# Patient Record
Sex: MALE | Race: ASIAN | NOT HISPANIC OR LATINO | Employment: FULL TIME | URBAN - METROPOLITAN AREA
[De-identification: names, ages, dates, MRNs, and addresses within clinical notes are randomized per-mention and may not be internally consistent; named-entity substitution may affect disease eponyms.]

---

## 2017-07-13 ENCOUNTER — GENERIC CONVERSION - ENCOUNTER (OUTPATIENT)
Dept: OTHER | Facility: OTHER | Age: 52
End: 2017-07-13

## 2017-08-04 ENCOUNTER — ALLSCRIPTS OFFICE VISIT (OUTPATIENT)
Dept: OTHER | Facility: OTHER | Age: 52
End: 2017-08-04

## 2017-08-04 DIAGNOSIS — E04.2 NONTOXIC MULTINODULAR GOITER: ICD-10-CM

## 2017-08-08 ENCOUNTER — APPOINTMENT (OUTPATIENT)
Dept: URGENT CARE | Facility: MEDICAL CENTER | Age: 52
End: 2017-08-08
Payer: OTHER MISCELLANEOUS

## 2017-08-08 PROCEDURE — G0382 LEV 3 HOSP TYPE B ED VISIT: HCPCS

## 2017-08-08 PROCEDURE — 99283 EMERGENCY DEPT VISIT LOW MDM: CPT

## 2017-08-23 ENCOUNTER — ALLSCRIPTS OFFICE VISIT (OUTPATIENT)
Dept: OTHER | Facility: OTHER | Age: 52
End: 2017-08-23

## 2017-08-24 ENCOUNTER — HOSPITAL ENCOUNTER (OUTPATIENT)
Dept: RADIOLOGY | Facility: HOSPITAL | Age: 52
Discharge: HOME | End: 2017-08-24
Attending: RADIOLOGY

## 2017-08-24 DIAGNOSIS — Z76.89 REFERRAL OF PATIENT WITHOUT EXAMINATION OR TREATMENT: ICD-10-CM

## 2017-09-06 ENCOUNTER — ALLSCRIPTS OFFICE VISIT (OUTPATIENT)
Dept: OTHER | Facility: OTHER | Age: 52
End: 2017-09-06

## 2018-01-10 NOTE — MISCELLANEOUS
Message  Return to work or school:   Gianna Caldwell is under my professional care  He was seen in my office on 9-6-2017   He is able to return to work on  9-7-2017    He can perform work without limitations           Signatures   Electronically signed by : DAYLIN Quintana ; Sep  6 2017  9:04AM EST                       (Author)

## 2018-01-13 VITALS
HEIGHT: 65 IN | SYSTOLIC BLOOD PRESSURE: 154 MMHG | WEIGHT: 161 LBS | OXYGEN SATURATION: 99 % | HEART RATE: 84 BPM | RESPIRATION RATE: 16 BRPM | TEMPERATURE: 97.1 F | DIASTOLIC BLOOD PRESSURE: 84 MMHG | BODY MASS INDEX: 26.82 KG/M2

## 2018-01-13 VITALS
BODY MASS INDEX: 26.82 KG/M2 | WEIGHT: 161 LBS | SYSTOLIC BLOOD PRESSURE: 148 MMHG | HEART RATE: 96 BPM | DIASTOLIC BLOOD PRESSURE: 80 MMHG | HEIGHT: 65 IN

## 2018-01-13 VITALS — WEIGHT: 161 LBS | HEART RATE: 85 BPM | DIASTOLIC BLOOD PRESSURE: 83 MMHG | SYSTOLIC BLOOD PRESSURE: 138 MMHG

## 2019-06-10 ENCOUNTER — TELEPHONE (OUTPATIENT)
Dept: INTERNAL MEDICINE CLINIC | Facility: CLINIC | Age: 54
End: 2019-06-10

## 2021-04-06 ENCOUNTER — HOSPITAL ENCOUNTER (INPATIENT)
Facility: HOSPITAL | Age: 56
LOS: 1 days | DRG: 282 | End: 2021-04-07
Attending: EMERGENCY MEDICINE | Admitting: STUDENT IN AN ORGANIZED HEALTH CARE EDUCATION/TRAINING PROGRAM
Payer: COMMERCIAL

## 2021-04-06 ENCOUNTER — APPOINTMENT (EMERGENCY)
Dept: RADIOLOGY | Facility: HOSPITAL | Age: 56
DRG: 282 | End: 2021-04-06
Payer: COMMERCIAL

## 2021-04-06 ENCOUNTER — APPOINTMENT (INPATIENT)
Dept: INTERVENTIONAL RADIOLOGY/VASCULAR | Facility: HOSPITAL | Age: 56
DRG: 282 | End: 2021-04-06
Attending: INTERNAL MEDICINE
Payer: COMMERCIAL

## 2021-04-06 ENCOUNTER — APPOINTMENT (INPATIENT)
Dept: NON INVASIVE DIAGNOSTICS | Facility: HOSPITAL | Age: 56
DRG: 282 | End: 2021-04-06
Payer: COMMERCIAL

## 2021-04-06 ENCOUNTER — OFFICE VISIT (OUTPATIENT)
Dept: URGENT CARE | Facility: CLINIC | Age: 56
End: 2021-04-06
Payer: COMMERCIAL

## 2021-04-06 VITALS
BODY MASS INDEX: 25.83 KG/M2 | HEIGHT: 65 IN | SYSTOLIC BLOOD PRESSURE: 162 MMHG | HEART RATE: 78 BPM | DIASTOLIC BLOOD PRESSURE: 88 MMHG | TEMPERATURE: 98.7 F | OXYGEN SATURATION: 98 % | WEIGHT: 155 LBS | RESPIRATION RATE: 18 BRPM

## 2021-04-06 DIAGNOSIS — R07.9 CHEST PAIN, UNSPECIFIED TYPE: Primary | ICD-10-CM

## 2021-04-06 DIAGNOSIS — I21.4 NSTEMI (NON-ST ELEVATED MYOCARDIAL INFARCTION) (HCC): ICD-10-CM

## 2021-04-06 DIAGNOSIS — I24.9 ACUTE CORONARY SYNDROME (HCC): Primary | ICD-10-CM

## 2021-04-06 DIAGNOSIS — E83.52 HYPERCALCEMIA: ICD-10-CM

## 2021-04-06 DIAGNOSIS — R07.9 CHEST PAIN, UNSPECIFIED TYPE: ICD-10-CM

## 2021-04-06 PROBLEM — D72.829 LEUKOCYTOSIS: Status: ACTIVE | Noted: 2021-04-06

## 2021-04-06 PROBLEM — Z72.0 TOBACCO ABUSE: Status: ACTIVE | Noted: 2021-04-06

## 2021-04-06 PROBLEM — I16.0 HYPERTENSIVE URGENCY: Status: ACTIVE | Noted: 2021-04-06

## 2021-04-06 PROBLEM — R73.9 HYPERGLYCEMIA: Status: ACTIVE | Noted: 2021-04-06

## 2021-04-06 LAB
ALBUMIN SERPL BCP-MCNC: 4.1 G/DL (ref 3.5–5)
ALP SERPL-CCNC: 109 U/L (ref 46–116)
ALT SERPL W P-5'-P-CCNC: 42 U/L (ref 12–78)
ANION GAP SERPL CALCULATED.3IONS-SCNC: 9 MMOL/L (ref 4–13)
APTT PPP: 32 SECONDS (ref 23–37)
APTT PPP: 47 SECONDS (ref 23–37)
AST SERPL W P-5'-P-CCNC: 73 U/L (ref 5–45)
ATRIAL RATE: 90 BPM
BASOPHILS # BLD AUTO: 0.03 THOUSANDS/ΜL (ref 0–0.1)
BASOPHILS NFR BLD AUTO: 0 % (ref 0–1)
BILIRUB DIRECT SERPL-MCNC: 0.24 MG/DL (ref 0–0.2)
BILIRUB SERPL-MCNC: 0.93 MG/DL (ref 0.2–1)
BUN SERPL-MCNC: 17 MG/DL (ref 5–25)
CALCIUM SERPL-MCNC: 12.4 MG/DL (ref 8.3–10.1)
CHLORIDE SERPL-SCNC: 100 MMOL/L (ref 100–108)
CO2 SERPL-SCNC: 31 MMOL/L (ref 21–32)
CREAT SERPL-MCNC: 1.04 MG/DL (ref 0.6–1.3)
EOSINOPHIL # BLD AUTO: 0.04 THOUSAND/ΜL (ref 0–0.61)
EOSINOPHIL NFR BLD AUTO: 0 % (ref 0–6)
ERYTHROCYTE [DISTWIDTH] IN BLOOD BY AUTOMATED COUNT: 13.4 % (ref 11.6–15.1)
GFR SERPL CREATININE-BSD FRML MDRD: 80 ML/MIN/1.73SQ M
GLUCOSE SERPL-MCNC: 135 MG/DL (ref 65–140)
GLUCOSE SERPL-MCNC: 167 MG/DL (ref 65–140)
GLUCOSE SERPL-MCNC: 171 MG/DL (ref 65–140)
HCT VFR BLD AUTO: 43.3 % (ref 36.5–49.3)
HGB BLD-MCNC: 14.2 G/DL (ref 12–17)
IMM GRANULOCYTES # BLD AUTO: 0.02 THOUSAND/UL (ref 0–0.2)
IMM GRANULOCYTES NFR BLD AUTO: 0 % (ref 0–2)
INR PPP: 1.13 (ref 0.84–1.19)
KCT BLD-ACNC: 144 SEC (ref 89–137)
LIPASE SERPL-CCNC: 146 U/L (ref 73–393)
LYMPHOCYTES # BLD AUTO: 1.69 THOUSANDS/ΜL (ref 0.6–4.47)
LYMPHOCYTES NFR BLD AUTO: 17 % (ref 14–44)
MCH RBC QN AUTO: 26.8 PG (ref 26.8–34.3)
MCHC RBC AUTO-ENTMCNC: 32.8 G/DL (ref 31.4–37.4)
MCV RBC AUTO: 82 FL (ref 82–98)
MONOCYTES # BLD AUTO: 0.69 THOUSAND/ΜL (ref 0.17–1.22)
MONOCYTES NFR BLD AUTO: 7 % (ref 4–12)
NEUTROPHILS # BLD AUTO: 7.7 THOUSANDS/ΜL (ref 1.85–7.62)
NEUTS SEG NFR BLD AUTO: 76 % (ref 43–75)
NRBC BLD AUTO-RTO: 0 /100 WBCS
P AXIS: 72 DEGREES
PLATELET # BLD AUTO: 178 THOUSANDS/UL (ref 149–390)
PMV BLD AUTO: 10.6 FL (ref 8.9–12.7)
POTASSIUM SERPL-SCNC: 4 MMOL/L (ref 3.5–5.3)
PR INTERVAL: 166 MS
PROT SERPL-MCNC: 8.6 G/DL (ref 6.4–8.2)
PROTHROMBIN TIME: 14 SECONDS (ref 11.6–14.5)
QRS AXIS: 85 DEGREES
QRSD INTERVAL: 96 MS
QT INTERVAL: 314 MS
QTC INTERVAL: 384 MS
RBC # BLD AUTO: 5.3 MILLION/UL (ref 3.88–5.62)
SODIUM SERPL-SCNC: 140 MMOL/L (ref 136–145)
SPECIMEN SOURCE: ABNORMAL
T WAVE AXIS: 94 DEGREES
TROPONIN I SERPL-MCNC: 11.87 NG/ML
TROPONIN I SERPL-MCNC: 14.91 NG/ML
TROPONIN I SERPL-MCNC: 16.28 NG/ML
TROPONIN I SERPL-MCNC: 20.19 NG/ML
VENTRICULAR RATE: 90 BPM
WBC # BLD AUTO: 10.17 THOUSAND/UL (ref 4.31–10.16)

## 2021-04-06 PROCEDURE — 99285 EMERGENCY DEPT VISIT HI MDM: CPT

## 2021-04-06 PROCEDURE — 85610 PROTHROMBIN TIME: CPT | Performed by: PHYSICIAN ASSISTANT

## 2021-04-06 PROCEDURE — 85025 COMPLETE CBC W/AUTO DIFF WBC: CPT | Performed by: PHYSICIAN ASSISTANT

## 2021-04-06 PROCEDURE — 99223 1ST HOSP IP/OBS HIGH 75: CPT | Performed by: PHYSICIAN ASSISTANT

## 2021-04-06 PROCEDURE — 82948 REAGENT STRIP/BLOOD GLUCOSE: CPT

## 2021-04-06 PROCEDURE — B211YZZ FLUOROSCOPY OF MULTIPLE CORONARY ARTERIES USING OTHER CONTRAST: ICD-10-PCS | Performed by: INTERNAL MEDICINE

## 2021-04-06 PROCEDURE — 93306 TTE W/DOPPLER COMPLETE: CPT

## 2021-04-06 PROCEDURE — 85730 THROMBOPLASTIN TIME PARTIAL: CPT | Performed by: PHYSICIAN ASSISTANT

## 2021-04-06 PROCEDURE — 85347 COAGULATION TIME ACTIVATED: CPT

## 2021-04-06 PROCEDURE — C9113 INJ PANTOPRAZOLE SODIUM, VIA: HCPCS | Performed by: PHYSICIAN ASSISTANT

## 2021-04-06 PROCEDURE — 99291 CRITICAL CARE FIRST HOUR: CPT | Performed by: PHYSICIAN ASSISTANT

## 2021-04-06 PROCEDURE — 96374 THER/PROPH/DIAG INJ IV PUSH: CPT

## 2021-04-06 PROCEDURE — 93458 L HRT ARTERY/VENTRICLE ANGIO: CPT

## 2021-04-06 PROCEDURE — 36415 COLL VENOUS BLD VENIPUNCTURE: CPT | Performed by: PHYSICIAN ASSISTANT

## 2021-04-06 PROCEDURE — 71045 X-RAY EXAM CHEST 1 VIEW: CPT

## 2021-04-06 PROCEDURE — 4A033BC MEASUREMENT OF ARTERIAL PRESSURE, CORONARY, PERCUTANEOUS APPROACH: ICD-10-PCS | Performed by: INTERNAL MEDICINE

## 2021-04-06 PROCEDURE — 84484 ASSAY OF TROPONIN QUANT: CPT | Performed by: PHYSICIAN ASSISTANT

## 2021-04-06 PROCEDURE — C1769 GUIDE WIRE: HCPCS

## 2021-04-06 PROCEDURE — B215YZZ FLUOROSCOPY OF LEFT HEART USING OTHER CONTRAST: ICD-10-PCS | Performed by: INTERNAL MEDICINE

## 2021-04-06 PROCEDURE — 99152 MOD SED SAME PHYS/QHP 5/>YRS: CPT | Performed by: INTERNAL MEDICINE

## 2021-04-06 PROCEDURE — 99221 1ST HOSP IP/OBS SF/LOW 40: CPT | Performed by: INTERNAL MEDICINE

## 2021-04-06 PROCEDURE — 99153 MOD SED SAME PHYS/QHP EA: CPT

## 2021-04-06 PROCEDURE — 84484 ASSAY OF TROPONIN QUANT: CPT | Performed by: INTERNAL MEDICINE

## 2021-04-06 PROCEDURE — NC001 PR NO CHARGE: Performed by: PHYSICIAN ASSISTANT

## 2021-04-06 PROCEDURE — 93306 TTE W/DOPPLER COMPLETE: CPT | Performed by: INTERNAL MEDICINE

## 2021-04-06 PROCEDURE — C1894 INTRO/SHEATH, NON-LASER: HCPCS

## 2021-04-06 PROCEDURE — 80048 BASIC METABOLIC PNL TOTAL CA: CPT | Performed by: PHYSICIAN ASSISTANT

## 2021-04-06 PROCEDURE — 4A023N7 MEASUREMENT OF CARDIAC SAMPLING AND PRESSURE, LEFT HEART, PERCUTANEOUS APPROACH: ICD-10-PCS | Performed by: INTERNAL MEDICINE

## 2021-04-06 PROCEDURE — 83036 HEMOGLOBIN GLYCOSYLATED A1C: CPT | Performed by: PHYSICIAN ASSISTANT

## 2021-04-06 PROCEDURE — 93458 L HRT ARTERY/VENTRICLE ANGIO: CPT | Performed by: INTERNAL MEDICINE

## 2021-04-06 PROCEDURE — 99152 MOD SED SAME PHYS/QHP 5/>YRS: CPT

## 2021-04-06 PROCEDURE — 99213 OFFICE O/P EST LOW 20 MIN: CPT | Performed by: PHYSICIAN ASSISTANT

## 2021-04-06 PROCEDURE — 85730 THROMBOPLASTIN TIME PARTIAL: CPT | Performed by: STUDENT IN AN ORGANIZED HEALTH CARE EDUCATION/TRAINING PROGRAM

## 2021-04-06 PROCEDURE — 83690 ASSAY OF LIPASE: CPT | Performed by: PHYSICIAN ASSISTANT

## 2021-04-06 PROCEDURE — 93010 ELECTROCARDIOGRAM REPORT: CPT | Performed by: INTERNAL MEDICINE

## 2021-04-06 PROCEDURE — 93005 ELECTROCARDIOGRAM TRACING: CPT

## 2021-04-06 PROCEDURE — 80076 HEPATIC FUNCTION PANEL: CPT | Performed by: PHYSICIAN ASSISTANT

## 2021-04-06 RX ORDER — SODIUM CHLORIDE 9 MG/ML
50 INJECTION, SOLUTION INTRAVENOUS CONTINUOUS
Status: DISCONTINUED | OUTPATIENT
Start: 2021-04-06 | End: 2021-04-06

## 2021-04-06 RX ORDER — AMLODIPINE BESYLATE 5 MG/1
5 TABLET ORAL DAILY
Status: CANCELLED | OUTPATIENT
Start: 2021-04-07

## 2021-04-06 RX ORDER — ATORVASTATIN CALCIUM 40 MG/1
40 TABLET, FILM COATED ORAL
Status: CANCELLED | OUTPATIENT
Start: 2021-04-07

## 2021-04-06 RX ORDER — OMEPRAZOLE 10 MG/1
10 CAPSULE, DELAYED RELEASE ORAL DAILY
COMMUNITY
End: 2021-10-27

## 2021-04-06 RX ORDER — ONDANSETRON 2 MG/ML
4 INJECTION INTRAMUSCULAR; INTRAVENOUS EVERY 6 HOURS PRN
Status: DISCONTINUED | OUTPATIENT
Start: 2021-04-06 | End: 2021-04-07 | Stop reason: HOSPADM

## 2021-04-06 RX ORDER — PANTOPRAZOLE SODIUM 40 MG/1
40 TABLET, DELAYED RELEASE ORAL
Status: DISCONTINUED | OUTPATIENT
Start: 2021-04-07 | End: 2021-04-07 | Stop reason: HOSPADM

## 2021-04-06 RX ORDER — MAGNESIUM HYDROXIDE/ALUMINUM HYDROXICE/SIMETHICONE 120; 1200; 1200 MG/30ML; MG/30ML; MG/30ML
10 SUSPENSION ORAL ONCE
Status: COMPLETED | OUTPATIENT
Start: 2021-04-06 | End: 2021-04-06

## 2021-04-06 RX ORDER — FENTANYL CITRATE 50 UG/ML
INJECTION, SOLUTION INTRAMUSCULAR; INTRAVENOUS CODE/TRAUMA/SEDATION MEDICATION
Status: COMPLETED | OUTPATIENT
Start: 2021-04-06 | End: 2021-04-06

## 2021-04-06 RX ORDER — ONDANSETRON 2 MG/ML
4 INJECTION INTRAMUSCULAR; INTRAVENOUS EVERY 6 HOURS PRN
Status: CANCELLED | OUTPATIENT
Start: 2021-04-06

## 2021-04-06 RX ORDER — PANTOPRAZOLE SODIUM 40 MG/1
40 TABLET, DELAYED RELEASE ORAL
Status: CANCELLED | OUTPATIENT
Start: 2021-04-07

## 2021-04-06 RX ORDER — ASPIRIN 81 MG/1
81 TABLET ORAL DAILY
Status: DISCONTINUED | OUTPATIENT
Start: 2021-04-07 | End: 2021-04-07 | Stop reason: HOSPADM

## 2021-04-06 RX ORDER — MAGNESIUM HYDROXIDE/ALUMINUM HYDROXICE/SIMETHICONE 120; 1200; 1200 MG/30ML; MG/30ML; MG/30ML
10 SUSPENSION ORAL EVERY 4 HOURS PRN
Status: DISCONTINUED | OUTPATIENT
Start: 2021-04-06 | End: 2021-04-07 | Stop reason: HOSPADM

## 2021-04-06 RX ORDER — HEPARIN SODIUM 10000 [USP'U]/100ML
3-20 INJECTION, SOLUTION INTRAVENOUS
Status: CANCELLED | OUTPATIENT
Start: 2021-04-06

## 2021-04-06 RX ORDER — ASPIRIN 81 MG/1
81 TABLET, CHEWABLE ORAL DAILY
Status: DISCONTINUED | OUTPATIENT
Start: 2021-04-07 | End: 2021-04-07 | Stop reason: HOSPADM

## 2021-04-06 RX ORDER — SODIUM CHLORIDE, SODIUM LACTATE, POTASSIUM CHLORIDE, CALCIUM CHLORIDE 600; 310; 30; 20 MG/100ML; MG/100ML; MG/100ML; MG/100ML
75 INJECTION, SOLUTION INTRAVENOUS CONTINUOUS
Status: DISCONTINUED | OUTPATIENT
Start: 2021-04-06 | End: 2021-04-07 | Stop reason: HOSPADM

## 2021-04-06 RX ORDER — LIDOCAINE WITH 8.4% SOD BICARB 0.9%(10ML)
SYRINGE (ML) INJECTION CODE/TRAUMA/SEDATION MEDICATION
Status: COMPLETED | OUTPATIENT
Start: 2021-04-06 | End: 2021-04-06

## 2021-04-06 RX ORDER — ASPIRIN 81 MG/1
324 TABLET, CHEWABLE ORAL ONCE
Status: COMPLETED | OUTPATIENT
Start: 2021-04-06 | End: 2021-04-06

## 2021-04-06 RX ORDER — LIDOCAINE HYDROCHLORIDE 20 MG/ML
10 SOLUTION OROPHARYNGEAL ONCE
Status: COMPLETED | OUTPATIENT
Start: 2021-04-06 | End: 2021-04-06

## 2021-04-06 RX ORDER — VERAPAMIL HCL 2.5 MG/ML
AMPUL (ML) INTRAVENOUS CODE/TRAUMA/SEDATION MEDICATION
Status: COMPLETED | OUTPATIENT
Start: 2021-04-06 | End: 2021-04-06

## 2021-04-06 RX ORDER — MAGNESIUM HYDROXIDE/ALUMINUM HYDROXICE/SIMETHICONE 120; 1200; 1200 MG/30ML; MG/30ML; MG/30ML
10 SUSPENSION ORAL EVERY 4 HOURS PRN
Status: CANCELLED | OUTPATIENT
Start: 2021-04-06

## 2021-04-06 RX ORDER — HEPARIN SODIUM 1000 [USP'U]/ML
2000 INJECTION, SOLUTION INTRAVENOUS; SUBCUTANEOUS
Status: CANCELLED | OUTPATIENT
Start: 2021-04-06

## 2021-04-06 RX ORDER — ASPIRIN 81 MG/1
81 TABLET, CHEWABLE ORAL DAILY
Status: CANCELLED | OUTPATIENT
Start: 2021-04-07

## 2021-04-06 RX ORDER — ASPIRIN 81 MG/1
81 TABLET ORAL DAILY
Status: CANCELLED | OUTPATIENT
Start: 2021-04-07

## 2021-04-06 RX ORDER — MIDAZOLAM HYDROCHLORIDE 2 MG/2ML
INJECTION, SOLUTION INTRAMUSCULAR; INTRAVENOUS CODE/TRAUMA/SEDATION MEDICATION
Status: COMPLETED | OUTPATIENT
Start: 2021-04-06 | End: 2021-04-06

## 2021-04-06 RX ORDER — NITROGLYCERIN 20 MG/100ML
INJECTION INTRAVENOUS CODE/TRAUMA/SEDATION MEDICATION
Status: COMPLETED | OUTPATIENT
Start: 2021-04-06 | End: 2021-04-06

## 2021-04-06 RX ORDER — HEPARIN SODIUM 1000 [USP'U]/ML
4000 INJECTION, SOLUTION INTRAVENOUS; SUBCUTANEOUS
Status: CANCELLED | OUTPATIENT
Start: 2021-04-06

## 2021-04-06 RX ORDER — AMLODIPINE BESYLATE 5 MG/1
5 TABLET ORAL DAILY
Status: DISCONTINUED | OUTPATIENT
Start: 2021-04-06 | End: 2021-04-07 | Stop reason: HOSPADM

## 2021-04-06 RX ORDER — ACETAMINOPHEN 325 MG/1
650 TABLET ORAL EVERY 6 HOURS PRN
Status: CANCELLED | OUTPATIENT
Start: 2021-04-06

## 2021-04-06 RX ORDER — HEPARIN SODIUM 10000 [USP'U]/100ML
3-20 INJECTION, SOLUTION INTRAVENOUS
Status: DISCONTINUED | OUTPATIENT
Start: 2021-04-06 | End: 2021-04-07 | Stop reason: HOSPADM

## 2021-04-06 RX ORDER — PANTOPRAZOLE SODIUM 40 MG/1
40 INJECTION, POWDER, FOR SOLUTION INTRAVENOUS ONCE
Status: COMPLETED | OUTPATIENT
Start: 2021-04-06 | End: 2021-04-06

## 2021-04-06 RX ORDER — HEPARIN SODIUM 1000 [USP'U]/ML
4000 INJECTION, SOLUTION INTRAVENOUS; SUBCUTANEOUS
Status: DISCONTINUED | OUTPATIENT
Start: 2021-04-06 | End: 2021-04-07 | Stop reason: HOSPADM

## 2021-04-06 RX ORDER — ATORVASTATIN CALCIUM 40 MG/1
40 TABLET, FILM COATED ORAL
Status: DISCONTINUED | OUTPATIENT
Start: 2021-04-06 | End: 2021-04-07 | Stop reason: HOSPADM

## 2021-04-06 RX ORDER — HEPARIN SODIUM 1000 [USP'U]/ML
2000 INJECTION, SOLUTION INTRAVENOUS; SUBCUTANEOUS
Status: DISCONTINUED | OUTPATIENT
Start: 2021-04-06 | End: 2021-04-07 | Stop reason: HOSPADM

## 2021-04-06 RX ORDER — HEPARIN SODIUM 1000 [USP'U]/ML
4000 INJECTION, SOLUTION INTRAVENOUS; SUBCUTANEOUS ONCE
Status: COMPLETED | OUTPATIENT
Start: 2021-04-06 | End: 2021-04-06

## 2021-04-06 RX ORDER — SODIUM CHLORIDE, SODIUM LACTATE, POTASSIUM CHLORIDE, CALCIUM CHLORIDE 600; 310; 30; 20 MG/100ML; MG/100ML; MG/100ML; MG/100ML
75 INJECTION, SOLUTION INTRAVENOUS CONTINUOUS
Status: CANCELLED | OUTPATIENT
Start: 2021-04-06

## 2021-04-06 RX ORDER — HEPARIN SODIUM 1000 [USP'U]/ML
INJECTION, SOLUTION INTRAVENOUS; SUBCUTANEOUS CODE/TRAUMA/SEDATION MEDICATION
Status: COMPLETED | OUTPATIENT
Start: 2021-04-06 | End: 2021-04-06

## 2021-04-06 RX ORDER — ACETAMINOPHEN 325 MG/1
650 TABLET ORAL EVERY 6 HOURS PRN
Status: DISCONTINUED | OUTPATIENT
Start: 2021-04-06 | End: 2021-04-07 | Stop reason: HOSPADM

## 2021-04-06 RX ADMIN — NITROGLYCERIN 200 MCG: 20 INJECTION INTRAVENOUS at 17:07

## 2021-04-06 RX ADMIN — LIDOCAINE HYDROCHLORIDE 10 ML: 20 SOLUTION OROPHARYNGEAL at 08:39

## 2021-04-06 RX ADMIN — Medication 2 ML: at 17:05

## 2021-04-06 RX ADMIN — MAGNESIUM HYDROXIDE/ALUMINUM HYDROXICE/SIMETHICONE 10 ML: 120; 1200; 1200 SUSPENSION ORAL at 08:38

## 2021-04-06 RX ADMIN — ASPIRIN 324 MG: 81 TABLET, CHEWABLE ORAL at 13:37

## 2021-04-06 RX ADMIN — METOPROLOL TARTRATE 25 MG: 25 TABLET, FILM COATED ORAL at 15:49

## 2021-04-06 RX ADMIN — AMLODIPINE BESYLATE 5 MG: 5 TABLET ORAL at 15:49

## 2021-04-06 RX ADMIN — HEPARIN SODIUM 12 UNITS/KG/HR: 10000 INJECTION, SOLUTION INTRAVENOUS at 12:25

## 2021-04-06 RX ADMIN — FENTANYL CITRATE 50 MCG: 50 INJECTION, SOLUTION INTRAMUSCULAR; INTRAVENOUS at 17:05

## 2021-04-06 RX ADMIN — IODIXANOL 45 ML: 320 INJECTION, SOLUTION INTRAVASCULAR at 17:23

## 2021-04-06 RX ADMIN — ATORVASTATIN CALCIUM 40 MG: 40 TABLET, FILM COATED ORAL at 18:36

## 2021-04-06 RX ADMIN — SODIUM CHLORIDE, SODIUM LACTATE, POTASSIUM CHLORIDE, AND CALCIUM CHLORIDE 75 ML/HR: .6; .31; .03; .02 INJECTION, SOLUTION INTRAVENOUS at 18:36

## 2021-04-06 RX ADMIN — MIDAZOLAM HYDROCHLORIDE 1 MG: 1 INJECTION, SOLUTION INTRAMUSCULAR; INTRAVENOUS at 17:04

## 2021-04-06 RX ADMIN — PANTOPRAZOLE SODIUM 40 MG: 40 INJECTION, POWDER, FOR SOLUTION INTRAVENOUS at 11:18

## 2021-04-06 RX ADMIN — HEPARIN SODIUM 4000 UNITS: 1000 INJECTION INTRAVENOUS; SUBCUTANEOUS at 12:25

## 2021-04-06 RX ADMIN — VERAPAMIL HYDROCHLORIDE 2.5 MG: 2.5 INJECTION, SOLUTION INTRAVENOUS at 17:07

## 2021-04-06 RX ADMIN — HEPARIN SODIUM 5000 UNITS: 1000 INJECTION INTRAVENOUS; SUBCUTANEOUS at 17:10

## 2021-04-06 RX ADMIN — Medication 25 MG: at 08:39

## 2021-04-06 NOTE — ASSESSMENT & PLAN NOTE
· POA, slightly elevated at 10 17  · Likely reactive  · No additional evidence of underlying infection  · Patient is afebrile, hemodynamically stable otherwise  · Monitor CBC closely

## 2021-04-06 NOTE — CONSULTS
Consult to cardiology  Consult performed by: Regi Mejia MD  Consult ordered by: Davidson Bell PA-C       Reason for consultation:   Elevated troponin     HPI:   54-year-old male with chronic active smoker, family history of premature coronary artery disease was referred from urgent care for further evaluation     patient is experiencing substernal/epigastric burning sensation which is intermittent in nature going on since   He tried milk which initially helped but symptoms progress and was having some shortness of breath yesterday  He again tried milk and Tums which did not help and so he went to urgent care  Patient was given GI cocktail which partially helped the symptoms and he was sent to emergency department for further evaluation  patient denies any fever, chills, leg edema, orthopnea, leg edema, black stool or blood in stool, dizziness or loss of consciousness   on arrival to ED patient was noted to have significant elevated blood pressure and as per patient he was never diagnosed to have high blood pressure/hyperlipidemia or diabetes     he denies personal history of myocardial infarction, TIA / CVA, heart failure, cardiac arrhythmia or peripheral vascular disease     social history:  Chronic smoker 1 pack per day, denies illicit drug use or alcohol intake   Family history: Father  of MI at age of 54    Multiple family members have coronary artery disease     patient was told to have cardiac murmur many years back in [place] and underwent stress test which was okay as per patient     at present time he has mild epigastric burning     physical examination:  General:  moderate built, awake, alert and oriented x3, not in distress  Neck: supple, no JVD  Eyes: PERRL, conjunctiva normal  Lungs:  Bilateral air entry positive, no wheeze/rhonchi or crackle  Heart:  S1-S2 normal, no murmur  Abdomen:  Soft ,nondistended ,nontender, bowel sounds positive  Extremities:  No leg edema, no deformity, ROM normal  Neuro:  Moving all extremities, speech clear  Skin: warm, no rash    Labs including vitals reviewed   Troponin 11  Increase calcium likely as patient was taking milk of magnesia and Tums  EKG showed sinus rhythm, nonspecific ST changes     Cardiac telemetry monitoring showed sinus rhythm without any evidence of arrhythmia     Assessment:   1  Non-STEMI  2  Hypertension  3  Chronic active smoker  4  Family history of premature coronary artery disease     recommendation:   Continues telemetry monitoring   Give aspirin 325 mg   Continue therapeutic heparin with PTT monitoring   Repeat troponin   2D echocardiogram   risk, benefit and alternatives of cardiac catheterization including possible coronary intervention explained to the patient  Patient understands and agrees   NPO for cardiac catheterization     addendum note   Cardiac catheterization showed multivessel coronary artery disease with 100% ostial LAD and proximal % occlusion, OM1 90% stenosis  Patient will be transferred to [facility] for CABG evaluation      above discussed with patient  Slim notified

## 2021-04-06 NOTE — DISCHARGE SUMMARY
[address] [facility]  Discharge- Kelly Aas 1965, 64 y o  male MRN: [id number]  Unit/Bed#: -01 Encounter: [id number]  Primary Care Provider: Jose Poon MD   Date and time admitted to hospital: 4/6/2021 10:54 AM               DOS: 4/6/2021  * Chest pain  Assessment & Plan  · Patient presenting with midsternal, burning chest pain that started Sunday night for eating a large meal  · Reports that was only relieved with drinking milk and self-induced vomiting  · Positive family history of CAD, tobacco abuse  · Trop 11 87/14 91  · NSTEMI, type 1  · EKG showed sinus rhythm   · Cardiology consulted,   · Status post aspirin 325 mg today  · Place on aspirin 81 mg daily and Lipitor 40 mg daily  · Started on Lopressor 25 mg b i d  And amlodipine 5 mg daily per Cardiology  · Currently on therapeutic heparin drip  · Echo with EF 55%, G2 DD  · Status post cardiac catheterization, evidence of triple-vessel disease  Recommending that patient be transferred to [facility] for CABG evaluation  · Obtain lipid panel  · Trial protonix and PRN mylanta for any concurrent GERD symptoms  · Saturating well on RA      Leukocytosis  Assessment & Plan  · POA, slightly elevated at 10 17  · Likely reactive  · No additional evidence of underlying infection  · Patient is afebrile, hemodynamically stable otherwise  · Monitor CBC closely    Hypercalcemia  Assessment & Plan  · POA, calcium 12 4  · No prior labs to compare  · Could be multifactorial in setting of patient taking in increased doses of Tums as an outpatient as well self-induced vomiting, dehydration  · Continue on very gentle IV fluid hydration  · Obtain repeat CMP in the a m  Tobacco abuse  Assessment & Plan  · Currently every day smoker  Smokes 1 pack a day     · Counseled on smoking cessation   · Offered nicotine patch while inpatient, patient declined      Hyperglycemia  Assessment & Plan  · BMP with glucose noted to be 171  · Patient denies any history of diabetes, however does not appear to have followed up with a PCP in quite some time  · Obtain hemoglobin A1c  · Q i d  Fingersticks  · Monitor closely    Hypertensive urgency  Assessment & Plan  · BP was 188/108 on admission  · Patient denies any prior history of hypertension  · Significant improvement noted, most recent /84  · Started on Lopressor 25 mg b i d  And amlodipine 5 mg daily per Cardiology  · Monitor BP closely       Discharging Physician / Practitioner: Donnie Almazan PA-C  PCP: Aguilar Plaza MD  Admission Date:   Admission Orders (From admission, onward)     Ordered        04/06/21 1222  Inpatient Admission  Once                   Discharge Date: 04/06/21    Resolved Problems  Date Reviewed: 4/6/2021    None          Consultations During Hospital Stay:  · Cardiology    Procedures Performed:   · Chest x-ray 4/6-no acute cardiopulmonary disease  · Echo 4/6-EF 55%, hypokinesis of the mid anterior and apical anterior wall  G2 DD  · Cardiac catheterization 4/6 - triple-vessel disease, full report pending    Significant Findings / Test Results:   · Hyperglycemia  · Hypercalcemia  · Troponin of 11 87/14 91  · Mild leukocytosis, likely reactive    Incidental Findings:   · None     Test Results Pending at Discharge (will require follow up): · None     Outpatient Tests Requested:  · N/A, pt to be transferred to [facility]    Complications:  None    Reason for Admission:  Chest pain, NSTEMI    Hospital Course:     Patti Chua is a 64 y o  male patient with significant past medical history of tobacco abuse who originally presented to the hospital on 4/6/2021 due to midsternal chest pain and burning sensation  Patient's symptoms have been ongoing for 2 days prior to admission  He thought that the symptoms were likely related to GERD and was trialing over-the-counter Tums and PPI without relief  Pain became so severe that he came into the ER  Troponin was initially elevated at 11   He had significant family history of heart disease and history of tobacco smoking  He had declined any additional medical history  Patient was evaluated by Cardiology and underwent echocardiogram as well as cardiac catheterization  Patient was noted to have triple-vessel disease on cardiac catheterization and recommended transfer to [facility] for CABG evaluation  Patient was in agreement with transfer  Patient was discharged in stable condition  For additional information please refer to medical records  The patient, initially admitted to the hospital as inpatient, was discharged earlier than expected given the following: Patient to be discharged to [facility] for CABG evaluation  Please see above list of diagnoses and related plan for additional information  Condition at Discharge: stable     Discharge Day Visit / Exam:     * Please refer to separate progress note for these details *    Discussion with Family:  Cardiology discussed with patient regarding transfer, he is in agreement    Discharge instructions/Information to patient and family:   See after visit summary for information provided to patient and family  Provisions for Follow-Up Care:  See after visit summary for information related to follow-up care and any pertinent home health orders  Disposition:     [address] Transfer to [facility] to [facility]:   · Not Applicable to this Patient - Not Applicable to this Patient    Planned Readmission:  None     Discharge Statement:  I spent 35 minutes discharging the patient  This time was spent on the day of discharge  I had direct contact with the patient on the day of discharge  Greater than 50% of the total time was spent examining patient, answering all patient questions, arranging and discussing plan of care with patient as well as directly providing post-discharge instructions    Additional time then spent on discharge activities  Discharge Medications:  See after visit summary for reconciled discharge medications provided to patient and family        ** Please Note: This note has been constructed using a voice recognition system **

## 2021-04-06 NOTE — ASSESSMENT & PLAN NOTE
· POA, calcium 12 4  · No prior labs to compare  · Could be multifactorial in setting of patient taking in increased doses of Tums as an outpatient as well self-induced vomiting, dehydration  · Placed on very gentle IV fluid hydration  · Obtain repeat CMP in the a m

## 2021-04-06 NOTE — PROGRESS NOTES
[address] [facility]        NAME: Nadege Rudolph is a 64 y o  male  : 1965    MRN: [id number]  DATE: 2021  TIME: 8:57 AM    Assessment and Plan   Chest pain, unspecified type [R07 9]  1  Chest pain, unspecified type  Lidocaine Viscous HCl (XYLOCAINE) 2 % mucosal solution 10 mL    diphenhydrAMINE (BENADRYL) oral liquid 25 mg    aluminum-magnesium hydroxide-simethicone (MYLANTA) oral suspension 10 mL    ECG 12 lead         Patient Instructions     Patient Instructions   Abnormal EKG  Reports improvement with GI cocktail, 10/10 pain down to 7/10 pain  Symptoms most likely due to acid reflux  Unable to r/o cardiac etiology  Recommend evaluation at [facility]'s ED  **Portions of the record may have been created with voice recognition software  Occasional wrong word or "sound a like" substitutions may have occurred due to the inherent limitations of voice recognition software  Read the chart carefully and recognize, using context, where substitutions have occurred  **     Chief Complaint     Chief Complaint   Patient presents with    Heartburn     Pt c/o acid reflex flair up that started yesterday morning  History of Present Illness     77-year-old male presents clinic with complaints of heartburn x1 day  Reports 3:00 a m  yesterday went to lay down for bed and fell the burning in his chest  He reports some chest discomfort and occasional shortness of breath when his symptoms flair  She states this occurred after eating pizza and having soda which she does not typically consume  He denies any history of acid reflux  He reports that making himself vomit and drinking milk helps with his symptoms  He noticed his vomit was pink in color, but denies blood or black coffee-ground vomitus  He took an omeprazole last night with no improvement in his symptoms  He reports history of cardiac murmur, otherwise no significant cardiac history     He denies fever, nausea, diarrhea, abdominal pain, body aches or chills  He smokes 1 pack per day  Review of Systems     Review of Systems   Constitutional: Negative for fever  Respiratory: Positive for shortness of breath  Negative for chest tightness  Cardiovascular: Positive for chest pain  Negative for palpitations and leg swelling  Gastrointestinal: Negative for abdominal pain, diarrhea, nausea and vomiting  Heartburn   Musculoskeletal: Negative for myalgias  Neurological: Negative for dizziness and headaches  Current Medications     Current Outpatient Medications:     omeprazole (PriLOSEC) 10 mg delayed release capsule, Take 10 mg by mouth daily, Disp: , Rfl:   No current facility-administered medications for this visit  Current Allergies     Allergies as of 04/06/2021    (No Known Allergies)            The following portions of the patient's history were reviewed and updated as appropriate: allergies, current medications, past family history, past medical history, past social history, past surgical history and problem list      Past Medical History:   Diagnosis Date    Acid reflux        Past Surgical History:   Procedure Laterality Date    VASECTOMY         History reviewed  No pertinent family history  Medications have been verified  Objective     /88 (BP Location: Left arm, Patient Position: Sitting, Cuff Size: Standard)   Pulse 78   Temp 98 7 °F (37 1 °C)   Resp 18   Ht 5' 5" (1 651 m)   Wt 70 3 kg (155 lb)   SpO2 98%   BMI 25 79 kg/m²        Physical Exam     Physical Exam  Vitals signs and nursing note reviewed  Constitutional:       General: He is not in acute distress  Appearance: Normal appearance  He is normal weight  He is not ill-appearing or diaphoretic  HENT:      Head: Normocephalic and atraumatic  Cardiovascular:      Rate and Rhythm: Normal rate and regular rhythm  Pulses: Normal pulses  Heart sounds: Murmur present     Pulmonary:      Effort: Pulmonary effort is normal  No respiratory distress  Breath sounds: Normal breath sounds  No wheezing, rhonchi or rales  Abdominal:      General: Abdomen is flat  Bowel sounds are normal  There is no distension  Palpations: Abdomen is soft  Tenderness: There is no abdominal tenderness  There is no guarding or rebound  Skin:     Findings: No rash  Neurological:      Mental Status: He is alert and oriented to person, place, and time

## 2021-04-06 NOTE — H&P
[address] [facility]  H&P- Nadege Rudolph 1965, 64 y o  male MRN: [id number]  Unit/Bed#: ED 10 Encounter: [id number]  Primary Care Provider: Jose Church MD   Date and time admitted to hospital: 4/6/2021 10:54 AM      DOS: 4/6/2021  * Chest pain  Assessment & Plan  · Patient presenting with midsternal, burning chest pain that started Sunday night for eating a large meal  · Reports that was only relieved with drinking milk and self-induced vomiting  · Positive family history of CAD, tobacco abuse  · Trop 11 87/14 9, continue to trend to peak    · NSTEMI, type to be determined  · EKG showed sinus rhythm   · Cardiology consulted,  · Status post aspirin 325 mg today  · Place on aspirin 81 mg daily and Lipitor 40 mg daily  · Started on Lopressor 25 mg b i d  And amlodipine 5 mg daily per Cardiology  · Started on therapeutic heparin drip  · Obtain echocardiogram  · Patient currently NPO, plan for cardiac catheterization possibly later today  · Obtain lipid panel  · Trial protonix and PRN mylanta for any GERD symptoms  · Saturating well on RA      Leukocytosis  Assessment & Plan  · POA, slightly elevated at 10 17  · Likely reactive  · No additional evidence of underlying infection  · Patient is afebrile, hemodynamically stable otherwise  · Monitor CBC closely    Hypercalcemia  Assessment & Plan  · POA, calcium 12 4  · No prior labs to compare  · Could be multifactorial in setting of patient taking in increased doses of Tums as an outpatient as well self-induced vomiting, dehydration  · Placed on very gentle IV fluid hydration  · Obtain repeat CMP in the a m  Tobacco abuse  Assessment & Plan  · Currently every day smoker  Smokes 1 pack a day     · Counseled on smoking cessation   · Offered nicotine patch while inpatient, patient declined      Hyperglycemia  Assessment & Plan  · BMP with glucose noted to be 171  · Patient denies any history of diabetes, however does not appear to have followed up with a PCP in quite some time  · Obtain hemoglobin A1c  · Q i d  Fingersticks  · Monitor closely    Hypertensive urgency  Assessment & Plan  · BP was 188/108 on admission  · Patient denies any prior history of hypertension  · Started on Lopressor 25 mg b i d  And amlodipine 5 mg daily per Cardiology  · Monitor BP closely     VTE Prophylaxis: Heparin Drip  / sequential compression device   Code Status:  Level 1-full code, discussed with patient at bedside  POLST: There is no POLST form on file for this patient (pre-hospital)  Discussion with family:  Discussed with patient at bedside regarding plan of care, when asked update friends or family members patient politely declined    Anticipated Length of Stay:  Patient will be admitted on an Inpatient basis with an anticipated length of stay of  > 2 midnights  Justification for Hospital Stay:  Patient presenting with chest pain, NSTEMI requiring cardiac catheterization, echo and continuous cardiac monitoring    Total Time for Visit, including Counseling / Coordination of Care: 30 minutes  Greater than 50% of this total time spent on direct patient counseling and coordination of care  Chief Complaint:  "I felt like my chest was burning "    History of Present Illness:    Sal Garcia is a 64 y o  male with significant past medical history of tobacco abuse who presents with midsternal chest pain/burning x2 days  Patient reports that symptoms started Sunday night after a large meal   He states that the pain feels like a burning sensation and thought it was secondary to acid reflux  Therefore he has been taking increased amount of Tums, drinking milk and try to dose of omeprazole without much relief  Reports that the pain is worse when he lays down  Reports that he even resorted to self-induced vomiting to help his symptoms  However he continued to have pain and therefore came to the ER  Reports that his pain is very minimal currently    He denies any associated shortness of breath, diaphoresis  States that he has significant history of cardiac disease on paternal side  Review of Systems:    Review of Systems   Constitutional: Negative for appetite change, chills, diaphoresis and fever  HENT: Negative for congestion, sinus pressure, sneezing, sore throat and tinnitus  Eyes: Negative  Negative for visual disturbance  Respiratory: Negative for cough, chest tightness, shortness of breath and wheezing  Cardiovascular: Positive for chest pain  Negative for palpitations and leg swelling  Gastrointestinal: Positive for vomiting  Negative for abdominal pain, constipation, diarrhea and nausea  Genitourinary: Negative for difficulty urinating, dysuria, hematuria and urgency  Musculoskeletal: Negative for back pain, joint swelling and myalgias  Skin: Negative for color change, pallor and rash  Neurological: Negative for dizziness, light-headedness and headaches  Past Medical and Surgical History:     Past Medical History:   Diagnosis Date    Acid reflux        Past Surgical History:   Procedure Laterality Date    VASECTOMY         Meds/Allergies:    Prior to Admission medications    Medication Sig Start Date End Date Taking? Authorizing Provider   omeprazole (PriLOSEC) 10 mg delayed release capsule Take 10 mg by mouth daily    Historical Provider, MD     I have reviewed home medications with patient personally      Allergies: No Known Allergies    Social History:     Marital Status:    Occupation:   Patient Pre-hospital Living Situation:  Patient lives at home alone  Patient Pre-hospital Level of Mobility:  Full mobility  Patient Pre-hospital Diet Restrictions:  None  Substance Use History:   Social History     Substance and Sexual Activity   Alcohol Use Never    Frequency: Never     Social History     Tobacco Use   Smoking Status Current Every Day Smoker    Packs/day: 1 00    Types: Cigarettes   Smokeless Tobacco Never Used     Social History     Substance and Sexual Activity   Drug Use Never       Family History:    non-contributory    Physical Exam:     Vitals:   Blood Pressure: 169/88 (04/06/21 1216)  Pulse: 67 (04/06/21 1216)  Temperature: 98 6 °F (37 °C) (04/06/21 0920)  Temp Source: Oral (04/06/21 0920)  Respirations: 18 (04/06/21 1216)  Height: 5' 5" (165 1 cm) (04/06/21 0920)  Weight - Scale: 70 4 kg (155 lb 2 6 oz) (04/06/21 0920)  SpO2: 100 % (04/06/21 1216)    Physical Exam  Vitals signs reviewed  Constitutional:       General: He is not in acute distress  Appearance: He is not toxic-appearing  Comments: Patient is in no acute distress lying in his hospital bed resting comfortably   HENT:      Head: Normocephalic and atraumatic  Eyes:      Conjunctiva/sclera: Conjunctivae normal       Pupils: Pupils are equal, round, and reactive to light  Cardiovascular:      Rate and Rhythm: Normal rate and regular rhythm  Pulses: Normal pulses  Pulmonary:      Effort: Pulmonary effort is normal  No respiratory distress  Breath sounds: Normal breath sounds  No wheezing  Abdominal:      General: Bowel sounds are normal  There is no distension  Palpations: Abdomen is soft  Tenderness: There is no abdominal tenderness  There is no guarding  Musculoskeletal:      Right lower leg: No edema  Left lower leg: No edema  Skin:     General: Skin is warm and dry  Findings: No erythema  Neurological:      Mental Status: He is alert  Psychiatric:         Mood and Affect: Mood normal          Additional Data:     Lab Results: I have personally reviewed pertinent reports        Results from last 7 days   Lab Units 04/06/21  1115   WBC Thousand/uL 10 17*   HEMOGLOBIN g/dL 14 2   HEMATOCRIT % 43 3   PLATELETS Thousands/uL 178   NEUTROS PCT % 76*   LYMPHS PCT % 17   MONOS PCT % 7   EOS PCT % 0     Results from last 7 days   Lab Units 04/06/21  1115   SODIUM mmol/L 140   POTASSIUM mmol/L 4 0   CHLORIDE mmol/L 100 CO2 mmol/L 31   BUN mg/dL 17   CREATININE mg/dL 1 04   ANION GAP mmol/L 9   CALCIUM mg/dL 12 4*   ALBUMIN g/dL 4 1   TOTAL BILIRUBIN mg/dL 0 93   ALK PHOS U/L 109   ALT U/L 42   AST U/L 73*   GLUCOSE RANDOM mg/dL 171*     Results from last 7 days   Lab Units 04/06/21  1221   INR  1 13                   Imaging: I have personally reviewed pertinent reports  XR chest 1 view portable   Final Result by Deedee Kinney MD (04/06 4792)      No acute cardiopulmonary disease  Workstation performed: [id number]             EKG, Pathology, and Other Studies Reviewed on Admission:   · EKG: NSR  · CXR results reviewed     [facility] Epic Records Reviewed: Yes     ** Please Note: This note has been constructed using a voice recognition system   **

## 2021-04-06 NOTE — ASSESSMENT & PLAN NOTE
· Patient presenting with midsternal, burning chest pain that started Sunday night for eating a large meal  · Reports that was only relieved with drinking milk and self-induced vomiting  · Positive family history of CAD, tobacco abuse  · Trop 11 87/14 91  · NSTEMI, type 1  · EKG showed sinus rhythm   · Cardiology consulted,   · Status post aspirin 325 mg today  · Place on aspirin 81 mg daily and Lipitor 40 mg daily  · Started on Lopressor 25 mg b i d  And amlodipine 5 mg daily per Cardiology  · Currently on therapeutic heparin drip  · Echo with EF 55%, G2 DD  · Status post cardiac catheterization, evidence of triple-vessel disease    Recommending that patient be transferred to [facility] for CABG evaluation  · Obtain lipid panel  · Trial protonix and PRN mylanta for any concurrent GERD symptoms  · Saturating well on RA

## 2021-04-06 NOTE — ASSESSMENT & PLAN NOTE
· Currently every day smoker  Smokes 1 pack a day     · Counseled on smoking cessation   · Offered nicotine patch while inpatient, patient declined

## 2021-04-06 NOTE — ASSESSMENT & PLAN NOTE
· BMP with glucose noted to be 171  · Patient denies any history of diabetes, however does not appear to have followed up with a PCP in quite some time  · Obtain hemoglobin A1c  · Q i d  Fingersticks  · Monitor closely

## 2021-04-06 NOTE — PROGRESS NOTES
Patient transported to 314 ON THE monitor, report given to Car TRUJILLO, care assumed  Assessed R radial puncture site with RN, site is clean, dry and intact with no signs or symptoms of bleeding or hematoma  Cap refill is brisk  Patient denies any chest pain at this time  VSS  TR band with 12ml air in pocket

## 2021-04-06 NOTE — ASSESSMENT & PLAN NOTE
· BP was 188/108 on admission  · Patient denies any prior history of hypertension  · Significant improvement noted, most recent /84  · Started on Lopressor 25 mg b i d   And amlodipine 5 mg daily per Cardiology  · Monitor BP closely

## 2021-04-06 NOTE — ASSESSMENT & PLAN NOTE
· POA, calcium 12 4  · No prior labs to compare  · Could be multifactorial in setting of patient taking in increased doses of Tums as an outpatient as well self-induced vomiting, dehydration  · Continue on very gentle IV fluid hydration  · Obtain repeat CMP in the a m

## 2021-04-06 NOTE — ED PROVIDER NOTES
History  Chief Complaint   Patient presents with    Chest Pain     Pt reports CP that began yest am, now worsening  Was seen at urgent care, and instructed to come here       64 y o  male with past medical history significant for GERD presents to ED with chief complaint of chest pain  Onset of symptoms reported as 1 day ago  Location of symptoms reported as substernal chest  Quality is reported as burning sensation  Severity is reported as moderate currently none  Associated symptoms: denies sob, denies nausea, denies dizziness or syncope, denies fevers, denies diaphoresis  Modifying factors: given GI cocktail at urgent care with good relief of symptoms  Context: Pt reports CP that began yest am, now worsening  Was seen at urgent care, and instructed to come here  Patient is a smoker  He reports father with history of premature CAD and mother with history of acid reflus  Reviewed past medical history and visits via [facility]:  No prior visits to this ed  History provided by:  Patient   used: No    Chest Pain  Associated symptoms: vomiting (self induced)    Associated symptoms: no abdominal pain, no back pain, no cough, no diaphoresis, no dizziness, no dysphagia, no fatigue, no fever, no headache, no nausea, no numbness, no palpitations, no shortness of breath and no weakness        Prior to Admission Medications   Prescriptions Last Dose Informant Patient Reported?  Taking?   omeprazole (PriLOSEC) 10 mg delayed release capsule   Yes No   Sig: Take 10 mg by mouth daily      Facility-Administered Medications Last Administration Doses Remaining   Lidocaine Viscous HCl (XYLOCAINE) 2 % mucosal solution 10 mL 4/6/2021  8:39 AM 0   aluminum-magnesium hydroxide-simethicone (MYLANTA) oral suspension 10 mL 4/6/2021  8:38 AM 0   diphenhydrAMINE (BENADRYL) oral liquid 25 mg 4/6/2021  8:39 AM 0          Past Medical History:   Diagnosis Date    Acid reflux        Past Surgical History: Procedure Laterality Date    VASECTOMY         History reviewed  No pertinent family history  I have reviewed and agree with the history as documented  E-Cigarette/Vaping    E-Cigarette Use Never User      E-Cigarette/Vaping Substances     Social History     Tobacco Use    Smoking status: Current Every Day Smoker     Packs/day: 1 00     Types: Cigarettes    Smokeless tobacco: Never Used   Substance Use Topics    Alcohol use: Never     Frequency: Never    Drug use: Never       Review of Systems   Constitutional: Negative for activity change, appetite change, chills, diaphoresis, fatigue, fever and unexpected weight change  HENT: Negative for congestion, dental problem, drooling, ear discharge, ear pain, facial swelling, hearing loss, mouth sores, nosebleeds, postnasal drip, rhinorrhea, sinus pressure, sinus pain, sneezing, sore throat, tinnitus, trouble swallowing and voice change  Eyes: Negative for photophobia, pain, redness, itching and visual disturbance  Respiratory: Negative for cough, shortness of breath, wheezing and stridor  Cardiovascular: Positive for chest pain  Negative for palpitations and leg swelling  Gastrointestinal: Positive for vomiting (self induced)  Negative for abdominal distention, abdominal pain, anal bleeding, blood in stool, constipation, diarrhea, nausea and rectal pain  Endocrine: Negative for cold intolerance, heat intolerance, polydipsia, polyphagia and polyuria  Genitourinary: Negative for decreased urine volume, difficulty urinating, flank pain, hematuria, testicular pain and urgency  Musculoskeletal: Negative for arthralgias, back pain, gait problem, joint swelling, myalgias, neck pain and neck stiffness  Skin: Negative for color change, pallor, rash and wound  Allergic/Immunologic: Negative for environmental allergies, food allergies and immunocompromised state     Neurological: Negative for dizziness, tremors, seizures, syncope, facial asymmetry, speech difficulty, weakness, light-headedness, numbness and headaches  Hematological: Negative for adenopathy  Does not bruise/bleed easily  Psychiatric/Behavioral: Negative for agitation, confusion, hallucinations and suicidal ideas  The patient is not nervous/anxious and is not hyperactive  All other systems reviewed and are negative  Physical Exam  Physical Exam  Vitals signs and nursing note reviewed  Constitutional:       General: He is not in acute distress  Appearance: Normal appearance  He is well-developed  He is not diaphoretic  Comments: BP (!) 171/90 (BP Location: Left arm)   Pulse 78   Temp 98 6 °F (37 °C) (Oral)   Resp 17   Ht 5' 5" (1 651 m)   Wt 70 4 kg (155 lb 2 6 oz)   SpO2 99%   BMI 25 82 kg/m²    HENT:      Head: Normocephalic and atraumatic  Right Ear: External ear normal       Left Ear: External ear normal       Nose: Nose normal       Mouth/Throat:      Pharynx: No oropharyngeal exudate  Eyes:      General: No scleral icterus  Right eye: No discharge  Left eye: No discharge  Conjunctiva/sclera: Conjunctivae normal       Pupils: Pupils are equal, round, and reactive to light  Neck:      Musculoskeletal: Normal range of motion and neck supple  No neck rigidity or muscular tenderness  Thyroid: No thyromegaly  Vascular: No JVD  Trachea: No tracheal deviation  Cardiovascular:      Rate and Rhythm: Normal rate and regular rhythm  Pulses: Normal pulses  Pulmonary:      Effort: Pulmonary effort is normal  No respiratory distress  Breath sounds: Normal breath sounds  No stridor  No wheezing or rales  Chest:      Chest wall: No tenderness  Abdominal:      General: Bowel sounds are normal  There is no distension  Palpations: Abdomen is soft  There is no mass  Tenderness: There is no abdominal tenderness  There is no guarding or rebound  Musculoskeletal: Normal range of motion           General: No tenderness, deformity or signs of injury  Lymphadenopathy:      Cervical: No cervical adenopathy  Skin:     General: Skin is warm  Capillary Refill: Capillary refill takes less than 2 seconds  Coloration: Skin is not jaundiced or pale  Findings: No bruising, erythema, lesion or rash  Neurological:      General: No focal deficit present  Mental Status: He is alert and oriented to person, place, and time  Mental status is at baseline  Cranial Nerves: No cranial nerve deficit  Sensory: No sensory deficit  Motor: No weakness or abnormal muscle tone  Coordination: Coordination normal       Deep Tendon Reflexes: Reflexes normal    Psychiatric:         Mood and Affect: Mood normal          Behavior: Behavior normal          Thought Content:  Thought content normal          Judgment: Judgment normal          Vital Signs  ED Triage Vitals [04/06/21 0920]   Temperature Pulse Respirations Blood Pressure SpO2   98 6 °F (37 °C) 91 19 (!) 188/108 98 %      Temp Source Heart Rate Source Patient Position - Orthostatic VS BP Location FiO2 (%)   Oral Monitor Sitting Left arm --      Pain Score       7           Vitals:    04/06/21 0920 04/06/21 1121 04/06/21 1216   BP: (!) 188/108 (!) 171/90 169/88   Pulse: 91 78 67   Patient Position - Orthostatic VS: Sitting Lying Lying         Visual Acuity      ED Medications  Medications   heparin (porcine) injection 4,000 Units (has no administration in time range)   heparin (porcine) 25,000 units in 0 45% NaCl 250 mL infusion (premix) (has no administration in time range)   heparin (porcine) injection 4,000 Units (has no administration in time range)   heparin (porcine) injection 2,000 Units (has no administration in time range)   pantoprazole (PROTONIX) injection 40 mg (40 mg Intravenous Given 4/6/21 1118)       Diagnostic Studies  Results Reviewed     Procedure Component Value Units Date/Time    APTT six (6) hours after Heparin bolus/drip initiation or dosing change [[id number]] Collected: 04/06/21 1221    Lab Status: No result Specimen: Blood from Arm, Left     Protime-INR [[id number]] Collected: 04/06/21 1221    Lab Status: No result Specimen: Blood from Arm, Left     Basic metabolic panel [[id number]]  (Abnormal) Collected: 04/06/21 1115    Lab Status: Final result Specimen: Blood from Arm, Right Updated: 04/06/21 1200     Sodium 140 mmol/L      Potassium 4 0 mmol/L      Chloride 100 mmol/L      CO2 31 mmol/L      ANION GAP 9 mmol/L      BUN 17 mg/dL      Creatinine 1 04 mg/dL      Glucose 171 mg/dL      Calcium 12 4 mg/dL      eGFR 80 ml/min/1 73sq m     Narrative:      [place] guidelines for Chronic Kidney Disease (CKD):     Stage 1 with normal or high GFR (GFR > 90 mL/min/1 73 square meters)    Stage 2 Mild CKD (GFR = 60-89 mL/min/1 73 square meters)    Stage 3A Moderate CKD (GFR = 45-59 mL/min/1 73 square meters)    Stage 3B Moderate CKD (GFR = 30-44 mL/min/1 73 square meters)    Stage 4 Severe CKD (GFR = 15-29 mL/min/1 73 square meters)    Stage 5 End Stage CKD (GFR <15 mL/min/1 73 square meters)  Note: GFR calculation is accurate only with a steady state creatinine    Lipase [[id number]]  (Normal) Collected: 04/06/21 1115    Lab Status: Final result Specimen: Blood from Arm, Right Updated: 04/06/21 1151     Lipase 146 u/L     Hepatic function panel [[id number]]  (Abnormal) Collected: 04/06/21 1115    Lab Status: Final result Specimen: Blood from Arm, Right Updated: 04/06/21 1151     Total Bilirubin 0 93 mg/dL      Bilirubin, Direct 0 24 mg/dL      Alkaline Phosphatase 109 U/L      AST 73 U/L      ALT 42 U/L      Total Protein 8 6 g/dL      Albumin 4 1 g/dL     Troponin I [[id number]]  (Abnormal) Collected: 04/06/21 1115    Lab Status: Final result Specimen: Blood from Arm, Right Updated: 04/06/21 1137     Troponin I 11 87 ng/mL     CBC and differential [[id number]]  (Abnormal) Collected: 04/06/21 1115    Lab Status: Final result Specimen: Blood from Arm, Right Updated: 04/06/21 1121     WBC 10 17 Thousand/uL      RBC 5 30 Million/uL      Hemoglobin 14 2 g/dL      Hematocrit 43 3 %      MCV 82 fL      MCH 26 8 pg      MCHC 32 8 g/dL      RDW 13 4 %      MPV 10 6 fL      Platelets 809 Thousands/uL      nRBC 0 /100 WBCs      Neutrophils Relative 76 %      Immat GRANS % 0 %      Lymphocytes Relative 17 %      Monocytes Relative 7 %      Eosinophils Relative 0 %      Basophils Relative 0 %      Neutrophils Absolute 7 70 Thousands/µL      Immature Grans Absolute 0 02 Thousand/uL      Lymphocytes Absolute 1 69 Thousands/µL      Monocytes Absolute 0 69 Thousand/µL      Eosinophils Absolute 0 04 Thousand/µL      Basophils Absolute 0 03 Thousands/µL                  XR chest 1 view portable    (Results Pending)              Procedures  ECG 12 Lead Documentation Only    Date/Time: 4/6/2021 9:22 AM  Performed by: Luis E Cleary PA-C  Authorized by: Luis E Cleary PA-C     Indications / Diagnosis:  C/p  ECG reviewed by me, the ED Provider: yes    Patient location:  ED  Previous ECG:     Previous ECG:  Unavailable    Comparison to cardiac monitor: Yes    Interpretation:     Interpretation: normal    Rate:     ECG rate:  90    ECG rate assessment: normal    Rhythm:     Rhythm: sinus rhythm    Ectopy:     Ectopy: none    QRS:     QRS axis:  Normal    QRS intervals:  Normal  Conduction:     Conduction: abnormal      Abnormal conduction: incomplete RBBB    ST segments:     ST segments:  Normal  T waves:     T waves: normal               ED Course  ED Course as of Apr 06 1223   Tue Apr 06, 2021   1202 Re-evaluation  Patient currently chest pain free  Will continue to monitor  Spoke with cardiology will see in consult                    HEART Risk Score      Most Recent Value   Heart Score Risk Calculator   History  1 Filed at: 04/06/2021 1222   ECG  1 Filed at: 04/06/2021 1222   Age  1 Filed at: 04/06/2021 1222   Risk Factors  2 Filed at: 04/06/2021 1222   Troponin  2 Filed at: 04/06/2021 1222   HEART Score  7 Filed at: 04/06/2021 1222                      SBIRT 20yo+      Most Recent Value   SBIRT (25 yo +)   In order to provide better care to our patients, we are screening all of our patients for alcohol and drug use  Would it be okay to ask you these screening questions? Yes Filed at: 04/06/2021 1108   Initial Alcohol Screen: US AUDIT-C    1  How often do you have a drink containing alcohol? 1 Filed at: 04/06/2021 1108   2  How many drinks containing alcohol do you have on a typical day you are drinking? 1 Filed at: 04/06/2021 1108   3a  Male UNDER 65: How often do you have five or more drinks on one occasion? 0 Filed at: 04/06/2021 1108   3b  FEMALE Any Age, or MALE 65+: How often do you have 4 or more drinks on one occassion? 0 Filed at: 04/06/2021 1108   Audit-C Score  2 Filed at: 04/06/2021 1108   EVGENY: How many times in the past year have you    Used an illegal drug or used a prescription medication for non-medical reasons? Never Filed at: 04/06/2021 1108                    MDM  Number of Diagnoses or Management Options  Acute coronary syndrome [facility]): new and requires workup  Hypercalcemia: new and requires workup  NSTEMI (non-ST elevated myocardial infarction) [facility]): new and requires workup  Diagnosis management comments: Ddx includes but is not limited to:  1  ACS/USA  2  Pneumothorax  3  Pneumonia  4  Pleural effusion  5  Pericarditis  6  Pericardial effusion  7  Chest wall pain/costochondritis  8  Esophageal spasm  9  Pulmonary embolism  10  Bronchitis/bronchospasm  11  Aortic dissection  Plan cardiac workup including ekg, labs, cxr  Determination of disposition to made based upon risk factors, workup results and patient's ED coarse  Lab results reviewed  CBC demonstrates mildly elevated white blood cell count 10 1, hemoglobin 14 2 hematocrit 43 3 are normal   No anemia  Lipase normal 146  No pancreatitis    Troponin is elevated at 11 8  EKG demonstrates no acute ischemic changes  Consistent with NSTEMI  Basic metabolic panel demonstrates a BUN of 11 creatinine 1 0 which are normal   No renal failure  Glucose elevated 161, calcium elevated at 12  Hepatic function panel remarkable for AST of 73, ALT of 42        chest xray images independently visualized and interpreted by me  No acute pneumothorax or infiltrate  Medical decision-making: based on my review of the history, physical exam, laboratory testing and diagnostic x-rays; I believe the patient has potential coronary artery disease at this time  Patient will require serial enzymes and diagnostic cardiac testing  Discussed with the patient, discussed with the hospitalist service  Amount and/or Complexity of Data Reviewed  Clinical lab tests: ordered and reviewed  Tests in the radiology section of CPT®: ordered and reviewed  Tests in the medicine section of CPT®: ordered and reviewed  Discussion of test results with the performing providers: yes  Review and summarize past medical records: yes  Discuss the patient with other providers: yes (Dr Georges Menendez, cardiology)  Independent visualization of images, tracings, or specimens: yes    Risk of Complications, Morbidity, and/or Mortality  General comments: ED course:  59-year-old male with history of gastroesophageal reflux disease presents to the emergency department with substernal chest burning since yesterday  He reports the day prior he has been eating soda and pizza which he does not normally consume  He states following that he developed substernal burning which he thought was reflux  He tried drinking milk at home twice which did relieve his symptoms  He reports the symptoms returned later that day and he went to the pharmacy and was recommended omeprazole  His symptoms were improved yesterday afternoon until he bent over to tie his shoe and noticed his symptoms worsened    He states the burning was so bad that he forced himself to vomit which did completely relieve his symptoms  He states this morning his symptoms resume prompting to go to a local urgent care  He was seen there given a GI cocktail which he reports has relieved his symptoms  He was referred to the ED for further evaluation  In the emergency department he was chest pain-free  His blood pressure was noted be 964 systolic on arrival   On a recheck it came down to 171/90  His initial EKG shows no acute ST elevations  So normal sinus rhythm with nonspecific T-wave abnormalities noted  There is an incomplete right bundle-branch block  His initial troponin came back at 11 in the setting of normal renal function  Patient remained chest pain-free in the ED  Discussed his test results at bedside and informed him regarding pending admission  Case discussed with cardiology, Dr Timmy Richard  Case presented to [facility] for admission  Heparin started for NSTEMI  The critical care time is separate of other billable procedures, treating other patients, and any teaching time  The critical care time was for: obtaining history from patient or surrogate, development of treatment plan with patient or surrogate, discussion with any applicable consultants, examination of the patient,ordering and performing treatments and interventions, ordering and reviewing any applicable laboratory or radiographic studies, reassessment of the patient for response to treatment, reviewing any pertinent and available old records, documentation time      The critical care time was necessary to prevent deterioration of the following organ systems: cardiopulmonary system/Acute coronary syndrome/NSTEMI/hypercalcemia    Time spent (in minutes):45              Disposition  Final diagnoses:   Acute coronary syndrome [facility])   Hypercalcemia   NSTEMI (non-ST elevated myocardial infarction) [facility])     Time reflects when diagnosis was documented in both MDM as applicable and the Disposition within this note     Time User Action Codes Description Comment    4/6/2021 12:07 PM Melissa Sergio Add [I24 9] Acute coronary syndrome ([address] )     4/6/2021 12:07 PM Melissa Sergio Add [E83 52] Hypercalcemia     4/6/2021 12:07 PM Melissaadelfo Hill Add [I21 4] NSTEMI (non-ST elevated myocardial infarction) [facility])       ED Disposition     ED Disposition Condition Date/Time Comment    Admit Stable Tue Apr 6, 2021 12:22 PM Case was discussed with Dr Robel Regan and the patient's admission status was agreed to be Admission Status: inpatient status to the service of Dr Robel Regan   Follow-up Information    None         Patient's Medications   Discharge Prescriptions    No medications on file     No discharge procedures on file      PDMP Review     None          ED Provider  Electronically Signed by           Colleen Aquino PA-C  04/06/21 [address] [facility] JHONNY Armendariz PA-C  04/06/21 1225

## 2021-04-06 NOTE — DISCHARGE INSTRUCTIONS
After Radial Heart Catheterization   WHAT YOU NEED TO KNOW:   What will happen after a radial heart catheterization? · You will be attached to a heart monitor until you are fully awake  A heart monitor is an EKG that stays on continuously to record your heart's electrical activity  Healthcare providers will monitor your vital signs and pulses in your arm  They will frequently check your pressure bandage for bleeding or swelling  · You may have a band wrapped tightly around your wrist  The band puts pressure on your wound and helps prevent bleeding  A healthcare provider can put air into the band or remove air from the band  A healthcare provider will gradually remove air from the band and decrease pressure on your wrist  The band may be removed in 2 hours or when your wound stops bleeding  · You will need to keep your wrist straight for 2 to 4 hours  Do not  push or pull with your arm  Arm movements can cause serious bleeding  After you are monitored for several hours, you may go home or may need to stay in the hospital overnight  What do I need to know before I go home? · Care for your wound as directed  Remove the pressure bandage in 24 hours or as directed  Mild bruising is normal and expected  A small bandage can be placed on your wound after you remove the pressure bandage  Do not put powders, lotions, or creams on your wound  They may cause your wound to get infected  Monitor your wound every day for signs of infection, such as redness, swelling, or pus  · Shower the day after your procedure or as directed  Remove your pressure bandage before you shower  Do not take baths or go in hot tubs or pools  Carefully wash the wound with soap and water  Pat the area dry  A small bandage can be placed on your wound after you shower  · Apply firm, steady pressure to your wound if it bleeds  Apply pressure with a clean gauze or towel for 5 to 10 minutes   Call 911 if bleeding becomes heavy or does not stop  · Drink liquids as directed  Liquids will help flush the contrast liquid from your body  Ask how much liquid to drink each day and which liquids are best for you  · Do not lift anything heavier than 5 pounds until directed by your healthcare provider  Heavy lifting can put stress on your wound and cause bleeding  Do not push or pull with the arm that was used for the procedure  Do not do vigorous activity for at least 48 hours  Vigorous activity may cause bleeding from your wound  Rest and do quiet activities  Take short walks around the house to prevent a blood clot  Ask your healthcare provider when you can return to your normal activities  · Do not drive or return to work until your healthcare provider says it is okay  Your healthcare provider may tell you to wait 48 hours before you drive to decrease your risk for bleeding  You may not be able to return to work for at least 2 days after your procedure if your job involves heavy lifting  What medicines may I need? You may need any of the following:  · Blood thinners  help prevent blood clots  Clots can cause strokes, heart attacks, and death  The following are general safety guidelines to follow while you are taking a blood thinner:    ? Watch for bleeding and bruising while you take blood thinners  Watch for bleeding from your gums or nose  Watch for blood in your urine and bowel movements  Use a soft washcloth on your skin, and a soft toothbrush to brush your teeth  This can keep your skin and gums from bleeding  If you shave, use an electric shaver  Do not play contact sports  ? Tell your dentist and other healthcare providers that you take a blood thinner  Wear a bracelet or necklace that says you take this medicine  ? Do not start or stop any other medicines unless your healthcare provider tells you to  Many medicines cannot be used with blood thinners      ? Take your blood thinner exactly as prescribed by your healthcare provider  Do not skip does or take less than prescribed  Tell your provider right away if you forget to take your blood thinner, or if you take too much  ? Warfarin  is a blood thinner that you may need to take  The following are things you should be aware of if you take warfarin:     § Foods and medicines can affect the amount of warfarin in your blood  Do not make major changes to your diet while you take warfarin  Warfarin works best when you eat about the same amount of vitamin K every day  Vitamin K is found in green leafy vegetables and certain other foods  Ask for more information about what to eat when you are taking warfarin  § You will need to see your healthcare provider for follow-up visits when you are on warfarin  You will need regular blood tests  These tests are used to decide how much medicine you need  · Acetaminophen  helps decrease pain and fever  This medicine is available without a doctor's order  Ask how much medicine is safe to take, and how often to take it  Acetaminophen can cause liver damage if not taken correctly  · Take your medicine as directed  Contact your healthcare provider if you think your medicine is not helping or if you have side effects  Tell him or her if you are allergic to any medicine  Keep a list of the medicines, vitamins, and herbs you take  Include the amounts, and when and why you take them  Bring the list or the pill bottles to follow-up visits  Carry your medicine list with you in case of an emergency  Call your local emergency number (911 in the [address] [facility]) if:   · You have chest pain  · You have any of the following signs of a heart attack:      ? Squeezing, pressure, or pain in your chest    ? You may  also have any of the following:     ? Discomfort or pain in your back, neck, jaw, stomach, or arm    ? Shortness of breath    ? Nausea or vomiting    ?  Lightheadedness or a sudden cold sweat    · You have any of the following signs of a stroke:      ? Numbness or drooping on one side of your face     ? Weakness in an arm or leg    ? Confusion or difficulty speaking    ? Dizziness, a severe headache, or vision loss    · You cough up blood  · You have trouble breathing  · You cannot stop the bleeding from your wound even after you hold firm pressure for 10 minutes  When should I call my doctor? · You have a fever or chills  · Blood soaks through your bandage  · Your stitches come apart  · Your hand or arm feels numb, cool, or looks pale  · Your wound gets swollen quickly  · Your wound is red, swollen, or draining pus  · Your wound looks more bruised or you have new bruising on the side of your wrist      · You have nausea or are vomiting  · Your skin is itchy, swollen, or you have a rash  · You have questions or concerns about your condition or care  Healthy living tips: The following are general healthy guidelines  If your chest pain is caused by a heart problem, your healthcare provider will give you specific guidelines to follow  · Manage other health conditions  Diabetes and high cholesterol increases your risk for another heart attack and stroke  Talk to your healthcare provider about your management plan  He or she will make a plan that helps you manage your conditions  · Do not smoke  Nicotine and other chemicals in cigarettes and cigars can cause lung and heart damage  Ask your healthcare provider for information if you currently smoke and need help to quit  E-cigarettes or smokeless tobacco still contain nicotine  Talk to your healthcare provider before you use these products  · Eat a variety of healthy, low-fat, low-salt foods  Healthy foods include fruits, vegetables, whole-grain breads, low-fat dairy products, beans, lean meats, and fish  Ask for more information about a heart healthy diet  · Drink plenty of water every day  Your body is made of mostly water   Water helps your body to control your temperature and blood pressure  Ask your healthcare provider how much water you should drink every day  · Ask about activity  Your healthcare provider will tell you which activities to limit or avoid  Ask when you can drive, return to work, and have sex  Ask about the best exercise plan for you  · Maintain a healthy weight  Ask your healthcare provider how much you should weigh  Ask him or her to help you create a weight loss plan if you are overweight  · Get the flu and pneumonia vaccines  All adults should get the influenza (flu) vaccine  Get it every year as soon as it becomes available  The pneumococcal vaccine is given to adults aged 72 years or older  The vaccine is given every 5 years to prevent pneumococcal disease, such as pneumonia  If you have a stent:   · Carry your stent card with you at all times  · Let all healthcare providers know that you have a stent  CARE AGREEMENT:   You have the right to help plan your care  Learn about your health condition and how it may be treated  Discuss treatment options with your healthcare providers to decide what care you want to receive  You always have the right to refuse treatment  The above information is an  only  It is not intended as medical advice for individual conditions or treatments  Talk to your doctor, nurse or pharmacist before following any medical regimen to see if it is safe and effective for you  © Copyright [address] Information is for End User's use only and may not be sold, redistributed or otherwise used for commercial purposes   All illustrations and images included in CareNotes® are the copyrighted property of [employer]  or [address] [place], [place] [address]

## 2021-04-06 NOTE — PATIENT INSTRUCTIONS
Abnormal EKG  Reports improvement with GI cocktail, 10/10 pain down to 7/10 pain  Symptoms most likely due to acid reflux  Unable to r/o cardiac etiology  Recommend evaluation at [facility]'s ED

## 2021-04-06 NOTE — ASSESSMENT & PLAN NOTE
· Patient presenting with midsternal, burning chest pain that started Sunday night for eating a large meal  · Reports that was only relieved with drinking milk and self-induced vomiting  · Positive family history of CAD, tobacco abuse  · Trop 11 87/14 9, continue to trend to peak    · NSTEMI, type to be determined  · EKG showed sinus rhythm   · Cardiology consulted,  · Status post aspirin 325 mg today  · Place on aspirin 81 mg daily and Lipitor 40 mg daily  · Started on Lopressor 25 mg b i d   And amlodipine 5 mg daily per Cardiology  · Started on therapeutic heparin drip  · Obtain echocardiogram  · Patient currently NPO, plan for cardiac catheterization possibly later today  · Obtain lipid panel  · Trial protonix and PRN mylanta for any GERD symptoms  · Saturating well on RA

## 2021-04-06 NOTE — ASSESSMENT & PLAN NOTE
· BP was 188/108 on admission  · Patient denies any prior history of hypertension  · Started on Lopressor 25 mg b i d   And amlodipine 5 mg daily per Cardiology  · Monitor BP closely

## 2021-04-07 ENCOUNTER — HOSPITAL ENCOUNTER (INPATIENT)
Facility: HOSPITAL | Age: 56
LOS: 8 days | Discharge: HOME HEALTH | DRG: 235 | End: 2021-04-15
Attending: INTERNAL MEDICINE | Admitting: INTERNAL MEDICINE
Payer: COMMERCIAL

## 2021-04-07 VITALS
SYSTOLIC BLOOD PRESSURE: 164 MMHG | HEIGHT: 65 IN | WEIGHT: 155.2 LBS | RESPIRATION RATE: 18 BRPM | TEMPERATURE: 98.8 F | HEART RATE: 66 BPM | DIASTOLIC BLOOD PRESSURE: 92 MMHG | OXYGEN SATURATION: 97 % | BODY MASS INDEX: 25.86 KG/M2

## 2021-04-07 DIAGNOSIS — R73.9 HYPERGLYCEMIA: ICD-10-CM

## 2021-04-07 DIAGNOSIS — Z95.1 S/P CABG X 3: ICD-10-CM

## 2021-04-07 DIAGNOSIS — R73.03 PREDIABETES: ICD-10-CM

## 2021-04-07 DIAGNOSIS — I21.4 NSTEMI (NON-ST ELEVATED MYOCARDIAL INFARCTION) (HCC): Primary | ICD-10-CM

## 2021-04-07 DIAGNOSIS — I20.9 ANGINA PECTORIS (HCC): ICD-10-CM

## 2021-04-07 DIAGNOSIS — D69.6 THROMBOCYTOPENIA (HCC): ICD-10-CM

## 2021-04-07 DIAGNOSIS — R07.9 CHEST PAIN, UNSPECIFIED TYPE: ICD-10-CM

## 2021-04-07 DIAGNOSIS — Z72.0 TOBACCO ABUSE: ICD-10-CM

## 2021-04-07 DIAGNOSIS — R73.09 ELEVATED HEMOGLOBIN A1C: ICD-10-CM

## 2021-04-07 LAB
ALBUMIN SERPL BCP-MCNC: 3.6 G/DL (ref 3.5–5)
ALP SERPL-CCNC: 96 U/L (ref 46–116)
ALT SERPL W P-5'-P-CCNC: 36 U/L (ref 12–78)
ANION GAP SERPL CALCULATED.3IONS-SCNC: 8 MMOL/L (ref 4–13)
APTT PPP: 64 SECONDS (ref 23–37)
APTT PPP: 67 SECONDS (ref 23–37)
AST SERPL W P-5'-P-CCNC: 61 U/L (ref 5–45)
BILIRUB SERPL-MCNC: 1.3 MG/DL (ref 0.2–1)
BUN SERPL-MCNC: 19 MG/DL (ref 5–25)
CALCIUM SERPL-MCNC: 9.3 MG/DL (ref 8.3–10.1)
CHLORIDE SERPL-SCNC: 103 MMOL/L (ref 100–108)
CHOLEST SERPL-MCNC: 132 MG/DL (ref 50–200)
CO2 SERPL-SCNC: 28 MMOL/L (ref 21–32)
CREAT SERPL-MCNC: 1.11 MG/DL (ref 0.6–1.3)
ERYTHROCYTE [DISTWIDTH] IN BLOOD BY AUTOMATED COUNT: 13.3 % (ref 11.6–15.1)
EST. AVERAGE GLUCOSE BLD GHB EST-MCNC: 140 MG/DL
GFR SERPL CREATININE-BSD FRML MDRD: 74 ML/MIN/1.73SQ M
GLUCOSE SERPL-MCNC: 133 MG/DL (ref 65–140)
GLUCOSE SERPL-MCNC: 142 MG/DL (ref 65–140)
GLUCOSE SERPL-MCNC: 150 MG/DL (ref 65–140)
GLUCOSE SERPL-MCNC: 189 MG/DL (ref 65–140)
HBA1C MFR BLD: 6.5 %
HCT VFR BLD AUTO: 38.9 % (ref 36.5–49.3)
HDLC SERPL-MCNC: 35 MG/DL
HGB BLD-MCNC: 12.7 G/DL (ref 12–17)
LDLC SERPL CALC-MCNC: 79 MG/DL (ref 0–100)
MCH RBC QN AUTO: 26.7 PG (ref 26.8–34.3)
MCHC RBC AUTO-ENTMCNC: 32.6 G/DL (ref 31.4–37.4)
MCV RBC AUTO: 82 FL (ref 82–98)
PLATELET # BLD AUTO: 153 THOUSANDS/UL (ref 149–390)
PMV BLD AUTO: 10.7 FL (ref 8.9–12.7)
POTASSIUM SERPL-SCNC: 3.8 MMOL/L (ref 3.5–5.3)
PROT SERPL-MCNC: 7.2 G/DL (ref 6.4–8.2)
RBC # BLD AUTO: 4.75 MILLION/UL (ref 3.88–5.62)
SODIUM SERPL-SCNC: 139 MMOL/L (ref 136–145)
TRIGL SERPL-MCNC: 91 MG/DL
TROPONIN I SERPL-MCNC: 5.6 NG/ML
WBC # BLD AUTO: 8.39 THOUSAND/UL (ref 4.31–10.16)

## 2021-04-07 PROCEDURE — 99239 HOSP IP/OBS DSCHRG MGMT >30: CPT | Performed by: NURSE PRACTITIONER

## 2021-04-07 PROCEDURE — 85730 THROMBOPLASTIN TIME PARTIAL: CPT | Performed by: STUDENT IN AN ORGANIZED HEALTH CARE EDUCATION/TRAINING PROGRAM

## 2021-04-07 PROCEDURE — 80053 COMPREHEN METABOLIC PANEL: CPT | Performed by: PHYSICIAN ASSISTANT

## 2021-04-07 PROCEDURE — 82948 REAGENT STRIP/BLOOD GLUCOSE: CPT

## 2021-04-07 PROCEDURE — 3044F HG A1C LEVEL LT 7.0%: CPT | Performed by: NURSE PRACTITIONER

## 2021-04-07 PROCEDURE — NC001 PR NO CHARGE: Performed by: INTERNAL MEDICINE

## 2021-04-07 PROCEDURE — 84484 ASSAY OF TROPONIN QUANT: CPT | Performed by: STUDENT IN AN ORGANIZED HEALTH CARE EDUCATION/TRAINING PROGRAM

## 2021-04-07 PROCEDURE — 85027 COMPLETE CBC AUTOMATED: CPT | Performed by: PHYSICIAN ASSISTANT

## 2021-04-07 PROCEDURE — 80061 LIPID PANEL: CPT | Performed by: PHYSICIAN ASSISTANT

## 2021-04-07 PROCEDURE — 99233 SBSQ HOSP IP/OBS HIGH 50: CPT | Performed by: INTERNAL MEDICINE

## 2021-04-07 RX ORDER — AMLODIPINE BESYLATE 5 MG/1
5 TABLET ORAL DAILY
Status: DISCONTINUED | OUTPATIENT
Start: 2021-04-08 | End: 2021-04-09 | Stop reason: HOSPADM

## 2021-04-07 RX ORDER — ASPIRIN 81 MG/1
81 TABLET, CHEWABLE ORAL DAILY
Status: DISCONTINUED | OUTPATIENT
Start: 2021-04-08 | End: 2021-04-09 | Stop reason: HOSPADM

## 2021-04-07 RX ORDER — MAGNESIUM HYDROXIDE/ALUMINUM HYDROXICE/SIMETHICONE 120; 1200; 1200 MG/30ML; MG/30ML; MG/30ML
10 SUSPENSION ORAL EVERY 4 HOURS PRN
Status: DISCONTINUED | OUTPATIENT
Start: 2021-04-07 | End: 2021-04-09 | Stop reason: HOSPADM

## 2021-04-07 RX ORDER — HEPARIN SODIUM 1000 [USP'U]/ML
4000 INJECTION, SOLUTION INTRAVENOUS; SUBCUTANEOUS
Status: DISCONTINUED | OUTPATIENT
Start: 2021-04-07 | End: 2021-04-09 | Stop reason: HOSPADM

## 2021-04-07 RX ORDER — PANTOPRAZOLE SODIUM 40 MG/1
40 TABLET, DELAYED RELEASE ORAL
Status: DISCONTINUED | OUTPATIENT
Start: 2021-04-08 | End: 2021-04-09 | Stop reason: HOSPADM

## 2021-04-07 RX ORDER — ATORVASTATIN CALCIUM 40 MG/1
40 TABLET, FILM COATED ORAL
Status: DISCONTINUED | OUTPATIENT
Start: 2021-04-08 | End: 2021-04-09 | Stop reason: HOSPADM

## 2021-04-07 RX ORDER — ACETAMINOPHEN 325 MG/1
650 TABLET ORAL EVERY 6 HOURS PRN
Status: DISCONTINUED | OUTPATIENT
Start: 2021-04-07 | End: 2021-04-09 | Stop reason: HOSPADM

## 2021-04-07 RX ORDER — HEPARIN SODIUM 10000 [USP'U]/100ML
3-20 INJECTION, SOLUTION INTRAVENOUS
Status: DISCONTINUED | OUTPATIENT
Start: 2021-04-07 | End: 2021-04-09 | Stop reason: HOSPADM

## 2021-04-07 RX ORDER — ASPIRIN 81 MG/1
81 TABLET ORAL DAILY
Status: DISCONTINUED | OUTPATIENT
Start: 2021-04-08 | End: 2021-04-07 | Stop reason: SDUPTHER

## 2021-04-07 RX ORDER — ONDANSETRON 2 MG/ML
4 INJECTION INTRAMUSCULAR; INTRAVENOUS EVERY 6 HOURS PRN
Status: DISCONTINUED | OUTPATIENT
Start: 2021-04-07 | End: 2021-04-09 | Stop reason: HOSPADM

## 2021-04-07 RX ORDER — SODIUM CHLORIDE, SODIUM LACTATE, POTASSIUM CHLORIDE, CALCIUM CHLORIDE 600; 310; 30; 20 MG/100ML; MG/100ML; MG/100ML; MG/100ML
75 INJECTION, SOLUTION INTRAVENOUS CONTINUOUS
Status: DISCONTINUED | OUTPATIENT
Start: 2021-04-07 | End: 2021-04-07

## 2021-04-07 RX ORDER — HEPARIN SODIUM 1000 [USP'U]/ML
2000 INJECTION, SOLUTION INTRAVENOUS; SUBCUTANEOUS
Status: DISCONTINUED | OUTPATIENT
Start: 2021-04-07 | End: 2021-04-09 | Stop reason: HOSPADM

## 2021-04-07 RX ADMIN — HEPARIN SODIUM 12 UNITS/KG/HR: 10000 INJECTION, SOLUTION INTRAVENOUS at 21:04

## 2021-04-07 RX ADMIN — ASPIRIN 81 MG: 81 TABLET, CHEWABLE ORAL at 09:31

## 2021-04-07 RX ADMIN — ASPIRIN 81 MG: 81 TABLET, DELAYED RELEASE ORAL at 09:31

## 2021-04-07 RX ADMIN — PANTOPRAZOLE SODIUM 40 MG: 40 TABLET, DELAYED RELEASE ORAL at 05:52

## 2021-04-07 RX ADMIN — METOPROLOL TARTRATE 25 MG: 25 TABLET, FILM COATED ORAL at 21:15

## 2021-04-07 RX ADMIN — ATORVASTATIN CALCIUM 40 MG: 40 TABLET, FILM COATED ORAL at 15:59

## 2021-04-07 RX ADMIN — SODIUM CHLORIDE, SODIUM LACTATE, POTASSIUM CHLORIDE, AND CALCIUM CHLORIDE 75 ML/HR: .6; .31; .03; .02 INJECTION, SOLUTION INTRAVENOUS at 16:05

## 2021-04-07 NOTE — ASSESSMENT & PLAN NOTE
· POA, calcium 12 4  · No prior labs to compare  · Could be multifactorial in setting of patient taking in increased doses of Tums as an outpatient as well self-induced vomiting, dehydration  · Continue on very gentle IV fluid hydration  · Calcium noted to be 9 3 today  · Monitor

## 2021-04-07 NOTE — CASE MANAGEMENT
Per rounding with [facility], patient is being transferred to B  Transport time pending  Cm completed the medical necessity and placed in patient's binder  Cm placed another copy in medical records  Cm department will continue to follow patient through discharge

## 2021-04-07 NOTE — ASSESSMENT & PLAN NOTE
· Patient presenting with midsternal, burning chest pain that started Sunday night for eating a large meal  · Reports that was only relieved with drinking milk and self-induced vomiting  · Positive family history of CAD, tobacco abuse  · Trop 11 87/14 91  · NSTEMI, type 1  · EKG showed sinus rhythm   · Cardiology consulted,   · Status post aspirin 325 mg today  · Place on aspirin 81 mg daily and Lipitor 40 mg daily  · Started on Lopressor 25 mg b i d  And amlodipine 5 mg daily per Cardiology  · Currently on therapeutic heparin drip  · Echo with EF 55%, G2 DD  · Status post cardiac catheterization, evidence of triple-vessel disease    Recommending that patient be transferred to [facility] [place] for CABG evaluation  · Updated lipid panel noted  · Total chol 132; Triglycerides 91; LDL 79  · Trial protonix and PRN mylanta for any concurrent GERD symptoms  · Saturating well on RA

## 2021-04-07 NOTE — DISCHARGE SUMMARY
[facility]  Discharge- Herminiaerin Stamp 1965, 64 y o  male MRN: [id number]  Unit/Bed#: -Natalie Encounter: [id number]  Primary Care Provider: Ranjit Salter MD   Date and time admitted to hospital: 4/6/2021 10:54 AM    * Chest pain  Assessment & Plan  · Patient presenting with midsternal, burning chest pain that started Sunday night for eating a large meal  · Reports that was only relieved with drinking milk and self-induced vomiting  · Positive family history of CAD, tobacco abuse  · Trop 11 87/14 91  · NSTEMI, type 1  · EKG showed sinus rhythm   · Cardiology consulted,   · Status post aspirin 325 mg today  · Place on aspirin 81 mg daily and Lipitor 40 mg daily  · Started on Lopressor 25 mg b i d  And amlodipine 5 mg daily per Cardiology  · Currently on therapeutic heparin drip  · Echo with EF 55%, G2 DD  · Status post cardiac catheterization, evidence of triple-vessel disease  Recommending that patient be transferred to [facility] for CABG evaluation  · Updated lipid panel noted  · Total chol 132; Triglycerides 91; LDL 79  · Trial protonix and PRN mylanta for any concurrent GERD symptoms  · Saturating well on RA      Leukocytosis  Assessment & Plan  · POA, slightly elevated at 10 17  · Likely reactive  · No additional evidence of underlying infection  · Patient is afebrile, hemodynamically stable otherwise  · Monitor CBC closely    Hypercalcemia  Assessment & Plan  · POA, calcium 12 4  · No prior labs to compare  · Could be multifactorial in setting of patient taking in increased doses of Tums as an outpatient as well self-induced vomiting, dehydration  · Continue on very gentle IV fluid hydration  · Calcium noted to be 9 3 today  · Monitor  Tobacco abuse  Assessment & Plan  · Currently every day smoker  Smokes 1 pack a day     · Counseled on smoking cessation   · Offered nicotine patch while inpatient, patient declined      Hyperglycemia  Assessment & Plan  · BMP with glucose noted to be 171  · Patient denies any history of diabetes, however does not appear to have followed up with a PCP in quite some time  · Obtain hemoglobin A1c  · Q i d  Fingersticks  · Monitor closely    Hypertensive urgency  Assessment & Plan  · BP was 188/108 on admission  · Patient denies any prior history of hypertension  · Significant improvement noted, most recent /84  · Started on Lopressor 25 mg b i d  And amlodipine 5 mg daily per Cardiology  · Monitor BP closely     Discharging Physician / Practitioner: SEE Bah  PCP: Daniel Ruiz MD  Admission Date:   Admission Orders (From admission, onward)     Ordered        04/06/21 1222  Inpatient Admission  Once                   Discharge Date: 04/07/21    Resolved Problems  Date Reviewed: 4/7/2021    None          Consultations During Hospital Stay:  · Cardiology    Procedures Performed:   · Cardiac Catheterization    Significant Findings / Test Results:   XR chest 1 view portable   Final Result by Brock Gillis MD (04/06 0664)      No acute cardiopulmonary disease  Workstation performed: [id number]         ·       Incidental Findings:   · None    Test Results Pending at Discharge (will require follow up): · None     Outpatient Tests Requested:  · NA - Transfer to \[facility]\""    Complications:  None    Reason for Admission:  Chest pain, NSTEMI    Hospital Course:     Susana Yanez is a 64 y o  male patient who originally presented to the hospital on 4/6/2021 due to chest pain x2 days prior to admission  See full discharge note as documented by Radha Greer PA-C on 4/6  Patient held overnight at [facility] as we were waiting for a bed at [facility] for [facility] evaluation  Please see above list of diagnoses and related plan for additional information       Condition at Discharge: stable     Discharge Day Visit / Exam:     Subjective:  Patient resting in bed; denies complaints of active chest pain, shortness of breath, fever, or chills at this time  Vitals: Blood Pressure: 120/71 (04/07/21 1042)  Pulse: 72 (04/07/21 1042)  Temperature: 98 3 °F (36 8 °C) (04/07/21 1042)  Temp Source: Oral (04/06/21 0920)  Respirations: 17 (04/07/21 1042)  Height: 5' 5" (165 1 cm) (04/06/21 0920)  Weight - Scale: 70 4 kg (155 lb 3 3 oz) (04/06/21 1751)  SpO2: 96 % (04/07/21 1042)     Exam:   Physical Exam  Vitals signs and nursing note reviewed  Constitutional:       Appearance: He is not ill-appearing  Cardiovascular:      Rate and Rhythm: Normal rate  Pulses: Normal pulses  Heart sounds: Normal heart sounds  Pulmonary:      Effort: Pulmonary effort is normal       Breath sounds: Normal breath sounds  Abdominal:      General: Bowel sounds are normal       Palpations: Abdomen is soft  Musculoskeletal: Normal range of motion  Skin:     General: Skin is warm and dry  Capillary Refill: Capillary refill takes less than 2 seconds  Neurological:      Mental Status: He is alert  Mental status is at baseline  Psychiatric:         Mood and Affect: Mood normal        Discussion with Family: NA    Discharge instructions/Information to patient and family:   See after visit summary for information provided to patient and family  Provisions for Follow-Up Care:  See after visit summary for information related to follow-up care and any pertinent home health orders  Disposition:     [address] Transfer to \[facility]\""      Planned Readmission: None     Discharge Statement:  I spent 35 minutes discharging the patient  This time was spent on the day of discharge  I had direct contact with the patient on the day of discharge  Greater than 50% of the total time was spent examining patient, answering all patient questions, arranging and discussing plan of care with patient as well as directly providing post-discharge instructions  Additional time then spent on discharge activities      Discharge Medications:  See after visit summary for reconciled discharge medications provided to patient and family        ** Please Note: This note has been constructed using a voice recognition system **

## 2021-04-07 NOTE — UTILIZATION REVIEW
Initial Clinical Review    Admission: Date/Time/Statement:   Admission Orders (From admission, onward)     Ordered        04/06/21 1222  Inpatient Admission  Once                   Orders Placed This Encounter   Procedures    Inpatient Admission     Standing Status:   Standing     Number of Occurrences:   1     Order Specific Question:   Level of Care     Answer:   Med Surg [16]     Order Specific Question:   Estimated length of stay     Answer:   More than 2 Midnights     Order Specific Question:   Certification     Answer:   I certify that inpatient services are medically necessary for this patient for a duration of greater than two midnights  See H&P and MD Progress Notes for additional information about the patient's course of treatment  ED Arrival Information     Expected Arrival Acuity Means of Arrival Escorted By Service Admission Type    4/6/2021 08:59 4/6/2021 09:16 Urgent Wheelchair Family Member General Medicine Urgent    Arrival Complaint    chest pain        Chief Complaint   Patient presents with    Chest Pain     Pt reports CP that began yest am, now worsening  Was seen at urgent care, and instructed to come here  Assessment/Plan:  63 yo male to ED from home w/ midsternal CP /burning x2 days   Sx started after eating a lrg meal on Sunday   Pain worse when lying down   Admitted IP status for NSTEMI type to be determined  Plan for cardiology consult , asa, lopressor , heparin gtt , echo , NPO for cardiac  Cath , f/u lipid panel , trial protonix and mylanta   Hypercalcemia london 12 4, gentle IVF and recheck in am   HTN urgency started on lopressor and amlodipine and monitor   4/6 Cardiology Consult   NSTEMI , tele , asa, heparin , repeat trop , echo , NPO for cardiac cath   addendum note   Cardiac catheterization showed multivessel coronary artery disease with 100% ostial LAD and proximal % occlusion, OM1 90% stenosis       4/6 IM Note   Status post cardiac catheterization, evidence of triple-vessel disease  Recommending that patient be transferred to [facility] for CABG evaluation     ED Triage Vitals [04/06/21 0920]   Temperature Pulse Respirations Blood Pressure SpO2   98 6 °F (37 °C) 91 19 (!) 188/108 98 %      Temp Source Heart Rate Source Patient Position - Orthostatic VS BP Location FiO2 (%)   Oral Monitor Sitting Left arm --      Pain Score       7          Wt Readings from Last 1 Encounters:   04/06/21 70 4 kg (155 lb 3 3 oz)     Additional Vital Signs:  04/07/21 07:16:32  98 5 °F (36 9 °C)  75  17  112/73  86  96 %  --  --   04/07/21 0258  --  --  --  112/76  --  --  --  Lying   04/07/21 02:55:10  --  --  --  89/63Abnormal   72  --  --  --   04/06/21 23:31:59  98 9 °F (37 2 °C)  79  18  119/71  87  96 %  --  --   04/06/21 22:25:30  98 2 °F (36 8 °C)  74  17  118/65  83  96 %  --  --   04/06/21 21:15:04  --  81  --  102/70  81  94 %  --  --   04/06/21 2100  --  --  --  --  --  95 %  None (Room air)  --   04/06/21 19:41:42  --  69  --  150/97  115  96 %  --  --   04/06/21 19:06:10  --  62  --  139/91  107  96 %  --  --   04/06/21 1900  --  63  --  143/92  109  98 %  --  --   04/06/21 18:52:42  --  59  --  143/92  109  99 %  --  --   04/06/21 18:38:38  --  63  --  145/93  110  96 %  --  --   04/06/21 18:22:47  --  63  --  143/84  104  99 %  --  --   04/06/21 18:05:44  --  65  --  138/90  106  96 %  --  --   04/06/21 17:51:05  98 6 °F (37 °C)  64  17  136/81  99  97 %  --  --   04/06/21 1530  --  77  22  168/90  123  97 %  None (Room air)  Lying   04/06/21 1216  --  67  18  169/88  --  100 %  None (Room air)  Lying   04/06/21 1121  --  78  17  171/90Abnormal   --  99 %  None (Room air)  Lying   04/06/21 1107  --  --  --  --  --  --  None (Room air         Pertinent Labs/Diagnostic Test Results:  4/6 PCXR  No acute cardiopulmonary disease  4/6 EKG NSR Possible Left atrial enlargement  Incomplete right bundle branch block  Nonspecific T wave abnormality  Abnormal ECG  4/6 Echo LEFT VENTRICLE:  Systolic function was normal  Ejection fraction was estimated to be 55 %  There was hypokinesis of mid anterior and apical anterior wall  Wall thickness was at the upper limits of normal   Features were consistent with a pseudonormal left ventricular filling pattern, with concomitant abnormal relaxation and increased filling pressure (grade 2 diastolic dysfunction)    MITRAL VALVE:  There was mild annular calcification  There was mild regurgitation   AORTIC VALVE:  The valve was not visualized well enough to rule out a bicuspid morphology    TRICUSPID VALVE:  There was trace regurgitation    4/6 Cardiac Cath    IMPRESSIONS:  Ostial % occlusion and distal LAD fills from right-to-left and left-to-left collateral  Mid OM1 90% stenosis  Distal circumflex 85 % stenosis  Proximal % occluded and distally it fills from right to right collaterals  Preserved LVEF on echo   RECOMMENDATIONS:  Aggressive medical management  CT surgery evaluation for CABG  Results from last 7 days   Lab Units 04/07/21  0552 04/06/21  1115   WBC Thousand/uL 8 39 10 17*   HEMOGLOBIN g/dL 12 7 14 2   HEMATOCRIT % 38 9 43 3   PLATELETS Thousands/uL 153 178   NEUTROS ABS Thousands/µL  --  7 70*     Results from last 7 days   Lab Units 04/07/21  0552 04/06/21  1115   SODIUM mmol/L 139 140   POTASSIUM mmol/L 3 8 4 0   CHLORIDE mmol/L 103 100   CO2 mmol/L 28 31   ANION GAP mmol/L 8 9   BUN mg/dL 19 17   CREATININE mg/dL 1 11 1 04   EGFR ml/min/1 73sq m 74 80   CALCIUM mg/dL 9 3 12 4*     Results from last 7 days   Lab Units 04/07/21  0552 04/06/21  1115   AST U/L 61* 73*   ALT U/L 36 42   ALK PHOS U/L 96 109   TOTAL PROTEIN g/dL 7 2 8 6*   ALBUMIN g/dL 3 6 4 1   TOTAL BILIRUBIN mg/dL 1 30* 0 93   BILIRUBIN DIRECT mg/dL  --  0 24*     Results from last 7 days   Lab Units 04/07/21  0714 04/06/21  2117 04/06/21  1801   POC GLUCOSE mg/dl 133 167* 135     Results from last 7 days   Lab Units 04/07/21  0552 04/06/21  1115   GLUCOSE RANDOM mg/dL 142* 171*     Results from last 7 days   Lab Units 04/06/21  1900   HEMOGLOBIN A1C % 6 5*   EAG mg/dl 140     Results from last 7 days   Lab Units 04/06/21  2146 04/06/21  1900 04/06/21  1342 04/06/21  1115   TROPONIN I ng/mL 16 28* 20 19* 14 91* 11 87*     Results from last 7 days   Lab Units 04/07/21  0552 04/06/21  2146 04/06/21  1221   PROTIME seconds  --   --  14 0   INR   --   --  1 13   PTT seconds 64* 47* 32     Results from last 7 days   Lab Units 04/06/21  1115   LIPASE u/L 146     ED Treatment:   Medication Administration from 04/06/2021 0859 to 04/06/2021 1737       Date/Time Order Dose Route Action     04/06/2021 1118 pantoprazole (PROTONIX) injection 40 mg 40 mg Intravenous Given     04/06/2021 1225 heparin (porcine) injection 4,000 Units 4,000 Units Intravenous Given     04/06/2021 1225 heparin (porcine) 25,000 units in 0 45% NaCl 250 mL infusion (premix) 12 Units/kg/hr Intravenous New Bag     04/06/2021 1337 aspirin chewable tablet 324 mg 324 mg Oral Given     04/06/2021 1549 metoprolol tartrate (LOPRESSOR) tablet 25 mg 25 mg Oral Given     04/06/2021 1549 amLODIPine (NORVASC) tablet 5 mg 5 mg Oral Given     04/06/2021 1704 midazolam (VERSED) injection 1 mg Intravenous Given     04/06/2021 1705 fentanyl citrate (PF) 100 MCG/2ML 50 mcg Intravenous Given     04/06/2021 1705 lidocaine 1% buffered 2 mL Infiltration Given     04/06/2021 1707 nitroGLYcerin (TRIDIL) 50 mg in 250 mL infusion (premix) 200 mcg Intra-arterial Given     04/06/2021 1707 verapamil (ISOPTIN) injection 2 5 mg Intra-arterial Given     04/06/2021 1710 heparin (porcine) injection 5,000 Units Intravenous Given     04/06/2021 1723 iodixanol (VISIPAQUE) 320 MG/ML injection 45 mL Intravenous Given        Past Medical History:   Diagnosis Date    Acid reflux      Present on Admission:  **None**      Admitting Diagnosis: Hypercalcemia [E83 52]  Chest pain [R07 9]  Acute coronary syndrome (HCC) [I24 9]  NSTEMI (non-ST elevated myocardial infarction) ([facility] [address] ) [I21 4]  Chest pain, unspecified type [R07 9]  Age/Sex: 64 y o  male  Admission Orders:  Scheduled Medications:  amLODIPine, 5 mg, Oral, Daily  aspirin, 81 mg, Oral, Daily  aspirin, 81 mg, Oral, Daily  atorvastatin, 40 mg, Oral, Daily With Dinner  metoprolol tartrate, 25 mg, Oral, Q12H BOBBY  pantoprazole, 40 mg, Oral, Early Morning      Continuous IV Infusions:  heparin (porcine), 3-20 Units/kg/hr (Order-Specific), Intravenous, Titrated  lactated ringers, 75 mL/hr, Intravenous, Continuous      PRN Meds:  acetaminophen, 650 mg, Oral, Q6H PRN  aluminum-magnesium hydroxide-simethicone, 10 mL, Oral, Q4H PRN  heparin (porcine), 2,000 Units, Intravenous, Q1H PRN  heparin (porcine), 4,000 Units, Intravenous, Q1H PRN  ondansetron, 4 mg, Intravenous, Q6H PRN    Tele   NPO     IP CONSULT TO CARDIOLOGY    Network Utilization Review Department  ATTENTION: Please call with any questions or concerns to [phone] and carefully listen to the prompts so that you are directed to the right person  All voicemails are confidential   Manisha Doing all requests for admission clinical reviews, approved or denied determinations and any other requests to dedicated fax number below belonging to the campus where the patient is receiving treatment   List of dedicated fax numbers for the Facilities:  [address] DENIALS (Administrative/Medical Necessity) [phone]   [address] (Maternity/NICU/Pediatrics) [phone]   [address] [place] [address] 40 [id number] [facility] Darren Gerson 2427 (  Christina Gutiérrez "Carey" 103) [address] [facility] [address] [place] [address] [address] [phone]   [place] [address] [phone]

## 2021-04-07 NOTE — H&P
INTERNAL MEDICINE RESIDENCY ADMISSION H&P     Name: Sal Garcia   Age & Sex: 64 y o  male   MRN: [id number]  Unit/Bed#: CW2 212-01   Encounter: [id number]  Primary Care Provider: Princess Dee MD    Code Status: Level 1 - Full Code  Admission Status: INPATIENT   Admit To: SOD Team C   Disposition: Patient requires Med/Surg with Telemetry    ASSESSMENT/PLAN     Principal Problem:    Chest pain  Active Problems:    Hypertensive urgency    Tobacco abuse    Hyperglycemia    Hypercalcemia    * Chest pain  Assessment & Plan  Patient presenting with 3 days of chest pain which she described as a flame in the middle of his chest   Pain did not radiate anywhere it improved with milk in [place] until Tuesday when patient bent over to tie his shoes and was suddenly struck with an episode of sweats, palpitations and shortness of breath  He presented to [facility] where components were positive and cardiac catheterization demonstrated triple-vessel disease  Patient was transferred to [facility] for CABG evaluation  Patient currently denies any chest pain, shortness of breath, palpitations or lightheadedness  He has a regular rate and rhythm with a systolic ejection murmur in the 2nd left intercostal space on physical exam   He does appear to have JVD at 45° extending up to the angle of the mandible    Troponins on admission 11, peaked at 20 19, no longer needed trend  Continue heparin drip, aspirin, statin, beta-blocker   Metoprolol 25 mg b i d    EKG demonstrated Incomplete right bundle branch block, Nonspecific T wave abnormality  Catheterization Ostial % occlusion and distal LAD fills from right-to-left and left-to-left collateral, Mid OM1 90% stenosis, Distal circumflex 85 % stenosis, Proximal % occluded and distally it fills from right to right collaterals, Preserved LVEF on echo  Echo EF of 55% with grade 2 diastolic dysfunction, trace TR, trace pericardial effusion was identified along the right atrial free wall  Cardiology and CT surgery on board, appreciate recommendations   Tentatively CABG on Friday  Cardiovascular diet in place  Lipid panel pending, will follow-up  Recommend nicotine abstinence and healthy lifestyle    Hypertensive urgency  Assessment & Plan  Patient presented to monitor campus with elevated blood pressure 188/108, without signs of end-organ damage  Patient BP hovering around 714 systolic without any complaints of CP curently    Continue metoprolol 25 b i d  Continue amlodipine 5 mg daily  Will encourage tobacco cessation  Will continue to monitor daily labs  Cardiac diet in place      Tobacco abuse  Assessment & Plan  Patient has extensive tobacco use, 1 pack per day for over 30 years  Will provide nicotine cessation Education  Will hold on provide nicotine patch in setting of STEMI  Will encourage abstinence  Will benefit from outpatient CT lung screening annually    Hypercalcemia  Assessment & Plan  No signs of psychosis, abdominal pain or changes in reflexes  Likely multifactorial, self-induced vomiting, milk alkali syndrome and dehydration    12 4 on admission  Currently down trending and improved with IV fluids  Continue to monitor    Hyperglycemia  Assessment & Plan  BG >160 on presentation  No history of DM, s/s such as polyuria or polydipsia, polyphagia    Will hold off on diabetic regimen, however may consider adding SSI coverage  Goal -180 during hospital stay, generally at goal currently  A1c 6 5  Will recommend lifestyle modification and outpatient follow up      VTE Pharmacologic Prophylaxis: Heparin  VTE Mechanical Prophylaxis: sequential compression device    CHIEF COMPLAINT   No chief complaint on file       HISTORY OF PRESENT ILLNESS     The patient is a 64year old male with a past medical history of tobacco abuse who originally presented to [facility] on Tuesday 4/6 with complaints of chest pain which began on Sunday 4/4 at [facility]  He thought it was acid reflux which he described as "a flame inside his chest", rating it as 10/10  The pain did not radiate anywhere  He therefore drink milk, took handfuls of Tums, Prilosec, and went to urgent care which he was prescribed a GI cocktail   A GI cocktail did not help and later that night when patient was bending over to tie his shoes he suddenly had an episode of sweats, shortness of breath and palpitations  He denies any changes in vision, lightheadedness or syncope during that time  He then decided to go to the hospital   This has never happened to him before  On presentation to [address] [place] [address], he was found to have hypertensive urgency with blood pressures upwards of 188/108, a troponin level of 11,  and was sent for cardiac catheterization  Catheterization demonstrated severe triple vessel disease, and it was recommended that the patient be transferred to [facility] for CABG evaluation  He was started on aspirin, statin, beta blocker and a heparin gtt  Echocardiogram was also obtained which showed an EF of 55% with grade 2 diastolic dysfunction  He was started on Metoprolol and Amlodipine as per cardiology and her blood pressure has since improved  He was also found to have hypercalcemia (calcium of 12 4) of unknown origin, although is most likely multifactorial and related to taking increased doses of tums, self induced vomiting and dehydration  He also was found to be hyperglycemic although he denies any history of diabetes  HbA1c was found to be 6 5  Patient has over 30 year history of 1 pack per day, no alcohol no drug use  Patient states he no longer has chest pain, SOB, palpitations, or sweats; he is very pleasant and states he is a , with concerns of timeline to return back to work  All questions answered to satisfaction    REVIEW OF SYSTEMS     Review of Systems   Constitutional: Negative for diaphoresis, fatigue and fever     Eyes: Negative for visual disturbance  Respiratory: Negative for shortness of breath  Cardiovascular: Negative for chest pain and palpitations  Gastrointestinal: Positive for nausea and vomiting  Negative for diarrhea  Neurological: Negative for dizziness, syncope and light-headedness  OBJECTIVE     Vitals:    21   BP: (!) 173/96 (!) 172/96    Pulse: 66  69   SpO2: 98%  99%      Temperature:   Temp (24hrs), Av 5 °F (36 9 °C), Min:98 2 °F (36 8 °C), Max:98 9 °F (37 2 °C)       Intake & Output:  I/O     None        No intake or output data in the 24 hours ending 21  No intake/output data recorded  Weights: There is no height or weight on file to calculate BMI  Weight (last 2 days)     None        Physical Exam  Constitutional:       General: He is not in acute distress  Appearance: Normal appearance  He is not ill-appearing  HENT:      Mouth/Throat:      Mouth: Mucous membranes are dry  Cardiovascular:      Rate and Rhythm: Normal rate and regular rhythm  Heart sounds: Murmur (EVA 2nd L intercostal space) present  No gallop  Pulmonary:      Breath sounds: No wheezing, rhonchi or rales  Abdominal:      General: Bowel sounds are normal  There is distension  Palpations: Abdomen is soft  Tenderness: There is no abdominal tenderness  There is no guarding or rebound  Musculoskeletal:      Right lower leg: No edema  Left lower leg: No edema  Skin:     General: Skin is warm and dry  Findings: Lesion (scattered hyperpiemtation scars globally) present  Comments: hyperpigmentaed amorphous areas on R/L LE, suggestive of PVD   Neurological:      Mental Status: He is alert and oriented to person, place, and time  Psychiatric:         Mood and Affect: Mood normal          Behavior: Behavior normal          Thought Content:  Thought content normal          Judgment: Judgment normal        PAST MEDICAL HISTORY     Past Medical History: Diagnosis Date    Acid reflux      PAST SURGICAL HISTORY     Past Surgical History:   Procedure Laterality Date    VASECTOMY       SOCIAL & FAMILY HISTORY     Social History     Substance and Sexual Activity   Alcohol Use Never    Frequency: Never     Substance and Sexual Activity   Alcohol Use Never    Frequency: Never        Substance and Sexual Activity   Drug Use Never     Social History     Tobacco Use   Smoking Status Current Every Day Smoker    Packs/day: 1 00    Types: Cigarettes   Smokeless Tobacco Never Used     Family History   Problem Relation Age of Onset    Heart disease Father      LABORATORY DATA     Labs: I have personally reviewed pertinent reports  Results from last 7 days   Lab Units 04/07/21  0552 04/06/21  1115   WBC Thousand/uL 8 39 10 17*   HEMOGLOBIN g/dL 12 7 14 2   HEMATOCRIT % 38 9 43 3   PLATELETS Thousands/uL 153 178   NEUTROS PCT %  --  76*   MONOS PCT %  --  7      Results from last 7 days   Lab Units 04/07/21  0552 04/06/21  1115   POTASSIUM mmol/L 3 8 4 0   CHLORIDE mmol/L 103 100   CO2 mmol/L 28 31   BUN mg/dL 19 17   CREATININE mg/dL 1 11 1 04   CALCIUM mg/dL 9 3 12 4*   ALK PHOS U/L 96 109   ALT U/L 36 42   AST U/L 61* 73*     Serum creatinine: 1 11 mg/dL 04/07/21 0552  Estimated creatinine clearance: 64 6 mL/min            Results from last 7 days   Lab Units 04/07/21  1315 04/07/21  0552 04/06/21  2146 04/06/21  1221   INR   --   --   --  1 13   PTT seconds 67* 64* 47* 32         Results from last 7 days   Lab Units 04/06/21  2146 04/06/21  1900 04/06/21  1342   TROPONIN I ng/mL 16 28* 20 19* 14 91*     Micro:  No results found for: BLOODCX, Diannah Rouge, WOUNDCULT, SPUTUMCULTUR  IMAGING & DIAGNOSTIC TESTS     Imaging: I have personally reviewed pertinent reports  Xr Chest 1 View Portable    Result Date: 4/6/2021  Impression: No acute cardiopulmonary disease   Workstation performed: [id number]     EKG, Pathology, and Other Studies: I have personally reviewed pertinent reports  ALLERGIES   No Known Allergies  MEDICATIONS PRIOR TO ARRIVAL     Prior to Admission medications    Medication Sig Start Date End Date Taking? Authorizing Provider   omeprazole (PriLOSEC) 10 mg delayed release capsule Take 10 mg by mouth daily    Historical Provider, MD     MEDICATIONS ADMINISTERED IN LAST 24 HOURS     Medication Administration - last 24 hours from 04/06/2021 2127 to 04/07/2021 2127       Date/Time Order Dose Route Action Action by     04/07/2021 2120 lactated ringers infusion 75 mL/hr Intravenous Not Given Sylvia Thibodeaux RN     04/07/2021 2104 heparin (porcine) 25,000 units in 0 45% NaCl 250 mL infusion (premix) 12 Units/kg/hr Intravenous [address] Sylvia Thibodeaux RN     04/07/2021 2115 metoprolol tartrate (LOPRESSOR) tablet 25 mg 25 mg Oral Given Sylvia Thibodeaux RN        CURRENT MEDICATIONS     Current Facility-Administered Medications   Medication Dose Route Frequency Provider Last Rate    acetaminophen  650 mg Oral Q6H PRN Kannan Isbell MD      aluminum-magnesium hydroxide-simethicone  10 mL Oral Q4H PRN Kannan Isbell MD     [facility] ON 4/8/2021] amLODIPine  5 mg Oral Daily Kannan Isbell MD      [START ON 4/8/2021] aspirin  81 mg Oral Daily Kannan Isbell MD      [START ON 4/8/2021] atorvastatin  40 mg Oral Daily With Yanelis Bhagat MD      heparin (porcine)  3-20 Units/kg/hr (Order-Specific) Intravenous Titrated Kannan Isbell MD 12 Units/kg/hr (04/07/21 2104)    heparin (porcine)  2,000 Units Intravenous Q1H PRN Kannan Isbell MD      heparin (porcine)  4,000 Units Intravenous Q1H PRN Kannan Isbell MD      metoprolol tartrate  25 mg Oral Q12H [address] Kannan Isbell MD      ondansetron  4 mg Intravenous Q6H PRN Kannan Isbell MD      [START ON 4/8/2021] pantoprazole  40 mg Oral Early Morning Kannan Isbell MD       heparin (porcine), 3-20 Units/kg/hr (Order-Specific), Last Rate: 12 Units/kg/hr (04/07/21 2104)        Admission Time  I spent 30 minutes admitting the patient  This involved direct patient contact where I performed a full history and physical, reviewing previous records, and reviewing laboratory and other diagnostic studies     ==  DO CLINT Morales Resident, PGY-1  [facility] Res[facility]

## 2021-04-07 NOTE — PLAN OF CARE
Problem: CARDIOVASCULAR - ADULT  Goal: Maintains optimal cardiac output and hemodynamic stability  Description: INTERVENTIONS:  - Monitor I/O, vital signs and rhythm  - Monitor for S/S and trends of decreased cardiac output  - Administer and titrate ordered vasoactive medications to optimize hemodynamic stability  - Assess quality of pulses, skin color and temperature  - Assess for signs of decreased coronary artery perfusion  - Instruct patient to report change in severity of symptoms  Outcome: Progressing  Goal: Absence of cardiac dysrhythmias or at baseline rhythm  Description: INTERVENTIONS:  - Continuous cardiac monitoring, vital signs, obtain 12 lead EKG if ordered  - Administer antiarrhythmic and heart rate control medications as ordered  - Monitor electrolytes and administer replacement therapy as ordered  Outcome: Progressing     Problem: PAIN - ADULT  Goal: Verbalizes/displays adequate comfort level or baseline comfort level  Description: Interventions:  - Encourage patient to monitor pain and request assistance  - Assess pain using appropriate pain scale  - Administer analgesics based on type and severity of pain and evaluate response  - Implement non-pharmacological measures as appropriate and evaluate response  - Consider cultural and social influences on pain and pain management  - Notify physician/advanced practitioner if interventions unsuccessful or patient reports new pain  Outcome: Progressing     Problem: INFECTION - ADULT  Goal: Absence or prevention of progression during hospitalization  Description: INTERVENTIONS:  - Assess and monitor for signs and symptoms of infection  - Monitor lab/diagnostic results  - Monitor all insertion sites, i e  indwelling lines, tubes, and drains  - Monitor endotracheal if appropriate and nasal secretions for changes in amount and color  - [place] appropriate cooling/warming therapies per order  - Administer medications as ordered  - Instruct and encourage patient and family to use good hand hygiene technique  - Identify and instruct in appropriate isolation precautions for identified infection/condition  Outcome: Progressing     Problem: SAFETY ADULT  Goal: Patient will remain free of falls  Description: INTERVENTIONS:  - Assess patient frequently for physical needs  -  Identify cognitive and physical deficits and behaviors that affect risk of falls    -  [place] fall precautions as indicated by assessment   - Educate patient/family on patient safety including physical limitations  - Instruct patient to call for assistance with activity based on assessment  - Modify environment to reduce risk of injury  - Consider OT/PT consult to assist with strengthening/mobility  Outcome: Progressing  Goal: Maintain or return to baseline ADL function  Description: INTERVENTIONS:  -  Assess patient's ability to carry out ADLs; assess patient's baseline for ADL function and identify physical deficits which impact ability to perform ADLs (bathing, care of mouth/teeth, toileting, grooming, dressing, etc )  - Assess/evaluate cause of self-care deficits   - Assess range of motion  - Assess patient's mobility; develop plan if impaired  - Assess patient's need for assistive devices and provide as appropriate  - Encourage maximum independence but intervene and supervise when necessary  - Involve family in performance of ADLs  - Assess for home care needs following discharge   - Consider OT consult to assist with ADL evaluation and planning for discharge  - Provide patient education as appropriate  Outcome: Progressing  Goal: Maintain or return mobility status to optimal level  Description: INTERVENTIONS:  - Assess patient's baseline mobility status (ambulation, transfers, stairs, etc )    - Identify cognitive and physical deficits and behaviors that affect mobility  - Identify mobility aids required to assist with transfers and/or ambulation (gait belt, sit-to-stand, lift, walker, cane, etc )  - [place] fall precautions as indicated by assessment  - Record patient progress and toleration of activity level on Mobility SBAR; progress patient to next Phase/Stage  - Instruct patient to call for assistance with activity based on assessment  - Consider rehabilitation consult to assist with strengthening/weightbearing, etc   Outcome: Progressing     Problem: DISCHARGE PLANNING  Goal: Discharge to home or other facility with appropriate resources  Description: INTERVENTIONS:  - Identify barriers to discharge w/patient and caregiver  - Arrange for needed discharge resources and transportation as appropriate  - Identify discharge learning needs (meds, wound care, etc )  - Arrange for interpretive services to assist at discharge as needed  - Refer to Case Management Department for coordinating discharge planning if the patient needs post-hospital services based on physician/advanced practitioner order or complex needs related to functional status, cognitive ability, or social support system  Outcome: Progressing     Problem: Knowledge Deficit  Goal: Patient/family/caregiver demonstrates understanding of disease process, treatment plan, medications, and discharge instructions  Description: Complete learning assessment and assess knowledge base    Interventions:  - Provide teaching at level of understanding  - Provide teaching via preferred learning methods  Outcome: Progressing

## 2021-04-07 NOTE — PROGRESS NOTES
Cardiology progress note:     subjective:   Denies chest pain or shortness of breath     Objective:   Not in distress     Labs including vitals reviewed  Peak troponin 20 trending down   Cardiac telemetry monitoring showed sinus rhythm without any evidence of arrhythmia    General:  moderate built, awake, alert and oriented x3  Neck: supple, no JVD  Eyes: PERRL, conjunctiva normal  Lungs:  Bilateral air entry positive, no wheeze/rhonchi or crackle  Heart:  S1-S2 normal, no murmur  Abdomen:  Soft ,nondistended ,nontender, bowel sounds positive  Extremities:  No leg edema, no deformity, ROM normal, right radial cardiac catheterization site no bleeding or hematoma  Right distal radial artery pulse same as before angiogram  Neuro:  Moving all extremities, speech clear  Skin: warm, no rash     Assessment:   1  Non-STEMI  Cardiac catheterization showed triple-vessel coronary artery disease  Currently patient denies chest pain  Peak troponin of 20  Echo showed preserved LVEF    2    Newly diagnosed hypertension this admission  3  HbA1c 6 5 this admission  4  Chronic active smoker  5  Family history of premature coronary artery disease     recommendation:   Continues telemetry monitoring   Continue therapeutic heparin with PTT monitoring   Continue aspirin, Lipitor, metoprolol tartrate and Norvasc    consider switching Norvasc to ACE-inhibitor after coronary revascularization   patient is awaiting to transfer at [facility] for CABG evaluation   above all discussed with patient    Patient understands and agrees

## 2021-04-07 NOTE — TRANSPORTATION MEDICAL NECESSITY
Section I - General Information    Name of Patient: Dalia Shields                 : 1965    Medicare #: [id number]  Transport Date: 21 (PCS is valid for round trips on this date and for all repetitive trips in the 60-day range as noted below )  [facility] [address]  Is the pt's stay covered under Medicare Part A (PPS/DRG)   []     Closest appropriate facility? If no, why is transport to more distant facility required? Yes  If hospice pt, is this transport related to pt's terminal illness? NA       Section II - Medical Necessity Questionnaire  Ambulance transportation is medically necessary only if other means of transport are contraindicated or would be potentially harmful to the patient  To meet this requirement, the patient must either be "bed confined" or suffer from a condition such that transport by means other than ambulance is contraindicated by the patient's condition  The following questions must be answered by the medical professional signing below for this form to be valid:    1)  Describe the MEDICAL CONDITION (physical and/or mental) of this patient AT [address] [place] [address] that requires the patient to be transported in an ambulance and why transport by other means is contraindicated by the patient's condition: Patient requires cardiac monitoring for the transfer  Patient is limited by pain and fatigue  2) Is the patient "bed confined" as defined below? No  To be "be confined" the patient must satisfy all three of the following conditions: (1) unable to get up from bed without Assistance; AND (2) unable to ambulate; AND (3) unable to sit in a chair or wheelchair  3) Can this patient safely be transported by car or wheelchair van (i e , seated during transport without a medical attendant or monitoring)?    No    4) In addition to completing questions 1-3 above, please check any of the following conditions that apply*:   *Note: supporting documentation for any boxes checked must be maintained in the patient's medical records  If hosp-hosp transfer, describe services needed at 2nd facility not available at 1st facility? Moderate/severe pain on movement   Medical attendant required   Requires oxygen-unable to self administer  Unable to tolerate seated position for time needed to transport   Cardiac monitoring required en route       Section III - Signature of Physician or Healthcare Professional  I certify that the above information is true and correct based on my evaluation of this patient, and represent that the patient requires transport by ambulance and that other forms of transport are contraindicated  I understand that this information will be used by the Centers for Medicare and Medicaid Services (CMS) to support the determination of medical necessity for ambulance services, and I represent that I have personal knowledge of the patient's condition at time of transport  []  If this box is checked, I also certify that the patient is physically or mentally incapable of signing the ambulance service's claim and that the institution with which I am affiliated has furnished care, services, or assistance to the patient  My signature below is made on behalf of the patient pursuant to 42 CFR §424 36(b)(4)  In accordance with 42 CFR §424 37, the specific reason(s) that the patient is physically or mentally incapable of signing the claim form is as follows: N/A        Signature of Physician* or Healthcare Professional_______________________________    Signature Date 04/07/21 (For scheduled repetitive transports, this form is not valid for transports performed more than 60 days after this date)    Printed Name & Credentials of Physician or Healthcare Professional (MD, DO, RN, etc ) VITA Pruitt    *Form must be signed by patient's attending physician for scheduled, repetitive transports   For non-repetitive, unscheduled ambulance transports, if unable to obtain the signature of the attending physician, any of the following may sign (choose appropriate option below)  [] Physician Assistant []  Clinical Nurse Specialist []  Registered Nurse  []  Nurse Practitioner  [x] Discharge Planner

## 2021-04-08 ENCOUNTER — APPOINTMENT (INPATIENT)
Dept: NON INVASIVE DIAGNOSTICS | Facility: HOSPITAL | Age: 56
DRG: 235 | End: 2021-04-08
Payer: COMMERCIAL

## 2021-04-08 ENCOUNTER — PREP FOR PROCEDURE (OUTPATIENT)
Dept: CARDIAC SURGERY | Facility: CLINIC | Age: 56
End: 2021-04-08

## 2021-04-08 ENCOUNTER — APPOINTMENT (INPATIENT)
Dept: RADIOLOGY | Facility: HOSPITAL | Age: 56
DRG: 235 | End: 2021-04-08
Payer: COMMERCIAL

## 2021-04-08 DIAGNOSIS — I25.10 CORONARY ARTERY DISEASE, ANGINA PRESENCE UNSPECIFIED, UNSPECIFIED VESSEL OR LESION TYPE, UNSPECIFIED WHETHER NATIVE OR TRANSPLANTED HEART: ICD-10-CM

## 2021-04-08 DIAGNOSIS — I21.4 NSTEMI (NON-ST ELEVATED MYOCARDIAL INFARCTION) (HCC): Primary | ICD-10-CM

## 2021-04-08 PROBLEM — E83.52 HYPERCALCEMIA: Status: RESOLVED | Noted: 2021-04-06 | Resolved: 2021-04-08

## 2021-04-08 LAB
ABO GROUP BLD: NORMAL
ABO GROUP BLD: NORMAL
ALBUMIN SERPL BCP-MCNC: 3.2 G/DL (ref 3.5–5)
ALP SERPL-CCNC: 91 U/L (ref 46–116)
ALT SERPL W P-5'-P-CCNC: 37 U/L (ref 12–78)
ANION GAP SERPL CALCULATED.3IONS-SCNC: 4 MMOL/L (ref 4–13)
APTT PPP: 57 SECONDS (ref 23–37)
APTT PPP: 68 SECONDS (ref 23–37)
APTT PPP: 74 SECONDS (ref 23–37)
AST SERPL W P-5'-P-CCNC: 31 U/L (ref 5–45)
BACTERIA UR QL AUTO: NORMAL /HPF
BILIRUB SERPL-MCNC: 0.74 MG/DL (ref 0.2–1)
BILIRUB UR QL STRIP: NEGATIVE
BLD GP AB SCN SERPL QL: NEGATIVE
BUN SERPL-MCNC: 17 MG/DL (ref 5–25)
CALCIUM ALBUM COR SERPL-MCNC: 9.2 MG/DL (ref 8.3–10.1)
CALCIUM SERPL-MCNC: 8.6 MG/DL (ref 8.3–10.1)
CHLORIDE SERPL-SCNC: 107 MMOL/L (ref 100–108)
CHOLEST SERPL-MCNC: 97 MG/DL (ref 50–200)
CLARITY UR: CLEAR
CO2 SERPL-SCNC: 28 MMOL/L (ref 21–32)
COLOR UR: YELLOW
CREAT SERPL-MCNC: 1.04 MG/DL (ref 0.6–1.3)
ERYTHROCYTE [DISTWIDTH] IN BLOOD BY AUTOMATED COUNT: 13.2 % (ref 11.6–15.1)
FLUAV RNA RESP QL NAA+PROBE: NEGATIVE
FLUBV RNA RESP QL NAA+PROBE: NEGATIVE
GFR SERPL CREATININE-BSD FRML MDRD: 80 ML/MIN/1.73SQ M
GLUCOSE SERPL-MCNC: 114 MG/DL (ref 65–140)
GLUCOSE SERPL-MCNC: 130 MG/DL (ref 65–140)
GLUCOSE SERPL-MCNC: 188 MG/DL (ref 65–140)
GLUCOSE SERPL-MCNC: 194 MG/DL (ref 65–140)
GLUCOSE UR STRIP-MCNC: NEGATIVE MG/DL
HCT VFR BLD AUTO: 36.1 % (ref 36.5–49.3)
HDLC SERPL-MCNC: 34 MG/DL
HGB BLD-MCNC: 12 G/DL (ref 12–17)
HGB UR QL STRIP.AUTO: NEGATIVE
HYALINE CASTS #/AREA URNS LPF: NORMAL /LPF
KETONES UR STRIP-MCNC: NEGATIVE MG/DL
LDLC SERPL CALC-MCNC: 44 MG/DL (ref 0–100)
LEUKOCYTE ESTERASE UR QL STRIP: NEGATIVE
MCH RBC QN AUTO: 27.5 PG (ref 26.8–34.3)
MCHC RBC AUTO-ENTMCNC: 33.2 G/DL (ref 31.4–37.4)
MCV RBC AUTO: 83 FL (ref 82–98)
NITRITE UR QL STRIP: NEGATIVE
NON-SQ EPI CELLS URNS QL MICRO: NORMAL /HPF
PH UR STRIP.AUTO: 8 [PH]
PLATELET # BLD AUTO: 139 THOUSANDS/UL (ref 149–390)
PMV BLD AUTO: 10.9 FL (ref 8.9–12.7)
POTASSIUM SERPL-SCNC: 3.4 MMOL/L (ref 3.5–5.3)
PROT SERPL-MCNC: 6.9 G/DL (ref 6.4–8.2)
PROT UR STRIP-MCNC: NEGATIVE MG/DL
RBC # BLD AUTO: 4.36 MILLION/UL (ref 3.88–5.62)
RBC #/AREA URNS AUTO: NORMAL /HPF
RH BLD: NEGATIVE
RH BLD: NEGATIVE
RSV RNA RESP QL NAA+PROBE: NEGATIVE
SARS-COV-2 RNA RESP QL NAA+PROBE: NEGATIVE
SODIUM SERPL-SCNC: 139 MMOL/L (ref 136–145)
SP GR UR STRIP.AUTO: 1.01 (ref 1–1.03)
SPECIMEN EXPIRATION DATE: NORMAL
TRIGL SERPL-MCNC: 97 MG/DL
UROBILINOGEN UR QL STRIP.AUTO: 1 E.U./DL
WBC # BLD AUTO: 7.19 THOUSAND/UL (ref 4.31–10.16)
WBC #/AREA URNS AUTO: NORMAL /HPF

## 2021-04-08 PROCEDURE — 81001 URINALYSIS AUTO W/SCOPE: CPT | Performed by: PHYSICIAN ASSISTANT

## 2021-04-08 PROCEDURE — 93971 EXTREMITY STUDY: CPT | Performed by: SURGERY

## 2021-04-08 PROCEDURE — 93880 EXTRACRANIAL BILAT STUDY: CPT

## 2021-04-08 PROCEDURE — 94760 N-INVAS EAR/PLS OXIMETRY 1: CPT

## 2021-04-08 PROCEDURE — 86920 COMPATIBILITY TEST SPIN: CPT

## 2021-04-08 PROCEDURE — 86850 RBC ANTIBODY SCREEN: CPT | Performed by: PHYSICIAN ASSISTANT

## 2021-04-08 PROCEDURE — 86901 BLOOD TYPING SEROLOGIC RH(D): CPT | Performed by: PHYSICIAN ASSISTANT

## 2021-04-08 PROCEDURE — 99232 SBSQ HOSP IP/OBS MODERATE 35: CPT | Performed by: INTERNAL MEDICINE

## 2021-04-08 PROCEDURE — 93880 EXTRACRANIAL BILAT STUDY: CPT | Performed by: SURGERY

## 2021-04-08 PROCEDURE — 99223 1ST HOSP IP/OBS HIGH 75: CPT | Performed by: THORACIC SURGERY (CARDIOTHORACIC VASCULAR SURGERY)

## 2021-04-08 PROCEDURE — 85730 THROMBOPLASTIN TIME PARTIAL: CPT | Performed by: STUDENT IN AN ORGANIZED HEALTH CARE EDUCATION/TRAINING PROGRAM

## 2021-04-08 PROCEDURE — G1004 CDSM NDSC: HCPCS

## 2021-04-08 PROCEDURE — 80053 COMPREHEN METABOLIC PANEL: CPT | Performed by: STUDENT IN AN ORGANIZED HEALTH CARE EDUCATION/TRAINING PROGRAM

## 2021-04-08 PROCEDURE — 93971 EXTREMITY STUDY: CPT

## 2021-04-08 PROCEDURE — 71250 CT THORAX DX C-: CPT

## 2021-04-08 PROCEDURE — 85027 COMPLETE CBC AUTOMATED: CPT | Performed by: STUDENT IN AN ORGANIZED HEALTH CARE EDUCATION/TRAINING PROGRAM

## 2021-04-08 PROCEDURE — 0241U HB NFCT DS VIR RESP RNA 4 TRGT: CPT | Performed by: PHYSICIAN ASSISTANT

## 2021-04-08 PROCEDURE — 82948 REAGENT STRIP/BLOOD GLUCOSE: CPT

## 2021-04-08 PROCEDURE — 80061 LIPID PANEL: CPT | Performed by: STUDENT IN AN ORGANIZED HEALTH CARE EDUCATION/TRAINING PROGRAM

## 2021-04-08 PROCEDURE — 85730 THROMBOPLASTIN TIME PARTIAL: CPT | Performed by: INTERNAL MEDICINE

## 2021-04-08 PROCEDURE — 86900 BLOOD TYPING SEROLOGIC ABO: CPT | Performed by: PHYSICIAN ASSISTANT

## 2021-04-08 PROCEDURE — 87081 CULTURE SCREEN ONLY: CPT | Performed by: PHYSICIAN ASSISTANT

## 2021-04-08 RX ORDER — CHLORHEXIDINE GLUCONATE 0.12 MG/ML
15 RINSE ORAL ONCE
Status: COMPLETED | OUTPATIENT
Start: 2021-04-09 | End: 2021-04-09

## 2021-04-08 RX ORDER — CHLORHEXIDINE GLUCONATE 0.12 MG/ML
15 RINSE ORAL EVERY 12 HOURS SCHEDULED
Status: DISCONTINUED | OUTPATIENT
Start: 2021-04-08 | End: 2021-04-09 | Stop reason: HOSPADM

## 2021-04-08 RX ORDER — POTASSIUM CHLORIDE 20 MEQ/1
40 TABLET, EXTENDED RELEASE ORAL ONCE
Status: COMPLETED | OUTPATIENT
Start: 2021-04-08 | End: 2021-04-08

## 2021-04-08 RX ORDER — CEFAZOLIN SODIUM 2 G/50ML
2000 SOLUTION INTRAVENOUS ONCE
Status: CANCELLED | OUTPATIENT
Start: 2021-04-08 | End: 2021-04-08

## 2021-04-08 RX ORDER — HEPARIN SODIUM 1000 [USP'U]/ML
10000 INJECTION, SOLUTION INTRAVENOUS; SUBCUTANEOUS ONCE
Status: CANCELLED | OUTPATIENT
Start: 2021-04-09

## 2021-04-08 RX ORDER — LABETALOL 20 MG/4 ML (5 MG/ML) INTRAVENOUS SYRINGE
10 EVERY 6 HOURS PRN
Status: DISCONTINUED | OUTPATIENT
Start: 2021-04-08 | End: 2021-04-09

## 2021-04-08 RX ORDER — HEPARIN SODIUM 1000 [USP'U]/ML
400 INJECTION, SOLUTION INTRAVENOUS; SUBCUTANEOUS ONCE
Status: CANCELLED | OUTPATIENT
Start: 2021-04-09

## 2021-04-08 RX ADMIN — ASPIRIN 81 MG: 81 TABLET, CHEWABLE ORAL at 09:13

## 2021-04-08 RX ADMIN — METOPROLOL TARTRATE 25 MG: 25 TABLET, FILM COATED ORAL at 09:13

## 2021-04-08 RX ADMIN — MUPIROCIN 1 APPLICATION: 20 OINTMENT TOPICAL at 13:05

## 2021-04-08 RX ADMIN — ATORVASTATIN CALCIUM 40 MG: 40 TABLET, FILM COATED ORAL at 16:43

## 2021-04-08 RX ADMIN — MUPIROCIN 1 APPLICATION: 20 OINTMENT TOPICAL at 20:15

## 2021-04-08 RX ADMIN — HEPARIN SODIUM 14 UNITS/KG/HR: 10000 INJECTION, SOLUTION INTRAVENOUS at 20:54

## 2021-04-08 RX ADMIN — HEPARIN SODIUM 2000 UNITS: 1000 INJECTION INTRAVENOUS; SUBCUTANEOUS at 06:48

## 2021-04-08 RX ADMIN — METOPROLOL TARTRATE 25 MG: 25 TABLET, FILM COATED ORAL at 20:16

## 2021-04-08 RX ADMIN — AMLODIPINE BESYLATE 5 MG: 5 TABLET ORAL at 09:13

## 2021-04-08 RX ADMIN — CHLORHEXIDINE GLUCONATE 0.12% ORAL RINSE 15 ML: 1.2 LIQUID ORAL at 20:15

## 2021-04-08 RX ADMIN — CHLORHEXIDINE GLUCONATE 0.12% ORAL RINSE 15 ML: 1.2 LIQUID ORAL at 13:04

## 2021-04-08 RX ADMIN — POTASSIUM CHLORIDE 40 MEQ: 1500 TABLET, EXTENDED RELEASE ORAL at 07:14

## 2021-04-08 RX ADMIN — PANTOPRAZOLE SODIUM 40 MG: 40 TABLET, DELAYED RELEASE ORAL at 06:48

## 2021-04-08 NOTE — ASSESSMENT & PLAN NOTE
Patient has extensive tobacco use, 1 pack per day for over 30 years     -Will provide nicotine cessation Education  -Will hold on provide nicotine patch in setting of STEMI  -Will encourage abstinence  -Will benefit from outpatient CT lung screening annually

## 2021-04-08 NOTE — PROGRESS NOTES
[facility] Admission Note   Unit/Bed # @DBLINK (JUN,19405)@ Encounter: [id number]  SOD Team C           Masood Timothyonesimo 64 y o  male [id number]       Patient seen and examined  This is a 79-year-old male past medical history significant for tobacco abuse presented to outside hospital with chest pain 04/06/2021  Patient had at that time complained of 1 day onset of substernal chest pain described as burning there was moderate intensity  Presentations emergency department patient's calcium was noted to be 12 4 which was secondary to over consumption of TUMs in the setting of heartburn which was his anginal equivalent  The patient did have A1c 6 5 troponin peaked at 20  LDL 70  Patient was subsequently cathed and had severe triple vessel disease and was recommend to have cabg eval  Patient seen and examined  In no acute distress  + JVD  No cp  Consult cardiology and CT surgery  Cont  Hep drip  Cont  ASA and statin  Monitor on tele  Assessment/Plan: Active Problems:    * No active hospital problems   *         Disposition:  INPATIENT     Expected LOS: >2 Midnights      Jose Yoder DO

## 2021-04-08 NOTE — PROGRESS NOTES
Cardiology Progress Note - Dee Swann 64 y o  male MRN: [id number]    Unit/Bed#: CW2 212-01 Encounter: [id number]      Assessment:  Principal Problem:    NSTEMI (non-ST elevated myocardial infarction) [facility])  Active Problems:    Chest pain    Hypertensive urgency    Hyperglycemia    Tobacco abuse    Hypercalcemia      Plan:  Patient is comfortable this morning  He has no chest pain or significant dyspnea  He is in sinus rhythm on telemetry with a rate in the 60s  Cardiac surgery is consulted in reference to CABG  BMP today with potassium of 3 4 and creatinine of 1 04  Troponin last night was 5 6  Total cholesterol is 97 with an HDL of 34 and a calculated LDL of 44  Will continue his current medical regimen  Patient on intravenous heparin  Subjective:   Patient seen and examined  No significant events overnight   negative  Objective:     Vitals: Blood pressure 136/64, pulse 56, temperature 98 8 °F (37 1 °C), resp  rate 16, SpO2 99 %  , There is no height or weight on file to calculate BMI ,   Orthostatic Blood Pressures      Most Recent Value   Blood Pressure  136/64 filed at 04/07/2021 2336      ,    No intake or output data in the 24 hours ending 04/08/21 0651    No significant arrhythmias seen on telemetry review         Physical Exam:    GEN: Dee Swann appears well, alert and oriented x 3, pleasant and cooperative   NECK: supple, no carotid bruits, no JVD or HJR  HEART: normal rate, regular rhythm, normal S1 and S2, no murmurs, clicks, gallops or rubs   LUNGS: clear to auscultation bilaterally; no wheezes, rales, or rhonchi   ABDOMEN: normal bowel sounds, soft, no tenderness, no distention  EXTREMITIES: peripheral pulses normal; no clubbing, cyanosis, or edema  SKIN: warm and well perfused, no suspicious lesions on exposed skin    Labs & Results:    Admission on 04/07/2021   Component Date Value    Troponin I 04/07/2021 5 60*    PTT 04/08/2021 57*    Sodium 04/08/2021 139     Potassium 04/08/2021 3 4*    Chloride 04/08/2021 107     CO2 04/08/2021 28     ANION GAP 04/08/2021 4     BUN 04/08/2021 17     Creatinine 04/08/2021 1 04     Glucose 04/08/2021 194*    Calcium 04/08/2021 8 6     Corrected Calcium 04/08/2021 9 2     AST 04/08/2021 31     ALT 04/08/2021 37     Alkaline Phosphatase 04/08/2021 91     Total Protein 04/08/2021 6 9     Albumin 04/08/2021 3 2*    Total Bilirubin 04/08/2021 0 74     eGFR 04/08/2021 80     Cholesterol 04/08/2021 97     Triglycerides 04/08/2021 97     HDL, Direct 04/08/2021 34*    LDL Calculated 04/08/2021 44        Xr Chest 1 View Portable    Result Date: 4/6/2021  Narrative: CHEST INDICATION:   chest pain  COMPARISON:  None EXAM PERFORMED/VIEWS:  XR CHEST PORTABLE FINDINGS: Cardiomediastinal silhouette appears unremarkable  The lungs are clear  No pneumothorax or pleural effusion  Mild spondylotic changes noted  Impression: No acute cardiopulmonary disease  Workstation performed: [id number]       EKG personally reviewed by Rose Castillo MD      Counseling / Coordination of Care  Total floor / unit time spent today 30 minutes  Greater than 50% of total time was spent with the patient and / or family counseling and / or coordination of care

## 2021-04-08 NOTE — UTILIZATION REVIEW
Initial Clinical Review    Admission: Date/Time/Statement:   Admission Orders (From admission, onward)     Ordered        04/07/21 2232  Inpatient Admission  Once                   Orders Placed This Encounter   Procedures    Inpatient Admission     Standing Status:   Standing     Number of Occurrences:   1     Order Specific Question:   Level of Care     Answer:   Med Surg [16]     Order Specific Question:   Estimated length of stay     Answer:   More than 2 Midnights     Order Specific Question:   Certification     Answer:   I certify that inpatient services are medically necessary for this patient for a duration of greater than two midnights  See H&P and MD Progress Notes for additional information about the patient's course of treatment  Assessment/Plan: 64year old male, presented to the ED @ [facility], Admitted,  Transferred to [facility], higher level of care, via EMS  Admitted as Inpatient due to NSTEMI,  Status post cardiac catheterization, evidence of triple-vessel disease  Recommending that patient be transferred to [facility] for CABG evaluation  Patient presenting with midsternal, burning chest pain that started Sunday night for eating a large meal   Troponins on admission 11, peaked at 20  19  Continue heparin drip, aspirin, statin, beta-blocker:   Metoprolol 25 mg b i d  ECG:  Incomplete RBBB, Nonspecific T wave abnormality  VTE Pharmacologic Prophylaxis: Heparin  VTE Mechanical Prophylaxis: sequential compression device    04/08/2021  Consult CT Surgery:  recommend coronary artery bypass grafting  Pre-op testing has been ordered  Plan for CABG x4 tomorrow  04/08/2021  Progress Note:  Monitor on telemetry  Continue heparin gtt, aspirin 81 mg, Lipitor 40, Metoprolol 25 mg b i d , amlodipine 5 mg  Protonix 40, Mylanta prn for GERD-like symptoms  NPO after MN           Triage Vitals   Temperature Pulse Respirations Blood Pressure SpO2   04/07/21 2336 04/07/21 2001 04/07/21 2336 21   98 8 °F (37 1 °C) 66 16 (!) 173/96 98 %      Temp Source Heart Rate Source Patient Position - Orthostatic VS BP Location FiO2 (%)   21 1215 -- -- -- --   Oral          Pain Score       21 0030       No Pain          Wt Readings from Last 1 Encounters:   21 70 4 kg (155 lb 3 3 oz)     Additional Vital Signs:   Date/Time  Temp  Pulse  Resp  BP  MAP (mmHg)  SpO2  O2 Device   21 15:43:27  98 8 °F (37 1 °C)  62  18  135/77  96  98 %  --   21 1342  --  --  --  --  --  98 %  None (Room air)   21 12:15:18  98 4 °F (36 9 °C)  61  18  154/97  116  99 %  --   21 07:19:44  97 8 °F (36 6 °C)  61  18  146/81  103  99 %  --   21 0030  --  --  --  --  --  --  None (Room air)   21 23:36:04  98 8 °F (37 1 °C)  56  16  136/64  88  99 %  --   21 21:17:50  --  69  --  --  --  99 %  --   21 2115  --  --  --  172/96Abnormal   --  --  --     2021 @ 1329  CT chest:  No evidence of aneurysm  Enlarged thyroid left greater than right probably related to goiter similar to the study of 2017  Striated nephrogram   Occasionally this can be associated with acute tubular necrosis and should be correlated with renal function 2 small lung nodules Based on current Fleischner Society 2017 Guidelines on incidental pulmonary nodule, no routine follow-up is needed if the patient is considered low risk for lung cancer   If the patient is considered high risk for lung cancer, 12 month follow-up non-contrast chest CT is recommended  2021 @ 1501  Chest X:  No acute cardiopulmonary disease  2021 @ 0922  EC, Normal sinus rhythm with sinus arrhythmia    2021  Cardiac Catheterization:  CORONARY CIRCULATION:  The coronary circulation is co-dominant  Left main: The vessel was large sized  LAD: The vessel was large sized  100% occlusion of ostial LAD   Distal LAD fills from left-to-left and right-to-left collaterals    Circumflex: The vessel was medium to large sized  OM1 is medium to large caliber long vessel  Proximal is diffusely disease with maximum stenosis of 90% in the mid(after the stenosis it bifurcates into 2 medium caliber long branch)  OM2 is medium caliber long vessel with luminal irregularities  Distal circumflex has 80-85% stenosis extending into L PL(L PL is medium caliber long vessel  Left to right collateral filling RPDA /distal RCA is visualized  RCA: The vessel was normal sized  Proximal RCA has 100% occlusion   Distal RCA fills from right to right collaterals  Right to left collaterals filling LAD is visualized  IMPRESSIONS:  Ostial % occlusion and distal LAD fills from right-to-left and left-to-left collateral  Mid OM1 90% stenosis  Distal circumflex 85 % stenosis  Proximal % occluded and distally it fills from right to right collaterals  Preserved LVEF on echo   RECOMMENDATIONS:  Aggressive medical management  CT surgery evaluation for CABG    Pertinent Labs/Diagnostic Test Results:   Results from last 7 days   Lab Units 04/08/21  1217   SARS-COV-2  Negative     Results from last 7 days   Lab Units 04/08/21  0537 04/07/21  0552 04/06/21  1115   WBC Thousand/uL 7 19 8 39 10 17*   HEMOGLOBIN g/dL 12 0 12 7 14 2   HEMATOCRIT % 36 1* 38 9 43 3   PLATELETS Thousands/uL 139* 153 178   NEUTROS ABS Thousands/µL  --   --  7 70*     Results from last 7 days   Lab Units 04/08/21  0537 04/07/21  0552 04/06/21  1115   SODIUM mmol/L 139 139 140   POTASSIUM mmol/L 3 4* 3 8 4 0   CHLORIDE mmol/L 107 103 100   CO2 mmol/L 28 28 31   ANION GAP mmol/L 4 8 9   BUN mg/dL 17 19 17   CREATININE mg/dL 1 04 1 11 1 04   EGFR ml/min/1 73sq m 80 74 80   CALCIUM mg/dL 8 6 9 3 12 4*     Results from last 7 days   Lab Units 04/08/21  0537 04/07/21  0552 04/06/21  1115   AST U/L 31 61* 73*   ALT U/L 37 36 42   ALK PHOS U/L 91 96 109   TOTAL PROTEIN g/dL 6 9 7 2 8 6*   ALBUMIN g/dL 3 2* 3 6 4 1   TOTAL BILIRUBIN mg/dL 0 74 1 30* 0 93   BILIRUBIN DIRECT mg/dL  --   --  0 24*     Results from last 7 days   Lab Units 04/08/21  0537 04/07/21  1556 04/07/21  1040 04/07/21  0714 04/06/21  2117 04/06/21  1801   POC GLUCOSE mg/dl 188* 150* 189* 133 167* 135     Results from last 7 days   Lab Units 04/08/21  0537 04/07/21  0552 04/06/21  1115   GLUCOSE RANDOM mg/dL 194* 142* 171*     Results from last 7 days   Lab Units 04/06/21  1900   HEMOGLOBIN A1C % 6 5*   EAG mg/dl 140     Results from last 7 days   Lab Units 04/07/21  2112 04/06/21  2146 04/06/21  1900 04/06/21  1342 04/06/21  1115   TROPONIN I ng/mL 5 60* 16 28* 20 19* 14 91* 11 87*     Results from last 7 days   Lab Units 04/08/21  1311 04/08/21  0537 04/07/21  1315  04/06/21  1221   PROTIME seconds  --   --   --   --  14 0   INR   --   --   --   --  1 13   PTT seconds 74* 57* 67*   < > 32    < > = values in this interval not displayed       Results from last 7 days   Lab Units 04/06/21  1115   LIPASE u/L 146     Results from last 7 days   Lab Units 04/08/21  1311   CLARITY UA  Clear   COLOR UA  Yellow   SPEC GRAV UA  1 012   PH UA  8 0   GLUCOSE UA mg/dl Negative   KETONES UA mg/dl Negative   BLOOD UA  Negative   PROTEIN UA mg/dl Negative   NITRITE UA  Negative   BILIRUBIN UA  Negative   UROBILINOGEN UA E U /dl 1 0   LEUKOCYTES UA  Negative   WBC UA /hpf None Seen   RBC UA /hpf None Seen   BACTERIA UA /hpf None Seen   EPITHELIAL CELLS WET PREP /hpf None Seen     Results from last 7 days   Lab Units 04/08/21  1217   INFLUENZA A PCR  Negative   INFLUENZA B PCR  Negative   RSV PCR  Negative     Past Medical History:   Diagnosis Date    CAD (coronary artery disease)     Former tobacco use     GERD (gastroesophageal reflux disease)     Goiter     History of myocardial infarction     History of rib fracture     Hypertension      Present on Admission:   Tobacco abuse   Hypertensive urgency   Hyperglycemia   (Resolved) Hypercalcemia   Chest pain   NSTEMI (non-ST elevated myocardial infarction) [facility])      Admitting Diagnosis: Chest pain [R07 9]  Age/Sex: 64 y o  male  Admission Orders:  Scheduled Medications:  amLODIPine, 5 mg, Oral, Daily  aspirin, 81 mg, Oral, Daily  atorvastatin, 40 mg, Oral, Daily With Dinner  chlorhexidine, 15 mL, Swish & Spit, Q12H [address]  [START ON 4/9/2021] chlorhexidine, 15 mL, Swish & Spit, Once  insulin lispro, 1-5 Units, Subcutaneous, TID AC  [START ON 4/9/2021] metoprolol tartrate, 12 5 mg, Oral, Once  metoprolol tartrate, 25 mg, Oral, Q12H [address]  [START ON 4/9/2021] mupirocin, 1 application, Nasal, Once  mupirocin, , Nasal, Q12H [address]  pantoprazole, 40 mg, Oral, Early Morning      Continuous IV Infusions:  heparin (porcine), 3-20 Units/kg/hr (Order-Specific), Intravenous, Titrated      PRN Meds:  acetaminophen, 650 mg, Oral, Q6H PRN  aluminum-magnesium hydroxide-simethicone, 10 mL, Oral, Q4H PRN  heparin (porcine), 2,000 Units, Intravenous, Q1H PRN  heparin (porcine), 4,000 Units, Intravenous, Q1H PRN  ondansetron, 4 mg, Intravenous, Q6H PRN      NPO  Telemetry  Daniel SCDs  IP CONSULT TO CARDIOTHORACIC SURGERY    Network Utilization Review Department  ATTENTION: Please call with any questions or concerns to [phone] and carefully listen to the prompts so that you are directed to the right person  All voicemails are confidential   Alice Salcedo all requests for admission clinical reviews, approved or denied determinations and any other requests to dedicated fax number below belonging to the campus where the patient is receiving treatment   List of dedicated fax numbers for the Facilities:  [address] [place] [address] DENIALS (Administrative/Medical Necessity) [phone]   [address] (Maternity/NICU/Pediatrics) [phone] [address] María Herbert [phone]     [address] ([address]) [address] [place] [address] Laura Corrigan 1481 [phone]   [address]51 [phone]

## 2021-04-08 NOTE — UTILIZATION REVIEW
Notification of Discharge  This is a Notification of Discharge from our facility [address]  Please be advised that this patient has been discharge from our facility  Below you will find the admission and discharge date and time including the patients disposition  PRESENTATION DATE: 4/6/2021 10:54 AM  OBS ADMISSION DATE:   IP ADMISSION DATE: 4/6/21 1222   DISCHARGE DATE: 4/7/2021  7:28 PM  DISPOSITION: [address]   Admission Orders listed below:  Admission Orders (From admission, onward)     Ordered        04/06/21 1222  Inpatient Admission  Once                   Please contact the UR Department if additional information is required to close this patient's authorization/case  [address] [facility] Utilization Review Department  Main: [phone] x carefully listen to the prompts  All voicemails are confidential   [email or web address]  Send all requests for admission clinical reviews, approved or denied determinations and any other requests to dedicated fax number below belonging to the campus where the patient is receiving treatment   List of dedicated fax numbers:  [address] DENIALS (Administrative/Medical Necessity) [phone]   [address] (Maternity/NICU/Pediatrics) [phone]   Nabil Carias [phone]   Selvin Lopez [phone]   Alexsandra Ravi [phone]   [facility] [address] [place] [address] [phone]   [facility]  [phone]   [address] [facility]  [facility] [address] [facility] [phone]
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
## 2021-04-08 NOTE — ASSESSMENT & PLAN NOTE
Patient presented to [facility] with elevated blood pressure 188/108, without signs of end-organ damage    -Continue metoprolol 25 b i d   -Continue amlodipine 5 mg daily  -Encourage tobacco cessation  -Continue to monitor daily labs  -Cardiac diet in place
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
## 2021-04-08 NOTE — CASE MANAGEMENT
LOS: Day 1  Bundle: pt is not a bundle  Readmission risk: pt is not a 30 day readmit    CM reviewed role with pt's dtr Mary over the phone at [phone]  Darryl Kwong reported the pt lives alone in a 3 level home with 3-[address] and approx [address] between floors  Pt is reported IPTA with ADLs, pt drives and works  No reported use of DME, no reported hx of VNA, STR or OPPT  Mary reported the pt does not have a PCP, CM provided Mary with the number for Andrea Vásquez was appreciative  Darryl Kwong also reported she does not believe the pt has a pharmacy that he uses on a regular, potentially [facility] in [place]  No reported hx of MH or substance abuse  CM reviewed d/c planning process including the following: identifying help at home, patient preference for d/c planning needs, Discharge Lounge, [facility] Meds to Bed program, availability of treatment team to discuss questions or concerns patient and/or family may have regarding understanding medications and recognizing signs and symptoms once discharged  CM also encouraged patient to follow up with all recommended appointments after discharge  Patient advised of importance for patient and family to participate in managing patients medical well being  Patient/caregiver received discharge checklist  Content reviewed  Patient/caregiver encouraged to participate in discharge plan of care prior to discharge home

## 2021-04-08 NOTE — ASSESSMENT & PLAN NOTE
No signs of psychosis, abdominal pain or changes in reflexes      Likely multifactorial, self-induced vomiting, milk alkali syndrome and dehydration    12 4 on admission  Currently down trending and improved with IV fluids  Continue to monitor

## 2021-04-08 NOTE — PROGRESS NOTES
INTERNAL MEDICINE RESIDENCY PROGRESS NOTE     Name: Rafael Flannery   Age & Sex: 64 y o  male   MRN: [id number]  Unit/Bed#: CW2 212-01   Encounter: [id number]  Team: SOD Team C     PATIENT INFORMATION     Name: Rafael Flannery   Age & Sex: 64 y o  male   MRN: [id number]  Hospital Stay Days: 1    ASSESSMENT/PLAN     Principal Problem:    NSTEMI (non-ST elevated myocardial infarction) [facility])  Active Problems:    Chest pain    Hypertensive urgency    Hyperglycemia    Tobacco abuse      Tobacco abuse  Assessment & Plan  Patient has extensive tobacco use, 1 pack per day for over 30 years     -Will provide nicotine cessation Education  -Will hold on provide nicotine patch in setting of STEMI  -Will encourage abstinence  -Will benefit from outpatient CT lung screening annually    Hyperglycemia  Assessment & Plan  BG >160 on presentation  No history of DM, s/s such as polyuria or polydipsia, polyphagia    -A1c 6 5  -Starting sliding scale insulin while inpatient  -Will recommend lifestyle modification and outpatient follow up    Hypertensive urgency  Assessment & Plan  Patient presented to [facility] with elevated blood pressure 188/108, without signs of end-organ damage    -Continue metoprolol 25 b i d   -Continue amlodipine 5 mg daily  -Encourage tobacco cessation  -Continue to monitor daily labs  -Cardiac diet in place      Chest pain  Assessment & Plan  Patient presenting with 3 days of chest pain which he described as a flame in the middle of his chest   Pain did not radiate anywhere it improved with milk in [place] until Tuesday when patient bent over to tie his shoes and was suddenly struck with an episode of sweats, palpitations and shortness of breath  He presented to [facility] where components were positive and cardiac catheterization demonstrated triple-vessel disease  Patient was transferred to [facility] for CABG evaluation      Patient currently denies any chest pain, shortness of breath, palpitations or lightheadedness  He has a regular rate and rhythm with a systolic ejection murmur in the 2nd left intercostal space on physical exam   He does appear to have JVD at 45° extending up to the angle of the mandible    -Troponins on admission 11, peaked at 20 19, no longer needed trend  -EKG demonstrated Incomplete right bundle branch block, Nonspecific T wave abnormality  -Catheterization Ostial % occlusion and distal LAD fills from right-to-left and left-to-left collateral, Mid OM1 90% stenosis, Distal circumflex 85 % stenosis, Proximal % occluded and distally it fills from right to right collaterals, Preserved LVEF on echo  -Echo EF of 55% with grade 2 diastolic dysfunction, trace TR, trace pericardial effusion was identified along the right atrial free wall    -lipid panel normal, A1c 6 5  -Cardiovascular diet in place  -Continue heparin gtt, aspirin 81 mg, Lipitor 40, Metoprolol 25 mg b i d , amlodipine 5 mg  -Protonix 40, Mylanta prn for GERD-like symptoms  -Cardiology following  -Seen by Cardiac surgery, preop workup for CABG started  Plan for OR 4/9  -Recommend nicotine abstinence and healthy lifestyle    Hypercalcemia-resolved as of 4/8/2021  Assessment & Plan  No signs of psychosis, abdominal pain or changes in reflexes  Likely multifactorial, self-induced vomiting, milk alkali syndrome and dehydration    12 4 on admission  Currently down trending and improved with IV fluids  Continue to monitor      Disposition: Continue inpatient level of care  Pt currently on heparin gtt s/p NSTEMI and planned to have CABG tomorrow 4/9  Cardiac surgery will take over care after CABG  SUBJECTIVE     Patient seen and examined  No acute events overnight  Pt says he feels back to his baseline today  Denies any pain, chest tightness, headache, dizziness, N/V, palpitations, diaphoresis      OBJECTIVE     Vitals:    04/07/21 2117 04/07/21 2336 04/08/21 0719 04/08/21 1215   BP:  136/64 146/81 154/97   Pulse: 69 56 61 61   Resp:  16 18 18   Temp:  98 8 °F (37 1 °C) 97 8 °F (36 6 °C)    SpO2: 99% 99% 99% 99%      Temperature:   Temp (24hrs), Av 5 °F (36 9 °C), Min:97 8 °F (36 6 °C), Max:98 8 °F (37 1 °C)    Temperature: 97 8 °F (36 6 °C)  Intake & Output:  I/O     None        Weights: There is no height or weight on file to calculate BMI  Weight (last 2 days)     None        Physical Exam  Constitutional:       General: He is not in acute distress  HENT:      Head: Normocephalic  Eyes:      Extraocular Movements: Extraocular movements intact  Cardiovascular:      Rate and Rhythm: Normal rate and regular rhythm  Comments: Systolic murmur 2nd left intercostal space  Pulmonary:      [place]: Pulmonary effort is normal       Breath sounds: Normal breath sounds  Abdominal:      Palpations: Abdomen is soft  Tenderness: There is no abdominal tenderness  Musculoskeletal:      Right lower leg: No edema  Left lower leg: No edema  Skin:     General: Skin is warm and dry  Neurological:      Mental Status: He is alert and oriented to person, place, and time  LABORATORY DATA     Labs: I have personally reviewed pertinent reports    Results from last 7 days   Lab Units 21  0552 21  1115   WBC Thousand/uL 7 19 8 39 10 17*   HEMOGLOBIN g/dL 12 0 12 7 14 2   HEMATOCRIT % 36 1* 38 9 43 3   PLATELETS Thousands/uL 139* 153 178   NEUTROS PCT %  --   --  76*   MONOS PCT %  --   --  7      Results from last 7 days   Lab Units 21  0552 21  1115   POTASSIUM mmol/L 3 4* 3 8 4 0   CHLORIDE mmol/L 107 103 100   CO2 mmol/L 28 28 31   BUN mg/dL 17 19 17   CREATININE mg/dL 1 04 1 11 1 04   CALCIUM mg/dL 8 6 9 3 12 4*   ALK PHOS U/L 91 96 109   ALT U/L 37 36 42   AST U/L 31 61* 73*              Results from last 7 days   Lab Units 21  0537 21  1315 21  0552  21  1221   INR   --   --   --   --  1 13   PTT seconds 57* 67* 64*   < > 32    < > = values in this interval not displayed  Results from last 7 days   Lab Units 04/07/21  2112 04/06/21  2146 04/06/21  1900   TROPONIN I ng/mL 5 60* 16 28* 20 19*       IMAGING & DIAGNOSTIC TESTING     Radiology Results: I have personally reviewed pertinent reports  No results found  Other Diagnostic Testing: I have personally reviewed pertinent reports  ACTIVE MEDICATIONS     Current Facility-Administered Medications   Medication Dose Route Frequency    acetaminophen (TYLENOL) tablet 650 mg  650 mg Oral Q6H PRN    aluminum-magnesium hydroxide-simethicone (MYLANTA) oral suspension 10 mL  10 mL Oral Q4H PRN    amLODIPine (NORVASC) tablet 5 mg  5 mg Oral Daily    aspirin chewable tablet 81 mg  81 mg Oral Daily    atorvastatin (LIPITOR) tablet 40 mg  40 mg Oral Daily With Dinner    chlorhexidine (PERIDEX) 0 12 % oral rinse 15 mL  15 mL Swish & Spit Q12H [address]    [START ON 4/9/2021] chlorhexidine (PERIDEX) 0 12 % oral rinse 15 mL  15 mL Swish & Spit Once    heparin (porcine) 25,000 units in 0 45% NaCl 250 mL infusion (premix)  3-20 Units/kg/hr (Order-Specific) Intravenous Titrated    heparin (porcine) injection 2,000 Units  2,000 Units Intravenous Q1H PRN    heparin (porcine) injection 4,000 Units  4,000 Units Intravenous Q1H PRN    [START ON 4/9/2021] metoprolol tartrate (LOPRESSOR) partial tablet 12 5 mg  12 5 mg Oral Once    metoprolol tartrate (LOPRESSOR) tablet 25 mg  25 mg Oral Q12H [address]    [START ON 4/9/2021] mupirocin (BACTROBAN) 2 % nasal ointment 1 application  1 application Nasal Once    mupirocin (BACTROBAN) 2 % nasal ointment   Nasal Q12H [address]    ondansetron (ZOFRAN) injection 4 mg  4 mg Intravenous Q6H PRN    pantoprazole (PROTONIX) EC tablet 40 mg  40 mg Oral Early Morning       VTE Pharmacologic Prophylaxis: Heparin  VTE Mechanical Prophylaxis: sequential compression device    Portions of the record may have been created with voice recognition software  Occasional wrong word or "sound a like" substitutions may have occurred due to the inherent limitations of voice recognition software    Read the chart carefully and recognize, using context, where substitutions have occurred   ==  [facility] Student

## 2021-04-08 NOTE — ASSESSMENT & PLAN NOTE
BG >160 on presentation   No history of DM, s/s such as polyuria or polydipsia, polyphagia    -A1c 6 5  -Starting sliding scale insulin while inpatient  -Will recommend lifestyle modification and outpatient follow up

## 2021-04-08 NOTE — RESPIRATORY THERAPY NOTE
RT Protocol Note  Alice Mercedes 64 y o  male MRN: [id number]  Unit/Bed#: CW2 212-01 Encounter: [id number]    Assessment    Principal Problem:    NSTEMI (non-ST elevated myocardial infarction) [facility])  Active Problems:    Chest pain    Hypertensive urgency    Hyperglycemia    Tobacco abuse      Home Pulmonary Medications:  none       Past Medical History:   Diagnosis Date    Acid reflux     CAD (coronary artery disease)     Goiter     History of rib fracture     NSTEMI (non-ST elevated myocardial infarction) ([facility] )      Social History     Socioeconomic History    Marital status:      Spouse name: Not on file    Number of children: Not on file    Years of education: Not on file    Highest education level: Not on file   Occupational History    Not on file   Social Needs    Financial resource strain: Not on file    Food insecurity     Worry: Not on file     Inability: Not on file    Transportation needs     Medical: Not on file     Non-medical: Not on file   Tobacco Use    Smoking status: Current Every Day Smoker     Packs/day: 1 00     Types: Cigarettes    Smokeless tobacco: Never Used   Substance and Sexual Activity    Alcohol use: Never     Frequency: Never    Drug use: Never    Sexual activity: Not on file   Lifestyle    Physical activity     Days per week: Not on file     Minutes per session: Not on file    Stress: Not on file   Relationships    Social connections     Talks on phone: Not on file     Gets together: Not on file     Attends Taoist service: Not on file     Active member of club or organization: Not on file     Attends meetings of clubs or organizations: Not on file     Relationship status: Not on file    Intimate partner violence     Fear of current or ex partner: Not on file     Emotionally abused: Not on file     Physically abused: Not on file     Forced sexual activity: Not on file   Other Topics Concern    Not on file   Social History Narrative    Not on file Subjective         Objective    Physical Exam:   Assessment Type: (P) Assess only  General Appearance: (P) Alert, Awake  Respiratory Pattern: (P) Normal  Chest Assessment: (P) Chest expansion symmetrical  Bilateral Breath Sounds: (P) Clear    Vitals:  Blood pressure 154/97, pulse 61, temperature 98 4 °F (36 9 °C), temperature source Oral, resp  rate 18, SpO2 98 %  Imaging and other studies: I have personally reviewed pertinent reports  Plan    Respiratory Plan: (P) Discontinue Protocol        Resp Comments: (P) Pt evaluated pre open heart surgery  Pt found on rma with no sob  Pt is a current smoker with no other pulm hx  Recent CXR was clear  Attempted to answear any questions about post op time  Pt has no further questions  No therapy needed at this time  Will re-evaluate pt post op

## 2021-04-09 ENCOUNTER — APPOINTMENT (INPATIENT)
Dept: NON INVASIVE DIAGNOSTICS | Facility: HOSPITAL | Age: 56
DRG: 235 | End: 2021-04-09
Attending: THORACIC SURGERY (CARDIOTHORACIC VASCULAR SURGERY)
Payer: COMMERCIAL

## 2021-04-09 ENCOUNTER — ANESTHESIA (INPATIENT)
Dept: PERIOP | Facility: HOSPITAL | Age: 56
DRG: 235 | End: 2021-04-09
Payer: COMMERCIAL

## 2021-04-09 ENCOUNTER — APPOINTMENT (INPATIENT)
Dept: RADIOLOGY | Facility: HOSPITAL | Age: 56
DRG: 235 | End: 2021-04-09
Payer: COMMERCIAL

## 2021-04-09 ENCOUNTER — ANESTHESIA EVENT (INPATIENT)
Dept: PERIOP | Facility: HOSPITAL | Age: 56
DRG: 235 | End: 2021-04-09
Payer: COMMERCIAL

## 2021-04-09 PROBLEM — Z95.1 S/P CABG X 3: Status: ACTIVE | Noted: 2021-04-09

## 2021-04-09 PROBLEM — D69.6 THROMBOCYTOPENIA (HCC): Status: ACTIVE | Noted: 2021-04-09

## 2021-04-09 LAB
ALT SERPL W P-5'-P-CCNC: 76 U/L (ref 12–78)
ANION GAP SERPL CALCULATED.3IONS-SCNC: 10 MMOL/L (ref 4–13)
ANION GAP SERPL CALCULATED.3IONS-SCNC: 9 MMOL/L (ref 4–13)
APTT PPP: 40 SECONDS (ref 23–37)
ATRIAL RATE: 83 BPM
BASE EXCESS BLDA CALC-SCNC: -6 MMOL/L (ref -2–3)
BUN SERPL-MCNC: 17 MG/DL (ref 5–25)
BUN SERPL-MCNC: 18 MG/DL (ref 5–25)
CA-I BLD-SCNC: 1.01 MMOL/L (ref 1.12–1.32)
CALCIUM SERPL-MCNC: 7.8 MG/DL (ref 8.3–10.1)
CALCIUM SERPL-MCNC: 8.8 MG/DL (ref 8.3–10.1)
CHLORIDE SERPL-SCNC: 109 MMOL/L (ref 100–108)
CHLORIDE SERPL-SCNC: 112 MMOL/L (ref 100–108)
CO2 SERPL-SCNC: 21 MMOL/L (ref 21–32)
CO2 SERPL-SCNC: 27 MMOL/L (ref 21–32)
CREAT SERPL-MCNC: 0.93 MG/DL (ref 0.6–1.3)
CREAT SERPL-MCNC: 1.1 MG/DL (ref 0.6–1.3)
DS:DELIVERY SYSTEM: ABNORMAL
ERYTHROCYTE [DISTWIDTH] IN BLOOD BY AUTOMATED COUNT: 13.2 % (ref 11.6–15.1)
FIO2 GAS DIL.REBREATH: 70 L
GFR SERPL CREATININE-BSD FRML MDRD: 75 ML/MIN/1.73SQ M
GFR SERPL CREATININE-BSD FRML MDRD: 91 ML/MIN/1.73SQ M
GLUCOSE SERPL-MCNC: 124 MG/DL (ref 65–140)
GLUCOSE SERPL-MCNC: 125 MG/DL (ref 65–140)
GLUCOSE SERPL-MCNC: 128 MG/DL (ref 65–140)
GLUCOSE SERPL-MCNC: 131 MG/DL (ref 65–140)
GLUCOSE SERPL-MCNC: 135 MG/DL (ref 65–140)
GLUCOSE SERPL-MCNC: 135 MG/DL (ref 65–140)
GLUCOSE SERPL-MCNC: 137 MG/DL (ref 65–140)
GLUCOSE SERPL-MCNC: 145 MG/DL (ref 65–140)
GLUCOSE SERPL-MCNC: 151 MG/DL (ref 65–140)
GLUCOSE SERPL-MCNC: 158 MG/DL (ref 65–140)
HBV SURFACE AG SER QL: NORMAL
HCO3 BLDA-SCNC: 20.4 MMOL/L (ref 22–28)
HCT VFR BLD AUTO: 29.7 % (ref 36.5–49.3)
HCT VFR BLD AUTO: 36.5 % (ref 36.5–49.3)
HCT VFR BLD AUTO: 37.7 % (ref 36.5–49.3)
HCT VFR BLD CALC: 32 % (ref 36.5–49.3)
HCV AB SER QL: NORMAL
HGB BLD-MCNC: 10 G/DL (ref 12–17)
HGB BLD-MCNC: 12 G/DL (ref 12–17)
HGB BLD-MCNC: 12.4 G/DL (ref 12–17)
HGB BLDA-MCNC: 10.9 G/DL (ref 12–17)
MAGNESIUM SERPL-MCNC: 2 MG/DL (ref 1.6–2.6)
MCH RBC QN AUTO: 27.1 PG (ref 26.8–34.3)
MCHC RBC AUTO-ENTMCNC: 32.9 G/DL (ref 31.4–37.4)
MCV RBC AUTO: 83 FL (ref 82–98)
MRSA NOSE QL CULT: NORMAL
P AXIS: 74 DEGREES
PCO2 BLD: 22 MMOL/L (ref 21–32)
PCO2 BLD: 42.9 MM HG (ref 36–44)
PH BLD: 7.29 [PH] (ref 7.35–7.45)
PLATELET # BLD AUTO: 133 THOUSANDS/UL (ref 149–390)
PLATELET # BLD AUTO: 142 THOUSANDS/UL (ref 149–390)
PLATELET # BLD AUTO: 143 THOUSANDS/UL (ref 149–390)
PMV BLD AUTO: 10.2 FL (ref 8.9–12.7)
PMV BLD AUTO: 11.1 FL (ref 8.9–12.7)
PMV BLD AUTO: 11.1 FL (ref 8.9–12.7)
PO2 BLD: 132 MM HG (ref 75–129)
POTASSIUM BLD-SCNC: 3.7 MMOL/L (ref 3.5–5.3)
POTASSIUM SERPL-SCNC: 3.7 MMOL/L (ref 3.5–5.3)
POTASSIUM SERPL-SCNC: 3.9 MMOL/L (ref 3.5–5.3)
POTASSIUM SERPL-SCNC: 3.9 MMOL/L (ref 3.5–5.3)
POTASSIUM SERPL-SCNC: 4.7 MMOL/L (ref 3.5–5.3)
PR INTERVAL: 154 MS
QRS AXIS: 98 DEGREES
QRSD INTERVAL: 88 MS
QT INTERVAL: 367 MS
QTC INTERVAL: 432 MS
RBC # BLD AUTO: 4.42 MILLION/UL (ref 3.88–5.62)
SAO2 % BLD FROM PO2: 99 % (ref 60–85)
SODIUM BLD-SCNC: 143 MMOL/L (ref 136–145)
SODIUM SERPL-SCNC: 142 MMOL/L (ref 136–145)
SODIUM SERPL-SCNC: 146 MMOL/L (ref 136–145)
SPECIMEN SOURCE: ABNORMAL
T WAVE AXIS: 111 DEGREES
VENTILATION VALUE: ABNORMAL
VENTRICULAR RATE: 83 BPM
WBC # BLD AUTO: 7.27 THOUSAND/UL (ref 4.31–10.16)

## 2021-04-09 PROCEDURE — 94760 N-INVAS EAR/PLS OXIMETRY 1: CPT

## 2021-04-09 PROCEDURE — 85014 HEMATOCRIT: CPT | Performed by: PHYSICIAN ASSISTANT

## 2021-04-09 PROCEDURE — 84295 ASSAY OF SERUM SODIUM: CPT

## 2021-04-09 PROCEDURE — 93005 ELECTROCARDIOGRAM TRACING: CPT

## 2021-04-09 PROCEDURE — 5A1221Z PERFORMANCE OF CARDIAC OUTPUT, CONTINUOUS: ICD-10-PCS | Performed by: THORACIC SURGERY (CARDIOTHORACIC VASCULAR SURGERY)

## 2021-04-09 PROCEDURE — 85049 AUTOMATED PLATELET COUNT: CPT | Performed by: THORACIC SURGERY (CARDIOTHORACIC VASCULAR SURGERY)

## 2021-04-09 PROCEDURE — 85014 HEMATOCRIT: CPT

## 2021-04-09 PROCEDURE — 33508 ENDOSCOPIC VEIN HARVEST: CPT | Performed by: PHYSICIAN ASSISTANT

## 2021-04-09 PROCEDURE — 82803 BLOOD GASES ANY COMBINATION: CPT

## 2021-04-09 PROCEDURE — 33508 ENDOSCOPIC VEIN HARVEST: CPT | Performed by: THORACIC SURGERY (CARDIOTHORACIC VASCULAR SURGERY)

## 2021-04-09 PROCEDURE — 84132 ASSAY OF SERUM POTASSIUM: CPT | Performed by: PHYSICIAN ASSISTANT

## 2021-04-09 PROCEDURE — 84132 ASSAY OF SERUM POTASSIUM: CPT

## 2021-04-09 PROCEDURE — 86706 HEP B SURFACE ANTIBODY: CPT | Performed by: PHYSICIAN ASSISTANT

## 2021-04-09 PROCEDURE — 33518 CABG ARTERY-VEIN TWO: CPT | Performed by: THORACIC SURGERY (CARDIOTHORACIC VASCULAR SURGERY)

## 2021-04-09 PROCEDURE — 82330 ASSAY OF CALCIUM: CPT

## 2021-04-09 PROCEDURE — 85018 HEMOGLOBIN: CPT | Performed by: PHYSICIAN ASSISTANT

## 2021-04-09 PROCEDURE — 84460 ALANINE AMINO (ALT) (SGPT): CPT | Performed by: PHYSICIAN ASSISTANT

## 2021-04-09 PROCEDURE — 87340 HEPATITIS B SURFACE AG IA: CPT | Performed by: PHYSICIAN ASSISTANT

## 2021-04-09 PROCEDURE — 02100Z9 BYPASS CORONARY ARTERY, ONE ARTERY FROM LEFT INTERNAL MAMMARY, OPEN APPROACH: ICD-10-PCS | Performed by: THORACIC SURGERY (CARDIOTHORACIC VASCULAR SURGERY)

## 2021-04-09 PROCEDURE — 33533 CABG ARTERIAL SINGLE: CPT | Performed by: THORACIC SURGERY (CARDIOTHORACIC VASCULAR SURGERY)

## 2021-04-09 PROCEDURE — 94002 VENT MGMT INPAT INIT DAY: CPT

## 2021-04-09 PROCEDURE — 33518 CABG ARTERY-VEIN TWO: CPT | Performed by: PHYSICIAN ASSISTANT

## 2021-04-09 PROCEDURE — 82948 REAGENT STRIP/BLOOD GLUCOSE: CPT

## 2021-04-09 PROCEDURE — 93355 ECHO TRANSESOPHAGEAL (TEE): CPT

## 2021-04-09 PROCEDURE — 85049 AUTOMATED PLATELET COUNT: CPT | Performed by: PHYSICIAN ASSISTANT

## 2021-04-09 PROCEDURE — 33533 CABG ARTERIAL SINGLE: CPT | Performed by: PHYSICIAN ASSISTANT

## 2021-04-09 PROCEDURE — 86803 HEPATITIS C AB TEST: CPT | Performed by: PHYSICIAN ASSISTANT

## 2021-04-09 PROCEDURE — 5A1223Z PERFORMANCE OF CARDIAC PACING, CONTINUOUS: ICD-10-PCS | Performed by: THORACIC SURGERY (CARDIOTHORACIC VASCULAR SURGERY)

## 2021-04-09 PROCEDURE — 71045 X-RAY EXAM CHEST 1 VIEW: CPT

## 2021-04-09 PROCEDURE — 80048 BASIC METABOLIC PNL TOTAL CA: CPT | Performed by: PHYSICIAN ASSISTANT

## 2021-04-09 PROCEDURE — 30233N0 TRANSFUSION OF AUTOLOGOUS RED BLOOD CELLS INTO PERIPHERAL VEIN, PERCUTANEOUS APPROACH: ICD-10-PCS | Performed by: THORACIC SURGERY (CARDIOTHORACIC VASCULAR SURGERY)

## 2021-04-09 PROCEDURE — NC001 PR NO CHARGE: Performed by: PHYSICIAN ASSISTANT

## 2021-04-09 PROCEDURE — 80048 BASIC METABOLIC PNL TOTAL CA: CPT | Performed by: STUDENT IN AN ORGANIZED HEALTH CARE EDUCATION/TRAINING PROGRAM

## 2021-04-09 PROCEDURE — 83735 ASSAY OF MAGNESIUM: CPT | Performed by: STUDENT IN AN ORGANIZED HEALTH CARE EDUCATION/TRAINING PROGRAM

## 2021-04-09 PROCEDURE — 93010 ELECTROCARDIOGRAM REPORT: CPT | Performed by: INTERNAL MEDICINE

## 2021-04-09 PROCEDURE — 85347 COAGULATION TIME ACTIVATED: CPT

## 2021-04-09 PROCEDURE — 82947 ASSAY GLUCOSE BLOOD QUANT: CPT

## 2021-04-09 PROCEDURE — 02HV33Z INSERTION OF INFUSION DEVICE INTO SUPERIOR VENA CAVA, PERCUTANEOUS APPROACH: ICD-10-PCS | Performed by: ANESTHESIOLOGY

## 2021-04-09 PROCEDURE — 99291 CRITICAL CARE FIRST HOUR: CPT | Performed by: PHYSICIAN ASSISTANT

## 2021-04-09 PROCEDURE — 85730 THROMBOPLASTIN TIME PARTIAL: CPT | Performed by: THORACIC SURGERY (CARDIOTHORACIC VASCULAR SURGERY)

## 2021-04-09 PROCEDURE — 82330 ASSAY OF CALCIUM: CPT | Performed by: THORACIC SURGERY (CARDIOTHORACIC VASCULAR SURGERY)

## 2021-04-09 PROCEDURE — 021109W BYPASS CORONARY ARTERY, TWO ARTERIES FROM AORTA WITH AUTOLOGOUS VENOUS TISSUE, OPEN APPROACH: ICD-10-PCS | Performed by: THORACIC SURGERY (CARDIOTHORACIC VASCULAR SURGERY)

## 2021-04-09 PROCEDURE — 87389 HIV-1 AG W/HIV-1&-2 AB AG IA: CPT | Performed by: PHYSICIAN ASSISTANT

## 2021-04-09 PROCEDURE — 06BQ4ZZ EXCISION OF LEFT SAPHENOUS VEIN, PERCUTANEOUS ENDOSCOPIC APPROACH: ICD-10-PCS | Performed by: THORACIC SURGERY (CARDIOTHORACIC VASCULAR SURGERY)

## 2021-04-09 PROCEDURE — 85027 COMPLETE CBC AUTOMATED: CPT | Performed by: STUDENT IN AN ORGANIZED HEALTH CARE EDUCATION/TRAINING PROGRAM

## 2021-04-09 DEVICE — MARKER CORONARY BYPASS VOSS GRAFT: Type: IMPLANTABLE DEVICE | Site: AORTA | Status: FUNCTIONAL

## 2021-04-09 RX ORDER — PROPOFOL 10 MG/ML
INJECTION, EMULSION INTRAVENOUS AS NEEDED
Status: DISCONTINUED | OUTPATIENT
Start: 2021-04-09 | End: 2021-04-09

## 2021-04-09 RX ORDER — HYDRALAZINE HYDROCHLORIDE 20 MG/ML
5 INJECTION INTRAMUSCULAR; INTRAVENOUS EVERY 6 HOURS PRN
Status: DISCONTINUED | OUTPATIENT
Start: 2021-04-09 | End: 2021-04-09 | Stop reason: HOSPADM

## 2021-04-09 RX ORDER — MILRINONE LACTATE 0.2 MG/ML
0.13 INJECTION, SOLUTION INTRAVENOUS CONTINUOUS
Status: DISCONTINUED | OUTPATIENT
Start: 2021-04-09 | End: 2021-04-13

## 2021-04-09 RX ORDER — ALBUMIN, HUMAN INJ 5% 5 %
SOLUTION INTRAVENOUS
Status: COMPLETED
Start: 2021-04-09 | End: 2021-04-09

## 2021-04-09 RX ORDER — MAGNESIUM HYDROXIDE 1200 MG/15ML
LIQUID ORAL AS NEEDED
Status: DISCONTINUED | OUTPATIENT
Start: 2021-04-09 | End: 2021-04-09 | Stop reason: HOSPADM

## 2021-04-09 RX ORDER — ATORVASTATIN CALCIUM 80 MG/1
80 TABLET, FILM COATED ORAL
Status: DISCONTINUED | OUTPATIENT
Start: 2021-04-09 | End: 2021-04-15 | Stop reason: HOSPADM

## 2021-04-09 RX ORDER — FUROSEMIDE 10 MG/ML
40 INJECTION INTRAMUSCULAR; INTRAVENOUS EVERY 6 HOURS PRN
Status: DISCONTINUED | OUTPATIENT
Start: 2021-04-09 | End: 2021-04-10

## 2021-04-09 RX ORDER — VANCOMYCIN HYDROCHLORIDE 1 G/20ML
INJECTION, POWDER, LYOPHILIZED, FOR SOLUTION INTRAVENOUS AS NEEDED
Status: DISCONTINUED | OUTPATIENT
Start: 2021-04-09 | End: 2021-04-09 | Stop reason: HOSPADM

## 2021-04-09 RX ORDER — ASPIRIN 325 MG
325 TABLET ORAL DAILY
Status: DISCONTINUED | OUTPATIENT
Start: 2021-04-09 | End: 2021-04-15 | Stop reason: HOSPADM

## 2021-04-09 RX ORDER — CHLORHEXIDINE GLUCONATE 0.12 MG/ML
15 RINSE ORAL 2 TIMES DAILY
Status: DISCONTINUED | OUTPATIENT
Start: 2021-04-09 | End: 2021-04-12

## 2021-04-09 RX ORDER — LIDOCAINE HYDROCHLORIDE 10 MG/ML
INJECTION, SOLUTION EPIDURAL; INFILTRATION; INTRACAUDAL; PERINEURAL AS NEEDED
Status: DISCONTINUED | OUTPATIENT
Start: 2021-04-09 | End: 2021-04-09

## 2021-04-09 RX ORDER — CEFAZOLIN SODIUM 2 G/50ML
SOLUTION INTRAVENOUS AS NEEDED
Status: DISCONTINUED | OUTPATIENT
Start: 2021-04-09 | End: 2021-04-09

## 2021-04-09 RX ORDER — POLYETHYLENE GLYCOL 3350 17 G/17G
17 POWDER, FOR SOLUTION ORAL DAILY
Status: DISCONTINUED | OUTPATIENT
Start: 2021-04-09 | End: 2021-04-15 | Stop reason: HOSPADM

## 2021-04-09 RX ORDER — CALCIUM CHLORIDE 100 MG/ML
1 INJECTION INTRAVENOUS; INTRAVENTRICULAR ONCE
Status: DISCONTINUED | OUTPATIENT
Start: 2021-04-09 | End: 2021-04-10

## 2021-04-09 RX ORDER — POTASSIUM CHLORIDE 14.9 MG/ML
20 INJECTION INTRAVENOUS ONCE AS NEEDED
Status: DISCONTINUED | OUTPATIENT
Start: 2021-04-09 | End: 2021-04-10

## 2021-04-09 RX ORDER — MIDAZOLAM HYDROCHLORIDE 2 MG/2ML
INJECTION, SOLUTION INTRAMUSCULAR; INTRAVENOUS AS NEEDED
Status: DISCONTINUED | OUTPATIENT
Start: 2021-04-09 | End: 2021-04-09

## 2021-04-09 RX ORDER — CEFAZOLIN SODIUM 2 G/50ML
2000 SOLUTION INTRAVENOUS ONCE
Status: DISCONTINUED | OUTPATIENT
Start: 2021-04-09 | End: 2021-04-09 | Stop reason: HOSPADM

## 2021-04-09 RX ORDER — ALBUMIN, HUMAN INJ 5% 5 %
12.5 SOLUTION INTRAVENOUS ONCE
Status: COMPLETED | OUTPATIENT
Start: 2021-04-09 | End: 2021-04-09

## 2021-04-09 RX ORDER — LIDOCAINE HYDROCHLORIDE 10 MG/ML
INJECTION, SOLUTION EPIDURAL; INFILTRATION; INTRACAUDAL; PERINEURAL
Status: COMPLETED | OUTPATIENT
Start: 2021-04-09 | End: 2021-04-09

## 2021-04-09 RX ORDER — AMINOCAPROIC ACID 250 MG/ML
INJECTION, SOLUTION INTRAVENOUS AS NEEDED
Status: DISCONTINUED | OUTPATIENT
Start: 2021-04-09 | End: 2021-04-09

## 2021-04-09 RX ORDER — HEPARIN SODIUM 1000 [USP'U]/ML
INJECTION, SOLUTION INTRAVENOUS; SUBCUTANEOUS AS NEEDED
Status: DISCONTINUED | OUTPATIENT
Start: 2021-04-09 | End: 2021-04-09

## 2021-04-09 RX ORDER — FENTANYL CITRATE 50 UG/ML
50 INJECTION, SOLUTION INTRAMUSCULAR; INTRAVENOUS
Status: DISCONTINUED | OUTPATIENT
Start: 2021-04-09 | End: 2021-04-10

## 2021-04-09 RX ORDER — GLYCOPYRROLATE 0.2 MG/ML
INJECTION INTRAMUSCULAR; INTRAVENOUS AS NEEDED
Status: DISCONTINUED | OUTPATIENT
Start: 2021-04-09 | End: 2021-04-09

## 2021-04-09 RX ORDER — HYDROMORPHONE HCL/PF 1 MG/ML
0.5 SYRINGE (ML) INJECTION ONCE
Status: COMPLETED | OUTPATIENT
Start: 2021-04-09 | End: 2021-04-09

## 2021-04-09 RX ORDER — BISACODYL 10 MG
10 SUPPOSITORY, RECTAL RECTAL DAILY PRN
Status: DISCONTINUED | OUTPATIENT
Start: 2021-04-09 | End: 2021-04-15 | Stop reason: HOSPADM

## 2021-04-09 RX ORDER — OXYCODONE HYDROCHLORIDE 5 MG/1
2.5 TABLET ORAL EVERY 4 HOURS PRN
Status: DISCONTINUED | OUTPATIENT
Start: 2021-04-09 | End: 2021-04-10

## 2021-04-09 RX ORDER — PANTOPRAZOLE SODIUM 40 MG/1
40 TABLET, DELAYED RELEASE ORAL
Status: DISCONTINUED | OUTPATIENT
Start: 2021-04-09 | End: 2021-04-15 | Stop reason: HOSPADM

## 2021-04-09 RX ORDER — ROCURONIUM BROMIDE 10 MG/ML
INJECTION, SOLUTION INTRAVENOUS AS NEEDED
Status: DISCONTINUED | OUTPATIENT
Start: 2021-04-09 | End: 2021-04-09

## 2021-04-09 RX ORDER — FENTANYL CITRATE 50 UG/ML
50 INJECTION, SOLUTION INTRAMUSCULAR; INTRAVENOUS ONCE
Status: DISCONTINUED | OUTPATIENT
Start: 2021-04-09 | End: 2021-04-10

## 2021-04-09 RX ORDER — SODIUM CHLORIDE 450 MG/100ML
20 INJECTION, SOLUTION INTRAVENOUS CONTINUOUS
Status: DISCONTINUED | OUTPATIENT
Start: 2021-04-09 | End: 2021-04-14

## 2021-04-09 RX ORDER — AMIODARONE HYDROCHLORIDE 200 MG/1
200 TABLET ORAL EVERY 8 HOURS SCHEDULED
Status: DISCONTINUED | OUTPATIENT
Start: 2021-04-09 | End: 2021-04-15 | Stop reason: HOSPADM

## 2021-04-09 RX ORDER — ACETAMINOPHEN 325 MG/1
975 TABLET ORAL EVERY 8 HOURS
Status: DISCONTINUED | OUTPATIENT
Start: 2021-04-09 | End: 2021-04-15 | Stop reason: HOSPADM

## 2021-04-09 RX ORDER — OXYCODONE HYDROCHLORIDE 5 MG/1
5 TABLET ORAL EVERY 4 HOURS PRN
Status: DISCONTINUED | OUTPATIENT
Start: 2021-04-09 | End: 2021-04-10

## 2021-04-09 RX ORDER — NEOSTIGMINE METHYLSULFATE 1 MG/ML
INJECTION INTRAVENOUS AS NEEDED
Status: DISCONTINUED | OUTPATIENT
Start: 2021-04-09 | End: 2021-04-09

## 2021-04-09 RX ORDER — FONDAPARINUX SODIUM 2.5 MG/.5ML
2.5 INJECTION SUBCUTANEOUS DAILY
Status: DISCONTINUED | OUTPATIENT
Start: 2021-04-10 | End: 2021-04-15 | Stop reason: HOSPADM

## 2021-04-09 RX ORDER — MILRINONE LACTATE 0.2 MG/ML
INJECTION, SOLUTION INTRAVENOUS CONTINUOUS PRN
Status: DISCONTINUED | OUTPATIENT
Start: 2021-04-09 | End: 2021-04-09

## 2021-04-09 RX ORDER — FENTANYL CITRATE 50 UG/ML
INJECTION, SOLUTION INTRAMUSCULAR; INTRAVENOUS AS NEEDED
Status: DISCONTINUED | OUTPATIENT
Start: 2021-04-09 | End: 2021-04-09

## 2021-04-09 RX ORDER — MAGNESIUM SULFATE HEPTAHYDRATE 40 MG/ML
2 INJECTION, SOLUTION INTRAVENOUS ONCE
Status: COMPLETED | OUTPATIENT
Start: 2021-04-09 | End: 2021-04-09

## 2021-04-09 RX ORDER — PROTAMINE SULFATE 10 MG/ML
INJECTION, SOLUTION INTRAVENOUS AS NEEDED
Status: DISCONTINUED | OUTPATIENT
Start: 2021-04-09 | End: 2021-04-09

## 2021-04-09 RX ORDER — POTASSIUM CHLORIDE 14.9 MG/ML
20 INJECTION INTRAVENOUS
Status: DISCONTINUED | OUTPATIENT
Start: 2021-04-09 | End: 2021-04-10

## 2021-04-09 RX ORDER — ONDANSETRON 2 MG/ML
4 INJECTION INTRAMUSCULAR; INTRAVENOUS EVERY 6 HOURS PRN
Status: DISCONTINUED | OUTPATIENT
Start: 2021-04-09 | End: 2021-04-15 | Stop reason: HOSPADM

## 2021-04-09 RX ORDER — HYDROMORPHONE HCL/PF 1 MG/ML
0.5 SYRINGE (ML) INJECTION EVERY 2 HOUR PRN
Status: DISCONTINUED | OUTPATIENT
Start: 2021-04-09 | End: 2021-04-10

## 2021-04-09 RX ORDER — CEFAZOLIN SODIUM 2 G/50ML
2000 SOLUTION INTRAVENOUS EVERY 8 HOURS
Status: COMPLETED | OUTPATIENT
Start: 2021-04-09 | End: 2021-04-10

## 2021-04-09 RX ADMIN — AMINOCAPROIC ACID 5 G: 250 INJECTION, SOLUTION INTRAVENOUS at 08:44

## 2021-04-09 RX ADMIN — HEPARIN SODIUM 5000 UNITS: 1000 INJECTION INTRAVENOUS; SUBCUTANEOUS at 10:33

## 2021-04-09 RX ADMIN — HEPARIN SODIUM 23200 UNITS: 1000 INJECTION INTRAVENOUS; SUBCUTANEOUS at 09:56

## 2021-04-09 RX ADMIN — MIDAZOLAM 2 MG: 1 INJECTION INTRAMUSCULAR; INTRAVENOUS at 10:44

## 2021-04-09 RX ADMIN — MUPIROCIN 1 APPLICATION: 20 OINTMENT TOPICAL at 20:21

## 2021-04-09 RX ADMIN — MILRINONE LACTATE IN DEXTROSE 0.38 MCG/KG/MIN: 200 INJECTION, SOLUTION INTRAVENOUS at 12:40

## 2021-04-09 RX ADMIN — CEFAZOLIN SODIUM 2000 MG: 2 SOLUTION INTRAVENOUS at 08:13

## 2021-04-09 RX ADMIN — SODIUM CHLORIDE 8 UNITS/HR: 9 INJECTION, SOLUTION INTRAVENOUS at 12:12

## 2021-04-09 RX ADMIN — CEFAZOLIN SODIUM 2000 MG: 2 SOLUTION INTRAVENOUS at 20:16

## 2021-04-09 RX ADMIN — CHLORHEXIDINE GLUCONATE 0.12% ORAL RINSE 15 ML: 1.2 LIQUID ORAL at 20:32

## 2021-04-09 RX ADMIN — ACETAMINOPHEN 975 MG: 325 TABLET ORAL at 22:39

## 2021-04-09 RX ADMIN — EPINEPHRINE 2 MCG/MIN: 1 INJECTION, SOLUTION, CONCENTRATE INTRAVENOUS at 13:50

## 2021-04-09 RX ADMIN — ALBUMIN, HUMAN INJ 5% 12.5 G: 5 SOLUTION at 20:32

## 2021-04-09 RX ADMIN — METOPROLOL TARTRATE 25 MG: 25 TABLET, FILM COATED ORAL at 06:17

## 2021-04-09 RX ADMIN — PHENYLEPHRINE HYDROCHLORIDE 200 MCG: 10 INJECTION INTRAVENOUS at 08:50

## 2021-04-09 RX ADMIN — MIDAZOLAM 3 MG: 1 INJECTION INTRAMUSCULAR; INTRAVENOUS at 07:28

## 2021-04-09 RX ADMIN — ALBUMIN (HUMAN) 12.5 G: 12.5 INJECTION, SOLUTION INTRAVENOUS at 20:32

## 2021-04-09 RX ADMIN — ROCURONIUM BROMIDE 70 MG: 50 INJECTION, SOLUTION INTRAVENOUS at 07:44

## 2021-04-09 RX ADMIN — ASPIRIN 325 MG ORAL TABLET 325 MG: 325 PILL ORAL at 16:16

## 2021-04-09 RX ADMIN — SODIUM CHLORIDE 20 ML/HR: 0.45 INJECTION, SOLUTION INTRAVENOUS at 14:27

## 2021-04-09 RX ADMIN — SODIUM CHLORIDE 3 UNITS/HR: 9 INJECTION, SOLUTION INTRAVENOUS at 14:34

## 2021-04-09 RX ADMIN — LIDOCAINE HYDROCHLORIDE 5 ML: 10 INJECTION, SOLUTION EPIDURAL; INFILTRATION; INTRACAUDAL; PERINEURAL at 08:48

## 2021-04-09 RX ADMIN — LIDOCAINE HYDROCHLORIDE 5 ML: 10 INJECTION, SOLUTION EPIDURAL; INFILTRATION; INTRACAUDAL; PERINEURAL at 07:40

## 2021-04-09 RX ADMIN — ALBUMIN (HUMAN) 25 G: 12.5 INJECTION, SOLUTION INTRAVENOUS at 17:45

## 2021-04-09 RX ADMIN — MAGNESIUM SULFATE HEPTAHYDRATE 2 G: 40 INJECTION, SOLUTION INTRAVENOUS at 14:27

## 2021-04-09 RX ADMIN — PROTAMINE SULFATE 200 MG: 10 INJECTION, SOLUTION INTRAVENOUS at 12:41

## 2021-04-09 RX ADMIN — FENTANYL CITRATE 150 MCG: 50 INJECTION INTRAMUSCULAR; INTRAVENOUS at 10:44

## 2021-04-09 RX ADMIN — POTASSIUM CHLORIDE 20 MEQ: 14.9 INJECTION, SOLUTION INTRAVENOUS at 20:00

## 2021-04-09 RX ADMIN — FENTANYL CITRATE 250 MCG: 50 INJECTION INTRAMUSCULAR; INTRAVENOUS at 14:03

## 2021-04-09 RX ADMIN — HYDROMORPHONE HYDROCHLORIDE 0.5 MG: 1 INJECTION, SOLUTION INTRAMUSCULAR; INTRAVENOUS; SUBCUTANEOUS at 20:15

## 2021-04-09 RX ADMIN — HYDROMORPHONE HYDROCHLORIDE 0.5 MG: 1 INJECTION, SOLUTION INTRAMUSCULAR; INTRAVENOUS; SUBCUTANEOUS at 21:08

## 2021-04-09 RX ADMIN — MILRINONE LACTATE IN DEXTROSE 0.38 MCG/KG/MIN: 200 INJECTION, SOLUTION INTRAVENOUS at 14:00

## 2021-04-09 RX ADMIN — NOREPINEPHRINE BITARTRATE 2 MCG/MIN: 1 INJECTION, SOLUTION, CONCENTRATE INTRAVENOUS at 09:17

## 2021-04-09 RX ADMIN — AMIODARONE HYDROCHLORIDE 200 MG: 200 TABLET ORAL at 16:14

## 2021-04-09 RX ADMIN — HEPARIN SODIUM 5000 UNITS: 1000 INJECTION INTRAVENOUS; SUBCUTANEOUS at 09:28

## 2021-04-09 RX ADMIN — MUPIROCIN 1 APPLICATION: 20 OINTMENT TOPICAL at 06:17

## 2021-04-09 RX ADMIN — Medication 4 MCG/MIN: at 12:25

## 2021-04-09 RX ADMIN — CEFAZOLIN SODIUM 2000 MG: 2 SOLUTION INTRAVENOUS at 12:11

## 2021-04-09 RX ADMIN — CHLORHEXIDINE GLUCONATE 0.12% ORAL RINSE 15 ML: 1.2 LIQUID ORAL at 06:17

## 2021-04-09 RX ADMIN — OXYCODONE HYDROCHLORIDE 5 MG: 5 TABLET ORAL at 17:45

## 2021-04-09 RX ADMIN — FENTANYL CITRATE 100 MCG: 50 INJECTION INTRAMUSCULAR; INTRAVENOUS at 07:40

## 2021-04-09 RX ADMIN — ACETAMINOPHEN 975 MG: 325 TABLET ORAL at 16:13

## 2021-04-09 RX ADMIN — MILRINONE LACTATE IN DEXTROSE 0.38 MCG/KG/MIN: 200 INJECTION, SOLUTION INTRAVENOUS at 21:00

## 2021-04-09 RX ADMIN — SODIUM CHLORIDE, SODIUM LACTATE, POTASSIUM CHLORIDE, AND CALCIUM CHLORIDE 1000 ML: .6; .31; .03; .02 INJECTION, SOLUTION INTRAVENOUS at 15:30

## 2021-04-09 RX ADMIN — NEOSTIGMINE METHYLSULFATE 4 MG: 1 INJECTION INTRAVENOUS at 14:10

## 2021-04-09 RX ADMIN — PANTOPRAZOLE SODIUM 40 MG: 40 TABLET, DELAYED RELEASE ORAL at 06:18

## 2021-04-09 RX ADMIN — GLYCOPYRROLATE 0.4 MG: 0.2 INJECTION, SOLUTION INTRAMUSCULAR; INTRAVENOUS at 14:10

## 2021-04-09 RX ADMIN — PHENYLEPHRINE HYDROCHLORIDE 200 MCG: 10 INJECTION INTRAVENOUS at 08:18

## 2021-04-09 RX ADMIN — ALBUMIN, HUMAN INJ 5% 25 G: 5 SOLUTION at 17:45

## 2021-04-09 RX ADMIN — MIDAZOLAM 5 MG: 1 INJECTION INTRAMUSCULAR; INTRAVENOUS at 14:00

## 2021-04-09 RX ADMIN — AMIODARONE HYDROCHLORIDE 200 MG: 200 TABLET ORAL at 22:39

## 2021-04-09 RX ADMIN — PROPOFOL 150 MG: 10 INJECTION, EMULSION INTRAVENOUS at 07:41

## 2021-04-09 RX ADMIN — PHENYLEPHRINE HYDROCHLORIDE 200 MCG: 10 INJECTION INTRAVENOUS at 10:43

## 2021-04-09 RX ADMIN — AMINOCAPROIC ACID 5 G: 250 INJECTION, SOLUTION INTRAVENOUS at 12:26

## 2021-04-09 RX ADMIN — FENTANYL CITRATE 250 MCG: 50 INJECTION INTRAMUSCULAR; INTRAVENOUS at 08:31

## 2021-04-09 NOTE — QUICK NOTE
Attempted to evaluate the patient this morning  Unfortunately, patient was already taken to the OR earlier  Per nighttime RN, no acute events overnight  Chart reviewed, including vital signs, labs, and imaging  Hypertension last night noted  Patient will undergo CABG this morning and will be transferred to CCU thereafter  CT surgery will assume role of primary team  Defer to CT surgery team for further management of the patient
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
## 2021-04-09 NOTE — ANESTHESIA PREPROCEDURE EVALUATION
Procedure:  CORONARY ARTERY BYPASS GRAFT (CABG) 4 VESSELS (N/A Chest)  HARVEST VEIN ENDOSCOPIC (EVH) (N/A Abdomen)  TRANSESOPHAGEAL ECHOCARDIOGRAM (BENNETT) (N/A Esophagus)    Relevant Problems   CARDIO   (+) Chest pain   (+) Hypertensive urgency   (+) NSTEMI (non-ST elevated myocardial infarction) [facility])        Physical Exam    Airway    Mallampati score: I  TM Distance: >3 FB  Neck ROM: full     Dental   No notable dental hx     Cardiovascular  Rhythm: regular, Rate: normal, Cardiovascular exam normal    Pulmonary  Pulmonary exam normal Breath sounds clear to auscultation,     Other Findings        Anesthesia Plan  ASA Score- 4     Anesthesia Type- general with ASA Monitors  Additional Monitors: arterial line, central venous line and pulmonary artery catheter  Airway Plan: ETT  Plan Factors-Exercise tolerance (METS): >4 METS  Chart reviewed  EKG reviewed  Imaging results reviewed  Existing labs reviewed  Patient summary reviewed  Induction- intravenous  Postoperative Plan- Plan for postoperative opioid use  Planned trial extubation    Informed Consent- Anesthetic plan and risks discussed with patient
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
## 2021-04-09 NOTE — ANESTHESIA PROCEDURE NOTES
Procedure Performed: BENNETT Anesthesia  Start Time:  4/9/2021 8:08 AM        Preanesthesia Checklist    Patient identified, IV assessed, risks and benefits discussed, monitors and equipment assessed, procedure being performed at surgeon's request and anesthesia consent obtained  Procedure    Diagnostic Indications for BENNETT:  assessment of surgical repair  Type of BENNETT: interventional BENNETT with real time guidance of intracardiac procedure  Images Saved: ultrasound permanent image saved  Physician Requesting Echo: Sin Whyte,   Location performed: OR  Intubated  Bite block not placed  Heart visualized  Insertion of BENNETT Probe:  Atraumatic  Probe Type:  Multiplane and epiaortic  Modalities:  2D only, 3D, color flow mapping, continuous wave Doppler and pulse wave Doppler  Echocardiographic and Doppler Measurements    PREPROCEDURE    LEFT VENTRICLE:  Systolic Function: normal  Ejection Fraction: 55%  Regional Wall Motion Abnormalities: none  RIGHT VENTRICLE:  Systolic Function: normal   Cavity size normal  No hypertrophy              AORTIC VALVE:  Leaflets: bileaflet  Leaflet motions normal and normal  Stenosis: none  Regurgitation: none  MITRAL VALVE:  Leaflets: normal  Leaflet Motions: normal  Regurgitation: none  Stenosis: none  Mean Gradient: 1 mmHg  TRICUSPID VALVE:  Leaflets: normal  Leaflet Motions: normal  Stenosis: none  Regurgitation: trace  PULMONIC VALVE:  Leaflets: normal  Regurgitation: trace  Stenosis: none  ASCENDING AORTA:  Size:  normal  Dissection not present  AORTIC ARCH:  Size:  normal  dissection not present  DESCENDING AORTA:  Size: normal  Dissection not present  Grade 3: atheroma protruding < 0 5 cm into lumen  RIGHT ATRIUM:  Size:  normal  No spontaneous echo contrast     LEFT ATRIUM:  Size: normal  Spontaneous echo contrast     LEFT ATRIAL APPENDAGE:  Size: normal  Spontaneous echo contrast  Emptying Velocity: 34 cm/sec          ATRIAL SEPTUM:  Intra-atrial septal morphology: normal          VENTRICULAR SEPTUM:  Intra-ventricular septum morphology: normal              OTHER FINDINGS:  Pericardium:  normal  Pleural Effusion:  none  POSTPROCEDURE    LEFT VENTRICLE: Unchanged   Ejection Fraction: 60 %  Cavity Size: normal  Regional Wall Motion Abnormalities: none  RIGHT VENTRICLE: Unchanged   Systolic Function: normal  Cavity Size: normal         AORTIC VALVE: Unchanged   Stenosis: none  Regurgitation: none  MITRAL VALVE: Unchanged   Leaflets: native  Regurgitation: trace  Stenosis: none  TRICUSPID VALVE: Unchanged   Regurgitation: trace  Stenosis: none  PULMONIC VALVE: Unchanged   Leaflets: native  Regurgitation: none  Stenosis: none  ATRIA: Unchanged   Left Atrial Appendage Ligate: No        AORTA: Unchanged   Dissection: Dissection not present  REMOVAL:  Probe Removal: atraumatic

## 2021-04-09 NOTE — OP NOTE
OPERATIVE REPORT  PATIENT NAME: Masood Smith    :  1965  MRN: [id number]  Pt Location: BE OR ROOM 10    SURGERY DATE: 2021    SURGEON: Gideon Dyer DO    ASSISTANT: Saturnino Cowden, PA-C    ADDITIONAL ASSISTANT: Chiquita Matthews PA-C    PREOPERATIVE DIAGNOSIS:  Multivessel coronary artery disease, s/p Acute NSTEMI    POSTOPERATIVE DIAGNOSIS:  Multivessel coronary artery disease, s/p Acute NSTEMI    NYHA Class: 2    CCS Class: 2    PROCEDURE: Coronary artery bypass grafting x 3 with left internal mammary artery to left anterior descending, saphenous vein graft to left posterolateral branch, saphenous vein graft to obtuse marginal 2  ANESTHESIA: General endotracheal anesthesia with transesophageal echocardiogram guidance, Dr Brown Choudhary: 106 minutes  CROSSCLAMP TIME: 95 minutes  PACKS/TUBES/DRAINS: Chest tubes x 3  MATERIALS: Pacing wires: A x1  V x1  TRANSFUSION: None  SPECIMENS: None  ESTIMATED BLOOD LOSS: 500 mL    OPERATIVE TECHNIQUE:    The patient was taken to the operating room and placed supine on the operating table  Following the satisfactory induction of general anesthesia and placement of monitoring lines, the patient was prepped and draped in the usual sterile fashion  A time-out procedure was performed  The patient underwent median sternotomy, LIMA harvest, endoscopic left greater saphenous vein harvest, systemic heparinization and conduit preparation  The patient underwent pericardiotomy and epiaortic ultrasound was used to evaluate the ascending aorta, which was found to be free of significant atheromatous disease  The patient underwent aortic and right atrial cannulation and was initiated on bypass  The ascending aorta was crossclamped  An[place] cardioplegia was delivered with an excellent arrest     The distal right coronary artery and PDA were small and diffusely diseased and were not suitable for bypass   The saphenous vein was anastomosed to the left posterolateral branch in end-to-side fashion using running 7-0 Prolene suture  The flow was good at 100ml/min  The saphenous vein was anastomosed to the  obtuse marginal 2 in end-to-side fashion using running 7-0 Prolene suture  The flow was good at 110ml/min  The left internal mammary artery was anastomosed to the left anterior descending in end-to-side fashion using running 7-0 Prolene suture  A total of two proximal anastomoses were completed on the ascending aorta in end-to-side fashion using running 6-0 Prolene suture  The heart was de-aired and the crossclamp was removed  Atrial and ventricular pacing wires were placed  Following a period of reperfusion, the patient was weaned from cardiopulmonary bypass and decannulated  Protamine was administered with normalization of the ACT  Hemostasis was confirmed in all fields  Thoracostomy tubes were placed  The sternum was closed with stainless steel wires  The fascia, subdermis and skin were closed with multiple layers of running absorbable suture  As the attending surgeon, I was present and scrubbed for all critical portions of this procedure  There was no qualified surgical resident available  Sponge, needle and instrument counts were reported as correct by the nursing staff  Final transesophageal echocardiogram demonstrated normal biventricular function  The assistance of a PA was required to complete this case, specifically for assistance with endoscopic saphenous vein harvesting, cannulation, decannulation, and construction of the bypass grafts             SIGNATURE: Maldonado Michele DO  DATE: April 9, 2021  TIME: 1:29 PM

## 2021-04-09 NOTE — ANESTHESIA PROCEDURE NOTES
Introducer/[place]  Performed by: Ale Tolentino MD  Authorized by: Ale Tolentino MD     Date/Time: 4/9/2021 8:08 AM  Consent: Verbal consent obtained  Written consent obtained  Risks and benefits: risks, benefits and alternatives were discussed  Consent given by: patient  Patient understanding: patient states understanding of the procedure being performed  Patient consent: the patient's understanding of the procedure matches consent given  Procedure consent: procedure consent matches procedure scheduled  Relevant documents: relevant documents present and verified  Test results: test results available and properly labeled  Site marked: the operative site was marked  Radiology Images: Radiology Images displayed and confirmed  If images not available, report reviewed  Required items: required blood products, implants, devices, and special equipment available  Patient identity confirmed: verbally with patient, arm band, provided demographic data and hospital-assigned identification number  Time out: Immediately prior to procedure a "time out" was called to verify the correct patient, procedure, equipment, support staff and site/side marked as required    Indications: central pressure monitoring  Location details: right internal jugular  Catheter size: 9 Fr  Patient position: Trendelenburg  Assessment: blood return through all ports and free fluid flow  Preparation: skin prepped with ChloraPrep  Skin prep agent dried: skin prep agent completely dried prior to procedure  Sterile barriers: all five maximum sterile barriers used - cap, mask, sterile gown, sterile gloves, and large sterile sheet  Hand hygiene: hand hygiene performed prior to central venous catheter insertion  Ultrasound guidance: yes  ultrasound permanent image saved  Pre-procedure: landmarks identified  Number of attempts: 1  Successful placement: yes  Post-procedure: line sutured and dressing applied  Patient tolerance: patient tolerated the procedure well with no immediate complications

## 2021-04-09 NOTE — ANESTHESIA PROCEDURE NOTES
Central Line Insertion  Performed by: Naveen Stevens MD  Authorized by: Naveen Stevens MD     Date/Time: 4/9/2021 7:50 AM  Catheter Type:  triple lumen  Consent: Verbal consent obtained  Written consent obtained  Risks and benefits: risks, benefits and alternatives were discussed  Consent given by: patient  Patient understanding: patient states understanding of the procedure being performed  Patient consent: the patient's understanding of the procedure matches consent given  Procedure consent: procedure consent matches procedure scheduled  Relevant documents: relevant documents present and verified  Test results: test results available and properly labeled  Site marked: the operative site was marked  Radiology Images: Radiology Images displayed and confirmed  If images not available, report reviewed  Required items: required blood products, implants, devices, and special equipment available  Patient identity confirmed: verbally with patient, arm band, provided demographic data and hospital-assigned identification number  Time out: Immediately prior to procedure a "time out" was called to verify the correct patient, procedure, equipment, support staff and site/side marked as required    Indications: vascular access  Location details: right internal jugular  Catheter size: 7 Fr  Patient position: Trendelenburg  Assessment: blood return through all ports and free fluid flow  Preparation: skin prepped with ChloraPrep  Skin prep agent dried: skin prep agent completely dried prior to procedure  Sterile barriers: all five maximum sterile barriers used - cap, mask, sterile gown, sterile gloves, and large sterile sheet  Hand hygiene: hand hygiene performed prior to central venous catheter insertion  Ultrasound guidance: yes  sterile gel and probe cover used in ultrasound-guided central venous catheter insertionultrasound permanent image saved  Pre-procedure: landmarks identified  Number of attempts: 1  Successful placement: yes  Post-procedure: chlorhexidine patch applied,  dressing applied and line sutured  Patient tolerance: patient tolerated the procedure well with no immediate complications

## 2021-04-09 NOTE — INTERVAL H&P NOTE
H&P reviewed  After examining the patient I find no changes in the patients condition since the H&P had been written  Anticipated Length of Stay:  Patient will be admitted on an Inpatient basis with an anticipated length of stay of  greater than 2 midnights  Justification for Hospital Stay:  Post surgical recovery following open heart surgery        Vitals:    04/09/21 0347   BP: 137/67   Pulse: 69   Resp: 18   Temp: 98 2 °F (36 8 °C)   SpO2: 99%

## 2021-04-09 NOTE — ANESTHESIA PROCEDURE NOTES
Arterial Line Insertion  Performed by: Nidhi Velez MD  Authorized by: Nidhi Velez MD   Consent: Written consent obtained  Risks and benefits: risks, benefits and alternatives were discussed  Consent given by: patient  Patient understanding: patient states understanding of the procedure being performed  Patient consent: the patient's understanding of the procedure matches consent given  Procedure consent: procedure consent matches procedure scheduled  Relevant documents: relevant documents present and verified  Test results: test results available and properly labeled  Site marked: the operative site was marked  Radiology Images: Radiology Images displayed and confirmed  If images not available, report reviewed  Patient identity confirmed: verbally with patient, arm band, provided demographic data and hospital-assigned identification number  Time out: Immediately prior to procedure a "time out" was called to verify the correct patient, procedure, equipment, support staff and site/side marked as required  Preparation: Patient was prepped and draped in the usual sterile fashion    Indications: hemodynamic monitoring  Orientation:  Right  Location: radial arterylidocaine (PF) (XYLOCAINE-MPF) 1 % infiltration, 5 mL  Procedure Details:  Caio's test normal: yes  Needle gauge: 20  Seldinger technique: Seldinger technique used  Number of attempts: 1    Post-procedure:  Post-procedure: chlorhexidine patch applied and dressing applied  Waveform: good waveform  Post-procedure CNS: normal  Patient tolerance: patient tolerated the procedure well with no immediate complications

## 2021-04-10 ENCOUNTER — APPOINTMENT (INPATIENT)
Dept: RADIOLOGY | Facility: HOSPITAL | Age: 56
DRG: 235 | End: 2021-04-10
Payer: COMMERCIAL

## 2021-04-10 LAB
ANION GAP SERPL CALCULATED.3IONS-SCNC: 4 MMOL/L (ref 4–13)
ANION GAP SERPL CALCULATED.3IONS-SCNC: 4 MMOL/L (ref 4–13)
BASE EX.OXY STD BLDV CALC-SCNC: 43.6 % (ref 60–80)
BASE EXCESS BLDA CALC-SCNC: -2 MMOL/L (ref -2–3)
BASE EXCESS BLDA CALC-SCNC: -4 MMOL/L (ref -2–3)
BASE EXCESS BLDA CALC-SCNC: 0 MMOL/L (ref -2–3)
BASE EXCESS BLDA CALC-SCNC: 0 MMOL/L (ref -2–3)
BASE EXCESS BLDA CALC-SCNC: 1 MMOL/L (ref -2–3)
BASE EXCESS BLDA CALC-SCNC: 1 MMOL/L (ref -2–3)
BASE EXCESS BLDA CALC-SCNC: 2 MMOL/L (ref -2–3)
BASE EXCESS BLDV CALC-SCNC: -0.2 MMOL/L
BUN SERPL-MCNC: 16 MG/DL (ref 5–25)
BUN SERPL-MCNC: 22 MG/DL (ref 5–25)
CA-I BLD-SCNC: 0.97 MMOL/L (ref 1.12–1.32)
CA-I BLD-SCNC: 1.03 MMOL/L (ref 1.12–1.32)
CA-I BLD-SCNC: 1.04 MMOL/L (ref 1.12–1.32)
CA-I BLD-SCNC: 1.05 MMOL/L (ref 1.12–1.32)
CA-I BLD-SCNC: 1.13 MMOL/L (ref 1.12–1.32)
CA-I BLD-SCNC: 1.16 MMOL/L (ref 1.12–1.32)
CA-I BLD-SCNC: 1.2 MMOL/L (ref 1.12–1.32)
CALCIUM SERPL-MCNC: 7.7 MG/DL (ref 8.3–10.1)
CALCIUM SERPL-MCNC: 8 MG/DL (ref 8.3–10.1)
CHLORIDE SERPL-SCNC: 112 MMOL/L (ref 100–108)
CHLORIDE SERPL-SCNC: 112 MMOL/L (ref 100–108)
CO2 SERPL-SCNC: 24 MMOL/L (ref 21–32)
CO2 SERPL-SCNC: 24 MMOL/L (ref 21–32)
CREAT SERPL-MCNC: 0.94 MG/DL (ref 0.6–1.3)
CREAT SERPL-MCNC: 1.07 MG/DL (ref 0.6–1.3)
ERYTHROCYTE [DISTWIDTH] IN BLOOD BY AUTOMATED COUNT: 13.5 % (ref 11.6–15.1)
GFR SERPL CREATININE-BSD FRML MDRD: 77 ML/MIN/1.73SQ M
GFR SERPL CREATININE-BSD FRML MDRD: 90 ML/MIN/1.73SQ M
GLUCOSE SERPL-MCNC: 105 MG/DL (ref 65–140)
GLUCOSE SERPL-MCNC: 109 MG/DL (ref 65–140)
GLUCOSE SERPL-MCNC: 109 MG/DL (ref 65–140)
GLUCOSE SERPL-MCNC: 115 MG/DL (ref 65–140)
GLUCOSE SERPL-MCNC: 116 MG/DL (ref 65–140)
GLUCOSE SERPL-MCNC: 116 MG/DL (ref 65–140)
GLUCOSE SERPL-MCNC: 117 MG/DL (ref 65–140)
GLUCOSE SERPL-MCNC: 121 MG/DL (ref 65–140)
GLUCOSE SERPL-MCNC: 123 MG/DL (ref 65–140)
GLUCOSE SERPL-MCNC: 125 MG/DL (ref 65–140)
GLUCOSE SERPL-MCNC: 134 MG/DL (ref 65–140)
GLUCOSE SERPL-MCNC: 134 MG/DL (ref 65–140)
GLUCOSE SERPL-MCNC: 141 MG/DL (ref 65–140)
GLUCOSE SERPL-MCNC: 179 MG/DL (ref 65–140)
GLUCOSE SERPL-MCNC: 181 MG/DL (ref 65–140)
GLUCOSE SERPL-MCNC: 186 MG/DL (ref 65–140)
GLUCOSE SERPL-MCNC: 191 MG/DL (ref 65–140)
GLUCOSE SERPL-MCNC: 201 MG/DL (ref 65–140)
GLUCOSE SERPL-MCNC: 222 MG/DL (ref 65–140)
GLUCOSE SERPL-MCNC: 224 MG/DL (ref 65–140)
HBV SURFACE AB SER-ACNC: 10.45 MIU/ML
HCO3 BLDA-SCNC: 21.4 MMOL/L (ref 22–28)
HCO3 BLDA-SCNC: 24.5 MMOL/L (ref 22–28)
HCO3 BLDA-SCNC: 25.1 MMOL/L (ref 22–28)
HCO3 BLDA-SCNC: 25.6 MMOL/L (ref 22–28)
HCO3 BLDA-SCNC: 25.6 MMOL/L (ref 24–30)
HCO3 BLDA-SCNC: 25.8 MMOL/L (ref 22–28)
HCO3 BLDA-SCNC: 26.6 MMOL/L (ref 22–28)
HCO3 BLDV-SCNC: 24.8 MMOL/L (ref 24–30)
HCT VFR BLD AUTO: 29.6 % (ref 36.5–49.3)
HCT VFR BLD CALC: 20 % (ref 36.5–49.3)
HCT VFR BLD CALC: 24 % (ref 36.5–49.3)
HCT VFR BLD CALC: 26 % (ref 36.5–49.3)
HCT VFR BLD CALC: 26 % (ref 36.5–49.3)
HCT VFR BLD CALC: 31 % (ref 36.5–49.3)
HCT VFR BLD CALC: 31 % (ref 36.5–49.3)
HCT VFR BLD CALC: 33 % (ref 36.5–49.3)
HGB BLD-MCNC: 9.8 G/DL (ref 12–17)
HGB BLDA-MCNC: 10.5 G/DL (ref 12–17)
HGB BLDA-MCNC: 10.5 G/DL (ref 12–17)
HGB BLDA-MCNC: 11.2 G/DL (ref 12–17)
HGB BLDA-MCNC: 6.8 G/DL (ref 12–17)
HGB BLDA-MCNC: 8.2 G/DL (ref 12–17)
HGB BLDA-MCNC: 8.8 G/DL (ref 12–17)
HGB BLDA-MCNC: 8.8 G/DL (ref 12–17)
HIV 1+2 AB+HIV1 P24 AG SERPL QL IA: NORMAL
KCT BLD-ACNC: 115 SEC (ref 89–137)
KCT BLD-ACNC: 132 SEC (ref 89–137)
KCT BLD-ACNC: 422 SEC (ref 89–137)
KCT BLD-ACNC: 466 SEC (ref 89–137)
KCT BLD-ACNC: 502 SEC (ref 89–137)
KCT BLD-ACNC: 502 SEC (ref 89–137)
KCT BLD-ACNC: 526 SEC (ref 89–137)
MAGNESIUM SERPL-MCNC: 2.4 MG/DL (ref 1.6–2.6)
MCH RBC QN AUTO: 27.4 PG (ref 26.8–34.3)
MCHC RBC AUTO-ENTMCNC: 33.1 G/DL (ref 31.4–37.4)
MCV RBC AUTO: 83 FL (ref 82–98)
NASAL CANNULA: 2
O2 CT BLDV-SCNC: 6.7 ML/DL
PCO2 BLD: 23 MMOL/L (ref 21–32)
PCO2 BLD: 26 MMOL/L (ref 21–32)
PCO2 BLD: 26 MMOL/L (ref 21–32)
PCO2 BLD: 27 MMOL/L (ref 21–32)
PCO2 BLD: 28 MMOL/L (ref 21–32)
PCO2 BLD: 39.6 MM HG (ref 36–44)
PCO2 BLD: 39.8 MM HG (ref 36–44)
PCO2 BLD: 40.2 MM HG (ref 36–44)
PCO2 BLD: 40.3 MM HG (ref 36–44)
PCO2 BLD: 42.6 MM HG (ref 42–50)
PCO2 BLD: 44.5 MM HG (ref 36–44)
PCO2 BLD: 47.4 MM HG (ref 36–44)
PCO2 BLDV: 41.9 MM HG (ref 42–50)
PH BLD: 7.32 [PH] (ref 7.35–7.45)
PH BLD: 7.34 [PH] (ref 7.35–7.45)
PH BLD: 7.37 [PH] (ref 7.35–7.45)
PH BLD: 7.39 [PH] (ref 7.3–7.4)
PH BLD: 7.4 [PH] (ref 7.35–7.45)
PH BLD: 7.42 [PH] (ref 7.35–7.45)
PH BLD: 7.43 [PH] (ref 7.35–7.45)
PH BLDV: 7.39 [PH] (ref 7.3–7.4)
PLATELET # BLD AUTO: 74 THOUSANDS/UL (ref 149–390)
PMV BLD AUTO: 10.9 FL (ref 8.9–12.7)
PO2 BLD: 122 MM HG (ref 75–129)
PO2 BLD: 301 MM HG (ref 75–129)
PO2 BLD: 310 MM HG (ref 75–129)
PO2 BLD: 315 MM HG (ref 75–129)
PO2 BLD: 320 MM HG (ref 75–129)
PO2 BLD: 37 MM HG (ref 35–45)
PO2 BLD: >400 MM HG (ref 75–129)
PO2 BLDV: 23.8 MM HG (ref 35–45)
POTASSIUM BLD-SCNC: 3.8 MMOL/L (ref 3.5–5.3)
POTASSIUM BLD-SCNC: 4 MMOL/L (ref 3.5–5.3)
POTASSIUM BLD-SCNC: 4.4 MMOL/L (ref 3.5–5.3)
POTASSIUM BLD-SCNC: 5.7 MMOL/L (ref 3.5–5.3)
POTASSIUM BLD-SCNC: 6 MMOL/L (ref 3.5–5.3)
POTASSIUM BLD-SCNC: 6.1 MMOL/L (ref 3.5–5.3)
POTASSIUM BLD-SCNC: 6.5 MMOL/L (ref 3.5–5.3)
POTASSIUM SERPL-SCNC: 4.2 MMOL/L (ref 3.5–5.3)
POTASSIUM SERPL-SCNC: 4.3 MMOL/L (ref 3.5–5.3)
RBC # BLD AUTO: 3.58 MILLION/UL (ref 3.88–5.62)
SAO2 % BLD FROM PO2: 100 % (ref 60–85)
SAO2 % BLD FROM PO2: 70 % (ref 60–85)
SAO2 % BLD FROM PO2: 99 % (ref 60–85)
SODIUM BLD-SCNC: 133 MMOL/L (ref 136–145)
SODIUM BLD-SCNC: 134 MMOL/L (ref 136–145)
SODIUM BLD-SCNC: 134 MMOL/L (ref 136–145)
SODIUM BLD-SCNC: 135 MMOL/L (ref 136–145)
SODIUM BLD-SCNC: 139 MMOL/L (ref 136–145)
SODIUM BLD-SCNC: 139 MMOL/L (ref 136–145)
SODIUM BLD-SCNC: 141 MMOL/L (ref 136–145)
SODIUM SERPL-SCNC: 140 MMOL/L (ref 136–145)
SODIUM SERPL-SCNC: 140 MMOL/L (ref 136–145)
SPECIMEN SOURCE: ABNORMAL
SPECIMEN SOURCE: NORMAL
SPECIMEN SOURCE: NORMAL
WBC # BLD AUTO: 9.5 THOUSAND/UL (ref 4.31–10.16)

## 2021-04-10 PROCEDURE — 83735 ASSAY OF MAGNESIUM: CPT | Performed by: PHYSICIAN ASSISTANT

## 2021-04-10 PROCEDURE — 80048 BASIC METABOLIC PNL TOTAL CA: CPT | Performed by: PHYSICIAN ASSISTANT

## 2021-04-10 PROCEDURE — 82948 REAGENT STRIP/BLOOD GLUCOSE: CPT

## 2021-04-10 PROCEDURE — 71045 X-RAY EXAM CHEST 1 VIEW: CPT

## 2021-04-10 PROCEDURE — 85027 COMPLETE CBC AUTOMATED: CPT | Performed by: PHYSICIAN ASSISTANT

## 2021-04-10 PROCEDURE — 99222 1ST HOSP IP/OBS MODERATE 55: CPT | Performed by: INTERNAL MEDICINE

## 2021-04-10 PROCEDURE — 99233 SBSQ HOSP IP/OBS HIGH 50: CPT | Performed by: INTERNAL MEDICINE

## 2021-04-10 PROCEDURE — 93005 ELECTROCARDIOGRAM TRACING: CPT

## 2021-04-10 PROCEDURE — 82805 BLOOD GASES W/O2 SATURATION: CPT | Performed by: PHYSICIAN ASSISTANT

## 2021-04-10 PROCEDURE — 97163 PT EVAL HIGH COMPLEX 45 MIN: CPT

## 2021-04-10 PROCEDURE — 94760 N-INVAS EAR/PLS OXIMETRY 1: CPT

## 2021-04-10 PROCEDURE — 99024 POSTOP FOLLOW-UP VISIT: CPT | Performed by: THORACIC SURGERY (CARDIOTHORACIC VASCULAR SURGERY)

## 2021-04-10 RX ORDER — ALBUMIN, HUMAN INJ 5% 5 %
SOLUTION INTRAVENOUS
Status: COMPLETED
Start: 2021-04-10 | End: 2021-04-10

## 2021-04-10 RX ORDER — FUROSEMIDE 10 MG/ML
40 INJECTION INTRAMUSCULAR; INTRAVENOUS ONCE
Status: COMPLETED | OUTPATIENT
Start: 2021-04-10 | End: 2021-04-10

## 2021-04-10 RX ORDER — DOCUSATE SODIUM 100 MG/1
100 CAPSULE, LIQUID FILLED ORAL 2 TIMES DAILY
Status: DISCONTINUED | OUTPATIENT
Start: 2021-04-10 | End: 2021-04-12 | Stop reason: SDUPTHER

## 2021-04-10 RX ORDER — TEMAZEPAM 15 MG/1
15 CAPSULE ORAL
Status: DISCONTINUED | OUTPATIENT
Start: 2021-04-10 | End: 2021-04-15 | Stop reason: HOSPADM

## 2021-04-10 RX ORDER — OXYCODONE HYDROCHLORIDE 5 MG/1
5 TABLET ORAL EVERY 4 HOURS PRN
Status: DISCONTINUED | OUTPATIENT
Start: 2021-04-10 | End: 2021-04-15 | Stop reason: HOSPADM

## 2021-04-10 RX ORDER — HYDROMORPHONE HCL/PF 1 MG/ML
1 SYRINGE (ML) INJECTION ONCE
Status: COMPLETED | OUTPATIENT
Start: 2021-04-10 | End: 2021-04-10

## 2021-04-10 RX ORDER — ALBUMIN, HUMAN INJ 5% 5 %
12.5 SOLUTION INTRAVENOUS ONCE
Status: COMPLETED | OUTPATIENT
Start: 2021-04-10 | End: 2021-04-10

## 2021-04-10 RX ORDER — HYDROMORPHONE HCL/PF 1 MG/ML
0.5 SYRINGE (ML) INJECTION EVERY 2 HOUR PRN
Status: DISCONTINUED | OUTPATIENT
Start: 2021-04-10 | End: 2021-04-13

## 2021-04-10 RX ORDER — POTASSIUM CHLORIDE 20 MEQ/1
20 TABLET, EXTENDED RELEASE ORAL DAILY
Status: DISCONTINUED | OUTPATIENT
Start: 2021-04-10 | End: 2021-04-13

## 2021-04-10 RX ORDER — HYDROMORPHONE HCL/PF 1 MG/ML
0.5 SYRINGE (ML) INJECTION ONCE
Status: COMPLETED | OUTPATIENT
Start: 2021-04-10 | End: 2021-04-10

## 2021-04-10 RX ORDER — ALBUMIN, HUMAN INJ 5% 5 %
12.5 SOLUTION INTRAVENOUS ONCE
Status: DISCONTINUED | OUTPATIENT
Start: 2021-04-10 | End: 2021-04-11

## 2021-04-10 RX ORDER — OXYCODONE HYDROCHLORIDE 10 MG/1
10 TABLET ORAL EVERY 4 HOURS PRN
Status: DISCONTINUED | OUTPATIENT
Start: 2021-04-10 | End: 2021-04-15 | Stop reason: HOSPADM

## 2021-04-10 RX ORDER — FUROSEMIDE 10 MG/ML
40 INJECTION INTRAMUSCULAR; INTRAVENOUS DAILY
Status: DISCONTINUED | OUTPATIENT
Start: 2021-04-10 | End: 2021-04-11

## 2021-04-10 RX ORDER — ALBUMIN (HUMAN) 12.5 G/50ML
12.5 SOLUTION INTRAVENOUS ONCE
Status: DISCONTINUED | OUTPATIENT
Start: 2021-04-10 | End: 2021-04-10

## 2021-04-10 RX ADMIN — HYDROMORPHONE HYDROCHLORIDE 0.5 MG: 1 INJECTION, SOLUTION INTRAMUSCULAR; INTRAVENOUS; SUBCUTANEOUS at 20:00

## 2021-04-10 RX ADMIN — ACETAMINOPHEN 975 MG: 325 TABLET ORAL at 06:41

## 2021-04-10 RX ADMIN — AMIODARONE HYDROCHLORIDE 200 MG: 200 TABLET ORAL at 06:42

## 2021-04-10 RX ADMIN — MUPIROCIN 1 APPLICATION: 20 OINTMENT TOPICAL at 21:24

## 2021-04-10 RX ADMIN — FUROSEMIDE 40 MG: 10 INJECTION, SOLUTION INTRAVENOUS at 11:11

## 2021-04-10 RX ADMIN — OXYCODONE HYDROCHLORIDE 5 MG: 5 TABLET ORAL at 02:13

## 2021-04-10 RX ADMIN — HYDROMORPHONE HYDROCHLORIDE 1 MG: 1 INJECTION, SOLUTION INTRAMUSCULAR; INTRAVENOUS; SUBCUTANEOUS at 21:28

## 2021-04-10 RX ADMIN — POTASSIUM CHLORIDE 20 MEQ: 1500 TABLET, EXTENDED RELEASE ORAL at 11:12

## 2021-04-10 RX ADMIN — MUPIROCIN 1 APPLICATION: 20 OINTMENT TOPICAL at 09:22

## 2021-04-10 RX ADMIN — AMIODARONE HYDROCHLORIDE 200 MG: 200 TABLET ORAL at 21:24

## 2021-04-10 RX ADMIN — ALBUMIN (HUMAN) 12.5 G: 12.5 INJECTION, SOLUTION INTRAVENOUS at 11:11

## 2021-04-10 RX ADMIN — OXYCODONE HYDROCHLORIDE 5 MG: 5 TABLET ORAL at 14:10

## 2021-04-10 RX ADMIN — SODIUM CHLORIDE 1.5 UNITS/HR: 9 INJECTION, SOLUTION INTRAVENOUS at 20:00

## 2021-04-10 RX ADMIN — ACETAMINOPHEN 975 MG: 325 TABLET ORAL at 21:24

## 2021-04-10 RX ADMIN — CEFAZOLIN SODIUM 2000 MG: 2 SOLUTION INTRAVENOUS at 03:20

## 2021-04-10 RX ADMIN — MILRINONE LACTATE IN DEXTROSE 0.13 MCG/KG/MIN: 200 INJECTION, SOLUTION INTRAVENOUS at 15:03

## 2021-04-10 RX ADMIN — PANTOPRAZOLE SODIUM 40 MG: 40 TABLET, DELAYED RELEASE ORAL at 06:42

## 2021-04-10 RX ADMIN — FONDAPARINUX SODIUM 2.5 MG: 2.5 INJECTION, SOLUTION SUBCUTANEOUS at 09:21

## 2021-04-10 RX ADMIN — ALBUMIN (HUMAN) 12.5 G: 12.5 INJECTION, SOLUTION INTRAVENOUS at 16:17

## 2021-04-10 RX ADMIN — AMIODARONE HYDROCHLORIDE 200 MG: 200 TABLET ORAL at 14:09

## 2021-04-10 RX ADMIN — HYDROMORPHONE HYDROCHLORIDE 0.5 MG: 1 INJECTION, SOLUTION INTRAMUSCULAR; INTRAVENOUS; SUBCUTANEOUS at 16:08

## 2021-04-10 RX ADMIN — Medication 12.5 MG: at 11:10

## 2021-04-10 RX ADMIN — CHLORHEXIDINE GLUCONATE 0.12% ORAL RINSE 15 ML: 1.2 LIQUID ORAL at 09:21

## 2021-04-10 RX ADMIN — HYDROMORPHONE HYDROCHLORIDE 0.5 MG: 1 INJECTION, SOLUTION INTRAMUSCULAR; INTRAVENOUS; SUBCUTANEOUS at 08:43

## 2021-04-10 RX ADMIN — OXYCODONE HYDROCHLORIDE 10 MG: 10 TABLET ORAL at 19:53

## 2021-04-10 RX ADMIN — ATORVASTATIN CALCIUM 80 MG: 80 TABLET, FILM COATED ORAL at 17:09

## 2021-04-10 RX ADMIN — HYDROMORPHONE HYDROCHLORIDE 0.5 MG: 1 INJECTION, SOLUTION INTRAMUSCULAR; INTRAVENOUS; SUBCUTANEOUS at 03:20

## 2021-04-10 RX ADMIN — ASPIRIN 325 MG ORAL TABLET 325 MG: 325 PILL ORAL at 09:22

## 2021-04-10 RX ADMIN — OXYCODONE HYDROCHLORIDE 10 MG: 10 TABLET ORAL at 06:42

## 2021-04-10 RX ADMIN — ACETAMINOPHEN 975 MG: 325 TABLET ORAL at 14:09

## 2021-04-10 RX ADMIN — POLYETHYLENE GLYCOL 3350 17 G: 17 POWDER, FOR SOLUTION ORAL at 09:21

## 2021-04-10 RX ADMIN — FUROSEMIDE 40 MG: 10 INJECTION, SOLUTION INTRAVENOUS at 22:06

## 2021-04-10 RX ADMIN — Medication 12.5 MG: at 21:24

## 2021-04-10 RX ADMIN — CEFAZOLIN SODIUM 2000 MG: 2 SOLUTION INTRAVENOUS at 11:56

## 2021-04-10 RX ADMIN — CHLORHEXIDINE GLUCONATE 0.12% ORAL RINSE 15 ML: 1.2 LIQUID ORAL at 17:09

## 2021-04-10 RX ADMIN — HYDROMORPHONE HYDROCHLORIDE 0.5 MG: 1 INJECTION, SOLUTION INTRAMUSCULAR; INTRAVENOUS; SUBCUTANEOUS at 05:35

## 2021-04-10 RX ADMIN — DOCUSATE SODIUM 100 MG: 100 CAPSULE, LIQUID FILLED ORAL at 17:09

## 2021-04-10 RX ADMIN — TEMAZEPAM 15 MG: 15 CAPSULE ORAL at 21:24
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
## 2021-04-10 NOTE — CONSULTS
Consultation - Esther Maurer 64 y o  male MRN: [id number]    Unit/Bed#: [facility] 417-01 Encounter: [id number]      Assessment/Plan     Assessment: This is a 64y o -year-old male with pre-diabetes, hypertension and CAD who underwent CABG x3  Plan:    Pre diabetes:  A1c 6 5%  Currently on IV insulin infusion  He is also on Milrinone  He reports pain and very poor appetite  We recommend continuing IV Insulin infusion for today  We will reassess tomorrow to switch to subcutaneous insulin  Patient will likely not need to be discharged on insulin  CAD status post CABG management per Cardiovascular surgery  CC: Diabetes Consult    History of Present Illness     HPI: Esther Maurer is a 64y o  year old male with no known history of diabetes admitted with NSTEMI and underwent CABG  Endocrinology consulted to assist with diabetes management  Patient denies any history diabetes  He presented with epigastric and chest pain that he thought initially related to GERD; he was found with  NSTEMI and hypertensive urgency  Patient is a   He denies any recent weight changes  He denies polyuria, polydipsia or polyphagia  Admits to eat high-calorie snacks  He takes no medication except aspirin due to his strong family history coronary artery disease  He reports being active does admit to smoking with a 30 pack year history  Consults    Review of Systems   Constitutional: Negative for activity change, appetite change, chills, fever and unexpected weight change  HENT: Negative for ear pain, sore throat, trouble swallowing and voice change  Eyes: Negative for pain and visual disturbance  Respiratory: Negative for cough and shortness of breath  Cardiovascular: Positive for chest pain  Negative for palpitations  Gastrointestinal: Positive for nausea  Negative for abdominal pain and vomiting  Endocrine: Negative for polydipsia, polyphagia and polyuria     Genitourinary: Negative for dysuria and hematuria  Musculoskeletal: Negative for arthralgias, back pain and myalgias  Skin: Negative for color change and rash  Neurological: Negative for dizziness, tremors, seizures, syncope and numbness  Psychiatric/Behavioral: Negative for behavioral problems  The patient is not nervous/anxious  All other systems reviewed and are negative        Historical Information   Past Medical History:   Diagnosis Date    Bicuspid aortic valve     CAD (coronary artery disease)     Former tobacco use     GERD (gastroesophageal reflux disease)     Goiter     History of myocardial infarction     History of rib fracture     Hypertension      Past Surgical History:   Procedure Laterality Date    CARDIAC CATHETERIZATION      VASECTOMY       Social History   Social History     Substance and Sexual Activity   Alcohol Use Never    Frequency: Never     Social History     Substance and Sexual Activity   Drug Use Never     Social History     Tobacco Use   Smoking Status Current Every Day Smoker    Packs/day: 1 00    Types: Cigarettes   Smokeless Tobacco Never Used     Family History:   Family History   Problem Relation Age of Onset    Heart disease Father        Meds/Allergies   Current Facility-Administered Medications   Medication Dose Route Frequency Provider Last Rate Last Admin    acetaminophen (TYLENOL) tablet 975 mg  975 mg Oral Q8H Jenn Olson PA-C   975 mg at 04/10/21 0641    amiodarone tablet 200 mg  200 mg Oral [facility] Fredericka Apgar, PA-C   200 mg at 04/10/21 3055    aspirin tablet 325 mg  325 mg Oral Daily Fredericka Apgar, PA-C   325 mg at 04/10/21 3543    atorvastatin (LIPITOR) tablet 80 mg  80 mg Oral Daily With SNEHAL Roa        bisacodyl (DULCOLAX) rectal suppository 10 mg  10 mg Rectal Daily PRN Fredericka Apgar, PA-C        chlorhexidine (PERIDEX) 0 12 % oral rinse 15 mL  15 mL Mouth/Throat BID Fredericka Apgar, PA-C   15 mL at 04/10/21 0921    docusate sodium (COLACE) capsule 100 mg  100 mg Oral BID Manuel Medina PA-C        fondaparinux (ARIXTRA) subcutaneous injection 2 5 mg  2 5 mg Subcutaneous Daily Fredericka Apgar, PA-C   2 5 mg at 04/10/21 8979    furosemide (LASIX) injection 40 mg  40 mg Intravenous Daily Manuel Medina PA-C   40 mg at 04/10/21 1111    HYDROmorphone (DILAUDID) injection 0 5 mg  0 5 mg Intravenous Q2H PRN Rubbie Leisure, DO        insulin regular (HumuLIN R,NovoLIN R) 1 Units/mL in sodium chloride 0 9 % 100 mL infusion  0 3-21 Units/hr Intravenous Titrated Fredericka Apgar, PA-C 1 5 mL/hr at 04/10/21 0814 1 5 Units/hr at 04/10/21 0814    lidocaine (cardiac) injection 100 mg  100 mg Intravenous Q30 Min PRN Fredericka Apgar, PA-C        metoprolol tartrate (LOPRESSOR) partial tablet 12 5 mg  12 5 mg Oral Q12H [facility] Manuel Medina PA-C   12 5 mg at 04/10/21 1110    milrinone (PRIMACOR) 20 mg in 100 mL infusion (premix)  0 13 mcg/kg/min Intravenous Continuous Manuel Medina PA-C 2 7 mL/hr at 04/10/21 1048 0 13 mcg/kg/min at 04/10/21 1048    mupirocin (BACTROBAN) 2 % nasal ointment 1 application  1 application Nasal A05C [facility] Fredericka Apgar, PA-C   1 application at  0922    ondansetron (ZOFRAN) injection 4 mg  4 mg Intravenous Q6H PRN Fredericka Apgar, PA-C        oxyCODONE (ROXICODONE) immediate release tablet 10 mg  10 mg Oral Q4H PRN Maria Elena Coello PA-C   10 mg at 04/10/21 5300    oxyCODONE (ROXICODONE) IR tablet 5 mg  5 mg Oral Q4H PRN Maria Elena Coello PA-C        pantoprazole (PROTONIX) EC tablet 40 mg  40 mg Oral Daily Before Breakfast Fredericka Apgar, PA-C   40 mg at 04/10/21 2772    polyethylene glycol (MIRALAX) packet 17 g  17 g Oral Daily Fredericka Apgar, PAGeraldoC   17 g at 04/10/21 9696    potassium chloride (K-DUR,KLOR-CON) CR tablet 20 mEq  20 mEq Oral Daily Manuel Medina PA-C   20 mEq at 04/10/21 1112    sodium chloride infusion 0 45 %  20 mL/hr Intravenous Continuous Amada Apgar, PA-C 20 mL/hr at 21 1427 20 mL/hr at 21 1427    temazepam (RESTORIL) capsule 15 mg  15 mg Oral HS PRN Freddy Medina PA-C         No Known Allergies    Objective   Vitals: Blood pressure 101/71, pulse 64, temperature 99 °F (37 2 °C), temperature source Core, resp  rate 20, weight 75 2 kg (165 lb 12 6 oz), SpO2 97 %  Intake/Output Summary (Last 24 hours) at 4/10/2021 1342  Last data filed at 4/10/2021 1200  Gross per 24 hour   Intake 3678 49 ml   Output 2330 ml   Net 1348 49 ml     Invasive Devices     Central Venous Catheter Line            CVC Central Lines 04/09/21 Triple 1 day    Introducer 04/09/21 1 day          Peripheral Intravenous Line            Peripheral IV 04/07/21 Left Hand 3 days          Arterial Line            Arterial Line 04/09/21 Right Radial 1 day          Line            Pacer Wires 1 day    Pacer Wires 1 day          Drain            Chest Tube 1 Posterior Mediastinal 24 Fr  1 day    Chest Tube 2 Anterior Mediastinal 32 Fr  1 day    Chest Tube 3 Left Pleural 32 Fr  1 day    Urethral Catheter Non-latex; Temperature probe 16 Fr  1 day                Physical Exam  Vitals signs reviewed  Constitutional:       General: He is not in acute distress  Appearance: Normal appearance  HENT:      Head: Normocephalic and atraumatic  Nose: Nose normal       Mouth/Throat:      Mouth: Mucous membranes are moist    Eyes:      General: No scleral icterus  Extraocular Movements: Extraocular movements intact  Conjunctiva/sclera: Conjunctivae normal       Pupils: Pupils are equal, round, and reactive to light  Neck:      Musculoskeletal: Normal range of motion and neck supple  Comments: No thyromegaly  Cardiovascular:      Rate and Rhythm: Normal rate  Pulses: Normal pulses  Heart sounds: Normal heart sounds  Pulmonary:      Effort: Pulmonary effort is normal       Breath sounds: Normal breath sounds  Abdominal:      General: Bowel sounds are normal       Palpations: Abdomen is soft  Tenderness:  There is no abdominal tenderness  Musculoskeletal: Normal range of motion  Right lower leg: No edema  Left lower leg: No edema  Lymphadenopathy:      Cervical: No cervical adenopathy  Skin:     General: Skin is warm  Neurological:      Mental Status: He is alert and oriented to person, place, and time  Gait: Gait normal       Deep Tendon Reflexes: Reflexes normal    Psychiatric:         Mood and Affect: Mood normal          Behavior: Behavior normal          Thought Content: Thought content normal          Judgment: Judgment normal          The history was obtained from the review of the chart, patient  Lab Results:   Results from last 7 days   Lab Units 04/06/21  1900   HEMOGLOBIN A1C % 6 5*     Lab Results   Component Value Date    WBC 9 50 04/10/2021    HGB 9 8 (L) 04/10/2021    HCT 29 6 (L) 04/10/2021    MCV 83 04/10/2021    PLT 74 (L) 04/10/2021     Lab Results   Component Value Date/Time    BUN 16 04/10/2021 04:36 AM    K 4 3 04/10/2021 04:36 AM     (H) 04/10/2021 04:36 AM    CO2 24 04/10/2021 04:36 AM    CO2 22 04/09/2021 02:42 PM    CREATININE 0 94 04/10/2021 04:36 AM    AST 31 04/08/2021 05:37 AM    ALT 76 04/09/2021 02:05 PM    ALB 3 2 (L) 04/08/2021 05:37 AM     Recent Labs     04/08/21  0537   HDL 34*   TRIG 97     No results found for: MICROALBUR, [id number]  POC Glucose (mg/dl)   Date Value   04/10/2021 109   04/10/2021 117   04/10/2021 115   04/10/2021 116   04/10/2021 125   04/10/2021 123   04/09/2021 135   04/09/2021 137   04/09/2021 124   04/09/2021 128       Imaging Studies: I have personally reviewed pertinent reports  Portions of the record may have been created with voice recognition software

## 2021-04-10 NOTE — PROGRESS NOTES
Progress Note - Cardiothoracic Surgery   Susana Yanez 64 y o  male MRN: [id number]  Unit/Bed#: [facility] 417-01 Encounter: [id number]      POD # 1 s/p CABG x3    Pt seen/examined  Interval history and data reviewed with critical care team   Pt doing well  No specific complaints    Low dose milrinone        Medications:   Scheduled Meds:  Current Facility-Administered Medications   Medication Dose Route Frequency Provider Last Rate    acetaminophen  975 mg Oral [facility], PA-TOMAS      amiodarone  200 mg Oral [facility] [place], [place]      aspirin  325 mg Oral Daily [place], [place]      atorvastatin  80 mg Oral Daily With AllSAMMY he-TOMAS      bisacodyl  10 mg Rectal Daily PRN [place], PA-C      calcium chloride  1 g Intravenous Once [place], PA-C      cefazolin  2,000 mg Intravenous Q8H [place], PA-C Stopped (04/10/21 0400)    chlorhexidine  15 mL Mouth/Throat BID [place], PA-C      fentanyl citrate (PF)  50 mcg Intravenous Once Jenn Wesley, PA-C      fondaparinux  2 5 mg Subcutaneous Daily [place], PA-C      HYDROmorphone  0 5 mg Intravenous Q2H PRN [place], PA-C      insulin regular (HumuLIN R,NovoLIN R) infusion  0 3-21 Units/hr Intravenous Titrated [place], PA-C 1 5 Units/hr (04/10/21 8384)    lidocaine (cardiac)  100 mg Intravenous Q30 Min PRN [place], PA-C      milrinone [facility]) infusion  0 25 mcg/kg/min Intravenous Continuous [place], PA-C 0 13 mcg/kg/min (04/10/21 0917)    mupirocin  1 application Nasal O33E [facility] [place], PA-C      ondansetron  4 mg Intravenous Q6H PRN [place], PA-C      oxyCODONE  10 mg Oral Q4H PRN Asya Ma[place], PA-C      oxyCODONE  5 mg Oral Q4H PRN Asya [place], PA-C      pantoprazole  40 mg Oral Daily Before Breakfast [place], PA-C      polyethylene glycol  17 g Oral Daily [place], PA-C      sodium chloride  20 mL/hr Intravenous Continuous [place], PA-C 20 mL/hr (04/09/21 1427)     Continuous Infusions:insulin regular (HumuLIN R,NovoLIN R) infusion, 0 3-21 Units/hr, Last Rate: 1 5 Units/hr (04/10/21 0814)  milrinone (PRIMACOR) infusion, 0 25 mcg/kg/min, Last Rate: 0 13 mcg/kg/min (04/10/21 0917)  sodium chloride, 20 mL/hr, Last Rate: 20 mL/hr (04/09/21 1427)      PRN Meds: bisacodyl    HYDROmorphone    lidocaine (cardiac)    ondansetron    oxyCODONE    oxyCODONE    Vitals: Blood pressure (!) 85/60, pulse 72, temperature 98 6 °F (37 °C), temperature source Core, resp  rate (!) 29, weight 75 2 kg (165 lb 12 6 oz), SpO2 99 %  ,Body mass index is 27 59 kg/m²  I/O last 24 hours: In: 3228 4 [P O :200; I V :1028 4; IV Piggyback:2000]  Out: 1905 [Urine:1185; Chest Tube:720]  Invasive Devices     Central Venous Catheter Line            CVC Central Lines 04/09/21 Triple 1 day    Introducer 04/09/21 1 day          Peripheral Intravenous Line            Peripheral IV 04/07/21 Left Hand 3 days          Arterial Line            Arterial Line 04/09/21 Right Radial 1 day          Line            Pacer Wires less than 1 day    Pacer Wires less than 1 day          Drain            Urethral Catheter Non-latex; Temperature probe 16 Fr  1 day    Chest Tube 1 Posterior Mediastinal 24 Fr  less than 1 day    Chest Tube 2 Anterior Mediastinal 32 Fr  less than 1 day    Chest Tube 3 Left Pleural 32 Fr  less than 1 day                  Lab, Imaging and other studies:   Results from last 7 days   Lab Units 04/10/21  0436 04/09/21  2252 04/09/21  1442 04/09/21  1405 04/09/21  1200 04/09/21  0529 04/08/21  0537   WBC Thousand/uL 9 50  --   --   --   --  7 27 7 19   HEMOGLOBIN g/dL 9 8* 10 0*  --  12 4  --  12 0 12 0   I STAT HEMOGLOBIN g/dl  --   --  10 9*  --   --   --   --    HEMATOCRIT % 29 6* 29 7*  --  37 7  --  36 5 36 1*   HEMATOCRIT, ISTAT %  --   --  32*  --   --   --   --    PLATELETS Thousands/uL 74*  --   --  143* 142* 133* 139*     Results from last 7 days   Lab Units 04/10/21  0439 04/09/21  2252 04/09/21  1813 04/09/21  1442 04/09/21  1405 04/09/21  0529   POTASSIUM mmol/L 4 3 4 7 3 9  --  3 7 3 9   CHLORIDE mmol/L 112*  --   --   --  112* 109*   CO2 mmol/L 24  --   --   --  21 27   CO2, I-STAT mmol/L  --   --   --  22  --   --    BUN mg/dL 16  --   --   --  18 17   CREATININE mg/dL 0 94  --   --   --  1 10 0 93   GLUCOSE, ISTAT mg/dl  --   --   --  145*  --   --    CALCIUM mg/dL 7 7*  --   --   --  7 8* 8 8     Results from last 7 days   Lab Units 04/09/21  0529 04/08/21  1848 04/08/21  1311  04/06/21  1221   INR   --   --   --   --  1 13   PTT seconds 40* 68* 74*   < > 32    < > = values in this interval not displayed  No results for input(s): PHART, GJB5UCJ, PO2ART, LHF2DPB, T6WHXMBM, BEART in the last 72 hours  Plan:    DC [place]  Wean milrinone off and de-line  Continue chest tubes, pacing wires  OOBTC  Incentive spirometry  Diuresis  PO ASA/Statin/B blocker    ICU        SIGNATURE: Krishna Ramachandran DO  DATE: April 10, 2021  TIME: 9:35 AM

## 2021-04-10 NOTE — PLAN OF CARE
Problem: PHYSICAL THERAPY ADULT  Goal: Performs mobility at highest level of function for planned discharge setting  See evaluation for individualized goals  Description: Treatment/Interventions: Functional transfer training, LE strengthening/ROM, Therapeutic exercise, Endurance training, Cognitive reorientation, Patient/family training, Equipment eval/education, Bed mobility, Gait training, Spoke to nursing, Spoke to case management  Equipment Recommended: [place]       See flowsheet documentation for full assessment, interventions and recommendations  Outcome: Progressing  Note: Prognosis: Good  Problem List: Decreased strength, Decreased range of motion, Decreased endurance, Impaired balance, Decreased mobility, Decreased coordination, Decreased safety awareness, Pain  Assessment: Pt  is a 65 yo M who presents following NSTEMI with subsequent CABGx3  order placed for PT eval and tx, w/ activity order of ambulate patient  pt presents w/ comorbidities of chest pain, hypertensive urgency, hyperglycemia, Thrombocytopenia, Leukocytosis and personal factors of living in 2 story house, stair(s) to enter home, inability to perform current job functions, unable to perform physical activity, inability to perform IADLs, inability to perform ADLs and inability to live alone  pt presents w/ pain, weakness, decreased ROM, decreased endurance, impaired balance, gait deviations, impaired coordination, decreased safety awareness, impaired judgment and fall risk  these impairments are evident in findings from physical examination (weakness, decreased ROM and impaired coordination), mobility assessment (need for Min assist w/ all phases of mobility when usually mobilizing independently, tolerance to only 5 feet of ambulation and need for cueing for mobility technique), and AM-PAC 6-Clicks: 43/87  pt needed input for task focus and mobility technique  pt is at risk for falls due to physical and safety awareness deficits   pt's clinical presentation is unstable/unpredictable (evident in need for assist w/ all phases of mobility when usually mobilizing independently, tolerance to only 5 feet of ambulation, need for input for mobility technique, need for input for task focus and mobility technique and need for input for mobility technique/safety)  pt needs inpatient PT tx to improve mobility deficits  discharge recommendation is for inpatient rehab vs  home PT pending patient progression to reduce fall risk and maximize level of functional independence  PT Discharge Recommendation: Other (Comment)(rehab vs  HHPT pending patient progression)          See flowsheet documentation for full assessment

## 2021-04-10 NOTE — PHYSICAL THERAPY NOTE
PHYSICAL THERAPY Evaluation NOTE    Patient Name: Cira Street  [id number]'X Date: 4/10/2021     AGE:   64 y o  Mrn:   [id number]  ADMIT DX:  Chest pain [R07 9]    Past Medical History:   Diagnosis Date    Bicuspid aortic valve     CAD (coronary artery disease)     Former tobacco use     GERD (gastroesophageal reflux disease)     Goiter     History of myocardial infarction     History of rib fracture     Hypertension      Length Of Stay: 3  PHYSICAL THERAPY EVALUATION :    04/10/21 0904   PT Last Visit   PT Visit Date 04/10/21   Note Type   Note type Evaluation   Home Living   Type of Home House  (2 SH, 3-4 CHRISTINA 15 steps between floors)   Home Layout Two level   [facility]   (NO AD or DME at baseline  )   Additional Comments Pt  lives alone in a 2 SH, few CHRISTINA and full flight to bed and bathroom   Prior Function   Level of [place] Independent with ADLs and functional mobility   Lives With Alone   Receives Help From Family   ADL Assistance Independent   IADLs Independent   Falls in the last 6 months 0   Vocational Full time employment  ()   Comments PTA pt  reports INDEP with ADLs, IADLs and functional mobility without an AD   Restrictions/Precautions   Weight Bearing Precautions Per Order No   Other Precautions Cardiac/sternal;Multiple lines;Telemetry; Fall Risk;Pain  (pacing wires, A-line, Swanz, )   General   Additional Pertinent History Pt  is a 63 yo M who presents following NSTEMI with subsequent CABGx3      Family/Caregiver Present No   Cognition   Overall Cognitive Status WFL   Arousal/Participation Cooperative   Orientation Level Oriented X4   Memory Within functional limits   Following Commands Follows all commands and directions without difficulty   Comments Pt  was identified with full name and birthdate   RLE Assessment   RLE Assessment (functionally > 3+/5)   LLE Assessment   LLE Assessment   (functionally > 3+/5)   Coordination   Movements are Fluid and Coordinated   (antalgic and bradykinetic functional mobility)   Bed Mobility   Additional Comments unable to assess Pt  seated OOB in recliner prior to and following session   Transfers   Sit to Stand 4  Minimal assistance   Additional items Assist x 1; Increased time required;Verbal cues  (for hand placement, sequencing, and precautions)   Stand to Sit 4  Minimal assistance   Additional items Assist x 1; Impulsive;Verbal cues  (to reach back to control descent)   Ambulation/Elevation   Gait pattern   (standing marches)   Gait Assistance 4  Minimal assist   Additional items Assist x 1   Assistive Device Rolling walker   Distance 5  (5 marches x each LE)   Balance   Static Sitting Good   Dynamic Sitting Fair +   Static Standing Fair   Dynamic Standing Fair -   Ambulatory Poor +   Endurance Deficit   Endurance Deficit Yes   Endurance Deficit Description lightheadedness and fatigue with mobility   Activity Tolerance   Activity Tolerance Patient limited by fatigue;Treatment limited secondary to medical complications (Comment)  (Swanz catheter limited ambulation)   Nurse Made Aware Spoke to RN Cleared for PT session   Assessment   Prognosis Good   Problem List Decreased strength;Decreased range of motion;Decreased endurance; Impaired balance;Decreased mobility; Decreased coordination;Decreased safety awareness;Pain   Assessment Pt  is a 63 yo M who presents following NSTEMI with subsequent CABGx3  order placed for PT eval and tx, w/ activity order of ambulate patient   pt presents w/ comorbidities of chest pain, hypertensive urgency, hyperglycemia, Thrombocytopenia, Leukocytosis and personal factors of living in 2 story house, stair(s) to enter home, inability to perform current job functions, unable to perform physical activity, inability to perform IADLs, inability to perform ADLs and inability to live alone  pt presents w/ pain, weakness, decreased ROM, decreased endurance, impaired balance, gait deviations, impaired coordination, decreased safety awareness, impaired judgment and fall risk  these impairments are evident in findings from physical examination (weakness, decreased ROM and impaired coordination), mobility assessment (need for Min assist w/ all phases of mobility when usually mobilizing independently, tolerance to only 5 feet of ambulation and need for cueing for mobility technique), and AM-PAC 6-Clicks: 51/73  pt needed input for task focus and mobility technique  pt is at risk for falls due to physical and safety awareness deficits  pt's clinical presentation is unstable/unpredictable (evident in need for assist w/ all phases of mobility when usually mobilizing independently, tolerance to only 5 feet of ambulation, need for input for mobility technique, need for input for task focus and mobility technique and need for input for mobility technique/safety)  pt needs inpatient PT tx to improve mobility deficits  discharge recommendation is for inpatient rehab vs  home PT pending patient progression to reduce fall risk and maximize level of functional independence  Goals   Patient Goals to get stronger   STG Expiration Date 04/20/21   Short Term Goal #1 pt will: Increase bilateral LE strength 1/2 grade to facilitate independent mobility, Perform all bed mobility tasks independently to decrease fall risk factors, Perform all transfers independently to improve independence, Ambulate 250 ft  with least restrictive assistive device w/ supervision w/o LOB, Navigate 15 stairs w/ supervision with unilateral handrail to facilitate return to previous living environment and Increase all balance 1/2 grade to decrease risk for falls   PT Treatment Day 0   Plan   Treatment/Interventions Functional transfer training;LE strengthening/ROM; Therapeutic exercise; Endurance training;Cognitive reorientation;Patient/family training;Equipment eval/education; Bed mobility;Gait training;Spoke to nursing;Spoke to case management   PT Frequency   (4-6x/wk)   Recommendation   PT Discharge Recommendation Other (Comment)  (rehab vs  HHPT pending patient progression)   Equipment Recommended Walker   AM-PAC Basic Mobility Inpatient   Turning in Bed Without Bedrails 2   Lying on Back to Sitting on Edge of Flat Bed 2   Moving Bed to Chair 2   Standing Up From Chair 3   Walk in Room 2   Climb 3-5 Stairs 2   Basic Mobility Inpatient Raw Score 13   Basic Mobility Standardized Score 33 99       Skilled PT recommended while in hospital and upon DC to progress pt toward treatment goals  The patient's AM-PAC Basic Mobility Inpatient Short Form Raw Score is 13, Standardized Score is 33 99  A standardized score less than 42 9 suggests the patient may benefit from discharge to post-acute rehabilitation services  However anticipate large improvements in functional mobility when decreased limitations of Swanz Catheter and acutely post-operation  Please also refer to the recommendation of the Physical Therapist for safe discharge planning      Damian Romo, PT, DPT 4/10/2021

## 2021-04-11 ENCOUNTER — APPOINTMENT (INPATIENT)
Dept: RADIOLOGY | Facility: HOSPITAL | Age: 56
DRG: 235 | End: 2021-04-11
Payer: COMMERCIAL

## 2021-04-11 LAB
ANION GAP SERPL CALCULATED.3IONS-SCNC: 5 MMOL/L (ref 4–13)
BASE EX.OXY STD BLDV CALC-SCNC: 41.3 % (ref 60–80)
BASE EX.OXY STD BLDV CALC-SCNC: 51.5 % (ref 60–80)
BASE EXCESS BLDA CALC-SCNC: -2 MMOL/L
BASE EXCESS BLDV CALC-SCNC: -0.7 MMOL/L
BASE EXCESS BLDV CALC-SCNC: 3.4 MMOL/L
BUN SERPL-MCNC: 20 MG/DL (ref 5–25)
CALCIUM SERPL-MCNC: 7.9 MG/DL (ref 8.3–10.1)
CHLORIDE SERPL-SCNC: 110 MMOL/L (ref 100–108)
CO2 SERPL-SCNC: 24 MMOL/L (ref 21–32)
CREAT SERPL-MCNC: 1.07 MG/DL (ref 0.6–1.3)
ERYTHROCYTE [DISTWIDTH] IN BLOOD BY AUTOMATED COUNT: 13.9 % (ref 11.6–15.1)
GFR SERPL CREATININE-BSD FRML MDRD: 77 ML/MIN/1.73SQ M
GLUCOSE SERPL-MCNC: 109 MG/DL (ref 65–140)
GLUCOSE SERPL-MCNC: 119 MG/DL (ref 65–140)
GLUCOSE SERPL-MCNC: 130 MG/DL (ref 65–140)
GLUCOSE SERPL-MCNC: 139 MG/DL (ref 65–140)
GLUCOSE SERPL-MCNC: 144 MG/DL (ref 65–140)
GLUCOSE SERPL-MCNC: 149 MG/DL (ref 65–140)
GLUCOSE SERPL-MCNC: 150 MG/DL (ref 65–140)
GLUCOSE SERPL-MCNC: 154 MG/DL (ref 65–140)
GLUCOSE SERPL-MCNC: 165 MG/DL (ref 65–140)
GLUCOSE SERPL-MCNC: 171 MG/DL (ref 65–140)
GLUCOSE SERPL-MCNC: 182 MG/DL (ref 65–140)
GLUCOSE SERPL-MCNC: 190 MG/DL (ref 65–140)
GLUCOSE SERPL-MCNC: 220 MG/DL (ref 65–140)
HCO3 BLDA-SCNC: 21.8 MMOL/L (ref 22–28)
HCO3 BLDV-SCNC: 24.3 MMOL/L (ref 24–30)
HCO3 BLDV-SCNC: 28.7 MMOL/L (ref 24–30)
HCT VFR BLD AUTO: 30.8 % (ref 36.5–49.3)
HGB BLD-MCNC: 10.3 G/DL (ref 12–17)
MAGNESIUM SERPL-MCNC: 2 MG/DL (ref 1.6–2.6)
MCH RBC QN AUTO: 27.8 PG (ref 26.8–34.3)
MCHC RBC AUTO-ENTMCNC: 33.4 G/DL (ref 31.4–37.4)
MCV RBC AUTO: 83 FL (ref 82–98)
NASAL CANNULA: 15
NASAL CANNULA: 15
NASAL CANNULA: 4
NON VENT TYPE- NRB: 100
NON VENT TYPE- NRB: 100
O2 CT BLDA-SCNC: 14.5 ML/DL (ref 16–23)
O2 CT BLDV-SCNC: 6.3 ML/DL
O2 CT BLDV-SCNC: 7 ML/DL
OXYHGB MFR BLDA: 90.8 % (ref 94–97)
PCO2 BLDA: 34.3 MM HG (ref 36–44)
PCO2 BLDV: 41.1 MM HG (ref 42–50)
PCO2 BLDV: 46.8 MM HG (ref 42–50)
PH BLDA: 7.42 [PH] (ref 7.35–7.45)
PH BLDV: 7.39 [PH] (ref 7.3–7.4)
PH BLDV: 7.41 [PH] (ref 7.3–7.4)
PLATELET # BLD AUTO: 85 THOUSANDS/UL (ref 149–390)
PMV BLD AUTO: 11.5 FL (ref 8.9–12.7)
PO2 BLDA: 61.9 MM HG (ref 75–129)
PO2 BLDV: 23.9 MM HG (ref 35–45)
PO2 BLDV: 28.7 MM HG (ref 35–45)
POTASSIUM SERPL-SCNC: 4.1 MMOL/L (ref 3.5–5.3)
RBC # BLD AUTO: 3.7 MILLION/UL (ref 3.88–5.62)
SODIUM SERPL-SCNC: 139 MMOL/L (ref 136–145)
SPECIMEN SOURCE: ABNORMAL
WBC # BLD AUTO: 14.43 THOUSAND/UL (ref 4.31–10.16)

## 2021-04-11 PROCEDURE — 71045 X-RAY EXAM CHEST 1 VIEW: CPT

## 2021-04-11 PROCEDURE — 82805 BLOOD GASES W/O2 SATURATION: CPT | Performed by: PHYSICIAN ASSISTANT

## 2021-04-11 PROCEDURE — 94002 VENT MGMT INPAT INIT DAY: CPT

## 2021-04-11 PROCEDURE — 99232 SBSQ HOSP IP/OBS MODERATE 35: CPT | Performed by: INTERNAL MEDICINE

## 2021-04-11 PROCEDURE — 94760 N-INVAS EAR/PLS OXIMETRY 1: CPT

## 2021-04-11 PROCEDURE — NC001 PR NO CHARGE: Performed by: INTERNAL MEDICINE

## 2021-04-11 PROCEDURE — 99291 CRITICAL CARE FIRST HOUR: CPT | Performed by: PHYSICIAN ASSISTANT

## 2021-04-11 PROCEDURE — 82948 REAGENT STRIP/BLOOD GLUCOSE: CPT

## 2021-04-11 PROCEDURE — 85027 COMPLETE CBC AUTOMATED: CPT | Performed by: PHYSICIAN ASSISTANT

## 2021-04-11 PROCEDURE — 80048 BASIC METABOLIC PNL TOTAL CA: CPT | Performed by: PHYSICIAN ASSISTANT

## 2021-04-11 PROCEDURE — 83735 ASSAY OF MAGNESIUM: CPT | Performed by: PHYSICIAN ASSISTANT

## 2021-04-11 PROCEDURE — 99024 POSTOP FOLLOW-UP VISIT: CPT | Performed by: THORACIC SURGERY (CARDIOTHORACIC VASCULAR SURGERY)

## 2021-04-11 RX ORDER — LISINOPRIL 2.5 MG/1
2.5 TABLET ORAL DAILY
Status: DISCONTINUED | OUTPATIENT
Start: 2021-04-11 | End: 2021-04-12

## 2021-04-11 RX ORDER — ALBUMIN, HUMAN INJ 5% 5 %
12.5 SOLUTION INTRAVENOUS ONCE
Status: DISCONTINUED | OUTPATIENT
Start: 2021-04-11 | End: 2021-04-11

## 2021-04-11 RX ORDER — KETOROLAC TROMETHAMINE 30 MG/ML
15 INJECTION, SOLUTION INTRAMUSCULAR; INTRAVENOUS ONCE
Status: COMPLETED | OUTPATIENT
Start: 2021-04-11 | End: 2021-04-11

## 2021-04-11 RX ORDER — ALBUMIN, HUMAN INJ 5% 5 %
SOLUTION INTRAVENOUS
Status: COMPLETED
Start: 2021-04-11 | End: 2021-04-11

## 2021-04-11 RX ORDER — FUROSEMIDE 10 MG/ML
40 INJECTION INTRAMUSCULAR; INTRAVENOUS ONCE
Status: COMPLETED | OUTPATIENT
Start: 2021-04-11 | End: 2021-04-11

## 2021-04-11 RX ORDER — PHENYLEPHRINE HYDROCHLORIDE 10 MG/ML
INJECTION INTRAVENOUS
Status: DISPENSED
Start: 2021-04-11 | End: 2021-04-12

## 2021-04-11 RX ORDER — ALBUMIN, HUMAN INJ 5% 5 %
12.5 SOLUTION INTRAVENOUS ONCE
Status: COMPLETED | OUTPATIENT
Start: 2021-04-11 | End: 2021-04-11

## 2021-04-11 RX ORDER — FUROSEMIDE 10 MG/ML
40 INJECTION INTRAMUSCULAR; INTRAVENOUS
Status: DISCONTINUED | OUTPATIENT
Start: 2021-04-11 | End: 2021-04-14

## 2021-04-11 RX ADMIN — ALBUMIN (HUMAN) 12.5 G: 12.5 INJECTION, SOLUTION INTRAVENOUS at 22:39

## 2021-04-11 RX ADMIN — NICARDIPINE HYDROCHLORIDE 2 MG/HR: 2.5 INJECTION, SOLUTION INTRAVENOUS at 16:05

## 2021-04-11 RX ADMIN — FUROSEMIDE 40 MG: 10 INJECTION, SOLUTION INTRAMUSCULAR; INTRAVENOUS at 16:58

## 2021-04-11 RX ADMIN — SODIUM CHLORIDE 20 ML/HR: 0.45 INJECTION, SOLUTION INTRAVENOUS at 19:00

## 2021-04-11 RX ADMIN — Medication 12.5 MG: at 20:50

## 2021-04-11 RX ADMIN — POLYETHYLENE GLYCOL 3350 17 G: 17 POWDER, FOR SOLUTION ORAL at 08:58

## 2021-04-11 RX ADMIN — ACETAMINOPHEN 975 MG: 325 TABLET ORAL at 21:19

## 2021-04-11 RX ADMIN — MUPIROCIN 1 APPLICATION: 20 OINTMENT TOPICAL at 20:50

## 2021-04-11 RX ADMIN — CHLORHEXIDINE GLUCONATE 0.12% ORAL RINSE 15 ML: 1.2 LIQUID ORAL at 17:01

## 2021-04-11 RX ADMIN — DOCUSATE SODIUM 100 MG: 100 CAPSULE, LIQUID FILLED ORAL at 08:58

## 2021-04-11 RX ADMIN — KETOROLAC TROMETHAMINE 15 MG: 30 INJECTION, SOLUTION INTRAMUSCULAR at 05:19

## 2021-04-11 RX ADMIN — FUROSEMIDE 40 MG: 10 INJECTION, SOLUTION INTRAVENOUS at 13:29

## 2021-04-11 RX ADMIN — POTASSIUM CHLORIDE 20 MEQ: 1500 TABLET, EXTENDED RELEASE ORAL at 08:58

## 2021-04-11 RX ADMIN — NICARDIPINE HYDROCHLORIDE 4 MG/HR: 2.5 INJECTION, SOLUTION INTRAVENOUS at 18:24

## 2021-04-11 RX ADMIN — TEMAZEPAM 15 MG: 15 CAPSULE ORAL at 20:50

## 2021-04-11 RX ADMIN — NOREPINEPHRINE BITARTRATE 3 MCG/MIN: 1 INJECTION, SOLUTION, CONCENTRATE INTRAVENOUS at 23:53

## 2021-04-11 RX ADMIN — NICARDIPINE HYDROCHLORIDE 2 MG/HR: 2.5 INJECTION, SOLUTION INTRAVENOUS at 01:30

## 2021-04-11 RX ADMIN — AMIODARONE HYDROCHLORIDE 200 MG: 200 TABLET ORAL at 05:52

## 2021-04-11 RX ADMIN — Medication 12.5 MG: at 10:05

## 2021-04-11 RX ADMIN — FUROSEMIDE 40 MG: 10 INJECTION, SOLUTION INTRAMUSCULAR; INTRAVENOUS at 05:19

## 2021-04-11 RX ADMIN — PHENYLEPHRINE HYDROCHLORIDE 40 MCG/MIN: 10 INJECTION INTRAVENOUS at 23:10

## 2021-04-11 RX ADMIN — DOCUSATE SODIUM 100 MG: 100 CAPSULE, LIQUID FILLED ORAL at 17:01

## 2021-04-11 RX ADMIN — HYDROMORPHONE HYDROCHLORIDE 0.5 MG: 1 INJECTION, SOLUTION INTRAMUSCULAR; INTRAVENOUS; SUBCUTANEOUS at 21:20

## 2021-04-11 RX ADMIN — AMIODARONE HYDROCHLORIDE 200 MG: 200 TABLET ORAL at 13:29

## 2021-04-11 RX ADMIN — MUPIROCIN 1 APPLICATION: 20 OINTMENT TOPICAL at 08:59

## 2021-04-11 RX ADMIN — PANTOPRAZOLE SODIUM 40 MG: 40 TABLET, DELAYED RELEASE ORAL at 06:02

## 2021-04-11 RX ADMIN — AMIODARONE HYDROCHLORIDE 200 MG: 200 TABLET ORAL at 21:19

## 2021-04-11 RX ADMIN — OXYCODONE HYDROCHLORIDE 10 MG: 10 TABLET ORAL at 04:30

## 2021-04-11 RX ADMIN — ALBUMIN, HUMAN INJ 5% 12.5 G: 5 SOLUTION at 22:39

## 2021-04-11 RX ADMIN — OXYCODONE HYDROCHLORIDE 10 MG: 10 TABLET ORAL at 20:50

## 2021-04-11 RX ADMIN — MILRINONE LACTATE IN DEXTROSE 0.13 MCG/KG/MIN: 200 INJECTION, SOLUTION INTRAVENOUS at 15:03

## 2021-04-11 RX ADMIN — HYDROMORPHONE HYDROCHLORIDE 0.5 MG: 1 INJECTION, SOLUTION INTRAMUSCULAR; INTRAVENOUS; SUBCUTANEOUS at 18:45

## 2021-04-11 RX ADMIN — FONDAPARINUX SODIUM 2.5 MG: 2.5 INJECTION, SOLUTION SUBCUTANEOUS at 09:01

## 2021-04-11 RX ADMIN — ATORVASTATIN CALCIUM 80 MG: 80 TABLET, FILM COATED ORAL at 17:01

## 2021-04-11 RX ADMIN — CHLORHEXIDINE GLUCONATE 0.12% ORAL RINSE 15 ML: 1.2 LIQUID ORAL at 09:02

## 2021-04-11 RX ADMIN — HYDROMORPHONE HYDROCHLORIDE 0.5 MG: 1 INJECTION, SOLUTION INTRAMUSCULAR; INTRAVENOUS; SUBCUTANEOUS at 04:48

## 2021-04-11 RX ADMIN — ACETAMINOPHEN 975 MG: 325 TABLET ORAL at 05:52

## 2021-04-11 RX ADMIN — OXYCODONE HYDROCHLORIDE 10 MG: 10 TABLET ORAL at 17:01

## 2021-04-11 RX ADMIN — LISINOPRIL 2.5 MG: 2.5 TABLET ORAL at 12:16

## 2021-04-11 RX ADMIN — ASPIRIN 325 MG ORAL TABLET 325 MG: 325 PILL ORAL at 08:58
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
## 2021-04-11 NOTE — PROCEDURES
Procedure: Epicardial Wire Removal    04/11/21    Patient was returned to bed  EPW x 2 d/c'd in typical fashion  No immediate complications  Site dressed with dry sterile dressing  Patient and nurse aware of mandatory 1 hour bedrest protocol  Vital signs ordered Q 15 minutes for one hour as per protocol      Rhys Lemus PA-C

## 2021-04-11 NOTE — PROGRESS NOTES
Progress Note - Richard Mcbride 64 y o  male MRN: [id number]    Unit/Bed#: [facility] 417-01 Encounter: [id number]      CC: diabetes f/u    Subjective:   Richard Mcbride is a 64y o  year old male with prediabetes and CAD who underwent CABG, postoperative day 2  He remains on Milrinone and IV insulin infusion  He had hypoxia and is requiring high-flow nasal cannula  He reports good appetite  Objective:     Vitals: Blood pressure 102/53, pulse 72, temperature 99 9 °F (37 7 °C), resp  rate 17, weight 75 4 kg (166 lb 3 6 oz), SpO2 99 %  ,Body mass index is 27 66 kg/m²  Intake/Output Summary (Last 24 hours) at 4/11/2021 0837  Last data filed at 4/11/2021 0554  Gross per 24 hour   Intake 3038 7 ml   Output 2490 ml   Net 548 7 ml       Physical Exam:  General Appearance: awake, appears stated age and cooperative  Head: Normocephalic, without obvious abnormality, atraumatic  Extremities: moves all extremities  Skin: Skin color and temperature normal    Pulm: no labored breathing    Lab, Imaging and other studies: I have personally reviewed pertinent reports  POC Glucose (mg/dl)   Date Value   04/11/2021 144 (H)   04/11/2021 139   04/11/2021 130   04/11/2021 109   04/10/2021 105   04/10/2021 134   04/10/2021 181 (H)   04/10/2021 134   04/10/2021 121   04/10/2021 109       Assessment/ Plan:    Prediabetes:  A1C 6 5%  Currently on IV insulin infusion  We recommend continuing IV infusion for today as patient is still requiring high flow nasal cannula and Milrinone  If postprandial blood glucose are high, will add lispro 2 units t i d  AC  Will reassess tomorrow and switch to subcutaneous insulin  CAD status post CABG: management per Cardiovascular surgery  Portions of the record may have been created with voice recognition software

## 2021-04-11 NOTE — PROGRESS NOTES
Heart Failure/ Pulmonary Hypertension Progress Note - Yanet Long 64 y o  male MRN: [id number]    Unit/Bed#: [facility] 417-01 Encounter: [id number]      Assessment:    Principal Problem:    NSTEMI (non-ST elevated myocardial infarction) [facility])  Active Problems:    Chest pain    Hypertensive urgency    Hyperglycemia    Tobacco abuse    Thrombocytopenia (HCC)    S/P CABG x 3      Objective: Intake/ Output: 3302/2590: +712- multiple albumin  Weight: bed scale  Tele:     # CAD- POD 1, CABG x 3  Rx; asa 325 mg, atorvastatin 80 mg     [place]:  SVO2 4/11/21: 41% (pO2 91%)  HgB 10    Studies- personally reviewed by [facility] 4/6/21: Ostial % occlusion and distal LAD fills from right-to-left and left-to-left collateral  Mid OM1 90% stenosis  Distal circumflex 85 % stenosis  Proximal % occluded and distally it fills from right to right collaterals    Echocardiogram 4/6/21  LVEF: 55%, hypo of mid anterior and apical anterior walls, grade 2 D trachD  LVIDd:  RV: normal  MR: mild    # dyslipidemia- atorvastatin 80 mg daily  # afib prophylaxis- amiodarone 200 mg TID  # HTN, low post op  # tobacco use    Plan:  Lasix 40 mg IV BID  Continue metoprolol 12 5 mg BID  Recommend SVR reduction over increasing metoprolol to help wean milrinone  Can add captopril 6 25 mg TID take    Review of Systems   Constitutional: Negative for activity change, appetite change, fatigue and unexpected weight change  HENT: Negative for congestion and nosebleeds  Eyes: Negative  Respiratory: Negative for cough, chest tightness and shortness of breath  Cardiovascular: Positive for chest pain  Negative for palpitations and leg swelling  Gastrointestinal: Negative for abdominal distention  Endocrine: Negative  Genitourinary: Negative  Musculoskeletal: Negative  Skin: Negative  Neurological: Negative for dizziness, syncope and weakness  Hematological: Negative  Psychiatric/Behavioral: Negative           [employer] (day, reason): [place]ax catheter (day, reason):    Vitals: Blood pressure 102/53, pulse 72, temperature 99 9 °F (37 7 °C), resp  rate 17, weight 75 4 kg (166 lb 3 6 oz), SpO2 99 %  , Body mass index is 27 66 kg/m² , I/O last 3 completed shifts: In: 4460 9 [P O :1360; I V :1600 9; IV Piggyback:1500]  Out: 3700 [Urine:2840; Chest Tube:860]  No intake/output data recorded  Wt Readings from Last 3 Encounters:   04/11/21 75 4 kg (166 lb 3 6 oz)   04/06/21 70 4 kg (155 lb 3 3 oz)   04/06/21 70 3 kg (155 lb)       Intake/Output Summary (Last 24 hours) at 4/11/2021 0839  Last data filed at 4/11/2021 0554  Gross per 24 hour   Intake 3038 7 ml   Output 2490 ml   Net 548 7 ml     I/O last 3 completed shifts: In: 4460 9 [P O :1360; I V :1600 9; IV Piggyback:1500]  Out: 36 [Urine:2840; Chest Tube:860]    No significant arrhythmias seen on telemetry review         Physical Exam:  Vitals:    04/11/21 0553 04/11/21 0600 04/11/21 0615 04/11/21 0834   BP:  102/53     BP Location:       Pulse:  74 72    Resp:  18 17    Temp:  99 9 °F (37 7 °C)     TempSrc:       SpO2:  96% 98% 99%   Weight: 75 4 kg (166 lb 3 6 oz)          GEN: Teri Blight appears well, alert and oriented x 3, pleasant and cooperative   HEENT: pupils equal, round, and reactive to light; extraocular muscles intact  NECK: supple, no carotid bruits   HEART: regular rhythm, normal S1 and S2, no murmurs, clicks, gallops or rubs, JVP is    LUNGS: clear to auscultation bilaterally; no wheezes, rales, or rhonchi   ABDOMEN: normal bowel sounds, soft, no tenderness, no distention  EXTREMITIES: peripheral pulses normal; no clubbing, cyanosis, or edema  NEURO: no focal findings   SKIN: normal without suspicious lesions on exposed skin      Current Facility-Administered Medications:     acetaminophen (TYLENOL) tablet 975 mg, 975 mg, Oral, Q8H, Jenn Olson PA-C, 975 mg at 04/11/21 0552    albumin human (FLEXBUMIN) 5 % injection 12 5 g, 12 5 g, Intravenous, Once, [employer], SNEHAL, Stopped at 04/09/21 1800    amiodarone tablet 200 mg, 200 mg, Oral, Q8H [address], Merline Doles, PA-C, 200 mg at 04/11/21 2474    aspirin tablet 325 mg, 325 mg, Oral, Daily, Merline Doles, PA-C, 325 mg at 04/10/21 0363    atorvastatin (LIPITOR) tablet 80 mg, 80 mg, Oral, Daily With Chito Murray PA-C, 80 mg at 04/10/21 1709    bisacodyl (DULCOLAX) rectal suppository 10 mg, 10 mg, Rectal, Daily PRN, Merline Doles, PA-C    chlorhexidine (PERIDEX) 0 12 % oral rinse 15 mL, 15 mL, Mouth/Throat, BID, Jenn Olson PA-C, 15 mL at 04/10/21 1709    docusate sodium (COLACE) capsule 100 mg, 100 mg, Oral, BID, Cyril Medina PA-C, 100 mg at 04/10/21 1709    fondaparinux (ARIXTRA) subcutaneous injection 2 5 mg, 2 5 mg, Subcutaneous, Daily, Merline Doles, PA-C, 2 5 mg at 04/10/21 3539    furosemide (LASIX) injection 40 mg, 40 mg, Intravenous, BID (diuretic), Ravi Byrnes PA-C, 40 mg at 04/11/21 0519    HYDROmorphone (DILAUDID) injection 0 5 mg, 0 5 mg, Intravenous, Q2H PRN, Nae Paris DO, 0 5 mg at 04/11/21 0448    insulin regular (HumuLIN R,NovoLIN R) 1 Units/mL in sodium chloride 0 9 % 100 mL infusion, 0 3-21 Units/hr, Intravenous, Titrated, Merline Doles, PA-C, Last Rate: 1 mL/hr at 04/11/21 0554, 1 Units/hr at 04/11/21 0554    lidocaine (cardiac) injection 100 mg, 100 mg, Intravenous, Q30 Min PRN, Merline Doles, PA-C    metoprolol tartrate (LOPRESSOR) partial tablet 12 5 mg, 12 5 mg, Oral, Q12H [address], Aaron Medina PA-C, 12 5 mg at 04/10/21 2124    milrinone (PRIMACOR) 20 mg in 100 mL infusion (premix), 0 25 mcg/kg/min, Intravenous, Continuous, Ravi Byrnes PA-C, Last Rate: 5 3 mL/hr at 04/10/21 2000, 0 25 mcg/kg/min at 04/10/21 2000    mupirocin (BACTROBAN) 2 % nasal ointment 1 application, 1 application, Nasal, Q88X [address], Merline Doles, PA-C, 1 application at 84/41/41 2124    niCARdipine (CARDENE) 25 mg (STANDARD CONCENTRATION) in sodium chloride 0 9% 250 mL, 1-15 mg/hr, Intravenous, Titrated, Elisa Burrell PA-C, Stopped at 04/11/21 0615    ondansetron (ZOFRAN) injection 4 mg, 4 mg, Intravenous, Q6H PRN, Emma Engel PA-C    oxyCODONE (ROXICODONE) immediate release tablet 10 mg, 10 mg, Oral, Q4H PRN, Elisa Burrell PA-C, 10 mg at 04/11/21 0430    oxyCODONE (ROXICODONE) IR tablet 5 mg, 5 mg, Oral, Q4H PRN, Elisa Burrell PA-C, 5 mg at 04/10/21 1410    pantoprazole (PROTONIX) EC tablet 40 mg, 40 mg, Oral, Daily Before Breakfast, Jenn Olson PA-C, 40 mg at 04/11/21 0602    polyethylene glycol (MIRALAX) packet 17 g, 17 g, Oral, Daily, Emma Engel PA-C, 17 g at 04/10/21 9905    potassium chloride (K-DUR,KLOR-CON) CR tablet 20 mEq, 20 mEq, Oral, Daily, Jacob Luzjoseluis Medina PA-C, 20 mEq at 04/10/21 1112    sodium chloride infusion 0 45 %, 20 mL/hr, Intravenous, Continuous, Emma Engel PA-C, Last Rate: 20 mL/hr at 04/09/21 1427, 20 mL/hr at 04/09/21 1427    temazepam (RESTORIL) capsule 15 mg, 15 mg, Oral, HS PRN, Chely Wolfe PA-C, 15 mg at 04/10/21 2124      Labs & Results:    Results from last 7 days   Lab Units 04/07/21  2112 04/06/21  2146 04/06/21  1900   TROPONIN I ng/mL 5 60* 16 28* 20 19*     Results from last 7 days   Lab Units 04/11/21  0426 04/10/21  0436 04/09/21  2252  04/09/21  1405  04/09/21  0529   WBC Thousand/uL 14 43* 9 50  --   --   --   --  7 27   HEMOGLOBIN g/dL 10 3* 9 8* 10 0*  --  12 4  --  12 0   I STAT HEMOGLOBIN   --   --   --    < >  --    < >  --    HEMATOCRIT % 30 8* 29 6* 29 7*  --  37 7  --  36 5   HEMATOCRIT, ISTAT   --   --   --    < >  --    < >  --    PLATELETS Thousands/uL 85* 74*  --   --  143*   < > 133*    < > = values in this interval not displayed           Results from last 7 days   Lab Units 04/11/21  0426 04/10/21  2131 04/10/21  0436  04/09/21  1442 04/09/21  1405 04/09/21  1330 04/09/21  1201  04/08/21  0537 04/07/21  0552 04/06/21  1115   POTASSIUM mmol/L 4 1 4 2 4 3   < >  --  3 7  --   --    < > 3 4* 3 8 4 0   CHLORIDE mmol/L 110* 112* 112* --   --  112*  --   --    < > 107 103 100   CO2 mmol/L 24 24 24  --   --  21  --   --    < > 28 28 31   CO2, I-STAT mmol/L  --   --   --   --  22  --  23 27   < >  --   --   --    BUN mg/dL 20 22 16  --   --  18  --   --    < > 17 19 17   CREATININE mg/dL 1 07 1 07 0 94  --   --  1 10  --   --    < > 1 04 1 11 1 04   CALCIUM mg/dL 7 9* 8 0* 7 7*  --   --  7 8*  --   --    < > 8 6 9 3 12 4*   ALK PHOS U/L  --   --   --   --   --   --   --   --   --  91 96 109   ALT U/L  --   --   --   --   --  76  --   --   --  37 36 42   AST U/L  --   --   --   --   --   --   --   --   --  31 61* 73*   GLUCOSE, ISTAT mg/dl  --   --   --   --  145*  --  179* 224*   < >  --   --   --     < > = values in this interval not displayed  Results from last 7 days   Lab Units 04/06/21  1221   INR  1 13         Counseling / Coordination of Care  Total floor / unit time spent today 25 minutes  Greater than 50% of total time was spent with the patient and / or family counseling and / or coordination of care  A description of the counseling / coordination of care: 15      Thank you for the opportunity to participate in the care of this patient    [address] [facility] PULMONARY HYPERTENSION  MEDICAL DIRECTOR OF [facility] [place]

## 2021-04-11 NOTE — PROGRESS NOTES
Progress Note - Cardiothoracic Surgery   Yoshi Strange 64 y o  male MRN: [id number]  Unit/Bed#: [facility] 417-01 Encounter: [id number]      POD # 2 s/p CABG x3    Pt seen/examined  Interval history and data reviewed with critical care team   Pt doing well  No specific complaints    Low dose milrinone  Was on cardene for afterload reduction now off      Medications:   Scheduled Meds:  Current Facility-Administered Medications   Medication Dose Route Frequency Provider Last Rate    acetaminophen  975 mg [facility]      amiodarone  200 mg Oral [facility] [place]      aspirin  325 mg Oral Daily Fredericka Apgar, [place]      atorvastatin  80 mg Oral Daily With SNEHAL Roa      bisacodyl  10 mg Rectal Daily PRN Fredericka Apgar, PA-C      chlorhexidine  15 mL Mouth/Throat BID Fredericka Apgar, PA-C      docusate sodium  100 mg Oral BID Manuel Medina PA-C      fondaparinux  2 5 mg Subcutaneous Daily Jenn Olson PA-C      furosemide  40 mg Intravenous BID (diuretic) Maria Elena Coello PA-C      HYDROmorphone  0 5 mg Intravenous Q2H PRN Meera Ivy,       insulin regular (HumuLIN R,NovoLIN R) infusion  0 3-21 Units/hr Intravenous Titrated Fredericka Apgar, PA-C 1 Units/hr (21 0554)    lidocaine (cardiac)  100 mg Intravenous Q30 Min PRN Fredericka Apgar, PA-C      lisinopril  2 5 mg Oral Daily Cyril Medina PA-C      metoprolol tartrate  12 5 mg Oral Q12H [address] Cyril Medina PA-C      milrinone [facility]) infusion  0 13 mcg/kg/min Intravenous Continuous Manuel Medina PA-C 0 13 mcg/kg/min (21 1047)    mupirocin  1 application Nasal K21U [address] Fredericka Apgar, PA-C      ondansetron  4 mg Intravenous Q6H PRN Fredericka Apgar, PA-C      oxyCODONE  10 mg Oral Q4H PRN Maria Elena Coello PA-C      oxyCODONE  5 mg Oral Q4H PRN Maria Elena Coello PA-C      pantoprazole  40 mg Oral Daily Before Breakfast Fredericka Apgar, PA-C      polyethylene glycol  17 g Oral Daily Fredericka Apgar, PA-C      potassium chloride  20 mEq Oral Daily Amber Hood [place], [place]      sodium chloride  20 mL/hr Intravenous Continuous Daiana Leslie PA-C 20 mL/hr (04/09/21 1427)    temazepam  15 mg Oral HS PRN Amber Medina PA-C       Continuous Infusions:insulin regular (HumuLIN R,NovoLIN R) infusion, 0 3-21 Units/hr, Last Rate: 1 Units/hr (04/11/21 0554)  milrinone (PRIMACOR) infusion, 0 13 mcg/kg/min, Last Rate: 0 13 mcg/kg/min (04/11/21 1047)  sodium chloride, 20 mL/hr, Last Rate: 20 mL/hr (04/09/21 1427)      PRN Meds: bisacodyl    HYDROmorphone    lidocaine (cardiac)    ondansetron    oxyCODONE    oxyCODONE    temazepam    Vitals: Blood pressure 94/54, pulse 66, temperature 98 8 °F (37 1 °C), resp  rate (!) 43, weight 75 4 kg (166 lb 3 6 oz), SpO2 97 %  ,Body mass index is 27 66 kg/m²  I/O last 24 hours: In: 3639 1 [P O :1340; I V :1249 1; IV Piggyback:1050]  Out: 6147 [Urine:2355; Chest Tube:510]  Invasive Devices     Central Venous Catheter Line            CVC Central Lines 04/09/21 Triple 2 days    Introducer 04/09/21 2 days          Arterial Line            Arterial Line 04/09/21 Right Radial 2 days          Line            Pacer Wires 1 day    Pacer Wires 1 day          Drain            Urethral Catheter Non-latex; Temperature probe 16 Fr  2 days    Chest Tube 1 Posterior Mediastinal 24 Fr  1 day    Chest Tube 2 Anterior Mediastinal 32 Fr  1 day    Chest Tube 3 Left Pleural 32 Fr  1 day                  Lab, Imaging and other studies:   Results from last 7 days   Lab Units 04/11/21  0426 04/10/21  0436 04/09/21  2252  04/09/21  1405  04/09/21  0529   WBC Thousand/uL 14 43* 9 50  --   --   --   --  7 27   HEMOGLOBIN g/dL 10 3* 9 8* 10 0*  --  12 4  --  12 0   I STAT HEMOGLOBIN   --   --   --    < >  --    < >  --    HEMATOCRIT % 30 8* 29 6* 29 7*  --  37 7  --  36 5   HEMATOCRIT, ISTAT   --   --   --    < >  --    < >  --    PLATELETS Thousands/uL 85* 74*  --   --  143*   < > 133*    < > = values in this interval not displayed  Results from last 7 days   Lab Units 04/11/21  0426 04/10/21  2131 04/10/21  0436  04/09/21  1442   POTASSIUM mmol/L 4 1 4 2 4 3   < >  --    CHLORIDE mmol/L 110* 112* 112*  --   --    CO2 mmol/L 24 24 24  --   --    CO2, I-STAT mmol/L  --   --   --   --  22   BUN mg/dL 20 22 16  --   --    CREATININE mg/dL 1 07 1 07 0 94  --   --    GLUCOSE, ISTAT mg/dl  --   --   --   --  145*   CALCIUM mg/dL 7 9* 8 0* 7 7*  --   --     < > = values in this interval not displayed  Results from last 7 days   Lab Units 04/09/21  0529 04/08/21  1848 04/08/21  1311  04/06/21  1221   INR   --   --   --   --  1 13   PTT seconds 40* 68* 74*   < > 32    < > = values in this interval not displayed  Recent Labs     04/11/21  0501   PHART 7 422   [id number] 21 8*   PO2ART 61 9*   [id number] 34 3*   BEART -2 0           Plan:    Wean milrinone off and de-line  Continue chest tubes, remove pacing wires  OOBTC  Incentive spirometry  Diuresis  PO ASA/Statin/B blocker    Low dose lisinopril  ICU        SIGNATURE: Jimmy Lau DO  DATE: April 11, 2021  TIME: 11:26 AM

## 2021-04-12 ENCOUNTER — APPOINTMENT (INPATIENT)
Dept: NON INVASIVE DIAGNOSTICS | Facility: HOSPITAL | Age: 56
DRG: 235 | End: 2021-04-12
Payer: COMMERCIAL

## 2021-04-12 LAB
ABO GROUP BLD BPU: NORMAL
ANION GAP SERPL CALCULATED.3IONS-SCNC: 5 MMOL/L (ref 4–13)
ANION GAP SERPL CALCULATED.3IONS-SCNC: 5 MMOL/L (ref 4–13)
ATRIAL RATE: 57 BPM
ATRIAL RATE: 62 BPM
ATRIAL RATE: 67 BPM
BASE EX.OXY STD BLDV CALC-SCNC: 51.7 % (ref 60–80)
BASE EX.OXY STD BLDV CALC-SCNC: 54.9 % (ref 60–80)
BASE EXCESS BLDV CALC-SCNC: 0.8 MMOL/L
BASE EXCESS BLDV CALC-SCNC: 2.3 MMOL/L
BASOPHILS # BLD AUTO: 0.03 THOUSANDS/ΜL (ref 0–0.1)
BASOPHILS NFR BLD AUTO: 0 % (ref 0–1)
BPU ID: NORMAL
BUN SERPL-MCNC: 24 MG/DL (ref 5–25)
BUN SERPL-MCNC: 27 MG/DL (ref 5–25)
CA-I BLD-SCNC: 1.05 MMOL/L (ref 1.12–1.32)
CALCIUM SERPL-MCNC: 7.9 MG/DL (ref 8.3–10.1)
CALCIUM SERPL-MCNC: 7.9 MG/DL (ref 8.3–10.1)
CHLORIDE SERPL-SCNC: 105 MMOL/L (ref 100–108)
CHLORIDE SERPL-SCNC: 111 MMOL/L (ref 100–108)
CO2 SERPL-SCNC: 25 MMOL/L (ref 21–32)
CO2 SERPL-SCNC: 27 MMOL/L (ref 21–32)
CREAT SERPL-MCNC: 0.94 MG/DL (ref 0.6–1.3)
CREAT SERPL-MCNC: 1.1 MG/DL (ref 0.6–1.3)
CROSSMATCH: NORMAL
EOSINOPHIL # BLD AUTO: 0.09 THOUSAND/ΜL (ref 0–0.61)
EOSINOPHIL NFR BLD AUTO: 1 % (ref 0–6)
ERYTHROCYTE [DISTWIDTH] IN BLOOD BY AUTOMATED COUNT: 13.8 % (ref 11.6–15.1)
GFR SERPL CREATININE-BSD FRML MDRD: 75 ML/MIN/1.73SQ M
GFR SERPL CREATININE-BSD FRML MDRD: 90 ML/MIN/1.73SQ M
GLUCOSE SERPL-MCNC: 107 MG/DL (ref 65–140)
GLUCOSE SERPL-MCNC: 130 MG/DL (ref 65–140)
GLUCOSE SERPL-MCNC: 130 MG/DL (ref 65–140)
GLUCOSE SERPL-MCNC: 131 MG/DL (ref 65–140)
GLUCOSE SERPL-MCNC: 135 MG/DL (ref 65–140)
GLUCOSE SERPL-MCNC: 136 MG/DL (ref 65–140)
GLUCOSE SERPL-MCNC: 141 MG/DL (ref 65–140)
GLUCOSE SERPL-MCNC: 143 MG/DL (ref 65–140)
GLUCOSE SERPL-MCNC: 145 MG/DL (ref 65–140)
GLUCOSE SERPL-MCNC: 154 MG/DL (ref 65–140)
GLUCOSE SERPL-MCNC: 158 MG/DL (ref 65–140)
GLUCOSE SERPL-MCNC: 165 MG/DL (ref 65–140)
GLUCOSE SERPL-MCNC: 169 MG/DL (ref 65–140)
GLUCOSE SERPL-MCNC: 223 MG/DL (ref 65–140)
GLUCOSE SERPL-MCNC: 74 MG/DL (ref 65–140)
HCO3 BLDV-SCNC: 25.6 MMOL/L (ref 24–30)
HCO3 BLDV-SCNC: 27.6 MMOL/L (ref 24–30)
HCT VFR BLD AUTO: 28.4 % (ref 36.5–49.3)
HGB BLD-MCNC: 9.2 G/DL (ref 12–17)
HGB BLD-MCNC: 9.2 G/DL (ref 12–17)
IMM GRANULOCYTES # BLD AUTO: 0.11 THOUSAND/UL (ref 0–0.2)
IMM GRANULOCYTES NFR BLD AUTO: 1 % (ref 0–2)
LYMPHOCYTES # BLD AUTO: 1.73 THOUSANDS/ΜL (ref 0.6–4.47)
LYMPHOCYTES NFR BLD AUTO: 13 % (ref 14–44)
MAGNESIUM SERPL-MCNC: 1.8 MG/DL (ref 1.6–2.6)
MCH RBC QN AUTO: 27.4 PG (ref 26.8–34.3)
MCHC RBC AUTO-ENTMCNC: 32.4 G/DL (ref 31.4–37.4)
MCV RBC AUTO: 85 FL (ref 82–98)
MONOCYTES # BLD AUTO: 1.05 THOUSAND/ΜL (ref 0.17–1.22)
MONOCYTES NFR BLD AUTO: 8 % (ref 4–12)
NASAL CANNULA: 4
NASAL CANNULA: 4
NEUTROPHILS # BLD AUTO: 9.95 THOUSANDS/ΜL (ref 1.85–7.62)
NEUTS SEG NFR BLD AUTO: 77 % (ref 43–75)
NRBC BLD AUTO-RTO: 0 /100 WBCS
O2 CT BLDV-SCNC: 7.3 ML/DL
O2 CT BLDV-SCNC: 7.9 ML/DL
P AXIS: 64 DEGREES
P AXIS: 68 DEGREES
P AXIS: 72 DEGREES
PCO2 BLDV: 41.9 MM HG (ref 42–50)
PCO2 BLDV: 46.6 MM HG (ref 42–50)
PH BLDV: 7.39 [PH] (ref 7.3–7.4)
PH BLDV: 7.4 [PH] (ref 7.3–7.4)
PLATELET # BLD AUTO: 91 THOUSANDS/UL (ref 149–390)
PMV BLD AUTO: 12.3 FL (ref 8.9–12.7)
PO2 BLDV: 28.3 MM HG (ref 35–45)
PO2 BLDV: 29.8 MM HG (ref 35–45)
POTASSIUM SERPL-SCNC: 3.8 MMOL/L (ref 3.5–5.3)
POTASSIUM SERPL-SCNC: 3.8 MMOL/L (ref 3.5–5.3)
PR INTERVAL: 146 MS
PR INTERVAL: 154 MS
PR INTERVAL: 158 MS
QRS AXIS: 82 DEGREES
QRS AXIS: 86 DEGREES
QRS AXIS: 88 DEGREES
QRSD INTERVAL: 83 MS
QRSD INTERVAL: 88 MS
QRSD INTERVAL: 88 MS
QT INTERVAL: 383 MS
QT INTERVAL: 388 MS
QT INTERVAL: 442 MS
QTC INTERVAL: 389 MS
QTC INTERVAL: 410 MS
QTC INTERVAL: 431 MS
RBC # BLD AUTO: 3.36 MILLION/UL (ref 3.88–5.62)
SODIUM SERPL-SCNC: 137 MMOL/L (ref 136–145)
SODIUM SERPL-SCNC: 141 MMOL/L (ref 136–145)
T WAVE AXIS: 109 DEGREES
T WAVE AXIS: 121 DEGREES
T WAVE AXIS: 26 DEGREES
UNIT DISPENSE STATUS: NORMAL
UNIT PRODUCT CODE: NORMAL
UNIT RH: NORMAL
VENTRICULAR RATE: 57 BPM
VENTRICULAR RATE: 62 BPM
VENTRICULAR RATE: 67 BPM
WBC # BLD AUTO: 12.96 THOUSAND/UL (ref 4.31–10.16)

## 2021-04-12 PROCEDURE — 82948 REAGENT STRIP/BLOOD GLUCOSE: CPT

## 2021-04-12 PROCEDURE — 99024 POSTOP FOLLOW-UP VISIT: CPT | Performed by: THORACIC SURGERY (CARDIOTHORACIC VASCULAR SURGERY)

## 2021-04-12 PROCEDURE — 93325 DOPPLER ECHO COLOR FLOW MAPG: CPT | Performed by: INTERNAL MEDICINE

## 2021-04-12 PROCEDURE — 82805 BLOOD GASES W/O2 SATURATION: CPT | Performed by: PHYSICIAN ASSISTANT

## 2021-04-12 PROCEDURE — 93005 ELECTROCARDIOGRAM TRACING: CPT

## 2021-04-12 PROCEDURE — 94760 N-INVAS EAR/PLS OXIMETRY 1: CPT

## 2021-04-12 PROCEDURE — 97535 SELF CARE MNGMENT TRAINING: CPT

## 2021-04-12 PROCEDURE — NC001 PR NO CHARGE: Performed by: INTERNAL MEDICINE

## 2021-04-12 PROCEDURE — 93321 DOPPLER ECHO F-UP/LMTD STD: CPT | Performed by: INTERNAL MEDICINE

## 2021-04-12 PROCEDURE — 80048 BASIC METABOLIC PNL TOTAL CA: CPT | Performed by: PHYSICIAN ASSISTANT

## 2021-04-12 PROCEDURE — 85025 COMPLETE CBC W/AUTO DIFF WBC: CPT | Performed by: PHYSICIAN ASSISTANT

## 2021-04-12 PROCEDURE — 97116 GAIT TRAINING THERAPY: CPT

## 2021-04-12 PROCEDURE — 83735 ASSAY OF MAGNESIUM: CPT | Performed by: PHYSICIAN ASSISTANT

## 2021-04-12 PROCEDURE — 99232 SBSQ HOSP IP/OBS MODERATE 35: CPT | Performed by: INTERNAL MEDICINE

## 2021-04-12 PROCEDURE — 97167 OT EVAL HIGH COMPLEX 60 MIN: CPT

## 2021-04-12 PROCEDURE — 99233 SBSQ HOSP IP/OBS HIGH 50: CPT | Performed by: INTERNAL MEDICINE

## 2021-04-12 PROCEDURE — 93308 TTE F-UP OR LMTD: CPT

## 2021-04-12 PROCEDURE — 82330 ASSAY OF CALCIUM: CPT | Performed by: PHYSICIAN ASSISTANT

## 2021-04-12 PROCEDURE — 93308 TTE F-UP OR LMTD: CPT | Performed by: INTERNAL MEDICINE

## 2021-04-12 PROCEDURE — 85018 HEMOGLOBIN: CPT | Performed by: PHYSICIAN ASSISTANT

## 2021-04-12 PROCEDURE — 93010 ELECTROCARDIOGRAM REPORT: CPT | Performed by: INTERNAL MEDICINE

## 2021-04-12 PROCEDURE — 97110 THERAPEUTIC EXERCISES: CPT

## 2021-04-12 RX ORDER — LISINOPRIL 2.5 MG/1
2.5 TABLET ORAL DAILY
Status: DISCONTINUED | OUTPATIENT
Start: 2021-04-12 | End: 2021-04-13

## 2021-04-12 RX ORDER — DOCUSATE SODIUM 100 MG/1
100 CAPSULE, LIQUID FILLED ORAL 2 TIMES DAILY
Status: DISCONTINUED | OUTPATIENT
Start: 2021-04-12 | End: 2021-04-15 | Stop reason: HOSPADM

## 2021-04-12 RX ORDER — POTASSIUM CHLORIDE 20 MEQ/1
20 TABLET, EXTENDED RELEASE ORAL ONCE
Status: COMPLETED | OUTPATIENT
Start: 2021-04-12 | End: 2021-04-12

## 2021-04-12 RX ORDER — CALCIUM GLUCONATE 20 MG/ML
2 INJECTION, SOLUTION INTRAVENOUS ONCE
Status: COMPLETED | OUTPATIENT
Start: 2021-04-12 | End: 2021-04-12

## 2021-04-12 RX ORDER — MAGNESIUM SULFATE HEPTAHYDRATE 40 MG/ML
2 INJECTION, SOLUTION INTRAVENOUS ONCE
Status: COMPLETED | OUTPATIENT
Start: 2021-04-12 | End: 2021-04-12

## 2021-04-12 RX ORDER — INSULIN GLARGINE 100 [IU]/ML
10 INJECTION, SOLUTION SUBCUTANEOUS
Status: DISCONTINUED | OUTPATIENT
Start: 2021-04-12 | End: 2021-04-15 | Stop reason: HOSPADM

## 2021-04-12 RX ADMIN — DOCUSATE SODIUM 100 MG: 100 CAPSULE, LIQUID FILLED ORAL at 08:04

## 2021-04-12 RX ADMIN — OXYCODONE HYDROCHLORIDE 5 MG: 5 TABLET ORAL at 12:07

## 2021-04-12 RX ADMIN — MUPIROCIN 1 APPLICATION: 20 OINTMENT TOPICAL at 08:04

## 2021-04-12 RX ADMIN — Medication 12.5 MG: at 21:23

## 2021-04-12 RX ADMIN — LISINOPRIL 2.5 MG: 2.5 TABLET ORAL at 08:04

## 2021-04-12 RX ADMIN — FONDAPARINUX SODIUM 2.5 MG: 2.5 INJECTION, SOLUTION SUBCUTANEOUS at 08:04

## 2021-04-12 RX ADMIN — POTASSIUM CHLORIDE 20 MEQ: 1500 TABLET, EXTENDED RELEASE ORAL at 03:15

## 2021-04-12 RX ADMIN — MUPIROCIN 1 APPLICATION: 20 OINTMENT TOPICAL at 21:23

## 2021-04-12 RX ADMIN — ACETAMINOPHEN 975 MG: 325 TABLET ORAL at 06:03

## 2021-04-12 RX ADMIN — POLYETHYLENE GLYCOL 3350 17 G: 17 POWDER, FOR SOLUTION ORAL at 08:04

## 2021-04-12 RX ADMIN — OXYCODONE HYDROCHLORIDE 10 MG: 10 TABLET ORAL at 21:08

## 2021-04-12 RX ADMIN — AMIODARONE HYDROCHLORIDE 200 MG: 200 TABLET ORAL at 14:59

## 2021-04-12 RX ADMIN — INSULIN GLARGINE 10 UNITS: 100 INJECTION, SOLUTION SUBCUTANEOUS at 22:04

## 2021-04-12 RX ADMIN — AMIODARONE HYDROCHLORIDE 200 MG: 200 TABLET ORAL at 21:23

## 2021-04-12 RX ADMIN — POTASSIUM CHLORIDE 20 MEQ: 1500 TABLET, EXTENDED RELEASE ORAL at 08:04

## 2021-04-12 RX ADMIN — ASPIRIN 325 MG ORAL TABLET 325 MG: 325 PILL ORAL at 08:04

## 2021-04-12 RX ADMIN — ACETAMINOPHEN 975 MG: 325 TABLET ORAL at 14:59

## 2021-04-12 RX ADMIN — AMIODARONE HYDROCHLORIDE 200 MG: 200 TABLET ORAL at 06:03

## 2021-04-12 RX ADMIN — FUROSEMIDE 40 MG: 10 INJECTION, SOLUTION INTRAMUSCULAR; INTRAVENOUS at 15:01

## 2021-04-12 RX ADMIN — PANTOPRAZOLE SODIUM 40 MG: 40 TABLET, DELAYED RELEASE ORAL at 06:03

## 2021-04-12 RX ADMIN — Medication 12.5 MG: at 08:06

## 2021-04-12 RX ADMIN — MAGNESIUM SULFATE HEPTAHYDRATE 2 G: 40 INJECTION, SOLUTION INTRAVENOUS at 08:04

## 2021-04-12 RX ADMIN — FUROSEMIDE 40 MG: 10 INJECTION, SOLUTION INTRAMUSCULAR; INTRAVENOUS at 08:04

## 2021-04-12 RX ADMIN — ACETAMINOPHEN 975 MG: 325 TABLET ORAL at 21:07

## 2021-04-12 RX ADMIN — OXYCODONE HYDROCHLORIDE 10 MG: 10 TABLET ORAL at 06:03

## 2021-04-12 RX ADMIN — DOCUSATE SODIUM 100 MG: 100 CAPSULE, LIQUID FILLED ORAL at 18:28

## 2021-04-12 RX ADMIN — ATORVASTATIN CALCIUM 80 MG: 80 TABLET, FILM COATED ORAL at 15:35

## 2021-04-12 RX ADMIN — CALCIUM GLUCONATE 2 G: 20 INJECTION, SOLUTION INTRAVENOUS at 02:11

## 2021-04-12 NOTE — PROGRESS NOTES
Progress Note - Jade Weiss 64 y o  male MRN: [id number]    Unit/Bed#: [facility] 417-01 Encounter: [id number]      CC: diabetes f/u    Subjective:   Jade Weiss is a 64y o  year old male without known history of diabetes who is admitted CAD who underwent CABG  POD# 3  Patient was seen and examined bedside  Patient reports adequate diet  Is off pressors  Objective:     Vitals: Blood pressure 105/67, pulse 58, temperature 98 8 °F (37 1 °C), temperature source Probe, resp  rate (!) 24, weight 76 9 kg (169 lb 8 5 oz), SpO2 96 %  ,Body mass index is 28 21 kg/m²  Intake/Output Summary (Last 24 hours) at 4/12/2021 1601  Last data filed at 4/12/2021 1400  Gross per 24 hour   Intake 2581 28 ml   Output 1685 ml   Net 896 28 ml       Physical Exam:  General Appearance: awake, appears stated age and cooperative  Head: Normocephalic, without obvious abnormality, atraumatic  Extremities: moves all extremities  Skin: Skin color and temperature normal    Pulm: no labored breathing    Lab, Imaging and other studies: I have personally reviewed pertinent reports  POC Glucose (mg/dl)   Date Value   04/12/2021 158 (H)   04/12/2021 169 (H)   04/12/2021 165 (H)   04/12/2021 131   04/12/2021 130   04/12/2021 141 (H)   04/12/2021 107   04/12/2021 135   04/12/2021 74   04/11/2021 119       Assessment and plan:    Prediabetes with stress-induced hyperglycemia:  A1C 6 5%  Currently on continuous insulin infusion  He is off pressors, taking p o  well  Recommend transitioning to subcutaneous basal bolus insulin tonight  start Lantus 10 units subcutaneously at bedtime   start correctional sliding scale insulin   Discontinue IV insulin 1hr after 1st dose of Lantus  Continue to monitor blood sugar over time and make adjustments to the regimen if necessary  CAD status post CABG: management per Cardiovascular surgery  Portions of the record may have been created with voice recognition software

## 2021-04-12 NOTE — PROGRESS NOTES
Progress Note - Cardiothoracic Surgery   Kaylyn Winston 64 y o  male MRN: [id number]  Unit/Bed#: [facility] 417-01 Encounter: [id number]      POD # 3 s/p CABG x3    Pt seen/examined  Interval history and data reviewed with critical care team   Pt doing well  No specific complaints    When milrinone turned off, SVR goes up and CO goes down  Required levo for a short period of time  Milrinone at 0 13      Medications:   Scheduled Meds:  Current Facility-Administered Medications   Medication Dose Route Frequency Provider Last Rate    [facility]amin[facility]  975 [facility]      amiodarone  200 mg Oral [place], [place]      aspirin  325 mg Oral Daily [place], [place]      atorvastatin  80 mg Oral Daily With SNEHAL Roa      bisacodyl  10 mg Rectal Daily PRN Dov Gilliam PA-C      docusate sodium  100 mg Oral BID Freddy Medina PA-C      fondaparinux  2 5 mg Subcutaneous Daily Jennvishal Olson PA-C      furosemide  40 mg Intravenous BID (diuretic) Ravinder Ramon PA-C      HYDROmorphone  0 5 mg Intravenous Q2H PRN Jimy Yu DO      insulin regular (HumuLIN R,NovoLIN R) infusion  0 3-21 Units/hr Intravenous Titrated Dov Gilliam PA-C 0 5 Units/hr (04/12/21 0400)    lidocaine (cardiac)  100 mg Intravenous Q30 Min PRN Dov Gilliam PA-C      magnesium sulfate  2 g Intravenous Once Ravinder Ramon PA-C      metoprolol tartrate  12 5 mg Oral Q12H [address] Cyril Medina PA-C      milrinone [facility]) infusion  0 13 mcg/kg/min Intravenous Continuous Freddy Medina PA-C 0 13 mcg/kg/min (04/11/21 2348)    mupirocin  1 application Nasal L[address] Dov Gilliam PA-C      norepinephrine  1-30 mcg/min Intravenous Titrated Nathan Xiong PA-C Stopped (04/12/21 0600)    ondansetron  4 mg Intravenous Q6H PRN Dov Gilliam PA-C      oxyCODONE  10 mg Oral Q4H PRN Ravinder Ramon PA-C      oxyCODONE  5 mg Oral Q4H PRN Lulla Maroon, PA-C      pantoprazole  40 mg Oral Daily Before Breakfast [facility]      phenylephrine HCl           polyethylene glycol  17 g Oral Daily Veneda Dealfred PA-C      potassium chloride  20 mEq Oral Daily Lazarus Karma [place], [place]      sodium chloride  20 mL/hr Intravenous Continuous Veneda Dealfred PA-C 20 mL/hr (04/11/21 1900)    temazepam  15 mg Oral HS PRN Lazarus Karma Adam PA-C       Continuous Infusions:insulin regular (HumuLIN R,NovoLIN R) infusion, 0 3-21 Units/hr, Last Rate: 0 5 Units/hr (04/12/21 0400)  milrinone (PRIMACOR) infusion, 0 13 mcg/kg/min, Last Rate: 0 13 mcg/kg/min (04/11/21 2348)  norepinephrine, 1-30 mcg/min, Last Rate: Stopped (04/12/21 0600)  sodium chloride, 20 mL/hr, Last Rate: 20 mL/hr (04/11/21 1900)      PRN Meds: bisacodyl    HYDROmorphone    lidocaine (cardiac)    ondansetron    oxyCODONE    oxyCODONE    temazepam    Vitals: Blood pressure 116/69, pulse 64, temperature 99 1 °F (37 3 °C), resp  rate (!) 26, weight 76 9 kg (169 lb 8 5 oz), SpO2 95 %  ,Body mass index is 28 21 kg/m²  I/O last 24 hours: In: 3006 6 [P O :1380; I V :1276 6; IV Piggyback:350]  Out: 2020 [Urine:1790;  Chest Tube:230]  Invasive Devices     Central Venous Catheter Line            CVC Central Lines 04/09/21 Triple 2 days    Introducer 04/09/21 2 days          Drain            Chest Tube 1 Posterior Mediastinal 24 Fr  2 days    Chest Tube 2 Anterior Mediastinal 32 Fr  2 days    Chest Tube 3 Left Pleural 32 Fr  2 days                  Lab, Imaging and other studies:   Results from last 7 days   Lab Units 04/12/21  0518 04/12/21  0104 04/11/21  0426 04/10/21  0436   WBC Thousand/uL 12 96*  --  14 43* 9 50   HEMOGLOBIN g/dL 9 2* 9 2* 10 3* 9 8*   HEMATOCRIT % 28 4*  --  30 8* 29 6*   PLATELETS Thousands/uL 91*  --  85* 74*     Results from last 7 days   Lab Units 04/12/21  0548 04/12/21  0104 04/11/21  0426  04/09/21  1442   POTASSIUM mmol/L 3 8 3 8 4 1   < >  --    CHLORIDE mmol/L 111* 105 110*   < >  --    CO2 mmol/L 25 27 24   < >  --    CO2, I-STAT mmol/L  --   --   --   --  22   BUN mg/dL 24 27* 20   < >  --    CREATININE mg/dL 0 94 1 10 1 07   < >  --    GLUCOSE, ISTAT mg/dl  --   --   --   --  145*   CALCIUM mg/dL 7 9* 7 9* 7 9*   < >  --     < > = values in this interval not displayed  Results from last 7 days   Lab Units 04/09/21  0529 04/08/21  1848 04/08/21  1311  04/06/21  1221   INR   --   --   --   --  1 13   PTT seconds 40* 68* 74*   < > 32    < > = values in this interval not displayed  Recent Labs     04/11/21  0501   PHART 7 422   [id number] 21 8*   PO2ART 61 9*   [id number] 34 3*   BEART -2 0           Plan:    Keep milrinone and swan  TTE - if normal function can de-line  Remove chest tubes  OOBTC  Incentive spirometry  Diuresis  PO ASA/Statin/B blocker    Low dose lisinopril  ICU        SIGNATURE: Ravindra Lane DO  DATE: April 12, 2021  TIME: 7:35 AM

## 2021-04-12 NOTE — CONSULTS
Please see full consult from 2047 Faith Rodriguez MD on 4/6 and utilize as consult note      [address]

## 2021-04-12 NOTE — PROGRESS NOTES
General Cardiology   Progress Note -  Team One   Otto Maza 64 y o  male MRN: [id number]    Unit/Bed#: [facility] 417-01 Encounter: [id number]    Assessment  1  NSTEMI type 1 (POA)  -Presented to the [facility] ED on 4/6 with CP  -Troponin 11 9--14 9--20 2--16 3-5 6  -ECG 4/6; NSR nonspecific ST T-wave abnormalities  -[facility] 4/6/2021; 100% ostial LAD--distal LAD fills from right-to-left and left-to-left collateral, 100% prox RCA -- distally fills from right to right collaterals, mid OM1 90%, and distal circumflex 85% stenosis  -TTE 4/6/2021; LVEF 55%, hypokinesis of the mid anterior and apical anterior wall, grade 2 DD, mild MR, trace TR    2  MV CAD s/p CABG x3 (lima to LAD, SVG to left posterior lateral, and SVG to OM 2) POD # 3  -24 hour telemetry review; NSR  -Current medical therapies; aspirin 325 mg daily, atorvastatin 80 mg daily, and metoprolol tartrate 12 5 mg q 12 hours  -On prophylactic oral amiodarone 200 mg t i d  -On IV Milrinone 0 13 mcg/kg/min  (CO 3 9/CI 2 2, SVR 1865) -- attempted to wean Milrinone overnight -- drop in CI -- also had issues with hypotension and was started on IV Levophed -- currently weaned off  -On IV furosemide 40 mg b i d  -- for postop volume management; 24 hour I&O balance + 986 ml; overall +4 3 L    3  Hypertension  -Average /59, last recorded 104/70 (MAP 82), HR 66  -Outpatient BP regimen; non  -Inpatient BP regimen; metoprolol tartrate 12 5 mg q 12 hours, lisinopril 2 5 mg daily, and IV diuresis    4   Nicotine dependence; smoking cessation    Plan  -Follow-up limited TTE today  -IV Milrinone 0 13 mcg/kg/min -- continue for today -- hopeful to wean to off next 24 hours   -Needs improved afterload reduction -- has been started on lisinopril 2 5 mg daily today -- increase as BP tolerates  -Continue metoprolol tartrate low-dose at 12 5 mg q 12 hours  -Prophylactic oral amiodarone 200 mg t i d   -IV diuresis for postoperative volume management  -Strict I&Os, daily standing weights, 2 g NA +diet 1 8 L FR  -Monitor renal function electrolytes closely -- replete to maintain K +at 4 0, and magnesium at 2 0  -Continuous cardiopulmonary monitoring    Subjective  Review of Systems   Constitution: Negative for malaise/fatigue  Cardiovascular: Negative for chest pain, dyspnea on exertion, irregular heartbeat, leg swelling, near-syncope, orthopnea and palpitations  Respiratory: Negative for cough and shortness of breath  Gastrointestinal: Negative for abdominal pain  Genitourinary: Negative for dysuria  Neurological: Negative for dizziness, headaches and light-headedness  All other systems reviewed and are negative  Objective:   Physical Exam  Vitals signs and nursing note reviewed  Constitutional:       General: He is not in acute distress  Appearance: Normal appearance  He is not ill-appearing  HENT:      Head: Normocephalic and atraumatic  Mouth/Throat:      Mouth: Mucous membranes are moist    Eyes:      General: No scleral icterus  Neck:      Musculoskeletal: Normal range of motion and neck supple  Cardiovascular:      Rate and Rhythm: Normal rate and regular rhythm  Pulses: Normal pulses  Heart sounds: No murmur  Friction rub present  Pulmonary:      Effort: Pulmonary effort is normal       Breath sounds: Rales present  No wheezing  Abdominal:      General: Bowel sounds are normal       Palpations: Abdomen is soft  Tenderness: There is no abdominal tenderness  Musculoskeletal:      Right lower leg: Edema present  Left lower leg: Edema present  Skin:     General: Skin is warm and dry  Capillary Refill: Capillary refill takes less than 2 seconds  Neurological:      General: No focal deficit present  Mental Status: He is alert and oriented to person, place, and time  Psychiatric:         Mood and Affect: Mood normal          Vitals: Blood pressure 126/73, pulse 66, temperature 98 2 °F (36 8 °C), temperature source Probe, resp  rate (!) 28, weight 76 9 kg (169 lb 8 5 oz), SpO2 96 %  ,     Body mass index is 28 21 kg/m²  ,   Systolic (06OYA), RCI:471 , Min:63 , OFB:324     Diastolic (61LGK), KGK:32, Min:44, Max:77      Intake/Output Summary (Last 24 hours) at 4/12/2021 1030  Last data filed at 4/12/2021 1000  Gross per 24 hour   Intake 2932 6 ml   Output 2315 ml   Net 617 6 ml     Weight (last 2 days)     Date/Time   Weight    04/12/21 0600   76 9 (169 53)    04/11/21 0553   75 4 (166 23)    04/10/21 0535   75 2 (165 79)              LABORATORY RESULTS  Results from last 7 days   Lab Units 04/07/21  2112 04/06/21  2146 04/06/21  1900   TROPONIN I ng/mL 5 60* 16 28* 20 19*     CBC with diff:   Results from last 7 days   Lab Units 04/12/21  0518 04/12/21  0104 04/11/21  0426 04/10/21  0436 04/09/21  2252 04/09/21  1442 04/09/21  1405 04/09/21  1330  04/09/21  1200  04/09/21  0529 04/08/21  0537 04/07/21  0552 04/06/21  1115   WBC Thousand/uL 12 96*  --  14 43* 9 50  --   --   --   --   --   --   --  7 27 7 19 8 39 10 17*   HEMOGLOBIN g/dL 9 2* 9 2* 10 3* 9 8* 10 0*  --  12 4  --   --   --   --  12 0 12 0 12 7 14 2   I STAT HEMOGLOBIN g/dl  --   --   --   --   --  10 9*  --  10 5*   < >  --    < >  --   --   --   --    HEMATOCRIT % 28 4*  --  30 8* 29 6* 29 7*  --  37 7  --   --   --   --  36 5 36 1* 38 9 43 3   HEMATOCRIT, ISTAT %  --   --   --   --   --  32*  --  31*   < >  --    < >  --   --   --   --    MCV fL 85  --  83 83  --   --   --   --   --   --   --  83 83 82 82   PLATELETS Thousands/uL 91*  --  85* 74*  --   --  143*  --   --  142*  --  133* 139* 153 178   MCH pg 27 4  --  27 8 27 4  --   --   --   --   --   --   --  27 1 27 5 26 7* 26 8   MCHC g/dL 32 4  --  33 4 33 1  --   --   --   --   --   --   --  32 9 33 2 32 6 32 8   RDW % 13 8  --  13 9 13 5  --   --   --   --   --   --   --  13 2 13 2 13 3 13 4   MPV fL 12 3  --  11 5 10 9  --   --  10 2  --   --  11 1  --  11 1 10 9 10 7 10 6   NRBC AUTO /100 WBCs 0  --   --   --   -- --   --   --   --   --   --   --   --   --  0    < > = values in this interval not displayed  CMP:  Results from last 7 days   Lab Units 04/12/21  0548 04/12/21  0104 04/11/21  0426 04/10/21  2131 04/10/21  0436 04/09/21  2252 04/09/21  1813 04/09/21  1442 04/09/21  1405 04/09/21  1330 04/09/21  1201 04/09/21  1139 04/09/21  1112 04/09/21  1057 04/09/21  1004  04/09/21  0529 04/08/21  0537 04/07/21  0552 04/06/21  1115   POTASSIUM mmol/L 3 8 3 8 4 1 4 2 4 3 4 7 3 9  --  3 7  --   --   --   --   --   --   --  3 9 3 4* 3 8 4 0   CHLORIDE mmol/L 111* 105 110* 112* 112*  --   --   --  112*  --   --   --   --   --   --   --  109* 107 103 100   CO2 mmol/L 25 27 24 24 24  --   --   --  21  --   --   --   --   --   --   --  27 28 28 31   CO2, I-STAT mmol/L  --   --   --   --   --   --   --  22  --  23 27 27 27 26 26   < >  --   --   --   --    BUN mg/dL 24 27* 20 22 16  --   --   --  18  --   --   --   --   --   --   --  17 17 19 17   CREATININE mg/dL 0 94 1 10 1 07 1 07 0 94  --   --   --  1 10  --   --   --   --   --   --   --  0 93 1 04 1 11 1 04   GLUCOSE, ISTAT mg/dl  --   --   --   --   --   --   --  145*  --  179* 224* 222* 201* 186* 191*   < >  --   --   --   --    CALCIUM mg/dL 7 9* 7 9* 7 9* 8 0* 7 7*  --   --   --  7 8*  --   --   --   --   --   --   --  8 8 8 6 9 3 12 4*   AST U/L  --   --   --   --   --   --   --   --   --   --   --   --   --   --   --   --   --  31 61* 73*   ALT U/L  --   --   --   --   --   --   --   --  76  --   --   --   --   --   --   --   --  37 36 42   ALK PHOS U/L  --   --   --   --   --   --   --   --   --   --   --   --   --   --   --   --   --  91 96 109   EGFR ml/min/1 73sq m 90 75 77 77 90  --   --   --  75  --   --   --   --   --   --   --  91 80 74 80    < > = values in this interval not displayed         BMP:  Results from last 7 days   Lab Units 04/12/21  0548 04/12/21  0104 04/11/21  9895 04/10/21  2131 04/10/21  0436 04/09/21  2252 04/09/21  1813 04/09/21  1442 21  1405  21  0529   POTASSIUM mmol/L 3 8 3 8 4 1 4 2 4 3 4 7 3 9  --  3 7  --  3 9   CHLORIDE mmol/L 111* 105 110* 112* 112*  --   --   --  112*  --  109*   CO2 mmol/L 25 27 24 24 24  --   --   --  21  --  27   CO2, I-STAT mmol/L  --   --   --   --   --   --   --  22  --    < >  --    BUN mg/dL 24 27* 20 22 16  --   --   --  18  --  17   CREATININE mg/dL 0 94 1 10 1 07 1 07 0 94  --   --   --  1 10  --  0 93   GLUCOSE, ISTAT mg/dl  --   --   --   --   --   --   --  145*  --    < >  --    CALCIUM mg/dL 7 9* 7 9* 7 9* 8 0* 7 7*  --   --   --  7 8*  --  8 8    < > = values in this interval not displayed  No results found for: NTBNP     Results from last 7 days   Lab Units 21  0548 21  0426 04/10/21  0436 21  0529   MAGNESIUM mg/dL 1 8 2 0 2 4 2 0       Results from last 7 days   Lab Units 21  1900   HEMOGLOBIN A1C % 6 5*             Results from last 7 days   Lab Units 21  1221   INR  1 13       Lipid Profile:   No results found for: CHOL  Lab Results   Component Value Date    HDL 34 (L) 2021    HDL 35 (L) 2021     Lab Results   Component Value Date    LDLCALC 44 2021    LDLCALC 79 2021     Lab Results   Component Value Date    TRIG 97 2021    TRIG 91 2021       Cardiac testing:   Results for orders placed during the hospital encounter of 21   Echo complete with contrast if indicated    Narrative [address] [place] [id number] [phone]    Transthoracic Echocardiogram  2D, M-mode, Doppler, and Color Doppler    Study date:  2021    Patient: Elizabeth Lange  MR number: [id number]  Account number: [de-identified]  : 1965  Age: 64 years  Gender: Male  Status: Inpatient  Location: Emergency room  Height: 65 in  Weight: 155 lb  BP: 166/ 88 mmHg    Indications: Acute MI      Diagnoses: I21 4 - Non-ST elevation (NSTEMI) myocardial infarction    Sonographer:  Alexia Logan, [facility]  Referring Physician:  Bea Sin MD  Group:  [facility]  Interpreting Physician:  Bea Sin MD    SUMMARY    LEFT VENTRICLE:  Systolic function was normal  Ejection fraction was estimated to be 55 %  There was hypokinesis of mid anterior and apical anterior wall  Wall thickness was at the upper limits of normal   Features were consistent with a pseudonormal left ventricular filling pattern, with concomitant abnormal relaxation and increased filling pressure (grade 2 diastolic dysfunction)  MITRAL VALVE:  There was mild annular calcification  There was mild regurgitation  AORTIC VALVE:  The valve was not visualized well enough to rule out a bicuspid morphology  TRICUSPID VALVE:  There was trace regurgitation  PERICARDIUM:  A possible, trace pericardial effusion was identified along the right atrial free wall  There was no evidence of hemodynamic compromise  HISTORY: PRIOR HISTORY: Chest pain  Hypertensive urgency  Tobacco abuse  PROCEDURE: The procedure was performed in the emergency room  This was a routine study  The transthoracic approach was used  The study included complete 2D imaging, M-mode, complete spectral Doppler, and color Doppler  The heart rate was  69 bpm, at the start of the study  Images were obtained from the parasternal, apical, subcostal, and suprasternal notch acoustic windows  Image quality was adequate  LEFT VENTRICLE: Size was normal  Systolic function was normal  Ejection fraction was estimated to be 55 %  There was hypokinesis of mid anterior and apical anterior wall Wall thickness was at the upper limits of normal  DOPPLER: Features  were consistent with a pseudonormal left ventricular filling pattern, with concomitant abnormal relaxation and increased filling pressure (grade 2 diastolic dysfunction)      RIGHT VENTRICLE: The size was normal  Systolic function was normal     LEFT ATRIUM: Size was normal     RIGHT ATRIUM: Size was normal     MITRAL VALVE: There was mild annular calcification  DOPPLER: There was no evidence for stenosis  There was mild regurgitation  AORTIC VALVE: The valve was not visualized well enough to rule out a bicuspid morphology  DOPPLER: There was no evidence for stenosis  There was no regurgitation  TRICUSPID VALVE: DOPPLER: There was trace regurgitation  PULMONIC VALVE: Not well visualized  PERICARDIUM: A possible, trace pericardial effusion was identified along the right atrial free wall  There was no evidence of hemodynamic compromise  AORTA: The root exhibited normal size  SYSTEM MEASUREMENT TABLES    2D  %FS: 35 %  Ao Diam: 2 4 cm  EDV(Teich): 79 7 ml  EF(Teich): 64 7 %  ESV(Teich): 28 2 ml  IVSd: 0 9 cm  LA Area: 13 4 cm2  LA Diam: 3 5 cm  LVEDV MOD A4C: 88 8 ml  LVEF MOD A4C: 60 %  LVESV MOD A4C: 35 6 ml  LVIDd: 4 2 cm  LVIDs: 2 7 cm  LVLd A4C: 7 6 cm  LVLs A4C: 6 6 cm  LVPWd: 1 1 cm  RA Area: 11 5 cm2  RVIDd: 3 cm  SV MOD A4C: 53 3 ml  SV(Teich): 51 6 ml    MM  TAPSE: 2 2 cm    PW  E' Sept: 0 1 m/s  E/E' Sept: 8 8  MV A Dionisio: 0 8 m/s  MV Dec Costilla: 5 5 m/s2  MV DecT: 167 7 ms  MV E Dionisio: 0 9 m/s  MV E/A Ratio: 1 1    [facility] Accredited Echocardiography Laboratory    Prepared and electronically signed by    Jose Arias MD  Signed 06-Apr-2021 17:46:50       No results found for this or any previous visit  No results found for this or any previous visit  No procedure found  No results found for this or any previous visit      Meds/Allergies   all current active meds have been reviewed and current meds:   Current Facility-Administered Medications   Medication Dose Route Frequency    acetaminophen (TYLENOL) tablet 975 mg  975 mg Oral Q8H    amiodarone tablet 200 mg  200 mg Oral Q8H [address]    aspirin tablet 325 mg  325 mg Oral Daily    atorvastatin (LIPITOR) tablet 80 mg  80 mg Oral Daily With Dinner    bisacodyl (DULCOLAX) rectal suppository 10 mg  10 mg Rectal Daily PRN    docusate sodium (COLACE) capsule 100 mg  100 mg Oral BID    fondaparinux (ARIXTRA) subcutaneous injection 2 5 mg  2 5 mg Subcutaneous Daily    furosemide (LASIX) injection 40 mg  40 mg Intravenous BID (diuretic)    HYDROmorphone (DILAUDID) injection 0 5 mg  0 5 mg Intravenous Q2H PRN    insulin regular (HumuLIN R,NovoLIN R) 1 Units/mL in sodium chloride 0 9 % 100 mL infusion  0 3-21 Units/hr Intravenous Titrated    lidocaine (cardiac) injection 100 mg  100 mg Intravenous Q30 Min PRN    lisinopril (ZESTRIL) tablet 2 5 mg  2 5 mg Oral Daily    metoprolol tartrate (LOPRESSOR) partial tablet 12 5 mg  12 5 mg Oral Q12H [facility]    milrinone (PRIMACOR) 20 mg in 100 mL infusion (premix)  0 13 mcg/kg/min Intravenous Continuous    mupirocin (BACTROBAN) 2 % nasal ointment 1 application  1 application Nasal I93T [facility]    ondansetron (ZOFRAN) injection 4 mg  4 mg Intravenous Q6H PRN    oxyCODONE (ROXICODONE) immediate release tablet 10 mg  10 mg Oral Q4H PRN    oxyCODONE (ROXICODONE) IR tablet 5 mg  5 mg Oral Q4H PRN    pantoprazole (PROTONIX) EC tablet 40 mg  40 mg Oral Daily Before Breakfast    phenylephrine HCl (VAZCULEP) 10 MG/ML solution **ADS Override Pull**        polyethylene glycol (MIRALAX) packet 17 g  17 g Oral Daily    potassium chloride (K-DUR,KLOR-CON) CR tablet 20 mEq  20 mEq Oral Daily    sodium chloride infusion 0 45 %  20 mL/hr Intravenous Continuous    temazepam (RESTORIL) capsule 15 mg  15 mg Oral HS PRN     insulin regular (HumuLIN R,NovoLIN R) infusion, 0 3-21 Units/hr, Last Rate: 1 Units/hr (04/12/21 1004)  milrinone (PRIMACOR) infusion, 0 13 mcg/kg/min, Last Rate: 0 13 mcg/kg/min (04/11/21 2591)  sodium chloride, 20 mL/hr, Last Rate: 20 mL/hr (04/11/21 1900)      EKG personally reviewed by Evelene Galway, CRNP    Assessment:  Principal Problem:    NSTEMI (non-ST elevated myocardial infarction) [facility])  Active Problems:    Chest pain    Hypertensive urgency    Hyperglycemia Tobacco abuse    Thrombocytopenia (HCC)    S/P CABG x 3      Counseling / Coordination of Care  Total floor / unit time spent today 20 minutes  Greater than 50% of total time was spent with the patient and / or family counseling and / or coordination of care  ** Please Note: Dragon 360 Dictation voice to text software may have been used in the creation of this document   **
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
## 2021-04-12 NOTE — PROCEDURES
Procedure: Chest Tube Removal    04/12/21    Mediastinal CT x 2 and L pleural CT x 1 d/c'd in typical fashion  Patient tolerated procedure well  No immediate complications  Site dressed with Acticoat  Patient's nurse was made aware         Alisha Caicedo PA-C

## 2021-04-12 NOTE — CASE MANAGEMENT
CM met with pt to discuss available [facility] after discharge  Pt  Armida Mejia for nursing care  May need PT, tbd, and a RW  Family will transport when ready  Still on Primacor gtt

## 2021-04-12 NOTE — PLAN OF CARE
Problem: PHYSICAL THERAPY ADULT  Goal: Performs mobility at highest level of function for planned discharge setting  See evaluation for individualized goals  Description: Treatment/Interventions: Functional transfer training, LE strengthening/ROM, Therapeutic exercise, Endurance training, Cognitive reorientation, Patient/family training, Equipment eval/education, Bed mobility, Gait training, Spoke to nursing, Spoke to case management  Equipment Recommended: Jc Arevalo       See flowsheet documentation for full assessment, interventions and recommendations  Outcome: Progressing  Note: Prognosis: Good  Problem List: Decreased strength, Decreased endurance, Impaired balance, Decreased mobility, Pain  Assessment: Pt is making good progress towards mobility goals at this time  Session limited 2* swan-karen catheter in place  However, pt was able to ambulate multiple bouts of short distances with Audie  Pt was also able to perform standing TE with good technique  Pt demonstrated good standing tolerance, as well as good endurance  Will likely improve quickly with mobility when de-lined  Pt would benefit from continued acute skilled PT while here in the hospital  Recommending home with HHPT at this time  PT Discharge Recommendation: Return to previous environment with social support, Home with skilled therapy(HHPT at this time)          See flowsheet documentation for full assessment

## 2021-04-12 NOTE — PHYSICAL THERAPY NOTE
Physical Therapy Treatment Note    Patient's Name: Phan Zavaleta  : 21 1144   PT Last Visit   PT Visit Date 21   Note Type   Note Type Treatment   Pain Assessment   Pain Assessment Tool Pain Assessment not indicated - pt denies pain   Restrictions/Precautions   Weight Bearing Precautions Per Order No   Other Precautions Cardiac/sternal;Multiple lines;Telemetry; Fall Risk;Pain  (swan-karen cath)   General   Chart Reviewed Yes   Family/Caregiver Present No   Cognition   Overall Cognitive Status WFL   Attention Within functional limits   Orientation Level Oriented X4   Memory Decreased recall of precautions   Following Commands Follows all commands and directions without difficulty   Comments pt very pleasant, cooperative, and motivated   Bed Mobility   Supine to Sit 5  Supervision   Additional items Assist x 1; Increased time required;HOB elevated;Verbal cues   Sit to Supine 4  Minimal assistance   Additional items Assist x 1; Increased time required;Verbal cues;LE management   Additional Comments Pt ended session back in bed for echo  Transfers   Sit to Stand 4  Minimal assistance   Additional items Assist x 1; Increased time required;Verbal cues   Stand to Sit 4  Minimal assistance   Additional items Assist x 1; Increased time required;Verbal cues   Additional Comments Transfers with RW  Ambulation/Elevation   Gait pattern Short stride   Gait Assistance 4  Minimal assist   Additional items Assist x 1;Verbal cues   Assistive Device Rolling walker   Distance 3ft advance/retreat x5, limited by swan-karen cath in place  Balance   Static Sitting Good   Dynamic Sitting Fair +   Static Standing Fair   Dynamic Standing Fair -   Ambulatory Fair -   Activity Tolerance   Activity Tolerance Patient tolerated treatment well   Nurse Made Aware ok to see per RN   Exercises   Ankle Pumps Standing;20 reps;AROM; Bilateral  (heel raises at RW with CGA)   Marching Standing;20 reps;AROM; Bilateral  (2x10 at RW with CGA)   Assessment   Prognosis Good   Problem List Decreased strength;Decreased endurance; Impaired balance;Decreased mobility;Pain   Assessment Pt is making good progress towards mobility goals at this time  Session limited 2* swan-karen catheter in place  However, pt was able to ambulate multiple bouts of short distances with Audie  Pt was also able to perform standing TE with good technique  Pt demonstrated good standing tolerance, as well as good endurance  Will likely improve quickly with mobility when de-lined  Pt would benefit from continued acute skilled PT while here in the hospital  Recommending home with HHPT at this time  Goals   Patient Goals to go home   STG Expiration Date 04/20/21   PT Treatment Day 1   Plan   Treatment/Interventions Functional transfer training;LE strengthening/ROM; Elevations; Therapeutic exercise; Endurance training;Gait training;Bed mobility;Spoke to nursing;Spoke to case management;OT   Progress Progressing toward goals   PT Frequency Other (Comment)  (4-6X/WK)   Recommendation   PT Discharge Recommendation Return to previous environment with social support;Home with skilled therapy  (HHPT at this time)   Equipment Recommended Walker  (at this time)   Reinaldobanen 8 in Bed Without Bedrails 3   Lying on Back to Sitting on Edge of Flat Bed 3   Moving Bed to Chair 3   Standing Up From Chair 3   Walk in Room 3   Climb 3-5 Stairs 3   Basic Mobility Inpatient Raw Score 18   Basic Mobility Standardized Score 41 05       Sally Cleaning, PT, DPT
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
## 2021-04-12 NOTE — UTILIZATION REVIEW
Continued Stay Review    Date: 04/12/2021                         Current Patient Class: Inpatient  Current Level of Care: Med/Surg    HPI:56 y o  male initially admitted on 04/07/2021 04/069/2021   Procedure: CORONARY ARTERY BYPASS GRAFT (CABG) 3 VESSELS: LIMA to LAD; LLE EVH/SVG to LPL & OM2   HARVEST VEIN ENDOSCOPIC (EVH)  TRANSESOPHAGEAL ECHOCARDIOGRAM (BENNETT)  Assessment/Plan: POD # 3 s/p CABG x 3  Primacor weaned to 0 13 during the day, attempted to turn off overnight with subsequent drop in CI  Required ruben overnight but became bradycardic, levophed started and up as high as 10  This morning, off levo/ruben  EPW removed yesterday  Weaned to 2L NC  Continue to diuresis  CVC triple line / introducer / A  Line  Chest Tubes # 1,2,3 to Suction(-20cm)  Pertinent Labs/Diagnostic Results:   Results from last 7 days   Lab Units 04/08/21  1217   SARS-COV-2  Negative     Results from last 7 days   Lab Units 04/12/21  0518 04/12/21  0104 04/11/21  0426 04/10/21  0436 04/09/21  2252 04/09/21  1442  04/06/21  1115   WBC Thousand/uL 12 96*  --  14 43* 9 50  --   --    < > 10 17*   HEMOGLOBIN g/dL 9 2* 9 2* 10 3* 9 8* 10 0*  --    < > 14 2   I STAT HEMOGLOBIN g/dl  --   --   --   --   --  10 9*   < >  --    HEMATOCRIT % 28 4*  --  30 8* 29 6* 29 7*  --    < > 43 3   HEMATOCRIT, ISTAT %  --   --   --   --   --  32*   < >  --    PLATELETS Thousands/uL 91*  --  85* 74*  --   --    < > 178   NEUTROS ABS Thousands/µL 9 95*  --   --   --   --   --   --  7 70*    < > = values in this interval not displayed       Results from last 7 days   Lab Units 04/12/21  0548 04/12/21  0104 04/11/21  0426 04/10/21  2131 04/10/21  0436  04/09/21  1330 04/09/21  1201 04/09/21  1200 04/09/21  1139  04/09/21  0529   SODIUM mmol/L 141 137 139 140 140   < >  --   --   --   --   --  146*   POTASSIUM mmol/L 3 8 3 8 4 1 4 2 4 3   < >  --   --   --   --   --  3 9   CHLORIDE mmol/L 111* 105 110* 112* 112*   < >  --   --   --   --   --  109*   CO2 mmol/L 25 27 24 24 24   < >  --   --   --   --   --  27   CO2, I-STAT   --   --   --   --   --    < > 23 27  --  27   < >  --    ANION GAP mmol/L 5 5 5 4 4   < >  --   --   --   --   --  10   BUN mg/dL 24 27* 20 22 16   < >  --   --   --   --   --  17   CREATININE mg/dL 0 94 1 10 1 07 1 07 0 94   < >  --   --   --   --   --  0 93   EGFR ml/min/1 73sq m 90 75 77 77 90   < >  --   --   --   --   --  91   CALCIUM mg/dL 7 9* 7 9* 7 9* 8 0* 7 7*   < >  --   --   --   --   --  8 8   CALCIUM, IONIZED mmol/L  --  1 05*  --   --   --   --   --   --  1 01*  --   --   --    CALCIUM, IONIZED, ISTAT mmol/L  --   --   --   --   --   --  1 13 1 05*  --  1 03*   < >  --    MAGNESIUM mg/dL 1 8  --  2 0  --  2 4  --   --   --   --   --   --  2 0    < > = values in this interval not displayed       Results from last 7 days   Lab Units 04/09/21  1405 04/08/21  0537 04/07/21  0552 04/06/21  1115   AST U/L  --  31 61* 73*   ALT U/L 76 37 36 42   ALK PHOS U/L  --  91 96 109   TOTAL PROTEIN g/dL  --  6 9 7 2 8 6*   ALBUMIN g/dL  --  3 2* 3 6 4 1   TOTAL BILIRUBIN mg/dL  --  0 74 1 30* 0 93   BILIRUBIN DIRECT mg/dL  --   --   --  0 24*     Results from last 7 days   Lab Units 04/12/21  1609 04/12/21  1409 04/12/21  1206 04/12/21  0950 04/12/21  0759 04/12/21  0602 04/12/21  0425 04/12/21  0212 04/12/21  0111 04/12/21  0043 04/11/21  2212 04/11/21 2001   POC GLUCOSE mg/dl 154* 158* 169* 165* 131 130 141* 107 135 74 119 182*     Results from last 7 days   Lab Units 04/12/21  0548 04/12/21  0104 04/11/21  0426 04/10/21  2131 04/10/21  0436 04/09/21  1405 04/09/21  0529 04/08/21  0537 04/07/21  0552 04/06/21  1115   GLUCOSE RANDOM mg/dL 145* 130 154* 116 109 158* 131 194* 142* 171*     Results from last 7 days   Lab Units 04/06/21  1900   HEMOGLOBIN A1C % 6 5*   EAG mg/dl 140      Results from last 7 days   Lab Units 04/11/21  0501   PH ART  7 422   PCO2 ART mm Hg 34 3*   PO2 ART mm Hg 61 9*   HCO3 ART mmol/L 21 8*   BASE EXC ART mmol/L -2 0 O2 CONTENT ART mL/dL 14 5*   O2 HGB, ARTERIAL % 90 8*   ABG SOURCE  Line, Arterial     Results from last 7 days   Lab Units 04/12/21  0521 04/12/21  0104 04/11/21  2312   PH ANGELINE  7 404* 7 391 7 405*   PCO2 ANGELINE mm Hg 41 9* 46 6 46 8   PO2 ANGELINE mm Hg 29 8* 28 3* 28 7*   HCO3 ANGELINE mmol/L 25 6 27 6 28 7   BASE EXC ANGELINE mmol/L 0 8 2 3 3 4   O2 CONTENT ANGELINE ml/dL 7 9 7 3 7 0   O2 HGB, VENOUS % 54 9* 51 7* 51 5*     Results from last 7 days   Lab Units 04/09/21  1442 04/09/21  1330 04/09/21  1201  04/09/21  1112   PH, ANGELINE I-STAT   --   --   --   --  7 388   PCO2, ANGELINE ISTAT mm HG  --   --   --   --  42 6   PO2, ANGELINE ISTAT mm HG  --   --   --   --  37 0   HCO3, ANGELINE ISTAT mmol/L  --   --   --   --  25 6   I STAT BASE EXC mmol/L -6* -4* 0   < > 1   I STAT O2 SAT % 99* 99* 100*   < > 70   ISTAT PH ART  7 286* 7 338* 7 369   < >  --    I STAT ART PCO2 mm HG 42 9 39 8 44 5*   < >  --    I STAT ART PO2 mm  0* 122 0 301 0*   < >  --    I STAT ART HCO3 mmol/L 20 4* 21 4* 25 6   < >  --     < > = values in this interval not displayed  Results from last 7 days   Lab Units 04/07/21  2112 04/06/21  2146 04/06/21  1900 04/06/21  1342 04/06/21  1115   TROPONIN I ng/mL 5 60* 16 28* 20 19* 14 91* 11 87*     Results from last 7 days   Lab Units 04/09/21  0529 04/08/21  1848 04/08/21  1311  04/06/21  1221   PROTIME seconds  --   --   --   --  14 0   INR   --   --   --   --  1 13   PTT seconds 40* 68* 74*   < > 32    < > = values in this interval not displayed       Results from last 7 days   Lab Units 04/09/21  1405   HEP B S AG  Non-reactive   HEP C AB  Non-reactive     Results from last 7 days   Lab Units 04/06/21  1115   LIPASE u/L 146     Results from last 7 days   Lab Units 04/08/21  1311   CLARITY UA  Clear   COLOR UA  Yellow   SPEC GRAV UA  1 012   PH UA  8 0   GLUCOSE UA mg/dl Negative   KETONES UA mg/dl Negative   BLOOD UA  Negative   PROTEIN UA mg/dl Negative   NITRITE UA  Negative   BILIRUBIN UA  Negative   UROBILINOGEN UA E U /dl 1 0   LEUKOCYTES UA  Negative   WBC UA /hpf None Seen   RBC UA /hpf None Seen   BACTERIA UA /hpf None Seen   EPITHELIAL CELLS WET PREP /hpf None Seen     Results from last 7 days   Lab Units 04/08/21  1217   INFLUENZA A PCR  Negative   INFLUENZA B PCR  Negative   RSV PCR  Negative     Vital Signs: BP 94/60   Pulse 64   Temp 98 8 °F (37 1 °C) (Probe)   Resp (!) 37   Wt 76 9 kg (169 lb 8 5 oz)   SpO2 92%   BMI 28 21 kg/m²       Medications:   Scheduled Medications:  acetaminophen, 975 mg, Oral, Q8H  amiodarone, 200 mg, Oral, Q8H BOBBY  aspirin, 325 mg, Oral, Daily  atorvastatin, 80 mg, Oral, Daily With Dinner  docusate sodium, 100 mg, Oral, BID  fondaparinux, 2 5 mg, Subcutaneous, Daily  furosemide, 40 mg, Intravenous, BID (diuretic)  insulin glargine, 10 Units, Subcutaneous, HS  [START ON 4/13/2021] insulin lispro, 1-5 Units, Subcutaneous, TID AC  insulin lispro, 1-5 Units, Subcutaneous, HS  lisinopril, 2 5 mg, Oral, Daily  metoprolol tartrate, 12 5 mg, Oral, Q12H BOBBY  mupirocin, 1 application, Nasal, J54Y BOBBY  pantoprazole, 40 mg, Oral, Daily Before Breakfast  polyethylene glycol, 17 g, Oral, Daily  potassium chloride, 20 mEq, Oral, Daily      Continuous IV Infusions:  insulin regular (HumuLIN R,NovoLIN R) infusion, 0 3-21 Units/hr, Intravenous, Titrated  milrinone (PRIMACOR) infusion, 0 13 mcg/kg/min, Intravenous, Continuous  sodium chloride, 20 mL/hr, Intravenous, Continuous      PRN Meds:  bisacodyl, 10 mg, Rectal, Daily PRN  HYDROmorphone, 0 5 mg, Intravenous, Q2H PRN  lidocaine (cardiac), 100 mg, Intravenous, Q30 Min PRN  ondansetron, 4 mg, Intravenous, Q6H PRN  oxyCODONE, 10 mg, Oral, Q4H PRN  oxyCODONE, 5 mg, Oral, Q4H PRN  temazepam, 15 mg, Oral, HS PRN        Discharge Plan: D    Network Utilization Review Department  ATTENTION: Please call with any questions or concerns to [phone] and carefully listen to the prompts so that you are directed to the right person   All voicemails are mulugtea Ariza all requests for admission clinical reviews, approved or denied determinations and any other requests to dedicated fax number below belonging to the campus where the patient is receiving treatment   List of dedicated fax numbers for the Facilities:  [address] [place] [address] DENIALS (Administrative/Medical Necessity) [phone]   [address] (Maternity/NICU/Pediatrics) [phone]   [address] [place] [address] Dr María Nieto 1405 (  Christina Gutiérrez "Carey" 103) [address] [address] Josie Corrigan 1481 [address] [place], [place] [address] [phone]

## 2021-04-13 PROBLEM — R73.03 PREDIABETES: Status: ACTIVE | Noted: 2021-04-13

## 2021-04-13 LAB
ANION GAP SERPL CALCULATED.3IONS-SCNC: 3 MMOL/L (ref 4–13)
BASOPHILS # BLD AUTO: 0.03 THOUSANDS/ΜL (ref 0–0.1)
BASOPHILS NFR BLD AUTO: 0 % (ref 0–1)
BUN SERPL-MCNC: 26 MG/DL (ref 5–25)
CALCIUM SERPL-MCNC: 7.9 MG/DL (ref 8.3–10.1)
CHLORIDE SERPL-SCNC: 107 MMOL/L (ref 100–108)
CO2 SERPL-SCNC: 27 MMOL/L (ref 21–32)
CREAT SERPL-MCNC: 0.93 MG/DL (ref 0.6–1.3)
EOSINOPHIL # BLD AUTO: 0.12 THOUSAND/ΜL (ref 0–0.61)
EOSINOPHIL NFR BLD AUTO: 1 % (ref 0–6)
ERYTHROCYTE [DISTWIDTH] IN BLOOD BY AUTOMATED COUNT: 14 % (ref 11.6–15.1)
GFR SERPL CREATININE-BSD FRML MDRD: 91 ML/MIN/1.73SQ M
GLUCOSE SERPL-MCNC: 113 MG/DL (ref 65–140)
GLUCOSE SERPL-MCNC: 139 MG/DL (ref 65–140)
GLUCOSE SERPL-MCNC: 140 MG/DL (ref 65–140)
GLUCOSE SERPL-MCNC: 141 MG/DL (ref 65–140)
GLUCOSE SERPL-MCNC: 174 MG/DL (ref 65–140)
GLUCOSE SERPL-MCNC: 177 MG/DL (ref 65–140)
HCT VFR BLD AUTO: 27.7 % (ref 36.5–49.3)
HGB BLD-MCNC: 9 G/DL (ref 12–17)
IMM GRANULOCYTES # BLD AUTO: 0.03 THOUSAND/UL (ref 0–0.2)
IMM GRANULOCYTES NFR BLD AUTO: 0 % (ref 0–2)
LYMPHOCYTES # BLD AUTO: 1.24 THOUSANDS/ΜL (ref 0.6–4.47)
LYMPHOCYTES NFR BLD AUTO: 13 % (ref 14–44)
MAGNESIUM SERPL-MCNC: 2.1 MG/DL (ref 1.6–2.6)
MCH RBC QN AUTO: 27.2 PG (ref 26.8–34.3)
MCHC RBC AUTO-ENTMCNC: 32.5 G/DL (ref 31.4–37.4)
MCV RBC AUTO: 84 FL (ref 82–98)
MONOCYTES # BLD AUTO: 0.72 THOUSAND/ΜL (ref 0.17–1.22)
MONOCYTES NFR BLD AUTO: 8 % (ref 4–12)
NEUTROPHILS # BLD AUTO: 7.51 THOUSANDS/ΜL (ref 1.85–7.62)
NEUTS SEG NFR BLD AUTO: 78 % (ref 43–75)
NRBC BLD AUTO-RTO: 0 /100 WBCS
PLATELET # BLD AUTO: 106 THOUSANDS/UL (ref 149–390)
PMV BLD AUTO: 11.2 FL (ref 8.9–12.7)
POTASSIUM SERPL-SCNC: 4.1 MMOL/L (ref 3.5–5.3)
RBC # BLD AUTO: 3.31 MILLION/UL (ref 3.88–5.62)
SODIUM SERPL-SCNC: 137 MMOL/L (ref 136–145)
WBC # BLD AUTO: 9.65 THOUSAND/UL (ref 4.31–10.16)

## 2021-04-13 PROCEDURE — 99232 SBSQ HOSP IP/OBS MODERATE 35: CPT | Performed by: INTERNAL MEDICINE

## 2021-04-13 PROCEDURE — 82948 REAGENT STRIP/BLOOD GLUCOSE: CPT

## 2021-04-13 PROCEDURE — 85025 COMPLETE CBC W/AUTO DIFF WBC: CPT | Performed by: PHYSICIAN ASSISTANT

## 2021-04-13 PROCEDURE — 97535 SELF CARE MNGMENT TRAINING: CPT

## 2021-04-13 PROCEDURE — 83735 ASSAY OF MAGNESIUM: CPT | Performed by: PHYSICIAN ASSISTANT

## 2021-04-13 PROCEDURE — 99233 SBSQ HOSP IP/OBS HIGH 50: CPT | Performed by: INTERNAL MEDICINE

## 2021-04-13 PROCEDURE — 80048 BASIC METABOLIC PNL TOTAL CA: CPT | Performed by: PHYSICIAN ASSISTANT

## 2021-04-13 PROCEDURE — 97116 GAIT TRAINING THERAPY: CPT

## 2021-04-13 PROCEDURE — 99024 POSTOP FOLLOW-UP VISIT: CPT | Performed by: THORACIC SURGERY (CARDIOTHORACIC VASCULAR SURGERY)

## 2021-04-13 RX ORDER — LISINOPRIL 2.5 MG/1
2.5 TABLET ORAL ONCE
Status: DISCONTINUED | OUTPATIENT
Start: 2021-04-13 | End: 2021-04-15 | Stop reason: HOSPADM

## 2021-04-13 RX ORDER — POTASSIUM CHLORIDE 20 MEQ/1
20 TABLET, EXTENDED RELEASE ORAL 2 TIMES DAILY
Status: DISCONTINUED | OUTPATIENT
Start: 2021-04-13 | End: 2021-04-14

## 2021-04-13 RX ORDER — LISINOPRIL 5 MG/1
5 TABLET ORAL DAILY
Status: DISCONTINUED | OUTPATIENT
Start: 2021-04-14 | End: 2021-04-15 | Stop reason: HOSPADM

## 2021-04-13 RX ADMIN — INSULIN GLARGINE 10 UNITS: 100 INJECTION, SOLUTION SUBCUTANEOUS at 21:23

## 2021-04-13 RX ADMIN — MUPIROCIN 1 APPLICATION: 20 OINTMENT TOPICAL at 21:22

## 2021-04-13 RX ADMIN — ACETAMINOPHEN 975 MG: 325 TABLET ORAL at 05:48

## 2021-04-13 RX ADMIN — ACETAMINOPHEN 975 MG: 325 TABLET ORAL at 15:12

## 2021-04-13 RX ADMIN — ATORVASTATIN CALCIUM 80 MG: 80 TABLET, FILM COATED ORAL at 17:18

## 2021-04-13 RX ADMIN — POLYETHYLENE GLYCOL 3350 17 G: 17 POWDER, FOR SOLUTION ORAL at 08:05

## 2021-04-13 RX ADMIN — PANTOPRAZOLE SODIUM 40 MG: 40 TABLET, DELAYED RELEASE ORAL at 06:00

## 2021-04-13 RX ADMIN — LISINOPRIL 2.5 MG: 2.5 TABLET ORAL at 08:05

## 2021-04-13 RX ADMIN — MILRINONE LACTATE IN DEXTROSE 0.13 MCG/KG/MIN: 200 INJECTION, SOLUTION INTRAVENOUS at 04:41

## 2021-04-13 RX ADMIN — FUROSEMIDE 40 MG: 10 INJECTION, SOLUTION INTRAMUSCULAR; INTRAVENOUS at 07:54

## 2021-04-13 RX ADMIN — AMIODARONE HYDROCHLORIDE 200 MG: 200 TABLET ORAL at 15:12

## 2021-04-13 RX ADMIN — FUROSEMIDE 40 MG: 10 INJECTION, SOLUTION INTRAMUSCULAR; INTRAVENOUS at 17:19

## 2021-04-13 RX ADMIN — OXYCODONE HYDROCHLORIDE 5 MG: 5 TABLET ORAL at 17:29

## 2021-04-13 RX ADMIN — OXYCODONE HYDROCHLORIDE 5 MG: 5 TABLET ORAL at 21:25

## 2021-04-13 RX ADMIN — FONDAPARINUX SODIUM 2.5 MG: 2.5 INJECTION, SOLUTION SUBCUTANEOUS at 08:05

## 2021-04-13 RX ADMIN — AMIODARONE HYDROCHLORIDE 200 MG: 200 TABLET ORAL at 21:22

## 2021-04-13 RX ADMIN — OXYCODONE HYDROCHLORIDE 10 MG: 10 TABLET ORAL at 11:29

## 2021-04-13 RX ADMIN — OXYCODONE HYDROCHLORIDE 10 MG: 10 TABLET ORAL at 02:28

## 2021-04-13 RX ADMIN — INSULIN LISPRO 1 UNITS: 100 INJECTION, SOLUTION INTRAVENOUS; SUBCUTANEOUS at 11:24

## 2021-04-13 RX ADMIN — AMIODARONE HYDROCHLORIDE 200 MG: 200 TABLET ORAL at 05:48

## 2021-04-13 RX ADMIN — DOCUSATE SODIUM 100 MG: 100 CAPSULE, LIQUID FILLED ORAL at 08:05

## 2021-04-13 RX ADMIN — INSULIN LISPRO 1 UNITS: 100 INJECTION, SOLUTION INTRAVENOUS; SUBCUTANEOUS at 21:23

## 2021-04-13 RX ADMIN — POTASSIUM CHLORIDE 20 MEQ: 1500 TABLET, EXTENDED RELEASE ORAL at 10:04

## 2021-04-13 RX ADMIN — MUPIROCIN 1 APPLICATION: 20 OINTMENT TOPICAL at 08:02

## 2021-04-13 RX ADMIN — ACETAMINOPHEN 975 MG: 325 TABLET ORAL at 21:22

## 2021-04-13 RX ADMIN — POTASSIUM CHLORIDE 20 MEQ: 1500 TABLET, EXTENDED RELEASE ORAL at 17:18

## 2021-04-13 RX ADMIN — Medication 12.5 MG: at 21:23

## 2021-04-13 RX ADMIN — Medication 12.5 MG: at 08:02

## 2021-04-13 RX ADMIN — ASPIRIN 325 MG ORAL TABLET 325 MG: 325 PILL ORAL at 08:05

## 2021-04-13 NOTE — ASSESSMENT & PLAN NOTE
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 ) and was never told that he had prediabetes  · Patient has a positive family history with father being diabetic diagnosed at age 36, and diabetes in multiple family members  · He does not exercise regularly, however follows a healthy protein-rich diet except for snacking on chocolates   · Patient is an active smoker with 30 pack year history   · Does not report any other medical problems like hypertension or hyperlipidemia  · Regularly checks his BG with a glucometer at home, BG readings pre-meal have ranged in the 90-110s     PLAN :  · Patient can be discharged with lifestyle modifications and close follow up   · If need for medications, will benefit from GLP-1 analogs given the cardiovascular benefit and weight loss potential

## 2021-04-13 NOTE — PROGRESS NOTES
[facility]  Progress Note - Kelly Yajaira 1965, 64 y o  male MRN: [id number]  Unit/Bed#: [facility] 424-01 Encounter: [id number]  Primary Care Provider: No primary care provider on file  Date and time admitted to hospital: 4/7/2021  7:53 PM    Hyperglycemia  Assessment & Plan  Patient presented with an A1c of 6 5%  · No known history of diabetes mellitus however patient has a positive family history with father being diabetic diagnosed at age 36 and diabetes in multiple family members  · Patient was on IV regular insulin infusion, discontinued on 4/12 after being off pressors  · Transitioned to 10 units Lantus and correctional sliding scale  · POC glucose readings have been at goal  · Patient has been started on diet and has been able to tolerate well  No nausea or vomiting  PLAN :   · Continue 10 units Lantus  · Monitor blood sugar readings after patient's oral intake improves  · Continue correctional sliding scale-algorithm 2 at mealtime and algorithm 1 at bedtime      Prediabetes  Assessment & Plan  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 ) and was never told that he had prediabetes  · Patient has a positive family history with father being diabetic diagnosed at age 36, and diabetes in multiple family members  · He does not exercise regularly, however follows a healthy protein-rich diet except for snacking on chocolates   · Patient is an active smoker with 30 pack year history   · Does not report any other medical problems like hypertension or hyperlipidemia  · Regularly checks his BG with a glucometer at home, BG readings pre-meal have ranged in the 90-110s     PLAN :  · Patient can be discharged with lifestyle modifications and close follow up   · If need for medications, will benefit from GLP-1 analogs given the cardiovascular benefit and weight loss potential     S/P CABG x 3  Assessment & Plan  · Management per primary team    Progress Note - Bridget Butt 64 y o  male MRN: [id number]    Unit/Bed#: [facility] 424-01 Encounter: [id number]      CC: diabetes f/u    Subjective:   Bridget Butt is a 64y o  year old male with no known prior history of diabetes, hemoglobin A1c notes that the patient is prediabetic  Feels well  Complaints of chest pain at the incision site  No hypoglycemia  Had a bowel movement today  Is able to tolerate diet-no nausea or vomiting  Objective:     Vitals: Blood pressure (!) 112/44, pulse (!) 52, temperature 98 3 °F (36 8 °C), temperature source Oral, resp  rate 19, weight 76 8 kg (169 lb 5 oz), SpO2 99 %  ,Body mass index is 28 18 kg/m²  Intake/Output Summary (Last 24 hours) at 4/13/2021 1543  Last data filed at 4/13/2021 1501  Gross per 24 hour   Intake 611 08 ml   Output 2005 ml   Net -1393 92 ml       Physical Exam:  General Appearance: awake, appears stated age and cooperative  Head: Normocephalic, without obvious abnormality, atraumatic  Extremities: moves all extremities  Skin: Skin color and temperature normal    Pulm: no labored breathing    Lab, Imaging and other studies: I have personally reviewed pertinent films in PACS    POC Glucose (mg/dl)   Date Value   04/13/2021 174 (H)   04/13/2021 141 (H)   04/12/2021 113   04/12/2021 143 (H)   04/12/2021 136   04/12/2021 223 (H)   04/12/2021 154 (H)   04/12/2021 158 (H)   04/12/2021 169 (H)   04/12/2021 165 (H)               Portions of the record may have been created with voice recognition software

## 2021-04-13 NOTE — PHYSICAL THERAPY NOTE
Physical Therapy Treatment Note       04/13/21 1130   PT Last Visit   PT Visit Date 04/13/21   Note Type   Note Type Treatment   Pain Assessment   Pain Assessment Tool 0-10   Pain Score 7   Pain Location/Orientation Location: Chest   Patient's Stated Pain Goal No pain   Hospital Pain Intervention(s) Ambulation/increased activity   Restrictions/Precautions   Weight Bearing Precautions Per Order No   Other Precautions Cardiac/sternal;Chair Alarm; Bed Alarm;Multiple lines; Fall Risk;Pain;Telemetry   General   Chart Reviewed Yes   Family/Caregiver Present No   Cognition   Overall Cognitive Status WFL   Arousal/Participation Responsive   Attention Attends with cues to redirect   Orientation Level Oriented X4   Memory Unable to assess   Following Commands Follows one step commands without difficulty   Subjective   Subjective states he feels OK, but c/o pain w/ mobility  cooperative w/ session   Transfers   Sit to Stand 5  Supervision   Additional items Assist x 1   Stand to Sit 5  Supervision   Additional items Assist x 1   Ambulation/Elevation   Gait pattern   (slow, antalgic)   Gait Assistance 4  Minimal assist   Additional items Assist x 1  (w/ 2 others for lines, chair follow)   Assistive Device Rolling walker   Distance 100'x2, standing rest x 1-2 min   time spent for setup, repositioning   Balance   Static Sitting Normal   Dynamic Sitting Good   Static Standing Fair -   Dynamic Standing Fair -   Ambulatory Fair -   Endurance Deficit   Endurance Deficit Yes   Endurance Deficit Description fatigue, weakness, pain   Activity Tolerance   Activity Tolerance Patient tolerated treatment well;Patient limited by fatigue;Patient limited by pain   Nurse Made Aware yes   Assessment   Prognosis Good   Problem List Decreased strength;Decreased endurance; Impaired balance;Decreased mobility;Pain   Assessment Pt seen for session for setup, , transfers, gait w/ rest time, repositioning  Pt cooperative, willing to mobilize  continued c/o incisional pain  able to ambulate an increased distance overall, w/ min A opf 1   continue to anticipate d/c home w/ RW if still using at home   Goals   Patient Goals to go home   STG Expiration Date 04/20/21   PT Treatment Day 2   Plan   Treatment/Interventions Functional transfer training;LE strengthening/ROM; Endurance training; Therapeutic exercise;Patient/family training;Gait training;Bed mobility; Equipment eval/education   Progress Progressing toward goals   PT Frequency Other (Comment)  (4-6x/wk)   Recommendation   PT Discharge Recommendation No rehabilitation needs   Equipment Recommended Jc Arevalo  (RW for now)   Demario Cerda walker   Change/add to Anne Wylie?  No   AM-PAC Basic Mobility Inpatient   Turning in Bed Without Bedrails 3   Lying on Back to Sitting on Edge of Flat Bed 3   Moving Bed to Chair 3   Standing Up From Chair 3   Walk in Room 3   Climb 3-5 Stairs 3   Basic Mobility Inpatient Raw Score 18   Basic Mobility Standardized Score 41 05   Elma Randall PT, DPT CSRS
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
## 2021-04-13 NOTE — PROGRESS NOTES
General Cardiology   Progress Note -  Team One   Pili Rutledge 64 y o  male MRN: [id number]    Unit/Bed#: [facility] 417-01 Encounter: [id number]    Assessment  1  NSTEMI type 1 (POA)  -Presented to the [facility] ED on 4/6 with CP  -Troponin 11 9--14 9--20 2--16 3-5 6  -ECG 4/6; NSR nonspecific ST T-wave abnormalities  -[facility] 4/6/2021; 100% ostial LAD--distal LAD fills from right-to-left and left-to-left collateral, 100% prox RCA -- distally fills from right to right collaterals, mid OM1 90%, and distal circumflex 85% stenosis  -TTE 4/6/2021; LVEF 55%, hypokinesis of the mid anterior and apical anterior wall, grade 2 DD, mild MR, trace TR    2  MV CAD s/p CABG x3 (lima to LAD, SVG to left posterior lateral, and SVG to OM 2) POD # 4  -24 hour telemetry review; SB-NSR, rates currently in the low 50's  -Current medical therapies; aspirin 325 mg daily, atorvastatin 80 mg daily, and metoprolol tartrate 12 5 mg q 12 hours  -On prophylactic oral amiodarone 200 mg t i d    -IV milrinone weaned off this a m  (CO 4/CI 2 3, SVR 1399 --4/12 @ 1500) --- -Limited TTE 4/12/2021; LVEF 55%, no RWMA, grade 2 DD, RV normal size and systolic function, LA and RA normal, no MR, no significant AS or AI no pericardial effusion  -On IV furosemide 40 mg b i d  -- for postop volume management; 24 hour I&O balance -1 5L; overall +3 1 L     3  Hypertension  -Average /62, last recorded 129/73, HR 60  -Outpatient BP regimen; non  -Inpatient BP regimen; metoprolol tartrate 12 5 mg q 12 hours, lisinopril 2 5 mg daily, and IV diuresis     4   Nicotine dependence; smoking cessation     Plan  -IV milrinone weaned off  -Increase lisinopril to 5 mg daily -- further intensify as BP tolerates  -Continue metoprolol tartrate low-dose at 12 5 mg q 12 hours -- limited room for up titration d/t sinus bradycardia  -Prophylactic oral amiodarone 200 mg t i d   -Continue IV diuresis for postop vol management- goal net -1 to 1 5 L / 24 hrs  -Strict I&Os, daily standing weights, 2 g NA +diet 1 8 L FR  -Monitor renal function electrolytes closely -- replete to maintain K +at 4 0, and magnesium at 2 0  -Continuous cardiopulmonary monitoring    Subjective  Review of Systems   Constitution: Positive for malaise/fatigue  Negative for chills and fever  Cardiovascular: Negative for chest pain, dyspnea on exertion, irregular heartbeat, leg swelling, near-syncope, orthopnea and palpitations  Respiratory: Negative for cough and shortness of breath  Gastrointestinal: Negative for abdominal pain  Genitourinary: Negative for dysuria  Neurological: Negative for dizziness, headaches and weakness  All other systems reviewed and are negative  Objective:   Physical Exam  Vitals signs and nursing note reviewed  Constitutional:       General: He is not in acute distress  Appearance: Normal appearance  He is not ill-appearing  HENT:      Head: Normocephalic and atraumatic  Mouth/Throat:      Mouth: Mucous membranes are moist    Eyes:      General: No scleral icterus  Neck:      Comments: Right IJ TLC covered by CDI dressing  Cardiovascular:      Rate and Rhythm: Regular rhythm  Bradycardia present  Pulses: Normal pulses  Heart sounds: Normal heart sounds  No murmur  No friction rub  Pulmonary:      Effort: Pulmonary effort is normal       Breath sounds: Rales present  No wheezing  Abdominal:      General: Bowel sounds are normal       Palpations: Abdomen is soft  Tenderness: There is no abdominal tenderness  Musculoskeletal:      Right lower leg: Edema present  Left lower leg: Edema present  Skin:     General: Skin is warm and dry  Capillary Refill: Capillary refill takes less than 2 seconds  Neurological:      General: No focal deficit present  Mental Status: He is alert and oriented to person, place, and time     Psychiatric:         Mood and Affect: Mood normal          Vitals: Blood pressure 129/73, pulse 60, temperature 98 7 °F (37 1 °C), temperature source Oral, resp  rate 21, weight 76 8 kg (169 lb 5 oz), SpO2 98 %  ,     Body mass index is 28 18 kg/m²  ,   Systolic (41RQW), KBJ:245 , Min:92 , NXH:282     Diastolic (17LVH), DANNY:90, Min:51, Max:73      Intake/Output Summary (Last 24 hours) at 4/13/2021 1002  Last data filed at 4/13/2021 0800  Gross per 24 hour   Intake 705 85 ml   Output 1780 ml   Net -1074 15 ml     Weight (last 2 days)     Date/Time   Weight    04/13/21 0553   76 8 (169 31)    04/12/21 0600   76 9 (169 53)    04/11/21 0553   75 4 (166 23)              LABORATORY RESULTS  Results from last 7 days   Lab Units 04/07/21  2112 04/06/21  2146 04/06/21  1900   TROPONIN I ng/mL 5 60* 16 28* 20 19*     CBC with diff:   Results from last 7 days   Lab Units 04/13/21  0440 04/12/21  0518 04/12/21  0104 04/11/21  0426 04/10/21  0436 04/09/21  2252 04/09/21  1442 04/09/21  1405  04/09/21  1200  04/09/21  0529 04/08/21  0537 04/07/21  0552 04/06/21  1115   WBC Thousand/uL 9 65 12 96*  --  14 43* 9 50  --   --   --   --   --   --  7 27 7 19 8 39 10 17*   HEMOGLOBIN g/dL 9 0* 9 2* 9 2* 10 3* 9 8* 10 0*  --  12 4  --   --   --  12 0 12 0 12 7 14 2   I STAT HEMOGLOBIN g/dl  --   --   --   --   --   --  10 9*  --    < >  --    < >  --   --   --   --    HEMATOCRIT % 27 7* 28 4*  --  30 8* 29 6* 29 7*  --  37 7  --   --   --  36 5 36 1* 38 9 43 3   HEMATOCRIT, ISTAT %  --   --   --   --   --   --  32*  --    < >  --    < >  --   --   --   --    MCV fL 84 85  --  83 83  --   --   --   --   --   --  83 83 82 82   PLATELETS Thousands/uL 106* 91*  --  85* 74*  --   --  143*  --  142*  --  133* 139* 153 178   MCH pg 27 2 27 4  --  27 8 27 4  --   --   --   --   --   --  27 1 27 5 26 7* 26 8   MCHC g/dL 32 5 32 4  --  33 4 33 1  --   --   --   --   --   --  32 9 33 2 32 6 32 8   RDW % 14 0 13 8  --  13 9 13 5  --   --   --   --   --   --  13 2 13 2 13 3 13 4   MPV fL 11 2 12 3  --  11 5 10 9  --   --  10 2  --  11 1  --  11 1 10 9 10 7 10 6   NRBC AUTO /100 WBCs 0 0  --   --   --   --   --   --   --   --   --   --   --   --  0    < > = values in this interval not displayed  CMP:  Results from last 7 days   Lab Units 04/13/21  0440 04/12/21  0548 04/12/21  0104 04/11/21  0426 04/10/21  2131 04/10/21  0436 04/09/21  2252  04/09/21  1442 04/09/21  1405 04/09/21  1330 04/09/21  1201 04/09/21  1139 04/09/21  1112 04/09/21  1057 04/09/21  1004  04/08/21  0537 04/07/21  0552 04/06/21  1115   POTASSIUM mmol/L 4 1 3 8 3 8 4 1 4 2 4 3 4 7   < >  --  3 7  --   --   --   --   --   --    < > 3 4* 3 8 4 0   CHLORIDE mmol/L 107 111* 105 110* 112* 112*  --   --   --  112*  --   --   --   --   --   --    < > 107 103 100   CO2 mmol/L 27 25 27 24 24 24  --   --   --  21  --   --   --   --   --   --    < > 28 28 31   CO2, I-STAT mmol/L  --   --   --   --   --   --   --   --  22  --  23 27 27 27 26 26   < >  --   --   --    BUN mg/dL 26* 24 27* 20 22 16  --   --   --  18  --   --   --   --   --   --    < > 17 19 17   CREATININE mg/dL 0 93 0 94 1 10 1 07 1 07 0 94  --   --   --  1 10  --   --   --   --   --   --    < > 1 04 1 11 1 04   GLUCOSE, ISTAT mg/dl  --   --   --   --   --   --   --   --  145*  --  179* 224* 222* 201* 186* 191*   < >  --   --   --    CALCIUM mg/dL 7 9* 7 9* 7 9* 7 9* 8 0* 7 7*  --   --   --  7 8*  --   --   --   --   --   --    < > 8 6 9 3 12 4*   AST U/L  --   --   --   --   --   --   --   --   --   --   --   --   --   --   --   --   --  31 61* 73*   ALT U/L  --   --   --   --   --   --   --   --   --  76  --   --   --   --   --   --   --  37 36 42   ALK PHOS U/L  --   --   --   --   --   --   --   --   --   --   --   --   --   --   --   --   --  91 96 109   EGFR ml/min/1 73sq m 91 90 75 77 77 90  --   --   --  75  --   --   --   --   --   --    < > 80 74 80    < > = values in this interval not displayed         BMP:  Results from last 7 days   Lab Units 04/13/21  0440 04/12/21  1380 04/12/21  0104 04/11/21  5007 04/10/21  5375 04/10/21  6766 21  2252  21  1442 21  1405   POTASSIUM mmol/L 4 1 3 8 3 8 4 1 4 2 4 3 4 7   < >  --  3 7   CHLORIDE mmol/L 107 111* 105 110* 112* 112*  --   --   --  112*   CO2 mmol/L 27 25 27 24 24 24  --   --   --  21   CO2, I-STAT mmol/L  --   --   --   --   --   --   --   --  22  --    BUN mg/dL 26* 24 27* 20 22 16  --   --   --  18   CREATININE mg/dL 0 93 0 94 1 10 1 07 1 07 0 94  --   --   --  1 10   GLUCOSE, ISTAT mg/dl  --   --   --   --   --   --   --   --  145*  --    CALCIUM mg/dL 7 9* 7 9* 7 9* 7 9* 8 0* 7 7*  --   --   --  7 8*    < > = values in this interval not displayed  No results found for: NTBNP     Results from last 7 days   Lab Units 21  0440 21  0548 21  0426 04/10/21  0436 21  0529   MAGNESIUM mg/dL 2 1 1 8 2 0 2 4 2 0       Results from last 7 days   Lab Units 21  1900   HEMOGLOBIN A1C % 6 5*             Results from last 7 days   Lab Units 21  1221   INR  1 13       Lipid Profile:   No results found for: CHOL  Lab Results   Component Value Date    HDL 34 (L) 2021    HDL 35 (L) 2021     Lab Results   Component Value Date    LDLCALC 44 2021    LDLCALC 79 2021     Lab Results   Component Value Date    TRIG 97 2021    TRIG 91 2021       Cardiac testing:   Results for orders placed during the hospital encounter of 21   Echo complete with contrast if indicated    Narrative [address] [place] [id number] [phone]    Transthoracic Echocardiogram  2D, M-mode, Doppler, and Color Doppler    Study date:  2021    Patient: Jacqueline Luo  MR number: [id number]  Account number: [de-identified]  : 1965  Age: 64 years  Gender: Male  Status: Inpatient  Location: Emergency room  Height: 65 in  Weight: 155 lb  BP: 166/ 88 mmHg    Indications: Acute MI      Diagnoses: I21 4 - Non-ST elevation (NSTEMI) myocardial infarction    Sonographer:  Lolly Roberts, [facility]  Referring Physician:  Reji Mitchell MD  Group:  [facility]  Interpreting Physician:  Reji Mitchell MD    SUMMARY    LEFT VENTRICLE:  Systolic function was normal  Ejection fraction was estimated to be 55 %  There was hypokinesis of mid anterior and apical anterior wall  Wall thickness was at the upper limits of normal   Features were consistent with a pseudonormal left ventricular filling pattern, with concomitant abnormal relaxation and increased filling pressure (grade 2 diastolic dysfunction)  MITRAL VALVE:  There was mild annular calcification  There was mild regurgitation  AORTIC VALVE:  The valve was not visualized well enough to rule out a bicuspid morphology  TRICUSPID VALVE:  There was trace regurgitation  PERICARDIUM:  A possible, trace pericardial effusion was identified along the right atrial free wall  There was no evidence of hemodynamic compromise  HISTORY: PRIOR HISTORY: Chest pain  Hypertensive urgency  Tobacco abuse  PROCEDURE: The procedure was performed in the emergency room  This was a routine study  The transthoracic approach was used  The study included complete 2D imaging, M-mode, complete spectral Doppler, and color Doppler  The heart rate was  69 bpm, at the start of the study  Images were obtained from the parasternal, apical, subcostal, and suprasternal notch acoustic windows  Image quality was adequate  LEFT VENTRICLE: Size was normal  Systolic function was normal  Ejection fraction was estimated to be 55 %  There was hypokinesis of mid anterior and apical anterior wall Wall thickness was at the upper limits of normal  DOPPLER: Features  were consistent with a pseudonormal left ventricular filling pattern, with concomitant abnormal relaxation and increased filling pressure (grade 2 diastolic dysfunction)      RIGHT VENTRICLE: The size was normal  Systolic function was normal     LEFT ATRIUM: Size was normal     RIGHT ATRIUM: Size was normal     MITRAL VALVE: There was mild annular calcification  DOPPLER: There was no evidence for stenosis  There was mild regurgitation  AORTIC VALVE: The valve was not visualized well enough to rule out a bicuspid morphology  DOPPLER: There was no evidence for stenosis  There was no regurgitation  TRICUSPID VALVE: DOPPLER: There was trace regurgitation  PULMONIC VALVE: Not well visualized  PERICARDIUM: A possible, trace pericardial effusion was identified along the right atrial free wall  There was no evidence of hemodynamic compromise  AORTA: The root exhibited normal size  SYSTEM MEASUREMENT TABLES    2D  %FS: 35 %  Ao Diam: 2 4 cm  EDV(Teich): 79 7 ml  EF(Teich): 64 7 %  ESV(Teich): 28 2 ml  IVSd: 0 9 cm  LA Area: 13 4 cm2  LA Diam: 3 5 cm  LVEDV MOD A4C: 88 8 ml  LVEF MOD A4C: 60 %  LVESV MOD A4C: 35 6 ml  LVIDd: 4 2 cm  LVIDs: 2 7 cm  LVLd A4C: 7 6 cm  LVLs A4C: 6 6 cm  LVPWd: 1 1 cm  RA Area: 11 5 cm2  RVIDd: 3 cm  SV MOD A4C: 53 3 ml  SV(Teich): 51 6 ml    MM  TAPSE: 2 2 cm    PW  E' Sept: 0 1 m/s  E/E' Sept: 8 8  MV A Dionisio: 0 8 m/s  MV Dec Reynolds: 5 5 m/s2  MV DecT: 167 7 ms  MV E Dionisio: 0 9 m/s  MV E/A Ratio: 1 1    [facility] Accredited Echocardiography Laboratory    Prepared and electronically signed by    Calista Garcia MD  Signed 06-Apr-2021 17:46:50       No results found for this or any previous visit  No results found for this or any previous visit  No procedure found  No results found for this or any previous visit      Meds/Allergies   all current active meds have been reviewed and current meds:   Current Facility-Administered Medications   Medication Dose Route Frequency    acetaminophen (TYLENOL) tablet 975 mg  975 mg Oral Q8H    amiodarone tablet 200 mg  200 mg Oral Q8H [address]    aspirin tablet 325 mg  325 mg Oral Daily    atorvastatin (LIPITOR) tablet 80 mg  80 mg Oral Daily With Dinner    bisacodyl (DULCOLAX) rectal suppository 10 mg  10 mg Rectal Daily PRN    docusate sodium (COLACE) capsule 100 mg  100 mg Oral BID    fondaparinux (ARIXTRA) subcutaneous injection 2 5 mg  2 5 mg Subcutaneous Daily    furosemide (LASIX) injection 40 mg  40 mg Intravenous BID (diuretic)    HYDROmorphone (DILAUDID) injection 0 5 mg  0 5 mg Intravenous Q2H PRN    insulin glargine (LANTUS) subcutaneous injection 10 Units 0 1 mL  10 Units Subcutaneous HS    insulin lispro (HumaLOG) 100 units/mL subcutaneous injection 1-5 Units  1-5 Units Subcutaneous TID AC    insulin lispro (HumaLOG) 100 units/mL subcutaneous injection 1-5 Units  1-5 Units Subcutaneous HS    lidocaine (cardiac) injection 100 mg  100 mg Intravenous Q30 Min PRN    lisinopril (ZESTRIL) tablet 2 5 mg  2 5 mg Oral Daily    metoprolol tartrate (LOPRESSOR) partial tablet 12 5 mg  12 5 mg Oral Q12H BOBBY    milrinone (PRIMACOR) 20 mg in 100 mL infusion (premix)  0 13 mcg/kg/min Intravenous Continuous    mupirocin (BACTROBAN) 2 % nasal ointment 1 application  1 application Nasal H35R [address]    ondansetron (ZOFRAN) injection 4 mg  4 mg Intravenous Q6H PRN    oxyCODONE (ROXICODONE) immediate release tablet 10 mg  10 mg Oral Q4H PRN    oxyCODONE (ROXICODONE) IR tablet 5 mg  5 mg Oral Q4H PRN    pantoprazole (PROTONIX) EC tablet 40 mg  40 mg Oral Daily Before Breakfast    polyethylene glycol (MIRALAX) packet 17 g  17 g Oral Daily    potassium chloride (K-DUR,KLOR-CON) CR tablet 20 mEq  20 mEq Oral BID    sodium chloride infusion 0 45 %  20 mL/hr Intravenous Continuous    temazepam (RESTORIL) capsule 15 mg  15 mg Oral HS PRN     milrinone (PRIMACOR) infusion, 0 13 mcg/kg/min, Last Rate: Stopped (04/13/21 0800)  sodium chloride, 20 mL/hr, Last Rate: Stopped (04/13/21 0600)        EKG personally reviewed by SEE Jaeger    Assessment:  Principal Problem:    NSTEMI (non-ST elevated myocardial infarction) [facility])  Active Problems:    Chest pain    Hypertensive urgency    Hyperglycemia    Tobacco abuse Thrombocytopenia (HCC)    S/P CABG x 3    Counseling / Coordination of Care  Total floor / unit time spent today 20 minutes  Greater than 50% of total time was spent with the patient and / or family counseling and / or coordination of care  ** Please Note: Dragon 360 Dictation voice to text software may have been used in the creation of this document   **

## 2021-04-13 NOTE — ASSESSMENT & PLAN NOTE
Patient presented with an A1c of 6 5%  · No known history of diabetes mellitus however patient has a positive family history with father being diabetic diagnosed at age 36 and diabetes in multiple family members  · Patient was on IV regular insulin infusion, discontinued on 4/12 after being off pressors  · Transitioned to 10 units Lantus and correctional sliding scale  · POC glucose readings have been at goal  · Patient has been started on diet and has been able to tolerate well  No nausea or vomiting  PLAN :   · Continue 10 units Lantus  · Monitor blood sugar readings after patient's oral intake improves  · Continue correctional sliding scale-algorithm 2 at mealtime and algorithm 1 at bedtime

## 2021-04-13 NOTE — PROGRESS NOTES
Progress Note - Cardiothoracic Surgery   Sal Garcia 64 y o  male MRN: [id number]  Unit/Bed#: [facility] 417-01 Encounter: [id number]      POD # 4 s/p CABG x3    Pt seen/examined  Interval history and data reviewed with critical care team   Pt doing well  No specific complaints    TTE with normal function yesterday    Medications:   Scheduled Meds:  Current Facility-Administered Medications   Medication Dose Route Frequency Provider Last Rate    [facility]      amiodarone  200 mg Oral [place], [place]      aspirin  325 mg Oral Daily [place]      atorvastatin  80 mg Oral Daily With SNEHAL Roa      bisacodyl  10 mg Rectal Daily PRN Yfn Ames PA-C      docusate sodium  100 mg Oral BID Luis Osman PA-C      fondaparinux  2 5 mg Subcutaneous Daily Jennvishal Olson PA-C      furosemide  40 mg Intravenous BID (diuretic) Luis Osman PA-C      HYDROmorphone  0 5 mg Intravenous Q2H PRN Sue La DO      insulin glargine  10 Units Subcutaneous HS Enriqueta Soares MD      insulin lispro  1-5 Units Subcutaneous TID [facility] Enriqueta Soares MD      insulin lispro  1-5 Units Subcutaneous HS Enriqueta Soares MD      lidocaine (cardiac)  100 mg Intravenous Q30 Min PRN Yfn Ames PA-C      lisinopril  2 5 mg Oral Once Charlyn Dakin, CRNP      [START ON 4/14/2021] lisinopril  5 mg Oral Daily SEE Marin      metoprolol tartrate  12 5 mg Oral Q12H [address] [facility]      mupirocin  1 application Nasal L23T [address] [place], [place]      ondansetron  4 mg Intravenous Q6H PRN Yfn Ames PA-C      oxyCODONE  10 mg Oral Q4H PRN Luis Osman PA-C      oxyCODONE  5 mg Oral Q4H PRN Luis Osman PA-C      pantoprazole  40 mg Oral Daily Before Breakfast Yfn Ames PA-C      polyethylene glycol  17 g Oral Daily Yfn Starla Ames      potassium chloride  20 mEq Oral BID Luis Osman PA-C      sodium chloride  20 mL/hr Intravenous Continuous Claudeen Southerly, PA-C Stopped (04/13/21 0600)    temazepam  15 mg Oral HS PRN Celestina Medina PA-C       Continuous Infusions:sodium chloride, 20 mL/hr, Last Rate: Stopped (04/13/21 0600)      PRN Meds: bisacodyl    HYDROmorphone    lidocaine (cardiac)    ondansetron    oxyCODONE    oxyCODONE    temazepam    Vitals: Blood pressure 118/69, pulse 56, temperature 98 7 °F (37 1 °C), temperature source Oral, resp  rate (!) 29, weight 76 8 kg (169 lb 5 oz), SpO2 99 %  ,Body mass index is 28 18 kg/m²  I/O last 24 hours: In: 968 7 [P O :320; I V :598 7; IV Piggyback:50]  Out: 2260 [Urine:2200; Chest Tube:60]  Invasive Devices     Central Venous Catheter Line            CVC Central Lines 04/09/21 Triple 4 days                  Lab, Imaging and other studies:   Results from last 7 days   Lab Units 04/13/21  0440 04/12/21  0518 04/12/21  0104 04/11/21  0426   WBC Thousand/uL 9 65 12 96*  --  14 43*   HEMOGLOBIN g/dL 9 0* 9 2* 9 2* 10 3*   HEMATOCRIT % 27 7* 28 4*  --  30 8*   PLATELETS Thousands/uL 106* 91*  --  85*     Results from last 7 days   Lab Units 04/13/21  0440 04/12/21  0548 04/12/21  0104  04/09/21  1442   POTASSIUM mmol/L 4 1 3 8 3 8   < >  --    CHLORIDE mmol/L 107 111* 105   < >  --    CO2 mmol/L 27 25 27   < >  --    CO2, I-STAT mmol/L  --   --   --   --  22   BUN mg/dL 26* 24 27*   < >  --    CREATININE mg/dL 0 93 0 94 1 10   < >  --    GLUCOSE, ISTAT mg/dl  --   --   --   --  145*   CALCIUM mg/dL 7 9* 7 9* 7 9*   < >  --     < > = values in this interval not displayed  Results from last 7 days   Lab Units 04/09/21  0529 04/08/21  1848 04/08/21  1311  04/06/21  1221   INR   --   --   --   --  1 13   PTT seconds 40* 68* 74*   < > 32    < > = values in this interval not displayed       Recent Labs     04/11/21  0501   PHART 7 422   [id number] 21 8*   PO2ART 61 9*   [id number] 34 3*   BEART -2 0           Plan:    Stop milrinone  PT/ambulate  Incentive spirometry  Diuresis  PO ASA/Statin/B blocker    Lisinopril  Transfer to tele      SIGNATURE: Lizeth Newton DO  DATE: April 13, 2021  TIME: 10:20 AM

## 2021-04-13 NOTE — OCCUPATIONAL THERAPY NOTE
OccupationalTherapy Progress Note     Patient Name: Nadege BANUELOS Date: 4/13/2021  Problem List  Principal Problem:    NSTEMI (non-ST elevated myocardial infarction) [facility])  Active Problems:    Chest pain    Hypertensive urgency    Hyperglycemia    Tobacco abuse    Thrombocytopenia (HCC)    S/P CABG x 3    Prediabetes            04/13/21 1115   OT Last Visit   OT Visit Date 04/13/21   Note Type   Note Type Treatment   Restrictions/Precautions   Weight Bearing Precautions Per Order No   Other Precautions Cardiac/sternal;Multiple lines;Telemetry;O2;Fall Risk;Pain   General   Response to Previous Treatment Patient with no complaints from previous session   Lifestyle   Autonomy fully independent w all self care, mobility   Reciprocal Relationships supportive family  reports having brothers and a mom who all can help as needed   Intrinsic Gratification working out, laser engraving   Pain Assessment   Pain Assessment Tool 0-10   Pain Score 7   Pain Location/Orientation Location: Chest   ADL   Where Assessed Chair   LB Dressing Assistance 5  Supervision/Setup   LB Dressing Deficit Setup;Supervision/safety; Don/doff R sock; Don/doff L sock   Transfers   Sit to Stand 5  Supervision   Additional items Increased time required   Stand to Sit 5  Supervision   Additional items Increased time required   Functional Mobility   Functional Mobility 4  Minimal assistance   Additional Comments Pt demonstrated household mobility with RW  Additional items Rolling walker   Cognition   Overall Cognitive Status WFL   Arousal/Participation Alert; Cooperative   Attention Within functional limits   Orientation Level Oriented X4   Memory Within functional limits   Following Commands Follows one step commands without difficulty   Comments Pt is very pleasant and cooperative     Activity Tolerance   Activity Tolerance Patient limited by fatigue   Medical Staff Made Aware RN confirmed okay to see pt   Assessment   Assessment Patient participated in Skilled OT session this date with interventions consisting of ADL re training with the use of correct body mechanics, deep breathing technique, maintaining sternal precautions and  therapeutic activities to: increase activity tolerance   Patient agreeable to OT treatment session, upon arrival patient was found seated OOB to Chair  In comparison to previous session, patient with improvements in activity tolerance  From OT standpoint, recommendation at time of d/c would be Home with family support  Pt reports that he has family able to assist as needed upon d/c and that he has no concerns about this plan  Recommend continued participation in [facility] and functional mobility with staff  No further acute OT needs, d/c OT  Plan   Treatment Interventions ADL retraining;Functional transfer training; Endurance training;Cardiac education   Goal Expiration Date 04/22/21   OT Treatment Day 1   OT Frequency 3-5x/wk   Recommendation   OT Discharge Recommendation No rehabilitation needs  (home with increased support)   OT - OK to Discharge Yes  (When medically appropriate)   AM-PAC Daily Activity Inpatient   Lower Body Dressing 3   Bathing 3   Toileting 4   Upper Body Dressing 4   Grooming 4   Eating 4   Daily Activity Raw Score 22   Daily Activity Standardized Score (Calc for Raw Score >=11) 47  1   AM-PAC Applied Cognition Inpatient   Following a Speech/Presentation 4   Understanding Ordinary Conversation 4   Taking Medications 4   Remembering Where Things Are Placed or Put Away 4   Remembering List of 4-5 Errands 4   Taking Care of Complicated Tasks 4   Applied Cognition Raw Score 24   Applied Cognition Standardized Score 62 21   Modified Winnie Scale   Modified Winnie Scale 3       Cyndy Jean Baptiste, MOT, OTR/L

## 2021-04-13 NOTE — PLAN OF CARE
Problem: PHYSICAL THERAPY ADULT  Goal: Performs mobility at highest level of function for planned discharge setting  See evaluation for individualized goals  Description: Treatment/Interventions: Functional transfer training, LE strengthening/ROM, Therapeutic exercise, Endurance training, Cognitive reorientation, Patient/family training, Equipment eval/education, Bed mobility, Gait training, Spoke to nursing, Spoke to case management  Equipment Recommended: Mateo Sharpe       See flowsheet documentation for full assessment, interventions and recommendations  Outcome: Progressing  Note: Prognosis: Good  Problem List: Decreased strength, Decreased endurance, Impaired balance, Decreased mobility, Pain  Assessment: Pt seen for session for setup, , transfers, gait w/ rest time, repositioning  Pt cooperative, willing to mobilize  continued c/o incisional pain  able to ambulate an increased distance overall, w/ min A opf 1   continue to anticipate d/c home w/ RW if still using at home        PT Discharge Recommendation: Return to previous environment with social support, Home with skilled therapy(HHPT at this time)          See flowsheet documentation for full assessment

## 2021-04-13 NOTE — PLAN OF CARE
Problem: OCCUPATIONAL THERAPY ADULT  Goal: Performs self-care activities at highest level of function for planned discharge setting  See evaluation for individualized goals  Outcome: Progressing  Note: Limitation: Decreased ADL status, Decreased endurance, Decreased self-care trans, Decreased high-level ADLs     Assessment: Patient participated in Skilled OT session this date with interventions consisting of ADL re training with the use of correct body mechanics, deep breathing technique, maintaining sternal precautions and  therapeutic activities to: increase activity tolerance   Patient agreeable to OT treatment session, upon arrival patient was found seated OOB to Chair  In comparison to previous session, patient with improvements in activity tolerance  From OT standpoint, recommendation at time of d/c would be Home with family support  Pt reports that he has family able to assist as needed upon d/c and that he has no concerns about this plan  Recommend continued participation in [facility] and functional mobility with staff  No further acute OT needs, d/c OT        OT Discharge Recommendation: No rehabilitation needs(home with increased support)  OT - OK to Discharge: Yes(When medically appropriate)

## 2021-04-14 ENCOUNTER — APPOINTMENT (INPATIENT)
Dept: RADIOLOGY | Facility: HOSPITAL | Age: 56
DRG: 235 | End: 2021-04-14
Payer: COMMERCIAL

## 2021-04-14 PROBLEM — D62 POSTOPERATIVE ANEMIA DUE TO ACUTE BLOOD LOSS: Status: ACTIVE | Noted: 2021-04-14

## 2021-04-14 PROBLEM — R00.1 SINUS BRADYCARDIA: Status: ACTIVE | Noted: 2021-04-14

## 2021-04-14 LAB
ANION GAP SERPL CALCULATED.3IONS-SCNC: 4 MMOL/L (ref 4–13)
BASOPHILS # BLD AUTO: 0.04 THOUSANDS/ΜL (ref 0–0.1)
BASOPHILS NFR BLD AUTO: 1 % (ref 0–1)
BUN SERPL-MCNC: 28 MG/DL (ref 5–25)
CALCIUM SERPL-MCNC: 8.5 MG/DL (ref 8.3–10.1)
CHLORIDE SERPL-SCNC: 105 MMOL/L (ref 100–108)
CO2 SERPL-SCNC: 28 MMOL/L (ref 21–32)
CREAT SERPL-MCNC: 1.04 MG/DL (ref 0.6–1.3)
EOSINOPHIL # BLD AUTO: 0.19 THOUSAND/ΜL (ref 0–0.61)
EOSINOPHIL NFR BLD AUTO: 3 % (ref 0–6)
ERYTHROCYTE [DISTWIDTH] IN BLOOD BY AUTOMATED COUNT: 13.7 % (ref 11.6–15.1)
GFR SERPL CREATININE-BSD FRML MDRD: 80 ML/MIN/1.73SQ M
GLUCOSE SERPL-MCNC: 117 MG/DL (ref 65–140)
GLUCOSE SERPL-MCNC: 142 MG/DL (ref 65–140)
GLUCOSE SERPL-MCNC: 146 MG/DL (ref 65–140)
GLUCOSE SERPL-MCNC: 155 MG/DL (ref 65–140)
GLUCOSE SERPL-MCNC: 168 MG/DL (ref 65–140)
HCT VFR BLD AUTO: 27.7 % (ref 36.5–49.3)
HGB BLD-MCNC: 9 G/DL (ref 12–17)
IMM GRANULOCYTES # BLD AUTO: 0.03 THOUSAND/UL (ref 0–0.2)
IMM GRANULOCYTES NFR BLD AUTO: 0 % (ref 0–2)
LYMPHOCYTES # BLD AUTO: 1.53 THOUSANDS/ΜL (ref 0.6–4.47)
LYMPHOCYTES NFR BLD AUTO: 20 % (ref 14–44)
MAGNESIUM SERPL-MCNC: 2 MG/DL (ref 1.6–2.6)
MCH RBC QN AUTO: 27 PG (ref 26.8–34.3)
MCHC RBC AUTO-ENTMCNC: 32.5 G/DL (ref 31.4–37.4)
MCV RBC AUTO: 83 FL (ref 82–98)
MONOCYTES # BLD AUTO: 0.88 THOUSAND/ΜL (ref 0.17–1.22)
MONOCYTES NFR BLD AUTO: 11 % (ref 4–12)
NEUTROPHILS # BLD AUTO: 5.08 THOUSANDS/ΜL (ref 1.85–7.62)
NEUTS SEG NFR BLD AUTO: 65 % (ref 43–75)
NRBC BLD AUTO-RTO: 0 /100 WBCS
PLATELET # BLD AUTO: 137 THOUSANDS/UL (ref 149–390)
PMV BLD AUTO: 11.4 FL (ref 8.9–12.7)
POTASSIUM SERPL-SCNC: 4.2 MMOL/L (ref 3.5–5.3)
RBC # BLD AUTO: 3.33 MILLION/UL (ref 3.88–5.62)
SODIUM SERPL-SCNC: 137 MMOL/L (ref 136–145)
WBC # BLD AUTO: 7.75 THOUSAND/UL (ref 4.31–10.16)

## 2021-04-14 PROCEDURE — 80048 BASIC METABOLIC PNL TOTAL CA: CPT | Performed by: PHYSICIAN ASSISTANT

## 2021-04-14 PROCEDURE — 99232 SBSQ HOSP IP/OBS MODERATE 35: CPT | Performed by: INTERNAL MEDICINE

## 2021-04-14 PROCEDURE — 99024 POSTOP FOLLOW-UP VISIT: CPT | Performed by: THORACIC SURGERY (CARDIOTHORACIC VASCULAR SURGERY)

## 2021-04-14 PROCEDURE — 83735 ASSAY OF MAGNESIUM: CPT | Performed by: PHYSICIAN ASSISTANT

## 2021-04-14 PROCEDURE — 85025 COMPLETE CBC W/AUTO DIFF WBC: CPT | Performed by: PHYSICIAN ASSISTANT

## 2021-04-14 PROCEDURE — 82948 REAGENT STRIP/BLOOD GLUCOSE: CPT

## 2021-04-14 PROCEDURE — 71046 X-RAY EXAM CHEST 2 VIEWS: CPT

## 2021-04-14 RX ORDER — FUROSEMIDE 10 MG/ML
40 INJECTION INTRAMUSCULAR; INTRAVENOUS
Status: DISCONTINUED | OUTPATIENT
Start: 2021-04-14 | End: 2021-04-15

## 2021-04-14 RX ORDER — POTASSIUM CHLORIDE 20 MEQ/1
20 TABLET, EXTENDED RELEASE ORAL
Status: DISCONTINUED | OUTPATIENT
Start: 2021-04-14 | End: 2021-04-15

## 2021-04-14 RX ADMIN — LISINOPRIL 5 MG: 5 TABLET ORAL at 10:46

## 2021-04-14 RX ADMIN — MUPIROCIN 1 APPLICATION: 20 OINTMENT TOPICAL at 20:37

## 2021-04-14 RX ADMIN — DOCUSATE SODIUM 100 MG: 100 CAPSULE, LIQUID FILLED ORAL at 10:46

## 2021-04-14 RX ADMIN — OXYCODONE HYDROCHLORIDE 5 MG: 5 TABLET ORAL at 10:46

## 2021-04-14 RX ADMIN — PANTOPRAZOLE SODIUM 40 MG: 40 TABLET, DELAYED RELEASE ORAL at 06:04

## 2021-04-14 RX ADMIN — ACETAMINOPHEN 975 MG: 325 TABLET ORAL at 21:05

## 2021-04-14 RX ADMIN — INSULIN LISPRO 1 UNITS: 100 INJECTION, SOLUTION INTRAVENOUS; SUBCUTANEOUS at 10:56

## 2021-04-14 RX ADMIN — FUROSEMIDE 40 MG: 10 INJECTION, SOLUTION INTRAVENOUS at 13:48

## 2021-04-14 RX ADMIN — POTASSIUM CHLORIDE 20 MEQ: 1500 TABLET, EXTENDED RELEASE ORAL at 17:05

## 2021-04-14 RX ADMIN — POTASSIUM CHLORIDE 20 MEQ: 1500 TABLET, EXTENDED RELEASE ORAL at 12:17

## 2021-04-14 RX ADMIN — INSULIN LISPRO 1 UNITS: 100 INJECTION, SOLUTION INTRAVENOUS; SUBCUTANEOUS at 21:06

## 2021-04-14 RX ADMIN — FONDAPARINUX SODIUM 2.5 MG: 2.5 INJECTION, SOLUTION SUBCUTANEOUS at 10:55

## 2021-04-14 RX ADMIN — AMIODARONE HYDROCHLORIDE 200 MG: 200 TABLET ORAL at 06:04

## 2021-04-14 RX ADMIN — ACETAMINOPHEN 975 MG: 325 TABLET ORAL at 06:04

## 2021-04-14 RX ADMIN — Medication 12.5 MG: at 20:36

## 2021-04-14 RX ADMIN — ASPIRIN 325 MG ORAL TABLET 325 MG: 325 PILL ORAL at 10:46

## 2021-04-14 RX ADMIN — AMIODARONE HYDROCHLORIDE 200 MG: 200 TABLET ORAL at 21:05

## 2021-04-14 RX ADMIN — TEMAZEPAM 15 MG: 15 CAPSULE ORAL at 00:01

## 2021-04-14 RX ADMIN — ATORVASTATIN CALCIUM 80 MG: 80 TABLET, FILM COATED ORAL at 17:05

## 2021-04-14 RX ADMIN — ACETAMINOPHEN 975 MG: 325 TABLET ORAL at 13:47

## 2021-04-14 RX ADMIN — INSULIN GLARGINE 10 UNITS: 100 INJECTION, SOLUTION SUBCUTANEOUS at 21:05

## 2021-04-14 RX ADMIN — FUROSEMIDE 40 MG: 10 INJECTION, SOLUTION INTRAMUSCULAR; INTRAVENOUS at 10:55

## 2021-04-14 RX ADMIN — Medication 12.5 MG: at 10:46

## 2021-04-14 RX ADMIN — AMIODARONE HYDROCHLORIDE 200 MG: 200 TABLET ORAL at 13:47

## 2021-04-14 RX ADMIN — FUROSEMIDE 40 MG: 10 INJECTION, SOLUTION INTRAVENOUS at 18:41

## 2021-04-14 RX ADMIN — MUPIROCIN 1 APPLICATION: 20 OINTMENT TOPICAL at 10:46

## 2021-04-14 NOTE — PROGRESS NOTES
Progress Note - Cardiothoracic Surgery   Dee Swann 64 y o  male MRN: [id number]  Unit/Bed#: [facility] 424-01 Encounter: [id number]    Coronary artery disease  S/P coronary artery bypass grafting x 3; POD # 5      24 Hour Events: Transferred to telemetry yesterday  C/O SOB/heavy breathing with ambulation or after talking on phone for 5 minutes, continues on 2L NC  Pulling 750 on IS  Denies CP      Medications:   Scheduled Meds:  Current Facility-Administered Medications   Medication Dose Route Frequency Provider Last Rate    acetaminophen  975 mg Oral Q8H Ravi Byrnes PA-C      amiodarone  200 mg Oral [facility] Ravi Byrnes PA-C      aspirin  325 mg Oral Daily Ravi Byrnes PA-C      atorvastatin  80 mg Oral Daily With [employer]      bisacodyl  10 mg Rectal Daily PRN Ravi Byrnes PA-C      docusate sodium  100 mg Oral BID Ravi Byrnes PA-C      fondaparinux  2 5 mg Subcutaneous Daily Ravi Byrnes PA-C      furosemide  40 mg Intravenous BID (diuretic) Ravi Byrnes PA-C      insulin glargine  10 Units Subcutaneous HS Ravi Byrnes PA-C      insulin lispro  1-5 Units Subcutaneous TID AC Ravi Byrnes PA-C      insulin lispro  1-5 Units Subcutaneous HS Ravi Byrnes PA-C      lidocaine (cardiac)  100 mg Intravenous Q30 Min PRN Ravi Byrnes PA-C      lisinopril  2 5 mg Oral Once Ravi Byrnes PA-C      lisinopril  5 mg Oral Daily Ravi Byrnes PA-C      metoprolol tartrate  12 5 mg Oral Q12H [facility] Ravi Byrnes PA-C      mupirocin  1 application Nasal S80P [facility] Ravi Byrnes PA-C      ondansetron  4 mg Intravenous Q6H PRN Ravi Byrnes PA-C      oxyCODONE  10 mg Oral Q4H PRN Ravi Byrnes PA-C      oxyCODONE  5 mg Oral Q4H PRN Ravi Byrnes PA-C      pantoprazole  40 mg Oral Daily Before Breakfast Ravi Byrnes PA-C      polyethylene glycol  17 g Oral Daily Ravi Byrnes PA-C      potassium chloride  20 mEq Oral BID Nani Leonardo PA-C      sodium chloride  20 mL/hr Intravenous Continuous Nani Leonardo PA-C Stopped (04/13/21 0600)    temazepam  15 mg Oral HS PRN Nani Leonardo PA-C       Continuous Infusions:sodium chloride, 20 mL/hr, Last Rate: Stopped (04/13/21 0600)      PRN Meds: bisacodyl    lidocaine (cardiac)    ondansetron    oxyCODONE    oxyCODONE    temazepam    Vitals:   Vitals:    04/14/21 0233 04/14/21 0600 04/14/21 0604 04/14/21 0723   BP: 118/55   116/56   BP Location:       Pulse: 57   56   Resp: 18      Temp: 98 3 °F (36 8 °C)   98 °F (36 7 °C)   TempSrc:       SpO2: 93%  93% 99%   Weight:  75 7 kg (166 lb 14 2 oz)         Telemetry: Sinus Bradycardia; Heart Rate: 56    Respiratory:   SpO2: SpO2: 99 %; 2 LPM    Intake/Output:   I/O       04/12 0701 - 04/13 0700 04/13 0701 - 04/14 0700 04/14 0701 - 04/15 0700    P  O  120 560     I V  (mL/kg) 563 3 (7 3) 35 4 (0 5)     IV Piggyback 50      Total Intake(mL/kg) 733 3 (9 5) 595 4 (7 9)     Urine (mL/kg/hr) 2200 (1 2) 875 (0 5)     Stool  0     Chest Tube 60      Total Output 2260 875     Net -1526 8 -279 6            Unmeasured Stool Occurrence  1 x             Weights:   Weight (last 2 days)     Date/Time   Weight    04/14/21 0600   75 7 (166 89)    04/13/21 0553   76 8 (169 31)    04/12/21 0600   76 9 (169 53)            Admit weight: 75 2 kg    Results:   Results from last 7 days   Lab Units 04/14/21  0504 04/13/21  0440 04/12/21  0518   WBC Thousand/uL 7 75 9 65 12 96*   HEMOGLOBIN g/dL 9 0* 9 0* 9 2*   HEMATOCRIT % 27 7* 27 7* 28 4*   PLATELETS Thousands/uL 137* 106* 91*     Results from last 7 days   Lab Units 04/14/21  0411 04/13/21  0440 04/12/21  0548  04/09/21  1442   SODIUM mmol/L 137 137 141   < >  --    POTASSIUM mmol/L 4 2 4 1 3 8   < >  --    CHLORIDE mmol/L 105 107 111*   < >  --    CO2 mmol/L 28 27 25   < >  --    CO2, I-STAT mmol/L  --   --   --   --  22   BUN mg/dL 28* 26* 24   < >  -- CREATININE mg/dL 1 04 0 93 0 94   < >  --    GLUCOSE, ISTAT mg/dl  --   --   --   --  145*   CALCIUM mg/dL 8 5 7 9* 7 9*   < >  --     < > = values in this interval not displayed  Results from last 7 days   Lab Units 04/09/21  0529 04/08/21  1848 04/08/21  1311   PTT seconds 40* 68* 74*     Point of care glucose:  - 177  Endo following    Studies:  No studies past 24 hrs        Invasive Lines/Tubes:  Invasive Devices     Central Venous Catheter Line            CVC Central Lines 04/09/21 Triple 4 days                Physical Exam:    HEENT/NECK:  Normocephalic  Atraumatic  No jugular venous distention  Cardiac: Regular rate and rhythm and No murmurs/rubs/gallops  Pulmonary:  Breath sounds clear bilaterally, Breath sounds diminished in the bases bilaterally  and Poor inspiratory effort  Abdomen:  Non-tender, Non-distended and Normal bowel sounds  Incisions: Sternum is stable  Incision is clean, dry, and intact  and Saphenectomy incison is clean, dry, and intact  Extremities: Extremities warm/dry and No edema B/L  Neuro: Alert and oriented X 3, Sensation is grossly intact and No focal deficits  Skin: Warm/Dry, without rashes or lesions  Assessment:  Principal Problem:    NSTEMI (non-ST elevated myocardial infarction) [facility])  Active Problems:    Chest pain    Hypertensive urgency    Hyperglycemia    Tobacco abuse    Thrombocytopenia (HCC)    S/P CABG x 3    Prediabetes       Coronary artery disease  S/P coronary artery bypass grafting x 3; POD # 5    Plan:    1  Cardiac:   Sinus Bradycardia; HR/BP well-controlled  Lopressor, 12 5mg PO BID, Lisinopril 5mg PO daily  Continue ASA and Statin therapy  Epicardial pacing wires out  Central IV access no longer required; Remove central venous catheter today  Continue DVT prophylaxis    2  Pulmonary:   Acute respiratory failure; Requiring 2 Liters via nasal cannula    Secondary to atelectasis, pulmonary vascular congestion and poor inspiratory effort secondary to pain  Continue incentive spirometry/coughing/deep breathing exercises  Wean supplemental oxygen as tolerated for saturation > 90%  Chest tubes have been discontinued   MENDOZA - obtain CXR PA/LAT today to R/O pleural effusion/vascular congestion  3  Renal:   Intake/Output net: -279 mL/24 hours  Increase diuresis   Lasix 40 mg IV TID  Potassium Chloride 20 mEq PO TID  Post op Creatinine stable; Follow up labs prn    4  Neuro:  Neurologically intact; No active issues  Incisional pain well-controlled  Continue Tylenol, 975 mg PO q 8, standing dose  Continue Oxycodone, 2 5 to 5 mg PO q 4 hours prn pain    5  GI:  Tolerating TLC 2 3 gm sodium diet  Maintain 1800 mL daily fluid restriction   Continue stool softeners and prn suppository  Continue GI prophylaxis    6  Endo:   History of diabetes; Continue SQ insulin therapy as directed by endocrinology physician  Insulin administration teaching for home therapy ordered    7    Hematology:    Post-operative acute blood loss anemia; Hemoglobin 9 0; trend prn and Thrombocytopenia    8  Disposition:      Follow daily PT/OT recommendations regarding home vs  rehab when medically cleared for discharge    VTE Pharmacologic Prophylaxis: Fondaparinux (Arixtra)  VTE Mechanical Prophylaxis: sequential compression device    Collaborative rounds completed with LILIANA Rodgers    Plan of care discussed with bedside nurse    SIGNATURE: SNEHAL Leung  DATE: April 14, 2021  TIME: 7:39 AM

## 2021-04-14 NOTE — ANESTHESIA POST-OP FOLLOW-UP NOTE
Patient: Rafael Flannery  Procedure(s):  CORONARY ARTERY BYPASS GRAFT (CABG) 3 VESSELS: LIMA to LAD; LLE EVH/SVG to LPL & OM2  HARVEST VEIN ENDOSCOPIC (EVH)  TRANSESOPHAGEAL ECHOCARDIOGRAM (BENNETT)  Vitals:    04/14/21 1049   BP: 121/55   Pulse: 61   Resp:    Temp: 98 °F (36 7 °C)   SpO2: 98%

## 2021-04-14 NOTE — PROGRESS NOTES
General Cardiology   Progress Note -  Team One   Phan Zavaleta 64 y o  male MRN: [id number]    Unit/Bed#: [facility] 424-01 Encounter: [id number]    Assessment  1  NSTEMI type 1 (POA)  -Presented to the [address] [place] [address] ED on 4/6 with CP  -Troponin 11 9--14 9--20 2--16 3-5 6  -ECG 4/6; NSR nonspecific ST T-wave abnormalities  -[facility] 4/6/2021; 100% ostial LAD--distal LAD fills from right-to-left and left-to-left collateral, 100% prox RCA -- distally fills from right to right collaterals, mid OM1 90%, and distal circumflex 85% stenosis  -TTE 4/6/2021; LVEF 55%, hypokinesis of the mid anterior and apical anterior wall, grade 2 DD, mild MR, trace TR   -Limited TTE 4/12/2021; LVEF 55%, no RWMA, grade 2 DD, RV normal size and systolic function, LA and RA normal, no MR, no significant AS or AI no pericardial effusion     2  MV CAD s/p CABG x3 (LIMA to LAD, SVG to left posterior lateral, and SVG to OM 2) POD # 4  -24 hour telemetry review; NSR, rates in the low 60s, sinus bradycardia overnight  -Current medical therapies; aspirin 325 mg daily, atorvastatin 80 mg daily, and metoprolol tartrate 12 5 mg q 12 hours  -On prophylactic oral amiodarone 200 mg t i d  -On IV furosemide 40 mg b i d  -- for postop volume management; 24 hour I&O balance -279 ml; overall +2 8 L     3  Hypertension  -Average /57, last recorded 121/55, HR 61  -Outpatient BP regimen; non  -Inpatient BP regimen; metoprolol tartrate 12 5 mg q 12 hours, lisinopril 2 5 mg daily, and IV diuresis     4   Nicotine dependence; smoking cessation     Plan  -Continue lisinopril to 5 mg daily -- further intensify as BP tolerates  -Continue metoprolol tartrate low-dose at 12 5 mg q 12 hours -- limited room for up titration d/t sinus bradycardia  -Prophylactic oral amiodarone 200 mg t i d   -Agree with increasing IV furosemide to 40 mg b i d  for postop vol management- goal net -1 to 1 5 L / 24 hrs  -Strict I&Os, daily standing weights, 2 g NA +diet 1 8 L FR  -Monitor renal function electrolytes closely -- replete to maintain K +at 4 0, and magnesium at 2 0  -Continuous cardiopulmonary monitoring     Subjective  Review of Systems   Constitution: Negative for chills, fever and malaise/fatigue  Eyes: Negative for visual disturbance  Cardiovascular: Positive for dyspnea on exertion, leg swelling and orthopnea  Negative for irregular heartbeat and palpitations  Respiratory: Positive for cough  Negative for shortness of breath  Gastrointestinal: Negative for abdominal pain  Genitourinary: Negative for dysuria  Neurological: Negative for dizziness, headaches and light-headedness  All other systems reviewed and are negative  Objective:   Physical Exam  Vitals signs and nursing note reviewed  Constitutional:       General: He is not in acute distress  Appearance: Normal appearance  He is not ill-appearing  HENT:      Head: Normocephalic and atraumatic  Mouth/Throat:      Mouth: Mucous membranes are moist    Eyes:      General: No scleral icterus  Neck:      Musculoskeletal: Normal range of motion and neck supple  Comments: Right IJ TLC covered by CDI dressing  Cardiovascular:      Rate and Rhythm: Normal rate and regular rhythm  Pulses: Normal pulses  Heart sounds: Normal heart sounds  No murmur  Pulmonary:      Effort: Pulmonary effort is normal       Breath sounds: Rales present  No wheezing  Comments: Lower lung fields clear but very diminished, left lower lobe fine crackles  Abdominal:      General: Bowel sounds are normal       Palpations: Abdomen is soft  Tenderness: There is no abdominal tenderness  Musculoskeletal:      Right lower leg: Edema present  Left lower leg: Edema present  Comments: LLE > RLE   Skin:     General: Skin is warm and dry  Capillary Refill: Capillary refill takes less than 2 seconds  Neurological:      General: No focal deficit present        Mental Status: He is alert and oriented to person, place, and time  Psychiatric:         Mood and Affect: Mood normal          Vitals: Blood pressure 121/55, pulse 61, temperature 98 °F (36 7 °C), resp  rate 18, weight 75 7 kg (166 lb 14 2 oz), SpO2 98 %  ,     Body mass index is 27 77 kg/m²  ,   Systolic (46TWJ), BEM:517 , Min:112 , KMI:667     Diastolic (77PEU), IRC:41, Min:44, Max:67      Intake/Output Summary (Last 24 hours) at 4/14/2021 1137  Last data filed at 4/14/2021 0557  Gross per 24 hour   Intake 360 ml   Output 875 ml   Net -515 ml     Weight (last 2 days)     Date/Time   Weight    04/14/21 0600   75 7 (166 89)    04/13/21 0553   76 8 (169 31)    04/12/21 0600   76 9 (169 53)              LABORATORY RESULTS  Results from last 7 days   Lab Units 04/07/21  2112   TROPONIN I ng/mL 5 60*     CBC with diff:   Results from last 7 days   Lab Units 04/14/21  0504 04/13/21  0440 04/12/21  0518 04/12/21  0104 04/11/21  0426 04/10/21  0436 04/09/21  2252 04/09/21  1442 04/09/21  1405  04/09/21  1200  04/09/21  0529 04/08/21  0537   WBC Thousand/uL 7 75 9 65 12 96*  --  14 43* 9 50  --   --   --   --   --   --  7 27 7 19   HEMOGLOBIN g/dL 9 0* 9 0* 9 2* 9 2* 10 3* 9 8* 10 0*  --  12 4  --   --   --  12 0 12 0   I STAT HEMOGLOBIN g/dl  --   --   --   --   --   --   --  10 9*  --    < >  --    < >  --   --    HEMATOCRIT % 27 7* 27 7* 28 4*  --  30 8* 29 6* 29 7*  --  37 7  --   --   --  36 5 36 1*   HEMATOCRIT, ISTAT %  --   --   --   --   --   --   --  32*  --    < >  --    < >  --   --    MCV fL 83 84 85  --  83 83  --   --   --   --   --   --  83 83   PLATELETS Thousands/uL 137* 106* 91*  --  85* 74*  --   --  143*  --  142*  --  133* 139*   MCH pg 27 0 27 2 27 4  --  27 8 27 4  --   --   --   --   --   --  27 1 27 5   MCHC g/dL 32 5 32 5 32 4  --  33 4 33 1  --   --   --   --   --   --  32 9 33 2   RDW % 13 7 14 0 13 8  --  13 9 13 5  --   --   --   --   --   --  13 2 13 2   MPV fL 11 4 11 2 12 3  --  11 5 10 9  --   --  10 2  --  11 1  --  11 1 10 9   NRBC AUTO /100 WBCs 0 0 0  --   --   --   --   --   --   --   --   --   --   --     < > = values in this interval not displayed  CMP:  Results from last 7 days   Lab Units 04/14/21  0411 04/13/21  0440 04/12/21  0548 04/12/21  0104 04/11/21  0426 04/10/21  2131 04/10/21  0436  04/09/21  1442 04/09/21  1405 04/09/21  1330 04/09/21  1201 04/09/21  1139 04/09/21  1112 04/09/21  1057 04/09/21  1004  04/08/21  0537   POTASSIUM mmol/L 4 2 4 1 3 8 3 8 4 1 4 2 4 3   < >  --  3 7  --   --   --   --   --   --    < > 3 4*   CHLORIDE mmol/L 105 107 111* 105 110* 112* 112*  --   --  112*  --   --   --   --   --   --    < > 107   CO2 mmol/L 28 27 25 27 24 24 24  --   --  21  --   --   --   --   --   --    < > 28   CO2, I-STAT mmol/L  --   --   --   --   --   --   --   --  22  --  23 27 27 27 26 26   < >  --    BUN mg/dL 28* 26* 24 27* 20 22 16  --   --  18  --   --   --   --   --   --    < > 17   CREATININE mg/dL 1 04 0 93 0 94 1 10 1 07 1 07 0 94  --   --  1 10  --   --   --   --   --   --    < > 1 04   GLUCOSE, ISTAT mg/dl  --   --   --   --   --   --   --   --  145*  --  179* 224* 222* 201* 186* 191*   < >  --    CALCIUM mg/dL 8 5 7 9* 7 9* 7 9* 7 9* 8 0* 7 7*  --   --  7 8*  --   --   --   --   --   --    < > 8 6   AST U/L  --   --   --   --   --   --   --   --   --   --   --   --   --   --   --   --   --  31   ALT U/L  --   --   --   --   --   --   --   --   --  76  --   --   --   --   --   --   --  37   ALK PHOS U/L  --   --   --   --   --   --   --   --   --   --   --   --   --   --   --   --   --  91   EGFR ml/min/1 73sq m 80 91 90 75 77 77 90  --   --  75  --   --   --   --   --   --    < > 80    < > = values in this interval not displayed         BMP:  Results from last 7 days   Lab Units 04/14/21  0411 04/13/21  0440 04/12/21  0548 04/12/21  0104 04/11/21  0426 04/10/21  2131 04/10/21  0436  04/09/21  1442   POTASSIUM mmol/L 4 2 4 1 3 8 3 8 4 1 4 2 4 3   < >  --    CHLORIDE mmol/L 105 107 111* 105 110* 112* 112*  --   -- CO2 mmol/L 28 27 25 27 24 24 24  --   --    CO2, I-STAT mmol/L  --   --   --   --   --   --   --   --  22   BUN mg/dL 28* 26* 24 27* 20 22 16  --   --    CREATININE mg/dL 1 04 0 93 0 94 1 10 1 07 1 07 0 94  --   --    GLUCOSE, ISTAT mg/dl  --   --   --   --   --   --   --   --  145*   CALCIUM mg/dL 8 5 7 9* 7 9* 7 9* 7 9* 8 0* 7 7*  --   --     < > = values in this interval not displayed  No results found for: NTBNP     Results from last 7 days   Lab Units 21  0411 21  0440 21  0548 21  0426 04/10/21  0436 21  0529   MAGNESIUM mg/dL 2 0 2 1 1 8 2 0 2 4 2 0                         Lipid Profile:   No results found for: CHOL  Lab Results   Component Value Date    HDL 34 (L) 2021    HDL 35 (L) 2021     Lab Results   Component Value Date    LDLCALC 44 2021    LDLCALC 79 2021     Lab Results   Component Value Date    TRIG 97 2021    TRIG 91 2021       Cardiac testing:   Results for orders placed during the hospital encounter of 21   Echo complete with contrast if indicated    Narrative [address] [place] [id number]  [phone]    Transthoracic Echocardiogram  2D, M-mode, Doppler, and Color Doppler    Study date:  2021    Patient: Johanna Patterson  MR number: [id number]  Account number: [de-identified]  : 1965  Age: 64 years  Gender: Male  Status: Inpatient  Location: Emergency room  Height: 65 in  Weight: 155 lb  BP: 166/ 88 mmHg    Indications: Acute MI  Diagnoses: I21 4 - Non-ST elevation (NSTEMI) myocardial infarction    Sonographer:  Nayely Estevez RDCS  Referring Physician:  Lyndsay Bobby MD  Group:  [facility]  Interpreting Physician:  Lyndsay Bobby MD    SUMMARY    LEFT VENTRICLE:  Systolic function was normal  Ejection fraction was estimated to be 55 %    There was hypokinesis of mid anterior and apical anterior wall  Wall thickness was at the upper limits of normal   Features were consistent with a pseudonormal left ventricular filling pattern, with concomitant abnormal relaxation and increased filling pressure (grade 2 diastolic dysfunction)  MITRAL VALVE:  There was mild annular calcification  There was mild regurgitation  AORTIC VALVE:  The valve was not visualized well enough to rule out a bicuspid morphology  TRICUSPID VALVE:  There was trace regurgitation  PERICARDIUM:  A possible, trace pericardial effusion was identified along the right atrial free wall  There was no evidence of hemodynamic compromise  HISTORY: PRIOR HISTORY: Chest pain  Hypertensive urgency  Tobacco abuse  PROCEDURE: The procedure was performed in the emergency room  This was a routine study  The transthoracic approach was used  The study included complete 2D imaging, M-mode, complete spectral Doppler, and color Doppler  The heart rate was  69 bpm, at the start of the study  Images were obtained from the parasternal, apical, subcostal, and suprasternal notch acoustic windows  Image quality was adequate  LEFT VENTRICLE: Size was normal  Systolic function was normal  Ejection fraction was estimated to be 55 %  There was hypokinesis of mid anterior and apical anterior wall Wall thickness was at the upper limits of normal  DOPPLER: Features  were consistent with a pseudonormal left ventricular filling pattern, with concomitant abnormal relaxation and increased filling pressure (grade 2 diastolic dysfunction)  RIGHT VENTRICLE: The size was normal  Systolic function was normal     LEFT ATRIUM: Size was normal     RIGHT ATRIUM: Size was normal     MITRAL VALVE: There was mild annular calcification  DOPPLER: There was no evidence for stenosis  There was mild regurgitation  AORTIC VALVE: The valve was not visualized well enough to rule out a bicuspid morphology  DOPPLER: There was no evidence for stenosis  There was no regurgitation      TRICUSPID VALVE: DOPPLER: There was trace regurgitation  PULMONIC VALVE: Not well visualized  PERICARDIUM: A possible, trace pericardial effusion was identified along the right atrial free wall  There was no evidence of hemodynamic compromise  AORTA: The root exhibited normal size  SYSTEM MEASUREMENT TABLES    2D  %FS: 35 %  Ao Diam: 2 4 cm  EDV(Teich): 79 7 ml  EF(Teich): 64 7 %  ESV(Teich): 28 2 ml  IVSd: 0 9 cm  LA Area: 13 4 cm2  LA Diam: 3 5 cm  LVEDV MOD A4C: 88 8 ml  LVEF MOD A4C: 60 %  LVESV MOD A4C: 35 6 ml  LVIDd: 4 2 cm  LVIDs: 2 7 cm  LVLd A4C: 7 6 cm  LVLs A4C: 6 6 cm  LVPWd: 1 1 cm  RA Area: 11 5 cm2  RVIDd: 3 cm  SV MOD A4C: 53 3 ml  SV(Teich): 51 6 ml    MM  TAPSE: 2 2 cm    PW  E' Sept: 0 1 m/s  E/E' Sept: 8 8  MV A Dionisio: 0 8 m/s  MV Dec [place]: 5 5 m/s2  MV DecT: 167 7 ms  MV E Dionisio: 0 9 m/s  MV E/A Ratio: 1 1    [facility] Accredited Echocardiography Laboratory    Prepared and electronically signed by    Cisco Phan MD  Signed 06-Apr-2021 17:46:50       No results found for this or any previous visit  No results found for this or any previous visit  No procedure found  No results found for this or any previous visit      Meds/Allergies   all current active meds have been reviewed and current meds:   Current Facility-Administered Medications   Medication Dose Route Frequency    acetaminophen (TYLENOL) tablet 975 mg  975 mg Oral Q8H    amiodarone tablet 200 mg  200 mg Oral Q8H [address]    aspirin tablet 325 mg  325 mg Oral Daily    atorvastatin (LIPITOR) tablet 80 mg  80 mg Oral Daily With Dinner    bisacodyl (DULCOLAX) rectal suppository 10 mg  10 mg Rectal Daily PRN    docusate sodium (COLACE) capsule 100 mg  100 mg Oral BID    fondaparinux (ARIXTRA) subcutaneous injection 2 5 mg  2 5 mg Subcutaneous Daily    furosemide (LASIX) injection 40 mg  40 mg Intravenous TID (diuretic)    insulin glargine (LANTUS) subcutaneous injection 10 Units 0 1 mL  10 Units Subcutaneous HS    insulin lispro (HumaLOG) 100 units/mL subcutaneous injection 1-5 Units  1-5 Units Subcutaneous TID AC    insulin lispro (HumaLOG) 100 units/mL subcutaneous injection 1-5 Units  1-5 Units Subcutaneous HS    lidocaine (cardiac) injection 100 mg  100 mg Intravenous Q30 Min PRN    lisinopril (ZESTRIL) tablet 2 5 mg  2 5 mg Oral Once    lisinopril (ZESTRIL) tablet 5 mg  5 mg Oral Daily    metoprolol tartrate (LOPRESSOR) partial tablet 12 5 mg  12 5 mg Oral Q12H [facility]    mupirocin (BACTROBAN) 2 % nasal ointment 1 application  1 application Nasal F08Q [facility]    ondansetron (ZOFRAN) injection 4 mg  4 mg Intravenous Q6H PRN    oxyCODONE (ROXICODONE) immediate release tablet 10 mg  10 mg Oral Q4H PRN    oxyCODONE (ROXICODONE) IR tablet 5 mg  5 mg Oral Q4H PRN    pantoprazole (PROTONIX) EC tablet 40 mg  40 mg Oral Daily Before Breakfast    polyethylene glycol (MIRALAX) packet 17 g  17 g Oral Daily    potassium chloride (K-DUR,KLOR-CON) CR tablet 20 mEq  20 mEq Oral TID With Meals    temazepam (RESTORIL) capsule 15 mg  15 mg Oral HS PRN          EKG personally reviewed by SEE Melendrez    Assessment:  Principal Problem:    NSTEMI (non-ST elevated myocardial infarction) [facility])  Active Problems:    Chest pain    Hypertensive urgency    Hyperglycemia    Tobacco abuse    Thrombocytopenia (HCC)    S/P CABG x 3    Prediabetes    Postoperative anemia due to acute blood loss    Sinus bradycardia      Counseling / Coordination of Care  Total floor / unit time spent today 20 minutes  Greater than 50% of total time was spent with the patient and / or family counseling and / or coordination of care  ** Please Note: Dragon 360 Dictation voice to text software may have been used in the creation of this document   **

## 2021-04-14 NOTE — PLAN OF CARE
Problem: PAIN - ADULT  Goal: Verbalizes/displays adequate comfort level or baseline comfort level  Description: Interventions:  - Encourage patient to monitor pain and request assistance  - Assess pain using appropriate pain scale  - Administer analgesics based on type and severity of pain and evaluate response  - Implement non-pharmacological measures as appropriate and evaluate response  - Consider cultural and social influences on pain and pain management  - Notify physician/advanced practitioner if interventions unsuccessful or patient reports new pain  Outcome: Progressing     Problem: INFECTION - ADULT  Goal: Absence or prevention of progression during hospitalization  Description: INTERVENTIONS:  - Assess and monitor for signs and symptoms of infection  - Monitor lab/diagnostic results  - Monitor all insertion sites, i e  indwelling lines, tubes, and drains  - Monitor endotracheal if appropriate and nasal secretions for changes in amount and color  - [place] appropriate cooling/warming therapies per order  - Administer medications as ordered  - Instruct and encourage patient and family to use good hand hygiene technique  - Identify and instruct in appropriate isolation precautions for identified infection/condition  Outcome: Progressing  Goal: Absence of fever/infection during neutropenic period  Description: INTERVENTIONS:  - Monitor WBC    Outcome: Progressing     Problem: SAFETY ADULT  Goal: Patient will remain free of falls  Description: INTERVENTIONS:  - Assess patient frequently for physical needs  -  Identify cognitive and physical deficits and behaviors that affect risk of falls    -  [place] fall precautions as indicated by assessment   - Educate patient/family on patient safety including physical limitations  - Instruct patient to call for assistance with activity based on assessment  - Modify environment to reduce risk of injury  - Consider OT/PT consult to assist with strengthening/mobility  Outcome: Progressing  Goal: Maintain or return to baseline ADL function  Description: INTERVENTIONS:  -  Assess patient's ability to carry out ADLs; assess patient's baseline for ADL function and identify physical deficits which impact ability to perform ADLs (bathing, care of mouth/teeth, toileting, grooming, dressing, etc )  - Assess/evaluate cause of self-care deficits   - Assess range of motion  - Assess patient's mobility; develop plan if impaired  - Assess patient's need for assistive devices and provide as appropriate  - Encourage maximum independence but intervene and supervise when necessary  - Involve family in performance of ADLs  - Assess for home care needs following discharge   - Consider OT consult to assist with ADL evaluation and planning for discharge  - Provide patient education as appropriate  Outcome: Progressing  Goal: Maintain or return mobility status to optimal level  Description: INTERVENTIONS:  - Assess patient's baseline mobility status (ambulation, transfers, stairs, etc )    - Identify cognitive and physical deficits and behaviors that affect mobility  - Identify mobility aids required to assist with transfers and/or ambulation (gait belt, sit-to-stand, lift, walker, cane, etc )  - [place] fall precautions as indicated by assessment  - Record patient progress and toleration of activity level on Mobility SBAR; progress patient to next Phase/Stage  - Instruct patient to call for assistance with activity based on assessment  - Consider rehabilitation consult to assist with strengthening/weightbearing, etc   Outcome: Progressing     Problem: DISCHARGE PLANNING  Goal: Discharge to home or other facility with appropriate resources  Description: INTERVENTIONS:  - Identify barriers to discharge w/patient and caregiver  - Arrange for needed discharge resources and transportation as appropriate  - Identify discharge learning needs (meds, wound care, etc )  - Arrange for interpretive services to assist at discharge as needed  - Refer to Case Management Department for coordinating discharge planning if the patient needs post-hospital services based on physician/advanced practitioner order or complex needs related to functional status, cognitive ability, or social support system  Outcome: Progressing     Problem: Knowledge Deficit  Goal: Patient/family/caregiver demonstrates understanding of disease process, treatment plan, medications, and discharge instructions  Description: Complete learning assessment and assess knowledge base  Interventions:  - Provide teaching at level of understanding  - Provide teaching via preferred learning methods  Outcome: Progressing     Problem: Prexisting or High Potential for Compromised Skin Integrity  Goal: Skin integrity is maintained or improved  Description: INTERVENTIONS:  - Identify patients at risk for skin breakdown  - Assess and monitor skin integrity  - Assess and monitor nutrition and hydration status  - Monitor labs   - Assess for incontinence   - Turn and reposition patient  - Assist with mobility/ambulation  - Relieve pressure over bony prominences  - Avoid friction and shearing  - Provide appropriate hygiene as needed including keeping skin clean and dry  - Evaluate need for skin moisturizer/barrier cream  - Collaborate with interdisciplinary team   - Patient/family teaching  - Consider wound care consult   Outcome: Progressing     Problem: Potential for Falls  Goal: Patient will remain free of falls  Description: INTERVENTIONS:  - Assess patient frequently for physical needs  -  Identify cognitive and physical deficits and behaviors that affect risk of falls    -  [place] fall precautions as indicated by assessment   - Educate patient/family on patient safety including physical limitations  - Instruct patient to call for assistance with activity based on assessment  - Modify environment to reduce risk of injury  - Consider OT/PT consult to assist with strengthening/mobility  Outcome: Progressing

## 2021-04-14 NOTE — ANESTHESIA POSTPROCEDURE EVALUATION
Post-Op Assessment Note    CV Status:  Stable  Pain Score: 0       Mental Status:  Alert and awake   Hydration Status:  Stable   PONV Controlled:  None   Airway Patency:  Patent      Post Op Vitals Reviewed: Yes      Staff: Anesthesiologist         No complications documented      BP      Temp      Pulse     Resp      SpO2

## 2021-04-14 NOTE — RESTORATIVE TECHNICIAN NOTE
Restorative Specialist Mobility Note       Activity: Ambulate in [place], Chair     Assistive Device: Front wheel walker

## 2021-04-15 VITALS
RESPIRATION RATE: 19 BRPM | BODY MASS INDEX: 27.18 KG/M2 | SYSTOLIC BLOOD PRESSURE: 98 MMHG | HEART RATE: 67 BPM | DIASTOLIC BLOOD PRESSURE: 67 MMHG | WEIGHT: 163.36 LBS | OXYGEN SATURATION: 95 % | TEMPERATURE: 98.5 F

## 2021-04-15 LAB
ANION GAP SERPL CALCULATED.3IONS-SCNC: 4 MMOL/L (ref 4–13)
BUN SERPL-MCNC: 27 MG/DL (ref 5–25)
CALCIUM SERPL-MCNC: 8.8 MG/DL (ref 8.3–10.1)
CHLORIDE SERPL-SCNC: 103 MMOL/L (ref 100–108)
CO2 SERPL-SCNC: 29 MMOL/L (ref 21–32)
CREAT SERPL-MCNC: 1.06 MG/DL (ref 0.6–1.3)
GFR SERPL CREATININE-BSD FRML MDRD: 78 ML/MIN/1.73SQ M
GLUCOSE SERPL-MCNC: 113 MG/DL (ref 65–140)
GLUCOSE SERPL-MCNC: 119 MG/DL (ref 65–140)
GLUCOSE SERPL-MCNC: 204 MG/DL (ref 65–140)
POTASSIUM SERPL-SCNC: 4 MMOL/L (ref 3.5–5.3)
SODIUM SERPL-SCNC: 136 MMOL/L (ref 136–145)

## 2021-04-15 PROCEDURE — 80048 BASIC METABOLIC PNL TOTAL CA: CPT | Performed by: PHYSICIAN ASSISTANT

## 2021-04-15 PROCEDURE — 99024 POSTOP FOLLOW-UP VISIT: CPT | Performed by: THORACIC SURGERY (CARDIOTHORACIC VASCULAR SURGERY)

## 2021-04-15 PROCEDURE — 99232 SBSQ HOSP IP/OBS MODERATE 35: CPT | Performed by: INTERNAL MEDICINE

## 2021-04-15 PROCEDURE — 97116 GAIT TRAINING THERAPY: CPT

## 2021-04-15 PROCEDURE — 82948 REAGENT STRIP/BLOOD GLUCOSE: CPT

## 2021-04-15 RX ORDER — ATORVASTATIN CALCIUM 80 MG/1
80 TABLET, FILM COATED ORAL
Qty: 90 TABLET | Refills: 0 | Status: SHIPPED | OUTPATIENT
Start: 2021-04-15

## 2021-04-15 RX ORDER — OXYCODONE HYDROCHLORIDE 5 MG/1
TABLET ORAL
Qty: 30 TABLET | Refills: 0 | Status: SHIPPED | OUTPATIENT
Start: 2021-04-15 | End: 2021-04-22 | Stop reason: SDUPTHER

## 2021-04-15 RX ORDER — TORSEMIDE 20 MG/1
20 TABLET ORAL DAILY
Qty: 90 TABLET | Refills: 0 | Status: SHIPPED | OUTPATIENT
Start: 2021-04-15 | End: 2021-10-21 | Stop reason: HOSPADM

## 2021-04-15 RX ORDER — FUROSEMIDE 10 MG/ML
40 INJECTION INTRAMUSCULAR; INTRAVENOUS
Status: DISCONTINUED | OUTPATIENT
Start: 2021-04-15 | End: 2021-04-15 | Stop reason: HOSPADM

## 2021-04-15 RX ORDER — ACETAMINOPHEN 325 MG/1
TABLET ORAL
Qty: 90 TABLET | Refills: 0 | Status: SHIPPED | OUTPATIENT
Start: 2021-04-15 | End: 2022-02-14

## 2021-04-15 RX ORDER — POTASSIUM CHLORIDE 20 MEQ/1
20 TABLET, EXTENDED RELEASE ORAL DAILY
Qty: 10 TABLET | Refills: 0 | Status: SHIPPED | OUTPATIENT
Start: 2021-04-15 | End: 2021-04-27 | Stop reason: SDUPTHER

## 2021-04-15 RX ORDER — ASPIRIN 325 MG
325 TABLET ORAL DAILY
Qty: 90 TABLET | Refills: 0 | Status: SHIPPED | OUTPATIENT
Start: 2021-04-16 | End: 2021-04-30 | Stop reason: ALTCHOICE

## 2021-04-15 RX ORDER — POLYETHYLENE GLYCOL 3350 17 G/17G
17 POWDER, FOR SOLUTION ORAL DAILY
Qty: 510 G | Refills: 0 | Status: SHIPPED | OUTPATIENT
Start: 2021-04-15 | End: 2021-10-27

## 2021-04-15 RX ORDER — POTASSIUM CHLORIDE 20 MEQ/1
20 TABLET, EXTENDED RELEASE ORAL 2 TIMES DAILY
Status: DISCONTINUED | OUTPATIENT
Start: 2021-04-15 | End: 2021-04-15 | Stop reason: HOSPADM

## 2021-04-15 RX ADMIN — MUPIROCIN 1 APPLICATION: 20 OINTMENT TOPICAL at 09:14

## 2021-04-15 RX ADMIN — POTASSIUM CHLORIDE 20 MEQ: 1500 TABLET, EXTENDED RELEASE ORAL at 09:20

## 2021-04-15 RX ADMIN — OXYCODONE HYDROCHLORIDE 5 MG: 5 TABLET ORAL at 00:54

## 2021-04-15 RX ADMIN — ACETAMINOPHEN 975 MG: 325 TABLET ORAL at 05:42

## 2021-04-15 RX ADMIN — OXYCODONE HYDROCHLORIDE 5 MG: 5 TABLET ORAL at 01:41

## 2021-04-15 RX ADMIN — ASPIRIN 325 MG ORAL TABLET 325 MG: 325 PILL ORAL at 09:19

## 2021-04-15 RX ADMIN — FUROSEMIDE 40 MG: 10 INJECTION, SOLUTION INTRAVENOUS at 05:42

## 2021-04-15 RX ADMIN — AMIODARONE HYDROCHLORIDE 200 MG: 200 TABLET ORAL at 05:42

## 2021-04-15 RX ADMIN — FONDAPARINUX SODIUM 2.5 MG: 2.5 INJECTION, SOLUTION SUBCUTANEOUS at 09:19

## 2021-04-15 RX ADMIN — PANTOPRAZOLE SODIUM 40 MG: 40 TABLET, DELAYED RELEASE ORAL at 05:42

## 2021-04-15 NOTE — PLAN OF CARE
Problem: PAIN - ADULT  Goal: Verbalizes/displays adequate comfort level or baseline comfort level  Description: Interventions:  - Encourage patient to monitor pain and request assistance  - Assess pain using appropriate pain scale  - Administer analgesics based on type and severity of pain and evaluate response  - Implement non-pharmacological measures as appropriate and evaluate response  - Consider cultural and social influences on pain and pain management  - Notify physician/advanced practitioner if interventions unsuccessful or patient reports new pain  Outcome: Adequate for Discharge     Problem: INFECTION - ADULT  Goal: Absence or prevention of progression during hospitalization  Description: INTERVENTIONS:  - Assess and monitor for signs and symptoms of infection  - Monitor lab/diagnostic results  - Monitor all insertion sites, i e  indwelling lines, tubes, and drains  - Monitor endotracheal if appropriate and nasal secretions for changes in amount and color  - Pep appropriate cooling/warming therapies per order  - Administer medications as ordered  - Instruct and encourage patient and family to use good hand hygiene technique  - Identify and instruct in appropriate isolation precautions for identified infection/condition  Outcome: Adequate for Discharge  Goal: Absence of fever/infection during neutropenic period  Description: INTERVENTIONS:  - Monitor WBC    Outcome: Adequate for Discharge     Problem: SAFETY ADULT  Goal: Patient will remain free of falls  Description: INTERVENTIONS:  - Assess patient frequently for physical needs  -  Identify cognitive and physical deficits and behaviors that affect risk of falls    -  [place] fall precautions as indicated by assessment   - Educate patient/family on patient safety including physical limitations  - Instruct patient to call for assistance with activity based on assessment  - Modify environment to reduce risk of injury  - Consider OT/PT consult to assist with strengthening/mobility  Outcome: Adequate for Discharge  Goal: Maintain or return to baseline ADL function  Description: INTERVENTIONS:  -  Assess patient's ability to carry out ADLs; assess patient's baseline for ADL function and identify physical deficits which impact ability to perform ADLs (bathing, care of mouth/teeth, toileting, grooming, dressing, etc )  - Assess/evaluate cause of self-care deficits   - Assess range of motion  - Assess patient's mobility; develop plan if impaired  - Assess patient's need for assistive devices and provide as appropriate  - Encourage maximum independence but intervene and supervise when necessary  - Involve family in performance of ADLs  - Assess for home care needs following discharge   - Consider OT consult to assist with ADL evaluation and planning for discharge  - Provide patient education as appropriate  Outcome: Adequate for Discharge  Goal: Maintain or return mobility status to optimal level  Description: INTERVENTIONS:  - Assess patient's baseline mobility status (ambulation, transfers, stairs, etc )    - Identify cognitive and physical deficits and behaviors that affect mobility  - Identify mobility aids required to assist with transfers and/or ambulation (gait belt, sit-to-stand, lift, walker, cane, etc )  - [place] fall precautions as indicated by assessment  - Record patient progress and toleration of activity level on Mobility SBAR; progress patient to next Phase/Stage  - Instruct patient to call for assistance with activity based on assessment  - Consider rehabilitation consult to assist with strengthening/weightbearing, etc   Outcome: Adequate for Discharge     Problem: DISCHARGE PLANNING  Goal: Discharge to home or other facility with appropriate resources  Description: INTERVENTIONS:  - Identify barriers to discharge w/patient and caregiver  - Arrange for needed discharge resources and transportation as appropriate  - Identify discharge learning needs (meds, wound care, etc )  - Arrange for interpretive services to assist at discharge as needed  - Refer to Case Management Department for coordinating discharge planning if the patient needs post-hospital services based on physician/advanced practitioner order or complex needs related to functional status, cognitive ability, or social support system  Outcome: Adequate for Discharge     Problem: Knowledge Deficit  Goal: Patient/family/caregiver demonstrates understanding of disease process, treatment plan, medications, and discharge instructions  Description: Complete learning assessment and assess knowledge base  Interventions:  - Provide teaching at level of understanding  - Provide teaching via preferred learning methods  Outcome: Adequate for Discharge     Problem: Prexisting or High Potential for Compromised Skin Integrity  Goal: Skin integrity is maintained or improved  Description: INTERVENTIONS:  - Identify patients at risk for skin breakdown  - Assess and monitor skin integrity  - Assess and monitor nutrition and hydration status  - Monitor labs   - Assess for incontinence   - Turn and reposition patient  - Assist with mobility/ambulation  - Relieve pressure over bony prominences  - Avoid friction and shearing  - Provide appropriate hygiene as needed including keeping skin clean and dry  - Evaluate need for skin moisturizer/barrier cream  - Collaborate with interdisciplinary team   - Patient/family teaching  - Consider wound care consult   Outcome: Adequate for Discharge     Problem: PHYSICAL THERAPY ADULT  Goal: Performs mobility at highest level of function for planned discharge setting  See evaluation for individualized goals    Description: Treatment/Interventions: Functional transfer training, LE strengthening/ROM, Therapeutic exercise, Endurance training, Cognitive reorientation, Patient/family training, Equipment eval/education, Bed mobility, Gait training, Spoke to nursing, Spoke to case management  Equipment Recommended: Navya Chavarria       See flowsheet documentation for full assessment, interventions and recommendations  Outcome: Adequate for Discharge     Problem: Potential for Falls  Goal: Patient will remain free of falls  Description: INTERVENTIONS:  - Assess patient frequently for physical needs  -  Identify cognitive and physical deficits and behaviors that affect risk of falls  -  [place] fall precautions as indicated by assessment   - Educate patient/family on patient safety including physical limitations  - Instruct patient to call for assistance with activity based on assessment  - Modify environment to reduce risk of injury  - Consider OT/PT consult to assist with strengthening/mobility  Outcome: Adequate for Discharge     Problem: OCCUPATIONAL THERAPY ADULT  Goal: Performs self-care activities at highest level of function for planned discharge setting  See evaluation for individualized goals    Outcome: Adequate for Discharge

## 2021-04-15 NOTE — PROGRESS NOTES
General Cardiology   Progress Note -  Team One   Abigail Campos 64 y o  male MRN: [id number]    Unit/Bed#: [facility] 424-01 Encounter: [id number]    Assessment  1  NSTEMI type 1 (POA)  -Presented to the [facility] ED on 4/6 with CP  -Troponin 11 9--14 9--20 2--16 3-5 6  -ECG 4/6; NSR nonspecific ST T-wave abnormalities  -[facility] 4/6/2021; 100% ostial LAD--distal LAD fills from right-to-left and left-to-left collateral, 100% prox RCA -- distally fills from right to right collaterals, mid OM1 90%, and distal circumflex 85% stenosis  -TTE 4/6/2021; LVEF 55%, hypokinesis of the mid anterior and apical anterior wall, grade 2 DD, mild MR, trace TR   -Limited TTE 4/12/2021; LVEF 55%, no RWMA, grade 2 DD, RV normal size and systolic function, LA and RA normal, no MR, no significant AS or AI no pericardial effusion     2  MV CAD s/p CABG x3 (LIMA to LAD, SVG to left posterior lateral, and SVG to OM 2) POD # 6  -24 hour telemetry review; SB - NSR, rates upper 50's to low 60's  -Current medical therapies; aspirin 325 mg daily, atorvastatin 80 mg daily, and metoprolol tartrate 12 5 mg q 12 hours  -On prophylactic oral amiodarone 200 mg t i d  -On IV furosemide 40 mg t i d  -- for postop volume management; 24 hour I&O balance -2 1 L; overall +728 mL     3  Hypertension  -Average BP  124/56, last recorded 98/67, HR 67  -Outpatient BP regimen; non  -Inpatient BP regimen; metoprolol tartrate 12 5 mg q 12 hours, lisinopril 5 mg daily, and IV diuresis     4   Nicotine dependence; smoking cessation     Plan  -Continue metoprolol tartrate low-dose at 12 5 mg q 12 hours -- limited room for up titration d/t sinus bradycardia  -Continue lisinopril 5 mg daily (w/hold parameters)  -Prophylactic oral amiodarone 200 mg t i d   -Continue IV diuresis for vol management (CTS adjusted/decreased IV lasix to 40 mg BID)- goal net -1 to 1 5 L / 24 hrs  -Strict I&Os, daily standing weights, 2 g NA +diet 1 8 L FR  -Monitor renal function electrolytes closely -- replete to maintain K +at 4 0, and magnesium at 2 0  -Continuous cardiopulmonary monitoring    Subjective  Review of Systems   Constitution: Negative for chills, fever and malaise/fatigue  Cardiovascular: Positive for leg swelling and orthopnea  Negative for chest pain, dyspnea on exertion, irregular heartbeat, near-syncope and palpitations  Respiratory: Negative for cough and shortness of breath  Gastrointestinal: Negative for abdominal pain  Genitourinary: Negative for dysuria  Neurological: Negative for dizziness, headaches and weakness  All other systems reviewed and are negative  Objective:   Physical Exam  Vitals signs and nursing note reviewed  Constitutional:       General: He is not in acute distress  Appearance: Normal appearance  He is not ill-appearing  HENT:      Head: Normocephalic and atraumatic  Mouth/Throat:      Mouth: Mucous membranes are moist    Eyes:      General: No scleral icterus  Neck:      Musculoskeletal: Normal range of motion and neck supple  Comments: Right IJ TLC covered by CDI dressing  Cardiovascular:      Rate and Rhythm: Normal rate and regular rhythm  Pulses: Normal pulses  Heart sounds: Normal heart sounds  No murmur  Pulmonary:      Effort: Pulmonary effort is normal       Breath sounds: Rales present  No wheezing  Abdominal:      General: Bowel sounds are normal       Palpations: Abdomen is soft  Tenderness: There is no abdominal tenderness  Musculoskeletal:      Right lower leg: Edema present  Left lower leg: Edema present  Skin:     General: Skin is warm and dry  Capillary Refill: Capillary refill takes less than 2 seconds  Neurological:      General: No focal deficit present  Mental Status: He is alert and oriented to person, place, and time  Psychiatric:         Mood and Affect: Mood normal          Vitals: Blood pressure 98/67, pulse 67, temperature 98 5 °F (36 9 °C), resp   rate 19, weight 74 1 kg (163 lb 5 8 oz), SpO2 95 %  ,     Body mass index is 27 18 kg/m²  ,   Systolic (49AUH), QGY:547 , Min:98 , SAQ:561     Diastolic (99JXN), TFD:49, Min:45, Max:67      Intake/Output Summary (Last 24 hours) at 4/15/2021 1054  Last data filed at 4/15/2021 0419  Gross per 24 hour   Intake 720 ml   Output 2800 ml   Net -2080 ml     Weight (last 2 days)     Date/Time   Weight    04/15/21 0600   74 1 (163 36)    04/14/21 0600   75 7 (166 89)    04/13/21 0553   76 8 (169 31)              LABORATORY RESULTS      CBC with diff:   Results from last 7 days   Lab Units 04/14/21  0504 04/13/21  0440 04/12/21  0518 04/12/21  0104 04/11/21  0426 04/10/21  0436 04/09/21  2252 04/09/21  1442 04/09/21  1405  04/09/21  1200  04/09/21  0529   WBC Thousand/uL 7 75 9 65 12 96*  --  14 43* 9 50  --   --   --   --   --   --  7 27   HEMOGLOBIN g/dL 9 0* 9 0* 9 2* 9 2* 10 3* 9 8* 10 0*  --  12 4  --   --   --  12 0   I STAT HEMOGLOBIN g/dl  --   --   --   --   --   --   --  10 9*  --    < >  --    < >  --    HEMATOCRIT % 27 7* 27 7* 28 4*  --  30 8* 29 6* 29 7*  --  37 7  --   --   --  36 5   HEMATOCRIT, ISTAT %  --   --   --   --   --   --   --  32*  --    < >  --    < >  --    MCV fL 83 84 85  --  83 83  --   --   --   --   --   --  83   PLATELETS Thousands/uL 137* 106* 91*  --  85* 74*  --   --  143*  --  142*  --  133*   MCH pg 27 0 27 2 27 4  --  27 8 27 4  --   --   --   --   --   --  27 1   MCHC g/dL 32 5 32 5 32 4  --  33 4 33 1  --   --   --   --   --   --  32 9   RDW % 13 7 14 0 13 8  --  13 9 13 5  --   --   --   --   --   --  13 2   MPV fL 11 4 11 2 12 3  --  11 5 10 9  --   --  10 2  --  11 1  --  11 1   NRBC AUTO /100 WBCs 0 0 0  --   --   --   --   --   --   --   --   --   --     < > = values in this interval not displayed         CMP:  Results from last 7 days   Lab Units 04/15/21  0501 04/14/21  0411 04/13/21  0440 04/12/21  0548 04/12/21  0104 04/11/21  0426 04/10/21  2131  04/09/21  1442 04/09/21  1405 04/09/21  1330 04/09/21  1201 04/09/21  1139 04/09/21  1112 04/09/21  1057 04/09/21  1004   POTASSIUM mmol/L 4 0 4 2 4 1 3 8 3 8 4 1 4 2   < >  --  3 7  --   --   --   --   --   --    CHLORIDE mmol/L 103 105 107 111* 105 110* 112*   < >  --  112*  --   --   --   --   --   --    CO2 mmol/L 29 28 27 25 27 24 24   < >  --  21  --   --   --   --   --   --    CO2, I-STAT mmol/L  --   --   --   --   --   --   --   --  22  --  23 27 27 27 26 26   BUN mg/dL 27* 28* 26* 24 27* 20 22   < >  --  18  --   --   --   --   --   --    CREATININE mg/dL 1 06 1 04 0 93 0 94 1 10 1 07 1 07   < >  --  1 10  --   --   --   --   --   --    GLUCOSE, ISTAT mg/dl  --   --   --   --   --   --   --   --  145*  --  179* 224* 222* 201* 186* 191*   CALCIUM mg/dL 8 8 8 5 7 9* 7 9* 7 9* 7 9* 8 0*   < >  --  7 8*  --   --   --   --   --   --    ALT U/L  --   --   --   --   --   --   --   --   --  76  --   --   --   --   --   --    EGFR ml/min/1 73sq m 78 80 91 90 75 77 77   < >  --  75  --   --   --   --   --   --     < > = values in this interval not displayed  BMP:  Results from last 7 days   Lab Units 04/15/21  0501 04/14/21  0411 04/13/21  0440 04/12/21  0548 04/12/21  0104 04/11/21  0426 04/10/21  2131  04/09/21  1442   POTASSIUM mmol/L 4 0 4 2 4 1 3 8 3 8 4 1 4 2   < >  --    CHLORIDE mmol/L 103 105 107 111* 105 110* 112*   < >  --    CO2 mmol/L 29 28 27 25 27 24 24   < >  --    CO2, I-STAT mmol/L  --   --   --   --   --   --   --   --  22   BUN mg/dL 27* 28* 26* 24 27* 20 22   < >  --    CREATININE mg/dL 1 06 1 04 0 93 0 94 1 10 1 07 1 07   < >  --    GLUCOSE, ISTAT mg/dl  --   --   --   --   --   --   --   --  145*   CALCIUM mg/dL 8 8 8 5 7 9* 7 9* 7 9* 7 9* 8 0*   < >  --     < > = values in this interval not displayed         No results found for: NTBNP     Results from last 7 days   Lab Units 04/14/21  0411 04/13/21  0440 04/12/21  0548 04/11/21  0426 04/10/21  0436 04/09/21  0529   MAGNESIUM mg/dL 2 0 2 1 1 8 2 0 2 4 2 0                         Lipid Profile: No results found for: CHOL  Lab Results   Component Value Date    HDL 34 (L) 2021    HDL 35 (L) 2021     Lab Results   Component Value Date    LDLCALC 44 2021    LDLCALC 79 2021     Lab Results   Component Value Date    TRIG 97 2021    TRIG 91 2021       Cardiac testing:   Results for orders placed during the hospital encounter of 21   Echo complete with contrast if indicated    Narrative [address] [place] [address] [address] [address]  [phone]    Transthoracic Echocardiogram  2D, M-mode, Doppler, and Color Doppler    Study date:  2021    Patient: Bekah Monge  MR number: [id number]  Account number: [de-identified]  : 1965  Age: 64 years  Gender: Male  Status: Inpatient  Location: Emergency room  Height: 65 in  Weight: 155 lb  BP: 166/ 88 mmHg    Indications: Acute MI  Diagnoses: I21 4 - Non-ST elevation (NSTEMI) myocardial infarction    Sonographer:  Alexis Snyder RDCS  Referring Physician:  Vero Burns MD  Group:  [facility]  Interpreting Physician:  Vero Burns MD    SUMMARY    LEFT VENTRICLE:  Systolic function was normal  Ejection fraction was estimated to be 55 %  There was hypokinesis of mid anterior and apical anterior wall  Wall thickness was at the upper limits of normal   Features were consistent with a pseudonormal left ventricular filling pattern, with concomitant abnormal relaxation and increased filling pressure (grade 2 diastolic dysfunction)  MITRAL VALVE:  There was mild annular calcification  There was mild regurgitation  AORTIC VALVE:  The valve was not visualized well enough to rule out a bicuspid morphology  TRICUSPID VALVE:  There was trace regurgitation  PERICARDIUM:  A possible, trace pericardial effusion was identified along the right atrial free wall  There was no evidence of hemodynamic compromise  HISTORY: PRIOR HISTORY: Chest pain  Hypertensive urgency  Tobacco abuse  PROCEDURE: The procedure was performed in the emergency room  This was a routine study  The transthoracic approach was used  The study included complete 2D imaging, M-mode, complete spectral Doppler, and color Doppler  The heart rate was  69 bpm, at the start of the study  Images were obtained from the parasternal, apical, subcostal, and suprasternal notch acoustic windows  Image quality was adequate  LEFT VENTRICLE: Size was normal  Systolic function was normal  Ejection fraction was estimated to be 55 %  There was hypokinesis of mid anterior and apical anterior wall Wall thickness was at the upper limits of normal  DOPPLER: Features  were consistent with a pseudonormal left ventricular filling pattern, with concomitant abnormal relaxation and increased filling pressure (grade 2 diastolic dysfunction)  RIGHT VENTRICLE: The size was normal  Systolic function was normal     LEFT ATRIUM: Size was normal     RIGHT ATRIUM: Size was normal     MITRAL VALVE: There was mild annular calcification  DOPPLER: There was no evidence for stenosis  There was mild regurgitation  AORTIC VALVE: The valve was not visualized well enough to rule out a bicuspid morphology  DOPPLER: There was no evidence for stenosis  There was no regurgitation  TRICUSPID VALVE: DOPPLER: There was trace regurgitation  PULMONIC VALVE: Not well visualized  PERICARDIUM: A possible, trace pericardial effusion was identified along the right atrial free wall  There was no evidence of hemodynamic compromise  AORTA: The root exhibited normal size      SYSTEM MEASUREMENT TABLES    2D  %FS: 35 %  Ao Diam: 2 4 cm  EDV(Teich): 79 7 ml  EF(Teich): 64 7 %  ESV(Teich): 28 2 ml  IVSd: 0 9 cm  LA Area: 13 4 cm2  LA Diam: 3 5 cm  LVEDV MOD A4C: 88 8 ml  LVEF MOD A4C: 60 %  LVESV MOD A4C: 35 6 ml  LVIDd: 4 2 cm  LVIDs: 2 7 cm  LVLd A4C: 7 6 cm  LVLs A4C: 6 6 cm  LVPWd: 1 1 cm  RA Area: 11 5 cm2  RVIDd: 3 cm  SV MOD A4C: 53 3 ml  SV(Teich): 51 6 ml    MM  TAPSE: 2 2 cm    PW  E' Sept: 0 1 m/s  E/E' Sept: 8 8  MV A Dionisio: 0 8 m/s  MV Dec [facility]: 5 5 m/s2  MV DecT: 167 7 ms  MV E Dionisio: 0 9 m/s  MV E/A Ratio: 1 1    [facility]    Prepared and electronically signed by    Gema James MD  Signed 06-Apr-2021 17:46:50       No results found for this or any previous visit  No results found for this or any previous visit  No procedure found  No results found for this or any previous visit      Meds/Allergies   all current active meds have been reviewed and current meds:   Current Facility-Administered Medications   Medication Dose Route Frequency    acetaminophen (TYLENOL) tablet 975 mg  975 mg Oral Q8H    amiodarone tablet 200 mg  200 mg Oral Q8H [address]    aspirin tablet 325 mg  325 mg Oral Daily    atorvastatin (LIPITOR) tablet 80 mg  80 mg Oral Daily With Dinner    bisacodyl (DULCOLAX) rectal suppository 10 mg  10 mg Rectal Daily PRN    docusate sodium (COLACE) capsule 100 mg  100 mg Oral BID    fondaparinux (ARIXTRA) subcutaneous injection 2 5 mg  2 5 mg Subcutaneous Daily    furosemide (LASIX) injection 40 mg  40 mg Intravenous BID (diuretic)    insulin glargine (LANTUS) subcutaneous injection 10 Units 0 1 mL  10 Units Subcutaneous HS    insulin lispro (HumaLOG) 100 units/mL subcutaneous injection 1-5 Units  1-5 Units Subcutaneous TID AC    insulin lispro (HumaLOG) 100 units/mL subcutaneous injection 1-5 Units  1-5 Units Subcutaneous HS    lidocaine (cardiac) injection 100 mg  100 mg Intravenous Q30 Min PRN    lisinopril (ZESTRIL) tablet 2 5 mg  2 5 mg Oral Once    lisinopril (ZESTRIL) tablet 5 mg  5 mg Oral Daily    metoprolol tartrate (LOPRESSOR) partial tablet 12 5 mg  12 5 mg Oral Q12H BOBBY    mupirocin (BACTROBAN) 2 % nasal ointment 1 application  1 application Nasal Z36G [address]    ondansetron (ZOFRAN) injection 4 mg  4 mg Intravenous Q6H PRN    oxyCODONE (ROXICODONE) immediate release tablet 10 mg  10 mg Oral Q4H PRN    oxyCODONE (ROXICODONE) IR tablet 5 mg  5 mg Oral Q4H PRN    pantoprazole (PROTONIX) EC tablet 40 mg  40 mg Oral Daily Before Breakfast    polyethylene glycol (MIRALAX) packet 17 g  17 g Oral Daily    potassium chloride (K-DUR,KLOR-CON) CR tablet 20 mEq  20 mEq Oral BID    temazepam (RESTORIL) capsule 15 mg  15 mg Oral HS PRN          EKG personally reviewed by SEE Martin    Assessment:  Principal Problem:    NSTEMI (non-ST elevated myocardial infarction) [facility])  Active Problems:    Chest pain    Hypertensive urgency    Hyperglycemia    Tobacco abuse    Thrombocytopenia (HCC)    S/P CABG x 3    Prediabetes    Postoperative anemia due to acute blood loss    Sinus bradycardia      Counseling / Coordination of Care  Total floor / unit time spent today 20 minutes  Greater than 50% of total time was spent with the patient and / or family counseling and / or coordination of care  ** Please Note: Dragon 360 Dictation voice to text software may have been used in the creation of this document   **

## 2021-04-15 NOTE — DISCHARGE INSTRUCTIONS
Sternal Precautions   AMBULATORY CARE:   Sternal precautions  are used to help protect your sternum (breastbone) after open chest surgery  Wires are placed during surgery to hold the sternum together as it heals  Sternal precautions help prevent the wires from cutting through the sternum  The precautions also help prevent the sternum from coming apart from an injury, and prevent pain and bleeding  You may need to use the precautions for up to 12 weeks after surgery  Your surgeon will give you specific instructions based on the type of surgery you had  It is important to follow the instructions carefully  An injury to the healing sternum can be life-threatening  General sternal precautions:  Start slowly and do more as you get stronger  Pain medicine might make it harder for you to know when to slow down or be careful  Stop immediately if you hear a crunch or pop in your sternum  · Protect your sternum  Hug a pillow to your chest or cross your arms over your chest when you laugh, sneeze, or cough  · Be careful when you get into or out of a chair or bed  Hug a pillow or cross your arms when you stand or sit  Do not twist as you move  Use only your legs to sit and stand  You may need to use a raised toilet seat if you have trouble standing up without using your arms  Your healthcare provider may teach you to use your elbow for support as you move from lying to sitting  · Ask when you may take a bath or shower  You may need to use a bath chair if you have trouble getting into or out of the tub  Do not use a grab bar  · Do not lift or carry anything heavier than 5 pounds  For example, a gallon of milk weighs 8 pounds  · Keep your arms down as much as possible  Do not put your arms out to the side, behind you, or over your head  Do not let anyone pull your arms to help you move or dress  Do not reach for items  · Do not push or pull anything  Examples include a car door or a vacuum   · Do not drive while you are healing  Your surgeon will tell you when it is safe for you to start driving again  Call 911 for any of the following:   · You have sudden pain in your sternum and hear a crunch or pop  · You have bleeding that does not stop even after you apply pressure for 5 minutes  Seek care immediately if:   · You hear crunching or grinding in your sternum  · You have signs of an infection, such as a fever, red or warm skin, or pus in the surgery wound  Contact your healthcare provider if:   · You continue to feel pain, even after you take your pain medicine  · You have new or worsening pain, or any pain with movement  · You have questions or concerns about your condition or care  Care for your surgery wound:  Always wash your hands before you care for your wound  Wash your wound as directed  Do not rub the wound as you wash or dry the area  Pat the area dry with a clean towel  Change the bandages as directed and when they get wet or dirty  Do not smoke:  Nicotine can damage blood vessels and make it more difficult to heal  Do not use e-cigarettes or smokeless tobacco in place of cigarettes or to help you quit  They still contain nicotine  Ask your healthcare provider for information if you currently smoke and need help quitting  Follow up with your healthcare provider as directed:  Write down your questions so you remember to ask them during your visits  © 2017 [employer] Information is for End User's use only and may not be sold, redistributed or otherwise used for commercial purposes  All illustrations and images included in CareNotes® are the copyrighted property of [employer]  or [employer]  The above information is an  only  It is not intended as medical advice for individual conditions or treatments   Talk to your doctor, nurse or pharmacist before following any medical regimen to see if it is safe and effective for you       Heart Healthy Diet   WHAT YOU NEED TO KNOW:   A heart healthy diet is an eating plan low in total fat, unhealthy fats, and sodium (salt)  A heart healthy diet helps decrease your risk for heart disease and stroke  Limit the amount of fat you eat to 25% to 35% of your total daily calories  Limit sodium to less than 2,300 mg each day  DISCHARGE INSTRUCTIONS:   Healthy fats:  Healthy fats can help improve cholesterol levels  The risk for heart disease is decreased when cholesterol levels are normal  Choose healthy fats, such as the following:  · Unsaturated fat  is found in foods such as soybean, canola, olive, corn, and safflower oils  It is also found in soft tub margarine that is made with liquid vegetable oil  · Omega-3 fat  is found in certain fish, such as salmon, tuna, and trout, and in walnuts and flaxseed  Unhealthy fats:  Unhealthy fats can cause unhealthy cholesterol levels in your blood and increase your risk of heart disease  Limit unhealthy fats, such as the following:  · Cholesterol  is found in animal foods, such as eggs and lobster, and in dairy products made from whole milk  Limit cholesterol to less than 300 milligrams (mg) each day  You may need to limit cholesterol to 200 mg each day if you have heart disease  · Saturated fat  is found in meats, such as sotelo and hamburger  It is also found in chicken or turkey skin, whole milk, and butter  Limit saturated fat to less than 7% of your total daily calories  Limit saturated fat to less than 6% if you have heart disease or are at increased risk for it  · Trans fat  is found in packaged foods, such as potato chips and cookies  It is also in hard margarine, some fried foods, and shortening  Avoid trans fats as much as possible    Heart healthy foods and drinks to include:  Ask your dietitian or healthcare provider how many servings to have from each of the following food groups:  · Grains:      ¨ Whole-wheat breads, cereals, and pastas, and brown rice    ¨ Low-fat, low-sodium crackers and chips    · Vegetables:      ¨ Broccoli, green beans, green peas, and spinach    ¨ Collards, kale, and lima beans    ¨ Carrots, sweet potatoes, tomatoes, and peppers    ¨ Canned vegetables with no salt added    · Fruits:      ¨ Bananas, peaches, pears, and pineapple    ¨ Grapes, raisins, and dates    ¨ Oranges, tangerines, grapefruit, orange juice, and grapefruit juice    ¨ Apricots, mangoes, melons, and papaya    ¨ Raspberries and strawberries    ¨ Canned fruit with no added sugar    · Low-fat dairy products:      ¨ Nonfat (skim) milk, 1% milk, and low-fat almond, cashew, or soy milks fortified with calcium    ¨ Low-fat cheese, regular or frozen yogurt, and cottage cheese    · Meats and proteins , such as lean cuts of beef and pork (loin, leg, round), skinless chicken and turkey, legumes, soy products, egg whites, and nuts  Foods and drinks to limit or avoid:  Ask your dietitian or healthcare provider about these and other foods that are high in unhealthy fat, sodium, and sugar:  · Snack or packaged foods , such as frozen dinners, cookies, macaroni and cheese, and cereals with more than 300 mg of sodium per serving    · Canned or dry mixes  for cakes, soups, sauces, or gravies    · Vegetables with added sodium , such as instant potatoes, vegetables with added sauces, or regular canned vegetables    · Other foods high in sodium , such as ketchup, barbecue sauce, salad dressing, pickles, olives, soy sauce, and miso    · High-fat dairy foods  such as whole or 2% milk, cream cheese, or sour cream, and cheeses     · High-fat protein foods  such as high-fat cuts of beef (T-bone steaks, ribs), chicken or turkey with skin, and organ meats, such as liver    · Cured or smoked meats , such as hot dogs, sotelo, and sausage    · Unhealthy fats and oils , such as butter, stick margarine, shortening, and cooking oils such as coconut or palm oil    · Food and drinks high in sugar , such as soft drinks (soda), sports drinks, sweetened tea, candy, cake, cookies, pies, and doughnuts  Other diet guidelines to follow:   · Eat more foods containing omega-3 fats  Eat fish high in omega-3 fats at least 2 times a week  · Limit alcohol  Too much alcohol can damage your heart and raise your blood pressure  Women should limit alcohol to 1 drink a day  Men should limit alcohol to 2 drinks a day  A drink of alcohol is 12 ounces of beer, 5 ounces of wine, or 1½ ounces of liquor  · Choose low-sodium foods  High-sodium foods can lead to high blood pressure  Add little or no salt to food you prepare  Use herbs and spices in place of salt  · Eat more fiber  to help lower cholesterol levels  Eat at least 5 servings of fruits and vegetables each day  Eat 3 ounces of whole-grain foods each day  Legumes (beans) are also a good source of fiber  Lifestyle guidelines:   · Do not smoke  Nicotine and other chemicals in cigarettes and cigars can cause lung and heart damage  Ask your healthcare provider for information if you currently smoke and need help to quit  E-cigarettes or smokeless tobacco still contain nicotine  Talk to your healthcare provider before you use these products  · Exercise regularly  to help you maintain a healthy weight and improve your blood pressure and cholesterol levels  Ask your healthcare provider about the best exercise plan for you  Do not start an exercise program without asking your healthcare provider  Follow up with your healthcare provider as directed:  Write down your questions so you remember to ask them during your visits  © 2017 [employer] Information is for End User's use only and may not be sold, redistributed or otherwise used for commercial purposes  All illustrations and images included in CareNotes® are the copyrighted property of [employer]  or [employer]  The above information is an  only   It is not intended as medical advice for individual conditions or treatments  Talk to your doctor, nurse or pharmacist before following any medical regimen to see if it is safe and effective for you  Narcotic Pain Management   WHAT YOU NEED TO KNOW:   It is important to manage your pain so you can rest and heal  It will also help you return to your normal activities  DISCHARGE INSTRUCTIONS:   Call 911 or have someone call 911 for any of the following:   · You are breathing slower than normal, or you have trouble breathing  · You cannot be woken  · You have a seizure  Return to the emergency department if:   · Your heart is beating slower than usual     · Your heart feels like it is jumping or fluttering  · You have trouble staying awake  · You have severe muscle pain or weakness  · You see or hear things that are not real   Contact your healthcare provider or pain specialist if:   · You are too dizzy to stand up  · Your pain gets worse or you have new pain  · Your pain does not get better after you use your narcotic medicine  · You cannot do your usual activities because of side effects from the narcotic  · You are constipated or have abdominal pain  · You have questions or concerns about your condition or care  Narcotic safety:   · Take your medicine as directed  Ask if you need more information on how to take your medicine correctly  Follow up with your healthcare provider regularly  You may need to have your dose adjusted  Do not use narcotic medicine if you are pregnant or breastfeeding  Narcotic medicines can be transferred to your baby through your blood and breast milk  · Give your healthcare provider a list of all your medicines  Include any over-the-counter medicines, vitamins, and herbs  It can be dangerous to take narcotics with certain other medicines, such as antihistamines  · Keep your medicine in a safe place    Store your narcotic medicine in a locked cabinet to keep it away from children and others  · Do not drink alcohol while you use narcotics  Alcohol use with a narcotic medicine can make you sleepy and slow your breathing rate  You may stop breathing completely  · Do not drive or operate heavy machinery after you take narcotic medicine  Narcotic medicine can make you drowsy and make it hard to concentrate  You may injure yourself or others if you drive or operate heavy machinery while taking your medicine  Drink more liquids and eat high-fiber foods:  Liquids and fiber will help prevent constipation  Ask your healthcare provider what liquids are right for you and how much you should drink  Also ask for a list of foods that contain fiber  Follow up with your healthcare provider as directed: You may be referred to a pain specialist for more tests and treatment  Write down your questions so you remember to ask them during your visits  © 2017 [employer] [employer] Information is for End User's use only and may not be sold, redistributed or otherwise used for commercial purposes  All illustrations and images included in CareNotes® are the copyrighted property of [employer]  or [employer]  The above information is an  only  It is not intended as medical advice for individual conditions or treatments  Talk to your doctor, nurse or pharmacist before following any medical regimen to see if it is safe and effective for you

## 2021-04-15 NOTE — DISCHARGE SUMMARY
Discharge Summary - Cardiothoracic Surgery   Joey Perdomo 64 y o  male MRN: [id number]  Unit/Bed#: [facility] 424-01 Encounter: [id number]    Admission Date: 4/7/2021     Discharge Date: 04/15/21    Admitting Diagnosis: Chest pain [R07 9]    Primary Discharge Diagnosis:   Coronary artery disease  S/P coronary artery bypass grafting;    Secondary Discharge Diagnosis:   Patient Active Problem List   Diagnosis    Chest pain    Hypertensive urgency    Hyperglycemia    Tobacco abuse    Leukocytosis    NSTEMI (non-ST elevated myocardial infarction) (HCC)    Thrombocytopenia (HCC)    S/P CABG x 3    Prediabetes    Postoperative anemia due to acute blood loss    Sinus bradycardia         Attending: LILIANA Gillespie  Consulting Physician(s):   Cardiology  Medical/Critical Care  endocrine    Procedures Performed:   4/9: CORONARY ARTERY BYPASS GRAFT (CABG) 3 VESSELS: LIMA to LAD; LLE EVH/SVG to LPL & OM2  HARVEST VEIN ENDOSCOPIC (EVH); TRANSESOPHAGEAL ECHOCARDIOGRAM (BENNETT)     Hospital Course:   37: 59-year-old male referred from urgent care for further evaluation following onset of chest pain  On presentation NSTEMI was identified  Troponins positive  He was admitted and underwent cardiac catheterization  Severe 3 vessel CAD identified  He was transferred to [facility] for cardiac surgery consultation  4/8: Seen in consultation  Vein mapping, carotid ultrasound, and CT chest ordered  Consent signed  Preop orders placed  Ready for OR tomorrow  4/9: CABGx4  Transferred to ICU on Epi @ 2, Levo @ 12,  milrinone @ 0 35  Minimal CT output  NSR 85  Wean to extubate  4/10: Extubated to NC without difficulty  Weaned off of levophed and epi  Primacor weaned to 0 25  Received total 1L LR and 750 albumin for low CI and hypotension  V-paced overnight for CI/BP, now NSR 60s  Maintain swan and arterial line  Lopressor 12 5 mg PO BID  Lasix 40 mg IV q QD  D/C polanco catheter  Maintain ICU level of care        4/11: Attempted wean of primacor to 0 12 yesterday, however CI dropped to 1 9 with SvO2 43 6% and increased SVR up to the 1500s  Started on low dose cardene for afterload reduction with improvement in CI to 3 3 this AM and significant improvement in SVR to 700s  Cardene now off this AM  Worsening hypoxia overnight requiring escalation to HFNC and given dose of lasix with improvement in O2 requirements  Wean primacor to 0  13  Maintain swan  D/C arterial line  D/C pacing wires  Maintain chest tubes  Increase Lasix to 40 mg IV q BID  D/C polanco catheter  Add Lisinopril, 2 5 mg PO QD       4/12: Ci dropped to 1 with milrinone off yesterday  Restarted on 0 13  Echo today, deline if EF normal   Walk with PT, then discontinue chest tubes  On 2L NC  Transfer to stepdown  4/13: Remains on stepdown status, kept in ICU due to lack of floor beds  Remains on milrinone 0 13, delined, TTE w/ EF 55%, wean milrinone to off today  On RA throughout day/night, on [facility] s/p movement this AM, wean as tolerated  Remains on insulin gtt per endo  Transfer to telem & floor once bed available  4/14: C/O MENDOZA; remains on 2L NC, pulling 750 on IS with poor inspiratory effort  CXR PA/LAT today shows atelectasis and small bilateral pleural effusions  Trace apical left ptx  Sinus eugenia 56 -279ml/24 hrs; increase Lasix TID  D/C TLC       4/15: Weaned from RA  Asymptomatic Hypotension, ACE-I held  Good diuresis  Glucose well controlled, no insulin on D/C per endocrine  Home today  Condition at Discharge:   good     Discharge Physical Exam:    Please see the documented physical exam from this morning's progress note for details      Discharge Data:  Results from last 7 days   Lab Units 04/14/21  0504 04/13/21  0440 04/12/21  0518   WBC Thousand/uL 7 75 9 65 12 96*   HEMOGLOBIN g/dL 9 0* 9 0* 9 2*   HEMATOCRIT % 27 7* 27 7* 28 4*   PLATELETS Thousands/uL 137* 106* 91*     Results from last 7 days   Lab Units 04/15/21  0501 04/14/21  0411 04/13/21  0440 04/09/21  1442   POTASSIUM mmol/L 4 0 4 2 4 1   < >  --    CHLORIDE mmol/L 103 105 107   < >  --    CO2 mmol/L 29 28 27   < >  --    CO2, I-STAT mmol/L  --   --   --   --  22   BUN mg/dL 27* 28* 26*   < >  --    CREATININE mg/dL 1 06 1 04 0 93   < >  --    GLUCOSE, ISTAT mg/dl  --   --   --   --  145*   CALCIUM mg/dL 8 8 8 5 7 9*   < >  --     < > = values in this interval not displayed  Results from last 7 days   Lab Units 04/09/21  0529 04/08/21  1848 04/08/21  1311   PTT seconds 40* 68* 74*       Discharge instructions/Information to patient and family:   See after visit summary for information provided to patient and family  Heather Maharaj was educated on restrictions regarding driving and lifting, and techniques of proper incisional care  They were specifically counselled on signs and symptoms of an incisional infection, and advised to contact our service immediately should they develop fevers, sweats, chill, redness or drainage at the site of any incisions  Provisions for Follow-Up Care:  See after visit summary for information related to follow-up care and any pertinent home health orders  Disposition:  Home    Planned Readmission:   No    Discharge Medications:  See after visit summary for reconciled discharge medications provided to patient and family  Heather Maharaj was provided contact information and scheduled a follow up appointment with LILIANA Fortune  Additionally, follow up appointments have been scheduled for their primary care physician and primary cardiologist   Contact information was provided  Upon admission, troponins were Positive for NSTEMI, with level > 0 02  Heather Hancock was counseled on the importance of avoiding tobacco products  As with all patients whom have undergone open heart surgery, tobacco cessation medication was contraindicated at the time of discharge       ACE/ARB was Contraindicated secondary to hypotension    Beta Blocker was Prescribed at discharge      The patient was discharged on ongoing diuretic therapy with Torsemide 20 mg, PO QD and Potassium Chloride 20 mEq, PO QD  They were advised to continue these medications for 10 days, unless otherwise directed  Narcotic pain medication was prescribed in the form of Oxycodone  Prior to prescribing, their prescription profile was reviewed on the [facility] of health prescription drug monitoring program     The patient was informed that following their postoperative surgical evaluation, they will be referred to outpatient cardiac rehabilitation  They were counseled that this program is run by specialists who will help them safely strengthen their heart and prevent more heart disease  Cardiac rehabilitation will include exercise, relaxation, stress management, and heart-healthy nutrition  Caregivers will also check to make sure their medication regimen is working  During this admission, the patient was questioned on their use of tobacco, alcohol, and illicit/non-prescription drug use in the  previous 24 months  During this time frame they admit to using tobacco  As such they have been counseled on the importance of cessation and abstinence  I spent 30 minutes discharging the patient  This time was spent on the day of discharge  I had direct contact with the patient on the day of discharge  Additional documentation is required if more than 30 minutes were spent on discharge       SIGNATURE: Cyndi Grover PA-C  DATE: April 15, 2021  TIME: 11:25 AM

## 2021-04-15 NOTE — PLAN OF CARE
Problem: PHYSICAL THERAPY ADULT  Goal: Performs mobility at highest level of function for planned discharge setting  See evaluation for individualized goals  Description: Treatment/Interventions: Functional transfer training, LE strengthening/ROM, Therapeutic exercise, Endurance training, Cognitive reorientation, Patient/family training, Equipment eval/education, Bed mobility, Gait training, Spoke to nursing, Spoke to case management  Equipment Recommended: Sydni Ness       See flowsheet documentation for full assessment, interventions and recommendations  Outcome: Adequate for Discharge  Note: Prognosis: Good  Problem List: Decreased strength, Decreased endurance, Impaired balance, Decreased mobility, Pain  Assessment: Pt has made great progress towards mobility goals  At this time, pt is performing transfers at mod I level and ambulating community distances with supervision assist and no AD  Initiated stair training this session, during which pt was able to simulate home setup with good technique and no issues  Pt slightly SOB after stair trial, but recovered quickly with rest break  Pt has no further acute PT needs at this time, PT signing off  Pt would benefit from continued ambulation with staff 3-4x/day while here in the hospital  Recommending home with no needs upon d/c   Barriers to Discharge: None     PT Discharge Recommendation: Return to previous environment with social support, Home with skilled therapy(HHPT at this time)  PT Discharge Recommendation: No rehabilitation needs     PT - OK to Discharge: Yes    See flowsheet documentation for full assessment

## 2021-04-15 NOTE — PROGRESS NOTES
Progress Note - Cardiothoracic Surgery   Masood Smith 64 y o  male MRN: [id number]  Unit/Bed#: [facility] 424-01 Encounter: [id number]    Coronary artery disease  S/P coronary artery bypass grafting x 3; POD # 6      24 Hour Events: No events overnight  Weaned to RA  Self ambulating around the room  Denies CP or SOB      Medications:   Scheduled Meds:  Current Facility-Administered Medications   Medication Dose Route Frequency Provider Last Rate    acetaminophen  975 mg Oral Q8H Howard Balding, PA-C      amiodarone  200 mg Oral [facility] Howard Balding, PA-C      aspirin  325 mg Oral Daily Howard Balding, PA-C      atorvastatin  80 mg Oral Daily With [employer], PA-C      bisacodyl  10 mg Rectal Daily PRN Howard Balding, PA-C      docusate sodium  100 mg Oral BID Howard Balding, PA-C      fondaparinux  2 5 mg Subcutaneous Daily Howard Balding, PA-C      furosemide  40 mg Intravenous TID (diuretic) Juliette Hammond, SNEHAL      insulin glargine  10 Units Subcutaneous HS Howard Balding, PA-C      insulin lispro  1-5 Units Subcutaneous TID AC Howard Balding, PA-C      insulin lispro  1-5 Units Subcutaneous HS Howard Balding, PA-C      lidocaine (cardiac)  100 mg Intravenous Q30 Min PRN Howard Balding, PA-C      lisinopril  2 5 mg Oral Once Howard Balding, PA-C      lisinopril  5 mg Oral Daily Howard Balding, PA-C      metoprolol tartrate  12 5 mg Oral Q12H [address] [facility] PA-[facility]      mupirocin  1 application Nasal [id number] [address] [place], [place]      ondansetron  4 mg Intravenous Q6H PRN Howard Balding, PA-C      oxyCODONE  10 mg Oral Q4H PRN Howard Balding, PA-C      oxyCODONE  5 mg Oral Q4H PRN Howard Balding, PA-C      pantoprazole  40 mg Oral Daily Before Breakfast Howard Balding, PA-C      polyethylene glycol  17 g Oral Daily Howard Balding, PA-C      potassium chloride  20 mEq Oral TID With Meals Juliette Hammond, PA-TOMAS      temazepam 15 mg Oral HS PRN Birtha Skiff, PA-C       Continuous Infusions:   PRN Meds: bisacodyl    lidocaine (cardiac)    ondansetron    oxyCODONE    oxyCODONE    temazepam    Vitals:   Vitals:    04/15/21 0247 04/15/21 0600 04/15/21 0709 04/15/21 0914   BP: 113/54  113/55 98/67   Pulse: 55  (!) 54 67   Resp: 18  19    Temp: 97 7 °F (36 5 °C)  98 5 °F (36 9 °C)    TempSrc:       SpO2: 95%  95%    Weight:  74 1 kg (163 lb 5 8 oz)         Telemetry: Sinus Bradycardia; Heart Rate: 52    Respiratory:   SpO2: SpO2: 95 %; 2 LPM    Intake/Output: -2080 ml/24 hrs  I/O       04/12 0701 - 04/13 0700 04/13 0701 - 04/14 0700 04/14 0701 - 04/15 0700    P  O  120 560     I V  (mL/kg) 563 3 (7 3) 35 4 (0 5)     IV Piggyback 50      Total Intake(mL/kg) 733 3 (9 5) 595 4 (7 9)     Urine (mL/kg/hr) 2200 (1 2) 875 (0 5)     Stool  0     Chest Tube 60      Total Output 2260 875     Net -1526 8 -279 6            Unmeasured Stool Occurrence  1 x             Weights:   Weight (last 2 days)     Date/Time   Weight    04/15/21 0600   74 1 (163 36)    04/14/21 0600   75 7 (166 89)    04/13/21 0553   76 8 (169 31)            Admit weight: 75 2 kg    Results:   Results from last 7 days   Lab Units 04/14/21  0504 04/13/21  0440 04/12/21  0518   WBC Thousand/uL 7 75 9 65 12 96*   HEMOGLOBIN g/dL 9 0* 9 0* 9 2*   HEMATOCRIT % 27 7* 27 7* 28 4*   PLATELETS Thousands/uL 137* 106* 91*     Results from last 7 days   Lab Units 04/15/21  0501 04/14/21  0411 04/13/21  0440  04/09/21  1442   SODIUM mmol/L 136 137 137   < >  --    POTASSIUM mmol/L 4 0 4 2 4 1   < >  --    CHLORIDE mmol/L 103 105 107   < >  --    CO2 mmol/L 29 28 27   < >  --    CO2, I-STAT mmol/L  --   --   --   --  22   BUN mg/dL 27* 28* 26*   < >  --    CREATININE mg/dL 1 06 1 04 0 93   < >  --    GLUCOSE, ISTAT mg/dl  --   --   --   --  145*   CALCIUM mg/dL 8 8 8 5 7 9*   < >  --     < > = values in this interval not displayed       Results from last 7 days   Lab Units 04/09/21  0599 04/08/21  1848 04/08/21  1311   PTT seconds 40* 68* 74*     Point of care glucose:  - 177  Endo following    Studies:  CXR PA/LAT 4/14: 1  Small bilateral pleural effusions, status post thoracostomy tube removal   Bilateral lower lobe infiltrates consistent with atelectasis  2   Small left apical pneumothorax   No mediastinal shift  3   Small amount of air in the mediastinum, consistent with recent surgery  Invasive Lines/Tubes:  Invasive Devices     Peripheral Intravenous Line            Peripheral IV 04/14/21 Left Forearm less than 1 day                Physical Exam:    HEENT/NECK:  Normocephalic  Atraumatic  No jugular venous distention  Cardiac: Regular rate and rhythm and No murmurs/rubs/gallops  Pulmonary:  Breath sounds clear bilaterally and Breath sounds diminished in the bases bilaterally   Abdomen:  Non-tender, Non-distended and Normal bowel sounds  Incisions: Sternum is stable  Incision is clean, dry, and intact  and Saphenectomy incison is clean, dry, and intact  Extremities: Extremities warm/dry and No edema B/L  Neuro: Alert and oriented X 3, Sensation is grossly intact and No focal deficits  Skin: Warm/Dry, without rashes or lesions  Assessment:  Principal Problem:    NSTEMI (non-ST elevated myocardial infarction) [facility])  Active Problems:    Chest pain    Hypertensive urgency    Hyperglycemia    Tobacco abuse    Thrombocytopenia (HCC)    S/P CABG x 3    Prediabetes    Postoperative anemia due to acute blood loss    Sinus bradycardia       Coronary artery disease  S/P coronary artery bypass grafting x 3; POD # 6    Plan:    1  Cardiac:   Sinus Bradycardia; HR/BP well-controlled  Lopressor, 12 5mg PO BID, Lisinopril 5mg PO daily  Continue ASA and Statin therapy  Epicardial pacing wires out  Central IV accessremoved  Continue DVT prophylaxis    2   Pulmonary:   Acute respiratory failure;  Secondary to atelectasis, pulmonary vascular congestion and poor inspiratory effort secondary to pain  Resolved  Good room air oxygen saturations  Continue incentive spirometry/coughing/deep breathing exercises  Wean supplemental oxygen as tolerated for saturation > 90%  Chest tubes have been discontinued   MENDOZA - CXR PA/LAT with small bilat pleural effusions and trace left apical ptx  MENDOZA likely related to postop pain, discomfort limiting inspiration, as evidenced by pt only pulling 750 on IS  3  Renal:   Intake/Output net: -2080 mL/24 hours  Decrease diuresis   Lasix 40 mg IV BID  Potassium Chloride 20 mEq PO BID  Post op Creatinine stable; Follow up labs prn    4  Neuro:  Neurologically intact; No active issues  Incisional pain well-controlled  Continue Tylenol, 975 mg PO q 8, standing dose  Continue Oxycodone, 2 5 to 5 mg PO q 4 hours prn pain    5  GI:  Tolerating TLC 2 3 gm sodium diet  Maintain 1800 mL daily fluid restriction   Continue stool softeners and prn suppository  Continue GI prophylaxis    6  Endo:   History of diabetes; Continue SQ insulin therapy as directed by endocrinology physician  Insulin administration teaching for home therapy ordered  Will touch base with Endo regarding recommendations for glycemic control    7    Hematology:    Post-operative acute blood loss anemia; Hemoglobin 9 0; trend prn and Thrombocytopenia    8  Disposition:      Doing well  Discharge home today  VTE Pharmacologic Prophylaxis: Fondaparinux (Arixtra)  VTE Mechanical Prophylaxis: sequential compression device    Collaborative rounds completed with LILIANA Stroud    Plan of care discussed with bedside nurse    SIGNATURE: Laura Joseph PA-C  DATE: April 15, 2021  TIME: 9:27 AM

## 2021-04-15 NOTE — INCIDENTAL FINDINGS
The following findings require follow up:  Radiographic finding   Finding: Goiter; Lung nodule   Follow up required: yes   Follow up should be done within 12 month(s)    Please notify the following clinician to assist with the follow up:   Gilberto Lam [place], [place], [address]

## 2021-04-15 NOTE — CASE MANAGEMENT
Pt is cleared for d/c by Cardiothoracic Surgery SNEHAL Leung  RN Reece Galvin was notified of pt's d/c order  [facility] for [facility] aftercare plan  The pt and his family were informed of d/c  Family will transport pt home later this day, pickup time TBD  No IMM required  No chart copy required  CM to follow

## 2021-04-15 NOTE — PHYSICAL THERAPY NOTE
Physical Therapy Treatment Note    Patient's Name: Craig Restrepo  : 1965       04/15/21 0958   PT Last Visit   PT Visit Date 04/15/21   Note Type   Note Type Treatment   Pain Assessment   Pain Assessment Tool Pain Assessment not indicated - pt denies pain   Restrictions/Precautions   Weight Bearing Precautions Per Order No   Other Precautions Cardiac/sternal;Telemetry   General   Chart Reviewed Yes   Family/Caregiver Present No   Cognition   Overall Cognitive Status WFL   Attention Within functional limits   Orientation Level Oriented X4   Memory Within functional limits   Following Commands Follows all commands and directions without difficulty   Bed Mobility   Additional Comments OOB upon arrival, not assessed   Transfers   Sit to Stand 6  Modified independent   Stand to Sit 6  Modified independent   Additional Comments Transfers without AD   Ambulation/Elevation   Gait pattern   (slow)   Gait Assistance 5  Supervision   Assistive Device None   Distance 360ft   Stair Management Assistance 5  Supervision   Stair Management Technique One rail R;Alternating pattern; Foreward   Number of Stairs 14   Balance   Static Sitting Normal   Dynamic Sitting Good   Static Standing Good   Dynamic Standing Good   Ambulatory Good   Endurance Deficit   Endurance Deficit Yes   Endurance Deficit Description mild MENDOZA after steps   Activity Tolerance   Activity Tolerance Patient tolerated treatment well   Nurse Made Aware ok to see per RN   Assessment   Prognosis Good   Assessment Pt has made great progress towards mobility goals  At this time, pt is performing transfers at mod I level and ambulating community distances with supervision assist and no AD  Initiated stair training this session, during which pt was able to simulate home setup with good technique and no issues  Pt slightly SOB after stair trial, but recovered quickly with rest break  Pt has no further acute PT needs at this time, PT signing off   Pt would benefit from continued ambulation with staff 3-4x/day while here in the hospital  Recommending home with no needs upon d/c    Barriers to Discharge None   Goals   Patient Goals to go home   PT Treatment Day 3   Plan   Progress Discontinue PT   PT Frequency Other (Comment)  (D/C PT)   Recommendation   PT Discharge Recommendation No rehabilitation needs   Equipment Recommended Other (Comment)  (none)   PT - OK to Discharge Yes   AM-PAC Basic Mobility Inpatient   Turning in Bed Without Bedrails 4   Lying on Back to Sitting on Edge of Flat Bed 4   Moving Bed to Chair 4   Standing Up From Chair 4   Walk in Room 4   Climb 3-5 Stairs 3   Basic Mobility Inpatient Raw Score 23   Basic Mobility Standardized Score 50 88       Kirstie Kim, PT, DPT

## 2021-04-16 ENCOUNTER — TRANSITIONAL CARE MANAGEMENT (OUTPATIENT)
Dept: FAMILY MEDICINE CLINIC | Facility: CLINIC | Age: 56
End: 2021-04-16

## 2021-04-16 NOTE — UTILIZATION REVIEW
Notification of Discharge  This is a Notification of Discharge from our facility [address]  Please be advised that this patient has been discharge from our facility  Below you will find the admission and discharge date and time including the patients disposition  PRESENTATION DATE: 4/7/2021  7:53 PM  OBS ADMISSION DATE:   IP ADMISSION DATE: 4/7/21 1953   DISCHARGE DATE: 4/15/2021  1:36 PM  DISPOSITION: Home with [facility] with [facility]   Admission Orders listed below:  Admission Orders (From admission, onward)     Ordered        04/07/21 2232  Inpatient Admission  Once                   Please contact the UR Department if additional information is required to close this patient's authorization/case  [address] [employer] Utilization Review Department  Main: [phone] x carefully listen to the prompts  All voicemails are confidential   [email or web address]  Send all requests for admission clinical reviews, approved or denied determinations and any other requests to dedicated fax number below belonging to the campus where the patient is receiving treatment   List of dedicated fax numbers:  [address] DENIALS (Administrative/Medical Necessity) [phone]   [address] (Maternity/NICU/Pediatrics) [phone]   Carter Rogers [phone]   [facility] [phone]   Aaron Chand [phone]   [address] [phone]   [facility]  [phone]   [address] [facility]  [address] [facility] [address] [facility] [phone]

## 2021-04-20 ENCOUNTER — TELEPHONE (OUTPATIENT)
Dept: CARDIAC SURGERY | Facility: CLINIC | Age: 56
End: 2021-04-20

## 2021-04-20 NOTE — TELEPHONE ENCOUNTER
POST OP PHONE CALL    PROCEDURE:  DISCHARGE DATE:  Most recent bp with the visiting nurse was Jason Morrell also stated he's been checking it himself and its been 128/82      CURRENT WT: 156 lb  PRE OP WT:155 lb  DISCHARGE WT: 163 lb    Blanca Buerger been active walking around his house  Has not been feel sob, only when going up the stairs  Still using his incentive spirometer  No chest pain,  Not been feeling light headedness or fevers/ chill  No swelling and incision looks good   Reviewed f/u appt with him   pcp 4/22  Cards 4/30  Cardiac surgery 5/17

## 2021-04-22 ENCOUNTER — OFFICE VISIT (OUTPATIENT)
Dept: FAMILY MEDICINE CLINIC | Facility: CLINIC | Age: 56
End: 2021-04-22
Payer: COMMERCIAL

## 2021-04-22 VITALS
HEIGHT: 65 IN | DIASTOLIC BLOOD PRESSURE: 78 MMHG | SYSTOLIC BLOOD PRESSURE: 104 MMHG | OXYGEN SATURATION: 98 % | BODY MASS INDEX: 26.12 KG/M2 | WEIGHT: 156.8 LBS | HEART RATE: 78 BPM

## 2021-04-22 DIAGNOSIS — K62.5 BRBPR (BRIGHT RED BLOOD PER RECTUM): ICD-10-CM

## 2021-04-22 DIAGNOSIS — Z95.1 S/P CABG X 3: ICD-10-CM

## 2021-04-22 DIAGNOSIS — I20.9 ANGINA PECTORIS (HCC): ICD-10-CM

## 2021-04-22 DIAGNOSIS — D62 POSTOPERATIVE ANEMIA DUE TO ACUTE BLOOD LOSS: ICD-10-CM

## 2021-04-22 DIAGNOSIS — D69.6 THROMBOCYTOPENIA (HCC): ICD-10-CM

## 2021-04-22 DIAGNOSIS — R07.9 CHEST PAIN, UNSPECIFIED TYPE: ICD-10-CM

## 2021-04-22 DIAGNOSIS — Z72.0 TOBACCO ABUSE: ICD-10-CM

## 2021-04-22 DIAGNOSIS — E11.59 TYPE 2 DIABETES MELLITUS WITH OTHER CIRCULATORY COMPLICATION, WITHOUT LONG-TERM CURRENT USE OF INSULIN (HCC): ICD-10-CM

## 2021-04-22 DIAGNOSIS — Z09 HOSPITAL DISCHARGE FOLLOW-UP: Primary | ICD-10-CM

## 2021-04-22 DIAGNOSIS — D72.829 LEUKOCYTOSIS, UNSPECIFIED TYPE: ICD-10-CM

## 2021-04-22 DIAGNOSIS — R73.09 ELEVATED HEMOGLOBIN A1C: ICD-10-CM

## 2021-04-22 DIAGNOSIS — Z12.11 COLON CANCER SCREENING: ICD-10-CM

## 2021-04-22 DIAGNOSIS — Z12.11 SCREENING FOR COLON CANCER: ICD-10-CM

## 2021-04-22 DIAGNOSIS — I21.4 NSTEMI (NON-ST ELEVATED MYOCARDIAL INFARCTION) (HCC): ICD-10-CM

## 2021-04-22 DIAGNOSIS — Z76.89 ENCOUNTER TO ESTABLISH CARE WITH NEW DOCTOR: ICD-10-CM

## 2021-04-22 PROBLEM — E11.9 TYPE II DIABETES MELLITUS (HCC): Status: ACTIVE | Noted: 2021-04-06

## 2021-04-22 PROCEDURE — 99496 TRANSJ CARE MGMT HIGH F2F 7D: CPT | Performed by: STUDENT IN AN ORGANIZED HEALTH CARE EDUCATION/TRAINING PROGRAM

## 2021-04-22 RX ORDER — OXYCODONE HYDROCHLORIDE 5 MG/1
TABLET ORAL
Qty: 30 TABLET | Refills: 0 | Status: SHIPPED | OUTPATIENT
Start: 2021-04-22 | End: 2021-05-03 | Stop reason: SDUPTHER

## 2021-04-22 NOTE — Clinical Note
Can I see this man in 4-6 weeks (at his convenience ) for a follow up and annual physical  He established via a TCM

## 2021-04-25 PROBLEM — I16.0 HYPERTENSIVE URGENCY: Status: RESOLVED | Noted: 2021-04-06 | Resolved: 2021-04-25

## 2021-04-25 PROBLEM — R73.03 PREDIABETES: Status: RESOLVED | Noted: 2021-04-13 | Resolved: 2021-04-25

## 2021-04-25 NOTE — ASSESSMENT & PLAN NOTE
Stable  Still some post op wall pain  Continue close follow up with cardiology and will have cardiac rehab

## 2021-04-25 NOTE — PROGRESS NOTES
Assessment/Plan:  TCM Call (since 3/25/2021)     Date and time call was made  4/16/2021  1:30 PM    Hospital care reviewed  Records reviewed    Patient was hospitialized at  [facility]        Date of Admission  04/07/21    Date of discharge  04/15/21    Diagnosis  NSTEMI (non-ST elevated myocardial infarction    Disposition  Home; Home health services    Were the patients medications reviewed and updated  Yes    Current Symptoms  None      TCM Call (since 3/25/2021)     Post hospital issues  None    Should patient be enrolled in anticoag monitoring? No    Scheduled for follow up? Yes    Did you obtain your prescribed medications  Yes    Do you need help managing your prescriptions or medications  No    Is transportation to your appointment needed  No    I have advised the patient to call PCP with any new or worsening symptoms  Adilia Baer/kaia  Living Arrangements  Family members    Support System  Family    The type of support provided  Emotional    Do you have social support  Yes, as much as I need    Are you recieving any outpatient services  No    Are you recieving home care services  Yes    Types of home care services  Nurse visit    Are you using any community resources  No    Current waiver services  No    Have you fallen in the last 12 months  No    Interperter language line needed  No    Counseling  Patient    Counseling topics  patient and family education; Importance of RX compliance; Activities of daily living    Comments  spoke with pt on 01/19/21 after his hopspital d/c 04/15/2021  pt underwent CORONARY ARTERY BYPASS GRAFT (CABG) 3 VESSELS, pt was evaluayed for VNA at home and he has appt scheduled with Cardiologist  pt states that he is taking all his medications as prescribed and he is aware to call our office if he has any questions or concern  ER/CMA               Problem List Items Addressed This Visit        Endocrine    Type II diabetes mellitus ([facility] )       Lab Results   Component Value Date    HGBA1C 6 5 (H) 04/06/2021     Recommend continue metformin at current dose for 3-6 months along with diet and exercise  Will get him to ophthalmology for diabetic vision check and complete a foot exam  Is on lipitor, BB, but no ACE or ARB 2/2 hypotension  Will follow and get urine microalbumin and adjust management as clinically indicated         Relevant Orders    Ambulatory referral to Ophthalmology    Ambulatory referral to Diabetic Education    Microalbumin / creatinine urine ratio       Cardiovascular and Mediastinum    [facility]non[facility]ST elevated [facility]     Stable  Still some post op wall pain  Continue close follow up with cardiology and will have cardiac rehab  Relevant Medications    oxyCODONE (ROXICODONE) 5 mg immediate release tablet       Other    Chest pain     Refill oxycodone         Tobacco abuse     Continue with smoking cessattion  Relevant Medications    oxyCODONE (ROXICODONE) 5 mg immediate release tablet    Leukocytosis     Likely reactive 2/2 CABG and NSTEMI  Will follow up labs         Thrombocytopenia ([facility] )     Will recheck, may have been 2/2 post op anemia and blood loss         Relevant Medications    oxyCODONE (ROXICODONE) 5 mg immediate release tablet    Other Relevant Orders    CBC and Platelet    S/P CABG x 3     Continue with ASA, lipotor, BB, demadex x 10 days, and diabetes management and smoking cessation   Follow up with cardiology         Relevant Medications    oxyCODONE (ROXICODONE) 5 mg immediate release tablet    Postoperative anemia due to acute blood loss     Was resolving in the hospital, will recheck           Other Visit Diagnoses     Hospital discharge follow-up    -  Primary    Screening for colon cancer        Elevated hemoglobin A1c        Relevant Medications    oxyCODONE (ROXICODONE) 5 mg immediate release tablet    Other Relevant Orders    Ambulatory referral to Ophthalmology    Ambulatory referral to Diabetic Education Microalbumin / creatinine urine ratio    Angina pectoris (HCC)        Relevant Medications    oxyCODONE (ROXICODONE) 5 mg immediate release tablet    Colon cancer screening        Relevant Orders    Ambulatory referral to Gastroenterology    BRBPR (bright red blood per rectum)        Relevant Orders    Ambulatory referral to Gastroenterology    Encounter to establish care with new doctor                Subjective:      Patient ID: Abigail Campos is a 64 y o  male  HPI  Coming in for a hospital discharge follow up  Was admitted for chest pain and found to have an NSTEMI  S/p CABG x3 and pain is much improved  Has post surgical site pain  Denies a cardiac hx in himself but his father had a cardiac event at the same age  Seditary life style and a smoker as well  Did not know he had diabetes either  Still has some chest wall pain but it is improving  Denies any similar cardiac pain prior to this episode but has not seen a doctor in several years  He has tried taking ASA in the past but had BRBPR  Denies any hx of hemorrhoids, crohns, UC< or colon cancer in himself or his family  The following portions of the patient's history were reviewed and updated as appropriate:   Past Medical History:  He has a past medical history of Bicuspid aortic valve, CAD (coronary artery disease), Former tobacco use, GERD (gastroesophageal reflux disease), Goiter, History of myocardial infarction, History of rib fracture, Hypertension, and Hypertensive urgency (4/6/2021)  ,  _______________________________________________________________________  Medical Problems:  does not have any pertinent problems on file ,  _______________________________________________________________________  Past Surgical History:   has a past surgical history that includes Vasectomy; Cardiac catheterization; pr cabg, artery-vein, four (N/A, 4/9/2021); pr endoscopy w/video-asst vein harvest,cabg (Left, 4/9/2021); and pr echo transesophag r-t 2d w/prb img jaleesa i&r (N/A, 4/9/2021)  ,  _______________________________________________________________________  Family History:  family history includes Heart disease in his father ,  _______________________________________________________________________  Social History:   reports that he has been smoking cigarettes  He has been smoking about 1 00 pack per day  He has never used smokeless tobacco  He reports that he does not drink alcohol or use drugs  ,  _______________________________________________________________________  Allergies:  has No Known Allergies     _______________________________________________________________________  Current Outpatient Medications   Medication Sig Dispense Refill    acetaminophen (TYLENOL) 325 mg tablet Take 1-2 tabs q6h PRN mild fever/pain 90 tablet 0    aspirin 325 mg tablet Take 1 tablet (325 mg total) by mouth daily 90 tablet 0    atorvastatin (LIPITOR) 80 mg tablet Take 1 tablet (80 mg total) by mouth daily with dinner 90 tablet 0    metoprolol tartrate (LOPRESSOR) 25 mg tablet Take 0 5 tablets (12 5 mg total) by mouth every 12 (twelve) hours 90 tablet 0    potassium chloride (K-DUR,KLOR-CON) 20 mEq tablet Take 1 tablet (20 mEq total) by mouth daily for 10 days 10 tablet 0    torsemide (DEMADEX) 20 mg tablet Take 1 tablet (20 mg total) by mouth daily 90 tablet 0    bisacodyl (DULCOLAX) 5 mg EC tablet Take 2 tablets (10 mg total) by mouth daily as needed for constipation (Patient not taking: Reported on 4/22/2021) 180 each 0    metFORMIN (GLUCOPHAGE) 500 mg tablet Take 1 tablet (500 mg total) by mouth 2 (two) times a day with meals (Patient not taking: Reported on 4/22/2021) 90 tablet 0    omeprazole (PriLOSEC) 10 mg delayed release capsule Take 10 mg by mouth daily      oxyCODONE (ROXICODONE) 5 mg immediate release tablet Take 1-2 pills PO q6h as needed for mild to moderate pain  Ongoing therapy for postoperative pain   30 tablet 0    polyethylene glycol (MIRALAX) 17 g packet Take 17 g by mouth daily IF PACKETS UNAVAILABLE, MAY TAKE OTC EQUIVALENT  USED AS DIRECTED  (Patient not taking: Reported on 4/22/2021) 510 g 0     No current facility-administered medications for this visit       _______________________________________________________________________  Review of Systems   Constitutional: Negative for chills, fatigue and fever  HENT: Negative for rhinorrhea and sore throat  Eyes: Negative for visual disturbance  Respiratory: Negative for cough and shortness of breath  Cardiovascular: Positive for chest pain  Negative for palpitations  Gastrointestinal: Negative for abdominal pain, constipation, diarrhea, nausea and vomiting  Endocrine: Negative for polydipsia, polyphagia and polyuria  Genitourinary: Negative for difficulty urinating, dysuria and frequency  Musculoskeletal: Negative for arthralgias and myalgias  Skin: Negative for color change and rash  Neurological: Negative for weakness and headaches  Hematological: Negative for adenopathy  Does not bruise/bleed easily  Psychiatric/Behavioral: Positive for sleep disturbance (2/2 chest wall pain)  Objective:  Vitals:    04/22/21 1109   BP: 104/78   BP Location: Left arm   Patient Position: Sitting   Cuff Size: Adult   Pulse: 78   SpO2: 98%   Weight: 71 1 kg (156 lb 12 8 oz)   Height: 5' 5" (1 651 m)     Body mass index is 26 09 kg/m²  Physical Exam  Constitutional:       General: He is not in acute distress  Appearance: He is not ill-appearing  HENT:      Head: Normocephalic and atraumatic  Right Ear: External ear normal       Left Ear: External ear normal       Nose: Nose normal  No congestion or rhinorrhea  Mouth/Throat:      Mouth: Mucous membranes are moist       Pharynx: Oropharynx is clear  No oropharyngeal exudate or posterior oropharyngeal erythema  Eyes:      Extraocular Movements: Extraocular movements intact        Conjunctiva/sclera: Conjunctivae normal  Pupils: Pupils are equal, round, and reactive to light  Neck:      Musculoskeletal: Normal range of motion  Cardiovascular:      Rate and Rhythm: Normal rate and regular rhythm  Chest Wall: PMI is not displaced  No thrill  Pulses: Normal pulses  Dorsalis pedis pulses are 2+ on the right side and 2+ on the left side  Posterior tibial pulses are 2+ on the right side and 2+ on the left side  Heart sounds: No murmur  No systolic murmur  No diastolic murmur  No S3 sounds  Pulmonary:      Effort: Pulmonary effort is normal  No respiratory distress  Breath sounds: Normal breath sounds  No wheezing  Chest:      Chest wall: Lacerations (healing well, margins clean, dry, and intact without surround erythema or loculations), swelling and tenderness present  No mass, deformity, crepitus or edema  There is no dullness to percussion  Abdominal:      General: Bowel sounds are normal       Palpations: Abdomen is soft  Tenderness: There is no abdominal tenderness  Musculoskeletal: Normal range of motion  Right lower leg: No edema  Left lower leg: He exhibits no tenderness, no bony tenderness, no swelling, no deformity and no laceration  Edema (minimal non pitting) present  Legs:       Comments: Vein graft site is healing well without surrounding erythema, loculations or LOF  Skin:     General: Skin is warm and dry  Capillary Refill: Capillary refill takes less than 2 seconds  Findings: No rash  Neurological:      General: No focal deficit present  Mental Status: He is alert  Mental status is at baseline

## 2021-04-25 NOTE — ASSESSMENT & PLAN NOTE
Lab Results   Component Value Date    HGBA1C 6 5 (H) 04/06/2021     Recommend continue metformin at current dose for 3-6 months along with diet and exercise  Will get him to ophthalmology for diabetic vision check and complete a foot exam  Is on lipitor, BB, but no ACE or ARB 2/2 hypotension   Will follow and get urine microalbumin and adjust management as clinically indicated

## 2021-04-25 NOTE — ASSESSMENT & PLAN NOTE
Seems to be resolved and even required pressors during admission   Will continue to  onitor and add an ACE/ARB if needed

## 2021-04-25 NOTE — ASSESSMENT & PLAN NOTE
Continue with ASA, lipotor, BB, demadex x 10 days, and diabetes management and smoking cessation   Follow up with cardiology

## 2021-04-26 ENCOUNTER — TELEPHONE (OUTPATIENT)
Dept: FAMILY MEDICINE CLINIC | Facility: CLINIC | Age: 56
End: 2021-04-26

## 2021-04-26 NOTE — TELEPHONE ENCOUNTER
----- Message from Abad Cannon MD sent at 4/25/2021  1:45 PM EDT -----  Can I see this man in 4-6 weeks (at his convenience ) for a follow up and annual physical  He established via a TCM

## 2021-04-26 NOTE — TELEPHONE ENCOUNTER
Lm for pt asking him to call the office to schedule appt in 4 to 6 weeks for follow up and physical

## 2021-04-27 ENCOUNTER — TELEPHONE (OUTPATIENT)
Dept: FAMILY MEDICINE CLINIC | Facility: CLINIC | Age: 56
End: 2021-04-27

## 2021-04-27 DIAGNOSIS — D69.6 THROMBOCYTOPENIA (HCC): ICD-10-CM

## 2021-04-27 DIAGNOSIS — I20.9 ANGINA PECTORIS (HCC): ICD-10-CM

## 2021-04-27 DIAGNOSIS — R73.03 PREDIABETES: ICD-10-CM

## 2021-04-27 DIAGNOSIS — Z72.0 TOBACCO ABUSE: ICD-10-CM

## 2021-04-27 DIAGNOSIS — Z95.1 S/P CABG X 3: ICD-10-CM

## 2021-04-27 DIAGNOSIS — R73.09 ELEVATED HEMOGLOBIN A1C: ICD-10-CM

## 2021-04-27 DIAGNOSIS — I21.4 NSTEMI (NON-ST ELEVATED MYOCARDIAL INFARCTION) (HCC): ICD-10-CM

## 2021-04-27 RX ORDER — POTASSIUM CHLORIDE 20 MEQ/1
20 TABLET, EXTENDED RELEASE ORAL DAILY
Qty: 30 TABLET | Refills: 0 | Status: SHIPPED | OUTPATIENT
Start: 2021-04-27 | End: 2021-05-24

## 2021-04-27 NOTE — TELEPHONE ENCOUNTER
T/c from Omer Pineda at [facility] - pt was prescribed torsemide and  Potassium while in hosptial   Pt reports he was given 10 day supply of potassium and no ending date for torsemide  Pt reports he is short of breath when he does not take the torsemide but is not comfortable taking it without the potassium, which he will be running out of soon  Nurse is requesting Dr Saleem Trevizo refills the potassium for pt for time being  Pt has first appt with cardiovascular sx on 5/17      P#:  Christin Hayes

## 2021-04-27 NOTE — TELEPHONE ENCOUNTER
Attempted to call [facility] to notify her Dr Holly Go took care of the request   Rajan Plant phone, said "I'll call ya back" and hung up on me

## 2021-04-30 ENCOUNTER — OFFICE VISIT (OUTPATIENT)
Dept: CARDIOLOGY CLINIC | Facility: CLINIC | Age: 56
End: 2021-04-30
Payer: COMMERCIAL

## 2021-04-30 VITALS
SYSTOLIC BLOOD PRESSURE: 106 MMHG | DIASTOLIC BLOOD PRESSURE: 76 MMHG | HEIGHT: 65 IN | BODY MASS INDEX: 25.66 KG/M2 | OXYGEN SATURATION: 98 % | WEIGHT: 154 LBS | HEART RATE: 83 BPM

## 2021-04-30 DIAGNOSIS — R00.1 SINUS BRADYCARDIA: ICD-10-CM

## 2021-04-30 DIAGNOSIS — I25.10 CORONARY ARTERY DISEASE INVOLVING NATIVE CORONARY ARTERY OF NATIVE HEART WITHOUT ANGINA PECTORIS: Primary | ICD-10-CM

## 2021-04-30 DIAGNOSIS — I10 ESSENTIAL HYPERTENSION: ICD-10-CM

## 2021-04-30 DIAGNOSIS — Z95.1 S/P CABG X 3: ICD-10-CM

## 2021-04-30 PROCEDURE — 99214 OFFICE O/P EST MOD 30 MIN: CPT | Performed by: NURSE PRACTITIONER

## 2021-04-30 PROCEDURE — 3008F BODY MASS INDEX DOCD: CPT | Performed by: NURSE PRACTITIONER

## 2021-04-30 RX ORDER — ASPIRIN 81 MG/1
81 TABLET ORAL DAILY
Qty: 90 TABLET | Refills: 3 | Status: SHIPPED | OUTPATIENT
Start: 2021-04-30 | End: 2021-11-04

## 2021-04-30 RX ORDER — ASPIRIN 81 MG/1
81 TABLET ORAL ONCE
Status: DISCONTINUED | OUTPATIENT
Start: 2021-04-30 | End: 2021-04-30

## 2021-04-30 NOTE — PROGRESS NOTES
Cardiology Office Note    Ophelia Ware 64 y o  male MRN: [id number]    04/30/21          Assessment/Plan:    1  Coronary artery disease s/p CABG x3 ( lima to LAD, SVG to left posterior lateral and SVG to OM 2)  -patient states he is doing well, does have occasional sternotomy pain  - instructed to continue aspirin, lower dosage to 81 mg given history of GI bleeding  - also instructed to continue to take his metoprolol tartrate 12 5 mg b i d , torsemide, atorvastatin and potassium  - instructed on low-fat/ low-cholesterol and low-sodium diet    2  Hypertension  - blood pressure well controlled on metoprolol tartrate  - patient instructed to continue to monitor his blood pressure at home    3  Tobacco abuse  - patient has completely stop smoking      Follow up: 6 months or sooner as needed    1  Coronary artery disease involving native coronary artery of native heart without angina pectoris  aspirin (ECOTRIN LOW STRENGTH) 81 mg EC tablet    DISCONTINUED: aspirin (ECOTRIN LOW STRENGTH) EC tablet 81 mg   2  S/P CABG x 3     3  Sinus bradycardia     4  Essential hypertension         HPI: Ophelia Ware is a 64y o  year old male who presents   Today for a follow-up visit post CABG  He was admitted to [facility] on 04/06/2021 underwent coronary angiography for elevated troponin  He was discovered to have multivessel coronary artery disease and was transferred to [facility]  and underwent CABG x3 on 04/09/2021  Since discharge he states he has been doing well  He states he occasionally has sternotomy pain which for which he takes oxycodone which is effective  A since his surgery he states that he has quit smoking completely  He was concerned as he had taken aspirin in the past and states he had GI bleed from it, therefore his aspirin dosage was increased to 81 mg and he was prescribed enteric-coated tablets    He was instructed to continue to follow low-sodium diet as well as a low-fat and low-cholesterol diet  He states he had not been taking his water pill  as prescribed and was instructed to continue to take his torsemide daily along with his potassium  It also appears he has been taking metoprolol tartrate 25 mg once a day and was instructed to take half a tablet twice a day as ordered  His most recent TTE was performed on 04/12/2021 and was on limited study, and revealed normal systolic function with an EF 55%, no regional wall motion abnormalities,  And grade 2 diastolic dysfunction  The patient denies anginal chest pain, dyspnea on exertion or rest, lower extremity edema, or palpitations and was instructed to call  the office or seek medical attention if any such symptoms develop  All medications reviewed and patient is tolerating medications without side effects  Family History:  Family history of  early coronary artery disease    Social history: Former 1 pack per day smoker      Patient Active Problem List   Diagnosis    Chest pain    [facility]    Tobacco abuse    Leukocytosis    NSTEMI (non-ST elevated myocardial infarction) (HCC)    Thrombocytopenia (HCC)    S/P CABG x 3    Postoperative anemia due to acute blood loss    Sinus bradycardia       No Known Allergies      Current Outpatient Medications:     acetaminophen (TYLENOL) 325 mg tablet, Take 1-2 tabs q6h PRN mild fever/pain, Disp: 90 tablet, Rfl: 0    atorvastatin (LIPITOR) 80 mg tablet, Take 1 tablet (80 mg total) by mouth daily with dinner, Disp: 90 tablet, Rfl: 0    metoprolol tartrate (LOPRESSOR) 25 mg tablet, Take 0 5 tablets (12 5 mg total) by mouth every 12 (twelve) hours, Disp: 90 tablet, Rfl: 0    oxyCODONE (ROXICODONE) 5 mg immediate release tablet, Take 1-2 pills PO q6h as needed for mild to moderate pain   Ongoing therapy for postoperative pain , Disp: 30 tablet, Rfl: 0    potassium chloride (K-DUR,KLOR-CON) 20 mEq tablet, Take 1 tablet (20 mEq total) by mouth daily, Disp: 30 tablet, Rfl: 0    torsemide (DEMADEX) 20 mg tablet, Take 1 tablet (20 mg total) by mouth daily, Disp: 90 tablet, Rfl: 0    aspirin (ECOTRIN LOW STRENGTH) 81 mg EC tablet, Take 1 tablet (81 mg total) by mouth daily, Disp: 90 tablet, Rfl: 3    bisacodyl (DULCOLAX) 5 mg EC tablet, Take 2 tablets (10 mg total) by mouth daily as needed for constipation (Patient not taking: Reported on 4/22/2021), Disp: 180 each, Rfl: 0    metFORMIN (GLUCOPHAGE) 500 mg tablet, Take 1 tablet (500 mg total) by mouth 2 (two) times a day with meals (Patient not taking: Reported on 4/22/2021), Disp: 90 tablet, Rfl: 0    omeprazole (PriLOSEC) 10 mg delayed release capsule, Take 10 mg by mouth daily, Disp: , Rfl:     polyethylene glycol (MIRALAX) 17 g packet, Take 17 g by mouth daily IF PACKETS UNAVAILABLE, MAY TAKE OTC EQUIVALENT  USED AS DIRECTED  (Patient not taking: Reported on 4/22/2021), Disp: 510 g, Rfl: 0  No current facility-administered medications for this visit  Past Medical History:   Diagnosis Date    Bicuspid aortic valve     CAD (coronary artery disease)     Former tobacco use     GERD (gastroesophageal reflux disease)     Goiter     History of myocardial infarction     History of rib fracture     Hypertension     Hypertensive urgency 4/6/2021       Family History   Problem Relation Age of Onset    Heart disease Father        Past Surgical History:   Procedure Laterality Date    CARDIAC CATHETERIZATION      ND CABG, ARTERY-VEIN, FOUR N/A 4/9/2021    Procedure: CORONARY ARTERY BYPASS GRAFT (CABG) 3 VESSELS: LIMA to LAD; LLE EVH/SVG to LPL & OM2;  Surgeon: Lui Manrique DO;  [facility]: [facility];  Service: Cardiac Surgery    ND ECHO TRANSESOPHAG R-T 2D W/PRB IMG ACQUISJ I&R N/A 4/9/2021    Procedure: TRANSESOPHAGEAL ECHOCARDIOGRAM (BENNETT);   Surgeon: Lui Manrique DO;  [facility];  Service: Cardiac Surgery    ND ENDOSCOPY W/VIDEO-ASST VEIN HARVEST,CABG Left 4/9/2021    Procedure: Evita Nazario VEIN ENDOSCOPIC (EVH);   Surgeon: Gideon Dyer DO;  [facility]: [facility];  Service: Cardiac Surgery    VASECTOMY         Social History     Socioeconomic History    Marital status:      Spouse name: Not on file    Number of children: Not on file    Years of education: Not on file    Highest education level: Not on file   Occupational History    Not on file   Social Needs    Financial resource strain: Not on file    Food insecurity     Worry: Not on file     Inability: Not on file    Transportation needs     Medical: Not on file     Non-medical: Not on file   Tobacco Use    Smoking status: Current Every Day Smoker     Packs/day: 1 00     Types: Cigarettes    Smokeless tobacco: Never Used   Substance and Sexual Activity    Alcohol use: Never     Frequency: Never    Drug use: Never    Sexual activity: Not on file   Lifestyle    Physical activity     Days per week: Not on file     Minutes per session: Not on file    Stress: Not on file   Relationships    Social connections     Talks on phone: Not on file     Gets together: Not on file     Attends [employer] service: Not on file     Active member of club or organization: Not on file     Attends meetings of clubs or organizations: Not on file     Relationship status: Not on file    Intimate partner violence     Fear of current or ex partner: Not on file     Emotionally abused: Not on file     Physically abused: Not on file     Forced sexual activity: Not on file   Other Topics Concern    Not on file   Social History Narrative    Not on file       Review of symptoms:   Constitution:  Negative  HEENT:  Negative  Cardiovascular:  Negative  Respiratory:  Negative  Skin:  Negative  Gastrointestinal:  Negative  Genitourinary:  Negative  Musculoskeletal:  Negative  Neurological:  Negative  Endocrine:  Negative  Psychological:  Negative    Vitals: /76   Pulse 83   Ht 5' 5" (1 651 m)   Wt 69 9 kg (154 lb)   SpO2 98%   BMI 25 63 kg/m² Physical Exam:     GEN: Alert and oriented x 3, in no acute distress  Well appearing and well nourished  HEENT: Sclera anicteric, conjunctivae pink, mucous membranes moist    NECK: Supple, no carotid bruits, no significant JVD  Trachea midline  HEART: Regular rhythm, normal S1 and S2, no murmurs, clicks, gallops or rubs  LUNGS: Clear to auscultation bilaterally; no wheezes, rales, or rhonchi  No increased work of breathing or signs of respiratory distress  ABDOMEN: Soft, nontender, nondistended, normoactive bowel sounds  EXTREMITIES: Skin warm and well perfused, no clubbing, cyanosis, or edema  NEURO: No focal findings  Normal speech  Mood and affect normal    SKIN: Normal without suspicious lesions on exposed skin

## 2021-05-03 DIAGNOSIS — I20.9 ANGINA PECTORIS (HCC): ICD-10-CM

## 2021-05-03 DIAGNOSIS — Z72.0 TOBACCO ABUSE: ICD-10-CM

## 2021-05-03 DIAGNOSIS — Z95.1 S/P CABG X 3: ICD-10-CM

## 2021-05-03 DIAGNOSIS — R73.09 ELEVATED HEMOGLOBIN A1C: ICD-10-CM

## 2021-05-03 DIAGNOSIS — I21.4 NSTEMI (NON-ST ELEVATED MYOCARDIAL INFARCTION) (HCC): ICD-10-CM

## 2021-05-03 DIAGNOSIS — D69.6 THROMBOCYTOPENIA (HCC): ICD-10-CM

## 2021-05-03 RX ORDER — OXYCODONE HYDROCHLORIDE 5 MG/1
TABLET ORAL
Qty: 30 TABLET | Refills: 0 | Status: SHIPPED | OUTPATIENT
Start: 2021-05-03 | End: 2021-10-21 | Stop reason: HOSPADM

## 2021-05-03 NOTE — TELEPHONE ENCOUNTER
Medication:  PDMP   04/22/2021  1  04/22/2021  OXYCODONE HCL 5 MG TABLET  30 0  4  SH COL  [id number]  PENNS ([id number])  0  56 25 MME  Comm Ins  [place]    04/15/2021  2  04/15/2021  OXYCODONE HCL 5 MG TABLET  30 0  3  KE MCG  [id number]  PENNS ([id number])  0  75 0 MME  Comm Ins  [place]        Active agreement on file -No no

## 2021-05-17 ENCOUNTER — OFFICE VISIT (OUTPATIENT)
Dept: CARDIAC SURGERY | Facility: CLINIC | Age: 56
End: 2021-05-17

## 2021-05-17 VITALS
HEART RATE: 92 BPM | BODY MASS INDEX: 27.19 KG/M2 | OXYGEN SATURATION: 97 % | DIASTOLIC BLOOD PRESSURE: 92 MMHG | TEMPERATURE: 97.3 F | SYSTOLIC BLOOD PRESSURE: 148 MMHG | WEIGHT: 163.2 LBS | HEIGHT: 65 IN | RESPIRATION RATE: 18 BRPM

## 2021-05-17 DIAGNOSIS — Z95.1 S/P CABG X 3: Primary | ICD-10-CM

## 2021-05-17 DIAGNOSIS — I25.110 CORONARY ARTERY DISEASE INVOLVING NATIVE CORONARY ARTERY OF NATIVE HEART WITH UNSTABLE ANGINA PECTORIS (HCC): ICD-10-CM

## 2021-05-17 DIAGNOSIS — Z48.89 ENCOUNTER FOR POSTOPERATIVE CARE: ICD-10-CM

## 2021-05-17 PROCEDURE — 3008F BODY MASS INDEX DOCD: CPT | Performed by: NURSE PRACTITIONER

## 2021-05-17 PROCEDURE — 99024 POSTOP FOLLOW-UP VISIT: CPT | Performed by: NURSE PRACTITIONER

## 2021-05-17 NOTE — PROGRESS NOTES
POST OP FOLLOW UP VISIT    Procedure: S/P coronary artery bypass grafting x 3 (LIMA to LAD, SVG to LPL, SVG to OM2), performed on 4/9/21  History: Craig Restrepo is a 64y o  year old male who presents to our office today for routine follow up care from coronary artery bypass grafting  Patient presented to the hospital on 4/7/21 with chest pain and ruled in for a NSTEMI  Cardiac cath demonstrated 3 vessel CAD  CTS was consulted and pre op testing completed  He underwent CABG x 3 on 4/9/21  He tolerated his procedure well  Post op course was remarkable for need for pressor support for 5 days  He also had hypoxia likely related to poor pulmonary effort with evidence of atelectasis on CXR  He improved with aggressive pulmonary toilet and ambulation  He was discharged home on POD # 6  Pham keenan had f/u with his PCP and cardiologist   Today he reports sternotomy and chest wall pain  He took his last oxycodone this AM  He states he is taking 1000 mg Tylenol as frequent as every 2 hours at times  He alsO admits to taking Ibuprofen/Tylenol combo but now has developed acid reflux  Patient states he is tolerating ambulation and denies SOB, angina or lightheadedness  He has mild left leg edema (harvest site)  He denies GI issues and his eating well but reports a change in the odor of his stool  He denies fever or chills and reports his incisions have healed  Vital Signs:   Vitals:    05/17/21 1505 05/17/21 1510   BP: 140/96 148/92   BP Location: Left arm Right arm   Patient Position: Sitting Sitting   Cuff Size: Standard    Pulse: 92    Resp: 18    Temp: (!) 97 3 °F (36 3 °C)    TempSrc: Tympanic    SpO2: 97%    Weight: 74 kg (163 lb 3 2 oz)    Height: 5' 5" (1 651 m)        Home Medications:   Prior to Admission medications    Medication Sig Start Date End Date Taking?  Authorizing Provider   acetaminophen (TYLENOL) 325 mg tablet Take 1-2 tabs q6h PRN mild fever/pain 4/15/21  Yes Sean Melchor PA-C   aspirin (ECOTRIN LOW STRENGTH) 81 mg EC tablet Take 1 tablet (81 mg total) by mouth daily 4/30/21  Yes SEE Arshad   atorvastatin (LIPITOR) 80 mg tablet Take 1 tablet (80 mg total) by mouth daily with dinner 4/15/21  Yes Dave Lombard, PA-C   ibuprofen (MOTRIN) 100 mg/5 mL suspension Take by mouth every 6 (six) hours as needed for mild pain ADVIL   Yes Historical Provider, MD   metFORMIN (GLUCOPHAGE) 500 mg tablet Take 1 tablet (500 mg total) by mouth 2 (two) times a day with meals 4/15/21  Yes Dave Lombard, PA-C   metoprolol tartrate (LOPRESSOR) 25 mg tablet Take 0 5 tablets (12 5 mg total) by mouth every 12 (twelve) hours 4/15/21  Yes Dave Lombard, PA-C   bisacodyl (DULCOLAX) 5 mg EC tablet Take 2 tablets (10 mg total) by mouth daily as needed for constipation  Patient not taking: Reported on 4/22/2021 4/15/21 7/14/21  Dave Lombard, PA-C   omeprazole (PriLOSEC) 10 mg delayed release capsule Take 10 mg by mouth daily    Historical Provider, MD   oxyCODONE (ROXICODONE) 5 mg immediate release tablet Take 1-2 pills PO q6h as needed for mild to moderate pain  Ongoing therapy for postoperative pain  Patient not taking: Reported on 5/17/2021 5/3/21   Cara Martinez MD   polyethylene glycol (MIRALAX) 17 g packet Take 17 g by mouth daily IF PACKETS UNAVAILABLE, MAY TAKE OTC EQUIVALENT  USED AS DIRECTED  Patient not taking: Reported on 4/22/2021 4/15/21   Dave Lombard, PA-C   potassium chloride (K-DUR,KLOR-CON) 20 mEq tablet Take 1 tablet (20 mEq total) by mouth daily  Patient not taking: Reported on 5/17/2021 4/27/21 5/27/21  Cara Martinez MD   torsemide [facility]) 20 mg tablet Take 1 tablet (20 mg total) by mouth daily  Patient not taking: Reported on 5/17/2021 4/15/21   Dave Lombard, PA-C       Physical Exam:  General: Alert, oriented, well developed, no acute distress  HEENT/NECK:  PERRLA  No jugular venous distention  Cardiac:Regular rate and rhythm, No murmurs rubs or gallops    Pulmonary:Breath sounds clear bilaterally  Abdomen:  Non-tender, Non-distended  Positive bowel sounds  Upper extremities: 2+ radial pulses; brisk capillary refill  Lower extremities: Extremities warm/dry  PT/DP pules 2+ bilaterally  1+ edema left ankle, no edema right ankle  Incisions: Sternum is stable  Incision is clean, dry, and intact  Saphenectomy incision is clean, dry, and intact  Musculoskeletal: MAEE, no deficits, stable gait  Neuro: Alert and oriented X 3  Sensation is grossly intact  No focal deficits  Skin: Warm/Dry, without rashes or lesions  Lab Results:         Lab Results   Component Value Date    HGBA1C 6 5 (H) 04/06/2021     Lab Results   Component Value Date    TROPONINI 5 60 (H) 04/07/2021       Assessment: Coronary artery disease  S/P coronary artery bypass grafting;    Plan:     Kelly Gates is making progress in his recovery coronary artery bypass grafting  He is now 6weeks post op  Incisions are well-healed and his sternum is stable  Weight and VS are stable  I reviewed medications and made no changes  I discussed the benefits of participating in cardiac rehab and have cleared him to begin the outpatient  program  He may resume driving and I reviewed his lifting restriction of no more than 25 lbs for an additional 2 months, until he reaches 12 weeks post-op  Kelly Gates  does not need to return to our office for follow-up at this point  I have advised him to call with any new concerns that may arise  He should maintain routine follow-up with his cardiologist and PCP for ongoing medical care  Kelly Gates was comfortable with our recommendations and his questions were answered to his satisfaction        SEE Bagley  5/17/21  3:30PM

## 2021-05-17 NOTE — LETTER
May 17, 2021     Eugene Dia MD  [employer]    Patient: Yoshi Strange   YOB: 1965   Date of Visit: 5/17/2021       Dear Dr Todd Mary: Thank you for referring Yoshi Strange to me for evaluation  Below are my notes for this consultation  If you have questions, please do not hesitate to call me  I look forward to following your patient along with you  Sincerely,        Meera Ivy, DO        CC: No Recipients  JESSICA MackeyNP  5/17/2021  3:49 PM  Attested   POST OP FOLLOW UP VISIT    Procedure: S/P coronary artery bypass grafting x 3 (LIMA to LAD, SVG to LPL, SVG to OM2), performed on 4/9/21  History: Yoshi Strange is a 64y o  year old male who presents to our office today for routine follow up care from coronary artery bypass grafting  Patient presented to the hospital on 4/7/21 with chest pain and ruled in for a NSTEMI  Cardiac cath demonstrated 3 vessel CAD  CTS was consulted and pre op testing completed  He underwent CABG x 3 on 4/9/21  He tolerated his procedure well  Post op course was remarkable for need for pressor support for 5 days  He also had hypoxia likely related to poor pulmonary effort with evidence of atelectasis on CXR  He improved with aggressive pulmonary toilet and ambulation  He was discharged home on POD # 6  Pham keenan had f/u with his PCP and cardiologist   Today he reports sternotomy and chest wall pain  He took his last oxycodone this AM  He states he is taking 1000 mg Tylenol as frequent as every 2 hours at times  He alsO admits to taking Ibuprofen/Tylenol combo but now has developed acid reflux  Patient states he is tolerating ambulation and denies SOB, angina or lightheadedness  He has mild left leg edema (harvest site)  He denies GI issues and his eating well but reports a change in the odor of his stool  He denies fever or chills and reports his incisions have healed       Vital Signs:   Vitals: 05/17/21 1505 05/17/21 1510   BP: 140/96 148/92   BP Location: Left arm Right arm   Patient Position: Sitting Sitting   Cuff Size: Standard    Pulse: 92    Resp: 18    Temp: (!) 97 3 °F (36 3 °C)    TempSrc: Tympanic    SpO2: 97%    Weight: 74 kg (163 lb 3 2 oz)    Height: 5' 5" (1 651 m)        Home Medications:   Prior to Admission medications    Medication Sig Start Date End Date Taking? Authorizing Provider   acetaminophen (TYLENOL) 325 mg tablet Take 1-2 tabs q6h PRN mild fever/pain 4/15/21  Yes Yulia Mena PA-C   aspirin (ECOTRIN LOW STRENGTH) 81 mg EC tablet Take 1 tablet (81 mg total) by mouth daily 4/30/21  Yes SEE Arshad   atorvastatin (LIPITOR) 80 mg tablet Take 1 tablet (80 mg total) by mouth daily with dinner 4/15/21  Yes Yulia Mena PA-C   ibuprofen (MOTRIN) 100 mg/5 mL suspension Take by mouth every 6 (six) hours as needed for mild pain ADVIL   Yes Historical Provider, MD   metFORMIN (GLUCOPHAGE) 500 mg tablet Take 1 tablet (500 mg total) by mouth 2 (two) times a day with meals 4/15/21  Yes Yulia Mena PA-C   metoprolol tartrate (LOPRESSOR) 25 mg tablet Take 0 5 tablets (12 5 mg total) by mouth every 12 (twelve) hours 4/15/21  Yes Yulia Mena PA-C   bisacodyl (DULCOLAX) 5 mg EC tablet Take 2 tablets (10 mg total) by mouth daily as needed for constipation  Patient not taking: Reported on 4/22/2021 4/15/21 7/14/21  Yulia Mena PA-C   omeprazole (PriLOSEC) 10 mg delayed release capsule Take 10 mg by mouth daily    Historical Provider, MD   oxyCODONE (ROXICODONE) 5 mg immediate release tablet Take 1-2 pills PO q6h as needed for mild to moderate pain  Ongoing therapy for postoperative pain  Patient not taking: Reported on 5/17/2021 5/3/21   Wesley Vera MD   polyethylene glycol (MIRALAX) 17 g packet Take 17 g by mouth daily IF PACKETS UNAVAILABLE, MAY TAKE OTC EQUIVALENT  USED AS DIRECTED    Patient not taking: Reported on 4/22/2021 4/15/21   Yulia Mena PA-C   potassium chloride (K-DUR,KLOR-CON) 20 mEq tablet Take 1 tablet (20 mEq total) by mouth daily  Patient not taking: Reported on 5/17/2021 4/27/21 5/27/21  Nila Donohue MD   torsemide [facility]) 20 mg tablet Take 1 tablet (20 mg total) by mouth daily  Patient not taking: Reported on 5/17/2021 4/15/21   Wilfred Garner PA-C       Physical Exam:  General: Alert, oriented, well developed, no acute distress  HEENT/NECK:  PERRLA  No jugular venous distention  Cardiac:Regular rate and rhythm, No murmurs rubs or gallops  Pulmonary:Breath sounds clear bilaterally  Abdomen:  Non-tender, Non-distended  Positive bowel sounds  Upper extremities: 2+ radial pulses; brisk capillary refill  Lower extremities: Extremities warm/dry  PT/DP pules 2+ bilaterally  1+ edema left ankle, no edema right ankle  Incisions: Sternum is stable  Incision is clean, dry, and intact  Saphenectomy incision is clean, dry, and intact  Musculoskeletal: MAEE, no deficits, stable gait  Neuro: Alert and oriented X 3  Sensation is grossly intact  No focal deficits  Skin: Warm/Dry, without rashes or lesions  Lab Results:         Lab Results   Component Value Date    HGBA1C 6 5 (H) 04/06/2021     Lab Results   Component Value Date    TROPONINI 5 60 (H) 04/07/2021       Assessment: Coronary artery disease  S/P coronary artery bypass grafting;    Plan:     Richard Mcbride is making progress in his recovery coronary artery bypass grafting  He is now 6weeks post op  Incisions are well-healed and his sternum is stable  Weight and VS are stable  I reviewed medications and made no changes  I discussed the benefits of participating in cardiac rehab and have cleared him to begin the outpatient  program  He may resume driving and I reviewed his lifting restriction of no more than 25 lbs for an additional 2 months, until he reaches 12 weeks post-op  Richard Mcbride  does not need to return to our office for follow-up at this point    I have advised him to call with any new concerns that may arise  He should maintain routine follow-up with his cardiologist and PCP for ongoing medical care  Minerva Wilson was comfortable with our recommendations and his questions were answered to his satisfaction  SEE Cavazos  5/17/21  3:30PM  Attestation signed by Walden Harada, DO at 5/17/2021  4:06 PM:  Doing well post CABG x3  No chest pain or SOB  Has some upper incisional hyperalgesia and hypersensitivity  He is taking too much tylenol  Advised no more than 4g/day  Can alternate with advil if needed  Recommended PPI over the counter or maalox/Tums for reflux  Start cardiac rehab  Return to clinic as needed

## 2021-05-20 ENCOUNTER — CLINICAL SUPPORT (OUTPATIENT)
Dept: CARDIAC REHAB | Facility: HOSPITAL | Age: 56
End: 2021-05-20
Payer: COMMERCIAL

## 2021-05-20 DIAGNOSIS — R73.03 PREDIABETES: ICD-10-CM

## 2021-05-20 DIAGNOSIS — Z95.1 S/P CABG X 3: ICD-10-CM

## 2021-05-20 DIAGNOSIS — I20.9 ANGINA PECTORIS (HCC): ICD-10-CM

## 2021-05-20 DIAGNOSIS — R73.09 ELEVATED HEMOGLOBIN A1C: ICD-10-CM

## 2021-05-20 DIAGNOSIS — I21.4 NSTEMI (NON-ST ELEVATED MYOCARDIAL INFARCTION) (HCC): ICD-10-CM

## 2021-05-20 DIAGNOSIS — Z72.0 TOBACCO ABUSE: ICD-10-CM

## 2021-05-20 DIAGNOSIS — D69.6 THROMBOCYTOPENIA (HCC): ICD-10-CM

## 2021-05-20 PROCEDURE — 93797 PHYS/QHP OP CAR RHAB WO ECG: CPT

## 2021-05-20 NOTE — PROGRESS NOTES
Cardiac Rehabilitation Plan of Care   Initial Care Plan          Today's date: 2021   # of Exercise Sessions Completed:   Patient name: Ronit Lynne      : 1965  Age: 64 y o  MRN: [id number]  Referring Physician: David Hilton DO  Cardiologist: Dr Burgess Bill MD  Provider: Jaye Aguilar  Clinician: Jesus Manuel Manrique, MPT    Dx:   Encounter Diagnoses   Name Primary?  NSTEMI (non-ST elevated myocardial infarction) (HCC)     S/P CABG x 3     Elevated hemoglobin A1c     Angina pectoris (HCC)     Tobacco abuse     Thrombocytopenia (HCC)     Prediabetes      Date of onset: 2021       SUMMARY OF PROGRESS:  Today is the patient's initial visit at Cardiac  Rehab  Patient admits to 100% medication compliance  Patient reports the following physical limitations: fatigue, weakness, sternal pain/sorness, MENDOZA, general debility and overall functional decline    The patient completed an initial Submaximal TM ETT for a total of 8 minutes  and reached a 4 3 MET level  Patient does require supplemental O2 at this time  Patient's PHQ9 Score was zero  At this time, patient denies having depression  Patient's GAD7 Score was zero  At this time, patient denies having anxiety  Currently, the patient does not follow a formal exercise program at home  Patient was counseled on exercise guidelines including 1-2 days of moderate exercise on opposite days of Cardiac Rehab, as tolerated  Patient was issued the [facility] Cardiopulmonary Guide this session  At rest, the patient's HR was 89, O2Sat was 100%, and BP was 120/66  During exercise, the patient's peak HR was 119, while peak BP was 154/80  During the test the patient was able to maintain his 02 sat at 99%  Patient rated the test a 4 on 10 Scale  During recovery, the patient's HR was 91, O2Sat was 99%, and BP was 120/58  Patient denied having any other symptoms during exercise  Additionally, the patient's telemetry revealed NSR w/T wave inversion   Patient had correct hemodynamic responses to activity  Upon observation of patient's sternal incision, it was fully approximated w/some scabby areas  There were no signs on infection, redness, drainage or malodor  Patient rates his sternal pain/soreness as 3-/10  Sternal precautions were reviewed at length w/the patient  Patient has goals of decreased sternal pian, improved strength, initiate a HEP, decrease stress level, improve ability to ambulate, decrease reliance on medication, decrease risk profile, decrease MENDOZA, decrease fatigue,smoking cessation, return to work full time/duty and attain his maximal level of function      Patient is agreeable to coming to Cardiac Rehab 3X times/week for 12 weeks or until he reaches 36 sessions  Rosalind Blue was released by his surgeon 2021  He is scheduled to begin CR 2021           Medication compliance: Yes   Comments: Pt reports to be compliant with medications  Fall Risk: Low   Comments: Ambulates with a steady gait with no assist device    EKG Interpretation: Telemetry reveals NSR wT wave inversion      EXERCISE ASSESSMENT and PLAN    Current Exercise Program in Rehab:       Frequency: 3 days/week Supplement with home exercise 2+ days/wk as tolerated       Minutes: 30-35        METS: 3 to 3 5            HR: 20-30 > -115   RPE: 4-6         Modalities: Treadmill, UBE, NuStep and Recumbent bike      Exercise Progression 30 Day Goals :    Frequency: 3 days/week of cardiac rehab     Supplement with home exercise 2+ days/wk as tolerated    Minutes: 35-40                        >150 mins/wk of moderate intensity exercise   METS: 3 5 to 4   HR: 105-115   RPE: 4-6   Modalities: Treadmill, UBE, NuStep and Recumbent bike    Strength trainin-3 days / week  12-15 repetitions  1-2 sets per modality    Modalities: Leg Press and UE Free Weights    Home Exercise: none    Goals: 10% improvement in functional capacity - based on max METs achieved in fitness assessment, improved DASI score by 10%, Increase in exercise capacity by 40% - based on peak METs tolerated in cardiac rehab exercise session, Exercise 5 days/wk, >150mins/wk of moderate intensity exercise, Resume ADLs with increased strength, Return to work unrestricted, Attend Rehab regularly, Start a walking program and start a home exercise program    Progression Toward Goals:  Reviewed Pt goals and determined plan of care    Education: benefit of exercise for CAD risk factors, home exercise guidelines, AHA guidelines to achieve >150 mins/wk of moderate exercise, RPE scale and class: Risk Factors for Heart Disease   Plan:education on home exercise guidelines and Education class: Risk Factors for Heart Disease  Readiness to change: Preparation:  (Getting ready to change)       NUTRITION ASSESSMENT AND PLAN    Weight control:    Starting weight: 163   Current weight:   163  Waist circumference:    Starting: NA   Current:  NA    Diabetes: Pre-diabetes  Patient states he was not diagnosed w/DM prior to cardiac event    A1c: 6 5    last measured: 4/6/2021    Lipid management: Discussed diet and lipid management    Goals:LDL <100, HDL >40, TRG <150, CHOL <200, improved A1c  < 7 0% and Improved Rate Your Plate score  >60    Progression Toward Goals: Reviewed Pt goals and determined plan of care    Education: heart healthy eating  low sodium diet  hydration  nutrition for  lipid management  nutrition for Improved BG control  target goal for A1c <7 0  exercise and diabetes management   Plan: Education class: Reading Food Labels, Education Class: Heart Healthy Eating, refer to diabetes educator, replace butter with soft spreads made with olive oil, canola or yogurt, replace refined grain bread with whole grain bread, replace unhealthy snacks with fruits & vegs, eat fewer desserts and sweets, avoid processed foods, remove salt shaker from table, eat more home cooked meals and eat out less often, drink more water, learn how to read food labels and keep added daily sugar <25g/day  Readiness to change: Preparation:  (Getting ready to change)       PSYCHOSOCIAL ASSESSMENT AND PLAN    Emotional:  Depression assessment:  PHQ-9 = 0 =No Depression            Anxiety measure:  WALT-7 = 0-4  = Not anxious  Self-reported stress level:  7  Social support: Excellent    Goals:  Reduce perceived stress to 1-3/10, improved [facility] QOL < 27, contact behavioral health , Feelings in [place] Score < 3, Physical Fitness in [place] Score < 3, Daily Activity in [place] Score < 3, Social Activities in [place] Score < 3, Pain in [place] Score < 3, Overall Health in [place] Score < 3, Increased interest in doing things, improved sleep, improved concentration, improved positive thoughts of well being, increased energy, take time to relax and Handle anger/frustration better    Progression Toward Goals: Reviewed Pt goals and determined plan of care    Education: signs/sxs of depression, benefits of a positive support system, stress management techniques, depression and CAD and benefits of mental health counseling  Plan: Class: Stress and Your Health, Class: Relaxation, Practice relaxation techniques, Exercise and Spend time outdoors  Readiness to change: Preparation:  (Getting ready to change)       OTHER CORE COMPONENTS     Tobacco:   Social History     Tobacco Use   Smoking Status Former Smoker    Packs/day: 1 00    Types: Cigarettes   Smokeless Tobacco Never Used       Tobacco Use Intervention:   Brief counseling by cardiac rehab professional  Date: 21  Patient recently quit smoking at time of the event       Anginal Symptoms:  "indigestion"   NTG use: No prescription    Blood pressure:    Restin/66   Exercise: 154/80   Recovery:120/58    Goals: consistent BP < 130/80, reduced dietary sodium <2300mg, moderate intensity exercise >150 mins/wk, medication compliance, reduce number of medications  needed for BP control, reduce number of cigarettes/day and set a target quit date    Progression Toward Goals: Reviewed Pt goals and determined plan of care    Education:  understanding high blood pressure and it's relationship to CAD, low sodium diet and HTN, Education class:  Common Heart Medications, Education class: Understanding Heart Disease, smoking and heart disease, tobacco triggers and setting a smoking cessation plan  Plan: Class: Understanding Heart Disease, Class: Common Heart Medications, call free Quitline - 2-686-QUIT-NOW ([phone]), Set target quit date for smoking, Complete abstention from smoking, Avoid Processed foods, engage in regular exercise, eliminate salt shaker at the table, use salt substitutes, check labels for sodium content and monitor home BP  Readiness to change: Preparation:  (Getting ready to change)           CARDIAC REHAB ASSESSMENT    Today's date: May 20, 2021  Patient name: Otto Maza     : 1965       MRN: [id number]  PCP: Bora Oakley MD  Referring Physician: Gildardo Lua DO  Cardiologist: Dr Otto Branch MD  Surgeon: Dr Stewart Ndiaye MD  Dx:   Encounter Diagnoses   Name Primary?  NSTEMI (non-ST elevated myocardial infarction) (HCC)     S/P CABG x 3     Elevated hemoglobin A1c     Angina pectoris (HCC)     Tobacco abuse     Thrombocytopenia (HCC)     Prediabetes        Date of onset: 2021  Cultural needs: None    Height:    Wt Readings from Last 1 Encounters:   21 74 kg (163 lb 3 2 oz)      Weight:   Ht Readings from Last 1 Encounters:   21 5' 5" (1 651 m)     Medical History:   Past Medical History:   Diagnosis Date    Bicuspid aortic valve     CAD (coronary artery disease)     Former tobacco use     GERD (gastroesophageal reflux disease)     Goiter     History of myocardial infarction     History of rib fracture     Hypertension     Hypertensive urgency 2021         Physical Limitations: Patient is limited by sternal precautions      Fall Risk: Low   Comments: Ambulates with a steady gait with no assist device    Anginal Equivalent: "Indigestion"   NTG use: No prescription    Risk Factors   Cholesterol: Yes  Smoking: Recent user, quit in last 6 months, doing well abstaining  HTN: Yes  DM: Pre-diabetes  Obesity: No   Inactivity: No  Stress:  perceived  stress: 7/10   Stressors:Health and Employment   Goals for Stress Management:Practice Relaxation Techniques, Exercise, Spend time outside and Enjoy a hobby    Family History:  Family History   Problem Relation Age of Onset    Heart disease Father        Allergies: Patient has no known allergies  ETOH:   Social History     Substance and Sexual Activity   Alcohol Use Never    Frequency: Never         Current Medications:   Current Outpatient Medications   Medication Sig Dispense Refill    acetaminophen (TYLENOL) 325 mg tablet Take 1-2 tabs q6h PRN mild fever/pain 90 tablet 0    aspirin (ECOTRIN LOW STRENGTH) 81 mg EC tablet Take 1 tablet (81 mg total) by mouth daily 90 tablet 3    atorvastatin (LIPITOR) 80 mg tablet Take 1 tablet (80 mg total) by mouth daily with dinner 90 tablet 0    bisacodyl (DULCOLAX) 5 mg EC tablet Take 2 tablets (10 mg total) by mouth daily as needed for constipation (Patient not taking: Reported on 4/22/2021) 180 each 0    ibuprofen (MOTRIN) 100 mg/5 mL suspension Take by mouth every 6 (six) hours as needed for mild pain ADVIL      metFORMIN (GLUCOPHAGE) 500 mg tablet Take 1 tablet (500 mg total) by mouth 2 (two) times a day with meals 90 tablet 0    metoprolol tartrate (LOPRESSOR) 25 mg tablet Take 0 5 tablets (12 5 mg total) by mouth every 12 (twelve) hours 90 tablet 0    omeprazole (PriLOSEC) 10 mg delayed release capsule Take 10 mg by mouth daily      oxyCODONE (ROXICODONE) 5 mg immediate release tablet Take 1-2 pills PO q6h as needed for mild to moderate pain  Ongoing therapy for postoperative pain   (Patient not taking: Reported on 5/17/2021) 30 tablet 0    polyethylene glycol (MIRALAX) 17 g packet Take 17 g by mouth daily IF PACKETS UNAVAILABLE, MAY TAKE OTC EQUIVALENT  USED AS DIRECTED  (Patient not taking: Reported on 4/22/2021) 510 g 0    potassium chloride (K-DUR,KLOR-CON) 20 mEq tablet Take 1 tablet (20 mEq total) by mouth daily (Patient not taking: Reported on 5/17/2021) 30 tablet 0    torsemide (DEMADEX) 20 mg tablet Take 1 tablet (20 mg total) by mouth daily (Patient not taking: Reported on 5/17/2021) 90 tablet 0     No current facility-administered medications for this visit  Functional Status Prior to Diagnosis for Treatment   Occupation: full time job  - CNA; currently enrolled in [address] [place] [address] [address]: Activities w/friends and family  ADLs: No limitations  [place]: No limitations  Exercise: No formal HEP  Other: Patient is an [facility] [address] -possible exposures    Current Functional Status  Occupation: plans to return to work when medically stable  Recreation: as above  ADLs:able to perform self-care resumed driving  [place]: resumed all ADLs within sternal precautions  Exercise: Patient agreeable to attend CR  Other:     Patient Specific Goals:  Patient has goals of decreased sternal pian, improved strength, initiate a HEP, decrease stress level, improve ability to ambulate, decrease reliance on medication, decrease risk profile, decrease MENDOZA, decrease fatigue,smoking cessation, return to work full time/duty and attain his maximal level of function          Short Term Program Goals: dietary modifications increased strength improved energy/stamina with ADLs exercise 120-150 mins/wk    Long Term Goals: increased maximal walking duration  increased intial training workload  Improved [place] Activity Status score  Improved functional capacity  Improved Quality of Life - [facility] score reduced  Improved lipid profile  Improved A1c  Reduced stress  improved Rate Your Plate Score    Ability to reach goals/rehabilitation potential:  Excellent    Projected return to function: 12 weeks  Objective tests: sub-max TM ETT      Nutritional   Reviewed details of Rate your Plate  Discussed key elements of heart healthy eating  Reviewed patient goals for dietary modifications and their clinical implications  Reviewed most recent lipid profile  Goals for dietary modification: choose lean cuts of meat  eliminate processed meats  reduce portions of meat to 3 oz  increase fish intake  more meatless meals  increase whole grains  increase fruits and vegetables  eliminate butter  low sodium  more nuts/seeds      Emotional/Social  Patient reports he/she is coping well with good social support and denies depression or anxiety    Marital status:     Domestic Violence Screening: No    Comments: Patient presents w/general weakness and debility  The patient is very motivated to progress in order to return to work full time/duty  Bryant Rodriguez requires skilled CR interventions in order to attain his goals and maximal level of function

## 2021-05-23 DIAGNOSIS — Z95.1 S/P CABG X 3: ICD-10-CM

## 2021-05-23 DIAGNOSIS — R73.09 ELEVATED HEMOGLOBIN A1C: ICD-10-CM

## 2021-05-23 DIAGNOSIS — R73.03 PREDIABETES: ICD-10-CM

## 2021-05-23 DIAGNOSIS — Z72.0 TOBACCO ABUSE: ICD-10-CM

## 2021-05-23 DIAGNOSIS — I21.4 NSTEMI (NON-ST ELEVATED MYOCARDIAL INFARCTION) (HCC): ICD-10-CM

## 2021-05-23 DIAGNOSIS — D69.6 THROMBOCYTOPENIA (HCC): ICD-10-CM

## 2021-05-23 DIAGNOSIS — I20.9 ANGINA PECTORIS (HCC): ICD-10-CM

## 2021-05-24 ENCOUNTER — CLINICAL SUPPORT (OUTPATIENT)
Dept: CARDIAC REHAB | Facility: HOSPITAL | Age: 56
End: 2021-05-24
Payer: COMMERCIAL

## 2021-05-24 DIAGNOSIS — I21.4 NSTEMI (NON-ST ELEVATED MYOCARDIAL INFARCTION) (HCC): ICD-10-CM

## 2021-05-24 PROCEDURE — 93798 PHYS/QHP OP CAR RHAB W/ECG: CPT

## 2021-05-24 RX ORDER — POTASSIUM CHLORIDE 1500 MG/1
TABLET, EXTENDED RELEASE ORAL
Qty: 30 TABLET | Refills: 0 | Status: SHIPPED | OUTPATIENT
Start: 2021-05-24 | End: 2021-10-21 | Stop reason: HOSPADM

## 2021-05-26 ENCOUNTER — CLINICAL SUPPORT (OUTPATIENT)
Dept: CARDIAC REHAB | Facility: HOSPITAL | Age: 56
End: 2021-05-26
Payer: COMMERCIAL

## 2021-05-26 DIAGNOSIS — I21.4 NSTEMI (NON-ST ELEVATED MYOCARDIAL INFARCTION) (HCC): ICD-10-CM

## 2021-05-26 PROCEDURE — 93798 PHYS/QHP OP CAR RHAB W/ECG: CPT

## 2021-05-28 ENCOUNTER — CLINICAL SUPPORT (OUTPATIENT)
Dept: CARDIAC REHAB | Facility: HOSPITAL | Age: 56
End: 2021-05-28
Payer: COMMERCIAL

## 2021-05-28 DIAGNOSIS — Z95.1 S/P CABG (CORONARY ARTERY BYPASS GRAFT): ICD-10-CM

## 2021-05-28 PROCEDURE — 93798 PHYS/QHP OP CAR RHAB W/ECG: CPT

## 2021-05-31 ENCOUNTER — APPOINTMENT (OUTPATIENT)
Dept: CARDIAC REHAB | Facility: HOSPITAL | Age: 56
End: 2021-05-31
Payer: COMMERCIAL

## 2021-06-01 ENCOUNTER — CLINICAL SUPPORT (OUTPATIENT)
Dept: CARDIAC REHAB | Facility: HOSPITAL | Age: 56
End: 2021-06-01
Payer: COMMERCIAL

## 2021-06-01 DIAGNOSIS — Z95.1 S/P CABG (CORONARY ARTERY BYPASS GRAFT): ICD-10-CM

## 2021-06-01 PROCEDURE — 93798 PHYS/QHP OP CAR RHAB W/ECG: CPT

## 2021-06-02 ENCOUNTER — CLINICAL SUPPORT (OUTPATIENT)
Dept: CARDIAC REHAB | Facility: HOSPITAL | Age: 56
End: 2021-06-02
Payer: COMMERCIAL

## 2021-06-02 DIAGNOSIS — Z95.1 S/P CABG (CORONARY ARTERY BYPASS GRAFT): ICD-10-CM

## 2021-06-02 PROCEDURE — 93798 PHYS/QHP OP CAR RHAB W/ECG: CPT

## 2021-06-04 ENCOUNTER — CLINICAL SUPPORT (OUTPATIENT)
Dept: CARDIAC REHAB | Facility: HOSPITAL | Age: 56
End: 2021-06-04
Payer: COMMERCIAL

## 2021-06-04 DIAGNOSIS — Z95.1 S/P CABG (CORONARY ARTERY BYPASS GRAFT): ICD-10-CM

## 2021-06-04 PROCEDURE — 93798 PHYS/QHP OP CAR RHAB W/ECG: CPT

## 2021-06-07 ENCOUNTER — CLINICAL SUPPORT (OUTPATIENT)
Dept: CARDIAC REHAB | Facility: HOSPITAL | Age: 56
End: 2021-06-07
Payer: COMMERCIAL

## 2021-06-07 DIAGNOSIS — Z95.1 S/P CABG (CORONARY ARTERY BYPASS GRAFT): ICD-10-CM

## 2021-06-07 PROCEDURE — 93798 PHYS/QHP OP CAR RHAB W/ECG: CPT

## 2021-06-09 ENCOUNTER — APPOINTMENT (OUTPATIENT)
Dept: CARDIAC REHAB | Facility: HOSPITAL | Age: 56
End: 2021-06-09
Payer: COMMERCIAL

## 2021-06-11 ENCOUNTER — CLINICAL SUPPORT (OUTPATIENT)
Dept: CARDIAC REHAB | Facility: HOSPITAL | Age: 56
End: 2021-06-11
Payer: COMMERCIAL

## 2021-06-11 DIAGNOSIS — Z95.1 S/P CABG (CORONARY ARTERY BYPASS GRAFT): ICD-10-CM

## 2021-06-11 PROCEDURE — 93798 PHYS/QHP OP CAR RHAB W/ECG: CPT

## 2021-06-14 ENCOUNTER — CLINICAL SUPPORT (OUTPATIENT)
Dept: CARDIAC REHAB | Facility: HOSPITAL | Age: 56
End: 2021-06-14
Payer: COMMERCIAL

## 2021-06-14 DIAGNOSIS — Z95.1 S/P CABG (CORONARY ARTERY BYPASS GRAFT): ICD-10-CM

## 2021-06-14 PROCEDURE — 93798 PHYS/QHP OP CAR RHAB W/ECG: CPT

## 2021-06-16 ENCOUNTER — CLINICAL SUPPORT (OUTPATIENT)
Dept: CARDIAC REHAB | Facility: HOSPITAL | Age: 56
End: 2021-06-16
Payer: COMMERCIAL

## 2021-06-16 DIAGNOSIS — Z95.1 S/P CABG (CORONARY ARTERY BYPASS GRAFT): ICD-10-CM

## 2021-06-16 PROCEDURE — 93798 PHYS/QHP OP CAR RHAB W/ECG: CPT

## 2021-06-18 ENCOUNTER — CLINICAL SUPPORT (OUTPATIENT)
Dept: CARDIAC REHAB | Facility: HOSPITAL | Age: 56
End: 2021-06-18
Payer: COMMERCIAL

## 2021-06-18 DIAGNOSIS — Z95.1 POSTSURGICAL AORTOCORONARY BYPASS STATUS: ICD-10-CM

## 2021-06-18 PROCEDURE — 93798 PHYS/QHP OP CAR RHAB W/ECG: CPT

## 2021-06-18 NOTE — PROGRESS NOTES
Cardiac Rehabilitation Plan of Care   30 Day Reassessment          Today's date: 2021   # of Exercise Sessions Completed:   Patient name: Eros Miles      : 1965  Age: 64 y o  MRN: [id number]  Referring Physician: Kimberly Vences PA-C  Cardiologist: Dr Diana Rhodes MD  Provider: [facility]  Clinician: Nataliya Camarillo, MPT    Dx:   Encounter Diagnosis   Name Primary?  Postsurgical aortocoronary bypass status      Date of onset: 2021       SUMMARY OF PROGRESS:  Today was Maxime's 12 exercise session  at Cardiac  Rehab  Patient admits to 100% medication compliance  Patient reports the following physical limitations: fatigue, weakness, sternal pain/sorness, MENDOZA, general debility and overall functional decline    The patient is able to work consistently at a  a 4 33 MET level  Patient does require supplemental O2 at this time  Patient's PHQ9 Score was zero  At this time, patient denies having depression  Patient's GAD7 Score was zero  At this time, patient denies having anxiety  Currently, the patient does not follow a formal exercise program at home  Patient is planning a HEP for days off CR  Patient was counseled on exercise guidelines including 1-2 days of moderate exercise on opposite days of Cardiac Rehab, as tolerated  Patient has been attending the formal weekly education sessions  At rest, the patient's HR was 84, O2Sat was 100%, and BP was 128/66  During exercise, the patient's peak HR was 127, while peak BP was 154/70  During the test the patient was able to maintain his 02 sat at 99%  Patient rated the test a 4 to 6 on 10 Scale  During recovery, the patient's HR was 91, O2Sat was 99%, and BP was 128/56  Sonya Dangelo Patient denied having any other symptoms during exercise  Additionally, the patient's telemetry revealed NSR w/T wave inversion  Patient had correct hemodynamic responses to activity  Patient continues to note intermittent sternal soreness    Sternal precautions were reviewed at length w/the patient  Patient has goals of decreased sternal pian, improved strength, initiate a HEP, decrease stress level, improve ability to ambulate, decrease reliance on medication, decrease risk profile, decrease MENDOZA, decrease fatigue,smoking cessation, return to work full time/duty and attain his maximal level of function      Patient has been very compliant attending his CR sessions  He gives great effort during each session  He is pleased w/his progress thus far       Medication compliance: Yes   Comments: Pt reports to be compliant with medications  Fall Risk: Low   Comments: Ambulates with a steady gait with no assist device    EKG Interpretation: Telemetry reveals NSR wT wave inversion      EXERCISE ASSESSMENT and PLAN    Current Exercise Program in Rehab:       Frequency: 3 days/week Supplement with home exercise 2+ days/wk as tolerated       Minutes: 40-45       METS: 4 5 to 5            HR: 20-30 > -115   RPE: 4-6         Modalities: Treadmill, Airdyne bike, UBE, Lifecycle, Elliptical, NuStep and Recumbent bike      Exercise Progression 30 Day Goals :    Frequency: 3 days/week of cardiac rehab     Supplement with home exercise 2+ days/wk as tolerated    Minutes: 45-50                     >150 mins/wk of moderate intensity exercise   METS: 5 to 5 5   HR: 105-115   RPE: 4-6   Modalities: Treadmill, Airdyne bike, UBE, Lifecycle, Elliptical, NuStep and Recumbent bike    Strength trainin-3 days / week  12-15 repetitions  1-2 sets per modality    Modalities: Leg Press and UE Free Weights   Currently 5# UE Free weights and Leg Press    Home Exercise: Patient is planning a HEP for days off CR    Goals: 10% improvement in functional capacity - based on max METs achieved in fitness assessment, improved DASI score by 10%, Increase in exercise capacity by 40% - based on peak METs tolerated in cardiac rehab exercise session, Exercise 5 days/wk, >150mins/wk of moderate intensity exercise, Resume ADLs with increased strength, Return to work unrestricted, Attend Rehab regularly, Start a walking program and start a home exercise program    Progression Toward Goals: Will continue to educate and progress as tolerated  Education: benefit of exercise for CAD risk factors, home exercise guidelines, AHA guidelines to achieve >150 mins/wk of moderate exercise, RPE scale and class: Risk Factors for Heart Disease   Plan:education on home exercise guidelines and Education class: Risk Factors for Heart Disease  Readiness to change: Action:  (Changing behavior)      NUTRITION ASSESSMENT AND PLAN    Weight control:    Starting weight: 163   Current weight:   163  Waist circumference:    Starting: NA   Current:  NA    Diabetes: Pre-diabetes  Patient states he was not diagnosed w/DM prior to cardiac event  A1c: 6 5    last measured: 4/6/2021    Lipid management: Discussed diet and lipid management    Goals:LDL <100, HDL >40, TRG <150, CHOL <200, improved A1c  < 7 0% and Improved Rate Your Plate score  >84    Progression Toward Goals: Will continue to educate and progress as tolerated      Education: heart healthy eating  low sodium diet  hydration  nutrition for  lipid management  nutrition for Improved BG control  target goal for A1c <7 0  exercise and diabetes management   Plan: Education class: Reading Food Labels, Education Class: Heart Healthy Eating, refer to diabetes educator, replace butter with soft spreads made with olive oil, canola or yogurt, replace refined grain bread with whole grain bread, replace unhealthy snacks with fruits & vegs, eat fewer desserts and sweets, avoid processed foods, remove salt shaker from table, eat more home cooked meals and eat out less often, drink more water, learn how to read food labels and keep added daily sugar <25g/day  Readiness to change: Action:  (Changing behavior)      PSYCHOSOCIAL ASSESSMENT AND PLAN    Emotional:  Depression assessment:  PHQ-9 = 0 =No Depression            Anxiety measure:  WALT-7 = 0-4  = Not anxious  Self-reported stress level:  7  Social support: Excellent    Goals:  Reduce perceived stress to 1-3/10, improved [facility] QOL < 27, contact behavioral health , Feelings in [facility] Score < 3, Physical Fitness in [facility] Score < 3, Daily Activity in [facility] Score < 3, Social Activities in [facility] Score < 3, Pain in [facility] Score < 3, Overall Health in [place] Score < 3, Increased interest in doing things, improved sleep, improved concentration, improved positive thoughts of well being, increased energy, take time to relax and Handle anger/frustration better    Progression Toward Goals: Will continue to educate and progress as tolerated  Education: signs/sxs of depression, benefits of a positive support system, stress management techniques, depression and CAD and benefits of mental health counseling  Plan: Class: Stress and Your Health, Class: Relaxation, Practice relaxation techniques, Exercise and Spend time outdoors  Readiness to change: Action:  (Changing behavior)      OTHER CORE COMPONENTS     Tobacco:   Social History     Tobacco Use   Smoking Status Former Smoker    Packs/day:     Types: Cigarettes   Smokeless Tobacco Never Used       Tobacco Use Intervention:   Brief counseling by cardiac rehab professional  Date: 21  Patient recently quit smoking at time of the event  Anginal Symptoms:  "indigestion"   NTG use: No prescription    Blood pressure: Patient's resting BPs have been consistently < 130/80  Restin/66   Exercise: 154/70   Recovery:128/56    Goals: consistent BP < 130/80, reduced dietary sodium <2300mg, moderate intensity exercise >150 mins/wk, medication compliance, reduce number of medications  needed for BP control, reduce number of cigarettes/day and set a target quit date    Progression Toward Goals: Will continue to educate and progress as tolerated      Education:  understanding high blood pressure and it's relationship to CAD, low sodium diet and HTN, Education class:  Common Heart Medications, Education class: Understanding Heart Disease, smoking and heart disease, tobacco triggers and setting a smoking cessation plan  Plan: Class: Understanding Heart Disease, Class: Common Heart Medications, call free Quitline - 4-800-QUIT-NOW (923-4680), Set target quit date for smoking, Complete abstention from smoking, Avoid Processed foods, engage in regular exercise, eliminate salt shaker at the table, use salt substitutes, check labels for sodium content and monitor home BP  Readiness to change: Action:  (Changing behavior)

## 2021-06-21 ENCOUNTER — CLINICAL SUPPORT (OUTPATIENT)
Dept: CARDIAC REHAB | Facility: HOSPITAL | Age: 56
End: 2021-06-21
Payer: COMMERCIAL

## 2021-06-21 DIAGNOSIS — Z95.1 S/P CABG (CORONARY ARTERY BYPASS GRAFT): ICD-10-CM

## 2021-06-21 PROCEDURE — 93798 PHYS/QHP OP CAR RHAB W/ECG: CPT

## 2021-06-23 ENCOUNTER — CLINICAL SUPPORT (OUTPATIENT)
Dept: CARDIAC REHAB | Facility: HOSPITAL | Age: 56
End: 2021-06-23
Payer: COMMERCIAL

## 2021-06-23 DIAGNOSIS — Z95.1 S/P CABG (CORONARY ARTERY BYPASS GRAFT): ICD-10-CM

## 2021-06-23 PROCEDURE — 93798 PHYS/QHP OP CAR RHAB W/ECG: CPT

## 2021-06-25 ENCOUNTER — CLINICAL SUPPORT (OUTPATIENT)
Dept: CARDIAC REHAB | Facility: HOSPITAL | Age: 56
End: 2021-06-25
Payer: COMMERCIAL

## 2021-06-25 DIAGNOSIS — Z95.1 S/P CABG (CORONARY ARTERY BYPASS GRAFT): ICD-10-CM

## 2021-06-25 PROCEDURE — 93798 PHYS/QHP OP CAR RHAB W/ECG: CPT

## 2021-06-28 ENCOUNTER — CLINICAL SUPPORT (OUTPATIENT)
Dept: CARDIAC REHAB | Facility: HOSPITAL | Age: 56
End: 2021-06-28
Payer: COMMERCIAL

## 2021-06-28 DIAGNOSIS — Z95.1 S/P CABG (CORONARY ARTERY BYPASS GRAFT): ICD-10-CM

## 2021-06-28 PROCEDURE — 93798 PHYS/QHP OP CAR RHAB W/ECG: CPT

## 2021-06-29 ENCOUNTER — TELEPHONE (OUTPATIENT)
Dept: CARDIOLOGY CLINIC | Facility: CLINIC | Age: 56
End: 2021-06-29

## 2021-06-29 NOTE — TELEPHONE ENCOUNTER
patient was scheduled to see Lissette Carey next week  Patient stated he needs a note sent to his job stating that he cannot start work until he is seen at this appt or he will lose his job       Mayra Julio - [phone]

## 2021-06-29 NOTE — TELEPHONE ENCOUNTER
----- Message from Shun Valenzuela MD sent at 6/29/2021 12:11 PM EDT -----   I spoke to the patient over the phone  This patient was cleared to go back to work from [address]  He would like to finish cardiac rehab before it returns to work and will be completed next week        Please schedule patient follow-up appointment next week with  advanced practitioner

## 2021-06-29 NOTE — TELEPHONE ENCOUNTER
As per Dr Maria D Aviles   Pt should be seen in office next week by an AP or physician    Then letter can be written for the pt

## 2021-06-29 NOTE — TELEPHONE ENCOUNTER
I have not seen this patient since his CABG  He should contact CT surgery office to make sure that there is no contraindication for his return to work from surgical standpoint

## 2021-06-29 NOTE — TELEPHONE ENCOUNTER
Pt called stating his company received a letter from [facility] (surgeon) that he is cleared to go back to work without restrictions starting 7/2 but that wasn't official because it didn't came from his doctor or the cardiologist  Please advise

## 2021-06-30 ENCOUNTER — CLINICAL SUPPORT (OUTPATIENT)
Dept: CARDIAC REHAB | Facility: HOSPITAL | Age: 56
End: 2021-06-30
Payer: COMMERCIAL

## 2021-06-30 DIAGNOSIS — Z95.1 S/P CABG X 1: ICD-10-CM

## 2021-06-30 PROCEDURE — 93798 PHYS/QHP OP CAR RHAB W/ECG: CPT

## 2021-06-30 NOTE — TELEPHONE ENCOUNTER
S/w patient, stated that he contacted surgeons office and would not provide another letter for his work  Patient stated that he wanted site to be checked first prior to returning to work  Patient would like to know if letter can made until seen next week  Would you mind arranging letter, last seen by you on 4/30/21

## 2021-06-30 NOTE — TELEPHONE ENCOUNTER
Received call from pawel from cardiac rehab she stated the pt was with her and asked her to call  Pt is worried he said his job received letter clearing him for work  Pt gave me his job number and asked us to speak with Dino Zamora or Xiomara Blankenship so that we could fax letter in chart stating pt is not cleared for work by cardiologist   Randal giron cb     Pt work #[phone]

## 2021-06-30 NOTE — TELEPHONE ENCOUNTER
Spoke with bart from pt job  Faxed over letter from rahul explaining pt is not cleared for work  Spoke with pawel from rehab who will inform pt that we have contacted pts job and sent letter

## 2021-07-02 ENCOUNTER — TELEPHONE (OUTPATIENT)
Dept: CARDIOLOGY CLINIC | Facility: CLINIC | Age: 56
End: 2021-07-02

## 2021-07-02 ENCOUNTER — CLINICAL SUPPORT (OUTPATIENT)
Dept: CARDIAC REHAB | Facility: HOSPITAL | Age: 56
End: 2021-07-02
Payer: COMMERCIAL

## 2021-07-02 DIAGNOSIS — Z95.1 S/P CABG (CORONARY ARTERY BYPASS GRAFT): ICD-10-CM

## 2021-07-02 PROCEDURE — 93798 PHYS/QHP OP CAR RHAB W/ECG: CPT

## 2021-07-02 NOTE — TELEPHONE ENCOUNTER
Cardiac rehab called requesting a doppler order because pt is experiencing calves pain and cramping while on the treadmill  pt is also requesting to have this done before his next appt on 07/08

## 2021-07-02 NOTE — TELEPHONE ENCOUNTER
I ordered him for bilateral venous duplex  Please schedule him first available appointment with us to further discuss his symptoms

## 2021-07-05 ENCOUNTER — APPOINTMENT (OUTPATIENT)
Dept: CARDIAC REHAB | Facility: HOSPITAL | Age: 56
End: 2021-07-05
Payer: COMMERCIAL

## 2021-07-06 NOTE — TELEPHONE ENCOUNTER
S/w pt, advised that the VAS lower limb duplex study is in his chart  Also gave the # for Central Scheduling [phone], pt wants to keep the 7/8 appt with Leonard Ray

## 2021-07-07 ENCOUNTER — HOSPITAL ENCOUNTER (OUTPATIENT)
Dept: NON INVASIVE DIAGNOSTICS | Facility: CLINIC | Age: 56
Discharge: HOME | End: 2021-07-07
Payer: COMMERCIAL

## 2021-07-07 DIAGNOSIS — M79.604 PAIN IN BOTH LOWER EXTREMITIES: ICD-10-CM

## 2021-07-07 DIAGNOSIS — M79.605 PAIN IN BOTH LOWER EXTREMITIES: ICD-10-CM

## 2021-07-07 PROCEDURE — 93970 EXTREMITY STUDY: CPT

## 2021-07-07 PROCEDURE — 93970 EXTREMITY STUDY: CPT | Performed by: SURGERY

## 2021-07-08 ENCOUNTER — OFFICE VISIT (OUTPATIENT)
Dept: CARDIOLOGY CLINIC | Facility: CLINIC | Age: 56
End: 2021-07-08
Payer: COMMERCIAL

## 2021-07-08 VITALS
BODY MASS INDEX: 26.99 KG/M2 | OXYGEN SATURATION: 98 % | WEIGHT: 162 LBS | DIASTOLIC BLOOD PRESSURE: 100 MMHG | HEIGHT: 65 IN | SYSTOLIC BLOOD PRESSURE: 130 MMHG | HEART RATE: 78 BPM

## 2021-07-08 DIAGNOSIS — Z95.1 S/P CABG X 3: ICD-10-CM

## 2021-07-08 DIAGNOSIS — I73.9 CLAUDICATION (HCC): Primary | ICD-10-CM

## 2021-07-08 PROCEDURE — 99213 OFFICE O/P EST LOW 20 MIN: CPT | Performed by: PHYSICIAN ASSISTANT

## 2021-07-08 PROCEDURE — 3008F BODY MASS INDEX DOCD: CPT | Performed by: PHYSICIAN ASSISTANT

## 2021-07-08 RX ORDER — METOPROLOL SUCCINATE 25 MG/1
12.5 TABLET, EXTENDED RELEASE ORAL DAILY
Qty: 90 TABLET | Refills: 3 | Status: SHIPPED | OUTPATIENT
Start: 2021-07-08

## 2021-07-08 NOTE — PROGRESS NOTES
Cardiology Follow Up    Reynaldo Rg  1965  [id number]  [facility] [address] [address] [phone]    1  Claudication [facility])  Ambulatory referral to Vascular Surgery    VAS lower limb arterial duplex, complete bilateral   2  S/P CABG x 3  metoprolol succinate (TOPROL-XL) 25 mg 24 hr tablet       Interval History: Reynaldo Rg is a 51-year-old male with history of coronary artery disease status post CABG x3, hypertension, hyperlipidemia, tobacco abuse who presents for follow-up visit  Patient was seen in office on 04/30/2021 for CABG x3 follow-up in office  Patient doing reasonably well from a cardiovascular standpoint  He was referred to cardiopulmonary rehab  Last week, patient developed calf tenderness while on the treadmill  Task was sent to our office and patient was sent for venous duplex to rule out DVT  Venous duplex study did not show any evidence of DVTs but did show flush occlusion of bilateral superficial femoral arteries reconstituting in the distal segment  Patient admits to claudication symptoms with brisk walking and running on the treadmill  He denies any swelling or tenderness  He denies any prior history of blood clots or hypercoagulable state  Patient reports he stop smoking approximately 1 month ago when he was admitted for CABG  He reports he had been alternating aspirin 81 mg with 325 mg due to upset stomach and prior episodes of bleeding on full aspirin  He was advised to switch to ASA 81mg daily at his last visit  Patient reports he also had stopped taking his Lopressor and torsemide due to dizziness/lightheadedness and low BP readings at home in the 02I to 90 systolic  He is taking torsemide p r n  but has not needed to take it in the last 4 weeks    He denies any overt chest pain, discomfort, palpitations, shortness of breath, orthopnea, PND, lower extremity edema  Patient Active Problem List   Diagnosis    Chest pain    Type II diabetes mellitus (HCC)    Tobacco abuse    Leukocytosis    NSTEMI (non-ST elevated myocardial infarction) ([facility])    Thrombocytopenia (HCC)    S/P CABG x 3    Postoperative anemia due to acute blood loss    Sinus bradycardia    Encounter for postoperative care    CAD (coronary artery disease)     Past Medical History:   Diagnosis Date    Bicuspid aortic valve     CAD (coronary artery disease)     Former tobacco use     GERD (gastroesophageal reflux disease)     Goiter     History of myocardial infarction     History of rib fracture     Hypertension     Hypertensive urgency 4/6/2021     Social History     Socioeconomic History    Marital status:      Spouse name: Not on file    Number of children: Not on file    Years of education: Not on file    Highest education level: Not on file   Occupational History    Not on file   Tobacco Use    Smoking status: Former Smoker     Packs/day: 1 00     Types: Cigarettes    Smokeless tobacco: Never Used   Vaping Use    Vaping Use: Never used   Substance and Sexual Activity    Alcohol use: Never    Drug use: Never    Sexual activity: Not on file   Other Topics Concern    Not on file   Social History Narrative    Not on file     Social Determinants of Health     Financial Resource Strain:     Difficulty of Paying Living Expenses:    Food Insecurity:     Worried About Running Out of Food in the Last Year:     [address] in the Last Year:    Transportation Needs:     Lack of Transportation (Medical):      Lack of Transportation (Non-Medical):    Physical Activity:     Days of Exercise per Week:     Minutes of Exercise per Session:    Stress:     Feeling of Stress :    Social Connections:     Frequency of Communication with Friends and Family:     Frequency of Social Gatherings with Friends and Family:     Attends Buddhism Services:     Active Member of Clubs or Organizations:     Attends Club or Organization Meetings:     Marital Status:    Intimate Partner Violence:     Fear of Current or Ex-Partner:     Emotionally Abused:     Physically Abused:     Sexually Abused:       Family History   Problem Relation Age of Onset    Heart disease Father      Past Surgical History:   Procedure Laterality Date    CARDIAC CATHETERIZATION      MN CABG, ARTERY-VEIN, FOUR N/A 4/9/2021    Procedure: CORONARY ARTERY BYPASS GRAFT (CABG) 3 VESSELS: LIMA to LAD; LLE EVH/SVG to LPL & OM2;  Surgeon: Zach Gill DO;  [facility];  Service: Cardiac Surgery    MN ECHO TRANSESOPHAG R-T 2D W/PRB IMG ACQUISJ I&R N/A 4/9/2021    Procedure: TRANSESOPHAGEAL ECHOCARDIOGRAM (BENNETT); Surgeon: Zach Gill DO;  [facility];  Service: Cardiac Surgery    MN ENDOSCOPY W/VIDEO-ASST VEIN HARVEST,CABG Left 4/9/2021    Procedure: HARVEST VEIN ENDOSCOPIC ([address]);   Surgeon: Zach Gill DO;  [facility];  Service: Cardiac Surgery    VASECTOMY         Current Outpatient Medications:     aspirin (ECOTRIN LOW STRENGTH) 81 mg EC tablet, Take 1 tablet (81 mg total) by mouth daily, Disp: 90 tablet, Rfl: 3    bisacodyl (DULCOLAX) 5 mg EC tablet, Take 2 tablets (10 mg total) by mouth daily as needed for constipation, Disp: 180 each, Rfl: 0    Klor-Con M20 20 MEQ tablet, TAKE 1 TABLET BY MOUTH EVERY DAY, Disp: 30 tablet, Rfl: 0    torsemide (DEMADEX) 20 mg tablet, Take 1 tablet (20 mg total) by mouth daily, Disp: 90 tablet, Rfl: 0    acetaminophen (TYLENOL) 325 mg tablet, Take 1-2 tabs q6h PRN mild fever/pain (Patient not taking: Reported on 7/8/2021), Disp: 90 tablet, Rfl: 0    atorvastatin (LIPITOR) 80 mg tablet, Take 1 tablet (80 mg total) by mouth daily with dinner (Patient not taking: Reported on 7/8/2021), Disp: 90 tablet, Rfl: 0    ibuprofen (MOTRIN) 100 mg/5 mL suspension, Take by mouth every 6 (six) hours as needed for mild pain ADVIL (Patient not taking: Reported on 7/8/2021), Disp: , Rfl:     metFORMIN (GLUCOPHAGE) 500 mg tablet, Take 1 tablet (500 mg total) by mouth 2 (two) times a day with meals (Patient not taking: Reported on 7/8/2021), Disp: 90 tablet, Rfl: 0    metoprolol succinate (TOPROL-XL) 25 mg 24 hr tablet, Take 0 5 tablets (12 5 mg total) by mouth daily, Disp: 90 tablet, Rfl: 3    omeprazole (PriLOSEC) 10 mg delayed release capsule, Take 10 mg by mouth daily (Patient not taking: Reported on 7/8/2021), Disp: , Rfl:     oxyCODONE (ROXICODONE) 5 mg immediate release tablet, Take 1-2 pills PO q6h as needed for mild to moderate pain  Ongoing therapy for postoperative pain  (Patient not taking: Reported on 5/17/2021), Disp: 30 tablet, Rfl: 0    polyethylene glycol (MIRALAX) 17 g packet, Take 17 g by mouth daily IF PACKETS UNAVAILABLE, MAY TAKE OTC EQUIVALENT  USED AS DIRECTED  (Patient not taking: Reported on 4/22/2021), Disp: 510 g, Rfl: 0  No Known Allergies    Labs:  No visits with results within 2 Month(s) from this visit  Latest known visit with results is:   No results displayed because visit has over 200 results  Imaging: VAS lower limb venous duplex study, complete bilateral    Result Date: 7/7/2021  Narrative:  [facility] REPORT CLINICAL: Indications: This patient is referred for worsening bilateral calf pain for the past 6 weeks  Operative History: 2021-04-21 cabg x 3 Risk Factors The patient has history of HTN, HLD, CAD and previous smoking (quit <1yr ago)  CONCLUSION:  Impression: RIGHT LOWER LIMB: No evidence of acute or chronic deep vein thrombosis  No evidence of superficial thrombophlebitis noted  Doppler evaluation shows a normal response to augmentation maneuvers  Popliteal, posterior tibial and anterior tibial arterial Doppler waveforms are monophasic  LEFT LOWER LIMB: No evidence of acute or chronic deep vein thrombosis  No evidence of superficial thrombophlebitis noted   Doppler evaluation shows a normal response to augmentation maneuvers  Popliteal, posterior tibial and anterior tibial arterial Doppler waveforms are monophasic  TECH NOTE: There is a flush occlusion of the bilateral superficial femoral arteries, reconstituting in the distal segment  Technical findings were given to Carmenza De La Cruz PA-C on 7/7/2021 at 2:45pm   SIGNATURE: Electronically Signed by: Emeterio Jones on 2021-07-07 03:34:36 PM      Review of Systems:  Review of Systems   Constitutional: Negative for appetite change, chills, diaphoresis, fatigue and fever  Respiratory: Negative for cough, chest tightness and shortness of breath  Cardiovascular: Negative for chest pain, palpitations and leg swelling  Gastrointestinal: Negative for diarrhea, nausea and vomiting  Endocrine: Negative for cold intolerance and heat intolerance  Genitourinary: Negative for difficulty urinating, dysuria and enuresis  Musculoskeletal: Positive for myalgias  Negative for arthralgias, back pain and gait problem  Allergic/Immunologic: Negative for environmental allergies and food allergies  Neurological: Positive for weakness  Negative for dizziness, facial asymmetry and headaches  Hematological: Negative for adenopathy  Does not bruise/bleed easily  Psychiatric/Behavioral: Negative for agitation, behavioral problems and confusion  Physical Exam:  Physical Exam  Constitutional:       Appearance: He is well-developed  HENT:      Right Ear: External ear normal       Left Ear: External ear normal    Eyes:      Pupils: Pupils are equal, round, and reactive to light  Cardiovascular:      Rate and Rhythm: Normal rate and regular rhythm  Heart sounds: Normal heart sounds  No murmur heard  No friction rub  No gallop  Pulmonary:      Effort: Pulmonary effort is normal       Breath sounds: Normal breath sounds  Abdominal:      Palpations: Abdomen is soft  Musculoskeletal:         General: Normal range of motion  Cervical back: Normal range of motion  Skin:     General: Skin is warm and dry  Neurological:      Mental Status: He is alert and oriented to person, place, and time  Deep Tendon Reflexes: Reflexes are normal and symmetric  Psychiatric:         Behavior: Behavior normal          Thought Content: Thought content normal          Judgment: Judgment normal          Discussion/Summary:  1  LE claudication - order arterial duplex to further assess for arterial occlusion  Venous duplex negative for DVTs  Will refer to vascular surgery for follow-up/additional recommendations  Continue aspirin 81 mg and atorvastatin 80 mg daily for now  2  Hypertension - high BP readings on office visit today 152/102 repeat was 130/100  Patient states he has been taking his blood pressures at home which have been very low recently (77-73U systolic) on chart review patient has very labile blood pressures  Patient has not been taking metoprolol for 2 weeks now  Plan to switch Lopressor 12 5 mg b i d  with Toprol XL 12 5 mg daily and will up titrate from there  Patient may take Toprol XL prior to bedtime to reduce symptoms of dizziness/lightheadedness  Continue torsemide 20mg daily PRN and Kdur 20meq for weight gain of more than 3 lb over 1 day or 5 lb over 1 week  3  CAD s/p CABG x3 (lima to LAD, SVG to LPL and SVG to OM 2) - patient was switched to aspirin 81 mg daily  Patient to watch out for any additional sign of bleeding and report them immediately  Continue atorvastatin 80 mg daily and Toprol XL as above  He reports of some incisional tenderness at his sternotomy site but denies anginal equivalent  Patient enrolled in cardiopulmonary rehab, avoid walking/running until seen by vascular surgery  Employer letter was done and saved to chart  4  Hyperlipidemia - continue atorvastatin 80 mg daily  RTO in 1 month or sooner if needed

## 2021-07-09 ENCOUNTER — CLINICAL SUPPORT (OUTPATIENT)
Dept: CARDIAC REHAB | Facility: HOSPITAL | Age: 56
End: 2021-07-09
Payer: COMMERCIAL

## 2021-07-09 DIAGNOSIS — Z95.1 S/P CABG (CORONARY ARTERY BYPASS GRAFT): ICD-10-CM

## 2021-07-09 PROCEDURE — 93798 PHYS/QHP OP CAR RHAB W/ECG: CPT

## 2021-07-12 ENCOUNTER — CLINICAL SUPPORT (OUTPATIENT)
Dept: CARDIAC REHAB | Facility: HOSPITAL | Age: 56
End: 2021-07-12
Payer: COMMERCIAL

## 2021-07-12 DIAGNOSIS — Z95.1 S/P CABG (CORONARY ARTERY BYPASS GRAFT): ICD-10-CM

## 2021-07-12 PROCEDURE — 93798 PHYS/QHP OP CAR RHAB W/ECG: CPT

## 2021-07-14 ENCOUNTER — CLINICAL SUPPORT (OUTPATIENT)
Dept: CARDIAC REHAB | Facility: HOSPITAL | Age: 56
End: 2021-07-14
Payer: COMMERCIAL

## 2021-07-14 DIAGNOSIS — Z95.1 S/P CABG (CORONARY ARTERY BYPASS GRAFT): ICD-10-CM

## 2021-07-14 PROCEDURE — 93798 PHYS/QHP OP CAR RHAB W/ECG: CPT

## 2021-07-15 ENCOUNTER — HOSPITAL ENCOUNTER (OUTPATIENT)
Dept: NON INVASIVE DIAGNOSTICS | Facility: CLINIC | Age: 56
Discharge: HOME | End: 2021-07-15
Payer: COMMERCIAL

## 2021-07-15 DIAGNOSIS — I73.9 CLAUDICATION (HCC): ICD-10-CM

## 2021-07-15 PROCEDURE — 93922 UPR/L XTREMITY ART 2 LEVELS: CPT | Performed by: SURGERY

## 2021-07-15 PROCEDURE — 93923 UPR/LXTR ART STDY 3+ LVLS: CPT

## 2021-07-15 PROCEDURE — 93925 LOWER EXTREMITY STUDY: CPT

## 2021-07-15 PROCEDURE — 93925 LOWER EXTREMITY STUDY: CPT | Performed by: SURGERY

## 2021-07-15 NOTE — PROGRESS NOTES
Cardiac Rehabilitation Plan of Care   60 Day Reassessment          Today's date: 7/15/2021   # of Exercise Sessions Completed:   Patient name: Sally Nixon      : 1965  Age: 64 y o  MRN: [id number]  Referring Physician: Shaina Villar PA-C  Cardiologist: Dr Juanito Marte MD  Provider: Vianca boyd  Clinician: Clearnce Kussmaul Chapman Minus, MPT    Dx:   Encounter Diagnosis   Name Primary?  S/P CABG (coronary artery bypass graft)      Date of onset: 2021       SUMMARY OF PROGRESS:  Today was Maxime's 21st exercise session  at Cardiac  Rehab  Patient admits to 100% medication compliance  Patient reports the following physical limitations: fatigue, weakness, sternal pain/sorness, MENDOZA, general debility and overall functional decline    The patient is able to work consistently at a  a 5 14 MET level  Patient does require supplemental O2 at this time  Patient's PHQ9 Score was zero  At this time, patient denies having depression  Patient's GAD7 Score was zero  At this time, patient denies having anxiety  Currently, the patient does not follow a formal exercise program at home  Patient is planning a HEP for days off CR  Patient was counseled on exercise guidelines including 1-2 days of moderate exercise on opposite days of Cardiac Rehab, as tolerated  Patient has been attending the formal weekly education sessions  At rest, the patient's HR was 77, O2Sat was 100%, and BP was 128/52  During exercise, the patient's peak HR was 103, while peak BP was 142/68  During the test the patient was able to maintain his 02 sat at 99%  Patient rated the test a 4 to 5 on a 1- 10 Scale  During recovery, the patient's HR was 86, O2Sat was 99%, and BP was 118/52  Patient denied having any other symptoms during exercise  Additionally, the patient's telemetry revealed NSR w/T wave inversion  Patient had correct hemodynamic responses to activity  Patient continues to note intermittent sternal soreness    Sternal precautions have been reviewed at length w/the patient  Patient has goals of decreased sternal pian, improved strength, initiate a HEP, decrease stress level, improve ability to ambulate, decrease reliance on medication, decrease risk profile, decrease MENDOZA, decrease fatigue,smoking cessation, return to work full time/duty and attain his maximal level of function  Cally Moore has been attaining his golas as evidenced by his improved functional ability      Patient has been very compliant attending his CR sessions  He gives great effort during each session  He is pleased w/his progress thus far       Medication compliance: Yes   Comments: Pt reports to be compliant with medications  Fall Risk: Low   Comments: Ambulates with a steady gait with no assist device    EKG Interpretation: Telemetry reveals NSR wT wave inversion      EXERCISE ASSESSMENT and PLAN    Current Exercise Program in Rehab:       Frequency: 3 days/week Supplement with home exercise 2+ days/wk as tolerated       Minutes: 45-50      METS: 5 to 5 5            HR: 20-30 > -115   RPE: 4-6         Modalities: Treadmill, Airdyne bike, UBE, Lifecycle, Elliptical, NuStep and Recumbent bike      Exercise Progression 30 Day Goals :    Frequency: 3 days/week of cardiac rehab     Supplement with home exercise 2+ days/wk as tolerated    Minutes: 50-60                    >150 mins/wk of moderate intensity exercise   METS: 5 5 to 6   HR: 105-115   RPE: 4-6   Modalities: Treadmill, Airdyne bike, UBE, Lifecycle, Elliptical, NuStep and Recumbent bike    Strength trainin-3 days / week  12-15 repetitions  1-2 sets per modality    Modalities: Leg Press and UE Free Weights   Currently 5# UE Free weights and Leg Press    Home Exercise: Patient is planning a HEP for days off CR    Goals: 10% improvement in functional capacity - based on max METs achieved in fitness assessment, improved DASI score by 10%, Increase in exercise capacity by 40% - based on peak METs tolerated in cardiac rehab exercise session, Exercise 5 days/wk, >150mins/wk of moderate intensity exercise, Resume ADLs with increased strength, Return to work unrestricted, Attend Rehab regularly, Start a walking program and start a home exercise program    Progression Toward Goals: Will continue to educate and progress as tolerated  Education: benefit of exercise for CAD risk factors, home exercise guidelines, AHA guidelines to achieve >150 mins/wk of moderate exercise, RPE scale and class: Risk Factors for Heart Disease   Plan:education on home exercise guidelines and Education class: Risk Factors for Heart Disease  Readiness to change: Action:  (Changing behavior)      NUTRITION ASSESSMENT AND PLAN    Weight control:    Starting weight: 163   Current weight:   162  Waist circumference:    Starting: NA   Current:  NA    Diabetes: Pre-diabetes  Patient states he was not diagnosed w/DM prior to cardiac event  A1c: 6 5    last measured: 4/6/2021    Lipid management: Discussed diet and lipid management    Goals:LDL <100, HDL >40, TRG <150, CHOL <200, improved A1c  < 7 0% and Improved Rate Your Plate score  >73    Progression Toward Goals: Will continue to educate and progress as tolerated      Education: heart healthy eating  low sodium diet  hydration  nutrition for  lipid management  nutrition for Improved BG control  target goal for A1c <7 0  exercise and diabetes management   Plan: Education class: Reading Food Labels, Education Class: Heart Healthy Eating, refer to diabetes educator, replace butter with soft spreads made with olive oil, canola or yogurt, replace refined grain bread with whole grain bread, replace unhealthy snacks with fruits & vegs, eat fewer desserts and sweets, avoid processed foods, remove salt shaker from table, eat more home cooked meals and eat out less often, drink more water, learn how to read food labels and keep added daily sugar <25g/day  Readiness to change: Action:  (Changing behavior)      PSYCHOSOCIAL ASSESSMENT AND PLAN    Emotional:  Depression assessment:  PHQ-9 = 0 =No Depression            Anxiety measure:  WALT-7 = 0-4  = Not anxious  Self-reported stress level:  7  Social support: Excellent    Goals:  Reduce perceived stress to 1-3/10, improved [facility] QOL < 27, contact behavioral health , Feelings in [facility] Score < 3, Physical Fitness in [facility] Score < 3, Daily Activity in [facility] Score < 3, Social Activities in [facility] Score < 3, Pain in [facility] Score < 3, Overall Health in [place] Score < 3, Increased interest in doing things, improved sleep, improved concentration, improved positive thoughts of well being, increased energy, take time to relax and Handle anger/frustration better    Progression Toward Goals: Will continue to educate and progress as tolerated  Education: signs/sxs of depression, benefits of a positive support system, stress management techniques, depression and CAD and benefits of mental health counseling  Plan: Class: Stress and Your Health, Class: Relaxation, Practice relaxation techniques, Exercise and Spend time outdoors  Readiness to change: Action:  (Changing behavior)      OTHER CORE COMPONENTS     Tobacco:   Social History     Tobacco Use   Smoking Status Former Smoker    Packs/day: 1 00    Types: Cigarettes   Smokeless Tobacco Never Used       Tobacco Use Intervention:   Brief counseling by cardiac rehab professional  Date: 21  Patient recently quit smoking at time of the event  Anginal Symptoms:  "indigestion"   NTG use: No prescription    Blood pressure: Patient's resting BPs have been consistently < 130/80     Restin/62   Exercise: 148/62   Recovery:118/52    Goals: consistent BP < 130/80, reduced dietary sodium <2300mg, moderate intensity exercise >150 mins/wk, medication compliance, reduce number of medications  needed for BP control, reduce number of cigarettes/day and set a target quit date    Progression Toward Goals: Will continue to educate and progress as tolerated      Education:  understanding high blood pressure and it's relationship to CAD, low sodium diet and HTN, Education class:  Common Heart Medications, Education class: Understanding Heart Disease, smoking and heart disease, tobacco triggers and setting a smoking cessation plan  Plan: Class: Understanding Heart Disease, Class: Common Heart Medications, call free Quitline - 5-800-QUIT-NOW (514-5022), Set target quit date for smoking, Complete abstention from smoking, Avoid Processed foods, engage in regular exercise, eliminate salt shaker at the table, use salt substitutes, check labels for sodium content and monitor home BP  Readiness to change: Action:  (Changing behavior)

## 2021-07-16 ENCOUNTER — CLINICAL SUPPORT (OUTPATIENT)
Dept: CARDIAC REHAB | Facility: HOSPITAL | Age: 56
End: 2021-07-16
Payer: COMMERCIAL

## 2021-07-16 DIAGNOSIS — Z95.1 S/P CABG (CORONARY ARTERY BYPASS GRAFT): ICD-10-CM

## 2021-07-16 PROCEDURE — 93798 PHYS/QHP OP CAR RHAB W/ECG: CPT

## 2021-07-19 ENCOUNTER — CLINICAL SUPPORT (OUTPATIENT)
Dept: CARDIAC REHAB | Facility: HOSPITAL | Age: 56
End: 2021-07-19
Payer: COMMERCIAL

## 2021-07-19 DIAGNOSIS — Z95.1 S/P CABG (CORONARY ARTERY BYPASS GRAFT): ICD-10-CM

## 2021-07-19 PROCEDURE — 93798 PHYS/QHP OP CAR RHAB W/ECG: CPT

## 2021-07-21 ENCOUNTER — CLINICAL SUPPORT (OUTPATIENT)
Dept: CARDIAC REHAB | Facility: HOSPITAL | Age: 56
End: 2021-07-21
Payer: COMMERCIAL

## 2021-07-21 DIAGNOSIS — Z95.1 S/P CABG (CORONARY ARTERY BYPASS GRAFT): ICD-10-CM

## 2021-07-21 PROCEDURE — 93798 PHYS/QHP OP CAR RHAB W/ECG: CPT

## 2021-07-21 NOTE — PROGRESS NOTES
Cardiac Rehabilitation Plan of Care   Discharge    Today's date: 2021   # of Exercise Sessions Completed:   Patient name: Alexa Armendariz      : 1965  Age: 64 y o  MRN: [id number]  Referring Physician: Tarah Eduardo PA-C  Cardiologist: Dr Gavin More MD  Provider: Mario Howe  Clinician: Brian Pepe MS, CCRP    Dx:   Encounter Diagnosis   Name Primary?  S/P CABG (coronary artery bypass graft)      Date of onset: 2021       SUMMARY OF PROGRESS:  Today was Maxime's th exercise session at Cardiac Rehab  [facility] states he had to discontinue early due to returning to work  Patient admits to 100% medication compliance  Patient reports the following physical limitations: fatigue, weakness, sternal pain/sorness, MENDOZA, general debility and overall functional decline  The patient is able to work consistently at a  a 5 14 MET level  Patient's PHQ9 Score was zero  At this time, patient denies having depression  Patient's GAD7 Score was zero  At this time, patient denies having anxiety  Currently, the patient does follow a formal exercise program at home  Patient was counseled on exercise guidelines including 1-2 days of moderate exercise on opposite days of Cardiac Rehab, as tolerated  Patient has been attending the formal weekly education sessions  At rest, the patient's HR was 66 and BP was 124/62  During exercise, the patient's peak HR was 111, while peak BP was 142/68  Patient rated the test a 3-4 on a 1-10 Scale  During recovery, the patient's HR was 75 and BP was 118/72  Patient denied having any other symptoms during exercise  Additionally, the patient's telemetry revealed NSR w/ occasional T-wave inversion  Patient had correct hemodynamic responses to activity   Patient has goals of decreased sternal pian, improved strength, initiate a HEP, decrease stress level, improve ability to ambulate, decrease reliance on medication, decrease risk profile, decrease MENDOZA, decrease fatigue,smoking cessation, return to work full time/duty and attain his maximal level of function  Riddhi Saucedo has been attaining his golas as evidenced by his improved functional ability  Today was Gopi's last visit at Cardiac Rehab due to early return to work  [facility] states if it were not for his job, he would have loved to stay and complete all sessions  [facility] received all formal education specific to his disease process  [facility] was given a Home Exercise Program upon discharge  [facility] states he now exercises at home 3 days each week, for about 40 minutes/day  Gopi's Individualized Outcomes Report demonstrates improvement in several categories including maximum MET level (35% improvement), Rate Your Plate Dietary Survey (24% improvement), [facility] Quality of life Index (61% Improvement), and Duke Activity Index (60% Improvement)  [facility] reports that he has been watching his diet by increasing lean meats and vegetables and eating less carbohydrates  Al[facility] states his stress level is a 2/10  Al[facility]son [facility] states he does not get any cardiac symptoms with ADLs, but does experience calf tightness  The Cardiac Rehab staff contacted patient's doctor to be tested for PAD  Patient states he is getting follow up testing shortly       Medication compliance: Yes   Comments: Pt reports to be compliant with medications  Fall Risk: Low   Comments: Ambulates with a steady gait with no assist device    EKG Interpretation: Telemetry reveals NSR wT wave inversion      EXERCISE ASSESSMENT and PLAN    Current Exercise Program in Rehab:       Frequency: 4-5 days/week     Supplement with home exercise 2+ days/wk as tolerated    Minutes: 45-50      METS: 5 to 5 5       HR: 20-30 > RHR;    RPE: 4-6         Modalities: Treadmill, Airdyne bike, UBE, Lifecycle, Elliptical, NuStep and Recumbent bike      Exercise Progression 30 Day Goals :    Frequency: 4-5 days/week of cardiac rehab         Supplement with home exercise 2+ days/wk as tolerated    Minutes: 50-60 >150 mins/wk of moderate intensity exercise   METS: 5 5 to 6   HR:    RPE: 4-6   Modalities: Treadmill, Airdyne bike, UBE, Lifecycle, Elliptical, NuStep and Recumbent bike    Strength trainin-3 days / week  12-15 repetitions  1-2 sets per modality    Modalities: Leg Press and UE Free Weights   Currently 5# UE Free weights and Leg Press    Home Exercise: Patient is planning a HEP for days off CR    Goals: 10% improvement in functional capacity - based on max METs achieved in fitness assessment, improved DASI score by 10%, Increase in exercise capacity by 40% - based on peak METs tolerated in cardiac rehab exercise session, Exercise 5 days/wk, >150mins/wk of moderate intensity exercise, Resume ADLs with increased strength, Return to work unrestricted, Attend Rehab regularly, Start a walking program and start a home exercise program    Progression Toward Goals: Will continue to educate and progress as tolerated  Education: benefit of exercise for CAD risk factors, home exercise guidelines, AHA guidelines to achieve >150 mins/wk of moderate exercise, RPE scale and class: Risk Factors for Heart Disease   Plan:education on home exercise guidelines and Education class: Risk Factors for Heart Disease  Readiness to change: Action:  (Changing behavior)      NUTRITION ASSESSMENT AND PLAN    Weight control:    Starting weight: 163   Current weight:   162    Diabetes: Pre-diabetes  Patient states he was not diagnosed w/DM prior to cardiac event  A1c: 6 5    last measured: 2021    Lipid management: Discussed diet and lipid management    Goals:LDL <100, HDL >40, TRG <150, CHOL <200, improved A1c  < 7 0% and Improved Rate Your Plate score  >60    Progression Toward Goals: Will continue to educate and progress as tolerated      Education: heart healthy eating  low sodium diet  hydration  nutrition for  lipid management  nutrition for Improved BG control  target goal for A1c <7 0  exercise and diabetes management   Plan: Education class: Reading Food Labels, Education Class: Heart Healthy Eating, refer to diabetes educator, replace butter with soft spreads made with olive oil, canola or yogurt, replace refined grain bread with whole grain bread, replace unhealthy snacks with fruits & vegs, eat fewer desserts and sweets, avoid processed foods, remove salt shaker from table, eat more home cooked meals and eat out less often, drink more water, learn how to read food labels and keep added daily sugar <25g/day  Readiness to change: Action:  (Changing behavior)      PSYCHOSOCIAL ASSESSMENT AND PLAN    Emotional:  Depression assessment:  PHQ-9 = 0 =No Depression            Anxiety measure:  WALT-7 = 0-4  = Not anxious  Self-reported stress level:  7  Social support: Excellent    Goals:  Reduce perceived stress to 1-3/10, improved [facility] QOL < 27, contact behavioral health , Feelings in [facility] Score < 3, Physical Fitness in [facility] Score < 3, Daily Activity in [facility] Score < 3, Social Activities in [facility] Score < 3, Pain in [facility] Score < 3, Overall Health in [place] Score < 3, Increased interest in doing things, improved sleep, improved concentration, improved positive thoughts of well being, increased energy, take time to relax and Handle anger/frustration better    Progression Toward Goals: Will continue to educate and progress as tolerated      Education: signs/sxs of depression, benefits of a positive support system, stress management techniques, depression and CAD and benefits of mental health counseling  Plan: Class: Stress and Your Health, Class: Relaxation, Practice relaxation techniques, Exercise and Spend time outdoors  Readiness to change: Action:  (Changing behavior)      OTHER CORE COMPONENTS     Tobacco:   Social History     Tobacco Use   Smoking Status Former Smoker    Packs/day: 1 00    Types: Cigarettes   Smokeless Tobacco Never Used       Tobacco Use Intervention:   Brief counseling by cardiac rehab professional  Date: 21  Patient recently quit smoking at time of the event  Anginal Symptoms:  "indigestion"   NTG use: No prescription    Blood pressure: Patient's resting BPs have been consistently < 130/80  Restin/62   Exercise: 166/70   Recovery: 118/72    Goals: consistent BP < 130/80, reduced dietary sodium <2300mg, moderate intensity exercise >150 mins/wk, medication compliance, reduce number of medications  needed for BP control, reduce number of cigarettes/day and set a target quit date    Progression Toward Goals: Will continue to educate and progress as tolerated      Education:  understanding high blood pressure and it's relationship to CAD, low sodium diet and HTN, Education class:  Common Heart Medications, Education class: Understanding Heart Disease, smoking and heart disease, tobacco triggers and setting a smoking cessation plan  Plan: Class: Understanding Heart Disease, Class: Common Heart Medications, call free Quitline - 4800-QUIT-NOW ([phone]), Set target quit date for smoking, Complete abstention from smoking, Avoid Processed foods, engage in regular exercise, eliminate salt shaker at the table, use salt substitutes, check labels for sodium content and monitor home BP  Readiness to change: Action:  (Changing behavior)

## 2021-07-22 ENCOUNTER — CONSULT (OUTPATIENT)
Dept: VASCULAR SURGERY | Facility: CLINIC | Age: 56
End: 2021-07-22
Payer: COMMERCIAL

## 2021-07-22 ENCOUNTER — OFFICE VISIT (OUTPATIENT)
Dept: CARDIOLOGY CLINIC | Facility: CLINIC | Age: 56
End: 2021-07-22
Payer: COMMERCIAL

## 2021-07-22 VITALS
DIASTOLIC BLOOD PRESSURE: 84 MMHG | SYSTOLIC BLOOD PRESSURE: 112 MMHG | HEIGHT: 65 IN | HEART RATE: 64 BPM | BODY MASS INDEX: 27.79 KG/M2 | OXYGEN SATURATION: 99 % | WEIGHT: 166.8 LBS

## 2021-07-22 VITALS
SYSTOLIC BLOOD PRESSURE: 140 MMHG | WEIGHT: 166 LBS | HEART RATE: 68 BPM | TEMPERATURE: 98 F | BODY MASS INDEX: 27.66 KG/M2 | HEIGHT: 65 IN | DIASTOLIC BLOOD PRESSURE: 84 MMHG

## 2021-07-22 DIAGNOSIS — Z72.0 TOBACCO ABUSE: ICD-10-CM

## 2021-07-22 DIAGNOSIS — I73.9 CLAUDICATION (HCC): ICD-10-CM

## 2021-07-22 DIAGNOSIS — I73.9 PAD (PERIPHERAL ARTERY DISEASE) (HCC): Primary | ICD-10-CM

## 2021-07-22 DIAGNOSIS — R09.89 LEFT CAROTID BRUIT: Primary | ICD-10-CM

## 2021-07-22 DIAGNOSIS — E11.59 TYPE 2 DIABETES MELLITUS WITH OTHER CIRCULATORY COMPLICATION, WITHOUT LONG-TERM CURRENT USE OF INSULIN (HCC): ICD-10-CM

## 2021-07-22 DIAGNOSIS — I25.110 CORONARY ARTERY DISEASE INVOLVING NATIVE CORONARY ARTERY OF NATIVE HEART WITH UNSTABLE ANGINA PECTORIS (HCC): ICD-10-CM

## 2021-07-22 PROCEDURE — 1036F TOBACCO NON-USER: CPT | Performed by: PHYSICIAN ASSISTANT

## 2021-07-22 PROCEDURE — 99204 OFFICE O/P NEW MOD 45 MIN: CPT | Performed by: PHYSICIAN ASSISTANT

## 2021-07-22 PROCEDURE — 99214 OFFICE O/P EST MOD 30 MIN: CPT | Performed by: INTERNAL MEDICINE

## 2021-07-22 PROCEDURE — 3008F BODY MASS INDEX DOCD: CPT | Performed by: PHYSICIAN ASSISTANT

## 2021-07-22 NOTE — PATIENT INSTRUCTIONS
Peripheral Arterial Disease    -Continue with regular and progressive walking program  I recommend that you walk every day  You should start walking 10 minutes a day and build up to 30 minutes a day  Stop as often as you need to         -Follow good, heart healthy diet which is low in fat and cholesterol    -Maintain healthy weight   -Stop smoking   -Medical therapy - continue with aspirin and atorvastastin  -Routine arterial duplex and office visit 6 months  -Call or return sooner for increased pain in your legs; constant numbness, tingling or coldness in the legs; or if you develop wounds on your toes/feet that do not heal            Peripheral Artery Disease   AMBULATORY CARE:   Peripheral artery disease (PAD)  is narrow, weak, or blocked arteries  It may affect any arteries outside of your heart and brain  PAD is usually the result of a buildup of fat and cholesterol, also called plaque, along your artery walls  Inflammation, a blood clot, or abnormal cell growth could also block your arteries  PAD prevents normal blood flow to your legs and arms  You are at risk of an amputation if poor blood flow keeps wounds from healing or causes gangrene (tissue death)  Without treatment, PAD can also cause a heart attack or stroke  Common symptoms include:  Mild PAD usually does not cause symptoms  As the disease worsens over time, you may have the following:  · Pain or cramps in your leg or hip while you walk    · A numb, weak, or heavy feeling in your legs    · Dry, scaly, red, or pale skin on your legs    · Thick or brittle nails, or hair loss on your arms and legs    · Foot sores that will not heal    · Burning or aching in your feet and toes while resting (this may be worse when you lie down)    Call your local emergency number (911 in the [address] [place] [address],[address]) if:   · You have any of the following signs of a heart attack:      ?  Squeezing, pressure, or pain in your chest    ? You may  also have any of the following:     § Discomfort or pain in your back, neck, jaw, stomach, or arm    § Shortness of breath    § Nausea or vomiting    § Lightheadedness or a sudden cold sweat    · You have any of the following signs of a stroke:      ? Numbness or drooping on one side of your face     ? Weakness in an arm or leg    ? Confusion or difficulty speaking    ? Dizziness, a severe headache, or vision loss    Seek care immediately if:   · You have sores or wounds that will not heal     · You notice black or discolored skin on your arm or leg  · Your skin is cool to the touch  Call your doctor if:   · You have leg pain when you walk 1/8 mile (200 meters) or less, even with treatment  · Your legs are red, dry, or pale, even with treatment  · You have questions or concerns about your condition or care  Treatment for PAD  can help reduce your risk of a heart attack, stroke, or amputation  You may need more than one of the following:  · Medicines  may be given to prevent blood clots and reduce the risk of a heart attack or stroke  You may be given medicine to help prevent your PAD from getting worse  · A supervised exercise program  helps you stay active in normal daily activities  Healthcare providers will help you safely walk or do strength training exercises 3 times a week for 30 to 60 minutes  You will do this for several months, then transition to walking on your own  · Angioplasty  is a procedure to open your artery so blood can flow through normally  A thin tube called a catheter is used to insert a small balloon into your artery  The balloon is inflated to open your blocked artery, and then removed  A tube called a stent may be placed in your artery to hold it open  · Bypass surgery  is used to make a new connection to your artery with a vein from another part of your body, or an artificial graft  The vein or graft is attached to your artery above and below your blockage   This allows blood to flow around the blocked portion of your artery  Manage and prevent PAD:   · Walk for 30 to 60 minutes at least 4 times a week  Your healthcare provider may also refer you to a supervised exercise program  The program helps increase how far you can walk without pain  It also helps you stay active in normal daily activities         · Do not smoke  Nicotine and other chemicals in cigarettes and cigars can worsen PAD  They can also increase your risk for a heart attack or stroke  Ask your healthcare provider for information if you currently smoke and need help to quit  E-cigarettes or smokeless tobacco still contain nicotine  Talk to your healthcare provider before you use these products  · Manage other health conditions  Take your medicines as directed and follow your healthcare provider's instructions if you have high blood pressure or high cholesterol  Perform foot care and check your blood sugar levels as directed if you have diabetes  · Eat heart-healthy foods  Eat whole grains, fruits, and vegetables every day  Limit salt and high-fat foods  Ask your healthcare provider for more information on a heart healthy diet  Ask what a healthy weight is for you  Your healthcare provider can help you create a healthy weight-loss plan, if needed  Follow up with your doctor as directed:  Write down your questions so you remember to ask them during your visits  © [employer] 2021 Information is for End User's use only and may not be sold, redistributed or otherwise used for commercial purposes  All illustrations and images included in CareNotes® are the copyrighted property of [employer]  or Estela Ortega  The above information is an  only  It is not intended as medical advice for individual conditions or treatments  Talk to your doctor, nurse or pharmacist before following any medical regimen to see if it is safe and effective for you

## 2021-07-22 NOTE — PROGRESS NOTES
Assessment/Plan:    59-year-old M hypertension, hyperlipidemia and smoking who recently suffered a chest pain syndrome and found to have diabetes mellitus and coronary disease disease for which he underwent CABG x3 (4/21//21 )  Patient has been participating in phase 2 cardiac rehab  As he is progressing in rehab and walking an incline on the treadmill he has developed calf claudication  Symptoms occur with moderate to higher intensity activity and resolved after stopping 1-2 minutes  Historically, he does not give history of claudication but he was not very active  He has no lifestyle limiting symptoms  He has no nighttime pain, rest pain or wounds  Left carotid bruit  -     VAS carotid complete study; Future  -may be radiation from heart murmur  -given cardiovascular history, check CV duplex (call him with result)  -symptoms of stroke reviewed with patient for which he should call 9 1  -continue with aspirin and atorvastatin 80    Atherosclerosis of the lower extremities with intermittent claudication ([facility] [address] )  -     Ambulatory referral to Vascular Surgery  -     VAS lower limb arterial duplex, complete bilateral; Future  -non-lifestyle limiting calf claudication with higher level activity  -no rest pain or wounds    -2+ femoral and AT/DP/PT signals present bilaterally    -AMBER 7/15/21     R mid SFA occlusion, 50-75% distal SFA and > 75% prox popliteal, otherwise diffuse disease, 0 73/150/76     L 50-75% prox SFA, >75% distal SFA and popliteal stenosis, otherwise diffuse disease, 0 83/103/78       Discussion:  Patient presents for evaluation of calf claudication  Lower extremity arterial duplex study is significant for bilateral SFA and popliteal artery disease  He describes claudication symptoms at higher level of activity  The feet are warm and well-perfused  We discussed alternatives for therapy including exercise and best medical management with or without invasive testing and intervention    The patient is motivated to work on healthy lifestyle changes and continued smoking cessation  He does not feel that his symptoms are lifestyle limiting at this time or require intervention at this time  I recommended that he continue with regular and progressive walking program and clinical monitoring  Ideally he should continue with supervised rehab but he is planning to do the exercises at home or a gym  If he feels that he cannot progress due to claudication we should re-evaluate  He will continue with aspirin and atorvastatin  We discussed the rationale for medical therapy  We will repeat AMBER in 6 months with office visit  We discussed reasons to be seen sooner     -walking program  -heart healthy, diabetic diet; maintain healthy weight   -smoking cessation discussed  -continue with aspirin and atorvastatin; follow up lipids with PCP (LDL is stable <70)  -Routine arterial duplex and office visit 6 months    Tobacco abuse  -quit after bypass  -we discussed the critical importance of continued smoking cessation    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 complication, without long-term current use of insulin ([facility])  -A1C 6 5  -working on diet and exercise  -management per PCP    Coronary artery disease involving native coronary artery of native heart with unstable angina pectoris ([facility] [address] )  -s/p CAB  -No chest pain, pressure or SOB  -followed by Cardiology        Subjective:      Patient ID: Denise Kat is a 64 y o  male  New patient, reports experiencing BLE L>R calf stiffness/tightening w/ brisk walking on incline which is relieved by 1-2 minutes of rest      Patient reports that he had chest pain in April which she thought was reflux  Workup was significant for multivessel coronary artery disease for which he required bypass surgery  He admits to limitation due to shortness of breath on exertion and general deconditioning but no chest pain   While participating in cardiac rehab he has developed bilateral calf claudication - worse on the left  As he is progressing and working on higher load, that is walking an incline, he has calf tightness  The legs feel been and pain resolves within a minute or 2 of rest   Denies history of calf pain but prior to CAD was not regularly exercising  This point he feels that he could walk as far as he wants to if it is flat but only has symptoms on elevation  We discussed the possibility of sending him for interventions so he could improve his walking in rehab, however he does not necessarily feel it is holding him back  We did long detailed discussion regarding peripheral arterial disease  I recommend that he continues to walk  We will see him back in about 6 months or sooner if needed  However, he feels calf discomfort is limiting his ability to proceed in cardiac rehab then we should consider sending him for an angiogram     Review of his chart shows that he is walking just under 6 Mets which is more functionally limiting than he expressed  It does continue to denies symptoms with activities of daily living  Ollie Moya works as a  and plans to return tomorrow       COCO CLARK 7/15/21  FINDINGS:     Segment                Right                        Left                                            Impression  PSV (cm/s)  EDV  Impression  PSV (cm/s)  EDV    Common Femoral Artery                      96    5                      93   12    Prox Profunda                              75    4                     110    0    Prox SFA                                   55    0  50-75%              73    0    Mid SFA                Occluded                                         10    0    Dist SFA               50-75%             107   18  >75%               151   37    Proximal Pop           >75%               172   20  >75%               186   19    Distal Pop                                 39    9                      38    8    Tibioperoneal                              37    9 [facility]  Tibial                          19    8                      48   25    Dist Peroneal                              26    9                      62   23             CONCLUSION:     Impression:     RIGHT LOWER LIMB:  This resting evaluation shows an occlusion versus a high grade stenosis in the  mid superficial femoral artery  There is a 50-75% stenosis in the distal  superficial femoral artery and a >75% stenosis in the proximal popliteal artery  There is diffuse atherosclerotic disease in the remainder of the  femoro-popliteal and tibio-peroneal segments  Ankle/Brachial index: 0 73  which is in the moderate disease category  PVR/ PPG tracings are mildly dampened  Metatarsal pressure of 115 mmHg  Great toe pressure of 76 mmHg, within the healing range     LEFT LOWER LIMB:  This resting evaluation shows a 50-75% stenosis in the proximal superficial  femoral artery and a >75% stenosis in the distal superficial femoral and  proximal popliteal arteries  There is diffuse atherosclerotic disease in the  remainder of the femoro-popliteal and tibio-peroneal segments  Ankle/Brachial index: 0 83  which is in the moderate disease category  PVR/ PPG tracings are mildly dampened  Metatarsal pressure of 103 mmHg  Great toe pressure of 78 mmHg, within the healing range        The following portions of the patient's history were reviewed and updated as appropriate: allergies, current medications, past family history, past medical history, past social history, past surgical history and problem list     Review of Systems   Constitutional: Negative  HENT: Negative  Eyes: Negative  Respiratory: Positive for shortness of breath  Cardiovascular: Negative  Gastrointestinal: Negative  Endocrine: Negative  Genitourinary: Negative      Musculoskeletal:        Leg pain with walking   Skin: Negative  Allergic/Immunologic: Negative  Neurological: Negative  Hematological: Negative  Psychiatric/Behavioral: Negative  Objective:      /84 (BP Location: Right arm, Patient Position: Sitting)   Pulse 68   Temp 98 °F (36 7 °C) (Tympanic)   Ht 5' 5" (1 651 m)   Wt 75 3 kg (166 lb)   BMI 27 62 kg/m²          Physical Exam  Vitals and nursing note reviewed  Constitutional:       Appearance: He is well-developed  HENT:      Head: Normocephalic and atraumatic  Eyes:      Pupils: Pupils are equal, round, and reactive to light  Neck:      Thyroid: No thyromegaly  Vascular: No JVD  Trachea: Trachea normal    Cardiovascular:      Rate and Rhythm: Normal rate and regular rhythm  Pulses:           Carotid pulses are 2+ on the right side and 2+ on the left side  Radial pulses are 2+ on the right side and 2+ on the left side  Femoral pulses are 2+ on the right side and 2+ on the left side  Dorsalis pedis pulses are 2+ on the right side and 2+ on the left side  Heart sounds: Normal heart sounds, S1 normal and S2 normal  No murmur heard  No friction rub  No gallop  Comments:   No wounds  Skin healthy intact  Pulmonary:      Effort: Pulmonary effort is normal  No accessory muscle usage or respiratory distress  Breath sounds: Normal breath sounds  No wheezing or rales  Abdominal:      General: Bowel sounds are normal  There is no distension  Palpations: Abdomen is soft  Tenderness: There is no abdominal tenderness  Musculoskeletal:         General: No deformity  Normal range of motion  Cervical back: Neck supple  Right lower leg: No edema  Left lower leg: No edema  Skin:     General: Skin is warm and dry  Capillary Refill: Capillary refill takes 2 to 3 seconds  Findings: No lesion or rash  Nails: There is no clubbing     Neurological:      Mental Status: He is alert and oriented to person, place, and time  Comments: Grossly normal    Psychiatric:         Behavior: Behavior is cooperative  I have reviewed and made appropriate changes to the review of systems input by the medical assistant  Vitals:    07/22/21 1332   BP: 140/84   BP Location: Right arm   Patient Position: Sitting   Pulse: 68   Temp: 98 °F (36 7 °C)   TempSrc: Tympanic   Weight: 75 3 kg (166 lb)   Height: 5' 5" (1 651 m)       Patient Active Problem List   Diagnosis    Chest pain    Type II diabetes mellitus (HCC)    Tobacco abuse    Leukocytosis    NSTEMI (non-ST elevated myocardial infarction) (HCC)    Thrombocytopenia (HCC)    S/P CABG x 3    Postoperative anemia due to acute blood loss    Sinus bradycardia    Encounter for postoperative care    CAD (coronary artery disease)    Left carotid bruit       Past Surgical History:   Procedure Laterality Date    CARDIAC CATHETERIZATION      [place] CABG, ARTERY-VEIN, FOUR N/A 4/9/2021    Procedure: CORONARY ARTERY BYPASS GRAFT (CABG) 3 VESSELS: LIMA to LAD; LLE EVH/SVG to LPL & OM2;  Surgeon: Valeria Aquino DO;  [facility];  Service: Cardiac Surgery    [place] ECHO TRANSESOPHAG R-T 2D W/PRB IMG ACQUISJ I&R N/A 4/9/2021    Procedure: TRANSESOPHAGEAL ECHOCARDIOGRAM (BENNETT); Surgeon: Valeria Aquino DO;  [facility];  Service: Cardiac Surgery    [place] ENDOSCOPY W/VIDEO-ASST VEIN HARVEST,CABG Left 4/9/2021    Procedure: HARVEST VEIN ENDOSCOPIC ([address]);   Surgeon: Valeria Aquino DO;  [facility];  Service: Cardiac Surgery    VASECTOMY         Family History   Problem Relation Age of Onset    Heart disease Father        Social History     Socioeconomic History    Marital status:      Spouse name: Not on file    Number of children: Not on file    Years of education: Not on file    Highest education level: Not on file   Occupational History    Not on file   Tobacco Use    Smoking status: Former Smoker     Packs/day: 1 00     Types: Cigarettes     Quit date: 2021     Years since quittin 2    Smokeless tobacco: Never Used   Vaping Use    Vaping Use: Never used   Substance and Sexual Activity    Alcohol use: Never    Drug use: Never    Sexual activity: Not on file   Other Topics Concern    Not on file   Social History Narrative    Not on file     Social Determinants of Health     Financial Resource Strain:     Difficulty of Paying Living Expenses:    Food Insecurity:     Worried About Running Out of Food in the Last Year:     [address] in the Last Year:    Transportation Needs:     Lack of Transportation (Medical):      Lack of Transportation (Non-Medical):    Physical Activity:     Days of Exercise per Week:     Minutes of Exercise per Session:    Stress:     Feeling of Stress :    Social Connections:     Frequency of Communication with Friends and Family:     Frequency of Social Gatherings with Friends and Family:     Attends Confucianist Services:     Active Member of Clubs or Organizations:     Attends Club or Organization Meetings:     Marital Status:    Intimate Partner Violence:     Fear of Current or Ex-Partner:     Emotionally Abused:     Physically Abused:     Sexually Abused:        No Known Allergies      Current Outpatient Medications:     acetaminophen (TYLENOL) 325 mg tablet, Take 1-2 tabs q6h PRN mild fever/pain, Disp: 90 tablet, Rfl: 0    aspirin (ECOTRIN LOW STRENGTH) 81 mg EC tablet, Take 1 tablet (81 mg total) by mouth daily, Disp: 90 tablet, Rfl: 3    atorvastatin (LIPITOR) 80 mg tablet, Take 1 tablet (80 mg total) by mouth daily with dinner, Disp: 90 tablet, Rfl: 0    Klor-Con M20 20 MEQ tablet, TAKE 1 TABLET BY MOUTH EVERY DAY (Patient taking differently: Take 20 mEq by mouth When taking fluid pill), Disp: 30 tablet, Rfl: 0    metoprolol succinate (TOPROL-XL) 25 mg 24 hr tablet, Take 0 5 tablets (12 5 mg total) by mouth daily, Disp: 90 tablet, Rfl: 3    torsemide (DEMADEX) 20 mg tablet, Take 1 tablet (20 mg total) by mouth daily (Patient taking differently: Take 20 mg by mouth as needed ), Disp: 90 tablet, Rfl: 0    bisacodyl (DULCOLAX) 5 mg EC tablet, Take 2 tablets (10 mg total) by mouth daily as needed for constipation (Patient not taking: Reported on 7/22/2021), Disp: 180 each, Rfl: 0    ibuprofen (MOTRIN) 100 mg/5 mL suspension, Take by mouth every 6 (six) hours as needed for mild pain ADVIL (Patient not taking: Reported on 7/8/2021), Disp: , Rfl:     metFORMIN (GLUCOPHAGE) 500 mg tablet, Take 1 tablet (500 mg total) by mouth 2 (two) times a day with meals (Patient not taking: Reported on 7/8/2021), Disp: 90 tablet, Rfl: 0    omeprazole (PriLOSEC) 10 mg delayed release capsule, Take 10 mg by mouth daily (Patient not taking: Reported on 7/8/2021), Disp: , Rfl:     oxyCODONE (ROXICODONE) 5 mg immediate release tablet, Take 1-2 pills PO q6h as needed for mild to moderate pain  Ongoing therapy for postoperative pain  (Patient not taking: Reported on 5/17/2021), Disp: 30 tablet, Rfl: 0    polyethylene glycol (MIRALAX) 17 g packet, Take 17 g by mouth daily IF PACKETS UNAVAILABLE, MAY TAKE OTC EQUIVALENT  USED AS DIRECTED   (Patient not taking: Reported on 4/22/2021), Disp: 510 g, Rfl: 0

## 2021-07-22 NOTE — PROGRESS NOTES
Cardiology Office Note    Aurelio Barrera 64 y o  male MRN: [id number]    07/22/21          Assessment:  1  Intermittent claudication - [place] class 1 - 2  2  Coronary artery disease status post CABG  3  Hypertension  4  Dyslipidemia    Plan:    Patient presents with intermittent claudication  Discussed in detail regarding peripheral arterial disease and claudication  We discussed prognosis of claudication  Discussed management options  Patient states his claudication is mild  Does not think it is lifestyle limiting  He has stopped smoking recently  Congratulated pateint  Continue with aspirin and statin  Will refer to vascular rehab  Consider adding pletal if symptoms worsen  LV function is preserved    Coronary artery status post CABG:  Continue with aspirin, statin and metoprolol  Hypertension:  Blood pressure is well controlled  Patient wants to go back to work  He works as a   He has recently completed rehab and has no exertional cardiac symptoms  Patient is okay to return to work from cardiac standpoint  Follow up: 3 months or sooner as needed    No diagnosis found  HPI: Aurelio Barrera is a 64y o  year old male who presents for follow-up for claudication  Past medical history of coronary artery disease status post CABG x3, hypertension, hyperlipidemia, tobacco abuse  Since his bypass surgery, patient started doing cardiac rehab  He has noted intermittent claudication during rehab, states he gets leg cramps in both his legs while walking on a treadmill  Needs to stop and rest for a few minutes  Cramps resolves  Also while mowing his lawn, he has noted leg cramps  Stated does not interfere with his quality of life  States pain is mild to moderate in intensity and resolved on its own  He was seen at our office 2 weeks ago PN had a arterial Dopplers performed which showed peripheral arterial disease with diminished bilateral ABIs as noted below    Denies lower ext wounds/ulcers      Social history:  Patient has stopped smoking since his bypass surgery  He works as a   Has a sedentary lifestyle  Is considering to be more active and exercising more  Family History:   Coronary artery disease in father  Arterial dopplers with FRANCIE 7/15/21:  RIGHT LOWER LIMB:  This resting evaluation shows an occlusion versus a high grade stenosis in the  mid superficial femoral artery  There is a 50-75% stenosis in the distal  superficial femoral artery and a >75% stenosis in the proximal popliteal artery  There is diffuse atherosclerotic disease in the remainder of the  femoro-popliteal and tibio-peroneal segments  Ankle/Brachial index: 0 73  which is in the moderate disease category  PVR/ PPG tracings are mildly dampened  Metatarsal pressure of 115 mmHg  Great toe pressure of 76 mmHg, within the healing range     LEFT LOWER LIMB:  This resting evaluation shows a 50-75% stenosis in the proximal superficial  femoral artery and a >75% stenosis in the distal superficial femoral and  proximal popliteal arteries  There is diffuse atherosclerotic disease in the  remainder of the femoro-popliteal and tibio-peroneal segments  Ankle/Brachial index: 0 83  which is in the moderate disease category  PVR/ PPG tracings are mildly dampened    Metatarsal pressure of 103 mmHg  Great toe pressure of 78 mmHg, within the healing range      No Known Allergies      Current Outpatient Medications:     acetaminophen (TYLENOL) 325 mg tablet, Take 1-2 tabs q6h PRN mild fever/pain, Disp: 90 tablet, Rfl: 0    aspirin (ECOTRIN LOW STRENGTH) 81 mg EC tablet, Take 1 tablet (81 mg total) by mouth daily, Disp: 90 tablet, Rfl: 3    atorvastatin (LIPITOR) 80 mg tablet, Take 1 tablet (80 mg total) by mouth daily with dinner, Disp: 90 tablet, Rfl: 0    Klor-Con M20 20 MEQ tablet, TAKE 1 TABLET BY MOUTH EVERY DAY (Patient taking differently: Take 20 mEq by mouth When taking fluid pill), Disp: 30 tablet, Rfl: 0    metoprolol succinate (TOPROL-XL) 25 mg 24 hr tablet, Take 0 5 tablets (12 5 mg total) by mouth daily, Disp: 90 tablet, Rfl: 3    torsemide (DEMADEX) 20 mg tablet, Take 1 tablet (20 mg total) by mouth daily (Patient taking differently: Take 20 mg by mouth as needed ), Disp: 90 tablet, Rfl: 0    bisacodyl (DULCOLAX) 5 mg EC tablet, Take 2 tablets (10 mg total) by mouth daily as needed for constipation (Patient not taking: Reported on 7/22/2021), Disp: 180 each, Rfl: 0    ibuprofen (MOTRIN) 100 mg/5 mL suspension, Take by mouth every 6 (six) hours as needed for mild pain ADVIL (Patient not taking: Reported on 7/8/2021), Disp: , Rfl:     metFORMIN (GLUCOPHAGE) 500 mg tablet, Take 1 tablet (500 mg total) by mouth 2 (two) times a day with meals (Patient not taking: Reported on 7/8/2021), Disp: 90 tablet, Rfl: 0    omeprazole (PriLOSEC) 10 mg delayed release capsule, Take 10 mg by mouth daily (Patient not taking: Reported on 7/8/2021), Disp: , Rfl:     oxyCODONE (ROXICODONE) 5 mg immediate release tablet, Take 1-2 pills PO q6h as needed for mild to moderate pain  Ongoing therapy for postoperative pain  (Patient not taking: Reported on 5/17/2021), Disp: 30 tablet, Rfl: 0    polyethylene glycol (MIRALAX) 17 g packet, Take 17 g by mouth daily IF PACKETS UNAVAILABLE, MAY TAKE OTC EQUIVALENT  USED AS DIRECTED   (Patient not taking: Reported on 4/22/2021), Disp: 510 g, Rfl: 0    Past Medical History:   Diagnosis Date    Bicuspid aortic valve     CAD (coronary artery disease)     Former tobacco use     GERD (gastroesophageal reflux disease)     Goiter     History of myocardial infarction     History of rib fracture     Hypertension     Hypertensive urgency 4/6/2021       Family History   Problem Relation Age of Onset    Heart disease Father        Past Surgical History:   Procedure Laterality Date    CARDIAC CATHETERIZATION      DE CABG, ARTERY-VEIN, FOUR N/A 4/9/2021    Procedure: CORONARY ARTERY BYPASS GRAFT (CABG) 3 VESSELS: LIMA to LAD; LLE EVH/SVG to LPL & OM2;  Surgeon: Mitesh Mohamud DO;  [facility];  Service: Cardiac Surgery    MO ECHO TRANSESOPHAG R-T 2D W/PRB IMG ACQUISJ I&R N/A 4/9/2021    Procedure: TRANSESOPHAGEAL ECHOCARDIOGRAM (BENNETT); Surgeon: Mitesh Mohamud DO;  [facility];  Service: Cardiac Surgery    MO ENDOSCOPY W/VIDEO-ASST VEIN HARVEST,CABG Left 4/9/2021    Procedure: HARVEST VEIN ENDOSCOPIC ([address]); Surgeon: Mietsh Mohamud DO;  [facility];  Service: Cardiac Surgery    VASECTOMY         Social History     Socioeconomic History    Marital status:      Spouse name: Not on file    Number of children: Not on file    Years of education: Not on file    Highest education level: Not on file   Occupational History    Not on file   Tobacco Use    Smoking status: Former Smoker     Packs/day: 1 00     Types: Cigarettes    Smokeless tobacco: Never Used   Vaping Use    Vaping Use: Never used   Substance and Sexual Activity    Alcohol use: Never    Drug use: Never    Sexual activity: Not on file   Other Topics Concern    Not on file   Social History Narrative    Not on file     Social Determinants of Health     Financial Resource Strain:     Difficulty of Paying Living Expenses:    Food Insecurity:     Worried About [address] in the Last Year:     [address]  in the Last Year:    Transportation Needs:     Lack of Transportation (Medical):      Lack of Transportation (Non-Medical):    Physical Activity:     Days of Exercise per Week:     Minutes of Exercise per Session:    Stress:     Feeling of Stress :    Social Connections:     Frequency of Communication with Friends and Family:     Frequency of Social Gatherings with Friends and Family:     Attends Worship Services:     Active Member of Clubs or Organizations:     Attends Club or Organization Meetings:     Marital Status:    Intimate Partner Violence:     Fear of Current or Ex-Partner:     Emotionally Abused:     Physically Abused:     Sexually Abused:        Review of Systems   Constitutional: Negative  Negative for chills, diaphoresis and fever  Eyes: Negative for blurred vision  Cardiovascular: Positive for claudication  Negative for chest pain, dyspnea on exertion, irregular heartbeat, leg swelling, palpitations, paroxysmal nocturnal dyspnea and syncope  Respiratory: Negative for cough  Endocrine: Negative for cold intolerance  Skin: Negative for color change  Gastrointestinal: Negative for abdominal pain  Psychiatric/Behavioral: Negative for altered mental status and memory loss  The patient is not nervous/anxious  All other systems reviewed and are negative  Vitals: /84 (BP Location: Left arm, Patient Position: Sitting, Cuff Size: Standard)   Pulse 64   Ht 5' 5" (1 651 m)   Wt 75 7 kg (166 lb 12 8 oz)   SpO2 99%   BMI 27 76 kg/m²       Physical Exam:     GEN: Alert and oriented x 3, in no acute distress  Well appearing and well nourished  HEENT: Sclera anicteric, conjunctivae pink, mucous membranes moist  Oropharynx clear  NECK: Supple, no carotid bruits, no significant JVD  HEART: Regular rhythm, normal S1 and S2, no murmurs, clicks, gallops or rubs  PMI nondisplaced, no thrills  LUNGS: Clear to auscultation bilaterally; no wheezes, rales, or rhonchi  No increased work of breathing or signs of respiratory distress  ABDOMEN: Soft, nontender,  EXTREMITIES: Skin warm, no clubbing, wounds, cyanosis, or edema  NEURO: No focal findings  Normal speech  Mood and affect normal    SKIN: Normal without suspicious lesions on exposed skin

## 2021-07-22 NOTE — LETTER
July 22, 2021     Patient: Melisa Galvin   YOB: 1965   Date of Visit: 7/22/2021       To Whom it May Concern:    Melisa Galvin is under my professional care  He was seen in my office on 7/22/2021  From a vascular standpoint, he is stable and may work without restriction  He may return to work on 7/23/2021  If you have any questions or concerns, please don't hesitate to call           Sincerely,          Landy Cordero PA-C        CC: No Recipients

## 2021-07-23 ENCOUNTER — APPOINTMENT (OUTPATIENT)
Dept: CARDIAC REHAB | Facility: HOSPITAL | Age: 56
End: 2021-07-23
Payer: COMMERCIAL

## 2021-07-26 ENCOUNTER — APPOINTMENT (OUTPATIENT)
Dept: CARDIAC REHAB | Facility: HOSPITAL | Age: 56
End: 2021-07-26
Payer: COMMERCIAL

## 2021-07-28 ENCOUNTER — APPOINTMENT (OUTPATIENT)
Dept: CARDIAC REHAB | Facility: HOSPITAL | Age: 56
End: 2021-07-28
Payer: COMMERCIAL

## 2021-07-30 ENCOUNTER — APPOINTMENT (OUTPATIENT)
Dept: CARDIAC REHAB | Facility: HOSPITAL | Age: 56
End: 2021-07-30
Payer: COMMERCIAL

## 2021-08-09 ENCOUNTER — HOSPITAL ENCOUNTER (OUTPATIENT)
Dept: NON INVASIVE DIAGNOSTICS | Facility: CLINIC | Age: 56
Discharge: HOME | End: 2021-08-09
Payer: COMMERCIAL

## 2021-08-09 DIAGNOSIS — R09.89 LEFT CAROTID BRUIT: ICD-10-CM

## 2021-08-09 PROCEDURE — 93880 EXTRACRANIAL BILAT STUDY: CPT | Performed by: SURGERY

## 2021-08-09 PROCEDURE — 93880 EXTRACRANIAL BILAT STUDY: CPT

## 2021-09-10 ENCOUNTER — TELEPHONE (OUTPATIENT)
Dept: DERMATOLOGY | Facility: CLINIC | Age: 56
End: 2021-09-10

## 2021-09-10 NOTE — TELEPHONE ENCOUNTER
Called pt to schedule appt from online request, he needs a Monday morning in the [facility] office as he is a , as I was lookinhg at the schedule to find an appt the line disconnected, I tried to cb 3 times but the line never connected back  Darling Fitzgerald jd

## 2021-09-15 DIAGNOSIS — I21.4 NSTEMI (NON-ST ELEVATED MYOCARDIAL INFARCTION) (HCC): ICD-10-CM

## 2021-09-15 DIAGNOSIS — R73.09 ELEVATED HEMOGLOBIN A1C: ICD-10-CM

## 2021-09-15 DIAGNOSIS — Z72.0 TOBACCO ABUSE: ICD-10-CM

## 2021-09-15 DIAGNOSIS — I20.9 ANGINA PECTORIS (HCC): ICD-10-CM

## 2021-09-15 DIAGNOSIS — D69.6 THROMBOCYTOPENIA (HCC): ICD-10-CM

## 2021-09-15 DIAGNOSIS — Z95.1 S/P CABG X 3: ICD-10-CM

## 2021-09-15 DIAGNOSIS — R73.03 PREDIABETES: ICD-10-CM

## 2021-09-15 NOTE — TELEPHONE ENCOUNTER
Patient states he is out of metformin and needs refill  He scheduled appointment for annual physical on 11/1/2021  Patient needed the first available appointment on a Monday morning due to his  work schedule and this was next opening  Please refill, if appropriate

## 2021-10-14 ENCOUNTER — VBI (OUTPATIENT)
Dept: ADMINISTRATIVE | Facility: OTHER | Age: 56
End: 2021-10-14

## 2021-10-18 ENCOUNTER — OFFICE VISIT (OUTPATIENT)
Dept: DERMATOLOGY | Facility: CLINIC | Age: 56
End: 2021-10-18
Payer: COMMERCIAL

## 2021-10-18 VITALS — HEIGHT: 66 IN | WEIGHT: 168 LBS | TEMPERATURE: 96 F | BODY MASS INDEX: 27 KG/M2

## 2021-10-18 DIAGNOSIS — L20.9 ATOPIC DERMATITIS, UNSPECIFIED TYPE: Primary | ICD-10-CM

## 2021-10-18 PROCEDURE — 99203 OFFICE O/P NEW LOW 30 MIN: CPT | Performed by: DERMATOLOGY

## 2021-10-18 RX ORDER — HALOBETASOL PROPIONATE 0.05 %
OINTMENT (GRAM) TOPICAL
Qty: 50 G | Refills: 1 | Status: SHIPPED | OUTPATIENT
Start: 2021-10-18 | End: 2021-10-21 | Stop reason: HOSPADM

## 2021-10-19 ENCOUNTER — APPOINTMENT (EMERGENCY)
Dept: CT IMAGING | Facility: HOSPITAL | Age: 56
DRG: 247 | End: 2021-10-19
Payer: COMMERCIAL

## 2021-10-19 ENCOUNTER — APPOINTMENT (EMERGENCY)
Dept: RADIOLOGY | Facility: HOSPITAL | Age: 56
DRG: 247 | End: 2021-10-19
Payer: COMMERCIAL

## 2021-10-19 ENCOUNTER — HOSPITAL ENCOUNTER (INPATIENT)
Facility: HOSPITAL | Age: 56
LOS: 1 days | Discharge: HOME | DRG: 247 | End: 2021-10-21
Attending: EMERGENCY MEDICINE | Admitting: FAMILY MEDICINE
Payer: COMMERCIAL

## 2021-10-19 DIAGNOSIS — I25.110 CORONARY ARTERY DISEASE INVOLVING NATIVE CORONARY ARTERY OF NATIVE HEART WITH UNSTABLE ANGINA PECTORIS (HCC): ICD-10-CM

## 2021-10-19 DIAGNOSIS — I21.4 NSTEMI (NON-ST ELEVATED MYOCARDIAL INFARCTION) (HCC): Primary | ICD-10-CM

## 2021-10-19 LAB
ALBUMIN SERPL BCP-MCNC: 3.6 G/DL (ref 3.5–5)
ALP SERPL-CCNC: 120 U/L (ref 46–116)
ALT SERPL W P-5'-P-CCNC: 54 U/L (ref 12–78)
ANION GAP SERPL CALCULATED.3IONS-SCNC: 10 MMOL/L (ref 4–13)
AST SERPL W P-5'-P-CCNC: 32 U/L (ref 5–45)
BASOPHILS # BLD AUTO: 0.03 THOUSANDS/ΜL (ref 0–0.1)
BASOPHILS NFR BLD AUTO: 1 % (ref 0–1)
BILIRUB SERPL-MCNC: 0.44 MG/DL (ref 0.2–1)
BUN SERPL-MCNC: 31 MG/DL (ref 5–25)
CALCIUM SERPL-MCNC: 8.8 MG/DL (ref 8.3–10.1)
CHLORIDE SERPL-SCNC: 103 MMOL/L (ref 100–108)
CO2 SERPL-SCNC: 24 MMOL/L (ref 21–32)
CREAT SERPL-MCNC: 1.05 MG/DL (ref 0.6–1.3)
D DIMER PPP FEU-MCNC: 0.7 UG/ML FEU
EOSINOPHIL # BLD AUTO: 0.19 THOUSAND/ΜL (ref 0–0.61)
EOSINOPHIL NFR BLD AUTO: 3 % (ref 0–6)
ERYTHROCYTE [DISTWIDTH] IN BLOOD BY AUTOMATED COUNT: 13.7 % (ref 11.6–15.1)
GFR SERPL CREATININE-BSD FRML MDRD: 79 ML/MIN/1.73SQ M
GLUCOSE SERPL-MCNC: 187 MG/DL (ref 65–140)
HCT VFR BLD AUTO: 39.9 % (ref 36.5–49.3)
HGB BLD-MCNC: 13.2 G/DL (ref 12–17)
IMM GRANULOCYTES # BLD AUTO: 0.02 THOUSAND/UL (ref 0–0.2)
IMM GRANULOCYTES NFR BLD AUTO: 0 % (ref 0–2)
INR PPP: 1.2 (ref 0.84–1.19)
LYMPHOCYTES # BLD AUTO: 1.29 THOUSANDS/ΜL (ref 0.6–4.47)
LYMPHOCYTES NFR BLD AUTO: 22 % (ref 14–44)
MCH RBC QN AUTO: 26.2 PG (ref 26.8–34.3)
MCHC RBC AUTO-ENTMCNC: 33.1 G/DL (ref 31.4–37.4)
MCV RBC AUTO: 79 FL (ref 82–98)
MONOCYTES # BLD AUTO: 0.47 THOUSAND/ΜL (ref 0.17–1.22)
MONOCYTES NFR BLD AUTO: 8 % (ref 4–12)
NEUTROPHILS # BLD AUTO: 3.81 THOUSANDS/ΜL (ref 1.85–7.62)
NEUTS SEG NFR BLD AUTO: 66 % (ref 43–75)
NRBC BLD AUTO-RTO: 0 /100 WBCS
PLATELET # BLD AUTO: 203 THOUSANDS/UL (ref 149–390)
PMV BLD AUTO: 9.8 FL (ref 8.9–12.7)
POTASSIUM SERPL-SCNC: 4.1 MMOL/L (ref 3.5–5.3)
PROT SERPL-MCNC: 7.7 G/DL (ref 6.4–8.2)
PROTHROMBIN TIME: 14.7 SECONDS (ref 11.6–14.5)
RBC # BLD AUTO: 5.03 MILLION/UL (ref 3.88–5.62)
SODIUM SERPL-SCNC: 137 MMOL/L (ref 136–145)
TROPONIN I SERPL-MCNC: 0.04 NG/ML
TROPONIN I SERPL-MCNC: 0.62 NG/ML
WBC # BLD AUTO: 5.81 THOUSAND/UL (ref 4.31–10.16)

## 2021-10-19 PROCEDURE — 36415 COLL VENOUS BLD VENIPUNCTURE: CPT | Performed by: EMERGENCY MEDICINE

## 2021-10-19 PROCEDURE — 85025 COMPLETE CBC W/AUTO DIFF WBC: CPT | Performed by: EMERGENCY MEDICINE

## 2021-10-19 PROCEDURE — 99285 EMERGENCY DEPT VISIT HI MDM: CPT | Performed by: EMERGENCY MEDICINE

## 2021-10-19 PROCEDURE — 84484 ASSAY OF TROPONIN QUANT: CPT

## 2021-10-19 PROCEDURE — 93005 ELECTROCARDIOGRAM TRACING: CPT

## 2021-10-19 PROCEDURE — 85610 PROTHROMBIN TIME: CPT | Performed by: EMERGENCY MEDICINE

## 2021-10-19 PROCEDURE — 71046 X-RAY EXAM CHEST 2 VIEWS: CPT

## 2021-10-19 PROCEDURE — 84484 ASSAY OF TROPONIN QUANT: CPT | Performed by: EMERGENCY MEDICINE

## 2021-10-19 PROCEDURE — 71275 CT ANGIOGRAPHY CHEST: CPT

## 2021-10-19 PROCEDURE — 99285 EMERGENCY DEPT VISIT HI MDM: CPT

## 2021-10-19 PROCEDURE — 99220 PR INITIAL OBSERVATION CARE/DAY 70 MINUTES: CPT

## 2021-10-19 PROCEDURE — 80053 COMPREHEN METABOLIC PANEL: CPT | Performed by: EMERGENCY MEDICINE

## 2021-10-19 PROCEDURE — 85379 FIBRIN DEGRADATION QUANT: CPT | Performed by: EMERGENCY MEDICINE

## 2021-10-19 RX ORDER — NITROGLYCERIN 0.4 MG/1
0.4 TABLET SUBLINGUAL
Status: DISCONTINUED | OUTPATIENT
Start: 2021-10-19 | End: 2021-10-20

## 2021-10-19 RX ORDER — METOPROLOL SUCCINATE 25 MG/1
12.5 TABLET, EXTENDED RELEASE ORAL DAILY
Status: DISCONTINUED | OUTPATIENT
Start: 2021-10-20 | End: 2021-10-21 | Stop reason: HOSPADM

## 2021-10-19 RX ORDER — HEPARIN SODIUM 10000 [USP'U]/100ML
3-20 INJECTION, SOLUTION INTRAVENOUS
Status: DISCONTINUED | OUTPATIENT
Start: 2021-10-19 | End: 2021-10-20

## 2021-10-19 RX ORDER — ATORVASTATIN CALCIUM 40 MG/1
80 TABLET, FILM COATED ORAL
Status: DISCONTINUED | OUTPATIENT
Start: 2021-10-20 | End: 2021-10-21 | Stop reason: HOSPADM

## 2021-10-19 RX ORDER — ONDANSETRON 2 MG/ML
4 INJECTION INTRAMUSCULAR; INTRAVENOUS EVERY 6 HOURS PRN
Status: DISCONTINUED | OUTPATIENT
Start: 2021-10-19 | End: 2021-10-21 | Stop reason: HOSPADM

## 2021-10-19 RX ORDER — MORPHINE SULFATE 4 MG/ML
4 INJECTION, SOLUTION INTRAMUSCULAR; INTRAVENOUS EVERY 4 HOURS PRN
Status: DISCONTINUED | OUTPATIENT
Start: 2021-10-19 | End: 2021-10-21 | Stop reason: HOSPADM

## 2021-10-19 RX ORDER — ASPIRIN 81 MG/1
324 TABLET, CHEWABLE ORAL ONCE
Status: DISCONTINUED | OUTPATIENT
Start: 2021-10-19 | End: 2021-10-19

## 2021-10-19 RX ORDER — FENTANYL CITRATE 50 UG/ML
1 INJECTION, SOLUTION INTRAMUSCULAR; INTRAVENOUS ONCE
Status: COMPLETED | OUTPATIENT
Start: 2021-10-19 | End: 2021-10-19

## 2021-10-19 RX ORDER — ASPIRIN 81 MG/1
324 TABLET, CHEWABLE ORAL DAILY
Status: DISCONTINUED | OUTPATIENT
Start: 2021-10-20 | End: 2021-10-19

## 2021-10-19 RX ORDER — ACETAMINOPHEN 325 MG/1
650 TABLET ORAL EVERY 6 HOURS PRN
Status: DISCONTINUED | OUTPATIENT
Start: 2021-10-19 | End: 2021-10-21 | Stop reason: HOSPADM

## 2021-10-19 RX ORDER — ASPIRIN 81 MG/1
81 TABLET ORAL DAILY
Status: DISCONTINUED | OUTPATIENT
Start: 2021-10-20 | End: 2021-10-21 | Stop reason: HOSPADM

## 2021-10-19 RX ADMIN — MORPHINE SULFATE 4 MG: 4 INJECTION INTRAVENOUS at 23:19

## 2021-10-19 RX ADMIN — IOHEXOL 85 ML: 350 INJECTION, SOLUTION INTRAVENOUS at 18:14

## 2021-10-19 RX ADMIN — NITROGLYCERIN 0.4 MG: 0.4 TABLET SUBLINGUAL at 23:46

## 2021-10-20 LAB
APTT PPP: 33 SECONDS (ref 23–37)
APTT PPP: 90 SECONDS (ref 23–37)
ATRIAL RATE: 66 BPM
ATRIAL RATE: 71 BPM
ATRIAL RATE: 77 BPM
ATRIAL RATE: 86 BPM
ATRIAL RATE: 91 BPM
GLUCOSE SERPL-MCNC: 103 MG/DL (ref 65–140)
GLUCOSE SERPL-MCNC: 112 MG/DL (ref 65–140)
GLUCOSE SERPL-MCNC: 112 MG/DL (ref 65–140)
GLUCOSE SERPL-MCNC: 175 MG/DL (ref 65–140)
GLUCOSE SERPL-MCNC: 216 MG/DL (ref 65–140)
KCT BLD-ACNC: 249 SEC (ref 89–137)
P AXIS: 62 DEGREES
P AXIS: 62 DEGREES
P AXIS: 65 DEGREES
P AXIS: 69 DEGREES
P AXIS: 73 DEGREES
PR INTERVAL: 162 MS
PR INTERVAL: 166 MS
PR INTERVAL: 170 MS
PR INTERVAL: 172 MS
PR INTERVAL: 180 MS
QRS AXIS: 61 DEGREES
QRS AXIS: 67 DEGREES
QRS AXIS: 79 DEGREES
QRS AXIS: 80 DEGREES
QRS AXIS: 86 DEGREES
QRSD INTERVAL: 86 MS
QRSD INTERVAL: 90 MS
QRSD INTERVAL: 90 MS
QRSD INTERVAL: 92 MS
QRSD INTERVAL: 96 MS
QT INTERVAL: 360 MS
QT INTERVAL: 384 MS
QT INTERVAL: 390 MS
QT INTERVAL: 402 MS
QT INTERVAL: 410 MS
QTC INTERVAL: 421 MS
QTC INTERVAL: 441 MS
QTC INTERVAL: 442 MS
QTC INTERVAL: 445 MS
QTC INTERVAL: 459 MS
SPECIMEN SOURCE: ABNORMAL
T WAVE AXIS: 128 DEGREES
T WAVE AXIS: 149 DEGREES
T WAVE AXIS: 157 DEGREES
T WAVE AXIS: 158 DEGREES
T WAVE AXIS: 177 DEGREES
TROPONIN I SERPL-MCNC: 0.39 NG/ML
TROPONIN I SERPL-MCNC: 0.54 NG/ML
TROPONIN I SERPL-MCNC: 0.89 NG/ML
VENTRICULAR RATE: 66 BPM
VENTRICULAR RATE: 71 BPM
VENTRICULAR RATE: 77 BPM
VENTRICULAR RATE: 86 BPM
VENTRICULAR RATE: 91 BPM

## 2021-10-20 PROCEDURE — 82948 REAGENT STRIP/BLOOD GLUCOSE: CPT

## 2021-10-20 PROCEDURE — 85730 THROMBOPLASTIN TIME PARTIAL: CPT

## 2021-10-20 PROCEDURE — 99222 1ST HOSP IP/OBS MODERATE 55: CPT | Performed by: INTERNAL MEDICINE

## 2021-10-20 PROCEDURE — C1769 GUIDE WIRE: HCPCS | Performed by: INTERNAL MEDICINE

## 2021-10-20 PROCEDURE — 99233 SBSQ HOSP IP/OBS HIGH 50: CPT | Performed by: FAMILY MEDICINE

## 2021-10-20 PROCEDURE — 93455 CORONARY ART/GRFT ANGIO S&I: CPT | Performed by: INTERNAL MEDICINE

## 2021-10-20 PROCEDURE — C1894 INTRO/SHEATH, NON-LASER: HCPCS | Performed by: INTERNAL MEDICINE

## 2021-10-20 PROCEDURE — 99152 MOD SED SAME PHYS/QHP 5/>YRS: CPT | Performed by: INTERNAL MEDICINE

## 2021-10-20 PROCEDURE — C1887 CATHETER, GUIDING: HCPCS | Performed by: INTERNAL MEDICINE

## 2021-10-20 PROCEDURE — C1725 CATH, TRANSLUMIN NON-LASER: HCPCS | Performed by: INTERNAL MEDICINE

## 2021-10-20 PROCEDURE — B211YZZ FLUOROSCOPY OF MULTIPLE CORONARY ARTERIES USING OTHER CONTRAST: ICD-10-PCS | Performed by: INTERNAL MEDICINE

## 2021-10-20 PROCEDURE — C9600 PERC DRUG-EL COR STENT SING: HCPCS | Performed by: INTERNAL MEDICINE

## 2021-10-20 PROCEDURE — 93010 ELECTROCARDIOGRAM REPORT: CPT | Performed by: INTERNAL MEDICINE

## 2021-10-20 PROCEDURE — 85347 COAGULATION TIME ACTIVATED: CPT

## 2021-10-20 PROCEDURE — NC001 PR NO CHARGE: Performed by: INTERNAL MEDICINE

## 2021-10-20 PROCEDURE — 84484 ASSAY OF TROPONIN QUANT: CPT

## 2021-10-20 PROCEDURE — 99153 MOD SED SAME PHYS/QHP EA: CPT | Performed by: INTERNAL MEDICINE

## 2021-10-20 PROCEDURE — C1874 STENT, COATED/COV W/DEL SYS: HCPCS | Performed by: INTERNAL MEDICINE

## 2021-10-20 PROCEDURE — 93005 ELECTROCARDIOGRAM TRACING: CPT

## 2021-10-20 PROCEDURE — 36415 COLL VENOUS BLD VENIPUNCTURE: CPT

## 2021-10-20 PROCEDURE — 027034Z DILATION OF CORONARY ARTERY, ONE ARTERY WITH DRUG-ELUTING INTRALUMINAL DEVICE, PERCUTANEOUS APPROACH: ICD-10-PCS | Performed by: INTERNAL MEDICINE

## 2021-10-20 PROCEDURE — 92928 PRQ TCAT PLMT NTRAC ST 1 LES: CPT | Performed by: INTERNAL MEDICINE

## 2021-10-20 DEVICE — IMPLANTABLE DEVICE: Type: IMPLANTABLE DEVICE | Site: CORONARY | Status: FUNCTIONAL

## 2021-10-20 RX ORDER — MIDAZOLAM HYDROCHLORIDE 2 MG/2ML
INJECTION, SOLUTION INTRAMUSCULAR; INTRAVENOUS AS NEEDED
Status: DISCONTINUED | OUTPATIENT
Start: 2021-10-20 | End: 2021-10-20 | Stop reason: HOSPADM

## 2021-10-20 RX ORDER — LIDOCAINE WITH 8.4% SOD BICARB 0.9%(10ML)
SYRINGE (ML) INJECTION AS NEEDED
Status: DISCONTINUED | OUTPATIENT
Start: 2021-10-20 | End: 2021-10-20 | Stop reason: HOSPADM

## 2021-10-20 RX ORDER — SODIUM CHLORIDE 9 MG/ML
75 INJECTION, SOLUTION INTRAVENOUS CONTINUOUS
Status: DISCONTINUED | OUTPATIENT
Start: 2021-10-20 | End: 2021-10-20

## 2021-10-20 RX ORDER — HEPARIN SODIUM 1000 [USP'U]/ML
INJECTION, SOLUTION INTRAVENOUS; SUBCUTANEOUS AS NEEDED
Status: DISCONTINUED | OUTPATIENT
Start: 2021-10-20 | End: 2021-10-20 | Stop reason: HOSPADM

## 2021-10-20 RX ORDER — NITROGLYCERIN 20 MG/100ML
INJECTION INTRAVENOUS AS NEEDED
Status: DISCONTINUED | OUTPATIENT
Start: 2021-10-20 | End: 2021-10-20 | Stop reason: HOSPADM

## 2021-10-20 RX ORDER — SODIUM CHLORIDE 9 MG/ML
75 INJECTION, SOLUTION INTRAVENOUS CONTINUOUS
Status: DISPENSED | OUTPATIENT
Start: 2021-10-20 | End: 2021-10-20

## 2021-10-20 RX ORDER — ISOSORBIDE MONONITRATE 30 MG/1
30 TABLET, EXTENDED RELEASE ORAL DAILY
Status: DISCONTINUED | OUTPATIENT
Start: 2021-10-20 | End: 2021-10-21 | Stop reason: HOSPADM

## 2021-10-20 RX ORDER — FENTANYL CITRATE 50 UG/ML
INJECTION, SOLUTION INTRAMUSCULAR; INTRAVENOUS AS NEEDED
Status: DISCONTINUED | OUTPATIENT
Start: 2021-10-20 | End: 2021-10-20 | Stop reason: HOSPADM

## 2021-10-20 RX ADMIN — TICAGRELOR 90 MG: 90 TABLET ORAL at 17:42

## 2021-10-20 RX ADMIN — MORPHINE SULFATE 2 MG: 2 INJECTION, SOLUTION INTRAMUSCULAR; INTRAVENOUS at 17:41

## 2021-10-20 RX ADMIN — SODIUM CHLORIDE 75 ML/HR: 0.9 INJECTION, SOLUTION INTRAVENOUS at 16:31

## 2021-10-20 RX ADMIN — MORPHINE SULFATE 4 MG: 4 INJECTION INTRAVENOUS at 08:53

## 2021-10-20 RX ADMIN — HEPARIN SODIUM 12 UNITS/KG/HR: 10000 INJECTION, SOLUTION INTRAVENOUS at 01:11

## 2021-10-20 RX ADMIN — METOPROLOL SUCCINATE 12.5 MG: 25 TABLET, EXTENDED RELEASE ORAL at 08:54

## 2021-10-20 RX ADMIN — SODIUM CHLORIDE 500 ML: 0.9 INJECTION, SOLUTION INTRAVENOUS at 00:04

## 2021-10-20 RX ADMIN — NITROGLYCERIN 1 INCH: 20 OINTMENT TOPICAL at 09:20

## 2021-10-20 RX ADMIN — ATORVASTATIN CALCIUM 80 MG: 40 TABLET, FILM COATED ORAL at 17:44

## 2021-10-20 RX ADMIN — INSULIN LISPRO 1 UNITS: 100 INJECTION, SOLUTION INTRAVENOUS; SUBCUTANEOUS at 23:53

## 2021-10-20 RX ADMIN — ISOSORBIDE MONONITRATE 30 MG: 30 TABLET, EXTENDED RELEASE ORAL at 19:24

## 2021-10-20 RX ADMIN — ASPIRIN 81 MG: 81 TABLET, COATED ORAL at 08:54

## 2021-10-21 VITALS
HEIGHT: 66 IN | RESPIRATION RATE: 17 BRPM | WEIGHT: 171.08 LBS | HEART RATE: 77 BPM | SYSTOLIC BLOOD PRESSURE: 113 MMHG | BODY MASS INDEX: 27.49 KG/M2 | DIASTOLIC BLOOD PRESSURE: 63 MMHG | OXYGEN SATURATION: 98 % | TEMPERATURE: 97.7 F

## 2021-10-21 DIAGNOSIS — L20.9 ATOPIC DERMATITIS, UNSPECIFIED TYPE: ICD-10-CM

## 2021-10-21 LAB
ANION GAP SERPL CALCULATED.3IONS-SCNC: 8 MMOL/L (ref 4–13)
BUN SERPL-MCNC: 15 MG/DL (ref 5–25)
CALCIUM SERPL-MCNC: 8.5 MG/DL (ref 8.3–10.1)
CHLORIDE SERPL-SCNC: 107 MMOL/L (ref 100–108)
CO2 SERPL-SCNC: 26 MMOL/L (ref 21–32)
CREAT SERPL-MCNC: 0.93 MG/DL (ref 0.6–1.3)
ERYTHROCYTE [DISTWIDTH] IN BLOOD BY AUTOMATED COUNT: 13.7 % (ref 11.6–15.1)
GFR SERPL CREATININE-BSD FRML MDRD: 91 ML/MIN/1.73SQ M
GLUCOSE SERPL-MCNC: 119 MG/DL (ref 65–140)
GLUCOSE SERPL-MCNC: 123 MG/DL (ref 65–140)
GLUCOSE SERPL-MCNC: 134 MG/DL (ref 65–140)
HCT VFR BLD AUTO: 34.9 % (ref 36.5–49.3)
HGB BLD-MCNC: 11.6 G/DL (ref 12–17)
MCH RBC QN AUTO: 26.4 PG (ref 26.8–34.3)
MCHC RBC AUTO-ENTMCNC: 33.2 G/DL (ref 31.4–37.4)
MCV RBC AUTO: 80 FL (ref 82–98)
PLATELET # BLD AUTO: 176 THOUSANDS/UL (ref 149–390)
PMV BLD AUTO: 9.6 FL (ref 8.9–12.7)
POTASSIUM SERPL-SCNC: 4.2 MMOL/L (ref 3.5–5.3)
RBC # BLD AUTO: 4.39 MILLION/UL (ref 3.88–5.62)
SODIUM SERPL-SCNC: 141 MMOL/L (ref 136–145)
WBC # BLD AUTO: 7 THOUSAND/UL (ref 4.31–10.16)

## 2021-10-21 PROCEDURE — 85027 COMPLETE CBC AUTOMATED: CPT

## 2021-10-21 PROCEDURE — 82948 REAGENT STRIP/BLOOD GLUCOSE: CPT

## 2021-10-21 PROCEDURE — 99239 HOSP IP/OBS DSCHRG MGMT >30: CPT | Performed by: FAMILY MEDICINE

## 2021-10-21 PROCEDURE — 99232 SBSQ HOSP IP/OBS MODERATE 35: CPT | Performed by: INTERNAL MEDICINE

## 2021-10-21 PROCEDURE — 93005 ELECTROCARDIOGRAM TRACING: CPT

## 2021-10-21 PROCEDURE — 80048 BASIC METABOLIC PNL TOTAL CA: CPT

## 2021-10-21 RX ORDER — ISOSORBIDE MONONITRATE 30 MG/1
30 TABLET, EXTENDED RELEASE ORAL DAILY
Qty: 30 TABLET | Refills: 0 | Status: SHIPPED | OUTPATIENT
Start: 2021-10-22 | End: 2021-11-04

## 2021-10-21 RX ADMIN — ASPIRIN 81 MG: 81 TABLET, COATED ORAL at 09:02

## 2021-10-21 RX ADMIN — TICAGRELOR 90 MG: 90 TABLET ORAL at 09:02

## 2021-10-21 RX ADMIN — METOPROLOL SUCCINATE 12.5 MG: 25 TABLET, EXTENDED RELEASE ORAL at 09:02

## 2021-10-21 RX ADMIN — ISOSORBIDE MONONITRATE 30 MG: 30 TABLET, EXTENDED RELEASE ORAL at 09:02

## 2021-10-22 ENCOUNTER — TRANSITIONAL CARE MANAGEMENT (OUTPATIENT)
Dept: FAMILY MEDICINE CLINIC | Facility: CLINIC | Age: 56
End: 2021-10-22

## 2021-10-22 RX ORDER — HALOBETASOL PROPIONATE 0.05 %
OINTMENT (GRAM) TOPICAL
Qty: 50 G | Refills: 1 | Status: SHIPPED | OUTPATIENT
Start: 2021-10-22 | End: 2021-10-27

## 2021-10-24 LAB
ATRIAL RATE: 80 BPM
P AXIS: 75 DEGREES
PR INTERVAL: 158 MS
QRS AXIS: 81 DEGREES
QRSD INTERVAL: 94 MS
QT INTERVAL: 378 MS
QTC INTERVAL: 435 MS
T WAVE AXIS: 153 DEGREES
VENTRICULAR RATE: 80 BPM

## 2021-10-24 PROCEDURE — 93010 ELECTROCARDIOGRAM REPORT: CPT | Performed by: INTERNAL MEDICINE

## 2021-10-27 ENCOUNTER — OFFICE VISIT (OUTPATIENT)
Dept: FAMILY MEDICINE CLINIC | Facility: CLINIC | Age: 56
End: 2021-10-27
Payer: COMMERCIAL

## 2021-10-27 VITALS
WEIGHT: 164.8 LBS | HEART RATE: 82 BPM | OXYGEN SATURATION: 98 % | BODY MASS INDEX: 26.48 KG/M2 | HEIGHT: 66 IN | SYSTOLIC BLOOD PRESSURE: 120 MMHG | DIASTOLIC BLOOD PRESSURE: 82 MMHG

## 2021-10-27 DIAGNOSIS — I10 HYPERTENSION, UNSPECIFIED TYPE: ICD-10-CM

## 2021-10-27 DIAGNOSIS — I25.110 CORONARY ARTERY DISEASE INVOLVING NATIVE CORONARY ARTERY OF NATIVE HEART WITH UNSTABLE ANGINA PECTORIS (HCC): ICD-10-CM

## 2021-10-27 DIAGNOSIS — Z12.12 SCREENING FOR COLORECTAL CANCER: ICD-10-CM

## 2021-10-27 DIAGNOSIS — Z09 HOSPITAL DISCHARGE FOLLOW-UP: Primary | ICD-10-CM

## 2021-10-27 DIAGNOSIS — Z12.11 SCREENING FOR COLORECTAL CANCER: ICD-10-CM

## 2021-10-27 DIAGNOSIS — I21.4 NSTEMI (NON-ST ELEVATED MYOCARDIAL INFARCTION) (HCC): ICD-10-CM

## 2021-10-27 DIAGNOSIS — Z72.0 TOBACCO ABUSE: ICD-10-CM

## 2021-10-27 DIAGNOSIS — E11.59 TYPE 2 DIABETES MELLITUS WITH OTHER CIRCULATORY COMPLICATION, WITHOUT LONG-TERM CURRENT USE OF INSULIN (HCC): ICD-10-CM

## 2021-10-27 DIAGNOSIS — Z95.1 S/P CABG X 3: ICD-10-CM

## 2021-10-27 LAB
ATRIAL RATE: 71 BPM
P AXIS: 69 DEGREES
PR INTERVAL: 168 MS
QRS AXIS: 70 DEGREES
QRSD INTERVAL: 92 MS
QT INTERVAL: 388 MS
QTC INTERVAL: 421 MS
SL AMB POCT HEMOGLOBIN AIC: 6.6 (ref ?–6.5)
T WAVE AXIS: 152 DEGREES
VENTRICULAR RATE: 71 BPM

## 2021-10-27 PROCEDURE — 99496 TRANSJ CARE MGMT HIGH F2F 7D: CPT | Performed by: STUDENT IN AN ORGANIZED HEALTH CARE EDUCATION/TRAINING PROGRAM

## 2021-10-27 PROCEDURE — 93010 ELECTROCARDIOGRAM REPORT: CPT | Performed by: INTERNAL MEDICINE

## 2021-10-27 PROCEDURE — 83036 HEMOGLOBIN GLYCOSYLATED A1C: CPT | Performed by: STUDENT IN AN ORGANIZED HEALTH CARE EDUCATION/TRAINING PROGRAM

## 2021-10-27 PROCEDURE — 1111F DSCHRG MED/CURRENT MED MERGE: CPT | Performed by: STUDENT IN AN ORGANIZED HEALTH CARE EDUCATION/TRAINING PROGRAM

## 2021-10-28 LAB
LEFT EYE DIABETIC RETINOPATHY: NORMAL
RIGHT EYE DIABETIC RETINOPATHY: NORMAL

## 2021-10-29 DIAGNOSIS — I20.9 ANGINA PECTORIS (HCC): ICD-10-CM

## 2021-10-29 DIAGNOSIS — D69.6 THROMBOCYTOPENIA (HCC): ICD-10-CM

## 2021-10-29 DIAGNOSIS — I21.4 NSTEMI (NON-ST ELEVATED MYOCARDIAL INFARCTION) (HCC): ICD-10-CM

## 2021-10-29 DIAGNOSIS — R73.03 PREDIABETES: ICD-10-CM

## 2021-10-29 DIAGNOSIS — Z95.1 S/P CABG X 3: ICD-10-CM

## 2021-10-29 DIAGNOSIS — R73.09 ELEVATED HEMOGLOBIN A1C: ICD-10-CM

## 2021-10-29 DIAGNOSIS — Z72.0 TOBACCO ABUSE: ICD-10-CM

## 2021-11-03 RX ORDER — HALOBETASOL PROPIONATE 0.05 %
OINTMENT (GRAM) TOPICAL
Qty: 50 G | Refills: 0 | Status: SHIPPED | OUTPATIENT
Start: 2021-11-03 | End: 2022-01-31 | Stop reason: ALTCHOICE

## 2021-11-04 ENCOUNTER — OFFICE VISIT (OUTPATIENT)
Dept: CARDIOLOGY CLINIC | Facility: CLINIC | Age: 56
End: 2021-11-04
Payer: COMMERCIAL

## 2021-11-04 VITALS
DIASTOLIC BLOOD PRESSURE: 96 MMHG | HEIGHT: 66 IN | HEART RATE: 75 BPM | OXYGEN SATURATION: 98 % | RESPIRATION RATE: 12 BRPM | BODY MASS INDEX: 26.93 KG/M2 | WEIGHT: 167.6 LBS | SYSTOLIC BLOOD PRESSURE: 126 MMHG

## 2021-11-04 DIAGNOSIS — I21.4 NSTEMI (NON-ST ELEVATED MYOCARDIAL INFARCTION) (HCC): ICD-10-CM

## 2021-11-04 DIAGNOSIS — I73.9 PAD (PERIPHERAL ARTERY DISEASE) (HCC): ICD-10-CM

## 2021-11-04 DIAGNOSIS — Z95.1 S/P CABG X 3: ICD-10-CM

## 2021-11-04 DIAGNOSIS — I25.10 CORONARY ARTERY DISEASE INVOLVING NATIVE CORONARY ARTERY OF NATIVE HEART WITHOUT ANGINA PECTORIS: Primary | ICD-10-CM

## 2021-11-04 DIAGNOSIS — I10 ESSENTIAL HYPERTENSION: ICD-10-CM

## 2021-11-04 PROCEDURE — 3074F SYST BP LT 130 MM HG: CPT | Performed by: NURSE PRACTITIONER

## 2021-11-04 PROCEDURE — 1111F DSCHRG MED/CURRENT MED MERGE: CPT | Performed by: NURSE PRACTITIONER

## 2021-11-04 PROCEDURE — 3080F DIAST BP >= 90 MM HG: CPT | Performed by: NURSE PRACTITIONER

## 2021-11-04 PROCEDURE — 99214 OFFICE O/P EST MOD 30 MIN: CPT | Performed by: NURSE PRACTITIONER

## 2021-11-04 PROCEDURE — 1036F TOBACCO NON-USER: CPT | Performed by: NURSE PRACTITIONER

## 2021-11-04 PROCEDURE — 3008F BODY MASS INDEX DOCD: CPT | Performed by: NURSE PRACTITIONER

## 2021-11-04 RX ORDER — ISOSORBIDE MONONITRATE 30 MG/1
30 TABLET, EXTENDED RELEASE ORAL 2 TIMES DAILY
Qty: 60 TABLET | Refills: 0 | Status: SHIPPED | OUTPATIENT
Start: 2021-11-04 | End: 2021-11-04

## 2021-11-04 RX ORDER — ASPIRIN 81 MG/1
325 TABLET ORAL DAILY
Qty: 60 TABLET | Refills: 3
Start: 2021-11-04 | End: 2022-06-06

## 2021-11-04 RX ORDER — ISOSORBIDE MONONITRATE 30 MG/1
30 TABLET, EXTENDED RELEASE ORAL 2 TIMES DAILY
Qty: 90 TABLET | Refills: 3 | Status: SHIPPED | OUTPATIENT
Start: 2021-11-04 | End: 2022-04-20

## 2021-11-04 RX ORDER — NITROGLYCERIN 0.4 MG/1
0.4 TABLET SUBLINGUAL
Qty: 60 TABLET | Refills: 3 | Status: CANCELLED | OUTPATIENT
Start: 2021-11-04

## 2021-11-26 ENCOUNTER — HOSPITAL ENCOUNTER (OUTPATIENT)
Dept: NON INVASIVE DIAGNOSTICS | Facility: CLINIC | Age: 56
Discharge: HOME | End: 2021-11-26
Payer: COMMERCIAL

## 2021-11-26 VITALS
BODY MASS INDEX: 27.82 KG/M2 | HEART RATE: 75 BPM | HEIGHT: 65 IN | SYSTOLIC BLOOD PRESSURE: 126 MMHG | WEIGHT: 167 LBS | DIASTOLIC BLOOD PRESSURE: 96 MMHG

## 2021-11-26 DIAGNOSIS — I25.10 CORONARY ARTERY DISEASE INVOLVING NATIVE HEART WITHOUT ANGINA PECTORIS, UNSPECIFIED VESSEL OR LESION TYPE: ICD-10-CM

## 2021-11-26 LAB
AORTIC ROOT: 2.5 CM
APICAL FOUR CHAMBER EJECTION FRACTION: 66 %
E WAVE DECELERATION TIME: 198 MS
FRACTIONAL SHORTENING: 30 % (ref 28–44)
GLOBAL LONGITUIDAL STRAIN: -15 %
INTERVENTRICULAR SEPTUM IN DIASTOLE (PARASTERNAL SHORT AXIS VIEW): 1.1 CM
LEFT ATRIUM AREA SYSTOLE SINGLE PLANE A4C: 13 CM2
LEFT INTERNAL DIMENSION IN SYSTOLE: 3 CM (ref 2.1–4)
LEFT VENTRICULAR INTERNAL DIMENSION IN DIASTOLE: 4.3 CM (ref 4.3–6.41)
LEFT VENTRICULAR POSTERIOR WALL IN END DIASTOLE: 1.1 CM
LEFT VENTRICULAR STROKE VOLUME: 49 ML
MV E'TISSUE VEL-SEP: 7 CM/S
MV PEAK A VEL: 0.69 M/S
MV PEAK E VEL: 97 CM/S
MV STENOSIS PRESSURE HALF TIME: 0 MS
RIGHT ATRIUM AREA SYSTOLE A4C: 12 CM2
RIGHT VENTRICLE ID DIMENSION: 3 CM
SL CV LV EF: 65
SL CV PED ECHO LEFT VENTRICLE DIASTOLIC VOLUME (MOD BIPLANE) 2D: 84 ML
SL CV PED ECHO LEFT VENTRICLE SYSTOLIC VOLUME (MOD BIPLANE) 2D: 35 ML
TRICUSPID VALVE PEAK REGURGITATION VELOCITY: 2.56 M/S
TRICUSPID VALVE S': 1 CM/S
TV PEAK GRADIENT: 26 MMHG
Z-SCORE OF LEFT VENTRICULAR DIMENSION IN END SYSTOLE: -2

## 2021-11-26 PROCEDURE — 93356 MYOCRD STRAIN IMG SPCKL TRCK: CPT | Performed by: INTERNAL MEDICINE

## 2021-11-26 PROCEDURE — 93325 DOPPLER ECHO COLOR FLOW MAPG: CPT

## 2021-11-26 PROCEDURE — 93325 DOPPLER ECHO COLOR FLOW MAPG: CPT | Performed by: INTERNAL MEDICINE

## 2021-11-26 PROCEDURE — 93321 DOPPLER ECHO F-UP/LMTD STD: CPT | Performed by: INTERNAL MEDICINE

## 2021-11-26 PROCEDURE — 93308 TTE F-UP OR LMTD: CPT

## 2021-11-26 PROCEDURE — 93308 TTE F-UP OR LMTD: CPT | Performed by: INTERNAL MEDICINE

## 2021-11-26 PROCEDURE — 93321 DOPPLER ECHO F-UP/LMTD STD: CPT

## 2021-12-07 ENCOUNTER — TELEPHONE (OUTPATIENT)
Dept: CARDIOLOGY CLINIC | Facility: CLINIC | Age: 56
End: 2021-12-07

## 2021-12-07 NOTE — TELEPHONE ENCOUNTER
Beltran Bocanegra from [place] in [facility] called  Patient was referred by Dr Stacy Ann      They need patient's last clinical notes  Last cardiac cath report  Last echo    Demographics  Med list    Please fax to [phone] Attn:  Berlin    Berlin can be reached at [phone]

## 2021-12-07 NOTE — TELEPHONE ENCOUNTER
Name of Caller: Patient     Problem/Symptoms: Patient contacting office to request an extension letter that he will be picking up to mention why he is still out of work and the status moving forward  Patient was referred to [facility] in [facility], they have been in contact with him however, they are holding off until after the holidays       Call back number: [phone]

## 2021-12-10 DIAGNOSIS — D69.6 THROMBOCYTOPENIA (HCC): ICD-10-CM

## 2021-12-10 DIAGNOSIS — R73.03 PREDIABETES: ICD-10-CM

## 2021-12-10 DIAGNOSIS — Z72.0 TOBACCO ABUSE: ICD-10-CM

## 2021-12-10 DIAGNOSIS — Z95.1 S/P CABG X 3: ICD-10-CM

## 2021-12-10 DIAGNOSIS — R73.09 ELEVATED HEMOGLOBIN A1C: ICD-10-CM

## 2021-12-10 DIAGNOSIS — I20.9 ANGINA PECTORIS (HCC): ICD-10-CM

## 2021-12-10 DIAGNOSIS — I21.4 NSTEMI (NON-ST ELEVATED MYOCARDIAL INFARCTION) (HCC): ICD-10-CM

## 2021-12-10 NOTE — TELEPHONE ENCOUNTER
Patient called back to state he picked up the letter yesterday  Confirmed that this is the same letter---extension of leave from work

## 2021-12-15 DIAGNOSIS — I21.4 NSTEMI (NON-ST ELEVATED MYOCARDIAL INFARCTION) (HCC): ICD-10-CM

## 2022-01-06 ENCOUNTER — OFFICE VISIT (OUTPATIENT)
Dept: CARDIOLOGY CLINIC | Facility: CLINIC | Age: 57
End: 2022-01-06
Payer: COMMERCIAL

## 2022-01-06 VITALS
BODY MASS INDEX: 27.82 KG/M2 | HEART RATE: 71 BPM | OXYGEN SATURATION: 99 % | WEIGHT: 167 LBS | SYSTOLIC BLOOD PRESSURE: 148 MMHG | DIASTOLIC BLOOD PRESSURE: 78 MMHG | HEIGHT: 65 IN

## 2022-01-06 DIAGNOSIS — E11.59 TYPE 2 DIABETES MELLITUS WITH OTHER CIRCULATORY COMPLICATION, WITHOUT LONG-TERM CURRENT USE OF INSULIN (HCC): ICD-10-CM

## 2022-01-06 DIAGNOSIS — I77.1 SUBCLAVIAN ARTERIAL STENOSIS (HCC): Primary | ICD-10-CM

## 2022-01-06 DIAGNOSIS — I74.3 EMBOLISM AND THROMBOSIS OF ARTERIES OF THE LOWER EXTREMITIES (HCC): ICD-10-CM

## 2022-01-06 PROCEDURE — 99214 OFFICE O/P EST MOD 30 MIN: CPT | Performed by: INTERNAL MEDICINE

## 2022-01-06 NOTE — PROGRESS NOTES
Cardiology Follow Up    Nadege Rudolph  1965  [id number]  [address] [facility] [address]  [place] [id number] [phone]    Discussion/Summary:  1  CAD s/p CABg s/p PCI to distal lcx, recent PCI for  of OM  2  Subclavian artery stenosis s/p recent angioplasty  3  PAD with claudication Kent class 2/3  4  HTN  5  Dyslipidemia    Doing well  No cardiac complaints  Denies chest pain, sob, lightheadedness, dizziness, presyncope or syncope  Cont with ASA and ticagrelor  Cont with lipitor and metoprolol  On imdur 30 mg daily  Patient is euvolemic on examination  Recommend to hold off on diuretics for now  I am not sure episode was volume overload  Patient will contact us if he has recurrent symptoms,     Has significant/severe claudication  Has stopped smoking since 4/2021  Prior doppler studies reviewed  FRANCIE on left 07, right 0 8  Will check a CTA runoff of lower limbs  Will likely need peripheral angiography and possible intervention  Hypertension:  Cont to monitor for now  May need to increase in future  Currently on metoprolol and imdur    Recommend patient presents to the emergency room or call our office if he has any new/persistent or progressive symptoms  Previous studies were reviewed  Safety measures were reviewed  Questions were entertained and answered  Patient was advised to report any problems requiring medical attention  Follow-up with PCP and appropriate specialist and lab work as discussed  Return for follow up visit as scheduled or earlier, if needed  Thank you for allowing me to participate in the care and evaluation of your patient  Should you have any questions, please feel free to contact me  History of Present Illness:     Nadege Rudolph is a 64 y o   male who presents for follow up for cardiovascular care      Denies chest pain, chest pressure, shortness of breath, dizziness, lightheadedness, presyncope or syncope  Denies orthopnea, PND or lower limb edema  States a few days ago he felt "full of fluid" and SOB on lying flat  Denies lower limb edema  States he took a dose of torsemide and immediately felt better  No longer having any symptoms  He has claudication on exertion  States he needs to stop about 0 5 mile  States has been getting worse  Feels tight  Is affecting him a lot  He has stopped smoking since 4/2021  He was recently seen by Dr Burak Cunningham and underwent PCI to  of OM1 with excellent result  States he was short of breath     Echo 11/26/21:    Left Ventricle: Left ventricular cavity size is normal  Wall thickness is mildly increased  The left ventricular ejection fraction is 65%  Systolic function is normal  Global longitudinal strain is reduced at -15%  Wall motion is normal  There is concentric remodeling  Diastolic function is abnormal   Left atrial filling pressure is normal       Lower limb arterial doppler 7/15/2021:  RIGHT LOWER LIMB:  This resting evaluation shows an occlusion versus a high grade stenosis in the  mid superficial femoral artery  There is a 50-75% stenosis in the distal  superficial femoral artery and a >75% stenosis in the proximal popliteal artery  There is diffuse atherosclerotic disease in the remainder of the  femoro-popliteal and tibio-peroneal segments  Ankle/Brachial index: 0 73  which is in the moderate disease category  PVR/ PPG tracings are mildly dampened  Metatarsal pressure of 115 mmHg  Great toe pressure of 76 mmHg, within the healing range     LEFT LOWER LIMB:  This resting evaluation shows a 50-75% stenosis in the proximal superficial  femoral artery and a >75% stenosis in the distal superficial femoral and  proximal popliteal arteries  There is diffuse atherosclerotic disease in the  remainder of the femoro-popliteal and tibio-peroneal segments    Ankle/Brachial index: 0 83  which is in the moderate disease category  PVR/ PPG tracings are mildly dampened  Metatarsal pressure of 103 mmHg  Great toe pressure of 78 mmHg, within the healing range      Patient Active Problem List   Diagnosis    Chest pain    Type II diabetes mellitus (HCC)    Tobacco abuse    Leukocytosis    NSTEMI (non-ST elevated myocardial infarction) ([facility])    Thrombocytopenia (HCC)    S/P CABG x 3    Postoperative anemia due to acute blood loss    Sinus bradycardia    Encounter for postoperative care    CAD (coronary artery disease)    Left carotid bruit    GERD (gastroesophageal reflux disease)    Hypertension     Past Medical History:   Diagnosis Date    Bicuspid aortic valve     CAD (coronary artery disease)     Former tobacco use     GERD (gastroesophageal reflux disease)     Goiter     History of myocardial infarction     History of rib fracture     Hypertension     Hypertensive urgency 2021     Social History     Socioeconomic History    Marital status:      Spouse name: Not on file    Number of children: Not on file    Years of education: Not on file    Highest education level: Not on file   Occupational History    Not on file   Tobacco Use    Smoking status: Former Smoker     Packs/day: 1 00     Types: Cigarettes     Quit date: 2021     Years since quittin 7    Smokeless tobacco: Never Used   Vaping Use    Vaping Use: Never used   Substance and Sexual Activity    Alcohol use: Never    Drug use: Never    Sexual activity: Not on file   Other Topics Concern    Not on file   Social History Narrative    Not on file     Social Determinants of Health     Financial Resource Strain: Not on file   Food Insecurity: Not on file   Transportation Needs: No Transportation Needs    Lack of Transportation (Medical): No    Lack of Transportation (Non-Medical):  No   Physical Activity: Not on file   Stress: Not on file   Social Connections: Not on file   Intimate Partner Violence: Not on file   Housing Stability: Not on file      Family History   Problem Relation Age of Onset    Heart disease Father      Past Surgical History:   Procedure Laterality Date    CARDIAC CATHETERIZATION      CARDIAC CATHETERIZATION N/A 10/20/2021    Procedure: Cardiac catheterization;  Surgeon: Dylan Apple MD;  Location: [address] [place] [address] CATH LAB; Service: Cardiology    CARDIAC CATHETERIZATION N/A 10/20/2021    Procedure: Cardiac Coronary Angiogram;  Surgeon: Dylan Apple MD;  Location: [address] [place] [address] CATH LAB; Service: Cardiology    CARDIAC CATHETERIZATION N/A 10/20/2021    Procedure: Cardiac pci;  Surgeon: Dylan Apple MD;  Location: [address] [place] [address] CATH LAB; Service: Cardiology    NE CABG, ARTERY-VEIN, FOUR N/A 4/9/2021    Procedure: CORONARY ARTERY BYPASS GRAFT (CABG) 3 VESSELS: LIMA to LAD; LLE EVH/SVG to LPL & OM2;  Surgeon: Valarie Salas DO;  [facility];  Service: Cardiac Surgery    NE ECHO TRANSESOPHAG R-T 2D W/PRB IMG ACQUISJ I&R N/A 4/9/2021    Procedure: TRANSESOPHAGEAL ECHOCARDIOGRAM (BENNETT); Surgeon: Valarie Salas DO;  [facility];  Service: Cardiac Surgery    NE ENDOSCOPY W/VIDEO-ASST VEIN HARVEST,CABG Left 4/9/2021    Procedure: HARVEST VEIN ENDOSCOPIC ([address]); Surgeon: Valarie Salas DO;  [facility];  Service: Cardiac Surgery    VASECTOMY         Current Outpatient Medications:     acetaminophen (TYLENOL) 325 mg tablet, Take 1-2 tabs q6h PRN mild fever/pain, Disp: 90 tablet, Rfl: 0    aspirin (ECOTRIN LOW STRENGTH) 81 mg EC tablet, Take 4 tablets (325 mg total) by mouth daily, Disp: 60 tablet, Rfl: 3    atorvastatin (LIPITOR) 80 mg tablet, Take 1 tablet (80 mg total) by mouth daily with dinner, Disp: 90 tablet, Rfl: 0    halobetasol (ULTRAVATE) 0 05 % ointment, Apply topically twice a day on affected areas for no more than 2 weeks   Avoid using on face and groin area , Disp: 50 g, Rfl: 0    isosorbide mononitrate (IMDUR) 30 mg 24 hr tablet, Take 1 tablet (30 mg total) by mouth 2 (two) times a day, Disp: 90 tablet, Rfl: 3    metFORMIN (GLUCOPHAGE) 500 mg tablet, TAKE 1 TABLET BY MOUTH TWICE A DAY WITH MEALS, Disp: 90 tablet, Rfl: 0    metoprolol succinate (TOPROL-XL) 25 mg 24 hr tablet, Take 0 5 tablets (12 5 mg total) by mouth daily, Disp: 90 tablet, Rfl: 3    ticagrelor (BRILINTA) 90 MG, Take 1 tablet (90 mg total) by mouth 2 (two) times a day, Disp: 60 tablet, Rfl: 0  No Known Allergies    Labs:  Lab Results   Component Value Date    WBC 7 00 10/21/2021    HGB 11 6 (L) 10/21/2021    HCT 34 9 (L) 10/21/2021    MCV 80 (L) 10/21/2021     10/21/2021     Lab Results   Component Value Date    GLUCOSE 145 (H) 04/09/2021    CALCIUM 8 5 10/21/2021    K 4 2 10/21/2021    CO2 26 10/21/2021     10/21/2021    BUN 15 10/21/2021    CREATININE 0 93 10/21/2021     Lab Results   Component Value Date    HGBA1C 6 6 (A) 10/27/2021     No results found for: CHOL  Lab Results   Component Value Date    HDL 34 (L) 04/08/2021    HDL 35 (L) 04/07/2021     Lab Results   Component Value Date    LDLCALC 44 04/08/2021    [address] 04/07/2021     Lab Results   Component Value Date    TRIG 97 04/08/2021    TRIG 91 04/07/2021     No results found for: CHOLHDL    Review of Systems:  Review of Systems   Constitutional: Negative for activity change, appetite change, fatigue and fever  Respiratory: Negative for cough, chest tightness and shortness of breath  Cardiovascular: Negative for chest pain, palpitations and leg swelling  Gastrointestinal: Negative for nausea and vomiting  Genitourinary: Negative for dysuria  Musculoskeletal: Negative for back pain  Skin: Negative for color change  Neurological: Negative for dizziness, light-headedness and numbness  Psychiatric/Behavioral: Negative for agitation  All other systems reviewed and are negative  Physical Exam:  Physical Exam  Vitals and nursing note reviewed  Constitutional:       General: He is not in acute distress  Appearance: Normal appearance  He is not ill-appearing or diaphoretic  HENT:      Head: Normocephalic and atraumatic  Eyes:      Extraocular Movements: Extraocular movements intact  Cardiovascular:      Rate and Rhythm: Normal rate and regular rhythm  Heart sounds: No murmur heard  No friction rub  No gallop  Pulmonary:      Effort: Pulmonary effort is normal       Breath sounds: Normal breath sounds  Abdominal:      General: There is no distension  Palpations: Abdomen is soft  Musculoskeletal:         General: No swelling  Normal range of motion  Skin:     General: Skin is warm and dry  Capillary Refill: Capillary refill takes less than 2 seconds  Coloration: Skin is not pale  Neurological:      General: No focal deficit present  Mental Status: He is alert and oriented to person, place, and time     Psychiatric:         Mood and Affect: Mood normal

## 2022-01-09 DIAGNOSIS — I21.4 NSTEMI (NON-ST ELEVATED MYOCARDIAL INFARCTION) (HCC): ICD-10-CM

## 2022-01-10 RX ORDER — TICAGRELOR 90 MG/1
TABLET ORAL
Qty: 60 TABLET | Refills: 0 | Status: ON HOLD | OUTPATIENT
Start: 2022-01-10 | End: 2022-02-04

## 2022-01-14 ENCOUNTER — HOSPITAL ENCOUNTER (OUTPATIENT)
Dept: RADIOLOGY | Facility: MEDICAL CENTER | Age: 57
Discharge: HOME | End: 2022-01-14
Payer: COMMERCIAL

## 2022-01-14 DIAGNOSIS — I74.3 EMBOLISM AND THROMBOSIS OF ARTERIES OF THE LOWER EXTREMITIES (HCC): ICD-10-CM

## 2022-01-14 PROCEDURE — 75635 CT ANGIO ABDOMINAL ARTERIES: CPT

## 2022-01-14 PROCEDURE — G1004 CDSM NDSC: HCPCS

## 2022-01-14 RX ADMIN — IOHEXOL 120 ML: 350 INJECTION, SOLUTION INTRAVENOUS at 11:46

## 2022-01-14 NOTE — LETTER
[address] [place] [address]  [address]      January 20, 2022    MRN: [id number]     Phone: [phone]     Dear Mr Rey Claire recently had a(n) Cat Scan performed on 1/14/2022 at  [address] [place] [address] that was requested by Lynnea Canavan, MD  The study was reviewed by a radiologist, which is a physician who specializes in medical imaging  The radiologist issued a report describing his or her findings  In that report there was a finding that the radiologist felt warranted further discussion with your health care provider and that discussion would be beneficial to you  The results were sent to Lynnea Canavan, MD on 01/14/2022  5:29 PM  We recommend that you contact Lynnea Canavan, MD at [phone] or set up an appointment to discuss the results of the imaging test  If you have already heard from Lynnea Canavan, MD regarding the results of your study, you can disregard this letter  This letter is not meant to alarm you, but intended to encourage you to follow-up on your results with the provider that sent you for the imaging study  In addition, we have enclosed answers to frequently asked questions by other patients who have also received a letter to review results with their health care provider (see page two)  Thank you for choosing [facility] for your medical imaging needs  FREQUENTLY ASKED QUESTIONS    1  Why am I receiving this letter? [address] [place] [address] requires us to notify patients who have findings on imaging exams that may require more testing or follow-up with a health professional within the next 3 months          2  How serious is the finding on the imaging test?  This letter is sent to all patients who may need follow-up or more testing within the next 3 months  Receiving this letter does not necessarily mean you have a life-threatening imaging finding or disease  Recommendations in the radiologists imaging report are general in nature and it is up to your healthcare provider to say whether those recommendations make sense for your situation  You are strongly encouraged to talk to your health care provider about the results and ask whether additional steps need to be taken  3  Where can I get a copy of the final report for my recent radiology exam?  To get a full copy of the report you can access your records online at [email or web address] or please contact [address] [place] [address] Records Department at [phone] Monday through Friday between 8 am and 6 pm          4  What do I need to do now? Please contact your health care provider who requested the imaging study to discuss what further actions (if any) are needed  You may have already heard from (your ordering provider) in regard to this test in which case you can disregard this letter  NOTICE IN ACCORDANCE WITH THE [facility] PATIENT TEST RESULT INFORMATION ACT OF 2018    You are receiving this notice as a result of a determination by your diagnostic imaging service that further discussions of your test results are warranted and would be beneficial to you  The complete results of your test or tests have been or will be sent to the health care practitioner that ordered the test or tests  It is recommended that you contact your health care practitioner to discuss your results as soon as possible

## 2022-01-18 ENCOUNTER — TELEPHONE (OUTPATIENT)
Dept: CARDIOLOGY CLINIC | Facility: CLINIC | Age: 57
End: 2022-01-18

## 2022-01-18 NOTE — TELEPHONE ENCOUNTER
----- Message from Stephanie Dunham MD sent at 1/18/2022 10:05 AM EST -----  Please refer patient to vascular surgery

## 2022-01-18 NOTE — TELEPHONE ENCOUNTER
Called pt  and left a message  Pt called back and stated he will call back to get the vascular surgery information  He is unable to write information down at the moment

## 2022-01-18 NOTE — RESULT ENCOUNTER NOTE
Left message for pt to contact office to give the vascular surgery #   Their call center # [phone]

## 2022-01-18 NOTE — TELEPHONE ENCOUNTER
----- Message from Scot Sanders MD sent at 1/18/2022 10:05 AM EST -----  Please refer patient to vascular surgery

## 2022-01-18 NOTE — TELEPHONE ENCOUNTER
----- Message from Nate Tellez MD sent at 1/18/2022 12:14 PM EST -----  Please schedule patient with Dr Barbra Leonardo  I spoke with him     Thanks

## 2022-01-19 ENCOUNTER — TELEPHONE (OUTPATIENT)
Dept: VASCULAR SURGERY | Facility: CLINIC | Age: 57
End: 2022-01-19

## 2022-01-19 ENCOUNTER — APPOINTMENT (OUTPATIENT)
Dept: LAB | Facility: CLINIC | Age: 57
End: 2022-01-19
Payer: COMMERCIAL

## 2022-01-19 ENCOUNTER — OFFICE VISIT (OUTPATIENT)
Dept: VASCULAR SURGERY | Facility: CLINIC | Age: 57
End: 2022-01-19
Payer: COMMERCIAL

## 2022-01-19 ENCOUNTER — PREP FOR PROCEDURE (OUTPATIENT)
Dept: VASCULAR SURGERY | Facility: CLINIC | Age: 57
End: 2022-01-19

## 2022-01-19 VITALS
DIASTOLIC BLOOD PRESSURE: 88 MMHG | HEIGHT: 65 IN | HEART RATE: 72 BPM | BODY MASS INDEX: 28.69 KG/M2 | TEMPERATURE: 97.6 F | SYSTOLIC BLOOD PRESSURE: 124 MMHG | WEIGHT: 172.2 LBS

## 2022-01-19 DIAGNOSIS — Z95.1 S/P CABG X 3: ICD-10-CM

## 2022-01-19 DIAGNOSIS — D69.6 THROMBOCYTOPENIA (HCC): ICD-10-CM

## 2022-01-19 DIAGNOSIS — I70.213 ATHEROSCLEROSIS OF NATIVE ARTERY OF BOTH LOWER EXTREMITIES WITH INTERMITTENT CLAUDICATION (HCC): ICD-10-CM

## 2022-01-19 DIAGNOSIS — R73.09 ELEVATED HEMOGLOBIN A1C: ICD-10-CM

## 2022-01-19 DIAGNOSIS — I20.9 ANGINA PECTORIS (HCC): ICD-10-CM

## 2022-01-19 DIAGNOSIS — R73.03 PREDIABETES: ICD-10-CM

## 2022-01-19 DIAGNOSIS — I70.213 ATHEROSCLEROSIS OF NATIVE ARTERY OF BOTH LOWER EXTREMITIES WITH INTERMITTENT CLAUDICATION (HCC): Primary | ICD-10-CM

## 2022-01-19 DIAGNOSIS — I21.4 NSTEMI (NON-ST ELEVATED MYOCARDIAL INFARCTION) (HCC): ICD-10-CM

## 2022-01-19 DIAGNOSIS — Z72.0 TOBACCO ABUSE: ICD-10-CM

## 2022-01-19 PROBLEM — I70.219 ATHEROSCLER NATIVE ARTERIES THE EXTREMITIES W/INTERMIT CLAUDICATION (HCC): Status: ACTIVE | Noted: 2022-01-06

## 2022-01-19 LAB
ANION GAP SERPL CALCULATED.3IONS-SCNC: 6 MMOL/L (ref 4–13)
BUN SERPL-MCNC: 30 MG/DL (ref 5–25)
CALCIUM SERPL-MCNC: 9.7 MG/DL (ref 8.3–10.1)
CHLORIDE SERPL-SCNC: 105 MMOL/L (ref 100–108)
CO2 SERPL-SCNC: 24 MMOL/L (ref 21–32)
CREAT SERPL-MCNC: 1.08 MG/DL (ref 0.6–1.3)
ERYTHROCYTE [DISTWIDTH] IN BLOOD BY AUTOMATED COUNT: 14.1 % (ref 11.6–15.1)
GFR SERPL CREATININE-BSD FRML MDRD: 75 ML/MIN/1.73SQ M
GLUCOSE SERPL-MCNC: 152 MG/DL (ref 65–140)
HCT VFR BLD AUTO: 37.1 % (ref 36.5–49.3)
HGB BLD-MCNC: 12.7 G/DL (ref 12–17)
INR PPP: 1.12 (ref 0.84–1.19)
MCH RBC QN AUTO: 28.2 PG (ref 26.8–34.3)
MCHC RBC AUTO-ENTMCNC: 34.2 G/DL (ref 31.4–37.4)
MCV RBC AUTO: 82 FL (ref 82–98)
PLATELET # BLD AUTO: 224 THOUSANDS/UL (ref 149–390)
PMV BLD AUTO: 10.5 FL (ref 8.9–12.7)
POTASSIUM SERPL-SCNC: 4.3 MMOL/L (ref 3.5–5.3)
PROTHROMBIN TIME: 13.9 SECONDS (ref 11.6–14.5)
RBC # BLD AUTO: 4.51 MILLION/UL (ref 3.88–5.62)
SODIUM SERPL-SCNC: 135 MMOL/L (ref 136–145)
WBC # BLD AUTO: 5.79 THOUSAND/UL (ref 4.31–10.16)

## 2022-01-19 PROCEDURE — 99213 OFFICE O/P EST LOW 20 MIN: CPT | Performed by: SURGERY

## 2022-01-19 PROCEDURE — 85027 COMPLETE CBC AUTOMATED: CPT

## 2022-01-19 PROCEDURE — 36415 COLL VENOUS BLD VENIPUNCTURE: CPT

## 2022-01-19 PROCEDURE — 85610 PROTHROMBIN TIME: CPT

## 2022-01-19 PROCEDURE — 80048 BASIC METABOLIC PNL TOTAL CA: CPT

## 2022-01-19 NOTE — TELEPHONE ENCOUNTER
REMINDER: Under Reason For Call, comments MUST be formatted as:   (Surgeon's Initials) / (Procedure)    Physician / SIDDHARTH VILLALOBOS: Enriqueta Dowell // Dave Hatch (NPI: [id number]) / Ty (Tax: [id number] / NPI: [id number])    Procedure: Left leg angiogram with intervention    Level: 2 - Route clearance(s) to [facility] Clearance Pool     Equipment / Rep Needs: No    Assistant Surgeon: No    Allergies: Patient has no known allergies  Instructions Given: Angiogram Instructions    Blood Thinners / Medication Hold: Do not hold Aspirin (ASA)  and brillanta  Continue (do not hold)  Hydration Required: Patient does not require hydration  Dialysis: Patient is not on dialysis  Consent: I certify that patient has signed, printed, timed, and dated their surgery consent  I certify that BOTH sides of the completed surgery consent have been scanned into the patient's Epic chart by myself on 1/19/2022  Yes, I have LABELED the consent in Epic as Consent for Vascular Procedure  Clearances     Levels   1-3 ROUTE this encounter to [facility] Clearance Pool   AND   SEND Clearance Form(s) to Vascular Nursing e-mail group   Level   4 ROUTE this encounter to [facility] Surgery Coordinator Pool  AND   SEND Clearance Form(s) to Vascular Surgery Schedulers e-mail group     Patient does not require any pre operative clearance  Yes, I have ROUTED this encounter to [facility] Surgery Coordinator and/or [facility] Clearance Pool

## 2022-01-19 NOTE — H&P (VIEW-ONLY)
Assessment/Plan:    Atheroscler native arteries the extremities w/intermit claudication ([address] )  Short distance lifestyle limiting claudication, he is out of work secondary to this disability  He has tried medical management with antiplatelets and statin  He will lose his job due to this issue  Will plan for urgent intervention  Left GSV harvested for CABG last year  discussed risks and benefits of bypass vs  Endovascular intervention  Limited longevity of endovascular stents but less morbidity of procedure and quick recovery  Both CT angiogram and lower extremity arterial Doppler imaging were reviewed by me personally  CT angiogram demonstrates long segment SFA occlusion bilaterally with reconstitution of above knee popliteal artery  Based on these results I do recommend consideration for bypass surgery as the best long-term outcome but patient is concerned about recovery time  He also has elevated risk due to coronary artery disease  Thus it is reasonable to consider endovascular 1st attempt  Operative Scheduling Information:    Hospital:  [facility]    Physician:  Zev    Surgery: Left leg angiogram with intervention  (once left leg is done right leg to be scheduled at later date)    Urgency:  Urgent: 2 weeks    Level:  Level 2: Outpatients to be scheduled for surgery with time dependent medical necessity within 2 weeks    Case Length:  Normal    Post-op Bed:  Outpatient    OR Table:  Discovery    Equipment Needs:  None    Medication Instructions:  Aspirin:   Continue (do not hold)  Other: brillanta  Continue (do not hold)    Hydration:  No         Diagnoses and all orders for this visit:    Atherosclerosis of native artery of both lower extremities with intermittent claudication ([address] )  -     Case request operating room: ARTERIOGRAM; Standing  -     CBC and Platelet; Future  -     Protime-INR; Future  -     Basic metabolic panel;  Future  -     Case request operating room: ARTERIOGRAM          Subjective:      Patient ID: Kelly Gates is a 62 y o  male  Patient present to review CT of abdomen with run off done 1/14/22  Pt is referred by Dr Jessica Mistry from cardiology  HPI  Ongoing bilateral lower extremity short distance claudication with significant calf cramping with very short distances only 10 minutes of walking before he has to stop  Because of this patient is about to lose his job  It has not improved with medical management  Significant history of coronary artery disease, last year he underwent coronary artery bypass grafting in April 2021  Following which he had issues with the bypass and he required further angioplasties procedures at [facility]  The following portions of the patient's history were reviewed and updated as appropriate: allergies, current medications, past family history, past medical history, past social history, past surgical history and problem list     Review of Systems   Constitutional: Negative  HENT: Negative  Eyes: Negative  Respiratory: Negative  Cardiovascular: Negative  Gastrointestinal: Negative  Endocrine: Negative  Genitourinary: Negative  Musculoskeletal: Negative  Skin: Negative  Allergic/Immunologic: Negative  Neurological: Negative  Hematological: Negative  Psychiatric/Behavioral: Negative  I have reviewed the review of systems as entered and made appropriate changes as necessary    Objective:      /88 (BP Location: Left arm, Patient Position: Sitting, Cuff Size: Adult)   Pulse 72   Temp 97 6 °F (36 4 °C)   Ht 5' 5" (1 651 m)   Wt 78 1 kg (172 lb 3 2 oz)   BMI 28 66 kg/m²          Physical Exam  Vitals and nursing note reviewed  Constitutional:       Appearance: Normal appearance  HENT:      Head: Normocephalic and atraumatic  Cardiovascular:      Rate and Rhythm: Normal rate and regular rhythm  Heart sounds: Normal heart sounds        Comments: Biphasic DP and PT signals bilaterally  Pulmonary:      Effort: Pulmonary effort is normal       Breath sounds: Normal breath sounds  Abdominal:      Palpations: Abdomen is soft  Musculoskeletal:      Right lower leg: No edema  Left lower leg: No edema  Comments: Left GSV harvest   Skin:     General: Skin is warm and dry  Capillary Refill: Capillary refill takes less than 2 seconds  Neurological:      General: No focal deficit present  Mental Status: He is alert     Psychiatric:         Mood and Affect: Mood normal          Behavior: Behavior normal

## 2022-01-19 NOTE — PROGRESS NOTES
Assessment/Plan:    Atheroscler native arteries the extremities w/intermit claudication ([address] )  Short distance lifestyle limiting claudication, he is out of work secondary to this disability  He has tried medical management with antiplatelets and statin  He will lose his job due to this issue  Will plan for urgent intervention  Left GSV harvested for CABG last year  discussed risks and benefits of bypass vs  Endovascular intervention  Limited longevity of endovascular stents but less morbidity of procedure and quick recovery  Both CT angiogram and lower extremity arterial Doppler imaging were reviewed by me personally  CT angiogram demonstrates long segment SFA occlusion bilaterally with reconstitution of above knee popliteal artery  Based on these results I do recommend consideration for bypass surgery as the best long-term outcome but patient is concerned about recovery time  He also has elevated risk due to coronary artery disease  Thus it is reasonable to consider endovascular 1st attempt  Operative Scheduling Information:    Hospital:  [address] [facility]    Physician:  Zev    Surgery: Left leg angiogram with intervention  (once left leg is done right leg to be scheduled at later date)    Urgency:  Urgent: 2 weeks    Level:  Level 2: Outpatients to be scheduled for surgery with time dependent medical necessity within 2 weeks    Case Length:  Normal    Post-op Bed:  Outpatient    OR Table:  Discovery    Equipment Needs:  None    Medication Instructions:  Aspirin:   Continue (do not hold)  Other: brillanta  Continue (do not hold)    Hydration:  No         Diagnoses and all orders for this visit:    Atherosclerosis of native artery of both lower extremities with intermittent claudication ([address] )  -     Case request operating room: ARTERIOGRAM; Standing  -     CBC and Platelet; Future  -     Protime-INR; Future  -     Basic metabolic panel;  Future  -     Case request operating room: ARTERIOGRAM          Subjective:      Patient ID: Kelly Gates is a 62 y o  male  Patient present to review CT of abdomen with run off done 1/14/22  Pt is referred by Dr Jessica Mistry from cardiology  HPI  Ongoing bilateral lower extremity short distance claudication with significant calf cramping with very short distances only 10 minutes of walking before he has to stop  Because of this patient is about to lose his job  It has not improved with medical management  Significant history of coronary artery disease, last year he underwent coronary artery bypass grafting in April 2021  Following which he had issues with the bypass and he required further angioplasties procedures at [facility]  The following portions of the patient's history were reviewed and updated as appropriate: allergies, current medications, past family history, past medical history, past social history, past surgical history and problem list     Review of Systems   Constitutional: Negative  HENT: Negative  Eyes: Negative  Respiratory: Negative  Cardiovascular: Negative  Gastrointestinal: Negative  Endocrine: Negative  Genitourinary: Negative  Musculoskeletal: Negative  Skin: Negative  Allergic/Immunologic: Negative  Neurological: Negative  Hematological: Negative  Psychiatric/Behavioral: Negative  I have reviewed the review of systems as entered and made appropriate changes as necessary    Objective:      /88 (BP Location: Left arm, Patient Position: Sitting, Cuff Size: Adult)   Pulse 72   Temp 97 6 °F (36 4 °C)   Ht 5' 5" (1 651 m)   Wt 78 1 kg (172 lb 3 2 oz)   BMI 28 66 kg/m²          Physical Exam  Vitals and nursing note reviewed  Constitutional:       Appearance: Normal appearance  HENT:      Head: Normocephalic and atraumatic  Cardiovascular:      Rate and Rhythm: Normal rate and regular rhythm  Heart sounds: Normal heart sounds        Comments: Biphasic DP and PT signals bilaterally  Pulmonary:      Effort: Pulmonary effort is normal       Breath sounds: Normal breath sounds  Abdominal:      Palpations: Abdomen is soft  Musculoskeletal:      Right lower leg: No edema  Left lower leg: No edema  Comments: Left GSV harvest   Skin:     General: Skin is warm and dry  Capillary Refill: Capillary refill takes less than 2 seconds  Neurological:      General: No focal deficit present  Mental Status: He is alert     Psychiatric:         Mood and Affect: Mood normal          Behavior: Behavior normal

## 2022-01-19 NOTE — PRE-PROCEDURE INSTRUCTIONS
Pre-Surgery Instructions:   Medication Instructions    acetaminophen (TYLENOL) 325 mg tablet Instructed patient per Anesthesia Guidelines  Take morning of surgery with small sip of water if needed    aspirin (ECOTRIN LOW STRENGTH) 81 mg EC tablet Patient was instructed by Physician and understands  Take morning of surgery with small sip of water per surgeon's instructions    atorvastatin (LIPITOR) 80 mg tablet Patient was instructed by Physician and understands  Takes in evening  Do not take morning of surgery    Brilinta 90 MG Patient was instructed by Physician and understands  Take morning of surgery with small sip of water per surgeon's instructions    isosorbide mononitrate (IMDUR) 30 mg 24 hr tablet Patient was instructed by Physician and understands  Take morning of surgery with small sip of water per surgeon's instructions    metFORMIN (GLUCOPHAGE) 500 mg tablet Patient was instructed by Physician and understands  Do not take this evening or morning of surgery per surgeon's instructions    metoprolol succinate (TOPROL-XL) 25 mg 24 hr tablet Patient was instructed by Physician and understands  Take morning of surgery with small sip of water per surgeon's instructions   Covid screening negative as per patient  Reviewed showering and medication instructions  Patient verbalized understanding  Advised NPO after MN and ASC will call with scheduled surgical time

## 2022-01-19 NOTE — ASSESSMENT & PLAN NOTE
Short distance lifestyle limiting claudication, he is out of work secondary to this disability  He has tried medical management with antiplatelets and statin  He will lose his job due to this issue  Will plan for urgent intervention  Left GSV harvested for CABG last year  discussed risks and benefits of bypass vs  Endovascular intervention  Limited longevity of endovascular stents but less morbidity of procedure and quick recovery  Both CT angiogram and lower extremity arterial Doppler imaging were reviewed by me personally  CT angiogram demonstrates long segment SFA occlusion bilaterally with reconstitution of above knee popliteal artery  Based on these results I do recommend consideration for bypass surgery as the best long-term outcome but patient is concerned about recovery time  He also has elevated risk due to coronary artery disease  Thus it is reasonable to consider endovascular 1st attempt        Operative Scheduling Information:    Hospital:  [facility]    Physician:  Zev    Surgery: Left leg angiogram with intervention  (once left leg is done right leg to be scheduled at later date)    Urgency:  Urgent: 2 weeks    Level:  Level 2: Outpatients to be scheduled for surgery with time dependent medical necessity within 2 weeks    Case Length:  Normal    Post-op Bed:  Outpatient    [facility] Table:  [facility]    Equipment Needs:  None    Medication Instructions:  Aspirin:   Continue (do not hold)  Other: brillanta  Continue (do not hold)    Hydration:  No

## 2022-01-19 NOTE — TELEPHONE ENCOUNTER
Verified patient's insurance   CONFIRMED - Patient's insurance is [employer]  Is patient requesting a call when authorization has been obtained? Patient did not request a call  Surgery Date: 1-20-22  Primary Surgeon: JOSH // Billy Miles (NPI: [id number])  Assisting Surgeon: Not Applicable (N/A)  Facility: [facility] (Tax: [id number] / NPI: [id number])  Inpatient / Outpatient: Outpatient  Level: 2    Clearance Received: No clearance ordered  Consent Received: Yes, scanned into Epic on 1-19-22  Medication Hold / Last Dose: No Hold on ASA or Brilanta  VQI Spreadsheet: Not Applicable (N/A)  IR Notified: Not Applicable (N/A)  Rep  Notified: Not Applicable (N/A)  Equipment Needs: Not Applicable (N/A)  Vas Lab Requested: Not Applicable (N/A)  Patient Contacted: 1-19-22    Diagnosis: I70 213  Procedure/ CPT Code(s): Angiogram // CPT: 99383 25011 [id number]    For varicose vein related procedures:   Last LEVDR: Not Applicable (N/A)  CEAP Classification: Not Applicable (N/A)  VCSS: Not Applicable (N/A)    Post Operative Date/ Time: 1-28-22 , 10:00 Emily Corrales with Billy Miles (NPI: [id number])     *Please review with patient med hold, PATs, and check H&P *  PATIENT WAS MAILED SURGERY/SHOWERING/DISCHARGE/COVID INSTRUCTIONS AFTER REVIEWING WITH THEM VIA PHONE CALL       Scheduled patient from office hours 1-19-22 patient going for blood work today [facility]

## 2022-01-20 ENCOUNTER — ANESTHESIA (OUTPATIENT)
Dept: PERIOP | Facility: HOSPITAL | Age: 57
End: 2022-01-20
Payer: COMMERCIAL

## 2022-01-20 ENCOUNTER — HOSPITAL ENCOUNTER (OUTPATIENT)
Facility: HOSPITAL | Age: 57
Setting detail: OUTPATIENT SURGERY
Discharge: HOME | End: 2022-01-20
Attending: SURGERY | Admitting: SURGERY
Payer: COMMERCIAL

## 2022-01-20 ENCOUNTER — APPOINTMENT (OUTPATIENT)
Dept: RADIOLOGY | Facility: HOSPITAL | Age: 57
End: 2022-01-20
Payer: COMMERCIAL

## 2022-01-20 ENCOUNTER — ANESTHESIA EVENT (OUTPATIENT)
Dept: PERIOP | Facility: HOSPITAL | Age: 57
End: 2022-01-20
Payer: COMMERCIAL

## 2022-01-20 VITALS
RESPIRATION RATE: 16 BRPM | TEMPERATURE: 97.2 F | DIASTOLIC BLOOD PRESSURE: 69 MMHG | HEIGHT: 65 IN | HEART RATE: 98 BPM | BODY MASS INDEX: 29.02 KG/M2 | OXYGEN SATURATION: 95 % | SYSTOLIC BLOOD PRESSURE: 114 MMHG | WEIGHT: 174.16 LBS

## 2022-01-20 DIAGNOSIS — I73.9 PAD (PERIPHERAL ARTERY DISEASE) (HCC): ICD-10-CM

## 2022-01-20 PROBLEM — Z72.0 TOBACCO ABUSE: Status: RESOLVED | Noted: 2021-04-06 | Resolved: 2022-01-20

## 2022-01-20 LAB
GLUCOSE SERPL-MCNC: 176 MG/DL (ref 65–140)
GLUCOSE SERPL-MCNC: 197 MG/DL (ref 65–140)

## 2022-01-20 PROCEDURE — C1874 STENT, COATED/COV W/DEL SYS: HCPCS | Performed by: SURGERY

## 2022-01-20 PROCEDURE — 82948 REAGENT STRIP/BLOOD GLUCOSE: CPT

## 2022-01-20 PROCEDURE — C1769 GUIDE WIRE: HCPCS | Performed by: SURGERY

## 2022-01-20 PROCEDURE — C1725 CATH, TRANSLUMIN NON-LASER: HCPCS | Performed by: SURGERY

## 2022-01-20 PROCEDURE — 37226 PR REVASCULARIZE FEM/POP ARTERY,ANGIOPLASTY/STENT: CPT | Performed by: SURGERY

## 2022-01-20 PROCEDURE — C1894 INTRO/SHEATH, NON-LASER: HCPCS | Performed by: SURGERY

## 2022-01-20 PROCEDURE — C1760 CLOSURE DEV, VASC: HCPCS | Performed by: SURGERY

## 2022-01-20 PROCEDURE — NC001 PR NO CHARGE: Performed by: SURGERY

## 2022-01-20 PROCEDURE — C1887 CATHETER, GUIDING: HCPCS | Performed by: SURGERY

## 2022-01-20 PROCEDURE — 75716 ARTERY X-RAYS ARMS/LEGS: CPT | Performed by: SURGERY

## 2022-01-20 DEVICE — DRUG-ELUTING VASCULAR STENT SYSTEM
Type: IMPLANTABLE DEVICE | Site: LEG | Status: FUNCTIONAL
Brand: ELUVIA™

## 2022-01-20 DEVICE — MYNX CONTROL VCD 6F 7F
Type: IMPLANTABLE DEVICE | Site: LEG | Status: FUNCTIONAL
Brand: MYNX CONTROL

## 2022-01-20 RX ORDER — LIDOCAINE HYDROCHLORIDE 20 MG/ML
INJECTION, SOLUTION EPIDURAL; INFILTRATION; INTRACAUDAL; PERINEURAL AS NEEDED
Status: DISCONTINUED | OUTPATIENT
Start: 2022-01-20 | End: 2022-01-20

## 2022-01-20 RX ORDER — SODIUM CHLORIDE 9 MG/ML
100 INJECTION, SOLUTION INTRAVENOUS CONTINUOUS
Status: DISCONTINUED | OUTPATIENT
Start: 2022-01-20 | End: 2022-01-20 | Stop reason: HOSPADM

## 2022-01-20 RX ORDER — HEPARIN SODIUM 1000 [USP'U]/ML
INJECTION, SOLUTION INTRAVENOUS; SUBCUTANEOUS AS NEEDED
Status: DISCONTINUED | OUTPATIENT
Start: 2022-01-20 | End: 2022-01-20

## 2022-01-20 RX ORDER — HYDROMORPHONE HCL/PF 1 MG/ML
0.5 SYRINGE (ML) INJECTION
Status: DISCONTINUED | OUTPATIENT
Start: 2022-01-20 | End: 2022-01-20 | Stop reason: HOSPADM

## 2022-01-20 RX ORDER — ALBUTEROL SULFATE 2.5 MG/3ML
2.5 SOLUTION RESPIRATORY (INHALATION) ONCE AS NEEDED
Status: DISCONTINUED | OUTPATIENT
Start: 2022-01-20 | End: 2022-01-20 | Stop reason: HOSPADM

## 2022-01-20 RX ORDER — FENTANYL CITRATE 50 UG/ML
INJECTION, SOLUTION INTRAMUSCULAR; INTRAVENOUS AS NEEDED
Status: DISCONTINUED | OUTPATIENT
Start: 2022-01-20 | End: 2022-01-20

## 2022-01-20 RX ORDER — MIDAZOLAM HYDROCHLORIDE 2 MG/2ML
INJECTION, SOLUTION INTRAMUSCULAR; INTRAVENOUS AS NEEDED
Status: DISCONTINUED | OUTPATIENT
Start: 2022-01-20 | End: 2022-01-20

## 2022-01-20 RX ORDER — DEXAMETHASONE SODIUM PHOSPHATE 4 MG/ML
INJECTION, SOLUTION INTRA-ARTICULAR; INTRALESIONAL; INTRAMUSCULAR; INTRAVENOUS; SOFT TISSUE AS NEEDED
Status: DISCONTINUED | OUTPATIENT
Start: 2022-01-20 | End: 2022-01-20

## 2022-01-20 RX ORDER — PROPOFOL 10 MG/ML
INJECTION, EMULSION INTRAVENOUS AS NEEDED
Status: DISCONTINUED | OUTPATIENT
Start: 2022-01-20 | End: 2022-01-20

## 2022-01-20 RX ORDER — FENTANYL CITRATE/PF 50 MCG/ML
25 SYRINGE (ML) INJECTION
Status: DISCONTINUED | OUTPATIENT
Start: 2022-01-20 | End: 2022-01-20 | Stop reason: HOSPADM

## 2022-01-20 RX ORDER — ONDANSETRON 2 MG/ML
INJECTION INTRAMUSCULAR; INTRAVENOUS AS NEEDED
Status: DISCONTINUED | OUTPATIENT
Start: 2022-01-20 | End: 2022-01-20

## 2022-01-20 RX ORDER — EPHEDRINE SULFATE 50 MG/ML
INJECTION INTRAVENOUS AS NEEDED
Status: DISCONTINUED | OUTPATIENT
Start: 2022-01-20 | End: 2022-01-20

## 2022-01-20 RX ORDER — ONDANSETRON 2 MG/ML
4 INJECTION INTRAMUSCULAR; INTRAVENOUS ONCE AS NEEDED
Status: DISCONTINUED | OUTPATIENT
Start: 2022-01-20 | End: 2022-01-20 | Stop reason: HOSPADM

## 2022-01-20 RX ORDER — PROMETHAZINE HYDROCHLORIDE 25 MG/ML
12.5 INJECTION, SOLUTION INTRAMUSCULAR; INTRAVENOUS ONCE AS NEEDED
Status: DISCONTINUED | OUTPATIENT
Start: 2022-01-20 | End: 2022-01-20 | Stop reason: HOSPADM

## 2022-01-20 RX ADMIN — EPHEDRINE SULFATE 5 MG: 50 INJECTION, SOLUTION INTRAVENOUS at 11:54

## 2022-01-20 RX ADMIN — LIDOCAINE HYDROCHLORIDE 100 MG: 20 INJECTION, SOLUTION EPIDURAL; INFILTRATION; INTRACAUDAL; PERINEURAL at 11:00

## 2022-01-20 RX ADMIN — HEPARIN SODIUM 5000 UNITS: 1000 INJECTION INTRAVENOUS; SUBCUTANEOUS at 11:45

## 2022-01-20 RX ADMIN — EPHEDRINE SULFATE 5 MG: 50 INJECTION, SOLUTION INTRAVENOUS at 12:03

## 2022-01-20 RX ADMIN — FENTANYL CITRATE 50 MCG: 50 INJECTION INTRAMUSCULAR; INTRAVENOUS at 11:27

## 2022-01-20 RX ADMIN — DEXAMETHASONE SODIUM PHOSPHATE 4 MG: 4 INJECTION INTRA-ARTICULAR; INTRALESIONAL; INTRAMUSCULAR; INTRAVENOUS; SOFT TISSUE at 12:07

## 2022-01-20 RX ADMIN — EPHEDRINE SULFATE 5 MG: 50 INJECTION, SOLUTION INTRAVENOUS at 11:40

## 2022-01-20 RX ADMIN — MIDAZOLAM 2 MG: 1 INJECTION INTRAMUSCULAR; INTRAVENOUS at 10:52

## 2022-01-20 RX ADMIN — SODIUM CHLORIDE 100 ML/HR: 9 INJECTION, SOLUTION INTRAVENOUS at 09:43

## 2022-01-20 RX ADMIN — EPHEDRINE SULFATE 10 MG: 50 INJECTION, SOLUTION INTRAVENOUS at 11:33

## 2022-01-20 RX ADMIN — SODIUM CHLORIDE: 9 INJECTION, SOLUTION INTRAVENOUS at 13:00

## 2022-01-20 RX ADMIN — PROPOFOL 150 MG: 10 INJECTION, EMULSION INTRAVENOUS at 11:00

## 2022-01-20 RX ADMIN — FENTANYL CITRATE 50 MCG: 50 INJECTION INTRAMUSCULAR; INTRAVENOUS at 13:10

## 2022-01-20 RX ADMIN — ONDANSETRON 4 MG: 2 INJECTION INTRAMUSCULAR; INTRAVENOUS at 11:08

## 2022-01-20 NOTE — ANESTHESIA POSTPROCEDURE EVALUATION
Post-Op Assessment Note    CV Status:  Stable    Pain management: adequate     Mental Status:  Awake   Hydration Status:  Stable   PONV Controlled:  None   Airway Patency:  Patent      Post Op Vitals Reviewed: Yes      Staff: Anesthesiologist         No complications documented      /71 (01/20/22 1400)    Temp      Pulse 94 (01/20/22 1400)   Resp 14 (01/20/22 1400)    SpO2 96 % (01/20/22 1400)

## 2022-01-20 NOTE — DISCHARGE INSTRUCTIONS
DISCHARGE INSTRUCTIONS  ARTERIOGRAM/ANGIOPLASTY/STENT    ACTIVITY: On the evening following the procedure, you should be mostly resting  Someone should remain with you during the evening and overnight following the procedure  On the day after your procedure, limit your activity to walking  Avoid heavy lifting (no more than 15 lbs) for the first three days  Walking up steps and normal activities may be resumed as you feel ready  You should not drive a car for at least two days following discharge from the hospital  You may ride in a car  If you have any questions regarding a particular activity, please discuss with your doctor or nurse before you are discharged  DIET:  Resume your normal diet  Drink more water than usual for the next 24 hours  PROCEDURE SITE: You may have a procedure site in your groin, arm, or foot  You may have surgical glue at your procedure site  The glue is used to cover the procedure site, assist in closure, and prevent contamination  This adhesive will darken and peel away on its own within one to two weeks  Do not pick at it  You should shower daily  Wash incision daily with soap and water, but do not rub or scrub the incision; rinse thoroughly and pat dry  Do not bathe in a tub or swim for the first 2 week following your procedure or if you have any open wounds  It is normal to have some bruising, swelling or discoloration around the procedure site  IF increasing redness, pain, or a bulge develops, call our office immediately  If present, you may remove the band-aid or steri-strips over your procedure site after two days  If you notice any active bleeding at the site, apply pressure to the site and call our office ([phone]) or [phone]  FOLLOW UP STUDIES:  Your doctor will discuss whether further treatments or follow-up studies are necessary at your first post procedure visit      FOLLOW UP APPOINTMENTS:  Making and keeping follow up appointments and ultrasound tests are important to your recovery  If you have difficulty making it to or keeping your follow up appointments, call the office  MEDICATION:  Hold metformin for 48 hours postprocedure and then restart  If you have increased pain, fever >101 5, increased drainage, redness or a bad smell at your surgery site, new coldness/numbness of your arm or leg, please call us immediately and GO directly to the ER  PLEASE CALL THE OFFICE IF YOU HAVE ANY QUESTIONS  [phone] Jarred Gene [phone] [facility] FREE [phone]  [address] [place] [address] , [address], [place], [address]  [address], [address], Shelby Ashley, [address] [facility]  [address], [facility], [address] [place] [address]  [address] [facility], One [facility], [facility], [address]  [address], [address]st Floor, [address]  [facility], [address] [facility], [address] [address] [facility]  [address] [facility], [address] [facility], [address]  One [facility], [address] [facility], [facility],[address], Yogi Clemons [address]  201 [facility], [facility], [address]  [address] [place] [address] J[address] Espino[address]

## 2022-01-20 NOTE — OP NOTE
PERATIVE REPORT  PATIENT NAME: Edwar Zuleta    :  1965  MRN: [id number]  Pt Location: [place] [id number]    SURGERY DATE: 2022    Surgeon(s) and Role:     Lupe Bella MD - Primary    Preop Diagnosis:  Atherosclerosis of native artery of both lower extremities with intermittent claudication ([facility] [address] ) [I70 213]    Post-Op Diagnosis Codes:     * Atherosclerosis of native artery of both lower extremities with intermittent claudication (HCC) [I70 213]    Procedure(s) (LRB):    1  Ultrasound-guided access of the right common femoral artery  2  Right lower extremity angiogram  3  Left lower extremity angiogram  4  Balloon angioplasty and stenting of the left superficial femoral artery-5 mm x 150 mm, 6 mm x 150 mm , 7 mm x 40 mm  balloon, 6 mm x 120 mm, 7 mm x 60 mm and 7 mm x 80 mm drug eluting Shahana stent    Specimen(s):  * No specimens in log *    Estimated Blood Loss:   Minimal    Drains:  * No LDAs found *    Anesthesia Type:   Choice    Operative Indications: Atherosclerosis of native artery of both lower extremities with intermittent claudication ([facility] [address] ) [I70 213]      Operative Findings:  Angiographic findings and radiographic interpretation of the test-  Right lower extremity -  Common femoral arteries patent, profunda femoris arteries patent  Superficial femoral artery is occluded throughout its length, it is a flush occlusion with no origin of the SFA identified on angiogram in different projections  Above knee popliteal artery then reconstitutes via robust profunda collaterals  Popliteal arteries patent throughout its length  There is a three-vessel runoff via anterior tibial, posterior tibial and peroneal runoff  There is a complete plantar arch    Left lower extremity-  Common femoral artery is patent  Profunda femoris artery is patent  Superficial femoral artery is occluded throughout its length but there is a 1 cm not been at its origin that is patent    The above knee popliteal artery then reconstitutes via robust profunda collaterals  Popliteal artery is patent throughout its length  The tibioperoneal trunk and the peroneal artery is the main runoff into the foot  Anterior tibial artery is occluded throughout its length  The posterior tibial arteries occluded right from its origin to its distal 1/3  The lambda branches of the peroneal artery then reconstitutes the distal posterior tibial and dorsalis pedis artery  The plantar arch is complete  After balloon angioplasty and stenting there is complete resolution of the SFA stenosis and occlusion  Complications:   None    Procedure and Technique:  Patient is brought to the operating room and placed in supine position  LMA was given general anesthesia  Bilateral groins were prepped and draped in the usual standard sterile fashion  Under ultrasound guidance the right common femoral artery was punctured  It was found to be patent and pulsatile with posterior plaque  Micropuncture technique was used  Five Telugu sheath was then placed  We went up and over using Ulta Beauty wire and Omni Flush catheter  left lower extremity angiogram was performed the findings of which are as dictated above  Six [place] by 45 cm sheath was then placed up and over into the left common femoral artery  Patient was given 5000 units of intravenous heparin  Using stiff angled Glidewire and glide catheter we were able to cross the lesion in the SFA in subintimal plane  True lumen re-entry was performed with wire and angiogram was used for confirmation  First balloon angioplasty was performed using the 5 mm balloon for predilatation at nominal pressure for 3 minute inflation time  Following which 6 mm balloon was used for secondary dilatation  There was persistent area of stenosis distally which was treated additionally with 7 x 40 balloon    Then 3 overlapping stents were placed, 6 mm was placed distally and then the 7 mm was placed proximally to treat the entire segment of occlusion with excellent results  These stents were then post dilated using the 6 mm balloon  Completion arteriogram was satisfactory  Right lower extremity angiogram was then performed findings of which are as dictated above  Mynx closure device was then used to close the access site  Patient had excellent Doppler sounds in both bilateral anterior tibial and posterior tibial    I was present for the entire procedure    Patient Disposition:  PACU  and patient will return to OR for planned surgery as a staged procedure for Right leg intervention possible femoral popliteal bypass  SIGNATURE: Karley Vyas MD  DATE: January 20, 2022  TIME: 1:13 PM      Vascular Quality Initiative - Peripheral Vascular Intervention     Urgency: Elective    Functional Status:  Fully active; able to carry on all predisease activities without restriction  Ambulation: Amb = independently ambulatory    Leg Symptoms    Right: Severe Claudication:  ischemic limb muscle pain that limits walking < 1 block (<300 feet or 1 football field); Ischemic Rest Pain: pain in the distal foot at rest felt to be due to limited arterial perfusion  Left: Severe Claudication:  ischemic limb muscle pain that limits walking < 1 block (<300 feet or 1 football field); Ischemic Rest Pain: pain in the distal foot at rest felt to be due to limited arterial perfusion    COVID Information  COVID Symptoms Pre-Procedure: Asymptomatic    Treatment Delayed by Pandemic: None    Access   Number of Sites: 1     Access Site 1:     Side 1: Right    Site 1: Femoral Retrograde    Access Guidance 1:U/S    Largest Sheath Size 1: 6 Fr      Closure Device 1: MynxGrip      Number of Closure Devices: 1     Closure Device Outcome: Closure device successful         Procedure  Fluoro Time: 21 minutes  Contrast Volume: Visipaque 84 ml  DAP: 46 22 Gy cm2  CO2: no  Anticoagulant: Heparin  Protamine: No  If Creatinine is > 1 2 or missing, BETO Prophylaxis none     Treatment Details  Indication: Occlusive Disease,    Completion Assessment  Artery 1 treated: SFA        Left               Outflow: AT,PT,Peroneal: 1                  Was this Site previously treated?: No          TASC Grade: C          Total Treated Length: 20 cm          Total Occluded Length: 20 cm          Calcification: Mild (calcification on one side of artery > half length of lesion)          Number of Treatment types (Devices):    2           Device 1          Treatment Type: Plain Balloon         Device 2          Treatment Type: Stent,  Drug Eluting Stent                Diameter: 6 mm          Length: 120 mm      Diameter: 7 mm          Length: 60 mm      Diameter: 7 mm          Length: 80 mm            Concomitant: None          Technical result: Successful (stenosis <=30%)      None     Post Procedure  Patient currently taking: Statin, Yes      Antiplatelet Medication, Yes    Procedure Complications: No

## 2022-01-20 NOTE — INTERVAL H&P NOTE
H&P reviewed  After examining the patient I find no changes in the patients condition since the H&P had been written      Vitals:    01/20/22 0921   BP: 145/80   Pulse: 73   Resp: 14   Temp: 97 8 °F (36 6 °C)   SpO2: 100%

## 2022-01-20 NOTE — ANESTHESIA PREPROCEDURE EVALUATION
Procedure:  ARTERIOGRAM (Left Abdomen)    Relevant Problems   CARDIO   (+) CAD (coronary artery disease)   (+) Chest pain   (+) Hypertension   (+) NSTEMI (non-ST elevated myocardial infarction) ([facility])   (+) S/P CABG x 3   (+) Sinus bradycardia      ENDO   (+) Type II diabetes mellitus (HCC)      GI/HEPATIC   (+) GERD (gastroesophageal reflux disease)      HEMATOLOGY   (+) Postoperative anemia due to acute blood loss   (+) Thrombocytopenia ([facility])      NEURO/PSYCH   (+) Atheroscler native arteries the extremities w/intermit claudication ([facility])        Physical Exam    Airway    Mallampati score: II  TM Distance: >3 FB  Neck ROM: full     Dental       Cardiovascular  Rhythm: regular, Rate: normal, Cardiovascular exam normal    Pulmonary  Pulmonary exam normal Breath sounds clear to auscultation, Decreased breath sounds,     Other Findings        Anesthesia Plan  ASA Score- 3     Anesthesia Type- general and IV sedation with anesthesia with ASA Monitors  Additional Monitors:   Airway Plan:     Comment: CABG 4/21  Subsequent MI (October, 21)-----> Unblocked and stented [facility] (2 vessels)  Long acting nitrate today  No CP since isosorbide made BID, no sl NTG use          Plan Factors-    Chart reviewed  EKG reviewed  Patient summary reviewed  Patient is not a current smoker  Patient instructed to abstain from smoking on day of procedure  Patient did not smoke on day of surgery  Induction- intravenous  Postoperative Plan-     Informed Consent- Anesthetic plan and risks discussed with patient

## 2022-01-26 ENCOUNTER — VBI (OUTPATIENT)
Dept: ADMINISTRATIVE | Facility: OTHER | Age: 57
End: 2022-01-26

## 2022-01-28 ENCOUNTER — PREP FOR PROCEDURE (OUTPATIENT)
Dept: VASCULAR SURGERY | Facility: CLINIC | Age: 57
End: 2022-01-28

## 2022-01-28 ENCOUNTER — TELEPHONE (OUTPATIENT)
Dept: CARDIOLOGY CLINIC | Facility: CLINIC | Age: 57
End: 2022-01-28

## 2022-01-28 ENCOUNTER — TELEPHONE (OUTPATIENT)
Dept: ADMINISTRATIVE | Facility: HOSPITAL | Age: 57
End: 2022-01-28

## 2022-01-28 ENCOUNTER — OFFICE VISIT (OUTPATIENT)
Dept: VASCULAR SURGERY | Facility: CLINIC | Age: 57
End: 2022-01-28
Payer: COMMERCIAL

## 2022-01-28 VITALS
SYSTOLIC BLOOD PRESSURE: 102 MMHG | BODY MASS INDEX: 28.99 KG/M2 | DIASTOLIC BLOOD PRESSURE: 78 MMHG | WEIGHT: 174 LBS | HEART RATE: 80 BPM | HEIGHT: 65 IN

## 2022-01-28 DIAGNOSIS — I70.213 ATHEROSCLEROSIS OF NATIVE ARTERY OF BOTH LOWER EXTREMITIES WITH INTERMITTENT CLAUDICATION (HCC): Primary | ICD-10-CM

## 2022-01-28 PROCEDURE — 3008F BODY MASS INDEX DOCD: CPT | Performed by: SURGERY

## 2022-01-28 PROCEDURE — 1036F TOBACCO NON-USER: CPT | Performed by: SURGERY

## 2022-01-28 PROCEDURE — 3078F DIAST BP <80 MM HG: CPT | Performed by: SURGERY

## 2022-01-28 PROCEDURE — 99214 OFFICE O/P EST MOD 30 MIN: CPT | Performed by: SURGERY

## 2022-01-28 PROCEDURE — 3074F SYST BP LT 130 MM HG: CPT | Performed by: SURGERY

## 2022-01-28 RX ORDER — CHLORHEXIDINE GLUCONATE 0.12 MG/ML
15 RINSE ORAL ONCE
Status: CANCELLED | OUTPATIENT
Start: 2022-02-03 | End: 2022-01-28

## 2022-01-28 RX ORDER — CEFAZOLIN SODIUM 2 G/50ML
2000 SOLUTION INTRAVENOUS ONCE
Status: CANCELLED | OUTPATIENT
Start: 2022-02-03 | End: 2022-01-28

## 2022-01-28 NOTE — ASSESSMENT & PLAN NOTE
He underwent left SFA stenting with good results  Excellent triphasic signals in left PT  Right leg short distance claudication  Based on review of recent angiogram best option would be a Right  fem to pop bypass with PTFE  Risks and benefits of procedure were discussed in detail  Risks of bleeding with Brillanta were discussed, but safer to continue due to recent cardiac stents in October  Due to claudication patient is at risk of losing his job  His job insurance ends in February      Operative Scheduling Information:    Hospital:  [facility] OR    Physician:  Zev    Surgery: RIGHT FEM POP BYPASS WITH PTFE    Urgency:  Urgent: 4 weeks    Level:  Level 3: Outpatients to be scheduled for elective surgery than can be delayed up to 4 weeks without reasonable expectation of detriment to patient    Case Length:  Normal    Post-op Bed:  Stepdown    OR Table:  Standard    Equipment Needs:  None    Medication Instructions:  Aspirin:   Continue (do not hold)  Other: Brillanta  Continue (do not hold)    Hydration:  No

## 2022-01-28 NOTE — LETTER
January 28, 2022     Patient: Nadege Rudolph   YOB: 1965   Date of Visit: 1/28/2022       To Whom it May Concern:    Nadege Rudolph is under my professional care  He was seen in my office on 1/28/2022  He has significant arterial blockages in both legs and has undergone procedure for left leg 1/20/22  He will be needing right leg bypass surgery in near future and after recovery from that he will be ready for work  If you have any questions or concerns, please don't hesitate to call           Sincerely,          Johan Valentin MD        CC: No Recipients

## 2022-01-28 NOTE — PROGRESS NOTES
Assessment/Plan:    Atheroscler native arteries the extremities w/intermit claudication ([facility] [address] )  He underwent left SFA stenting with good results  Excellent triphasic signals in left PT  Right leg short distance claudication  Based on review of recent angiogram best option would be a Right  fem to pop bypass with PTFE  Risks and benefits of procedure were discussed in detail  Risks of bleeding with Brillanta were discussed, but safer to continue due to recent cardiac stents in October  Due to claudication patient is at risk of losing his job  His job insurance ends in February  Operative Scheduling Information:    Hospital:  [facility] OR    Physician:  Zev    Surgery: RIGHT FEM POP BYPASS WITH PTFE    Urgency:  Urgent: 4 weeks    Level:  Level 3: Outpatients to be scheduled for elective surgery than can be delayed up to 4 weeks without reasonable expectation of detriment to patient    Case Length:  Normal    Post-op Bed:  Stepdown    OR Table:  Standard    Equipment Needs:  None    Medication Instructions:  Aspirin:   Continue (do not hold)  Other: Brillanta  Continue (do not hold)    Hydration:  No         Diagnoses and all orders for this visit:    Atherosclerosis of native artery of both lower extremities with intermittent claudication ([facility] [address] )  -     Case request operating room: BYPASS FEMORAL-POPLITEAL; Standing  -     Type and screen; Future  -     CBC and Platelet; Future  -     Protime-INR; Future  -     HEMOGLOBIN A1C W/ EAG ESTIMATION; Future  -     EKG 12 lead; Future  -     XR chest pa & lateral; Future  -     Ambulatory referral to Cardiology; Future  -     Basic metabolic panel; Future  -     PAT Covid Screening; Future  -     COVID only; Future  -     Case request operating room: BYPASS FEMORAL-POPLITEAL          Subjective:      Patient ID: Craig Restrepo is a 62 y o  male  Patient present to review AOIL done on 1/25/22       HPI  Recently underwent Left leg angiogram with intervention with SFA stenting  His left leg is improving  Right leg has pain when he walks  He is on brillanta, aspirin and statin  He has quit smoking in April 2021  The following portions of the patient's history were reviewed and updated as appropriate: allergies, current medications, past family history, past medical history, past social history, past surgical history and problem list     Review of Systems   Constitutional: Negative  HENT: Negative  Eyes: Negative  Respiratory: Negative  Cardiovascular: Negative  Gastrointestinal: Negative  Endocrine: Negative  Genitourinary: Negative  Musculoskeletal: Negative  Skin: Negative  Allergic/Immunologic: Negative  Neurological: Negative  Hematological: Negative  Psychiatric/Behavioral: Negative  I have reviewed the ROS as entered and made changes as necessary  Objective:      /78 (BP Location: Left arm, Patient Position: Sitting, Cuff Size: Adult)   Pulse 80   Ht 5' 5" (1 651 m)   Wt 78 9 kg (174 lb)   BMI 28 96 kg/m²          Physical Exam  Vitals and nursing note reviewed  Constitutional:       Appearance: Normal appearance  HENT:      Head: Normocephalic and atraumatic  Cardiovascular:      Rate and Rhythm: Normal rate and regular rhythm  Heart sounds: Normal heart sounds  Comments: Triphasic left PT  Pulmonary:      Effort: Pulmonary effort is normal       Breath sounds: Normal breath sounds  Abdominal:      Palpations: Abdomen is soft  Musculoskeletal:      Right lower leg: No edema  Left lower leg: No edema  Comments: Left GSV harvest   Skin:     General: Skin is warm and dry  Capillary Refill: Capillary refill takes less than 2 seconds  Neurological:      General: No focal deficit present  Mental Status: He is alert and oriented to person, place, and time     Psychiatric:         Mood and Affect: Mood normal          Behavior: Behavior normal

## 2022-01-28 NOTE — TELEPHONE ENCOUNTER
Verified patient's insurance   CONFIRMED - Patient's insurance is [employer]  Is patient requesting a call when authorization has been obtained? Patient did not request a call  Surgery Date: 2-3-22  Primary Surgeon: JOSH // Ysabel Nguyen (NPI: [id number])  Assisting Surgeon: Not Applicable (N/A)  Facility: [facility] (Tax: [id number] / NPI: [id number])  Inpatient / Outpatient: Inpatient  Level: 3    Clearance Received: Yes, Cardiology   Consent Received: Yes, scanned into Epic on 1-28-22  Medication Hold / Last Dose: No Hold on ASA or Brillanta   VQI Spreadsheet: Yes  IR Notified: Not Applicable (N/A)  Rep  Notified: Not Applicable (N/A)  Equipment Needs: Yes  Vas Lab Requested: Not Applicable (N/A)  Patient Contacted: 1-28-22    Diagnosis: I70 213  Procedure/ CPT Code(s): BYPASS - Femoral-Popliteal // CPT: 05031    For varicose vein related procedures:   Last LEVDR: Not Applicable (N/A)  CEAP Classification: Not Applicable (N/A)  VCSS: Not Applicable (N/A)    Post Operative Date/ Time: To Be Determined (TBD)     *Please review with patient med hold, PATs, and check H&P *  PATIENT WAS MAILED SURGERY/SHOWERING/DISCHARGE/COVID INSTRUCTIONS AFTER REVIEWING WITH THEM VIA PHONE CALL       Spoke to patient to schedule surgery in office hours

## 2022-01-28 NOTE — H&P (VIEW-ONLY)
Assessment/Plan:    Atheroscler native arteries the extremities w/intermit claudication ([facility] [address] )  He underwent left SFA stenting with good results  Excellent triphasic signals in left PT  Right leg short distance claudication  Based on review of recent angiogram best option would be a Right  fem to pop bypass with PTFE  Risks and benefits of procedure were discussed in detail  Risks of bleeding with Brillanta were discussed, but safer to continue due to recent cardiac stents in October  Due to claudication patient is at risk of losing his job  His job insurance ends in February  Operative Scheduling Information:    Hospital:  [facility] OR    Physician:  Zev    Surgery: RIGHT FEM POP BYPASS WITH PTFE    Urgency:  Urgent: 4 weeks    Level:  Level 3: Outpatients to be scheduled for elective surgery than can be delayed up to 4 weeks without reasonable expectation of detriment to patient    Case Length:  Normal    Post-op Bed:  Stepdown    OR Table:  Standard    Equipment Needs:  None    Medication Instructions:  Aspirin:   Continue (do not hold)  Other: Brillanta  Continue (do not hold)    Hydration:  No         Diagnoses and all orders for this visit:    Atherosclerosis of native artery of both lower extremities with intermittent claudication ([facility] [address] )  -     Case request operating room: BYPASS FEMORAL-POPLITEAL; Standing  -     Type and screen; Future  -     CBC and Platelet; Future  -     Protime-INR; Future  -     HEMOGLOBIN A1C W/ EAG ESTIMATION; Future  -     EKG 12 lead; Future  -     XR chest pa & lateral; Future  -     Ambulatory referral to Cardiology; Future  -     Basic metabolic panel; Future  -     PAT Covid Screening; Future  -     COVID only; Future  -     Case request operating room: BYPASS FEMORAL-POPLITEAL          Subjective:      Patient ID: Cira Street is a 62 y o  male  Patient present to review AOIL done on 1/25/22       HPI  Recently underwent Left leg angiogram with intervention with SFA stenting  His left leg is improving  Right leg has pain when he walks  He is on brillanta, aspirin and statin  He has quit smoking in April 2021  The following portions of the patient's history were reviewed and updated as appropriate: allergies, current medications, past family history, past medical history, past social history, past surgical history and problem list     Review of Systems   Constitutional: Negative  HENT: Negative  Eyes: Negative  Respiratory: Negative  Cardiovascular: Negative  Gastrointestinal: Negative  Endocrine: Negative  Genitourinary: Negative  Musculoskeletal: Negative  Skin: Negative  Allergic/Immunologic: Negative  Neurological: Negative  Hematological: Negative  Psychiatric/Behavioral: Negative  I have reviewed the ROS as entered and made changes as necessary  Objective:      /78 (BP Location: Left arm, Patient Position: Sitting, Cuff Size: Adult)   Pulse 80   Ht 5' 5" (1 651 m)   Wt 78 9 kg (174 lb)   BMI 28 96 kg/m²          Physical Exam  Vitals and nursing note reviewed  Constitutional:       Appearance: Normal appearance  HENT:      Head: Normocephalic and atraumatic  Cardiovascular:      Rate and Rhythm: Normal rate and regular rhythm  Heart sounds: Normal heart sounds  Comments: Triphasic left PT  Pulmonary:      Effort: Pulmonary effort is normal       Breath sounds: Normal breath sounds  Abdominal:      Palpations: Abdomen is soft  Musculoskeletal:      Right lower leg: No edema  Left lower leg: No edema  Comments: Left GSV harvest   Skin:     General: Skin is warm and dry  Capillary Refill: Capillary refill takes less than 2 seconds  Neurological:      General: No focal deficit present  Mental Status: He is alert and oriented to person, place, and time     Psychiatric:         Mood and Affect: Mood normal          Behavior: Behavior normal

## 2022-01-31 ENCOUNTER — APPOINTMENT (OUTPATIENT)
Dept: RADIOLOGY | Facility: CLINIC | Age: 57
End: 2022-01-31
Payer: COMMERCIAL

## 2022-01-31 ENCOUNTER — LAB (OUTPATIENT)
Dept: LAB | Facility: CLINIC | Age: 57
End: 2022-01-31
Payer: COMMERCIAL

## 2022-01-31 ENCOUNTER — CLINICAL SUPPORT (OUTPATIENT)
Dept: URGENT CARE | Facility: CLINIC | Age: 57
End: 2022-01-31
Payer: COMMERCIAL

## 2022-01-31 DIAGNOSIS — I70.213 ATHEROSCLEROSIS OF NATIVE ARTERY OF BOTH LOWER EXTREMITIES WITH INTERMITTENT CLAUDICATION (HCC): ICD-10-CM

## 2022-01-31 LAB
ABO GROUP BLD: NORMAL
ANION GAP SERPL CALCULATED.3IONS-SCNC: 3 MMOL/L (ref 4–13)
BLD GP AB SCN SERPL QL: NEGATIVE
BUN SERPL-MCNC: 25 MG/DL (ref 5–25)
CALCIUM SERPL-MCNC: 9.6 MG/DL (ref 8.3–10.1)
CHLORIDE SERPL-SCNC: 103 MMOL/L (ref 100–108)
CO2 SERPL-SCNC: 27 MMOL/L (ref 21–32)
CREAT SERPL-MCNC: 1.17 MG/DL (ref 0.6–1.3)
ERYTHROCYTE [DISTWIDTH] IN BLOOD BY AUTOMATED COUNT: 13.6 % (ref 11.6–15.1)
GFR SERPL CREATININE-BSD FRML MDRD: 68 ML/MIN/1.73SQ M
GLUCOSE P FAST SERPL-MCNC: 186 MG/DL (ref 65–99)
HCT VFR BLD AUTO: 38.7 % (ref 36.5–49.3)
HGB BLD-MCNC: 12.6 G/DL (ref 12–17)
INR PPP: 1.13 (ref 0.84–1.19)
MCH RBC QN AUTO: 26.6 PG (ref 26.8–34.3)
MCHC RBC AUTO-ENTMCNC: 32.6 G/DL (ref 31.4–37.4)
MCV RBC AUTO: 82 FL (ref 82–98)
PLATELET # BLD AUTO: 233 THOUSANDS/UL (ref 149–390)
PMV BLD AUTO: 9.8 FL (ref 8.9–12.7)
POTASSIUM SERPL-SCNC: 4.7 MMOL/L (ref 3.5–5.3)
PROTHROMBIN TIME: 14 SECONDS (ref 11.6–14.5)
RBC # BLD AUTO: 4.73 MILLION/UL (ref 3.88–5.62)
RH BLD: NEGATIVE
SODIUM SERPL-SCNC: 133 MMOL/L (ref 136–145)
SPECIMEN EXPIRATION DATE: NORMAL
WBC # BLD AUTO: 5.99 THOUSAND/UL (ref 4.31–10.16)

## 2022-01-31 PROCEDURE — 85610 PROTHROMBIN TIME: CPT

## 2022-01-31 PROCEDURE — 86850 RBC ANTIBODY SCREEN: CPT

## 2022-01-31 PROCEDURE — 93005 ELECTROCARDIOGRAM TRACING: CPT

## 2022-01-31 PROCEDURE — 86901 BLOOD TYPING SEROLOGIC RH(D): CPT

## 2022-01-31 PROCEDURE — 36415 COLL VENOUS BLD VENIPUNCTURE: CPT

## 2022-01-31 PROCEDURE — 86900 BLOOD TYPING SEROLOGIC ABO: CPT

## 2022-01-31 PROCEDURE — 80048 BASIC METABOLIC PNL TOTAL CA: CPT

## 2022-01-31 PROCEDURE — 85027 COMPLETE CBC AUTOMATED: CPT

## 2022-01-31 PROCEDURE — 83036 HEMOGLOBIN GLYCOSYLATED A1C: CPT

## 2022-01-31 PROCEDURE — 71046 X-RAY EXAM CHEST 2 VIEWS: CPT

## 2022-01-31 NOTE — PRE-PROCEDURE INSTRUCTIONS
Pre-Surgery Instructions:   Medication Instructions    acetaminophen (TYLENOL) 325 mg tablet ok to take DOP w/ a sip of water prn    aspirin (ECOTRIN LOW STRENGTH) 81 mg EC tablet Patient was instructed by Physician and understands   atorvastatin (LIPITOR) 80 mg tablet pm med    Brilinta 90 MG Patient was instructed by Physician and understands      isosorbide mononitrate (IMDUR) 30 mg 24 hr tablet ok to take DOP w/ a sip of water    metFORMIN (GLUCOPHAGE) 500 mg tablet Hold night before & morning of procedure (per surgeon)    metoprolol succinate (TOPROL-XL) 25 mg 24 hr tablet ok to take DOP w/ a sip of water     Pt verbalizes understanding of the following:    - Bathing instructions, has chg, neck down, no genitals  - No lotions, powders, sprays, deodorant, jewelry, body piercings or make-up    - No Hold on ASA or Brillanta, per surgeon  - NPO after MN  -- Avoid OTC Vit/ Suppl/ Herbals 7 days prior  - Bring list of meds with last dose noted  - INXPO cards & photo id

## 2022-02-01 ENCOUNTER — OFFICE VISIT (OUTPATIENT)
Dept: CARDIOLOGY CLINIC | Facility: CLINIC | Age: 57
End: 2022-02-01
Payer: COMMERCIAL

## 2022-02-01 VITALS
HEART RATE: 86 BPM | BODY MASS INDEX: 28.49 KG/M2 | HEIGHT: 65 IN | SYSTOLIC BLOOD PRESSURE: 140 MMHG | WEIGHT: 171 LBS | DIASTOLIC BLOOD PRESSURE: 78 MMHG | OXYGEN SATURATION: 97 % | RESPIRATION RATE: 16 BRPM

## 2022-02-01 DIAGNOSIS — I70.213 ATHEROSCLEROSIS OF NATIVE ARTERY OF BOTH LOWER EXTREMITIES WITH INTERMITTENT CLAUDICATION (HCC): ICD-10-CM

## 2022-02-01 LAB
ATRIAL RATE: 68 BPM
EST. AVERAGE GLUCOSE BLD GHB EST-MCNC: 163 MG/DL
HBA1C MFR BLD: 7.3 %
P AXIS: 72 DEGREES
PR INTERVAL: 170 MS
QRS AXIS: 55 DEGREES
QRSD INTERVAL: 88 MS
QT INTERVAL: 390 MS
QTC INTERVAL: 414 MS
T WAVE AXIS: 97 DEGREES
VENTRICULAR RATE: 68 BPM

## 2022-02-01 PROCEDURE — 99214 OFFICE O/P EST MOD 30 MIN: CPT | Performed by: INTERNAL MEDICINE

## 2022-02-01 PROCEDURE — 93010 ELECTROCARDIOGRAM REPORT: CPT | Performed by: INTERNAL MEDICINE

## 2022-02-01 PROCEDURE — 1036F TOBACCO NON-USER: CPT | Performed by: INTERNAL MEDICINE

## 2022-02-01 PROCEDURE — 3051F HG A1C>EQUAL 7.0%<8.0%: CPT | Performed by: INTERNAL MEDICINE

## 2022-02-01 NOTE — PROGRESS NOTES
Cardiology Follow Up    Teri Gomez  1965  [id number]  [facility] [address] [place] [id number] [phone]    Discussion/Summary:    1  CAD s/p CABG s/p PCI to distal lcx, recent PCI for  of OM  2  Subclavian artery stenosis s/p recent angioplasty  3  PAD with claudication [place] class 2/3  4  HTN  5  Dyslipidemia  6  Preop risk assessment    Patient presents for preoperative cardiovascular risk assessment  Patient  is scheduled for fem pop bypass  Activity level: Patient is reaasonably active  He is able to complete greater than 4 METS of activity  There is no sign or symptoms of acute coronary syndrome, acute heart failure, valvular heart disease or uncontrolled arrhythmias  He is euvolemic on examination  Patient is at moderate to high risk of perioperative cardiovascular complications based on his age, comorbidities, activity level and nature of upcoming surgery  Discussed with patient who understands and agrees  No further work up would meaningfully change his perioperative risk  Cont with ASA, ticagrelor, metoprolol and statin  Will need to start ACEi given diabetes as OP following his up coming procedure  Procedure scheduled for Thursday  Recommend patient presents to the emergency room or call our office if he has any new/persistent or progressive symptoms  Previous studies were reviewed  Safety measures were reviewed  Questions were entertained and answered  Patient was advised to report any problems requiring medical attention  Follow-up with PCP and appropriate specialist and lab work as discussed  Return for follow up visit as scheduled or earlier, if needed  Thank you for allowing me to participate in the care and evaluation of your patient  Should you have any questions, please feel free to contact me        History of Present Illness:     Bryant Rodriguez Colten Ferreira is a 62 y o  male who presents for follow up for cardiovascular care  Patient was referred to vascular surgery for PAD/severe claudication  Underwent endovascular intervention with stenting of left SFA  Plan for right fem pop bypass  Denies chest pain, chest pressure, shortness of breath, dizziness, lightheadedness, presyncope or syncope  Denies orthopnea, PND or lower limb edema  He denies resolution of claudication in left leg  Masood Hong has claudication in right leg  Able to climb two or more flights or walk two blocks with no exertional chest pain, pressure or SOB  Has to stop for claudication  He was seen by Dr Vlad Brewer and underwent PCI to  of OM1 with excellent result       States he was short of breath      Echo 11/26/21:    Left Ventricle: Left ventricular cavity size is normal  Wall thickness is mildly increased  The left ventricular ejection fraction is 65%  Systolic function is normal  Global longitudinal strain is reduced at -15%  Wall motion is normal  There is concentric remodeling  Diastolic function is abnormal   Left atrial filling pressure is normal         Lower limb arterial doppler 7/15/2021:  RIGHT LOWER LIMB:  This resting evaluation shows an occlusion versus a high grade stenosis in the  mid superficial femoral artery  There is a 50-75% stenosis in the distal  superficial femoral artery and a >75% stenosis in the proximal popliteal artery  There is diffuse atherosclerotic disease in the remainder of the  femoro-popliteal and tibio-peroneal segments  Ankle/Brachial index: 0 73  which is in the moderate disease category  PVR/ PPG tracings are mildly dampened  Metatarsal pressure of 115 mmHg  Great toe pressure of 76 mmHg, within the healing range     LEFT LOWER LIMB:  This resting evaluation shows a 50-75% stenosis in the proximal superficial  femoral artery and a >75% stenosis in the distal superficial femoral and  proximal popliteal arteries   There is diffuse atherosclerotic disease in the  remainder of the femoro-popliteal and tibio-peroneal segments  Ankle/Brachial index: 0 83  which is in the moderate disease category  PVR/ PPG tracings are mildly dampened    Metatarsal pressure of 103 mmHg  Great toe pressure of 78 mmHg, within the healing range    Patient Active Problem List   Diagnosis    Chest pain    Type II diabetes mellitus (HCC)    Leukocytosis    [facility]STEMI [facility]non-ST elevated [facility] infarction) ([facility])    Thrombocytopenia (HCC)    S/P CABG x 3    Postoperative anemia due to acute blood loss    Sinus bradycardia    Encounter for postoperative care    CAD (coronary artery disease)    Left carotid bruit    GERD (gastroesophageal reflux disease)    Hypertension    Atheroscler native arteries the extremities w/intermit claudication ([facility])     Past Medical History:   Diagnosis Date    Bicuspid aortic valve     being observed    CAD (coronary artery disease)     Coronary artery disease     Diabetes mellitus ([facility] )     Former tobacco use     GERD (gastroesophageal reflux disease)     Goiter     History of myocardial infarction     History of rib fracture     Hyperlipidemia     Hypertension     Hypertensive urgency 2021    Leg pain, bilateral     Agram today LLE   2022    Myocardial infarction ([facility] )     -   CABG  x3    Wears glasses      Social History     Socioeconomic History    Marital status:      Spouse name: Not on file    Number of children: Not on file    Years of education: Not on file    Highest education level: Not on file   Occupational History    Not on file   Tobacco Use    Smoking status: Former Smoker     Packs/day: 1 00     Years: 38 00     Pack years: 38 00     Types: Cigarettes     Quit date: 2021     Years since quittin 8    Smokeless tobacco: Never Used   Vaping Use    Vaping Use: Never used   Substance and Sexual Activity    Alcohol use: Never    Drug use: Never    Sexual activity: Not on file   Other Topics Concern    Not on file   Social History Narrative    Not on file     Social Determinants of Health     Financial Resource Strain: Not on file   Food Insecurity: Not on file   Transportation Needs: No Transportation Needs    Lack of Transportation (Medical): No    Lack of Transportation (Non-Medical): No   Physical Activity: Not on file   Stress: Not on file   Social Connections: Not on file   Intimate Partner Violence: Not on file   Housing Stability: Not on file      Family History   Problem Relation Age of Onset    Heart disease Father      Past Surgical History:   Procedure Laterality Date    CARDIAC CATHETERIZATION      CARDIAC CATHETERIZATION N/A 10/20/2021    Procedure: Cardiac catheterization;  Surgeon: Randal Wilcox MD;  Location: [address] [place] [address] CATH LAB; Service: Cardiology    CARDIAC CATHETERIZATION N/A 10/20/2021    Procedure: Cardiac Coronary Angiogram;  Surgeon: Randal Wilcox MD;  Location: [address] [place] [address] CATH LAB; Service: Cardiology    CARDIAC CATHETERIZATION N/A 10/20/2021    Procedure: Cardiac pci;  Surgeon: Randal Wilcox MD;  Location: [address] [place] [address] CATH LAB; Service: Cardiology    CA CABG, ARTERY-VEIN, FOUR N/A 4/9/2021    Procedure: CORONARY ARTERY BYPASS GRAFT (CABG) 3 VESSELS: LIMA to LAD; LLE EVH/SVG to LPL & OM2;  Surgeon: Nae Paris DO;  [facility];  Service: Cardiac Surgery    CA ECHO TRANSESOPHAG R-T 2D W/PRB IMG ACQUISJ I&R N/A 4/9/2021    Procedure: TRANSESOPHAGEAL ECHOCARDIOGRAM (BENNETT); Surgeon: Nae Paris DO;  [facility];  Service: Cardiac Surgery    CA ENDOSCOPY W/VIDEO-ASST VEIN HARVEST,CABG Left 4/9/2021    Procedure: HARVEST VEIN ENDOSCOPIC ([address]);   Surgeon: Nae Paris DO;  [facility];  Service: Cardiac Surgery    CA [address] 3RD+ ORD SLCTV ABDL PEL/LXTR [facility] Left 1/20/2022    Procedure: ARTERIOGRAM, STENT PLACEMENT IN LEFT SUPERFICIAL FEMORIAL ARTERY;  Surgeon: Martina Stanley MD;  Location: [facility];  Service: Vascular    VASECTOMY         Current Outpatient Medications:     acetaminophen (TYLENOL) 325 mg tablet, Take 1-2 tabs q6h PRN mild fever/pain, Disp: 90 tablet, Rfl: 0    aspirin (ECOTRIN LOW STRENGTH) 81 mg EC tablet, Take 4 tablets (325 mg total) by mouth daily, Disp: 60 tablet, Rfl: 3    atorvastatin (LIPITOR) 80 mg tablet, Take 1 tablet (80 mg total) by mouth daily with dinner, Disp: 90 tablet, Rfl: 0    Brilinta 90 MG, TAKE 1 TABLET BY MOUTH 2 TIMES A DAY , Disp: 60 tablet, Rfl: 0    isosorbide mononitrate (IMDUR) 30 mg 24 hr tablet, Take 1 tablet (30 mg total) by mouth 2 (two) times a day, Disp: 90 tablet, Rfl: 3    metFORMIN (GLUCOPHAGE) 500 mg tablet, TAKE 1 TABLET BY MOUTH TWICE A DAY WITH MEALS, Disp: 90 tablet, Rfl: 0    metoprolol succinate (TOPROL-XL) 25 mg 24 hr tablet, Take 0 5 tablets (12 5 mg total) by mouth daily, Disp: 90 tablet, Rfl: 3  No Known Allergies    Labs:  Lab Results   Component Value Date    WBC 5 99 01/31/2022    HGB 12 6 01/31/2022    HCT 38 7 01/31/2022    MCV 82 01/31/2022     01/31/2022     Lab Results   Component Value Date    GLUCOSE 145 (H) 04/09/2021    CALCIUM 9 6 01/31/2022    K 4 7 01/31/2022    CO2 27 01/31/2022     01/31/2022    BUN 25 01/31/2022    CREATININE 1 17 01/31/2022     Lab Results   Component Value Date    HGBA1C 7 3 (H) 01/31/2022     No results found for: CHOL  Lab Results   Component Value Date    HDL 34 (L) 04/08/2021    HDL 35 (L) 04/07/2021     Lab Results   Component Value Date    LDLCALC 44 04/08/2021    [address] 79 04/07/2021     Lab Results   Component Value Date    TRIG 97 04/08/2021    TRIG 91 04/07/2021     No results found for: CHOLHDL    Review of Systems:  Review of Systems   Constitutional: Negative for activity change, appetite change, fatigue and fever  Respiratory: Negative for cough, chest tightness and shortness of breath  Cardiovascular: Negative for chest pain, palpitations and leg swelling  Gastrointestinal: Negative for nausea and vomiting  Genitourinary: Negative for dysuria  Musculoskeletal: Negative for back pain  Skin: Negative for color change  Neurological: Negative for dizziness, syncope, light-headedness and numbness  Psychiatric/Behavioral: Negative for agitation  All other systems reviewed and are negative  Physical Exam:  Physical Exam  Vitals and nursing note reviewed  Constitutional:       General: He is not in acute distress  Appearance: Normal appearance  He is not ill-appearing  HENT:      Head: Normocephalic and atraumatic  Eyes:      Extraocular Movements: Extraocular movements intact  Cardiovascular:      Rate and Rhythm: Normal rate and regular rhythm  Heart sounds: No murmur heard  No friction rub  No gallop  Pulmonary:      Effort: Pulmonary effort is normal  No respiratory distress  Breath sounds: Normal breath sounds  Abdominal:      General: There is no distension  Palpations: Abdomen is soft  Musculoskeletal:         General: No swelling  Normal range of motion  Cervical back: Normal range of motion  Skin:     General: Skin is warm and dry  Capillary Refill: Capillary refill takes less than 2 seconds  Coloration: Skin is not pale  Neurological:      General: No focal deficit present  Mental Status: He is alert and oriented to person, place, and time     Psychiatric:         Mood and Affect: Mood normal

## 2022-02-01 NOTE — TELEPHONE ENCOUNTER
Per Cardiology appointment from 2/1/22:    Patient presents for preoperative cardiovascular risk assessment  Patient  is scheduled for fem pop bypass  Activity level: Patient is reaasonably active  He is able to complete greater than 4 METS of activity  There is no sign or symptoms of acute coronary syndrome, acute heart failure, valvular heart disease or uncontrolled arrhythmias  He is euvolemic on examination  Patient is at moderate to high risk of perioperative cardiovascular complications based on his age, comorbidities, activity level and nature of upcoming surgery  Discussed with patient who understands and agrees  No further work up would meaningfully change his perioperative risk

## 2022-02-03 ENCOUNTER — ANESTHESIA EVENT (OUTPATIENT)
Dept: PERIOP | Facility: HOSPITAL | Age: 57
DRG: 254 | End: 2022-02-03
Payer: COMMERCIAL

## 2022-02-03 ENCOUNTER — HOSPITAL ENCOUNTER (INPATIENT)
Facility: HOSPITAL | Age: 57
LOS: 1 days | Discharge: HOME | DRG: 254 | End: 2022-02-04
Attending: SURGERY | Admitting: SURGERY
Payer: COMMERCIAL

## 2022-02-03 ENCOUNTER — ANESTHESIA (OUTPATIENT)
Dept: PERIOP | Facility: HOSPITAL | Age: 57
DRG: 254 | End: 2022-02-03
Payer: COMMERCIAL

## 2022-02-03 ENCOUNTER — HOSPITAL ENCOUNTER (OUTPATIENT)
Dept: RADIOLOGY | Facility: HOSPITAL | Age: 57
Discharge: HOME | DRG: 254 | End: 2022-02-03
Payer: COMMERCIAL

## 2022-02-03 DIAGNOSIS — Z95.828 S/P FEMORAL-POPLITEAL BYPASS SURGERY: Primary | ICD-10-CM

## 2022-02-03 DIAGNOSIS — I70.209 ARTERIAL OCCLUSION, LOWER EXTREMITY (HCC): ICD-10-CM

## 2022-02-03 LAB
GLUCOSE SERPL-MCNC: 173 MG/DL (ref 65–140)
GLUCOSE SERPL-MCNC: 187 MG/DL (ref 65–140)
GLUCOSE SERPL-MCNC: 189 MG/DL (ref 65–140)
KCT BLD-ACNC: 181 SEC (ref 89–137)
KCT BLD-ACNC: 200 SEC (ref 89–137)
KCT BLD-ACNC: 225 SEC (ref 89–137)
SPECIMEN SOURCE: ABNORMAL

## 2022-02-03 PROCEDURE — C1768 GRAFT, VASCULAR: HCPCS | Performed by: SURGERY

## 2022-02-03 PROCEDURE — 35656 BPG FEMORAL-POPLITEAL: CPT | Performed by: SURGERY

## 2022-02-03 PROCEDURE — 82948 REAGENT STRIP/BLOOD GLUCOSE: CPT

## 2022-02-03 PROCEDURE — 85347 COAGULATION TIME ACTIVATED: CPT

## 2022-02-03 PROCEDURE — NC001 PR NO CHARGE: Performed by: SURGERY

## 2022-02-03 PROCEDURE — C1894 INTRO/SHEATH, NON-LASER: HCPCS | Performed by: SURGERY

## 2022-02-03 PROCEDURE — 76000 FLUOROSCOPY <1 HR PHYS/QHP: CPT

## 2022-02-03 PROCEDURE — 041K0JL BYPASS RIGHT FEMORAL ARTERY TO POPLITEAL ARTERY WITH SYNTHETIC SUBSTITUTE, OPEN APPROACH: ICD-10-PCS | Performed by: SURGERY

## 2022-02-03 PROCEDURE — 99223 1ST HOSP IP/OBS HIGH 75: CPT | Performed by: INTERNAL MEDICINE

## 2022-02-03 DEVICE — REM RG T8MM 30CM RS X 40CM
Type: IMPLANTABLE DEVICE | Status: FUNCTIONAL
Brand: GORE-TEX   VASCULAR GRAFT

## 2022-02-03 RX ORDER — HEPARIN SODIUM 1000 [USP'U]/ML
INJECTION, SOLUTION INTRAVENOUS; SUBCUTANEOUS AS NEEDED
Status: DISCONTINUED | OUTPATIENT
Start: 2022-02-03 | End: 2022-02-03

## 2022-02-03 RX ORDER — GLYCOPYRROLATE 0.2 MG/ML
INJECTION INTRAMUSCULAR; INTRAVENOUS AS NEEDED
Status: DISCONTINUED | OUTPATIENT
Start: 2022-02-03 | End: 2022-02-03

## 2022-02-03 RX ORDER — SODIUM CHLORIDE 9 MG/ML
INJECTION, SOLUTION INTRAVENOUS CONTINUOUS PRN
Status: DISCONTINUED | OUTPATIENT
Start: 2022-02-03 | End: 2022-02-03

## 2022-02-03 RX ORDER — ONDANSETRON 2 MG/ML
4 INJECTION INTRAMUSCULAR; INTRAVENOUS EVERY 6 HOURS PRN
Status: DISCONTINUED | OUTPATIENT
Start: 2022-02-03 | End: 2022-02-04 | Stop reason: HOSPADM

## 2022-02-03 RX ORDER — FENTANYL CITRATE/PF 50 MCG/ML
25 SYRINGE (ML) INJECTION
Status: DISCONTINUED | OUTPATIENT
Start: 2022-02-03 | End: 2022-02-03 | Stop reason: HOSPADM

## 2022-02-03 RX ORDER — HYDROMORPHONE HCL/PF 1 MG/ML
0.2 SYRINGE (ML) INJECTION EVERY 4 HOURS PRN
Status: DISCONTINUED | OUTPATIENT
Start: 2022-02-03 | End: 2022-02-04 | Stop reason: HOSPADM

## 2022-02-03 RX ORDER — ACETAMINOPHEN 325 MG/1
975 TABLET ORAL EVERY 8 HOURS SCHEDULED
Status: DISCONTINUED | OUTPATIENT
Start: 2022-02-03 | End: 2022-02-04 | Stop reason: HOSPADM

## 2022-02-03 RX ORDER — CHLORHEXIDINE GLUCONATE 0.12 MG/ML
15 RINSE ORAL ONCE
Status: COMPLETED | OUTPATIENT
Start: 2022-02-03 | End: 2022-02-03

## 2022-02-03 RX ORDER — ONDANSETRON 2 MG/ML
INJECTION INTRAMUSCULAR; INTRAVENOUS AS NEEDED
Status: DISCONTINUED | OUTPATIENT
Start: 2022-02-03 | End: 2022-02-03

## 2022-02-03 RX ORDER — SODIUM CHLORIDE 9 MG/ML
125 INJECTION, SOLUTION INTRAVENOUS CONTINUOUS
Status: DISCONTINUED | OUTPATIENT
Start: 2022-02-03 | End: 2022-02-03

## 2022-02-03 RX ORDER — INSULIN GLARGINE 100 [IU]/ML
12 INJECTION, SOLUTION SUBCUTANEOUS
Status: DISCONTINUED | OUTPATIENT
Start: 2022-02-03 | End: 2022-02-04 | Stop reason: HOSPADM

## 2022-02-03 RX ORDER — ATORVASTATIN CALCIUM 40 MG/1
80 TABLET, FILM COATED ORAL
Status: DISCONTINUED | OUTPATIENT
Start: 2022-02-03 | End: 2022-02-04 | Stop reason: HOSPADM

## 2022-02-03 RX ORDER — EPHEDRINE SULFATE 50 MG/ML
INJECTION INTRAVENOUS AS NEEDED
Status: DISCONTINUED | OUTPATIENT
Start: 2022-02-03 | End: 2022-02-03

## 2022-02-03 RX ORDER — CEFAZOLIN SODIUM 1 G/3ML
INJECTION, POWDER, FOR SOLUTION INTRAMUSCULAR; INTRAVENOUS AS NEEDED
Status: DISCONTINUED | OUTPATIENT
Start: 2022-02-03 | End: 2022-02-03

## 2022-02-03 RX ORDER — OXYCODONE HYDROCHLORIDE 5 MG/1
2.5 TABLET ORAL EVERY 4 HOURS PRN
Status: DISCONTINUED | OUTPATIENT
Start: 2022-02-03 | End: 2022-02-04 | Stop reason: HOSPADM

## 2022-02-03 RX ORDER — LIDOCAINE HYDROCHLORIDE 20 MG/ML
INJECTION, SOLUTION EPIDURAL; INFILTRATION; INTRACAUDAL; PERINEURAL AS NEEDED
Status: DISCONTINUED | OUTPATIENT
Start: 2022-02-03 | End: 2022-02-03

## 2022-02-03 RX ORDER — MIDAZOLAM HYDROCHLORIDE 2 MG/2ML
INJECTION, SOLUTION INTRAMUSCULAR; INTRAVENOUS AS NEEDED
Status: DISCONTINUED | OUTPATIENT
Start: 2022-02-03 | End: 2022-02-03

## 2022-02-03 RX ORDER — ONDANSETRON 2 MG/ML
4 INJECTION INTRAMUSCULAR; INTRAVENOUS ONCE AS NEEDED
Status: DISCONTINUED | OUTPATIENT
Start: 2022-02-03 | End: 2022-02-03 | Stop reason: HOSPADM

## 2022-02-03 RX ORDER — IODIXANOL 320 MG/ML
INJECTION, SOLUTION INTRAVASCULAR AS NEEDED
Status: DISCONTINUED | OUTPATIENT
Start: 2022-02-03 | End: 2022-02-03 | Stop reason: HOSPADM

## 2022-02-03 RX ORDER — SODIUM CHLORIDE 9 MG/ML
100 INJECTION, SOLUTION INTRAVENOUS CONTINUOUS
Status: DISCONTINUED | OUTPATIENT
Start: 2022-02-03 | End: 2022-02-04

## 2022-02-03 RX ORDER — PROTAMINE SULFATE 10 MG/ML
INJECTION, SOLUTION INTRAVENOUS AS NEEDED
Status: DISCONTINUED | OUTPATIENT
Start: 2022-02-03 | End: 2022-02-03

## 2022-02-03 RX ORDER — CEFAZOLIN SODIUM 2 G/50ML
2000 SOLUTION INTRAVENOUS ONCE
Status: COMPLETED | OUTPATIENT
Start: 2022-02-03 | End: 2022-02-03

## 2022-02-03 RX ORDER — VECURONIUM BROMIDE 1 MG/ML
INJECTION, POWDER, LYOPHILIZED, FOR SOLUTION INTRAVENOUS AS NEEDED
Status: DISCONTINUED | OUTPATIENT
Start: 2022-02-03 | End: 2022-02-03

## 2022-02-03 RX ORDER — NEOSTIGMINE METHYLSULFATE 1 MG/ML
INJECTION INTRAVENOUS AS NEEDED
Status: DISCONTINUED | OUTPATIENT
Start: 2022-02-03 | End: 2022-02-03

## 2022-02-03 RX ORDER — OXYCODONE HYDROCHLORIDE 5 MG/1
5 TABLET ORAL EVERY 4 HOURS PRN
Status: DISCONTINUED | OUTPATIENT
Start: 2022-02-03 | End: 2022-02-04 | Stop reason: HOSPADM

## 2022-02-03 RX ORDER — METOPROLOL SUCCINATE 25 MG/1
12.5 TABLET, EXTENDED RELEASE ORAL DAILY
Status: DISCONTINUED | OUTPATIENT
Start: 2022-02-04 | End: 2022-02-04 | Stop reason: HOSPADM

## 2022-02-03 RX ORDER — PROPOFOL 10 MG/ML
INJECTION, EMULSION INTRAVENOUS AS NEEDED
Status: DISCONTINUED | OUTPATIENT
Start: 2022-02-03 | End: 2022-02-03

## 2022-02-03 RX ORDER — ASPIRIN 325 MG
325 TABLET ORAL DAILY
Status: DISCONTINUED | OUTPATIENT
Start: 2022-02-04 | End: 2022-02-04 | Stop reason: HOSPADM

## 2022-02-03 RX ORDER — HEPARIN SODIUM 5000 [USP'U]/ML
5000 INJECTION, SOLUTION INTRAVENOUS; SUBCUTANEOUS EVERY 8 HOURS SCHEDULED
Status: DISCONTINUED | OUTPATIENT
Start: 2022-02-03 | End: 2022-02-04 | Stop reason: HOSPADM

## 2022-02-03 RX ORDER — FENTANYL CITRATE 50 UG/ML
INJECTION, SOLUTION INTRAMUSCULAR; INTRAVENOUS AS NEEDED
Status: DISCONTINUED | OUTPATIENT
Start: 2022-02-03 | End: 2022-02-03

## 2022-02-03 RX ADMIN — FENTANYL CITRATE 50 MCG: 50 INJECTION INTRAMUSCULAR; INTRAVENOUS at 14:41

## 2022-02-03 RX ADMIN — HEPARIN SODIUM 1000 UNITS: 1000 INJECTION INTRAVENOUS; SUBCUTANEOUS at 14:44

## 2022-02-03 RX ADMIN — SODIUM CHLORIDE 125 ML/HR: 0.9 INJECTION, SOLUTION INTRAVENOUS at 17:20

## 2022-02-03 RX ADMIN — EPHEDRINE SULFATE 10 MG: 50 INJECTION, SOLUTION INTRAVENOUS at 15:22

## 2022-02-03 RX ADMIN — TICAGRELOR 90 MG: 90 TABLET ORAL at 20:14

## 2022-02-03 RX ADMIN — ACETAMINOPHEN 975 MG: 325 TABLET, FILM COATED ORAL at 22:18

## 2022-02-03 RX ADMIN — NEOSTIGMINE METHYLSULFATE 4 MG: 1 INJECTION INTRAVENOUS at 16:25

## 2022-02-03 RX ADMIN — GLYCOPYRROLATE 0.6 MG: 0.2 INJECTION, SOLUTION INTRAMUSCULAR; INTRAVENOUS at 16:25

## 2022-02-03 RX ADMIN — VECURONIUM BROMIDE 10 MG: 1 INJECTION, POWDER, LYOPHILIZED, FOR SOLUTION INTRAVENOUS at 11:48

## 2022-02-03 RX ADMIN — ATORVASTATIN CALCIUM 80 MG: 40 TABLET, FILM COATED ORAL at 20:14

## 2022-02-03 RX ADMIN — LIDOCAINE HYDROCHLORIDE 100 MG: 20 INJECTION, SOLUTION EPIDURAL; INFILTRATION; INTRACAUDAL; PERINEURAL at 11:48

## 2022-02-03 RX ADMIN — PROPOFOL 50 MG: 10 INJECTION, EMULSION INTRAVENOUS at 12:53

## 2022-02-03 RX ADMIN — FENTANYL CITRATE 50 MCG: 50 INJECTION INTRAMUSCULAR; INTRAVENOUS at 12:25

## 2022-02-03 RX ADMIN — FENTANYL CITRATE 25 MCG: 50 INJECTION, SOLUTION INTRAMUSCULAR; INTRAVENOUS at 17:01

## 2022-02-03 RX ADMIN — HYDROMORPHONE HYDROCHLORIDE 0.2 MG: 1 INJECTION, SOLUTION INTRAMUSCULAR; INTRAVENOUS; SUBCUTANEOUS at 20:27

## 2022-02-03 RX ADMIN — CEFAZOLIN SODIUM 2000 MG: 1 INJECTION, POWDER, FOR SOLUTION INTRAMUSCULAR; INTRAVENOUS at 15:25

## 2022-02-03 RX ADMIN — HEPARIN SODIUM 1000 UNITS: 1000 INJECTION INTRAVENOUS; SUBCUTANEOUS at 14:56

## 2022-02-03 RX ADMIN — INSULIN LISPRO 2 UNITS: 100 INJECTION, SOLUTION INTRAVENOUS; SUBCUTANEOUS at 20:16

## 2022-02-03 RX ADMIN — INSULIN LISPRO 1 UNITS: 100 INJECTION, SOLUTION INTRAVENOUS; SUBCUTANEOUS at 20:16

## 2022-02-03 RX ADMIN — SODIUM CHLORIDE: 0.9 INJECTION, SOLUTION INTRAVENOUS at 11:49

## 2022-02-03 RX ADMIN — PROTAMINE SULFATE 30 MG: 10 INJECTION, SOLUTION INTRAVENOUS at 15:43

## 2022-02-03 RX ADMIN — INSULIN GLARGINE 12 UNITS: 100 INJECTION, SOLUTION SUBCUTANEOUS at 22:18

## 2022-02-03 RX ADMIN — EPHEDRINE SULFATE 10 MG: 50 INJECTION, SOLUTION INTRAVENOUS at 14:26

## 2022-02-03 RX ADMIN — SODIUM CHLORIDE 100 ML/HR: 0.9 INJECTION, SOLUTION INTRAVENOUS at 18:25

## 2022-02-03 RX ADMIN — FENTANYL CITRATE 50 MCG: 50 INJECTION INTRAMUSCULAR; INTRAVENOUS at 15:50

## 2022-02-03 RX ADMIN — EPHEDRINE SULFATE 25 MG: 50 INJECTION, SOLUTION INTRAVENOUS at 11:57

## 2022-02-03 RX ADMIN — HEPARIN SODIUM 5000 UNITS: 5000 INJECTION INTRAVENOUS; SUBCUTANEOUS at 22:18

## 2022-02-03 RX ADMIN — CEFAZOLIN SODIUM 2000 MG: 2 SOLUTION INTRAVENOUS at 11:36

## 2022-02-03 RX ADMIN — OXYCODONE HYDROCHLORIDE 2.5 MG: 5 TABLET ORAL at 18:31

## 2022-02-03 RX ADMIN — VECURONIUM BROMIDE 2 MG: 1 INJECTION, POWDER, LYOPHILIZED, FOR SOLUTION INTRAVENOUS at 14:58

## 2022-02-03 RX ADMIN — ONDANSETRON 4 MG: 2 INJECTION INTRAMUSCULAR; INTRAVENOUS at 16:25

## 2022-02-03 RX ADMIN — FENTANYL CITRATE 100 MCG: 50 INJECTION INTRAMUSCULAR; INTRAVENOUS at 12:32

## 2022-02-03 RX ADMIN — PROPOFOL 200 MG: 10 INJECTION, EMULSION INTRAVENOUS at 11:48

## 2022-02-03 RX ADMIN — PROPOFOL 50 MG: 10 INJECTION, EMULSION INTRAVENOUS at 14:41

## 2022-02-03 RX ADMIN — SODIUM CHLORIDE 125 ML/HR: 0.9 INJECTION, SOLUTION INTRAVENOUS at 10:28

## 2022-02-03 RX ADMIN — FENTANYL CITRATE 50 MCG: 50 INJECTION INTRAMUSCULAR; INTRAVENOUS at 11:48

## 2022-02-03 RX ADMIN — FENTANYL CITRATE 100 MCG: 50 INJECTION INTRAMUSCULAR; INTRAVENOUS at 13:48

## 2022-02-03 RX ADMIN — HEPARIN SODIUM 6000 UNITS: 1000 INJECTION INTRAVENOUS; SUBCUTANEOUS at 12:58

## 2022-02-03 RX ADMIN — MIDAZOLAM 2 MG: 1 INJECTION INTRAMUSCULAR; INTRAVENOUS at 11:40

## 2022-02-03 RX ADMIN — SODIUM CHLORIDE: 0.9 INJECTION, SOLUTION INTRAVENOUS at 13:55

## 2022-02-03 RX ADMIN — PROPOFOL 50 MG: 10 INJECTION, EMULSION INTRAVENOUS at 13:43

## 2022-02-03 RX ADMIN — CHLORHEXIDINE GLUCONATE 0.12% ORAL RINSE 15 ML: 1.2 LIQUID ORAL at 10:02

## 2022-02-03 RX ADMIN — EPHEDRINE SULFATE 10 MG: 50 INJECTION, SOLUTION INTRAVENOUS at 14:53

## 2022-02-03 NOTE — ANESTHESIA PREPROCEDURE EVALUATION
Procedure:  BYPASS FEMORAL-POPLITEAL (Right Leg Upper)    Relevant Problems   CARDIO   (+) CAD (coronary artery disease)   (+) Chest pain   (+) History of PTCA   (+) Hypertension   (+) NSTEMI (non-ST elevated myocardial infarction) ([facility])   (+) S/P CABG x 3   (+) Sinus bradycardia      ENDO   (+) Type II diabetes mellitus (HCC)      GI/HEPATIC   (+) GERD (gastroesophageal reflux disease)      HEMATOLOGY   (+) Postoperative anemia due to acute blood loss   (+) Thrombocytopenia (HCC)      NEURO/PSYCH   (+) 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)      Other   (+) Former tobacco use        Physical Exam    Airway    Mallampati score: II  TM Distance: >3 FB  Neck ROM: full     Dental   No notable dental hx     Cardiovascular  Rhythm: regular, Rate: normal, Cardiovascular exam normal    Pulmonary  Pulmonary exam normal Breath sounds clear to auscultation,     Other Findings        Anesthesia Plan  ASA Score- 4     Anesthesia Type- general with ASA Monitors  Additional Monitors: arterial line  Airway Plan: ETT  Plan Factors-Exercise tolerance (METS): <4 METS  Chart reviewed  EKG reviewed  Existing labs reviewed  Patient summary reviewed  Patient is not a current smoker  Obstructive sleep apnea risk education given perioperatively  Induction- intravenous  Postoperative Plan-     Informed Consent- Anesthetic plan and risks discussed with patient

## 2022-02-03 NOTE — INTERVAL H&P NOTE
H&P reviewed  After examining the patient I find no changes in the patients condition since the H&P had been written      Vitals:    02/03/22 0937   BP: 123/73   Pulse: 72   Resp: 16   Temp: 98 2 °F (36 8 °C)   SpO2: 96%

## 2022-02-03 NOTE — PLAN OF CARE
Problem: PAIN - ADULT  Goal: Verbalizes/displays adequate comfort level or baseline comfort level  Description: Interventions:  - Encourage patient to monitor pain and request assistance  - Assess pain using appropriate pain scale  - Administer analgesics based on type and severity of pain and evaluate response  - Implement non-pharmacological measures as appropriate and evaluate response  - Consider cultural and social influences on pain and pain management  - Notify physician/advanced practitioner if interventions unsuccessful or patient reports new pain  Outcome: Progressing     Problem: INFECTION - ADULT  Goal: Absence or prevention of progression during hospitalization  Description: INTERVENTIONS:  - Assess and monitor for signs and symptoms of infection  - Monitor lab/diagnostic results  - Monitor all insertion sites, i e  indwelling lines, tubes, and drains  - Monitor endotracheal if appropriate and nasal secretions for changes in amount and color  - [place] appropriate cooling/warming therapies per order  - Administer medications as ordered  - Instruct and encourage patient and family to use good hand hygiene technique  - Identify and instruct in appropriate isolation precautions for identified infection/condition  Outcome: Progressing  Goal: Absence of fever/infection during neutropenic period  Description: INTERVENTIONS:  - Monitor WBC    Outcome: Progressing     Problem: SAFETY ADULT  Goal: Patient will remain free of falls  Description: INTERVENTIONS:  - Educate patient/family on patient safety including physical limitations  - Instruct patient to call for assistance with activity   - Consult OT/PT to assist with strengthening/mobility   - Keep Call bell within reach  - Keep bed low and locked with side rails adjusted as appropriate  - Keep care items and personal belongings within reach  - Initiate and maintain comfort rounds  - Make Fall Risk Sign visible to staff  - Apply yellow socks and bracelet for high fall risk patients  - Consider moving patient to room near nurses station  Outcome: Progressing  Goal: Maintain or return to baseline ADL function  Description: INTERVENTIONS:  -  Assess patient's ability to carry out ADLs; assess patient's baseline for ADL function and identify physical deficits which impact ability to perform ADLs (bathing, care of mouth/teeth, toileting, grooming, dressing, etc )  - Assess/evaluate cause of self-care deficits   - Assess range of motion  - Assess patient's mobility; develop plan if impaired  - Assess patient's need for assistive devices and provide as appropriate  - Encourage maximum independence but intervene and supervise when necessary  - Involve family in performance of ADLs  - Assess for home care needs following discharge   - Consider OT consult to assist with ADL evaluation and planning for discharge  - Provide patient education as appropriate  Outcome: Progressing  Goal: Maintains/Returns to pre admission functional level  Description: INTERVENTIONS:  - Perform BMAT or MOVE assessment daily    - Set and communicate daily mobility goal to care team and patient/family/caregiver     - Collaborate with rehabilitation services on mobility goals if consulted  - Out of bed for toileting  - Record patient progress and toleration of activity level   Outcome: Progressing     Problem: DISCHARGE PLANNING  Goal: Discharge to home or other facility with appropriate resources  Description: INTERVENTIONS:  - Identify barriers to discharge w/patient and caregiver  - Arrange for needed discharge resources and transportation as appropriate  - Identify discharge learning needs (meds, wound care, etc )  - Arrange for interpretive services to assist at discharge as needed  - Refer to Case Management Department for coordinating discharge planning if the patient needs post-hospital services based on physician/advanced practitioner order or complex needs related to functional status, cognitive ability, or social support system  Outcome: Progressing

## 2022-02-03 NOTE — ANESTHESIA PROCEDURE NOTES
Arterial Line Insertion  Performed by: Christine Dawn CRNA  Authorized by: Allyson Rossi MD   Consent: Written consent obtained  Consent given by: patient  Patient identity confirmed: hospital-assigned identification number, provided demographic data and arm band  Preparation: Patient was prepped and draped in the usual sterile fashion    Indications: hemodynamic monitoring  Orientation:  Right  Location: radial artery  Procedure Details:  Caio's test normal: yes  Needle gauge: 20  Seldinger technique: Seldinger technique used  Number of attempts: 1    Post-procedure:  Post-procedure: dressing applied  Waveform: good waveform  Post-procedure CNS: normal  Patient tolerance: Patient tolerated the procedure well with no immediate complications

## 2022-02-03 NOTE — OP NOTE
PERATIVE REPORT  PATIENT NAME: Minerva Wilson    :  1965  MRN: [id number]  Pt Location: [place] [facility] 09    SURGERY DATE: 2/3/2022    Surgeon(s) and Role:     * Yolis Grajeda MD - Primary     * Brenden Bowen MD - Assisting    Preop Diagnosis:  Atherosclerosis of native artery of both lower extremities with intermittent claudication ([address] ) [I70 213]    Post-Op Diagnosis Codes:     * Atherosclerosis of native artery of both lower extremities with intermittent claudication ([address] ) [I70 213]    Procedure(s) (LRB):  BYPASS FEMORAL-POPLITEAL (Right)    Specimen(s):  * No specimens in log *    Estimated Blood Loss:   400 mL    Drains:  Urethral Catheter Latex;Straight-tip 16 Fr  (Active)   Number of days: 0       Anesthesia Type:   General    Operative Indications: Atherosclerosis of native artery of both lower extremities with intermittent claudication ([address] ) [I70 213]      Operative Findings:  Common femoral artery with previous Perclose and Mynx closure  The common femoral artery is thick walled with atherosclerotic plaque but patent  Above knee popliteal artery is patent with fragile plaque    Initial completion arteriogram demonstrated that there was some luminal irregularity just beyond the distal anastomosis  Hence this was revised  Completion angiogram following that demonstrated resolution of luminal irregularities and brisk flow  Arterial Doppler/duplex was also performed which demonstrated low resistance flow across the anastomosis  Palpable DP and PT pulses  Complications:   None    Procedure and Technique:  Patient is brought to the operating room and placed in supine position  General anesthesia was induced via endotracheal intubation  Radial A-line was placed by anesthesia service  Lomax catheter was placed  Patient's entire RIGHT lower extremity was shaved and prepped and draped in the usual standard sterile fashion using circumferential are benign    Prior to prepping the groin was scrubbed with chlorhexidine soap  Longitudinal incision was made in the above knee region, medial thigh  Subcutaneous tissues were divided  Fascia was divided  Sartorius muscle was retracted  We then retracted the adductor muscles to expose the above knee popliteal artery  It was found to be soft and suitable for anastomosis  It was encircled with vessel loops proximally and distally after dissecting overlying tissues  We then did the proximal dissection  A longitudinal incision was made over the common femoral artery in the groin  Subcutaneous tissues were divided  Omari's was divided  The inguinal ligament was retracted cephalad  Femoral sheath was opened and common femoral artery was dissected  There was signficant scarring over that area due to prior punctures for cardiac cath  It had posterior plaque  Vessel loops were encircled around the common femoral artery proximal and distally  Tunnel was created between the 2 incisions using [employer] [place] tunneler  The tunneling was in the subcutaneous plane  8 mm [place] graft was passed through the tunnel in correct orientation  Patient was then given 6000 units of intravenous heparin  Additional heparin was given for the duration of the case as necessary to achieve therapeutic ACT  After 3 minutes control was obtained over the common femoral artery  An arteriotomy was created in the common femoral artery using 11 blade and Silva scissors  The graft was cut to a bevel to match the size of the arteriotomy  Proximal anastomosis was created using 4 quadrant technique with 5-0 prolene suture  Prior to closure of the anastomosis it was backflushed and forward flushed  We then made a longitudinal arteriotomy using 11 blade and Silva scissor over the above knee popliteal artery  The graft was cut to a bevel to match the size of the arteriotomy  Distal anastomosis created using 4 quadrant technique with 5-0 prolene suture    Prior to closure of the anastomosis it was backflushed and forward flushed  Completion angiogram was done by micropuncture of the CFA with micro sheath  Initial completion arteriogram demonstrated that there was some luminal irregularity just beyond the distal anastomosis  Hence this was revised but extending distally with a patch made of PTFE  Inside the lumen distally there was a dissection flap, likely due to clamp injury on frail intima  This was resected and tacked down with 7-0 prolene sutures x 2  Second completion angiogram following that demonstrated resolution of luminal irregularities and brisk flow  Arterial Doppler/duplex was also performed which demonstrated low resistance flow across the anastomosis  There was an excellent pulse over the bypass and in the popliteal artery beyond it  Intravenous protamine was given to reverse the effect of heparin  Gelfoam thrombin was applied over the proximal and distal anastomosis to achieve hemostasis  Antibiotic irrigation was carried out in both wounds  We then closed the groin wound in several layers  The deepest layer being closed with interrupted 2 0 Monocryl  Then there were 2 additional layers of 3 0 Monocryl and then skin was closed with running subcuticular 4 Monocryl  Histacryl was applied  The lower thigh incision was also closed with multiple layers of 3 0 Monocryl including the sartorius fascia  Skin was then closed with 4 Monocryl running subcuticular  I was present for the entire procedure    Patient Disposition:  PACU       SIGNATURE: Lauryn Corrigan MD  DATE: February 3, 2022  TIME: 4:48 PM    Vascular Quality Initiative - Infra-Inguinal Bypass      Urgency: Elective Anesthesia: General    Side: right  Skin Prep:Chlor + Alcohol     Graft Origin: Common Femoral    Graft Recipient: AK Pop    Graft Vein Type: None      Prosthetic: PTFE      Groin Incision: Incision Orientation is: Vertical    Groin Closure Type: Absorbable Subcuticular   Closure Dressing is: Cyanoacrylate Adhesive        Total Procedure Time:   Event Time In   Procedure Start 1231   Procedure Closing 1836   Procedure Finish 1260       EBL: 400 mL        Adjuncts:  Vein Cuff:  no Sequential Graft:no    Concomitant Proximal Ipsilateral:   PVI: no   Endarterectomy:no   Supra-inguinal Bypass: no    Completion Study:   Doppler: yes   Duplex: yes    Arteriogram: yes

## 2022-02-04 VITALS
WEIGHT: 172.62 LBS | RESPIRATION RATE: 16 BRPM | TEMPERATURE: 98.4 F | DIASTOLIC BLOOD PRESSURE: 72 MMHG | SYSTOLIC BLOOD PRESSURE: 127 MMHG | HEIGHT: 65 IN | BODY MASS INDEX: 28.76 KG/M2 | OXYGEN SATURATION: 97 % | HEART RATE: 70 BPM

## 2022-02-04 DIAGNOSIS — I21.4 NSTEMI (NON-ST ELEVATED MYOCARDIAL INFARCTION) (HCC): ICD-10-CM

## 2022-02-04 LAB
ANION GAP SERPL CALCULATED.3IONS-SCNC: 7 MMOL/L (ref 4–13)
BUN SERPL-MCNC: 16 MG/DL (ref 5–25)
CALCIUM SERPL-MCNC: 7.8 MG/DL (ref 8.3–10.1)
CHLORIDE SERPL-SCNC: 108 MMOL/L (ref 100–108)
CO2 SERPL-SCNC: 23 MMOL/L (ref 21–32)
CREAT SERPL-MCNC: 1.04 MG/DL (ref 0.6–1.3)
ERYTHROCYTE [DISTWIDTH] IN BLOOD BY AUTOMATED COUNT: 13.7 % (ref 11.6–15.1)
GFR SERPL CREATININE-BSD FRML MDRD: 79 ML/MIN/1.73SQ M
GLUCOSE SERPL-MCNC: 135 MG/DL (ref 65–140)
GLUCOSE SERPL-MCNC: 155 MG/DL (ref 65–140)
GLUCOSE SERPL-MCNC: 177 MG/DL (ref 65–140)
HCT VFR BLD AUTO: 28.8 % (ref 36.5–49.3)
HGB BLD-MCNC: 9.4 G/DL (ref 12–17)
MCH RBC QN AUTO: 26.9 PG (ref 26.8–34.3)
MCHC RBC AUTO-ENTMCNC: 32.6 G/DL (ref 31.4–37.4)
MCV RBC AUTO: 83 FL (ref 82–98)
PLATELET # BLD AUTO: 178 THOUSANDS/UL (ref 149–390)
PMV BLD AUTO: 9.4 FL (ref 8.9–12.7)
POTASSIUM SERPL-SCNC: 3.7 MMOL/L (ref 3.5–5.3)
RBC # BLD AUTO: 3.49 MILLION/UL (ref 3.88–5.62)
SODIUM SERPL-SCNC: 138 MMOL/L (ref 136–145)
WBC # BLD AUTO: 7.78 THOUSAND/UL (ref 4.31–10.16)

## 2022-02-04 PROCEDURE — 80048 BASIC METABOLIC PNL TOTAL CA: CPT | Performed by: SURGERY

## 2022-02-04 PROCEDURE — 82948 REAGENT STRIP/BLOOD GLUCOSE: CPT

## 2022-02-04 PROCEDURE — 99024 POSTOP FOLLOW-UP VISIT: CPT | Performed by: SURGERY

## 2022-02-04 PROCEDURE — 99232 SBSQ HOSP IP/OBS MODERATE 35: CPT | Performed by: INTERNAL MEDICINE

## 2022-02-04 PROCEDURE — NC001 PR NO CHARGE: Performed by: SURGERY

## 2022-02-04 PROCEDURE — 85027 COMPLETE CBC AUTOMATED: CPT | Performed by: SURGERY

## 2022-02-04 RX ORDER — TICAGRELOR 90 MG/1
TABLET ORAL
Qty: 60 TABLET | Refills: 0 | Status: SHIPPED | OUTPATIENT
Start: 2022-02-04 | End: 2022-02-28

## 2022-02-04 RX ORDER — OXYCODONE HYDROCHLORIDE 5 MG/1
5 TABLET ORAL EVERY 4 HOURS PRN
Qty: 20 TABLET | Refills: 0 | Status: SHIPPED | OUTPATIENT
Start: 2022-02-04 | End: 2022-02-14

## 2022-02-04 RX ADMIN — ACETAMINOPHEN 975 MG: 325 TABLET, FILM COATED ORAL at 14:02

## 2022-02-04 RX ADMIN — TICAGRELOR 90 MG: 90 TABLET ORAL at 08:06

## 2022-02-04 RX ADMIN — HEPARIN SODIUM 5000 UNITS: 5000 INJECTION INTRAVENOUS; SUBCUTANEOUS at 05:12

## 2022-02-04 RX ADMIN — INSULIN LISPRO 2 UNITS: 100 INJECTION, SOLUTION INTRAVENOUS; SUBCUTANEOUS at 08:05

## 2022-02-04 RX ADMIN — INSULIN LISPRO 2 UNITS: 100 INJECTION, SOLUTION INTRAVENOUS; SUBCUTANEOUS at 11:43

## 2022-02-04 RX ADMIN — HEPARIN SODIUM 5000 UNITS: 5000 INJECTION INTRAVENOUS; SUBCUTANEOUS at 14:02

## 2022-02-04 RX ADMIN — ASPIRIN 325 MG ORAL TABLET 325 MG: 325 PILL ORAL at 08:06

## 2022-02-04 RX ADMIN — INSULIN LISPRO 1 UNITS: 100 INJECTION, SOLUTION INTRAVENOUS; SUBCUTANEOUS at 08:05

## 2022-02-04 RX ADMIN — METOPROLOL SUCCINATE 12.5 MG: 25 TABLET, EXTENDED RELEASE ORAL at 08:06

## 2022-02-04 RX ADMIN — OXYCODONE HYDROCHLORIDE 5 MG: 5 TABLET ORAL at 05:11

## 2022-02-04 RX ADMIN — INSULIN LISPRO 1 UNITS: 100 INJECTION, SOLUTION INTRAVENOUS; SUBCUTANEOUS at 11:43

## 2022-02-04 RX ADMIN — HYDROMORPHONE HYDROCHLORIDE 0.2 MG: 1 INJECTION, SOLUTION INTRAMUSCULAR; INTRAVENOUS; SUBCUTANEOUS at 08:11

## 2022-02-04 RX ADMIN — OXYCODONE HYDROCHLORIDE 5 MG: 5 TABLET ORAL at 11:42

## 2022-02-04 RX ADMIN — ACETAMINOPHEN 975 MG: 325 TABLET, FILM COATED ORAL at 05:11

## 2022-02-04 NOTE — ASSESSMENT & PLAN NOTE
POD #0    Lower limb arterial doppler 7/15/2021:  RIGHT LOWER LIMB:  This resting evaluation shows an occlusion versus a high grade stenosis in the  mid superficial femoral artery  There is a 50-75% stenosis in the distal  superficial femoral artery and a >75% stenosis in the proximal popliteal artery  There is diffuse atherosclerotic disease in the remainder of the  femoro-popliteal and tibio-peroneal segments  Ankle/Brachial index: 0 73  which is in the moderate disease category     LEFT LOWER LIMB:  This resting evaluation shows a 50-75% stenosis in the proximal superficial  femoral artery and a >75% stenosis in the distal superficial femoral and  proximal popliteal arteries  There is diffuse atherosclerotic disease in the  remainder of the femoro-popliteal and tibio-peroneal segments  Ankle/Brachial index: 0 83  which is in the moderate disease category    CTA abdomin w run off on 1/20/22    There is mild atherosclerotic disease of the distal abdominal aorta without significant stenosis  The major mesenteric vessels and single renal arteries bilaterally are patent  RIGHT:  common iliac, external iliac, and common femoral artery are patent without significant stenosis  Right SFA is occluded proximally, with reconstitution in the region of of the distal thigh  The popliteal artery is patent  The anterior tibial artery is occluded proximally  LEFT:  common iliac artery, external iliac artery, and common femoral artery are patent  Left SFA is occluded proximally, with reconstitution in the distal thigh  The popliteal artery is patent  The anterior tibial artery is occluded throughout its course  The proximal posterior tibial artery is occluded, with reconstitution in the mid lower leg       Patient had R femoral-popliteal bypass today performed     Will continue metoprolol, brilinta,aspirin , statin as scheduled per primary team

## 2022-02-04 NOTE — PROGRESS NOTES
Progress Note - Vascular Surgery   Heather Maharaj 62 y o  male MRN: [id number]  Unit/Bed#: ICU 09 Encounter: [id number]    Assessment:  Heather Maharaj is a 62 y o  male s/p R fem pop bypass    Afebrile, Stable VS on room air  Palpable R PT and DP    Groin tender but no firm, expanding hematoma, Groin is soft    Plan:  -aspirin, SQH  -Pain control PRN  -regular diet  -consider DC today    Subjective/Objective     Subjective:   No acute events overnight  Objective:     Blood pressure 135/78, pulse 72, temperature 97 9 °F (36 6 °C), temperature source Temporal, resp  rate 14, height 5' 5" (1 651 m), weight 78 3 kg (172 lb 9 9 oz), SpO2 100 %  ,Body mass index is 28 73 kg/m²  Intake/Output Summary (Last 24 hours) at 2/4/2022 0143  Last data filed at 2/3/2022 2101  Gross per 24 hour   Intake 3333 33 ml   Output 1070 ml   Net 2263 33 ml       Invasive Devices  Report    Peripheral Intravenous Line            Peripheral IV 02/03/22 Dorsal (posterior); Left Hand <1 day    Peripheral IV 02/03/22 Right Hand <1 day    Peripheral IV 02/03/22 Right;Dorsal (posterior) Hand <1 day          Arterial Line            Arterial Line 02/03/22 Right Radial <1 day          Line            Arterial Sheath 6 Fr   Right Femoral 106 days          Drain            Urethral Catheter Latex;Straight-tip 16 Fr  <1 day                Physical Exam:   Gen: NAD, Comfortable  Neuro: A&O, No focal deficits  Head: Normal Cephalic, Atraumatic  Eye: EOMI, PERRLA, No scleral icterus  Neck: Supple, No JVD, Midline trachea  CV: RRR, Cap refill <2 sec  Pulm: Normal work of breathing, no respiratory distress  Abd: Soft, Non-Distended, Non-Tender  Ext: see above pulse exam  Skin: warm, dry, intact

## 2022-02-04 NOTE — PROGRESS NOTES
[facility]  Progress Note - Delorise Gearing 1965, 62 y o  male MRN: [id number]  Unit/Bed#: ICU 09 Encounter: [id number]  Primary Care Provider: Nila Donohue MD   Date and time admitted to hospital: 2/3/2022  9:05 AM    History of PTCA  Assessment & Plan  Patient underwent balloon angioplasty and stenting in left SFA on 1/20/22, with complete resolution of the SFA stenosis and occlusion     Former tobacco use  Assessment & Plan  Patient used to smoke 1-2 ppd  for 38 years,   He quit smoking in April,2021     Hypertension  Assessment & Plan  curently patient BP is stable  Continue metoprolol per primary team,   ECHO on 11/26 /21 EF 65%  Keep SBP>100        CAD (coronary artery disease)  Assessment & Plan  PMH of CAD s/p CABG× 3 in April 2021  ECHO on 11/26/21 EF 65%  Continue metoprolol  Statin, aspirin per primary team   imdur 30 mg on hold, per primary team     S/P CABG x 3  Assessment & Plan  S/P CABG ×3 in April 2021  Continue statin, aspirin metoprolol , brilinta as scheduled per primary team,      Type II diabetes mellitus [facility])  Assessment & Plan  Lab Results   Component Value Date    HGBA1C 7 3 (H) 01/31/2022       Recent Labs     02/03/22  1022 02/03/22  1649 02/03/22  1833   POCGLU 173* 187* 189*       Blood Sugar Average: Last 72 hrs:  (P) 183   Hold home metformin    Lantus 12 , acucheck/ SSI  Hypoglycemic protocol    HgA1C 7  3 on 1/31/22       * 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)  Assessment & Plan  POD #1    Lower limb arterial doppler 7/15/2021:  RIGHT LOWER LIMB:  This resting evaluation shows an occlusion versus a high grade stenosis in the  mid superficial femoral artery  There is a 50-75% stenosis in the distal  superficial femoral artery and a >75% stenosis in the proximal popliteal artery  There is diffuse atherosclerotic disease in the remainder of the  femoro-popliteal and tibio-peroneal segments    Ankle/Brachial index: 0 73  which is in the moderate disease category     LEFT LOWER LIMB:  This resting evaluation shows a 50-75% stenosis in the proximal superficial  femoral artery and a >75% stenosis in the distal superficial femoral and  proximal popliteal arteries  There is diffuse atherosclerotic disease in the  remainder of the femoro-popliteal and tibio-peroneal segments  Ankle/Brachial index: 0 83  which is in the moderate disease category    CTA abdomin w run off on 1/20/22    There is mild atherosclerotic disease of the distal abdominal aorta without significant stenosis  The major mesenteric vessels and single renal arteries bilaterally are patent  RIGHT:  common iliac, external iliac, and common femoral artery are patent without significant stenosis  Right SFA is occluded proximally, with reconstitution in the region of of the distal thigh  The popliteal artery is patent  The anterior tibial artery is occluded proximally  LEFT:  common iliac artery, external iliac artery, and common femoral artery are patent  Left SFA is occluded proximally, with reconstitution in the distal thigh  The popliteal artery is patent  The anterior tibial artery is occluded throughout its course  The proximal posterior tibial artery is occluded, with reconstitution in the mid lower leg  Patient had R femoral-popliteal bypass      Will continue metoprolol, brilinta,aspirin , statin as scheduled per primary team     ----------------------------------------------------------------------------------------  HPI/24hr events: No events overnight  Patient appropriate for transfer out of the ICU today?: No  Disposition: Discharge to home   Code Status: Prior  ---------------------------------------------------------------------------------------  SUBJECTIVE  Makes needs known       Review of Systems  Review of systems was reviewed and negative unless stated above in HPI/24-hour events ---------------------------------------------------------------------------------------  OBJECTIVE    Vitals   Vitals:    22 0000 22 0100 22 0200 22 0347   BP:       Pulse: 66 70 68    Resp: 12 14 (!) 11    Temp:    98 3 °F (36 8 °C)   TempSrc:    Temporal   SpO2: 98% 98%     Weight:       Height:         Temp (24hrs), Av 8 °F (36 6 °C), Min:97 4 °F (36 3 °C), Max:98 3 °F (36 8 °C)  Current: Temperature: 98 3 °F (36 8 °C)  Arterial Line BP: 120/50  Arterial Line MAP (mmHg): 76 mmHg    Respiratory:  SpO2: SpO2: 98 %  Nasal Cannula O2 Flow Rate (L/min): 2 L/min    Invasive/non-invasive ventilation settings   Respiratory  Report   Lab Data (Last 4 hours)    None         O2/Vent Data (Last 4 hours)    None                Physical Exam  Vitals and nursing note reviewed  Constitutional:       General: He is not in acute distress  Appearance: Normal appearance  HENT:      Head: Normocephalic and atraumatic  Right Ear: External ear normal       Left Ear: External ear normal       Nose: Nose normal       Mouth/Throat:      Mouth: Mucous membranes are moist       Pharynx: Oropharynx is clear  Eyes:      Extraocular Movements: Extraocular movements intact  Conjunctiva/sclera: Conjunctivae normal       Pupils: Pupils are equal, round, and reactive to light  Cardiovascular:      Rate and Rhythm: Normal rate and regular rhythm  Pulses: Normal pulses  Heart sounds: Normal heart sounds  Pulmonary:      Effort: Pulmonary effort is normal       Breath sounds: Normal breath sounds  Abdominal:      General: Bowel sounds are normal       Palpations: Abdomen is soft  Musculoskeletal:         General: Normal range of motion  Cervical back: Normal range of motion and neck supple  Comments: RLE surgical incisions CDI   Skin:     General: Skin is warm and dry  Capillary Refill: Capillary refill takes less than 2 seconds     Neurological:      General: No focal deficit present  Mental Status: He is alert and oriented to person, place, and time  Psychiatric:         Mood and Affect: Mood normal          Behavior: Behavior normal          Thought Content: Thought content normal          Judgment: Judgment normal              Laboratory and Diagnostics:  Results from last 7 days   Lab Units 02/04/22  0526 01/31/22  1102   WBC Thousand/uL 7 78 5 99   HEMOGLOBIN g/dL 9 4* 12 6   HEMATOCRIT % 28 8* 38 7   PLATELETS Thousands/uL 178 233     Results from last 7 days   Lab Units 01/31/22  1102   SODIUM mmol/L 133*   POTASSIUM mmol/L 4 7   CHLORIDE mmol/L 103   CO2 mmol/L 27   ANION GAP mmol/L 3*   BUN mg/dL 25   CREATININE mg/dL 1 17   CALCIUM mg/dL 9 6          Results from last 7 days   Lab Units 01/31/22  1102   INR  1 13              ABG:    VBG:          Micro        EKG: NSR  Imaging: I have personally reviewed pertinent films in PACS    Intake and Output  I/O       02/02 0701  02/03 0700 02/03 0701  02/04 0700    I V  (mL/kg)  3333 3 (42 6)    Total Intake(mL/kg)  3333 3 (42 6)    Urine (mL/kg/hr)  670    Blood  400    Total Output  1070    Net  +2263 3                Height and Weights   Height: 5' 5" (165 1 cm)  IBW (Ideal Body Weight): 61 5 kg  Body mass index is 28 73 kg/m²  Weight (last 2 days)     Date/Time Weight    02/03/22 0937 78 3 (172 62)            Nutrition       Diet Orders   (From admission, onward)             Start     Ordered    02/03/22 1825  Diet Regular; Regular House  Diet effective now        References:    Nutrtion Support Algorithm Enteral vs  Parenteral   Question Answer Comment   Diet Type Regular    Regular Regular House    RD to adjust diet per protocol?  No        02/03/22 1824                  Active Medications  Scheduled Meds:  Current Facility-Administered Medications   Medication Dose Route Frequency Provider Last Rate    acetaminophen  975 mg Oral [facility] Aaliyah Vargas MD      aspirin  325 mg Oral Daily Aaliyah Vargas MD     [facility] atorvastatin  80 mg Oral Daily With Tamela Villegas MD      heparin (porcine)  5,000 Units Subcutaneous [facility] Pink Severin, MD      HYDROmorphone  0 2 mg Intravenous Q4H PRN Pink Severin, MD      insulin glargine  12 Units Subcutaneous HS Pink Severin, MD      insulin lispro  1-6 Units Subcutaneous TID [facility] Pink Severin, MD      insulin lispro  2 Units Subcutaneous Daily With Breakfast Pink Severin, MD      insulin lispro  2 Units Subcutaneous Daily With Lunch Pink Severin, MD      insulin lispro  2 Units Subcutaneous Daily With Tamela Villegas MD      lactated ringers  500 mL Intravenous Once PRN Pink Severin, MD      And    lactated ringers  500 mL Intravenous Once PRN Pink Severin, MD      metoprolol succinate  12 5 mg Oral Daily Pink Severin, MD      ondansetron  4 mg Intravenous Q6H PRN Pink Severin, MD      oxyCODONE  2 5 mg Oral Q4H PRN Pink Severin, MD      oxyCODONE  5 mg Oral Q4H PRN Pink Severin, MD      sodium chloride  500 mL Intravenous Once PRN Pink Severin, MD      And    sodium chloride  500 mL Intravenous Once PRN Pink Severin, MD      ticagrelor  90 mg Oral Q12H [address] Pink Severin, MD       Continuous Infusions:     PRN Meds:   HYDROmorphone, 0 2 mg, Q4H PRN  lactated ringers, 500 mL, Once PRN   And  lactated ringers, 500 mL, Once PRN  ondansetron, 4 mg, Q6H PRN  oxyCODONE, 2 5 mg, Q4H PRN  oxyCODONE, 5 mg, Q4H PRN  sodium chloride, 500 mL, Once PRN   And  sodium chloride, 500 mL, Once PRN        Invasive Devices Review  Invasive Devices  Report    Peripheral Intravenous Line            Peripheral IV 02/03/22 Dorsal (posterior); Left Hand <1 day    Peripheral IV 02/03/22 Right Hand <1 day    Peripheral IV 02/03/22 Right;Dorsal (posterior) Hand <1 day          Arterial Line            Arterial Line 02/03/22 Right Radial <1 day          Line            Arterial Sheath 6 Fr   Right Femoral 106 days          Drain            Urethral Catheter Latex;Straight-tip 16 Fr  <1 day Rationale for remaining devices: medical necessity  ---------------------------------------------------------------------------------------  Advance Directive and Living Will:      Power of :    POLST:    ---------------------------------------------------------------------------------------  Care Time Delivered:   No Critical Care time spent       SEE Rodriguez      Portions of the record may have been created with voice recognition software  Occasional wrong word or "sound a like" substitutions may have occurred due to the inherent limitations of voice recognition software    Read the chart carefully and recognize, using context, where substitutions have occurred

## 2022-02-04 NOTE — ASSESSMENT & PLAN NOTE
Patient underwent balloon angioplasty and stenting in left SFA on 1/20/22, with complete resolution of the SFA stenosis and occlusion

## 2022-02-04 NOTE — ASSESSMENT & PLAN NOTE
PMH of CAD s/p CABG× 3 in April 2021  ECHO on 11/26/21 EF 65%  Continue metoprolol  Statin, aspirin per primary team   imdur 30 mg on hold, per primary team

## 2022-02-04 NOTE — DISCHARGE INSTRUCTIONS
DISCHARGE INSTRUCTIONS  LEG/BYPASS SURGERY    ACTIVITY:   Limit your physical activity to walking for the first week and then increase your activity as tolerated  If you become short of breath or tired, stop and rest   You may require help with walking or feel more secure with something to lean on  Walking up steps and normal activities may be resumed as you feel ready  Most people tire easily for the first few weeks following leg surgery  This improves as conditioning returns  Avoid strenuous activity such as vigorous exercise  Avoid heavy lifting (do not lift more than 15 pounds) for the first four weeks after surgery  You should not drive a car for at least two weeks following discharge from the hospital and you are off all narcotic pain medication  You may ride in a car  DIET:  Resume your normal diet  Good nutrition is important for healing of your incision  If you are discharged on narcotics for pain control, continue taking your stool softeners until you are having regular bowel movements  INCISION:  You should shower daily  Wash incision daily with soap and water, but do not rub or scrub the incision; rinse thoroughly and pat dry  You may have stitches or staples to close your incision and it is okay for these to get wet  Do not bathe in a tub or swim for the first 4 week following surgery or if you have any open wounds  It is normal to have swelling or discoloration around the incision  If increasing redness or pain develops, call our office immediately  Numbness in the region of the incision may occur following the surgery  This normally improves over six to twelve months  You may have surgical glue over your incisions  There are stitches present under the skin which will absorb on their own  The glue is used to cover the access, assist in closure, and prevent contamination  This adhesive will darken and peel away on its own within one to two weeks  Do not pick at it      If you have a groin wound/incision, place a clean dry piece of gauze to cover your groin incision to keep incision clean and dry and prevent your skin from sticking together  Change gauze daily  You may have staples or stitches at your incisions  These will be removed at your follow-up appointment when they are ready to come out  If you have a dressing over your surgical site, remove this on the second day after surgery  If you have foot or leg wounds, please follow your podiatrist/wound care doctor's instructions for care  If any of your incisions are open and require dressing changes, you will be given instructions for your daily incision care  If you are not able to change the dressings, a visiting nurse will be arranged  DO NOT put any powders, creams, ointments, or lotions on your incision  LEG SWELLING: Most patients have noticeable leg swelling after leg surgery  This usually improves within a few weeks  If swelling is present, elevate the leg whenever possible  Avoid sitting with the leg hanging down for prolonged periods of time  Walking is beneficial   An ACE bandage or support stocking may be helpful, but this should be discussed with your physician prior to use if you have a bypass  FOLLOW UP STUDIES:  Doppler ultrasound studies are very important for long-term management  Your surgeon will arrange this at your first postoperative visit  Repeat studies are then scheduled every three months for the first year and periodically after this  FOLLOW UP APPOINTMENTS:  Making and keeping follow up appointments and ultrasound tests are important to your recovery  If you have difficulty making it to or keeping your follow up appointments, call the office  If you have increased pain, fever >101 5, increased drainage, redness or a bad smell at your surgery site, new coldness/numbness of your arm or leg, please call us immediately and GO directly to the ER      PLEASE CALL THE OFFICE IF YOU HAVE ANY QUESTIONS  [phone] [facility] FREE [phone]  [address] [facility] , [address], [place], [address]  [address], [address], [facility], [address]  [address], [facility], [address] [facility]  [address] [facility], [address] [facility], [facility], [address]  [address], [address]st [address], [address]  [facility], [address] [facility], [address]  13[address] [facility], [address] [facility], [address]  One [facility], [address] [facility], [address] [facility], Y[address]  [address] [facility], Tita [place], [address]

## 2022-02-04 NOTE — PROGRESS NOTES
[facility]  Progress Note - Susana Yanez 1965, 62 y o  male MRN: [id number]  Unit/Bed#: ICU 09 Encounter: [id number]  Primary Care Provider: Robin Madrid MD   Date and time admitted to hospital: 2/3/2022  9:05 AM    * Atheroscler native arteries the extremities w/intermit claudication [facility])  Assessment & Plan  POD #0    Lower limb arterial doppler 7/15/2021:  RIGHT LOWER LIMB:  This resting evaluation shows an occlusion versus a high grade stenosis in the  mid superficial femoral artery  There is a 50-75% stenosis in the distal  superficial femoral artery and a >75% stenosis in the proximal popliteal artery  There is diffuse atherosclerotic disease in the remainder of the  femoro-popliteal and tibio-peroneal segments  Ankle/Brachial index: 0 73  which is in the moderate disease category     LEFT LOWER LIMB:  This resting evaluation shows a 50-75% stenosis in the proximal superficial  femoral artery and a >75% stenosis in the distal superficial femoral and  proximal popliteal arteries  There is diffuse atherosclerotic disease in the  remainder of the femoro-popliteal and tibio-peroneal segments  Ankle/Brachial index: 0 83  which is in the moderate disease category    CTA abdomin w run off on 1/20/22    There is mild atherosclerotic disease of the distal abdominal aorta without significant stenosis  The major mesenteric vessels and single renal arteries bilaterally are patent  RIGHT:  common iliac, external iliac, and common femoral artery are patent without significant stenosis  Right SFA is occluded proximally, with reconstitution in the region of of the distal thigh  The popliteal artery is patent  The anterior tibial artery is occluded proximally  LEFT:  common iliac artery, external iliac artery, and common femoral artery are patent  Left SFA is occluded proximally, with reconstitution in the distal thigh  The popliteal artery is patent    The anterior tibial artery is occluded throughout its course  The proximal posterior tibial artery is occluded, with reconstitution in the mid lower leg  Patient had R femoral-popliteal bypass today performed     Will continue metoprolol, brilinta,aspirin , statin as scheduled per primary team     Hypertension  Assessment & Plan  curently patient BP is stable  Continue metoprolol per primary team,   ECHO on 11/26 /21 EF 65%  Keep SBP>100      Type II diabetes mellitus [facility])  Assessment & Plan  Lab Results   Component Value Date    HGBA1C 7 3 (H) 01/31/2022       Recent Labs     02/03/22  1022 02/03/22  1649 02/03/22  1833   POCGLU 173* 187* 189*       Blood Sugar Average: Last 72 hrs:  (P) 183   Hold home metformin    Lantus 12 , acucheck/ SSI  Hypoglycemic protocol    HgA1C 7  3 on 1/31/22      Former tobacco use  Assessment & Plan  Patient used to smoke 1-2 ppd  for 38 years,   He quit smoking in April,2021    History of PTCA  Assessment & Plan  Patient underwent balloon angioplasty and stenting in left SFA on 1/20/22, with complete resolution of the SFA stenosis and occlusion    CAD (coronary artery disease)  Assessment & Plan  PMH of CAD s/p CABG× 3 in April 2021  ECHO on 11/26/21 EF 65%  Continue metoprolol  Statin, aspirin per primary team   imdur 30 mg on hold, per primary team     S/P CABG x 3  Assessment & Plan  S/P CABG ×3 in April 2021  Continue statin, aspirin metoprolol , brilinta as scheduled per primary team,       -------------------------------------------------------------------------------------------------------------  Chief Complaint: post operative observation     History of Present Illness   HX and PE limited by:   Esther Maurer is a 62 y o  male who presents to ICU for post operative observation  Patient  reports that he had pain in his R leg when he walks, it was persistent and was affecting his daily activities  He described the pain 5/10 in severity   He mentions that he used to have  cramping sensation after 10-15 minutes  of walk and which forced him to stop  Patient reports that he has currently  R  Groin discomfort, otherwise he feels okay  Currently he denies any SOB, CP, palpitation, wheezing, cough, HA, vision change, abdominal pain, diarrhea, constipation, dizziness  History obtained from chart review and the patient   -------------------------------------------------------------------------------------------------------------  Dispo: Continue Critical Care     Code Status: Prior  --------------------------------------------------------------------------------------------------------------  Review of Systems   Constitutional: Negative for activity change, appetite change, chills, diaphoresis, fatigue and fever  HENT: Negative for congestion  Respiratory: Negative for cough, choking, chest tightness, shortness of breath, wheezing and stridor  Cardiovascular: Negative for chest pain, palpitations and leg swelling  Gastrointestinal: Negative for abdominal distention, constipation, diarrhea and nausea  Genitourinary: Negative for flank pain  Neurological: Negative for dizziness, weakness, light-headedness and headaches  All other systems reviewed and are negative  A 12-point, complete review of systems was reviewed and negative except as stated above     Physical Exam  Constitutional:       Appearance: Normal appearance  He is obese  HENT:      Head: Normocephalic and atraumatic  Nose: Nose normal       Mouth/Throat:      Mouth: Mucous membranes are moist    Eyes:      Conjunctiva/sclera: Conjunctivae normal    Cardiovascular:      Rate and Rhythm: Normal rate and regular rhythm  Pulses: Normal pulses  Heart sounds: Normal heart sounds  No murmur heard  No gallop  Pulmonary:      Effort: Pulmonary effort is normal  No respiratory distress  Breath sounds: Normal breath sounds  No wheezing or rhonchi  Abdominal:      General: Abdomen is flat  Palpations: Abdomen is soft  Musculoskeletal:         General: No swelling, tenderness, deformity or signs of injury  Right lower leg: No swelling, deformity, tenderness or bony tenderness  No edema  Left lower leg: No deformity or tenderness  No edema  Legs:    Skin:     General: Skin is warm  Capillary Refill: Capillary refill takes less than 2 seconds  Neurological:      Mental Status: He is alert        --------------------------------------------------------------------------------------------------------------  Vitals:   Vitals:    02/03/22 1715 02/03/22 1727 02/03/22 1800 02/03/22 1900   BP: 127/73  135/78    Pulse: 76 74 72    Resp: 14 17 14    Temp: (!) 97 4 °F (36 3 °C)   97 6 °F (36 4 °C)   TempSrc:    Temporal   SpO2: 100% 99% 100%    Weight:       Height:         Temp  Min: 97 4 °F (36 3 °C)  Max: 98 2 °F (36 8 °C)  IBW (Ideal Body Weight): 61 5 kg  Height: 5' 5" (165 1 cm)  Body mass index is 28 73 kg/m²  Laboratory and Diagnostics:  Results from last 7 days   Lab Units 01/31/22  1102   WBC Thousand/uL 5 99   HEMOGLOBIN g/dL 12 6   HEMATOCRIT % 38 7   PLATELETS Thousands/uL 233     Results from last 7 days   Lab Units 01/31/22  1102   SODIUM mmol/L 133*   POTASSIUM mmol/L 4 7   CHLORIDE mmol/L 103   CO2 mmol/L 27   ANION GAP mmol/L 3*   BUN mg/dL 25   CREATININE mg/dL 1 17   CALCIUM mg/dL 9 6          Results from last 7 days   Lab Units 01/31/22  1102   INR  1 13              ABG:    VBG:          Micro:        EKG: reviewed   Imaging: I have personally reviewed pertinent reports          Historical Information   Past Medical History:   Diagnosis Date    Bicuspid aortic valve     being observed    CAD (coronary artery disease)     Coronary artery disease     Diabetes mellitus ([facility] [address] )     Former tobacco use     GERD (gastroesophageal reflux disease)     Goiter     History of myocardial infarction     History of rib fracture     Hyperlipidemia     Hypertension     Hypertensive urgency 4/6/2021    Leg pain, bilateral     Agram today LLE   1/20/2022    Myocardial infarction [facility])     2021-   CABG  x3    Wears glasses      Past Surgical History:   Procedure Laterality Date    CARDIAC CATHETERIZATION      CARDIAC CATHETERIZATION N/A 10/20/2021    Procedure: Cardiac catheterization;  Surgeon: Heather Rosa MD;  Location: [address] [place] [address] CATH LAB; Service: Cardiology    CARDIAC CATHETERIZATION N/A 10/20/2021    Procedure: Cardiac Coronary Angiogram;  Surgeon: Heather Rosa MD;  Location: [address] [place] [address] CATH LAB; Service: Cardiology    CARDIAC CATHETERIZATION N/A 10/20/2021    Procedure: Cardiac pci;  Surgeon: Heather Rosa MD;  Location: [address] [place] [address] CATH LAB; Service: Cardiology    IR LOWER EXTREMITY ANGIOGRAM  1/20/2022    IR LOWER EXTREMITY ANGIOGRAM  2/3/2022    NM CABG, ARTERY-VEIN, FOUR N/A 4/9/2021    Procedure: CORONARY ARTERY BYPASS GRAFT (CABG) 3 VESSELS: LIMA to LAD; LLE EVH/SVG to LPL & OM2;  Surgeon: Jann Galeazzi, DO;  Location[facility];  Service: Cardiac Surgery    NM ECHO TRANSESOPHAG R-T 2D W/PRB IMG ACQUISJ I&R N/A 4/9/2021    Procedure: TRANSESOPHAGEAL ECHOCARDIOGRAM (BENNETT); Surgeon: Jann Galeazzi, DO;  [facility];  Service: Cardiac Surgery    NM ENDOSCOPY W/VIDEO-ASST VEIN HARVEST,CABG Left 4/9/2021    Procedure: HARVEST VEIN ENDOSCOPIC ([address]);   Surgeon: Jann Galeazzi, DO;  Location[facility];  Service: Cardiac Surgery    NM Asa Briana 3RD+ ORD SLCTV ABDL PEL/LXTR [facility] Left 1/20/2022    Procedure: ARTERIOGRAM, STENT PLACEMENT IN LEFT SUPERFICIAL [address] ARTERY;  Surgeon: Randi Tenorio MD;  Location[facility];  Service: Vascular    VASECTOMY       Social History   Social History     Substance and Sexual Activity   Alcohol Use Never     Social History     Substance and Sexual Activity   Drug Use Never     Social History     Tobacco Use   Smoking Status Former Smoker    Packs/day: 1 00    Years: 38 00    Pack years: 38 00  Types: Cigarettes    Quit date: 2021    Years since quittin 8   Smokeless Tobacco Never Used     Exercise History:  Family History:   Family History   Problem Relation Age of Onset    Heart disease Father      I have reviewed this patient's family history and commented on sigificant items within the HPI hear disease father        Medications:  Current Facility-Administered Medications   Medication Dose Route Frequency    acetaminophen (TYLENOL) tablet 975 mg  975 mg Oral Q8H [address]    [START ON 2022] aspirin tablet 325 mg  325 mg Oral Daily    atorvastatin (LIPITOR) tablet 80 mg  80 mg Oral Daily With Dinner    heparin (porcine) subcutaneous injection 5,000 Units  5,000 Units Subcutaneous Q8H [address]    HYDROmorphone (DILAUDID) injection 0 2 mg  0 2 mg Intravenous Q4H PRN    insulin glargine (LANTUS) subcutaneous injection 12 Units 0 12 mL  12 Units Subcutaneous HS    insulin lispro (HumaLOG) 100 units/mL subcutaneous injection 1-6 Units  1-6 Units Subcutaneous TID AC    [START ON 2022] insulin lispro (HumaLOG) 100 units/mL subcutaneous injection 2 Units  2 Units Subcutaneous Daily With Breakfast    [START ON 2022] insulin lispro (HumaLOG) 100 units/mL subcutaneous injection 2 Units  2 Units Subcutaneous Daily With Lunch    insulin lispro (HumaLOG) 100 units/mL subcutaneous injection 2 Units  2 Units Subcutaneous Daily With Dinner    lactated ringers bolus 500 mL  500 mL Intravenous Once PRN    And    lactated ringers bolus 500 mL  500 mL Intravenous Once PRN    [START ON 2022] metoprolol succinate (TOPROL-XL) 24 hr tablet 12 5 mg  12 5 mg Oral Daily    ondansetron (ZOFRAN) injection 4 mg  4 mg Intravenous Q6H PRN    oxyCODONE (ROXICODONE) IR tablet 2 5 mg  2 5 mg Oral Q4H PRN    oxyCODONE (ROXICODONE) IR tablet 5 mg  5 mg Oral Q4H PRN    sodium chloride 0 9 % bolus 500 mL  500 mL Intravenous Once PRN    And    sodium chloride 0 9 % bolus 500 mL  500 mL Intravenous Once PRN  sodium chloride 0 9 % infusion  125 mL/hr Intravenous Continuous    sodium chloride 0 9 % infusion  100 mL/hr Intravenous Continuous    ticagrelor (BRILINTA) tablet 90 mg  90 mg Oral Q12H [address]     Home medications:  Prior to Admission Medications   Prescriptions Last Dose Informant Patient Reported? Taking? Brilinta 90 MG 2/3/2022 at 0700  No Yes   Sig: TAKE 1 TABLET BY MOUTH 2 TIMES A DAY     Patient taking differently: Take 90 mg by mouth every 12 (twelve) hours     acetaminophen (TYLENOL) 325 mg tablet 1/27/2022 Self No Yes   Sig: Take 1-2 tabs q6h PRN mild fever/pain   Patient taking differently: Take 325 mg by mouth every 6 (six) hours as needed Take 1-2 tabs q6h PRN mild fever/pain    aspirin (ECOTRIN LOW STRENGTH) 81 mg EC tablet 2/3/2022 at 0700 Self No Yes   Sig: Take 4 tablets (325 mg total) by mouth daily   atorvastatin (LIPITOR) 80 mg tablet 2/1/2022 Self No Yes   Sig: Take 1 tablet (80 mg total) by mouth daily with dinner   isosorbide mononitrate (IMDUR) 30 mg 24 hr tablet 2/3/2022 at 0700 Self No Yes   Sig: Take 1 tablet (30 mg total) by mouth 2 (two) times a day   metFORMIN (GLUCOPHAGE) 500 mg tablet 2/2/2022 at 0930  No Yes   Sig: TAKE 1 TABLET BY MOUTH TWICE A DAY WITH MEALS   Patient taking differently: Take 500 mg by mouth 2 (two) times a day with meals     metoprolol succinate (TOPROL-XL) 25 mg 24 hr tablet 2/3/2022 at 0700 Self No Yes   Sig: Take 0 5 tablets (12 5 mg total) by mouth daily      Facility-Administered Medications: None     Allergies:  No Known Allergies    ------------------------------------------------------------------------------------------------------------  Advance Directive and Living Will:      Power of :    POLST:    ------------------------------------------------------------------------------------------------------------  Anticipated Length of Stay is > 2 midnights    Care Time Delivered:   No Critical Care time spent       Hershell Estimable, MD        Portions of the record may have been created with voice recognition software  Occasional wrong word or "sound a like" substitutions may have occurred due to the inherent limitations of voice recognition software    Read the chart carefully and recognize, using context, where substitutions have occurred

## 2022-02-04 NOTE — ASSESSMENT & PLAN NOTE
curently patient BP is stable     Continue metoprolol per primary team,   ECHO on 11/26 /21 EF 65%  Keep SBP>100

## 2022-02-04 NOTE — ASSESSMENT & PLAN NOTE
62year old male former smoker w/ HTN, HLD, CAD w/ NSTEMIx2  s/p CABGx3 4/21 and PCI/NIRMALA 10/21, DMII,  PAD w/ short distance claudication of the bilateral lower extremities s/p LLE angiogram w/ SFA PTA/NIRMALA on 1/20/22 by Dr Coreen Ham, and now s/p RLE CFA-AK pop bypass w/ PTFE on 2/3/22 by Dr Coreen Ham      - Patient doing well this AM  Pain at incision sites  Denies chest pain or SOB  - VSS  Hgb 9 4  - Palpable DP/PT on exam  Foot is warm and well perfused    - Incision are clean and dry with dressing in place   - Continue statin, ASA/Brillinta   - OOB/ambulate   - Regular diet   - Republic discontinued overnight secondary to pain   BP have been WNL  - Anticipate discharge early afternoon

## 2022-02-04 NOTE — ASSESSMENT & PLAN NOTE
Lab Results   Component Value Date    HGBA1C 7 3 (H) 01/31/2022       Recent Labs     02/03/22  1022 02/03/22  1649 02/03/22  1833   POCGLU 173* 187* 189*       Blood Sugar Average: Last 72 hrs:  (P) 183   Hold home metformin    Lantus 12 , acucheck/ SSI  Hypoglycemic protocol    HgA1C 7  3 on 1/31/22

## 2022-02-04 NOTE — ASSESSMENT & PLAN NOTE
POD #1    Lower limb arterial doppler 7/15/2021:  RIGHT LOWER LIMB:  This resting evaluation shows an occlusion versus a high grade stenosis in the  mid superficial femoral artery  There is a 50-75% stenosis in the distal  superficial femoral artery and a >75% stenosis in the proximal popliteal artery  There is diffuse atherosclerotic disease in the remainder of the  femoro-popliteal and tibio-peroneal segments  Ankle/Brachial index: 0 73  which is in the moderate disease category     LEFT LOWER LIMB:  This resting evaluation shows a 50-75% stenosis in the proximal superficial  femoral artery and a >75% stenosis in the distal superficial femoral and  proximal popliteal arteries  There is diffuse atherosclerotic disease in the  remainder of the femoro-popliteal and tibio-peroneal segments  Ankle/Brachial index: 0 83  which is in the moderate disease category    CTA abdomin w run off on 1/20/22    There is mild atherosclerotic disease of the distal abdominal aorta without significant stenosis  The major mesenteric vessels and single renal arteries bilaterally are patent  RIGHT:  common iliac, external iliac, and common femoral artery are patent without significant stenosis  Right SFA is occluded proximally, with reconstitution in the region of of the distal thigh  The popliteal artery is patent  The anterior tibial artery is occluded proximally  LEFT:  common iliac artery, external iliac artery, and common femoral artery are patent  Left SFA is occluded proximally, with reconstitution in the distal thigh  The popliteal artery is patent  The anterior tibial artery is occluded throughout its course  The proximal posterior tibial artery is occluded, with reconstitution in the mid lower leg       Patient had R femoral-popliteal bypass      Will continue metoprolol, brilinta,aspirin , statin as scheduled per primary team

## 2022-02-04 NOTE — UTILIZATION REVIEW
Initial Clinical Review    Elective    IP     surgical procedure    Age/Sex: 62 y o  male     Surgery Date:    2/3/22    Procedure: BYPASS FEMORAL-POPLITEAL (Right)    Anesthesia:     general    Operative Findings:   Common femoral artery with previous Perclose and Mynx closure  The common femoral artery is thick walled with atherosclerotic plaque but patent  Above knee popliteal artery is patent with fragile plaque     Initial completion arteriogram demonstrated that there was some luminal irregularity just beyond the distal anastomosis  Hence this was revised  Completion angiogram following that demonstrated resolution of luminal irregularities and brisk flow  Arterial Doppler/duplex was also performed which demonstrated low resistance flow across the anastomosis      Palpable DP and PT pulses  EBL  400 cc    POD#1 Progress Note:   2/4     Continue  Post op care  Continue pain control as  Needed  Complains of mild discomfort  Right groin  Site  Continue  Current meds  Monitor labs, hemoglobin  9 4  RLE  Warm, well perfused      Admission Orders: Date/Time/Statement:   Admission Orders (From admission, onward)     Ordered        02/03/22 1645  Inpatient Admission  Once                      Orders Placed This Encounter   Procedures    Inpatient Admission     Standing Status:   Standing     Number of Occurrences:   1     Order Specific Question:   Level of Care     Answer:   Critical Care [15]     Order Specific Question:   Estimated length of stay     Answer:   Inpatient Only Surgery     Vital Signs: /72   Pulse 70   Temp 98 4 °F (36 9 °C) (Temporal)   Resp 16   Ht 5' 5" (1 651 m)   Wt 78 3 kg (172 lb 9 9 oz)   SpO2 97%   BMI 28 73 kg/m²     Pertinent Labs/Diagnostic Test Results:   Results from last 7 days   Lab Units 01/31/22  1239   SARS-COV-2  Negative     Results from last 7 days   Lab Units 02/04/22  0526 01/31/22  1102   WBC Thousand/uL 7 78 5 99   HEMOGLOBIN g/dL 9 4* 12 6 HEMATOCRIT % 28 8* 38 7   PLATELETS Thousands/uL 178 233         Results from last 7 days   Lab Units 02/04/22  0526 01/31/22  1102   SODIUM mmol/L 138 133*   POTASSIUM mmol/L 3 7 4 7   CHLORIDE mmol/L 108 103   CO2 mmol/L 23 27   ANION GAP mmol/L 7 3*   BUN mg/dL 16 25   CREATININE mg/dL 1 04 1 17   EGFR ml/min/1 73sq m 79 68   CALCIUM mg/dL 7 8* 9 6         Results from last 7 days   Lab Units 02/04/22  0746 02/03/22  1833 02/03/22  1649 02/03/22  1022   POC GLUCOSE mg/dl 155* 189* 187* 173*     Results from last 7 days   Lab Units 02/04/22  0526   GLUCOSE RANDOM mg/dL 135         Diet: regular    Mobility:    OOB as tolerated    DVT Prophylaxis:    SCD'S    Medications/Pain Control:   Scheduled Medications:  acetaminophen, 975 mg, Oral, Q8H BOBBY  aspirin, 325 mg, Oral, Daily  atorvastatin, 80 mg, Oral, Daily With Dinner  heparin (porcine), 5,000 Units, Subcutaneous, Q8H [address]  insulin glargine, 12 Units, Subcutaneous, HS  insulin lispro, 1-6 Units, Subcutaneous, TID AC  insulin lispro, 2 Units, Subcutaneous, Daily With Breakfast  insulin lispro, 2 Units, Subcutaneous, Daily With Lunch  insulin lispro, 2 Units, Subcutaneous, Daily With Dinner  metoprolol succinate, 12 5 mg, Oral, Daily  ticagrelor, 90 mg, Oral, Q12H [address]      Continuous IV Infusions:     PRN Meds:  HYDROmorphone, 0 2 mg, Intravenous, Q4H PRN   ( x1  2/3 and  X 1  2/4 thus far)  lactated ringers, 500 mL, Intravenous, Once PRN   And  lactated ringers, 500 mL, Intravenous, Once PRN  ondansetron, 4 mg, Intravenous, Q6H PRN  oxyCODONE, 2 5 mg, Oral, Q4H PRN  oxyCODONE, 5 mg, Oral, Q4H PRN  sodium chloride, 500 mL, Intravenous, Once PRN   And  sodium chloride, 500 mL, Intravenous, Once PRN        Network Utilization Review Department  ATTENTION: Please call with any questions or concerns to [phone] and carefully listen to the prompts so that you are directed to the right person   All voicemails are confidential   Adal Grant all requests for admission clinical reviews, approved or denied determinations and any other requests to dedicated fax number below belonging to the campus where the patient is receiving treatment   List of dedicated fax numbers for the Facilities:  [address] [place] DENIALS (Administrative/Medical Necessity) [phone]   [address]  (Maternity/NICU/Pediatrics) [phone]   [address]  [address] [place] [address] [id number] [address] [facility] 14[address] [facility] [facility] [phone]

## 2022-02-04 NOTE — ASSESSMENT & PLAN NOTE
S/P CABG ×3 in April 2021  Continue statin, aspirin metoprolol , brilinta as scheduled per primary team,

## 2022-02-04 NOTE — CASE MANAGEMENT
Case Management Assessment & Discharge Planning Note    Patient name Sal Garcia  Location ICU 09/ICU 09 MRN [id number]  : 1965 Date 2022       Current Admission Date: 2/3/2022  Current Admission Diagnosis:Atheroscler native arteries the extremities w/intermit claudication [facility])   Patient Active Problem List    Diagnosis Date Noted    Former tobacco use     History of PTCA     Atheroscler native arteries the extremities w/intermit claudication ([facility] ) 2022    GERD (gastroesophageal reflux disease)     Hypertension     Left carotid bruit 2021    Encounter for postoperative care 2021    CAD (coronary artery disease)     Postoperative anemia due to acute blood loss 2021    Sinus bradycardia 2021    Thrombocytopenia ([place] [address] ) 2021    S/P CABG x 3 2021    NSTEMI (non-ST elevated myocardial infarction) ([place] [address] ) 2021    Chest pain 2021    Type II diabetes mellitus ([place] [address] ) 2021    Leukocytosis 2021      LOS (days): 1  Geometric Mean LOS (GMLOS) (days):   Days to GMLOS:     OBJECTIVE:    Risk of Unplanned Readmission Score: 15         Current admission status: Inpatient       Preferred Pharmacy:   [facility]/pharmacy #[id number]- [place], [place] - [address] [facility]  Phone: [phone] Fax: [phone]    Primary Care Provider: Raheem Bermeo MD    Primary Insurance: [address] [place] [address]  Secondary Insurance:     ASSESSMENT:  [address] [place] [address] Representative - Brother   Primary Phone: [phone] (Mobile)                              Patient Information  Admitted from[de-identified] Home  Mental Status: Alert  During Assessment patient was accompanied by: Not accompanied during assessment  Assessment information provided by[de-identified] Patient  Primary Caregiver: Self  Support Systems: [address] [facility] [address] of Residence: [place] [address] do you live in?: [place]  Living Arrangements: Lives Alone  Is patient a ?: Yes    Activities of Daily Living Prior to Admission  Functional Status: Independent  Completes ADLs independently?: Yes  Ambulates independently?: Yes  Does patient have a history of HHC?: Yes (Following previous surgery; 2 appointments and closed)  Does patient currently have [address]?: No         Patient Information Continued  Income Source: Unemployed (Patient has been on medical leave since October)  Does patient have prescription coverage?: Yes (Currently; states he will only have insurance coverage until the end of the first wk of march (not working)  Hopes to return to work by then  Encouraged to contact [facility] or inquire about COBRA if he needs coverage and can't return to work)         [place] [place] [employer] of Transport to [facility][de-identified] Drives Self (Patient has a friend, Giovana Oshea, who will pick him up at discharge  States he can return to driving in a week )        DISCHARGE DETAILS:    Discharge planning discussed with[de-identified] patient  Freedom of Choice: Yes  Comments - Freedom of Choice: Patient would like to go home       Were Treatment Team discharge recommendations reviewed with patient/caregiver?: Yes               [address] [place] [address]         Is the patient interested in [address] at discharge?: No    DME Referral Provided  Referral made for DME?: No         Would you like to participate in our [address] service program?  :  (Patient states he has been taking Brilinta prior to this admission )    Treatment Team Recommendation: Home  Discharge Destination Plan[de-identified] Home  Transport at Discharge : Auto with designated            ETA of Transport (Date): 02/04/22        Transfer Mode: Self  Accompanied by: Ludmila Martin

## 2022-02-04 NOTE — QUICK NOTE
Post Op Check:    Afebrile, VSS on 2 L NC  Palpable right DP and PT  No expanding groin hematoma  Neurovascular intact  Dressing dry    Saw patient at the bedside once they arrived to the floor  Some groin pain  They denied any nausea, chest pain, or shortness of breath       GEN: NAD, A&O  Chest: Normal work of breathing, no respiratory distress  Abd: S, ND, NT  Ext: see above pulse exam    Plan:  Diet Regular; Regular House  Continue to monitor  Pain and nausea control PRN    Alfonza Runner, MD  Surgery, PGY-1

## 2022-02-04 NOTE — PROGRESS NOTES
[address] [place] [address]  Progress Note - Wyline Phalen 1965, 62 y o  male MRN: [id number]  Unit/Bed#: ICU 09 Encounter: [id number]  Primary Care Provider: Selin Rodriguez MD   Date and time admitted to hospital: 2/3/2022  9:05 AM    * Atheroscler native arteries the extremities w/intermit claudication [facility])  Assessment & Plan  62year old male former smoker w/ HTN, HLD, CAD w/ NSTEMIx2  s/p CABGx3 4/21 and PCI/NIRMALA 10/21, DMII,  PAD w/ short distance claudication of the bilateral lower extremities s/p LLE angiogram w/ SFA PTA/NIRMALA on 1/20/22 by Dr Anushka Fisher, and now s/p RLE CFA-AK pop bypass w/ PTFE on 2/3/22 by Dr Anushka Fisher      - Patient doing well this AM  Pain at incision sites  Denies chest pain or SOB  - VSS  Hgb 9 4  - Palpable DP/PT on exam  Foot is warm and well perfused    - Incision are clean and dry with dressing in place   - Continue statin, ASA/Brillinta   - OOB/ambulate   - Regular diet   - Sophie discontinued overnight secondary to pain  BP have been WNL  - Anticipate discharge early afternoon       Subjective: Patient doing well  Having incisional pain which is controlled with pain medication  Denies foot pain  RLE is warm, well perfused with palpable pedal pulses  Incisions are clean and dry with dressings in place  Denies chest pain or SOB  Up and in chair this AM  Anticipate DC later today     Vitals:  /76   Pulse 66   Temp 98 3 °F (36 8 °C) (Temporal)   Resp 14   Ht 5' 5" (1 651 m)   Wt 78 3 kg (172 lb 9 9 oz)   SpO2 99%   BMI 28 73 kg/m²     I/Os:  I/O last 3 completed shifts: In: 4191 7 [I V :4191 7]  Out: 5206 [TGJIB:7871; Blood:400]  No intake/output data recorded      Lab Results and Cultures:   CBC with diff: Lab Results   Component Value Date    WBC 7 78 02/04/2022    HGB 9 4 (L) 02/04/2022    HCT 28 8 (L) 02/04/2022    MCV 83 02/04/2022     02/04/2022    MCH 26 9 02/04/2022    MCHC 32 6 02/04/2022    RDW 13 7 02/04/2022    MPV 9 4 02/04/2022    NRBC 0 10/19/2021   ,   BMP/CMP:  Lab Results   Component Value Date    SODIUM 138 02/04/2022    K 3 7 02/04/2022     02/04/2022    CO2 23 02/04/2022    CO2 22 04/09/2021    BUN 16 02/04/2022    CREATININE 1 04 02/04/2022    GLUCOSE 145 (H) 04/09/2021    CALCIUM 7 8 (L) 02/04/2022    AST 32 10/19/2021    ALT 54 10/19/2021    ALKPHOS 120 (H) 10/19/2021    EGFR 79 02/04/2022   ,   Lipid Panel: No results found for: CHOL,   Coags:   Lab Results   Component Value Date    PTT 90 (H) 10/20/2021    INR 1 13 01/31/2022   ,     Blood Culture: No results found for: BLOODCX,   Urinalysis: Lab Results   Component Value Date    COLORU Yellow 04/08/2021    CLARITYU Clear 04/08/2021    SPECGRAV 1 012 04/08/2021    PHUR 8 0 04/08/2021    LEUKOCYTESUR Negative 04/08/2021    NITRITE Negative 04/08/2021    GLUCOSEU Negative 04/08/2021    KETONESU Negative 04/08/2021    BILIRUBINUR Negative 04/08/2021    BLOODU Negative 04/08/2021   ,   Urine Culture: No results found for: URINECX,   Wound Culure: No results found for: WOUNDCULT    Medications:  Current Facility-Administered Medications   Medication Dose Route Frequency    acetaminophen (TYLENOL) tablet 975 mg  975 mg Oral Q8H [address]    aspirin tablet 325 mg  325 mg Oral Daily    atorvastatin (LIPITOR) tablet 80 mg  80 mg Oral Daily With Dinner    heparin (porcine) subcutaneous injection 5,000 Units  5,000 Units Subcutaneous Q8H [address]    HYDROmorphone (DILAUDID) injection 0 2 mg  0 2 mg Intravenous Q4H PRN    insulin glargine (LANTUS) subcutaneous injection 12 Units 0 12 mL  12 Units Subcutaneous HS    insulin lispro (HumaLOG) 100 units/mL subcutaneous injection 1-6 Units  1-6 Units Subcutaneous TID AC    insulin lispro (HumaLOG) 100 units/mL subcutaneous injection 2 Units  2 Units Subcutaneous Daily With Breakfast    insulin lispro (HumaLOG) 100 units/mL subcutaneous injection 2 Units  2 Units Subcutaneous Daily With Lunch    insulin lispro (HumaLOG) 100 units/mL subcutaneous injection 2 Units  2 Units Subcutaneous Daily With Dinner    lactated ringers bolus 500 mL  500 mL Intravenous Once PRN    And    lactated ringers bolus 500 mL  500 mL Intravenous Once PRN    metoprolol succinate (TOPROL-XL) 24 hr tablet 12 5 mg  12 5 mg Oral Daily    ondansetron (ZOFRAN) injection 4 mg  4 mg Intravenous Q6H PRN    oxyCODONE (ROXICODONE) IR tablet 2 5 mg  2 5 mg Oral Q4H PRN    oxyCODONE (ROXICODONE) IR tablet 5 mg  5 mg Oral Q4H PRN    sodium chloride 0 9 % bolus 500 mL  500 mL Intravenous Once PRN    And    sodium chloride 0 9 % bolus 500 mL  500 mL Intravenous Once PRN    ticagrelor (BRILINTA) tablet 90 mg  90 mg Oral Q12H [facility] HOME       Imaging:  Reviewed     Physical Exam:    General: Alert and oriented, in no acute distress  Head: Normocephalic, atraumatic  Eyes: No scleral icterus present  Conjunctiva normal without discharge  ENT: Moist mucous membranes  Grossly normal    Neck: Supple, no JVD, trachea midline  Cardiovascular: Regular rate and rhythm  No murmurs, rubs or gallops  Pulmonary: Clear to auscultation bilaterally  Normal effort  No wheezes, rales or rhonchi  Abdomen: Soft, non-tender  Non-distended  Normoactive bowel sounds  Non-tender to palpation  No rebound or guarding  Extremities: RLE w/ incisions x2 with dressings in place  Foot is warm, well perfused with palpable pedal pulses  Bypass patent   Skin: No rashes or lesions  No jaundice     Neurologic: Grossly normal      Pulse exam:  Radial: Right: 2+ Left: 2+  DP: Right: 2+   PT: Right: 2+     Cookie Turner PA-C  2/4/2022

## 2022-02-06 NOTE — UTILIZATION REVIEW
Notification of Discharge   This is a Notification of Discharge from our facility [address]  Please be advised that this patient has been discharge from our facility  Below you will find the admission and discharge date and time including the patients disposition  UTILIZATION REVIEW CONTACT:  Francisco Vaughan  Utilization   Network Utilization Review Department  Phone: [phone] x carefully listen to the prompts  All voicemails are confidential   Email: [email or web address]     PHYSICIAN ADVISORY SERVICES:  FOR HENH-LU-PRBO REVIEW - MEDICAL NECESSITY DENIAL  Phone: [phone]  Fax: [phone]  Email: [email or web address]     PRESENTATION DATE: 2/3/2022  9:05 AM    INPATIENT ADMISSION DATE: 2/3/22  4:45 PM   DISCHARGE DATE: 2/4/2022  4:09 PM  DISPOSITION: Home/Self Care Home/Self Care      IMPORTANT INFORMATION:  Send all requests for admission clinical reviews, approved or denied determinations and any other requests to dedicated fax number below belonging to the campus where the patient is receiving treatment   List of dedicated fax numbers:  [address] DENIALS (Administrative/Medical Necessity) [phone]   [address] (Maternity/NICU/Pediatrics) [phone]   [place] Maryam [phone]   [address] [facility] [phone]   [address] [place] [address] [phone]   [address] [facility] [address] [place] [address],[address] [place] [address] [phone]   [facility]  [phone]   [address] [facility] [address]  [address] [facility] [address] [facility] [phone]

## 2022-02-07 ENCOUNTER — TRANSITIONAL CARE MANAGEMENT (OUTPATIENT)
Dept: FAMILY MEDICINE CLINIC | Facility: CLINIC | Age: 57
End: 2022-02-07

## 2022-02-08 ENCOUNTER — TELEPHONE (OUTPATIENT)
Dept: VASCULAR SURGERY | Facility: CLINIC | Age: 57
End: 2022-02-08

## 2022-02-08 DIAGNOSIS — I70.213 ATHEROSCLEROSIS OF NATIVE ARTERY OF BOTH LOWER EXTREMITIES WITH INTERMITTENT CLAUDICATION (HCC): Primary | ICD-10-CM

## 2022-02-08 NOTE — TELEPHONE ENCOUNTER
Vascular Nurse Navigator Post Op Call    Procedure: BYPASS FEMORAL-POPLITEAL (Right)    Date of Procedure: 2/3/22    Surgeon:    Sanjay Pham MD - Primary     * Janna Rene MD - Assisting    Discharge Date: 2/4/22    Discharge Disposition: Home    Leg Weakness?: No    Leg Swelling?: Yes, see below    Leg Numbness?: No    Chest Pain?: No    Shortness of Breath?: No    Orthopnea?: No    Anticoagulation pt was discharged on post op?: Aspirin and Ticagrelor (Brillanta)    Statin pt was discharged on post op?:  Lipitor (atorvastatin)    Bleeding?: No    Uncontrolled Pain?: No    Incision Concerns?: No    Fever or Chills?: No      Reviewed discharge instructions and incision care with patient  NEXT OFFICE VISIT SCHEDULED:  2/25/22 at 11:15 am with Dr Justine Acosta at The [address] [place] [address]    Transportation Confirmed?: Yes      Any further questions/concerns? Patient that he is dong good since discharge  He stated that he has swelling in his leg that seems to be slowly improving  He stated that he has some pain and has been trying to use Tylenol and ice for pain and swelling  Encouraged him to elevate his leg  Reviewed incision care with him - wash with soap and water  Reviewed discharge medications - Aspirin, Brillanta, and Lipitor  Reviewed activity restrictions  All questions answered  No concerns expressed at this time

## 2022-02-08 NOTE — TELEPHONE ENCOUNTER
Pt called, he is s/p fem pop bypass by Dr Messer Aston 2/3/22 and needs note for work stating he is unable to work and will be re-evaluated at next ov which is 2/25/22  ? Where he would like note sent, he states he can pull it from my chart  Note placed in pt's chart

## 2022-02-14 ENCOUNTER — OFFICE VISIT (OUTPATIENT)
Dept: FAMILY MEDICINE CLINIC | Facility: CLINIC | Age: 57
End: 2022-02-14
Payer: COMMERCIAL

## 2022-02-14 VITALS
OXYGEN SATURATION: 97 % | HEIGHT: 65 IN | DIASTOLIC BLOOD PRESSURE: 82 MMHG | TEMPERATURE: 98.3 F | SYSTOLIC BLOOD PRESSURE: 144 MMHG | WEIGHT: 172.2 LBS | BODY MASS INDEX: 28.69 KG/M2 | HEART RATE: 73 BPM

## 2022-02-14 DIAGNOSIS — E11.59 TYPE 2 DIABETES MELLITUS WITH OTHER CIRCULATORY COMPLICATION, WITHOUT LONG-TERM CURRENT USE OF INSULIN (HCC): ICD-10-CM

## 2022-02-14 DIAGNOSIS — Z87.891 FORMER TOBACCO USE: ICD-10-CM

## 2022-02-14 DIAGNOSIS — D69.6 THROMBOCYTOPENIA (HCC): ICD-10-CM

## 2022-02-14 DIAGNOSIS — Z95.1 S/P CABG X 3: ICD-10-CM

## 2022-02-14 DIAGNOSIS — Z72.0 TOBACCO ABUSE: ICD-10-CM

## 2022-02-14 DIAGNOSIS — R73.09 ELEVATED HEMOGLOBIN A1C: ICD-10-CM

## 2022-02-14 DIAGNOSIS — Z09 HOSPITAL DISCHARGE FOLLOW-UP: Primary | ICD-10-CM

## 2022-02-14 PROCEDURE — 99496 TRANSJ CARE MGMT HIGH F2F 7D: CPT | Performed by: STUDENT IN AN ORGANIZED HEALTH CARE EDUCATION/TRAINING PROGRAM

## 2022-02-14 PROCEDURE — 1111F DSCHRG MED/CURRENT MED MERGE: CPT | Performed by: STUDENT IN AN ORGANIZED HEALTH CARE EDUCATION/TRAINING PROGRAM

## 2022-02-14 NOTE — PROGRESS NOTES
Assessment/Plan:         Problem List Items Addressed This Visit        Endocrine    Type II diabetes mellitus ([facility] [address] )       Lab Results   Component Value Date    HGBA1C 7 3 (H) 01/31/2022     Increase a metformin to 1000mg BID, follow up annual physical  If HTN remains elevated, can start ACE or ARB         Relevant Medications    metFORMIN (GLUCOPHAGE) 1000 MG tablet       Other    Thrombocytopenia (HCC)    Relevant Medications    metFORMIN (GLUCOPHAGE) 1000 MG tablet    S/P CABG x 3    Relevant Medications    metFORMIN (GLUCOPHAGE) 1000 MG tablet    Former tobacco use      Other Visit Diagnoses     Hospital discharge follow-up    -  Primary    Elevated hemoglobin A1c        Relevant Medications    metFORMIN (GLUCOPHAGE) 1000 MG tablet    Tobacco abuse        Relevant Medications    metFORMIN (GLUCOPHAGE) 1000 MG tablet            Subjective:      Patient ID: Marisol Jesus is a 62 y o  male  HPI    TCM Call (since 1/14/2022)     Date and time call was made  2/7/2022 10:17 AM    Hospital care reviewed  Records reviewed        Patient was hospitialized at  [place] [place] [id number]        Date of Admission  02/03/22    Date of discharge  02/04/22    Diagnosis  Atheroscler native arteries the extremities w/intermit claudication    Disposition  Home    Were the patients medications reviewed and updated  Yes    Current Symptoms  Leg pain - right side  leg soreness and redness  Right side leg pain severity  Mild    Leg pain, right side, onset  Gradual      TCM Call (since 1/14/2022)     Post hospital issues  Reduced activity    Should patient be enrolled in anticoag monitoring? No    Scheduled for follow up? Yes    Did you obtain your prescribed medications  Yes    Do you need help managing your prescriptions or medications  No    Is transportation to your appointment needed  No    I have advised the patient to call PCP with any new or worsening symptoms  Adilia Baer/kaia      Living Arrangements  Family members Support System  Family    The type of support provided  Emotional    Do you have social support  Yes, as much as I need    Are you recieving any outpatient services  No    Are you recieving home care services  No    Are you using any community resources  No    Current waiver services  No    Have you fallen in the last 12 months  No    Interperter language line needed  No    Counseling  Patient    Counseling topics  patient and family education; Importance of RX compliance; Activities of daily living    Comments  spoke with pt on 02/7/22 after his hospital d/c on 02/4/22  pt underwent a BYPASS FEMORAL-POPLITEAL (Right Leg Upper) on 02/3/22  pt reports he has mild redness and swelling in upper right leg  he is taking his Oxycodone as needed for pain  he will f/u with vascular surgeon as advised at his discharge  pt will see PCP on 02/14/22 for hospital f/u and he is aware to call our office if he has any questions or concern  he checks BS at home, and he is taking all his other medications as prescribed  ER/CMA  The following portions of the patient's history were reviewed and updated as appropriate:   Past Medical History:  He has a past medical history of Bicuspid aortic valve, CAD (coronary artery disease), Coronary artery disease, Diabetes mellitus ([address] ), Former tobacco use, GERD (gastroesophageal reflux disease), Goiter, History of myocardial infarction, History of rib fracture, Hyperlipidemia, Hypertension, Hypertensive urgency (4/6/2021), Leg pain, bilateral, Myocardial infarction ([address] ), and Wears glasses  ,  _______________________________________________________________________  Medical Problems:  does not have any pertinent problems on file ,  _______________________________________________________________________  Past Surgical History:   has a past surgical history that includes Vasectomy; Cardiac catheterization; pr cabg, artery-vein, four (N/A, 4/9/2021); pr endoscopy w/video-asst vein harvest,cabg (Left, 4/9/2021); pr echo transesophag r-t 2d w/prb img acquisj i&r (N/A, 4/9/2021); Cardiac catheterization (N/A, 10/20/2021); Cardiac catheterization (N/A, 10/20/2021); Cardiac catheterization (N/A, 10/20/2021); pr slctv cathj 3rd+ ord slctv abdl pel/lxtr brnch (Left, 1/20/2022); IR lower extremity angiogram (1/20/2022); IR lower extremity angiogram (2/3/2022); and pr vein bypass graft,fem-pop (Right, 2/3/2022)  ,  _______________________________________________________________________  Family History:  family history includes Heart disease in his father ,  _______________________________________________________________________  Social History:   reports that he quit smoking about 10 months ago  His smoking use included cigarettes  He has a 38 00 pack-year smoking history  He has never used smokeless tobacco  He reports that he does not drink alcohol and does not use drugs  ,  _______________________________________________________________________  Allergies:  has No Known Allergies     _______________________________________________________________________  Current Outpatient Medications   Medication Sig Dispense Refill    aspirin (ECOTRIN LOW STRENGTH) 81 mg EC tablet Take 4 tablets (325 mg total) by mouth daily 60 tablet 3    atorvastatin (LIPITOR) 80 mg tablet Take 1 tablet (80 mg total) by mouth daily with dinner 90 tablet 0    Brilinta 90 MG TAKE 1 TABLET BY MOUTH TWICE A DAY 60 tablet 0    isosorbide mononitrate (IMDUR) 30 mg 24 hr tablet Take 1 tablet (30 mg total) by mouth 2 (two) times a day 90 tablet 3    metFORMIN (GLUCOPHAGE) 1000 MG tablet Take 1 tablet (1,000 mg total) by mouth 2 (two) times a day with meals 120 tablet 0    metoprolol succinate (TOPROL-XL) 25 mg 24 hr tablet Take 0 5 tablets (12 5 mg total) by mouth daily 90 tablet 3     No current facility-administered medications for this visit      _______________________________________________________________________  Review of Systems   Constitutional: Negative for activity change, appetite change, chills, fatigue and fever  HENT: Negative for congestion, rhinorrhea and sore throat  Eyes: Negative for visual disturbance  Respiratory: Negative for cough and shortness of breath  Cardiovascular: Negative for chest pain and palpitations  Gastrointestinal: Negative for abdominal pain, constipation, diarrhea, nausea and vomiting  Genitourinary: Negative for difficulty urinating, dysuria and frequency  Musculoskeletal: Negative for arthralgias and myalgias  Skin: Positive for wound  Negative for color change and rash  Neurological: Negative for weakness and headaches  Objective:  Vitals:    02/14/22 1322   BP: 144/82   BP Location: Left arm   Patient Position: Sitting   Cuff Size: Large   Pulse: 73   Temp: 98 3 °F (36 8 °C)   SpO2: 97%   Weight: 78 1 kg (172 lb 3 2 oz)   Height: 5' 5" (1 651 m)     Body mass index is 28 66 kg/m²  Physical Exam  Constitutional:       General: He is not in acute distress  Appearance: He is not ill-appearing  HENT:      Head: Normocephalic and atraumatic  Right Ear: External ear normal       Left Ear: External ear normal       Nose: Nose normal  No congestion or rhinorrhea  Mouth/Throat:      Mouth: Mucous membranes are moist       Pharynx: Oropharynx is clear  No oropharyngeal exudate or posterior oropharyngeal erythema  Eyes:      Extraocular Movements: Extraocular movements intact  Conjunctiva/sclera: Conjunctivae normal       Pupils: Pupils are equal, round, and reactive to light  Cardiovascular:      Rate and Rhythm: Normal rate and regular rhythm  Pulses: Normal pulses  Heart sounds: No murmur heard  Pulmonary:      [place]: Pulmonary effort is normal  No respiratory distress  Breath sounds: Normal breath sounds  No wheezing  Chest:      Chest wall: No tenderness     Abdominal:      General: Bowel sounds are normal       Palpations: Abdomen is soft  Tenderness: There is no abdominal tenderness  Musculoskeletal:         General: Normal range of motion  Cervical back: Normal range of motion  Skin:     General: Skin is warm and dry  Capillary Refill: Capillary refill takes less than 2 seconds  Findings: No rash  Comments: Incisions are well healed, minimal serosanguinous discharge from superior lesion    Neurological:      General: No focal deficit present  Mental Status: He is alert  Mental status is at baseline

## 2022-02-14 NOTE — ASSESSMENT & PLAN NOTE
Lab Results   Component Value Date    HGBA1C 7 3 (H) 01/31/2022     Increase a metformin to 1000mg BID, follow up annual physical  If HTN remains elevated, can start ACE or ARB

## 2022-02-18 RX ORDER — CEPHALEXIN 500 MG/1
500 CAPSULE ORAL EVERY 12 HOURS SCHEDULED
Qty: 14 CAPSULE | Refills: 0 | Status: SHIPPED | OUTPATIENT
Start: 2022-02-18 | End: 2022-02-25

## 2022-02-18 NOTE — TELEPHONE ENCOUNTER
Patient returned call  He stated that he did have drainage from his groin incision that was serosanguinous that has since stopped  He stated that his groin incision is without any redness, drainage or open areas  He stated that his thigh incision is swollen and feels warmer that the rest of his skin  He stated that this morning he noticed that the incision looks like the stitches are slowly pulling apart and it appears that he has an open area on this incision  He stated that there is no drainage from area  He stated the area around the incision feels hard  He stated that he has been icing the area to decrease the swelling  He has no pain, or numbness or tingling in his leg or foot  No fever or chills  Requested patient to send a picture of incision via [email or web address] and will send this information and picture to triage provider  He was agreeable to same  informed him I would contact him back with further recommendations

## 2022-02-18 NOTE — TELEPHONE ENCOUNTER
Contacted patient to inform him of recommendations from Juancho Oates PA-C  Informed him that she will be prescribing Keflex for him and he would prefer the prescription to be sent to [facility] in [place]  Informed him that she would him to move up his post op appointment he was agreeable to same and call was transferred to [facility] in the Call Center to see if his post op appointment can be moved up

## 2022-02-18 NOTE — TELEPHONE ENCOUNTER
Please call in Keflex 500 mg b i d  X7 days 0 refills and try to move up patient's office appointment if possible   Looks like he might be developing cellulitis and difficult to tell by picture if the center is opening or eschar

## 2022-02-22 ENCOUNTER — OFFICE VISIT (OUTPATIENT)
Dept: VASCULAR SURGERY | Facility: CLINIC | Age: 57
End: 2022-02-22

## 2022-02-22 VITALS
BODY MASS INDEX: 28.66 KG/M2 | HEIGHT: 65 IN | TEMPERATURE: 98.1 F | HEART RATE: 71 BPM | DIASTOLIC BLOOD PRESSURE: 86 MMHG | WEIGHT: 172 LBS | SYSTOLIC BLOOD PRESSURE: 148 MMHG

## 2022-02-22 DIAGNOSIS — E11.59 TYPE 2 DIABETES MELLITUS WITH OTHER CIRCULATORY COMPLICATION, WITHOUT LONG-TERM CURRENT USE OF INSULIN (HCC): ICD-10-CM

## 2022-02-22 DIAGNOSIS — R09.89 LEFT CAROTID BRUIT: ICD-10-CM

## 2022-02-22 DIAGNOSIS — I70.213 ATHEROSCLEROSIS OF NATIVE ARTERY OF BOTH LOWER EXTREMITIES WITH INTERMITTENT CLAUDICATION (HCC): Primary | ICD-10-CM

## 2022-02-22 PROCEDURE — 99024 POSTOP FOLLOW-UP VISIT: CPT | Performed by: PHYSICIAN ASSISTANT

## 2022-02-22 PROCEDURE — 3008F BODY MASS INDEX DOCD: CPT | Performed by: INTERNAL MEDICINE

## 2022-02-22 NOTE — LETTER
February 22, 2022     Patient: Yanet Long   YOB: 1965   Date of Visit: 2/22/2022       To Whom it May Concern:    Yanet Long is under my professional care  He was seen in my office on 2/22/2022  He will be re-evaluated in 1 week  At that visit, we will discuss timing for which he may return to work       If you have any questions or concerns, please don't hesitate to call           Sincerely,          Angie Malhotra PA-C        CC: No Recipients

## 2022-02-22 NOTE — PATIENT INSTRUCTIONS
Atherosclerosis lower extremities  S/P RLE CFA-AK popliteal bypass with PTFE (2/3/22, Zev)     R groin incision is healed and closed   R medial thigh is still healing   RLE bypass signal present; PT/DP pulses present    Plan:   continue with aspirin, Brilinta 90 twice daily and atovastatin 80   maintain good diabetes control   regular activity as tolerated   AMBER q 3 months post-bypass (due 5/4/22)       R groin incision is healed and closed   R thigh incision - wash with surgical soap and apply peroxide once or twice daily to wound   follow-up for wound check in 1 week, call if any questions or concerns         S/P LLE SFA angioplasty/ Shahana stent 1/20/22   continue with current medical therapy   management as above under bypass

## 2022-02-22 NOTE — PROGRESS NOTES
Assessment/Plan:    S/P RLE CFA-AK popliteal bypass with PTFE (2/3/22, Zev)  Atherosclerosis of native artery of both lower extremities with intermittent claudication (HCC)  -     VAS lower limb arterial duplex, complete bilateral; Future     R groin incision is healed and closed   R medial thigh is still healing   No leg/calf/foot pain or claudication   RLE bypass signal present; PT/DP pulses present; foot warm and well perfused    Plan:   continue with aspirin, Brilinta 90 twice daily and atovastatin 80   maintain good diabetes control   regular activity as tolerated   work note given   AMBER q 3 months post-bypass (due 5/4/22)      RLE incisional care:          R thigh is healing as expected but not entirely closed; superficial mid separation, no heat or overt infection   R thigh incision - wash with surgical soap and cleanse peroxide once or twice daily to wound   continue with cephalexin   follow-up for wound check in 1 week, call if any questions or concerns      S/P LLE SFA angioplasty/ Shahana stent 1/20/22   continue with current medical therapy   management as above under bypass    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 complication, without long-term current use of insulin (HCC)    Left subclavian artery stenosis   follow up duplex in Aug '22 (will need order)      Subjective:      Patient ID: Eros Miles is a 62 y o  male  Patient is a PO RLE fem-pop bypass  Patient states since starting antibiotic wound is doing better  Patient reports RLE swelling  Patient is taking ASA 81mg and atorvastatin      HPI    Mr Eros Miles 62 y/oM former smoker, DMII, Htn, HLD, CAD w/NSTEMIx2, s/p CAB x3 (4/21) and PCI/NIRMALA (10/21), atherosclerosis lower extremities with short distance claudication of both lower extremities s/p LLE angiogram and treatment of SFA with balloon angioplasty and stenting (1/20/22, Zev), followed by RLE CFA-AK popliteal bypass with PTFE (2/3/22, Zev )    2/22/22: Patient presents for first post-operative OV after RLE bypass  He developed pain, redness and tenderness over the thigh incision and called the office and given cephalexin  Reports that the thigh feels much improved  However, there is mild, superficial separation of the incision which we will continue to monitor  The R groin incision is healed and closed  A small "ear" in the healed groin incision is creating some discomfort for which a gauze for barrier is recommended until it softens up  He has no leg, calf, foot claudication  The following portions of the patient's history were reviewed and updated as appropriate: allergies, current medications, past family history, past medical history, past social history, past surgical history and problem list     Review of Systems   Constitutional: Negative  HENT: Negative  Eyes: Negative  Respiratory: Negative  Cardiovascular: Negative  Gastrointestinal: Negative  Endocrine: Negative  Genitourinary: Negative  Musculoskeletal: Negative  Skin: Positive for wound  Allergic/Immunologic: Negative  Neurological: Negative  Hematological: Negative  Psychiatric/Behavioral: Negative  Objective:      /86 (BP Location: Right arm, Patient Position: Sitting, Cuff Size: Standard)   Pulse 71   Temp 98 1 °F (36 7 °C) (Tympanic)   Ht 5' 5" (1 651 m)   Wt 78 kg (172 lb)   BMI 28 62 kg/m²     RIGHT lower extremity   R groin incision is healed and closed   R medial thigh with mild, superficial separation   RLE bypass signal present; PT/DP pulses present        R medial thigh        LEFT lower extremity   foot warm and well-perfused   DP/PT pulses present       Physical Exam  Vitals and nursing note reviewed  Constitutional:       Appearance: He is well-developed  HENT:      Head: Normocephalic and atraumatic  Eyes:      Pupils: Pupils are equal, round, and reactive to light  Neck:      Thyroid: No thyromegaly  Vascular: No JVD  Trachea: Trachea normal    Cardiovascular:      Rate and Rhythm: Normal rate and regular rhythm  Pulses:           Carotid pulses are 2+ on the right side and 2+ on the left side  Radial pulses are 2+ on the right side and 2+ on the left side  Dorsalis pedis pulses are 2+ on the right side and 2+ on the left side  Heart sounds: Normal heart sounds, S1 normal and S2 normal  No murmur heard  No friction rub  No gallop  Pulmonary:      [place]: Pulmonary effort is normal  No accessory muscle usage or respiratory distress  Breath sounds: Normal breath sounds  No wheezing or rales  Abdominal:      General: Bowel sounds are normal  There is no distension  Palpations: Abdomen is soft  Tenderness: There is no abdominal tenderness  Musculoskeletal:         General: No deformity  Normal range of motion  Cervical back: Neck supple  Skin:     General: Skin is warm and dry  Findings: No lesion or rash  Nails: There is no clubbing  Neurological:      Mental Status: He is alert and oriented to person, place, and time  Comments: Grossly normal    Psychiatric:         Behavior: Behavior is cooperative  I have reviewed and made appropriate changes to the review of systems input by the medical assistant      Vitals:    02/22/22 1256   BP: 148/86   BP Location: Right arm   Patient Position: Sitting   Cuff Size: Standard   Pulse: 71   Temp: 98 1 °F (36 7 °C)   TempSrc: Tympanic   Weight: 78 kg (172 lb)   Height: 5' 5" (1 651 m)       Patient Active Problem List   Diagnosis    Chest pain    Type II diabetes mellitus (HCC)    Leukocytosis    NSTEMI (non-ST elevated myocardial infarction) (HCC)    Thrombocytopenia (HCC)    S/P CABG x 3    Postoperative anemia due to acute blood loss    Sinus bradycardia    Encounter for postoperative care    CAD (coronary artery disease)    Left carotid bruit    GERD (gastroesophageal reflux disease)    Hypertension    Atheroscler native arteries the extremities w/intermit claudication ([address] )    Former tobacco use    History of PTCA       Past Surgical History:   Procedure Laterality Date    CARDIAC CATHETERIZATION      CARDIAC CATHETERIZATION N/A 10/20/2021    Procedure: Cardiac catheterization;  Surgeon: Nel Reece MD;  Location: [address] [place] [address] CATH LAB; Service: Cardiology    CARDIAC CATHETERIZATION N/A 10/20/2021    Procedure: Cardiac Coronary Angiogram;  Surgeon: Nel Reece MD;  Location: [address] [place] [address] CATH LAB; Service: Cardiology    CARDIAC CATHETERIZATION N/A 10/20/2021    Procedure: Cardiac pci;  Surgeon: Nel Reece MD;  Location: [address] [place] [address] CATH LAB; Service: Cardiology    IR LOWER EXTREMITY ANGIOGRAM  1/20/2022    IR LOWER EXTREMITY ANGIOGRAM  2/3/2022    MN CABG, ARTERY-VEIN, FOUR N/A 4/9/2021    Procedure: CORONARY ARTERY BYPASS GRAFT (CABG) 3 VESSELS: LIMA to LAD; LLE EVH/SVG to LPL & OM2;  Surgeon: Gabby Montoya DO;  [facility];  Service: Cardiac Surgery    MN ECHO TRANSESOPHAG R-T 2D W/PRB IMG ACQUISJ I&R N/A 4/9/2021    Procedure: TRANSESOPHAGEAL ECHOCARDIOGRAM (BENNETT); Surgeon: Gabby Montoya DO;  [facility];  Service: Cardiac Surgery    MN ENDOSCOPY W/VIDEO-ASST VEIN HARVEST,CABG Left 4/9/2021    Procedure: HARVEST VEIN ENDOSCOPIC ([address]);   Surgeon: Gabby Montoya DO;  [facility];  Service: Cardiac Surgery    MN [place] 3RD+ ORD SLCTV ABDL PEL/LXTR [facility] Left 1/20/2022    Procedure: ARTERIOGRAM, STENT PLACEMENT IN LEFT SUPERFICIAL [address];  Surgeon: Yaneli Kiser MD;  [facility];  Service: Vascular    MN VEIN BYPASS GRAFT,FEM-POP Right 2/3/2022    Procedure: BYPASS FEMORAL-POPLITEAL;  Surgeon: Yaneli Kiser MD;  [facility];  Service: Vascular    VASECTOMY         Family History   Problem Relation Age of Onset    Heart disease Father        Social History     Socioeconomic History  Marital status:      Spouse name: Not on file    Number of children: Not on file    Years of education: Not on file    Highest education level: Not on file   Occupational History    Not on file   Tobacco Use    Smoking status: Former Smoker     Packs/day: 1 00     Years: 38 00     Pack years: 38 00     Types: Cigarettes     Quit date: 2021     Years since quittin 8    Smokeless tobacco: Never Used   Vaping Use    Vaping Use: Never used   Substance and Sexual Activity    Alcohol use: Never    Drug use: Never    Sexual activity: Not Currently   Other Topics Concern    Not on file   Social History Narrative    Not on file     Social Determinants of Health     Financial Resource Strain: Not on file   Food Insecurity: Not on file   Transportation Needs: No Transportation Needs    Lack of Transportation (Medical): No    Lack of Transportation (Non-Medical):  No   Physical Activity: Not on file   Stress: Not on file   Social Connections: Not on file   Intimate Partner Violence: Not on file   Housing Stability: Not on file       No Known Allergies      Current Outpatient Medications:     aspirin (ECOTRIN LOW STRENGTH) 81 mg EC tablet, Take 4 tablets (325 mg total) by mouth daily, Disp: 60 tablet, Rfl: 3    atorvastatin (LIPITOR) 80 mg tablet, Take 1 tablet (80 mg total) by mouth daily with dinner, Disp: 90 tablet, Rfl: 0    Brilinta 90 MG, TAKE 1 TABLET BY MOUTH TWICE A DAY, Disp: 60 tablet, Rfl: 0    cephalexin (KEFLEX) 500 mg capsule, Take 1 capsule (500 mg total) by mouth every 12 (twelve) hours for 7 days, Disp: 14 capsule, Rfl: 0    isosorbide mononitrate (IMDUR) 30 mg 24 hr tablet, Take 1 tablet (30 mg total) by mouth 2 (two) times a day, Disp: 90 tablet, Rfl: 3    metFORMIN (GLUCOPHAGE) 1000 MG tablet, Take 1 tablet (1,000 mg total) by mouth 2 (two) times a day with meals, Disp: 120 tablet, Rfl: 0    metoprolol succinate (TOPROL-XL) 25 mg 24 hr tablet, Take 0 5 tablets (12 5 mg total) by mouth daily, Disp: 90 tablet, Rfl: 3

## 2022-02-27 DIAGNOSIS — I21.4 NSTEMI (NON-ST ELEVATED MYOCARDIAL INFARCTION) (HCC): ICD-10-CM

## 2022-02-28 RX ORDER — TICAGRELOR 90 MG/1
TABLET ORAL
Qty: 60 TABLET | Refills: 0 | Status: SHIPPED | OUTPATIENT
Start: 2022-02-28 | End: 2022-03-29

## 2022-03-01 ENCOUNTER — OFFICE VISIT (OUTPATIENT)
Dept: VASCULAR SURGERY | Facility: CLINIC | Age: 57
End: 2022-03-01

## 2022-03-01 VITALS
HEART RATE: 69 BPM | TEMPERATURE: 98.1 F | SYSTOLIC BLOOD PRESSURE: 140 MMHG | WEIGHT: 168 LBS | BODY MASS INDEX: 27.99 KG/M2 | DIASTOLIC BLOOD PRESSURE: 68 MMHG | HEIGHT: 65 IN

## 2022-03-01 DIAGNOSIS — E11.59 TYPE 2 DIABETES MELLITUS WITH OTHER CIRCULATORY COMPLICATION, WITHOUT LONG-TERM CURRENT USE OF INSULIN (HCC): Primary | ICD-10-CM

## 2022-03-01 DIAGNOSIS — I70.213 ATHEROSCLEROSIS OF NATIVE ARTERY OF BOTH LOWER EXTREMITIES WITH INTERMITTENT CLAUDICATION (HCC): ICD-10-CM

## 2022-03-01 PROCEDURE — 99024 POSTOP FOLLOW-UP VISIT: CPT | Performed by: PHYSICIAN ASSISTANT

## 2022-03-01 RX ORDER — CEPHALEXIN 500 MG/1
500 CAPSULE ORAL EVERY 8 HOURS SCHEDULED
Qty: 21 CAPSULE | Refills: 0 | Status: SHIPPED | OUTPATIENT
Start: 2022-03-01 | End: 2022-03-08

## 2022-03-01 NOTE — PROGRESS NOTES
Assessment/Plan:    RIGHT thigh incision after bypass is still healing  Atherosclerosis of native artery of both lower extremities with intermittent claudication (HCC)  -     cephalexin (KEFLEX) 500 mg capsule; Take 1 capsule (500 mg total) by mouth every 8 (eight) hours for 7 days     Returns for RIGHT thigh wound check     He used a topical antibiotic which pulled the scab off of the wound   no leg/calf/foot pain or claudication   no fevers or chills           R thigh superficial separation approximately 0 3 cm wide x 5 cm long; does not seem to probe or tunnel   There is yellow in wound bed which is antibiotic ointment per patient; no drainag     Mild thigh edema and blanching around the incision   RLE bypass signal present; DP pulse present; foot is warm and well perfused    A/P right thigh is not closed  Overall, appears to have superficial separation  Incisional care:   wash gently daily wash with soap and water or surgical soap   cleanse with peroxide   apply Dermagran to wound daily   continue with aspirin, ticagrelor and atorvastatin   baseline LE a scheduled for 5/23   continue with Keflex for one week   office visit in 1 week        Other diagnoses:  S/P LLE SFA angioplasty/ Shahana stent 1/20/22   continue with current medical therapy     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 complication, without long-term current use of insulin ([facility] [address] )     Left subclavian artery stenosis   follow up duplex in Aug '22 (will need order)      Subjective:      Patient ID: Valentin Miramontes is a 62 y o  male  1 week f/u for R groin /leg wound  S/P leg bypass 2/3/2022  No a right leg pain, numbness or tingling  Pt is taking ASA, Atorvastatin and Brilanta  Pt is a former smoker      HPI    Mr Valentin Miramontes 62 y/oM former smoker, DMII, Htn, HLD, CAD w/NSTEMIx2, s/p CAB x3 (4/21) and PCI/NIRMALA (10/21), atherosclerosis lower extremities with short distance claudication of both lower extremities s/p LLE angiogram and treatment of SFA with balloon angioplasty and stenting (1/20/22, Zev), followed by RLE CFA-AK popliteal bypass with PTFE (2/3/22, Zev )     2/22/22: Patient presents for first post-operative OV after RLE bypass  He developed pain, redness and tenderness over the thigh incision and called the office and given cephalexin  Reports that the thigh feels much improved  However, there is mild, superficial separation of the incision which we will continue to monitor  The R groin incision is healed and closed  A small "ear" in the healed groin incision is creating some discomfort for which a gauze for barrier is recommended until it softens up  He has no leg, calf, foot claudication  3/1/22:  Patient returns for office follow-up to reexamine right thigh wound  He reports that he put a topical antibiotic over the incision to soften up the thigh and pulled the scab off the wound  There appears to be yellow in the wound bed which he states is the product that he put over the incision  He has no fevers or chills  We discussed that we will try local care to promote wound healing and we will have him follow-up in 1 week  The following portions of the patient's history were reviewed and updated as appropriate: allergies, current medications, past family history, past medical history, past social history, past surgical history and problem list     Review of Systems   Constitutional: Negative  HENT: Negative  Eyes: Negative  Respiratory: Negative  Cardiovascular: Negative  Gastrointestinal: Negative  Endocrine: Negative  Genitourinary: Negative  Musculoskeletal: Negative  Right leg pain   Skin: Negative  Allergic/Immunologic: Negative  Neurological: Negative  Hematological: Negative  Psychiatric/Behavioral: Negative            Objective:      /68 (BP Location: Right arm, Patient Position: Sitting, Cuff Size: Standard)   Pulse 69   Temp 98 1 °F (36 7 °C) (Tympanic)   Ht 5' 5" (1 651 m)   Wt 76 2 kg (168 lb)   BMI 27 96 kg/m²          Physical Exam              Awake alert oriented x3  Pleasant apparent distress  Regular rate rhythm S1-S2  Clear to auscultation bilaterally    Right lower extremity as described above      I have reviewed and made appropriate changes to the review of systems input by the medical assistant  Vitals:    03/01/22 1520   BP: 140/68   BP Location: Right arm   Patient Position: Sitting   Cuff Size: Standard   Pulse: 69   Temp: 98 1 °F (36 7 °C)   TempSrc: Tympanic   Weight: 76 2 kg (168 lb)   Height: 5' 5" (1 651 m)       Patient Active Problem List   Diagnosis    Chest pain    Type II diabetes mellitus (HCC)    Leukocytosis    NSTEMI (non-ST elevated myocardial infarction) (HCC)    Thrombocytopenia (HCC)    S/P CABG x 3    Postoperative anemia due to acute blood loss    Sinus bradycardia    Encounter for postoperative care    CAD (coronary artery disease)    Left carotid bruit    GERD (gastroesophageal reflux disease)    Hypertension    Atheroscler native arteries the extremities w/intermit claudication ([address] )    Former tobacco use    History of PTCA       Past Surgical History:   Procedure Laterality Date    CARDIAC CATHETERIZATION      CARDIAC CATHETERIZATION N/A 10/20/2021    Procedure: Cardiac catheterization;  Surgeon: Eliane Yadav MD;  Location: [address] [place] [address] CATH LAB; Service: Cardiology    CARDIAC CATHETERIZATION N/A 10/20/2021    Procedure: Cardiac Coronary Angiogram;  Surgeon: Eliane Yadav MD;  Location: [address] [place] [address] CATH LAB; Service: Cardiology    CARDIAC CATHETERIZATION N/A 10/20/2021    Procedure: Cardiac pci;  Surgeon: Eliane Yadav MD;  Location: [address] [place] [address] CATH LAB;   Service: Cardiology    IR LOWER EXTREMITY ANGIOGRAM  1/20/2022    IR LOWER EXTREMITY ANGIOGRAM  2/3/2022    [place] CABG, ARTERY-VEIN, FOUR N/A 4/9/2021    Procedure: CORONARY ARTERY BYPASS GRAFT (CABG) 3 VESSELS: LIMA to LAD; LLE EVH/SVG to LPL & OM2;  Surgeon: Jimy Yu DO;  [facility];  Service: Cardiac Surgery    [place] ECHO TRANSESOPHAG R-T 2D W/PRB IMG ACQUISJ I&R N/A 2021    Procedure: TRANSESOPHAGEAL ECHOCARDIOGRAM (BENNETT); Surgeon: Jimy Yu DO;  [facility];  Service: Cardiac Surgery    [place] ENDOSCOPY W/VIDEO-ASST VEIN HARVEST,CABG Left 2021    Procedure: HARVEST VEIN ENDOSCOPIC ([address]); Surgeon: Jimy Yu DO;  [facility];  Service: Cardiac Surgery    [place] [facility] 3RD+ ORD SLCTV ABDL PEL/LXTR [facility] Left 2022    Procedure: ARTERIOGRAM, STENT PLACEMENT IN LEFT SUPERFICIAL [address] ARTERY;  Surgeon: Taniya Medrano MD;  [facility];  Service: Vascular    [place] VEIN BYPASS GRAFT,FEM-POP Right 2/3/2022    Procedure: BYPASS FEMORAL-POPLITEAL;  Surgeon: Taniya Medrano MD;  [facility];  Service: Vascular    VASECTOMY         Family History   Problem Relation Age of Onset    Heart disease Father        Social History     Socioeconomic History    Marital status:      Spouse name: Not on file    Number of children: Not on file    Years of education: Not on file    Highest education level: Not on file   Occupational History    Not on file   Tobacco Use    Smoking status: Former Smoker     Packs/day: 1 00     Years: 38 00     Pack years: 38 00     Types: Cigarettes     Quit date: 2021     Years since quittin 9    Smokeless tobacco: Never Used   Vaping Use    Vaping Use: Never used   Substance and Sexual Activity    Alcohol use: Never    Drug use: Never    Sexual activity: Not Currently   Other Topics Concern    Not on file   Social History Narrative    Not on file     Social Determinants of Health     Financial Resource Strain: Not on file   Food Insecurity: Not on file   Transportation Needs: No Transportation Needs    Lack of Transportation (Medical): No    Lack of Transportation (Non-Medical):  No   Physical Activity: Not on file   Stress: Not on file   Social Connections: Not on file   Intimate Partner Violence: Not on file   Housing Stability: Not on file       No Known Allergies      Current Outpatient Medications:     aspirin (ECOTRIN LOW STRENGTH) 81 mg EC tablet, Take 4 tablets (325 mg total) by mouth daily, Disp: 60 tablet, Rfl: 3    atorvastatin (LIPITOR) 80 mg tablet, Take 1 tablet (80 mg total) by mouth daily with dinner, Disp: 90 tablet, Rfl: 0    Brilinta 90 MG, TAKE 1 TABLET BY MOUTH TWICE A DAY, Disp: 60 tablet, Rfl: 0    isosorbide mononitrate (IMDUR) 30 mg 24 hr tablet, Take 1 tablet (30 mg total) by mouth 2 (two) times a day, Disp: 90 tablet, Rfl: 3    metFORMIN (GLUCOPHAGE) 1000 MG tablet, Take 1 tablet (1,000 mg total) by mouth 2 (two) times a day with meals, Disp: 120 tablet, Rfl: 0    metoprolol succinate (TOPROL-XL) 25 mg 24 hr tablet, Take 0 5 tablets (12 5 mg total) by mouth daily, Disp: 90 tablet, Rfl: 3

## 2022-03-01 NOTE — PATIENT INSTRUCTIONS
RIGHT thigh wound      Incisional care:   wash gently daily wash with soap and water or surgical soap   cleanse with peroxide   apply Dermagran to wound daily   continue with Keflex for one week   office visit in 1 week

## 2022-03-08 ENCOUNTER — OFFICE VISIT (OUTPATIENT)
Dept: VASCULAR SURGERY | Facility: CLINIC | Age: 57
End: 2022-03-08

## 2022-03-08 VITALS
SYSTOLIC BLOOD PRESSURE: 138 MMHG | HEIGHT: 65 IN | WEIGHT: 170.4 LBS | HEART RATE: 78 BPM | BODY MASS INDEX: 28.39 KG/M2 | DIASTOLIC BLOOD PRESSURE: 72 MMHG

## 2022-03-08 DIAGNOSIS — I70.213 ATHEROSCLEROSIS OF NATIVE ARTERY OF BOTH LOWER EXTREMITIES WITH INTERMITTENT CLAUDICATION (HCC): Primary | ICD-10-CM

## 2022-03-08 PROCEDURE — 99024 POSTOP FOLLOW-UP VISIT: CPT | Performed by: PHYSICIAN ASSISTANT

## 2022-03-08 NOTE — PATIENT INSTRUCTIONS
Right thigh superficial bypass incision is slowly healing       no evidence of infection   bypass pulse and distal signals are present    Plan:  Continue with gentle soap and water (or surgical scrub) and cover with 4 x 4    Will follow-up in about 3 weeks for re-evaluation    Call if any questions or concerns

## 2022-03-08 NOTE — PROGRESS NOTES
Assessment/Plan:     RIGHT thigh incision after bypass incision is slowly healing  Atherosclerosis of native artery of both lower extremities with intermittent claudication ([address] )     R thigh incision check   slowly closing   he did not use Dermagran   no leg/calf/foot pain or claudication         R thigh with superficial separation approx 0 5 wide x 5 5 cm long without probing or tunneling  Still some yellow slough wound bed  No redness   no evidence of infection   bypass pulse and distal signals are present    Plan:     Continue with gentle soap and water (or surgical scrub) and cover with 4 x 4     Follow-up in about 2- 3 weeks for re-evaluation or sooner, if needed        Subjective:      Patient ID: Cira Street is a 62 y o  male  HPI   Mr  Cira Street 62 y/oM former smoker, DMII, Htn, HLD, CAD w/NSTEMIx2, s/p CAB x3 (4/21) and PCI/NIRMALA (10/21), atherosclerosis lower extremities with short distance claudication of both lower extremities s/p LLE angiogram and treatment of SFA with balloon angioplasty and stenting (1/20/22, Zev), followed by RLE CFA-AK popliteal bypass with PTFE (2/3/22, Zev )     2/22/22: Patient presents for first post-operative OV after RLE bypass  He developed pain, redness and tenderness over the thigh incision and called the office and given cephalexin   Reports that the thigh feels much improved  However, there is mild, superficial separation of the incision which we will continue to monitor  The R groin incision is healed and closed  A small "ear" in the healed groin incision is creating some discomfort for which a gauze for barrier is recommended until it softens up  He has no leg, calf, foot claudication       3/1/22:  Patient returns for office follow-up to reexamine right thigh wound  He reports that he put a topical antibiotic over the incision to soften up the thigh and pulled the scab off the wound    There appears to be yellow in the wound bed which he states is the product that he put over the incision  He has no fevers or chills  We discussed that we will try local care to promote wound healing and we will have him follow-up in 1 week  3/8/22: 1 week f/u for wound check  Pt c/o mild soreness and discomfort at the site at the R thigh bypass incision  No leg pain, numbness and tingling  No fevers or chills  The incision appears improved but not yet closed  He did not use the Dermagran  He is washing with surgical scrub daily and placing guaze  We will re-check in 2-3 weeks  The following portions of the patient's history were reviewed and updated as appropriate: allergies, current medications, past family history, past medical history, past social history, past surgical history and problem list     Review of Systems   Constitutional: Negative  HENT: Negative  Eyes: Negative  Respiratory: Negative  Cardiovascular: Negative  Gastrointestinal: Negative  Endocrine: Negative  Genitourinary: Negative  Musculoskeletal: Negative  Skin: Positive for wound  Allergic/Immunologic: Negative  Neurological: Negative  Hematological: Negative  Psychiatric/Behavioral: Negative  Objective:      /72 (BP Location: Left arm, Patient Position: Sitting, Cuff Size: Standard)   Pulse 78   Ht 5' 5" (1 651 m)   Wt 77 3 kg (170 lb 6 4 oz)   BMI 28 36 kg/m²              Physical Exam      AA+O x3  NAD    RRR  Resp non=labored    R LE as described above

## 2022-03-08 NOTE — LETTER
March 8, 2022     Patient: Craig Restrepo   YOB: 1965   Date of Visit: 3/8/2022       To Whom it May Concern:    Craig Restrepo is under my professional care  He was seen in my office on 3/8/2022  He will be re-evaluated in about 2 weeks and we will discuss at that time possible return to work  If you have any questions or concerns, please don't hesitate to call           Sincerely,          Aleks Baig PA-C        CC: No Recipients

## 2022-03-22 ENCOUNTER — OFFICE VISIT (OUTPATIENT)
Dept: VASCULAR SURGERY | Facility: CLINIC | Age: 57
End: 2022-03-22

## 2022-03-22 VITALS
WEIGHT: 170.3 LBS | DIASTOLIC BLOOD PRESSURE: 88 MMHG | HEART RATE: 68 BPM | HEIGHT: 65 IN | SYSTOLIC BLOOD PRESSURE: 160 MMHG | BODY MASS INDEX: 28.37 KG/M2

## 2022-03-22 DIAGNOSIS — I70.213 ATHEROSCLEROSIS OF NATIVE ARTERY OF BOTH LOWER EXTREMITIES WITH INTERMITTENT CLAUDICATION (HCC): Primary | ICD-10-CM

## 2022-03-22 PROCEDURE — 99024 POSTOP FOLLOW-UP VISIT: CPT | Performed by: PHYSICIAN ASSISTANT

## 2022-03-22 PROCEDURE — 3008F BODY MASS INDEX DOCD: CPT | Performed by: INTERNAL MEDICINE

## 2022-03-22 NOTE — PROGRESS NOTES
Assessment/Plan:    RIGHT thigh incision after bypass is slowly healing  S/PRLE CFA-AK popliteal bypass with PTFE (2/3/22, Zev )    Atherosclerosis of native artery of both lower extremities with intermittent claudication ([address] )      Overall doing well; no leg pain; no fevers, chills   R thigh superficial separation about 3-4 mm wide and 4 5 cm long  Dry, no drainage or warmth  No overt infection  Bypass clinically patent; R 2+  DP/PT; L 2+ DP/ 0 PT    Plan:   Right thigh incision is slowly healing   Continue washing with surgical soap   Apply peroxide and touch of Betadine along the incision   Follow up in about 4 week for re-evaluation or sooner if needed   Initial postop lower extremity arterial duplex study scheduled for 05/23/2022        Subjective:      Patient ID: Pili Rutledge is a 62 y o  male  Pt is here for 2 week f/u for wound check  No leg pain  Walking well  Pt is taking ASA, Brilinta and Atorvastatin  Pt is a former smoker  HPI   Mr Pili Rutledge 62 y/oM former smoker, DMII, Htn, HLD, CAD w/NSTEMIx2, s/p CAB x3 (4/21) and PCI/NIRMALA (10/21), atherosclerosis lower extremities with short distance claudication of both lower extremities s/p LLE angiogram and treatment of SFA with balloon angioplasty and stenting (1/20/22, Zev), followed by RLE CFA-AK popliteal bypass with PTFE (2/3/22, Zev )     2/22/22: Patient presents for first post-operative OV after RLE bypass  He developed pain, redness and tenderness over the thigh incision and called the office and given cephalexin   Reports that the thigh feels much improved  However, there is mild, superficial separation of the incision which we will continue to monitor  The R groin incision is healed and closed  A small "ear" in the healed groin incision is creating some discomfort for which a gauze for barrier is recommended until it softens up   He has no leg, calf, foot claudication       3/1/22:  Patient returns for office follow-up to reexamine right thigh wound   He reports that he put a topical antibiotic over the incision to soften up the thigh and pulled the scab off the wound   There appears to be yellow in the wound bed which he states is the product that he put over the incision   He has no fevers or chills   We discussed that we will try local care to promote wound healing and we will have him follow-up in 1 week      3/8/22: 1 week f/u for wound check  Pt c/o mild soreness and discomfort at the site at the R thigh bypass incision  No leg pain, numbness and tingling  No fevers or chills  The incision appears improved but not yet closed  He did not use the Dermagran  He is washing with surgical scrub daily and placing guaze  We will re-check in 2-3 weeks       3/22/22: Follow up wound check  Patient reports that the incision continues to heal  He is waiting for it to close so he can return to work  He is washing with surgical scrub and covering with guaze since it tends to stick to his pants  He has no leg pain, numbness, tingling  No fevers or chills  The incision continues to improve but not yet closed  -Slowly healing R thigh  -Walking ok; no leg pain or claudication  -Not smoking for almost 1 year! The following portions of the patient's history were reviewed and updated as appropriate: allergies, current medications, past family history, past medical history, past social history, past surgical history and problem list     Review of Systems   Constitutional: Negative  HENT: Negative  Eyes: Negative  Respiratory: Negative  Cardiovascular: Negative  Gastrointestinal: Negative  Endocrine: Negative  Genitourinary: Negative  Musculoskeletal: Negative  Skin: Negative  Allergic/Immunologic: Negative  Neurological: Negative  Hematological: Negative  Psychiatric/Behavioral: Negative            Objective:      /88 (BP Location: Right arm, Patient Position: Sitting, Cuff Size: Standard)   Pulse 68   Ht 5' 5" (1 651 m)   Wt 77 2 kg (170 lb 4 8 oz)   BMI 28 34 kg/m²      Physical Exam      AA+O x 3  RRR, 2+ DP pulses bilaterally  Resp non-labored    R thigh incision as above    I have reviewed and made appropriate changes to the review of systems input by the medical assistant  Vitals:    03/22/22 1447   BP: 160/88   BP Location: Right arm   Patient Position: Sitting   Cuff Size: Standard   Pulse: 68   Weight: 77 2 kg (170 lb 4 8 oz)   Height: 5' 5" (1 651 m)       Patient Active Problem List   Diagnosis    Chest pain    Type II diabetes mellitus (HCC)    Leukocytosis    NSTEMI (non-ST elevated myocardial infarction) (HCC)    Thrombocytopenia (HCC)    S/P CABG x 3    Postoperative anemia due to acute blood loss    Sinus bradycardia    Encounter for postoperative care    CAD (coronary artery disease)    Left carotid bruit    GERD (gastroesophageal reflux disease)    Hypertension    Atheroscler native arteries the extremities w/intermit claudication ([address] )    Former tobacco use    History of PTCA       Past Surgical History:   Procedure Laterality Date    CARDIAC CATHETERIZATION      CARDIAC CATHETERIZATION N/A 10/20/2021    Procedure: Cardiac catheterization;  Surgeon: Stephanie Duhnam MD;  Location: [address] [place] [address] CATH LAB; Service: Cardiology    CARDIAC CATHETERIZATION N/A 10/20/2021    Procedure: Cardiac Coronary Angiogram;  Surgeon: Stephanie Dunham MD;  Location: [address] [place] [address] CATH LAB; Service: Cardiology    CARDIAC CATHETERIZATION N/A 10/20/2021    Procedure: Cardiac pci;  Surgeon: Stephanie Dunham MD;  Location: [address] [place] [address] CATH LAB;   Service: Cardiology    IR LOWER EXTREMITY ANGIOGRAM  1/20/2022    IR LOWER EXTREMITY ANGIOGRAM  2/3/2022    [place] CABG, ARTERY-VEIN, FOUR N/A 4/9/2021    Procedure: CORONARY ARTERY BYPASS GRAFT (CABG) 3 VESSELS: LIMA to LAD; LLE EVH/SVG to LPL & OM2;  Surgeon: Juliana Ordonez DO;  [facility];  Service: Cardiac Surgery    [place] ECHO TRANSESOPHAG R-T 2D W/PRB IMG ACQUISJ I&R N/A 2021    Procedure: TRANSESOPHAGEAL ECHOCARDIOGRAM (BENNETT); Surgeon: Sydnee Rosenberg DO;  [facility];  Service: Cardiac Surgery    [place] ENDOSCOPY W/VIDEO-ASST VEIN HARVEST,CABG Left 2021    Procedure: HARVEST VEIN ENDOSCOPIC ([address]); Surgeon: Sydnee Rosenberg DO;  [facility];  Service: Cardiac Surgery    [place] [facility] 3RD+ ORD SLCTV ABDL PEL/LXTR [facility] Left 2022    Procedure: ARTERIOGRAM, STENT PLACEMENT IN LEFT SUPERFICIAL [address] ARTERY;  Surgeon: Edi Lee MD;  [facility];  Service: Vascular    [place] VEIN BYPASS GRAFT,FEM-POP Right 2/3/2022    Procedure: BYPASS FEMORAL-POPLITEAL;  Surgeon: Edi Lee MD;  [facility];  Service: Vascular    VASECTOMY         Family History   Problem Relation Age of Onset    Heart disease Father        Social History     Socioeconomic History    Marital status:      Spouse name: Not on file    Number of children: Not on file    Years of education: Not on file    Highest education level: Not on file   Occupational History    Not on file   Tobacco Use    Smoking status: Former Smoker     Packs/day: 1 00     Years: 38 00     Pack years: 38 00     Types: Cigarettes     Quit date: 2021     Years since quittin 9    Smokeless tobacco: Never Used   Vaping Use    Vaping Use: Never used   Substance and Sexual Activity    Alcohol use: Never    Drug use: Never    Sexual activity: Not Currently   Other Topics Concern    Not on file   Social History Narrative    Not on file     Social Determinants of Health     Financial Resource Strain: Not on file   Food Insecurity: Not on file   Transportation Needs: No Transportation Needs    Lack of Transportation (Medical): No    Lack of Transportation (Non-Medical):  No   Physical Activity: Not on file   Stress: Not on file   Social Connections: Not on file   Intimate Partner Violence: Not on file   Housing Stability: Not on file       No Known Allergies      Current Outpatient Medications:     aspirin (ECOTRIN LOW STRENGTH) 81 mg EC tablet, Take 4 tablets (325 mg total) by mouth daily, Disp: 60 tablet, Rfl: 3    atorvastatin (LIPITOR) 80 mg tablet, Take 1 tablet (80 mg total) by mouth daily with dinner, Disp: 90 tablet, Rfl: 0    Brilinta 90 MG, TAKE 1 TABLET BY MOUTH TWICE A DAY, Disp: 60 tablet, Rfl: 0    metFORMIN (GLUCOPHAGE) 1000 MG tablet, Take 1 tablet (1,000 mg total) by mouth 2 (two) times a day with meals, Disp: 120 tablet, Rfl: 0    metoprolol succinate (TOPROL-XL) 25 mg 24 hr tablet, Take 0 5 tablets (12 5 mg total) by mouth daily, Disp: 90 tablet, Rfl: 3    isosorbide mononitrate (IMDUR) 30 mg 24 hr tablet, Take 1 tablet (30 mg total) by mouth 2 (two) times a day, Disp: 90 tablet, Rfl: 3

## 2022-03-22 NOTE — PATIENT INSTRUCTIONS
Plan:   Right thigh incision slowly healing   Continue with surgical soap   Apply peroxide and touch of Betadine   Follow up in about 4 week for re-evaluation or sooner if needed

## 2022-03-22 NOTE — LETTER
March 22, 2022     Patient: Jean-Claude Barton   YOB: 1965   Date of Visit: 3/22/2022       To Whom it May Concern:    Jean-Claude Barton is under my professional care  He was seen in my office on 3/22/2022  He will be re-evaluated in the office on 4/19/22  I expect that he should be ready to return to work without restriction at that time  If you have any questions or concerns, please don't hesitate to call           Sincerely,          Pranay Christensen PA-C        CC: No Recipients

## 2022-03-29 DIAGNOSIS — I21.4 NSTEMI (NON-ST ELEVATED MYOCARDIAL INFARCTION) (HCC): ICD-10-CM

## 2022-03-29 RX ORDER — TICAGRELOR 90 MG/1
TABLET ORAL
Qty: 60 TABLET | Refills: 0 | Status: SHIPPED | OUTPATIENT
Start: 2022-03-29 | End: 2022-04-28

## 2022-04-07 ENCOUNTER — RA CDI HCC (OUTPATIENT)
Dept: OTHER | Facility: HOSPITAL | Age: 57
End: 2022-04-07

## 2022-04-07 NOTE — PROGRESS NOTES
[facility]  coding opportunities          Chart Reviewed number of suggestions sent to Provider: 2     E11 65 Type 2 diabetes mellitus with hyperglycemia  *Elevated A1c:Labs    E11 51 Type 2 diabetes mellitus with diabetic peripheral angiopathy without gangrene  *As per ICD10 guidelines,For diabetic patients with peripheral arteriosclerosis, peripheral vascular disease, or peripheral arterial disease, you should use a combination code for "diabetic peripheral angiopathy "     If this is correct, please document and assess at your next visit,4/14/2022  Patients Insurance        Commercial Insurance: [employer]

## 2022-04-09 DIAGNOSIS — Z95.1 S/P CABG X 3: ICD-10-CM

## 2022-04-09 DIAGNOSIS — R73.09 ELEVATED HEMOGLOBIN A1C: ICD-10-CM

## 2022-04-09 DIAGNOSIS — Z72.0 TOBACCO ABUSE: ICD-10-CM

## 2022-04-09 DIAGNOSIS — D69.6 THROMBOCYTOPENIA (HCC): ICD-10-CM

## 2022-04-11 ENCOUNTER — OFFICE VISIT (OUTPATIENT)
Dept: GASTROENTEROLOGY | Facility: CLINIC | Age: 57
End: 2022-04-11
Payer: COMMERCIAL

## 2022-04-11 VITALS
WEIGHT: 172.2 LBS | DIASTOLIC BLOOD PRESSURE: 86 MMHG | SYSTOLIC BLOOD PRESSURE: 156 MMHG | HEART RATE: 71 BPM | HEIGHT: 65 IN | BODY MASS INDEX: 28.69 KG/M2

## 2022-04-11 DIAGNOSIS — Z12.11 SCREENING FOR COLON CANCER: Primary | ICD-10-CM

## 2022-04-11 PROCEDURE — 99203 OFFICE O/P NEW LOW 30 MIN: CPT | Performed by: PHYSICIAN ASSISTANT

## 2022-04-11 NOTE — PROGRESS NOTES
[facility] - Outpatient Consultation  Richard Gallagher 62 y o  male MRN: [id number]  Encounter: [id number]          ASSESSMENT AND PLAN:      1  Screening for colon cancer  He has never had a screening colonoscopy  Average risk    He has had multiple vascular surgeries this year - CABG, Coronary artery stenting, LE stent, LE bypass  He is on [place]    He will likely not be able to have a routine procedure until at least next December  I have reached out to his cardiologist and will place recall based upon his opinion    ______________________________________________________________________    HPI:  62year old male presents for evaluation prior to scheduling a screening colonoscopy  He has never had a colonoscopy  He denies any family history of colorectal cancer  He denies any gastrointestinal symptoms at present such as abdominal pain, diarrhea, constipation, rectal bleeding or unexpected weight loss  Patient has a complicated recent cardiac and vascular history  He underwent coronary artery bypass grafting last April followed by coronary artery stenting in December, lower extremity bypass in January and lower extremity stenting in February  He is maintained on aspirin and Brilinta  REVIEW OF SYSTEMS:    CONSTITUTIONAL: Denies any fever, chills, rigors, and weight loss  HEENT: No earache or tinnitus  Denies hearing loss or visual disturbances  CARDIOVASCULAR: No chest pain or palpitations  RESPIRATORY: Denies any cough, hemoptysis, shortness of breath or dyspnea on exertion  GASTROINTESTINAL: As noted in the History of Present Illness  GENITOURINARY: No problems with urination  Denies any hematuria or dysuria  NEUROLOGIC: No dizziness or vertigo, denies headaches  MUSCULOSKELETAL: Denies any muscle or joint pain  SKIN: Denies skin rashes or itching  ENDOCRINE: Denies excessive thirst  Denies intolerance to heat or cold    PSYCHOSOCIAL: Denies depression or anxiety  Denies any recent memory loss  Historical Information   Past Medical History:   Diagnosis Date    Bicuspid aortic valve     being observed    CAD (coronary artery disease)     Coronary artery disease     Diabetes mellitus ([facility] )     Former tobacco use     GERD (gastroesophageal reflux disease)     Goiter     History of myocardial infarction     History of rib fracture     Hyperlipidemia     Hypertension     Hypertensive urgency 4/6/2021    Leg pain, bilateral     Agram today LLE   1/20/2022    Myocardial infarction ([facility] [address] )     2021-   CABG  x3    Wears glasses      Past Surgical History:   Procedure Laterality Date    CARDIAC CATHETERIZATION      CARDIAC CATHETERIZATION N/A 10/20/2021    Procedure: Cardiac catheterization;  Surgeon: Dee Pierre MD;  Location: [address] [place] [address] CATH LAB; Service: Cardiology    CARDIAC CATHETERIZATION N/A 10/20/2021    Procedure: Cardiac Coronary Angiogram;  Surgeon: Dee Pierre MD;  Location: [address] [place] [address] CATH LAB; Service: Cardiology    CARDIAC CATHETERIZATION N/A 10/20/2021    Procedure: Cardiac pci;  Surgeon: Dee Pierre MD;  Location: [address] [place] [address] CATH LAB; Service: Cardiology    IR LOWER EXTREMITY ANGIOGRAM  1/20/2022    IR LOWER EXTREMITY ANGIOGRAM  2/3/2022    CA CABG, ARTERY-VEIN, FOUR N/A 4/9/2021    Procedure: CORONARY ARTERY BYPASS GRAFT (CABG) 3 VESSELS: LIMA to LAD; LLE EVH/SVG to LPL & OM2;  Surgeon: Kiera Charles DO;  [facility];  Service: Cardiac Surgery    CA ECHO TRANSESOPHAG R-T 2D W/PRB IMG ACQUISJ I&R N/A 4/9/2021    Procedure: TRANSESOPHAGEAL ECHOCARDIOGRAM (BENNETT); Surgeon: Kiera Charles DO;  [facility];  Service: Cardiac Surgery    CA ENDOSCOPY W/VIDEO-ASST VEIN HARVEST,CABG Left 4/9/2021    Procedure: HARVEST VEIN ENDOSCOPIC ([address]);   Surgeon: Kiera Charles DO;  [facility];  Service: Cardiac Surgery    CA Tim Gather 3RD+ ORD SLCTV ABDL PEL/LXTR [facility] Left 1/20/2022    Procedure: ARTERIOGRAM, STENT PLACEMENT IN LEFT SUPERFICIAL FEMORIAL ARTERY;  Surgeon: Day Mora MD;  [facility];  Service: Vascular    DC VEIN BYPASS GRAFT,FEM-POP Right 2/3/2022    Procedure: BYPASS FEMORAL-POPLITEAL;  Surgeon: Day Mora MD;  [facility];  Service: Vascular    VASECTOMY       Social History   Social History     Substance and Sexual Activity   Alcohol Use Never     Social History     Substance and Sexual Activity   Drug Use Never     Social History     Tobacco Use   Smoking Status Former Smoker    Packs/day: 1 00    Years: 38 00    Pack years: 38 00    Types: Cigarettes    Quit date: 2021    Years since quittin 0   Smokeless Tobacco Never Used     Family History   Problem Relation Age of Onset    Heart disease Father        Meds/Allergies       Current Outpatient Medications:     aspirin (ECOTRIN LOW STRENGTH) 81 mg EC tablet    atorvastatin (LIPITOR) 80 mg tablet    Brilinta 90 MG    metFORMIN (GLUCOPHAGE) 1000 MG tablet    metoprolol succinate (TOPROL-XL) 25 mg 24 hr tablet    isosorbide mononitrate (IMDUR) 30 mg 24 hr tablet    No Known Allergies        Objective     Blood pressure 156/86, pulse 71, height 5' 5" (1 651 m), weight 78 1 kg (172 lb 3 2 oz)  Body mass index is 28 66 kg/m²  PHYSICAL EXAM:      General Appearance:   Alert, cooperative, no distress   HEENT:   Normocephalic, atraumatic, anicteric      Neck:  Supple, symmetrical, trachea midline   Lungs:   Clear to auscultation bilaterally; no rales, rhonchi or wheezing; respirations unlabored    Heart[de-identified]   Regular rate and rhythm; no murmur, rub, or gallop     Abdomen:   Soft, non-tender, non-distended; normal bowel sounds; no masses, no organomegaly    Genitalia:   Deferred    Rectal:   Deferred    Extremities:  No cyanosis, clubbing or edema    Pulses:  2+ and symmetric    Skin:  No jaundice, rashes, or lesions    Lymph nodes:  No palpable cervical lymphadenopathy        Lab Results:   No visits with results within 1 Day(s) from this visit  Latest known visit with results is:   Admission on 02/03/2022, Discharged on 02/04/2022   Component Date Value    POC Glucose 02/03/2022 173*    Activated Clotting Time,* 02/03/2022 225*    Specimen Type 02/03/2022 ARTERIAL     Activated Clotting Time,* 02/03/2022 200*    Specimen Type 02/03/2022 ARTERIAL     Activated Clotting Time,* 02/03/2022 181*    Specimen Type 02/03/2022 ARTERIAL     POC Glucose 02/03/2022 187*    POC Glucose 02/03/2022 189*    WBC 02/04/2022 7 78     RBC 02/04/2022 3 49*    Hemoglobin 02/04/2022 9 4*    Hematocrit 02/04/2022 28 8*    MCV 02/04/2022 83     MCH 02/04/2022 26 9     MCHC 02/04/2022 32 6     RDW 02/04/2022 13 7     Platelets 58/93/8912 178     MPV 02/04/2022 9 4     Sodium 02/04/2022 138     Potassium 02/04/2022 3 7     Chloride 02/04/2022 108     CO2 02/04/2022 23     ANION GAP 02/04/2022 7     BUN 02/04/2022 16     Creatinine 02/04/2022 1 04     Glucose 02/04/2022 135     Calcium 02/04/2022 7 8*    eGFR 02/04/2022 79     POC Glucose 02/04/2022 155*    POC Glucose 02/04/2022 177*         Radiology Results:   No results found

## 2022-04-14 ENCOUNTER — TELEPHONE (OUTPATIENT)
Dept: ADMINISTRATIVE | Facility: OTHER | Age: 57
End: 2022-04-14

## 2022-04-14 ENCOUNTER — OFFICE VISIT (OUTPATIENT)
Dept: FAMILY MEDICINE CLINIC | Facility: CLINIC | Age: 57
End: 2022-04-14
Payer: COMMERCIAL

## 2022-04-14 VITALS
TEMPERATURE: 97.5 F | BODY MASS INDEX: 28.49 KG/M2 | HEIGHT: 65 IN | SYSTOLIC BLOOD PRESSURE: 126 MMHG | DIASTOLIC BLOOD PRESSURE: 82 MMHG | HEART RATE: 78 BPM | WEIGHT: 171 LBS | OXYGEN SATURATION: 98 %

## 2022-04-14 DIAGNOSIS — I25.110 CORONARY ARTERY DISEASE INVOLVING NATIVE CORONARY ARTERY OF NATIVE HEART WITH UNSTABLE ANGINA PECTORIS (HCC): ICD-10-CM

## 2022-04-14 DIAGNOSIS — E11.59 TYPE 2 DIABETES MELLITUS WITH OTHER CIRCULATORY COMPLICATION, WITHOUT LONG-TERM CURRENT USE OF INSULIN (HCC): Primary | ICD-10-CM

## 2022-04-14 DIAGNOSIS — I10 HYPERTENSION, UNSPECIFIED TYPE: ICD-10-CM

## 2022-04-14 DIAGNOSIS — Z12.5 SCREENING FOR PROSTATE CANCER: ICD-10-CM

## 2022-04-14 DIAGNOSIS — Z87.891 FORMER TOBACCO USE: ICD-10-CM

## 2022-04-14 DIAGNOSIS — Z00.00 ANNUAL PHYSICAL EXAM: ICD-10-CM

## 2022-04-14 DIAGNOSIS — Z12.11 SCREENING FOR COLON CANCER: ICD-10-CM

## 2022-04-14 DIAGNOSIS — Z12.2 ENCOUNTER FOR SCREENING FOR LUNG CANCER: ICD-10-CM

## 2022-04-14 DIAGNOSIS — D62 POSTOPERATIVE ANEMIA DUE TO ACUTE BLOOD LOSS: ICD-10-CM

## 2022-04-14 PROBLEM — R09.89 LEFT CAROTID BRUIT: Status: RESOLVED | Noted: 2021-07-22 | Resolved: 2022-04-14

## 2022-04-14 PROBLEM — I21.4 NSTEMI (NON-ST ELEVATED MYOCARDIAL INFARCTION) (HCC): Status: RESOLVED | Noted: 2021-04-07 | Resolved: 2022-04-14

## 2022-04-14 PROBLEM — Z48.89 ENCOUNTER FOR POSTOPERATIVE CARE: Status: RESOLVED | Noted: 2021-05-17 | Resolved: 2022-04-14

## 2022-04-14 PROBLEM — D72.829 LEUKOCYTOSIS: Status: RESOLVED | Noted: 2021-04-06 | Resolved: 2022-04-14

## 2022-04-14 PROBLEM — R07.9 CHEST PAIN: Status: RESOLVED | Noted: 2021-04-06 | Resolved: 2022-04-14

## 2022-04-14 LAB — SL AMB POCT HEMOGLOBIN AIC: 6.7 (ref ?–6.5)

## 2022-04-14 PROCEDURE — 99396 PREV VISIT EST AGE 40-64: CPT | Performed by: STUDENT IN AN ORGANIZED HEALTH CARE EDUCATION/TRAINING PROGRAM

## 2022-04-14 PROCEDURE — 83036 HEMOGLOBIN GLYCOSYLATED A1C: CPT | Performed by: STUDENT IN AN ORGANIZED HEALTH CARE EDUCATION/TRAINING PROGRAM

## 2022-04-14 PROCEDURE — 3725F SCREEN DEPRESSION PERFORMED: CPT | Performed by: STUDENT IN AN ORGANIZED HEALTH CARE EDUCATION/TRAINING PROGRAM

## 2022-04-14 PROCEDURE — 3044F HG A1C LEVEL LT 7.0%: CPT | Performed by: STUDENT IN AN ORGANIZED HEALTH CARE EDUCATION/TRAINING PROGRAM

## 2022-04-14 PROCEDURE — 1036F TOBACCO NON-USER: CPT | Performed by: STUDENT IN AN ORGANIZED HEALTH CARE EDUCATION/TRAINING PROGRAM

## 2022-04-14 NOTE — ASSESSMENT & PLAN NOTE
Lab Results   Component Value Date    HGBA1C 6 7 (A) 04/14/2022     Very well controlled  Continue metformin, diet and exercise  Follow urine microalbumin  recommend he stay compliant with lipitor   Foot exam and eye exam utd

## 2022-04-14 NOTE — LETTER
Diabetic Eye Exam Form    Date Requested: 22  Patient: Jessi Sorto  Patient : 1965   Referring Provider: Araceli Faye MD    DIABETIC Eye Exam Date _______________________________    Type of Exam MUST be documented for Diabetic Eye Exams  Please CHECK ONE  Retinal Exam       Dilated Retinal Exam       OCT       Optomap-Iris Exam      Fundus Photography     Left Eye - Please check Retinopathy AND Type or No Retinopathy      Exam did show retinopathy    Exam did not show retinopathy         Mild     Proliferative           Moderate    Severe            None         Right Eye - Please check Retinopathy AND Type or No Retinopathy     Exam did show retinopathy    Exam did not show retinopathy         Mild     Proliferative        Moderate    Severe        None       Comments __________________________________________________________    Practice Providing Exam ______________________________________________    Exam Performed By (print name) _______________________________________      Provider Signature ___________________________________________________    These reports are needed for  compliance  Please fax this completed form and a copy of the Diabetic Eye Exam report to our office located at [place] [address] as soon as possible via [phone] \[facility] [place] [facility]\"": Phone [phone]  We thank you for your assistance in treating our mutual patient     [address] - [phone] F [phone]

## 2022-04-14 NOTE — PROGRESS NOTES
[address]    NAME: Tasha Verdin  AGE: 62 y o  SEX: male  : 1965     DATE: 2022     Assessment and Plan:     Problem List Items Addressed This Visit        Endocrine    Type II diabetes mellitus ([address] ) - Primary       Lab Results   Component Value Date    HGBA1C 6 7 (A) 2022     Very well controlled  Continue metformin, diet and exercise  Follow urine microalbumin  recommend he stay compliant with lipitor  Foot exam and eye exam utd          Relevant Orders    POCT hemoglobin A1c (Completed)    Comprehensive metabolic panel    Lipid panel    Microalbumin / creatinine urine ratio       Cardiovascular and Mediastinum    CAD (coronary artery disease)    Relevant Orders    Comprehensive metabolic panel    Lipid panel    Hypertension     Well controlled, continue imdur and BB            Other    Postoperative anemia due to acute blood loss    Relevant Orders    CBC and differential    Former tobacco use     1 year tobacco free, did discuss screening for lung cancer and will move forward with this  Other Visit Diagnoses     Screening for colon cancer        Annual physical exam        Encounter for screening for lung cancer        Relevant Orders    CT lung screening program    Screening for prostate cancer        Relevant Orders    PSA, Total Screen          Immunizations and preventive care screenings were discussed with patient today  Appropriate education was printed on patient's after visit summary  Counseling:  · Exercise: the importance of regular exercise/physical activity was discussed  Recommend exercise 3-5 times per week for at least 30 minutes  Depression Screening and Follow-up Plan: Patient was screened for depression during today's encounter  They screened negative with a PHQ-2 score of 0  Return in about 4 months (around 2022) for Recheck a1c  Chief Complaint:     Chief Complaint   Patient presents with    Physical Exam     Pt presents today for an annual physical exam  Pt defers order for CRC screening at this time  Pt will also try to give a urine sample today before he leaves for Urine Microalbumin   Care Gap Request     Care Gap Request sent for DM eye exam completed at [facility]  Exam was performed within the last year  History of Present Illness:     Adult Annual Physical   Patient here for a comprehensive physical exam  The patient reports no problems  Diet and Physical Activity  · Diet/Nutrition: well balanced diet  · Exercise: walking  Depression Screening  PHQ-2/9 Depression Screening    Little interest or pleasure in doing things: 0 - not at all  Feeling down, depressed, or hopeless: 0 - not at all  PHQ-2 Score: 0  PHQ-2 Interpretation: Negative depression screen       General Health  · Sleep: sleeps well  · Hearing: normal - bilateral   · Vision: no vision problems and goes for regular eye exams  · Dental: regular dental visits   Health  · Symptoms include: none     Review of Systems:     Review of Systems   Constitutional: Negative for chills, fatigue and fever  HENT: Negative for rhinorrhea and sore throat  Eyes: Negative for visual disturbance  Respiratory: Negative for cough and shortness of breath  Cardiovascular: Negative for chest pain and palpitations  Gastrointestinal: Negative for abdominal pain, constipation, diarrhea, nausea and vomiting  Genitourinary: Negative for difficulty urinating, dysuria and frequency  Musculoskeletal: Negative for arthralgias and myalgias  Skin: Negative for color change and rash  Neurological: Negative for weakness and headaches        Past Medical History:     Past Medical History:   Diagnosis Date    Bicuspid aortic valve     being observed    CAD (coronary artery disease)     Chest pain 4/6/2021    Coronary artery disease     Diabetes mellitus ([facility] )     Encounter for postoperative care 5/17/2021    Former tobacco use     GERD (gastroesophageal reflux disease)     Goiter     History of myocardial infarction     History of rib fracture     Hyperlipidemia     Hypertension     Hypertensive urgency 4/6/2021    Left carotid bruit 7/22/2021    Leg pain, bilateral     Agram today LLE   1/20/2022    Leukocytosis 4/6/2021    Myocardial infarction [facility])     2021-   CABG  x3    NSTEM[facility]non-ST elevated [facility]) [facility] 4/7/2021    Wears glasses       Past Surgical History:     Past Surgical History:   Procedure Laterality Date    CARDIAC CATHETERIZATION      CARDIAC CATHETERIZATION N/A 10/20/2021    Procedure: Cardiac catheterization;  Surgeon: Philip Savage MD;  Location: [address] [place] [address] CATH LAB; Service: Cardiology    CARDIAC CATHETERIZATION N/A 10/20/2021    Procedure: Cardiac Coronary Angiogram;  Surgeon: Philip Savage MD;  Location: [address] [place] [address] CATH LAB; Service: Cardiology    CARDIAC CATHETERIZATION N/A 10/20/2021    Procedure: Cardiac pci;  Surgeon: Philip Savage MD;  Location: [address] [place] [address] CATH LAB; Service: Cardiology    IR LOWER EXTREMITY ANGIOGRAM  1/20/2022    IR LOWER EXTREMITY ANGIOGRAM  2/3/2022    OK CABG, ARTERY-VEIN, FOUR N/A 4/9/2021    Procedure: CORONARY ARTERY BYPASS GRAFT (CABG) 3 VESSELS: LIMA to LAD; LLE EVH/SVG to LPL & OM2;  Surgeon: Pam Lomax DO;  [facility];  Service: Cardiac Surgery    OK ECHO TRANSESOPHAG R-T 2D W/PRB IMG ACQUISJ I&R N/A 4/9/2021    Procedure: TRANSESOPHAGEAL ECHOCARDIOGRAM (BENNETT); Surgeon: Pam Lomax DO;  [facility];  Service: Cardiac Surgery    OK ENDOSCOPY W/VIDEO-ASST VEIN HARVEST,CABG Left 4/9/2021    Procedure: HARVEST VEIN ENDOSCOPIC ([address]);   Surgeon: Pam Lomax DO;  [facility];  Service: Cardiac Surgery    OK Modesto Barnett 3RD+ ORD SLCTV ABDL PEL/LXTR [facility] Left 1/20/2022    Procedure: ARTERIOGRAM, STENT PLACEMENT IN LEFT SUPERFICIAL FEMORIAL ARTERY;  Surgeon: Burgess Sydnee MD;  [facility];  Service: Vascular    TN VEIN BYPASS GRAFT,FEM-POP Right 2/3/2022    Procedure: BYPASS FEMORAL-POPLITEAL;  Surgeon: Burgess Sydnee MD;  [facility];  Service: Vascular    VASECTOMY        Family History:     Family History   Problem Relation Age of Onset    Heart disease Father       Social History:     Social History     Socioeconomic History    Marital status:      Spouse name: None    Number of children: None    Years of education: None    Highest education level: None   Occupational History    None   Tobacco Use    Smoking status: Former Smoker     Packs/day: 1 00     Years: 38 00     Pack years: 38 00     Types: Cigarettes     Quit date: 2021     Years since quittin 0    Smokeless tobacco: Never Used   Vaping Use    Vaping Use: Never used   Substance and Sexual Activity    Alcohol use: Never    Drug use: Never    Sexual activity: Not Currently   Other Topics Concern    None   Social History Narrative    None     Social Determinants of Health     Financial Resource Strain: Not on file   Food Insecurity: Not on file   Transportation Needs: No Transportation Needs    Lack of Transportation (Medical): No    Lack of Transportation (Non-Medical):  No   Physical Activity: Not on file   Stress: Not on file   Social Connections: Not on file   Intimate Partner Violence: Not on file   Housing Stability: Not on file      Current Medications:     Current Outpatient Medications   Medication Sig Dispense Refill    aspirin (ECOTRIN LOW STRENGTH) 81 mg EC tablet Take 4 tablets (325 mg total) by mouth daily 60 tablet 3    Brilinta 90 MG TAKE 1 TABLET BY MOUTH TWICE A DAY 60 tablet 0    isosorbide mononitrate (IMDUR) 30 mg 24 hr tablet Take 1 tablet (30 mg total) by mouth 2 (two) times a day 90 tablet 3    metFORMIN (GLUCOPHAGE) 1000 MG tablet TAKE 1 TABLET BY MOUTH TWICE A DAY WITH MEALS 120 tablet 0    metoprolol succinate (TOPROL-XL) 25 mg 24 hr tablet Take 0 5 tablets (12 5 mg total) by mouth daily 90 tablet 3    atorvastatin (LIPITOR) 80 mg tablet Take 1 tablet (80 mg total) by mouth daily with dinner (Patient not taking: Reported on 4/14/2022 ) 90 tablet 0     No current facility-administered medications for this visit  Allergies:     No Known Allergies   Physical Exam:     /82 (BP Location: Left arm, Patient Position: Sitting, Cuff Size: Adult)   Pulse 78   Temp 97 5 °F (36 4 °C) (Tympanic)   Ht 5' 5" (1 651 m)   Wt 77 6 kg (171 lb)   SpO2 98%   BMI 28 46 kg/m²     Physical Exam  Vitals reviewed  Constitutional:       General: He is not in acute distress  Appearance: Normal appearance  He is not ill-appearing  HENT:      Head: Normocephalic and atraumatic  Right Ear: Tympanic membrane, ear canal and external ear normal       Left Ear: Tympanic membrane, ear canal and external ear normal       Nose: Nose normal       Mouth/Throat:      Mouth: Mucous membranes are moist       Pharynx: Oropharynx is clear  No oropharyngeal exudate or posterior oropharyngeal erythema  Eyes:      General: No scleral icterus  Right eye: No discharge  Left eye: No discharge  Extraocular Movements: Extraocular movements intact  Conjunctiva/sclera: Conjunctivae normal       Pupils: Pupils are equal, round, and reactive to light  Cardiovascular:      Rate and Rhythm: Normal rate and regular rhythm  Pulses: Normal pulses  Heart sounds: Normal heart sounds  No murmur heard  Pulmonary:      Effort: Pulmonary effort is normal  No respiratory distress  Breath sounds: Normal breath sounds  Abdominal:      General: Bowel sounds are normal       Palpations: Abdomen is soft  Tenderness: There is no abdominal tenderness  Musculoskeletal:         General: Normal range of motion  Cervical back: Normal range of motion and neck supple  Lymphadenopathy:      Cervical: No cervical adenopathy  Skin:     General: Skin is warm and dry  Capillary Refill: Capillary refill takes less than 2 seconds  Neurological:      General: No focal deficit present  Mental Status: He is alert and oriented to person, place, and time  Mental status is at baseline  Cranial Nerves: No cranial nerve deficit  Psychiatric:         Mood and Affect: Mood normal          Thought Content:  Thought content normal           Phan Bolanos MD  [facility] [address]

## 2022-04-14 NOTE — TELEPHONE ENCOUNTER
----- Message from Keyonna Xie sent at 4/14/2022  9:33 AM EDT -----  Regarding: Care gap request (DM Eye Exam)  04/14/22 9:33 AM    Humera, our patient Vincent Harper has had Diabetic Eye Exam completed/performed  Please assist in updating the patient chart by making an External outreach to [facility] facility  Unsure of which location they went to  The date of service is within the past year      Thank you,  Keyonna Xie   [facility] [address] [place]

## 2022-04-14 NOTE — PATIENT INSTRUCTIONS

## 2022-04-19 ENCOUNTER — OFFICE VISIT (OUTPATIENT)
Dept: VASCULAR SURGERY | Facility: CLINIC | Age: 57
End: 2022-04-19

## 2022-04-19 VITALS
HEIGHT: 65 IN | SYSTOLIC BLOOD PRESSURE: 128 MMHG | DIASTOLIC BLOOD PRESSURE: 84 MMHG | WEIGHT: 167 LBS | TEMPERATURE: 97.2 F | HEART RATE: 80 BPM | BODY MASS INDEX: 27.82 KG/M2

## 2022-04-19 DIAGNOSIS — I70.213 ATHEROSCLEROSIS OF NATIVE ARTERY OF BOTH LOWER EXTREMITIES WITH INTERMITTENT CLAUDICATION (HCC): Primary | ICD-10-CM

## 2022-04-19 DIAGNOSIS — I10 HYPERTENSION, UNSPECIFIED TYPE: ICD-10-CM

## 2022-04-19 PROCEDURE — 99024 POSTOP FOLLOW-UP VISIT: CPT | Performed by: PHYSICIAN ASSISTANT

## 2022-04-19 PROCEDURE — 3008F BODY MASS INDEX DOCD: CPT | Performed by: STUDENT IN AN ORGANIZED HEALTH CARE EDUCATION/TRAINING PROGRAM

## 2022-04-19 NOTE — LETTER
April 19, 2022     Patient: Brendon Palencia  YOB: 1965  Date of Visit: 4/19/2022      To Whom it May Concern:    Brendon Palencia is under my professional care  Gato Jacques was seen in my office on 4/19/2022  Gato Jacques may return to work on 4/25/2022 without restriction  If you have any questions or concerns, please don't hesitate to call           Sincerely,          Wilda Carrasquillo PA-C        CC: No Recipients

## 2022-04-19 NOTE — PROGRESS NOTES
Assessment/Plan:    RIGHT thigh incision after bypass is slowly healing      S/P RLE CFA-AK popliteal bypass with PTFE (2/3/22, Zev )     Atherosclerosis of native artery of both lower extremities with intermittent claudication (HCC)      R thigh incision is essentially healed; tiny 2-3 mm of superficial separation; no redness, edema or drainage  Bypass / thigh is soft, NT  2+PT pulse   11 weeks post op after R LE CFA to above knee popliteal bypass with PTFE      Plan:    activity as tolerated    continue to maintain good diabetes and blood pressure control    continue with aspirin/statin (Brilinta per cardiology)    check AMBER at 3 months (scheduled 5/23) and office visit    we discussed reasons to call to be seen sooner    no provided to return to work next week without restrictions          Subjective:      Patient ID: Davi Chery is a 62 y o  male  Patient presents today for 1 month f/u visit to re-check R thigh healing s/p RLE CFA-AK pop bypass w/ PTFE on 2/3/22, Dr Tyrone Kearney  Patient reports significant improvement in wound healing  HPI    Mr Davi Chery 62 y/oM former smoker, DMII, Htn, HLD, CAD w/NSTEMIx2, s/p CAB x3 (4/21) and PCI/NIRMALA (10/21), atherosclerosis lower extremities with short distance claudication of both lower extremities s/p LLE angiogram and treatment of SFA with balloon angioplasty and stenting (1/20/22, Zev), followed by RLE CFA-AK popliteal bypass with PTFE (2/3/22, Zev )       4/19/22:  Mr Jeanette Delaney was seen several times for a slow healing right thigh incision after bypass surgery  He reports that he is doing well  He offers no complaints  The right thigh incision is a essentially closed  There are couple mm of superficial separation of the incision  The incision is stable  There is no redness, swelling or drainage  Mr Jeanette Delaney is back to the gym and all activities  He is walking as much as he wants to without any leg pain or claudication    He remains smoke-free for 1 year now and was congratulated  He would like to return to work next week  He is working as a   A note for him to return to work without restriction was provided  The following portions of the patient's history were reviewed and updated as appropriate: allergies, current medications, past family history, past medical history, past social history, past surgical history and problem list     Review of Systems   Constitutional: Negative  HENT: Negative  Eyes: Negative  Respiratory: Negative  Cardiovascular: Negative  Gastrointestinal: Negative  Endocrine: Negative  Genitourinary: Negative  Musculoskeletal: Negative  Skin: Negative  Allergic/Immunologic: Negative  Neurological: Negative  Hematological: Negative  Psychiatric/Behavioral: Negative  Objective:      /84 (BP Location: Right arm, Patient Position: Sitting)   Pulse 80   Temp (!) 97 2 °F (36 2 °C) (Tympanic)   Ht 5' 5" (1 651 m)   Wt 75 8 kg (167 lb)   BMI 27 79 kg/m²     R thigh incision is essentially closed  There is tiny, a couple mm of superficial separation  There is no redness, swelling or drainage  The bypass is nontender  There is a R 2+ PT pulse  Physical Exam  Vitals and nursing note reviewed  Constitutional:       Appearance: He is well-developed  HENT:      Head: Normocephalic and atraumatic  Eyes:      Pupils: Pupils are equal, round, and reactive to light  Neck:      Thyroid: No thyromegaly  Vascular: No JVD  Trachea: Trachea normal    Cardiovascular:      Rate and Rhythm: Normal rate and regular rhythm  Pulses:           Carotid pulses are 2+ on the right side and 2+ on the left side  Radial pulses are 2+ on the right side and 2+ on the left side  Posterior tibial pulses are 2+ on the right side  Heart sounds: Normal heart sounds, S1 normal and S2 normal  No murmur heard  No friction rub   No gallop  Pulmonary:      Effort: Pulmonary effort is normal  No accessory muscle usage or respiratory distress  Breath sounds: Normal breath sounds  No wheezing or rales  Abdominal:      General: Bowel sounds are normal  There is no distension  Palpations: Abdomen is soft  Tenderness: There is no abdominal tenderness  Musculoskeletal:         General: No deformity  Normal range of motion  Cervical back: Neck supple  Skin:     General: Skin is warm and dry  Findings: No lesion or rash  Nails: There is no clubbing  Neurological:      Mental Status: He is alert and oriented to person, place, and time  Comments: Grossly normal    Psychiatric:         Behavior: Behavior is cooperative  I have reviewed and made appropriate changes to the review of systems input by the medical assistant  Vitals:    04/19/22 1319   BP: 128/84   BP Location: Right arm   Patient Position: Sitting   Pulse: 80   Temp: (!) 97 2 °F (36 2 °C)   TempSrc: Tympanic   Weight: 75 8 kg (167 lb)   Height: 5' 5" (1 651 m)       Patient Active Problem List   Diagnosis    Type II diabetes mellitus (HCC)    Thrombocytopenia (HCC)    S/P CABG x 3    Postoperative anemia due to acute blood loss    Sinus bradycardia    CAD (coronary artery disease)    GERD (gastroesophageal reflux disease)    Hypertension    Atheroscler native arteries the extremities w/intermit claudication ([address] )    Former tobacco use    History of PTCA       Past Surgical History:   Procedure Laterality Date    CARDIAC CATHETERIZATION      CARDIAC CATHETERIZATION N/A 10/20/2021    Procedure: Cardiac catheterization;  Surgeon: Carline Lock MD;  Location: [address] [place] [address] CATH LAB; Service: Cardiology    CARDIAC CATHETERIZATION N/A 10/20/2021    Procedure: Cardiac Coronary Angiogram;  Surgeon: Carline Lock MD;  Location: [address] [place] [address] CATH LAB;   Service: Cardiology    CARDIAC CATHETERIZATION N/A 10/20/2021 Procedure: Cardiac pci;  Surgeon: Robb Braga MD;  Location: [facility] CATH LAB; Service: Cardiology    IR LOWER EXTREMITY ANGIOGRAM  2022    IR LOWER EXTREMITY ANGIOGRAM  2/3/2022    HI CABG, ARTERY-VEIN, FOUR N/A 2021    Procedure: CORONARY ARTERY BYPASS GRAFT (CABG) 3 VESSELS: LIMA to LAD; LLE EVH/SVG to LPL & OM2;  Surgeon: Vel Chi DO;  [facility];  Service: Cardiac Surgery    HI ECHO TRANSESOPHAG R-T 2D W/PRB IMG ACQUISJ I&R N/A 2021    Procedure: TRANSESOPHAGEAL ECHOCARDIOGRAM (BENNETT); Surgeon: Vel Chi DO;  [facility];  Service: Cardiac Surgery    HI ENDOSCOPY W/VIDEO-ASST VEIN HARVEST,CABG Left 2021    Procedure: HARVEST VEIN ENDOSCOPIC ([address]);   Surgeon: Vel Chi DO;  [facility];  Service: Cardiac Surgery    HI [address] 3RD+ ORD SLCTV ABDL PEL/LXTR [facility] Left 2022    Procedure: ARTERIOGRAM, STENT PLACEMENT IN LEFT SUPERFICIAL [address] ARTERY;  Surgeon: Manuel Gallegos MD;  [facility];  Service: Vascular    HI VEIN BYPASS GRAFT,FEM-POP Right 2/3/2022    Procedure: BYPASS FEMORAL-POPLITEAL;  Surgeon: Manuel Gallegos MD;  [facility];  Service: Vascular    VASECTOMY         Family History   Problem Relation Age of Onset    Heart disease Father        Social History     Socioeconomic History    Marital status:      Spouse name: Not on file    Number of children: Not on file    Years of education: Not on file    Highest education level: Not on file   Occupational History    Not on file   Tobacco Use    Smoking status: Former Smoker     Packs/day: 1 00     Years: 38 00     Pack years: 38 00     Types: Cigarettes     Quit date: 2021     Years since quittin 0    Smokeless tobacco: Never Used   Vaping Use    Vaping Use: Never used   Substance and Sexual Activity    Alcohol use: Never    Drug use: Never    Sexual activity: Not Currently   Other Topics Concern    Not on file Social History Narrative    Not on file     Social Determinants of Health     Financial Resource Strain: Not on file   Food Insecurity: Not on file   Transportation Needs: No Transportation Needs    Lack of Transportation (Medical): No    Lack of Transportation (Non-Medical):  No   Physical Activity: Not on file   Stress: Not on file   Social Connections: Not on file   Intimate Partner Violence: Not on file   Housing Stability: Not on file       No Known Allergies      Current Outpatient Medications:     aspirin (ECOTRIN LOW STRENGTH) 81 mg EC tablet, Take 4 tablets (325 mg total) by mouth daily, Disp: 60 tablet, Rfl: 3    atorvastatin (LIPITOR) 80 mg tablet, Take 1 tablet (80 mg total) by mouth daily with dinner, Disp: 90 tablet, Rfl: 0    Brilinta 90 MG, TAKE 1 TABLET BY MOUTH TWICE A DAY, Disp: 60 tablet, Rfl: 0    isosorbide mononitrate (IMDUR) 30 mg 24 hr tablet, Take 1 tablet (30 mg total) by mouth 2 (two) times a day, Disp: 90 tablet, Rfl: 3    metFORMIN (GLUCOPHAGE) 1000 MG tablet, TAKE 1 TABLET BY MOUTH TWICE A DAY WITH MEALS, Disp: 120 tablet, Rfl: 0    metoprolol succinate (TOPROL-XL) 25 mg 24 hr tablet, Take 0 5 tablets (12 5 mg total) by mouth daily, Disp: 90 tablet, Rfl: 3

## 2022-04-19 NOTE — PATIENT INSTRUCTIONS
RIGHT thigh incision after bypass is slowly healing      S/P RLE CFA-AK popliteal bypass with PTFE (2/3/22, Zev )     Atherosclerosis of native artery of both lower extremities with intermittent claudication (HCC)      11 weeks post op after R LE CFA to above knee popliteal bypass with PTFE      activity as tolerated    continue to maintain good diabetes and blood pressure control    continue with aspirin/statin (Brilinta per cardiology)    check AMBER at 3 months (scheduled 5/23) and office visit with Dr Ynes wesley to return to work without restriction

## 2022-04-20 DIAGNOSIS — I21.4 NSTEMI (NON-ST ELEVATED MYOCARDIAL INFARCTION) (HCC): ICD-10-CM

## 2022-04-20 RX ORDER — ISOSORBIDE MONONITRATE 30 MG/1
TABLET, EXTENDED RELEASE ORAL
Qty: 180 TABLET | Refills: 1 | Status: SHIPPED | OUTPATIENT
Start: 2022-04-20

## 2022-04-20 NOTE — TELEPHONE ENCOUNTER
Upon review of the In Basket request and the patient's chart, initial outreach has been made via fax, please see Contacts section for details       Thank you  Su Yuen

## 2022-04-21 NOTE — TELEPHONE ENCOUNTER
Upon review of the In Basket request we were able to locate, review, and update the patient chart as requested for Diabetic Eye Exam     Any additional questions or concerns should be emailed to the Practice Liaisons via [email or web address] email, please do not reply via In Basket      Thank you  Madonna Reilly

## 2022-04-22 ENCOUNTER — TELEPHONE (OUTPATIENT)
Dept: CARDIOLOGY CLINIC | Facility: CLINIC | Age: 57
End: 2022-04-22

## 2022-04-22 NOTE — TELEPHONE ENCOUNTER
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

## 2022-04-22 NOTE — TELEPHONE ENCOUNTER
Patient was last seen by Dr Kirstin Ceballos on 2/1/22  States he had DOT physical on 4/22/22  Has follow up with Dr Kirstin Ceballos on 5/9/22    Patient is requesting a provider fill out DOT form so he is able to return to work on Monday    Please advise

## 2022-04-25 NOTE — TELEPHONE ENCOUNTER
The form has been dropped off & once it is completed, it needs to be faxed to: [phone]  The form is scanned in Media

## 2022-04-25 NOTE — TELEPHONE ENCOUNTER
Patient called  He wants to know if he should drop off the form at the office and if the doctor will be able to fill it out so he can go back to work      [phone]

## 2022-04-26 NOTE — TELEPHONE ENCOUNTER
Pt stopped at [facility] wanting to know the status of the forms b/c he was supposed to return to work on 4/25/22 & he couldn't return until the forms have been completed & faxed  Pt would also like a call once the forms have been completed

## 2022-04-27 ENCOUNTER — TELEPHONE (OUTPATIENT)
Dept: CARDIOLOGY CLINIC | Facility: CLINIC | Age: 57
End: 2022-04-27

## 2022-04-27 DIAGNOSIS — I21.4 NSTEMI (NON-ST ELEVATED MYOCARDIAL INFARCTION) (HCC): ICD-10-CM

## 2022-04-27 NOTE — TELEPHONE ENCOUNTER
Pt called & he is getting upset b/c he needs to return back to work & wants to know the status of the form

## 2022-04-28 RX ORDER — TICAGRELOR 90 MG/1
TABLET ORAL
Qty: 60 TABLET | Refills: 0 | Status: SHIPPED | OUTPATIENT
Start: 2022-04-28 | End: 2022-05-31

## 2022-05-09 ENCOUNTER — OFFICE VISIT (OUTPATIENT)
Dept: CARDIOLOGY CLINIC | Facility: CLINIC | Age: 57
End: 2022-05-09
Payer: COMMERCIAL

## 2022-05-09 VITALS
RESPIRATION RATE: 16 BRPM | WEIGHT: 169 LBS | HEIGHT: 65 IN | DIASTOLIC BLOOD PRESSURE: 86 MMHG | SYSTOLIC BLOOD PRESSURE: 152 MMHG | BODY MASS INDEX: 28.16 KG/M2 | HEART RATE: 82 BPM | OXYGEN SATURATION: 98 %

## 2022-05-09 DIAGNOSIS — Z95.1 S/P CABG X 3: Primary | ICD-10-CM

## 2022-05-09 DIAGNOSIS — I10 HYPERTENSION, UNSPECIFIED TYPE: ICD-10-CM

## 2022-05-09 DIAGNOSIS — I25.110 CORONARY ARTERY DISEASE INVOLVING NATIVE CORONARY ARTERY OF NATIVE HEART WITH UNSTABLE ANGINA PECTORIS (HCC): ICD-10-CM

## 2022-05-09 PROCEDURE — 3079F DIAST BP 80-89 MM HG: CPT | Performed by: INTERNAL MEDICINE

## 2022-05-09 PROCEDURE — 3077F SYST BP >= 140 MM HG: CPT | Performed by: INTERNAL MEDICINE

## 2022-05-09 PROCEDURE — 1036F TOBACCO NON-USER: CPT | Performed by: INTERNAL MEDICINE

## 2022-05-09 PROCEDURE — 4010F ACE/ARB THERAPY RXD/TAKEN: CPT | Performed by: INTERNAL MEDICINE

## 2022-05-09 PROCEDURE — 3008F BODY MASS INDEX DOCD: CPT | Performed by: INTERNAL MEDICINE

## 2022-05-09 PROCEDURE — 99214 OFFICE O/P EST MOD 30 MIN: CPT | Performed by: INTERNAL MEDICINE

## 2022-05-09 RX ORDER — LOSARTAN POTASSIUM 25 MG/1
25 TABLET ORAL DAILY
Qty: 90 TABLET | Refills: 3 | Status: SHIPPED | OUTPATIENT
Start: 2022-05-09

## 2022-05-09 NOTE — PROGRESS NOTES
Cardiology Follow Up    Chai Yadav  1965  [id number]  [facility] [address] [place] [id number] [phone]    Discussion/Summary:  1  CAD s/p CABG s/p PCI to distal lcx, recent PCI for  of OM  2  Subclavian artery stenosis s/p recent angioplasty  3  PAD with recent intervention  4  HTN  5  Dyslipidemia    Continue with aspirin and ticagrelor  Continue with statin therapy  Patient is very active  Denies exertional complaints    Hypertension:  Blood pressure is elevated  Will add losartan  Will check CBC given recent anemia  Dyslipidemia: Continue with statin  Follow-up in 4 months  Recommend patient presents to the emergency room or call our office if he has any new/persistent or progressive symptoms  Previous studies were reviewed  Safety measures were reviewed  Questions were entertained and answered  Patient was advised to report any problems requiring medical attention  Follow-up with PCP and appropriate specialist and lab work as discussed  Return for follow up visit as scheduled or earlier, if needed  Thank you for allowing me to participate in the care and evaluation of your patient  Should you have any questions, please feel free to contact me  History of Present Illness:     Chai Yadav is a 62 y o  male who presents for follow up for cardiovascular care  Denies chest pain, chest pressure, shortness of breath, dizziness, lightheadedness, presyncope or syncope  Denies orthopnea, PND or lower limb edema  Past medical history of coronary artery disease status post CABG x3, hypertension, hyperlipidemia, tobacco abuse  Patient had a non-STEMI in October 2021  Underwent PCI to circumflex  He had staged complex PCI for a    OM by Dr Madeline Mackey in December 2021    Echo 11/26/21:    Left Ventricle: Left ventricular cavity size is normal  Wall thickness is mildly increased  The left ventricular ejection fraction is 65%  Systolic function is normal  Global longitudinal strain is reduced at -15%  Wall motion is normal  There is concentric remodeling   Diastolic function is abnormal   Left atrial filling pressure is normal     Patient Active Problem List   Diagnosis    Type II diabetes mellitus (HCC)    Thrombocytopenia (HCC)    S/P CABG x 3    Postoperative anemia due to acute blood loss    Sinus bradycardia    CAD (coronary artery disease)    GERD (gastroesophageal reflux disease)    Hypertension    Atheroscler native arteries the extremities w/intermit claudication ([facility] )    Former tobacco use    History of PTCA     Past Medical History:   Diagnosis Date    Bicuspid aortic valve     being observed    CAD (coronary artery disease)     Chest pain 2021    Coronary artery disease     Diabetes mellitus ([facility] )     Encounter for postoperative care 2021    Former tobacco use     GERD (gastroesophageal reflux disease)     Goiter     History of myocardial infarction     History of rib fracture     Hyperlipidemia     Hypertension     Hypertensive urgency 2021    Left carotid bruit 2021    Leg pain, bilateral     Agram today LLE   2022    Leukocytosis 2021    Myocardial infarction ([facility] )     -   CABG  x3    NSTEMI (non-ST elevated myocardial infarction) ([facility] ) 2021    Wears glasses      Social History     Socioeconomic History    Marital status:      Spouse name: Not on file    Number of children: Not on file    Years of education: Not on file    Highest education level: Not on file   Occupational History    Not on file   Tobacco Use    Smoking status: Former Smoker     Packs/day: 1 00     Years: 38 00     Pack years: 38 00     Types: Cigarettes     Quit date: 2021     Years since quittin 0    Smokeless tobacco: Never Used   Vaping Use    Vaping Use: Never used   Substance and Sexual Activity    Alcohol use: Never    Drug use: Never    Sexual activity: Not Currently   Other Topics Concern    Not on file   Social History Narrative    Not on file     Social Determinants of Health     Financial Resource Strain: Not on file   Food Insecurity: Not on file   Transportation Needs: No Transportation Needs    Lack of Transportation (Medical): No    Lack of Transportation (Non-Medical): No   Physical Activity: Not on file   Stress: Not on file   Social Connections: Not on file   Intimate Partner Violence: Not on file   Housing Stability: Not on file      Family History   Problem Relation Age of Onset    Heart disease Father      Past Surgical History:   Procedure Laterality Date    CARDIAC CATHETERIZATION      CARDIAC CATHETERIZATION N/A 10/20/2021    Procedure: Cardiac catheterization;  Surgeon: Floresita Schwab MD;  Location: [address] [place] [address] CATH LAB; Service: Cardiology    CARDIAC CATHETERIZATION N/A 10/20/2021    Procedure: Cardiac Coronary Angiogram;  Surgeon: Floresita Schwab MD;  Location: [address] [place] [address] CATH LAB; Service: Cardiology    CARDIAC CATHETERIZATION N/A 10/20/2021    Procedure: Cardiac pci;  Surgeon: Floresita Schwab MD;  Location: [address] [place] [address] CATH LAB; Service: Cardiology    IR LOWER EXTREMITY ANGIOGRAM  1/20/2022    IR LOWER EXTREMITY ANGIOGRAM  2/3/2022    KS CABG, ARTERY-VEIN, FOUR N/A 4/9/2021    Procedure: CORONARY ARTERY BYPASS GRAFT (CABG) 3 VESSELS: LIMA to LAD; LLE EVH/SVG to LPL & OM2;  Surgeon: Astrid Wong DO;  [facility];  Service: Cardiac Surgery    KS ECHO TRANSESOPHAG R-T 2D W/PRB IMG ACQUISJ I&R N/A 4/9/2021    Procedure: TRANSESOPHAGEAL ECHOCARDIOGRAM (BENNETT); Surgeon: Astrid Wong DO;  [facility];  Service: Cardiac Surgery    KS ENDOSCOPY W/VIDEO-ASST VEIN HARVEST,CABG Left 4/9/2021    Procedure: HARVEST VEIN ENDOSCOPIC ([address]);   Surgeon: Astrid Wong DO;  [facility];  Service: Cardiac Surgery    KS SLCTV CATHJ 3RD+ ORD SLCTV ABDL PEL/LXTR [facility] Left 1/20/2022    Procedure: ARTERIOGRAM, STENT PLACEMENT IN LEFT SUPERFICIAL FEMORIAL ARTERY;  Surgeon: Lizeth Jimenez MD;  [facility];  Service: Vascular    NV VEIN BYPASS GRAFT,FEM-POP Right 2/3/2022    Procedure: BYPASS FEMORAL-POPLITEAL;  Surgeon: Lizeth Jimenez MD;  [facility];  Service: Vascular    VASECTOMY         Current Outpatient Medications:     aspirin (ECOTRIN LOW STRENGTH) 81 mg EC tablet, Take 4 tablets (325 mg total) by mouth daily, Disp: 60 tablet, Rfl: 3    atorvastatin (LIPITOR) 80 mg tablet, Take 1 tablet (80 mg total) by mouth daily with dinner, Disp: 90 tablet, Rfl: 0    Brilinta 90 MG, TAKE 1 TABLET BY MOUTH TWICE A DAY, Disp: 60 tablet, Rfl: 0    isosorbide mononitrate (IMDUR) 30 mg 24 hr tablet, TAKE 1 TABLET BY MOUTH TWICE A DAY, Disp: 180 tablet, Rfl: 1    metFORMIN (GLUCOPHAGE) 1000 MG tablet, TAKE 1 TABLET BY MOUTH TWICE A DAY WITH MEALS, Disp: 120 tablet, Rfl: 0    metoprolol succinate (TOPROL-XL) 25 mg 24 hr tablet, Take 0 5 tablets (12 5 mg total) by mouth daily (Patient taking differently: Take 12 5 mg by mouth daily Pt reports taking 25 mg daily ), Disp: 90 tablet, Rfl: 3    losartan (COZAAR) 25 mg tablet, Take 1 tablet (25 mg total) by mouth daily, Disp: 90 tablet, Rfl: 3  No Known Allergies    Labs:  Lab Results   Component Value Date    WBC 7 78 02/04/2022    HGB 9 4 (L) 02/04/2022    HCT 28 8 (L) 02/04/2022    MCV 83 02/04/2022     02/04/2022     Lab Results   Component Value Date    GLUCOSE 145 (H) 04/09/2021    CALCIUM 7 8 (L) 02/04/2022    K 3 7 02/04/2022    CO2 23 02/04/2022     02/04/2022    BUN 16 02/04/2022    CREATININE 1 04 02/04/2022     Lab Results   Component Value Date    HGBA1C 6 7 (A) 04/14/2022     No results found for: CHOL  Lab Results   Component Value Date    HDL 34 (L) 04/08/2021    HDL 35 (L) 04/07/2021     Lab Results   Component Value Date    LDLCALC 44 04/08/2021 [facility] 79 04/07/2021     Lab Results   Component Value Date    TRIG 97 04/08/2021    TRIG 91 04/07/2021         Review of Systems:  Review of Systems   Constitutional: Negative for activity change, appetite change, fatigue and fever  Respiratory: Negative for cough, chest tightness and shortness of breath  Cardiovascular: Negative for chest pain, palpitations and leg swelling  Gastrointestinal: Negative for nausea and vomiting  Genitourinary: Negative for dysuria  Musculoskeletal: Negative for back pain  Skin: Negative for color change  Neurological: Negative for dizziness, syncope, light-headedness and numbness  Psychiatric/Behavioral: Negative for agitation  All other systems reviewed and are negative  Physical Exam:  Physical Exam  Vitals and nursing note reviewed  Constitutional:       General: He is not in acute distress  Appearance: Normal appearance  He is not ill-appearing  HENT:      Head: Normocephalic and atraumatic  Eyes:      Extraocular Movements: Extraocular movements intact  Cardiovascular:      Rate and Rhythm: Normal rate and regular rhythm  Heart sounds: No murmur heard  No friction rub  No gallop  Pulmonary:      Effort: Pulmonary effort is normal  No respiratory distress  Breath sounds: Normal breath sounds  Abdominal:      General: There is no distension  Palpations: Abdomen is soft  Musculoskeletal:         General: No swelling  Normal range of motion  Cervical back: Normal range of motion  Skin:     General: Skin is warm and dry  Capillary Refill: Capillary refill takes less than 2 seconds  Coloration: Skin is not pale  Neurological:      General: No focal deficit present  Mental Status: He is alert and oriented to person, place, and time     Psychiatric:         Mood and Affect: Mood normal

## 2022-05-10 ENCOUNTER — APPOINTMENT (OUTPATIENT)
Dept: LAB | Facility: CLINIC | Age: 57
End: 2022-05-10
Payer: COMMERCIAL

## 2022-05-10 DIAGNOSIS — D62 POSTOPERATIVE ANEMIA DUE TO ACUTE BLOOD LOSS: ICD-10-CM

## 2022-05-10 DIAGNOSIS — I25.110 CORONARY ARTERY DISEASE INVOLVING NATIVE CORONARY ARTERY OF NATIVE HEART WITH UNSTABLE ANGINA PECTORIS (HCC): ICD-10-CM

## 2022-05-10 DIAGNOSIS — Z12.5 SCREENING FOR PROSTATE CANCER: ICD-10-CM

## 2022-05-10 DIAGNOSIS — E11.59 TYPE 2 DIABETES MELLITUS WITH OTHER CIRCULATORY COMPLICATION, WITHOUT LONG-TERM CURRENT USE OF INSULIN (HCC): ICD-10-CM

## 2022-05-10 LAB
BASOPHILS # BLD AUTO: 0.02 THOUSANDS/ΜL (ref 0–0.1)
BASOPHILS NFR BLD AUTO: 0 % (ref 0–1)
CREAT UR-MCNC: 38.3 MG/DL
EOSINOPHIL # BLD AUTO: 0.11 THOUSAND/ΜL (ref 0–0.61)
EOSINOPHIL NFR BLD AUTO: 2 % (ref 0–6)
ERYTHROCYTE [DISTWIDTH] IN BLOOD BY AUTOMATED COUNT: 15.5 % (ref 11.6–15.1)
HCT VFR BLD AUTO: 39 % (ref 36.5–49.3)
HGB BLD-MCNC: 12.4 G/DL (ref 12–17)
IMM GRANULOCYTES # BLD AUTO: 0.01 THOUSAND/UL (ref 0–0.2)
IMM GRANULOCYTES NFR BLD AUTO: 0 % (ref 0–2)
LYMPHOCYTES # BLD AUTO: 1.79 THOUSANDS/ΜL (ref 0.6–4.47)
LYMPHOCYTES NFR BLD AUTO: 27 % (ref 14–44)
MCH RBC QN AUTO: 24 PG (ref 26.8–34.3)
MCHC RBC AUTO-ENTMCNC: 31.8 G/DL (ref 31.4–37.4)
MCV RBC AUTO: 76 FL (ref 82–98)
MICROALBUMIN UR-MCNC: 34.9 MG/L (ref 0–20)
MICROALBUMIN/CREAT 24H UR: 91 MG/G CREATININE (ref 0–30)
MONOCYTES # BLD AUTO: 0.59 THOUSAND/ΜL (ref 0.17–1.22)
MONOCYTES NFR BLD AUTO: 9 % (ref 4–12)
NEUTROPHILS # BLD AUTO: 4.1 THOUSANDS/ΜL (ref 1.85–7.62)
NEUTS SEG NFR BLD AUTO: 62 % (ref 43–75)
NRBC BLD AUTO-RTO: 0 /100 WBCS
PLATELET # BLD AUTO: 216 THOUSANDS/UL (ref 149–390)
PMV BLD AUTO: 10.4 FL (ref 8.9–12.7)
RBC # BLD AUTO: 5.16 MILLION/UL (ref 3.88–5.62)
WBC # BLD AUTO: 6.62 THOUSAND/UL (ref 4.31–10.16)

## 2022-05-10 PROCEDURE — 82570 ASSAY OF URINE CREATININE: CPT | Performed by: STUDENT IN AN ORGANIZED HEALTH CARE EDUCATION/TRAINING PROGRAM

## 2022-05-10 PROCEDURE — 3060F POS MICROALBUMINURIA REV: CPT | Performed by: INTERNAL MEDICINE

## 2022-05-10 PROCEDURE — 36415 COLL VENOUS BLD VENIPUNCTURE: CPT | Performed by: INTERNAL MEDICINE

## 2022-05-10 PROCEDURE — 85025 COMPLETE CBC W/AUTO DIFF WBC: CPT | Performed by: INTERNAL MEDICINE

## 2022-05-10 PROCEDURE — 82043 UR ALBUMIN QUANTITATIVE: CPT | Performed by: STUDENT IN AN ORGANIZED HEALTH CARE EDUCATION/TRAINING PROGRAM

## 2022-05-23 ENCOUNTER — HOSPITAL ENCOUNTER (OUTPATIENT)
Dept: VASCULAR ULTRASOUND | Facility: HOSPITAL | Age: 57
Discharge: HOME | End: 2022-05-23
Payer: COMMERCIAL

## 2022-05-23 DIAGNOSIS — I70.213 ATHEROSCLEROSIS OF NATIVE ARTERY OF BOTH LOWER EXTREMITIES WITH INTERMITTENT CLAUDICATION (HCC): ICD-10-CM

## 2022-05-23 PROCEDURE — 93925 LOWER EXTREMITY STUDY: CPT | Performed by: SURGERY

## 2022-05-23 PROCEDURE — 93925 LOWER EXTREMITY STUDY: CPT

## 2022-05-23 PROCEDURE — 93923 UPR/LXTR ART STDY 3+ LVLS: CPT

## 2022-05-31 DIAGNOSIS — I21.4 NSTEMI (NON-ST ELEVATED MYOCARDIAL INFARCTION) (HCC): ICD-10-CM

## 2022-05-31 RX ORDER — TICAGRELOR 90 MG/1
TABLET ORAL
Qty: 60 TABLET | Refills: 0 | Status: SHIPPED | OUTPATIENT
Start: 2022-05-31 | End: 2022-06-29

## 2022-06-03 DIAGNOSIS — R73.09 ELEVATED HEMOGLOBIN A1C: ICD-10-CM

## 2022-06-03 DIAGNOSIS — D69.6 THROMBOCYTOPENIA (HCC): ICD-10-CM

## 2022-06-03 DIAGNOSIS — Z72.0 TOBACCO ABUSE: ICD-10-CM

## 2022-06-03 DIAGNOSIS — Z95.1 S/P CABG X 3: ICD-10-CM

## 2022-06-04 DIAGNOSIS — I25.10 CORONARY ARTERY DISEASE INVOLVING NATIVE CORONARY ARTERY OF NATIVE HEART WITHOUT ANGINA PECTORIS: ICD-10-CM

## 2022-06-06 RX ORDER — ASPIRIN 81 MG/1
TABLET ORAL
Qty: 90 TABLET | Refills: 3 | Status: SHIPPED | OUTPATIENT
Start: 2022-06-06

## 2022-06-24 NOTE — PROGRESS NOTES
Assessment/Plan:    Atherosclerosis of native artery of both lower extremities with intermittent claudication (HCC)  -     VAS lower limb arterial duplex, complete bilateral; Future    S/P RLE CFA-AK popliteal bypass with PTFE (2/3/22, Zev )     -5 mo Post op after R LE CFA to above knee popliteal bypass with PTFE   -Doing well; no claudication, rest pain or wounds  -AMBER AMBER 5/23/22    R CF to AK pop PTFE bypass is patent  0 95/140/81    L mSFA and stent patent, diffuse fem, poplital and tibioperoneal disease 1 09/103/89     Plan:  -Overall stable after R LE bypass  -Continue with aspirin 81 and atorvastatin 80 (Brilinta as per cardiology)  -Continue with regular exercise - recommend 30 minutes for 5 days weekly, heart healthy diet  -Maintain good diabetes and blood pressure control  -Monitor good foot/skin care; check feet daily for any wounds or changes  -Follow up AMBER in 3 months (due 8/23/22)  -Office visit in 6 months or sooner, if needed      Additional diagnoses:  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 complication, without long-term current use of insulin ([facility] )    Former tobacco use    CC: Dr George Main:      Patient ID: Cassia Wellington is a 62 y o  male  Pt is here to rev LEAD 5/23/22  Pt denies any R thigh pain or claudication symptoms  Pt is taking ASA, Atorvastatin and Brilinta  Pt is a former smoker  HPI   Mr Cassia Wellington 62 y/oM former smoker, DMII, Htn, HLD, CAD w/NSTEMIx2, s/p CAB x3 (4/21) and PCI/NIRMALA (10/21), atherosclerosis lower extremities with short distance claudication of both lower extremities s/p LLE angiogram and treatment of SFA with balloon angioplasty and stenting (1/20/22, Zev), followed by RLE CFA-AK popliteal bypass with PTFE (2/3/22, Zev ) Patient was seen several times post-op for slow healing, superficial thigh incision which eventually healed  He was able to return to work as a        6/27/22:   Mr Patti Painting returns for post-op visit after RLE RLE CFA-AK popliteal bypass with PTFE and to review duplex  Patient reports that he is doing well  He is back to work as a   He is walking as much as he wants to without any leg pain  He has no ischemic rest pain or wounds  He does feel the bypass in the right thigh  He is maintained on aspirin, ticagrelor (from cardiology) and atorvastatin  He is no problems with medications  We reviewed his lower extremity arterial duplex study which shows patent right lower extremity bypass and patent left SFA stent  He has overall preserved ABIs and metatarsal and toe pressures are above healing  Patient education regarding diabetes mellitus, foot care and atherosclerosis lower extremities was provided  A1C 6 7      AMBER 5/23/22  Operative History:  2022-02-03 Right CFA to above knee popliteal PTFE bypass graft (Dr Bertin Ring)  2022-01-20 Left Proximal to mid superficial femoral stent placement (Dr Bertin Ring)  2021-10-20 Coronary angiogram  2021-04-09 CABG x 3  2021-04-09 Left GSV harvest for CABG  Risk Factors  The patient has history of HTN, Diabetes, Hyperlipidemia, PAD, CAD, Sinus  bradycardia, MI and previous smoking (quit 1-5yrs ago)  Clinical  Right Pressure: 129/ mm Hg, Left Pressure: 117/ mm Hg  FINDINGS:     Segment                Right                  Left                                            Waveform   PSV (cm/s)  Waveform   PSV (cm/s)    Post  Tibial           Triphasic              Triphasic                Ant   Tibial            Triphasic              Triphasic                Inflow Anastomosis                       156                           High Thigh (Graft)                        48                           Mid Thigh (Graft)                         65                           Low Thigh (Graft)                         67                           Outflow Anastomosis                       74                           Common Femoral Artery                    128 109    Prox Profunda                            174                     95    Prox SFA - Stent                                                120    Mid SFA - Stent                                                  81    Dist SFA                                                         59    Proximal Pop                              72                     73    Distal Pop                                74                     45    Tibioperoneal                             61                     63    Dist Post Tibial                          60                     29    Prox  Ant  Tibial                         35                     25    Dist  Ant  Tibial                         18                     66    Dist Peroneal                             52                     63            CONCLUSION:     Impression:     RIGHT LOWER LIMB:  The common femoral to above knee popliteal PTFE bypass graft is widely patent  Diffuse disease noted throughout the native popliteal and tibioperoneal  arteries without significant focal stenosis  Ankle/Brachial index: 0 95 which is in the normal category, (Prior: 0 73)  PVR/ PPG tracings are normal  Triphasic waveforms noted at the ankle  Metatarsal pressure of 140 mm Hg  Great toe pressure of 81 mm Hg, within the healing range  LEFT LOWER LIMB:  The proximal to mid superficial femoral artery and stent is widely patent  Diffuse disease noted throughout the distal femoral, popliteal and  tibioperoneal arteries without significant focal stenosis  Ankle/Brachial index: 1 09 which is in the normal category, (Prior: 0 83)  PVR/ PPG tracings are normal  Triphasic waveforms noted at the ankle  Metatarsal pressure of 103 mm Hg  Great toe pressure of 89 mm Hg, within the healing range  Compared to previous study on 7/15/2021, there is is significant improvement in  the bilateral ABIs s/p intervention          The following portions of the patient's history were reviewed and updated as appropriate: allergies, current medications, past family history, past medical history, past social history, past surgical history and problem list     Review of Systems   Constitutional: Negative  HENT: Negative  Eyes: Negative  Respiratory: Negative  Cardiovascular: Negative  Gastrointestinal: Negative  Endocrine: Negative  Genitourinary: Negative  Musculoskeletal: Negative  Skin: Negative  Allergic/Immunologic: Negative  Neurological: Negative  Hematological: Negative  Psychiatric/Behavioral: Negative  Objective:      /80 (BP Location: Left arm, Patient Position: Sitting, Cuff Size: Standard)   Pulse 80   Temp 98 4 °F (36 9 °C) (Tympanic)   Ht 5' 5" (1 651 m)   Wt 74 4 kg (164 lb)   BMI 27 29 kg/m²        Physical Exam  Vitals and nursing note reviewed  Constitutional:       Appearance: He is well-developed  HENT:      Head: Normocephalic and atraumatic  Eyes:      Pupils: Pupils are equal, round, and reactive to light  Neck:      Thyroid: No thyromegaly  Vascular: No JVD  Trachea: Trachea normal    Cardiovascular:      Rate and Rhythm: Normal rate and regular rhythm  Pulses:           Carotid pulses are 2+ on the right side and 2+ on the left side  Radial pulses are 2+ on the right side and 2+ on the left side  Dorsalis pedis pulses are 2+ on the right side and 2+ on the left side  Posterior tibial pulses are 2+ on the right side and 2+ on the left side  Heart sounds: Normal heart sounds, S1 normal and S2 normal  No murmur heard  No friction rub  No gallop  Comments: R bypass signal present  Pulmonary:      Effort: Pulmonary effort is normal  No accessory muscle usage or respiratory distress  Breath sounds: Normal breath sounds  No wheezing or rales  Abdominal:      General: Bowel sounds are normal  There is no distension  Palpations: Abdomen is soft  Tenderness: There is no abdominal tenderness  Musculoskeletal:         General: No deformity  Normal range of motion  Cervical back: Neck supple  Right lower leg: No edema  Left lower leg: No edema  Skin:     General: Skin is warm and dry  Findings: No lesion or rash  Nails: There is no clubbing  Neurological:      Mental Status: He is alert and oriented to person, place, and time  Comments: Grossly normal    Psychiatric:         Behavior: Behavior is cooperative  I have reviewed and made appropriate changes to the review of systems input by the medical assistant  Vitals:    06/27/22 0837   BP: 132/80   BP Location: Left arm   Patient Position: Sitting   Cuff Size: Standard   Pulse: 80   Temp: 98 4 °F (36 9 °C)   TempSrc: Tympanic   Weight: 74 4 kg (164 lb)   Height: 5' 5" (1 651 m)       Patient Active Problem List   Diagnosis    Type II diabetes mellitus (HCC)    Thrombocytopenia (HCC)    S/P CABG x 3    Postoperative anemia due to acute blood loss    Sinus bradycardia    CAD (coronary artery disease)    GERD (gastroesophageal reflux disease)    Hypertension    Atheroscler native arteries the extremities w/intermit claudication ([address] )    Former tobacco use    History of PTCA       Past Surgical History:   Procedure Laterality Date    CARDIAC CATHETERIZATION      CARDIAC CATHETERIZATION N/A 10/20/2021    Procedure: Cardiac catheterization;  Surgeon: Kristine Danielle MD;  Location: [address] [place] [address] CATH LAB; Service: Cardiology    CARDIAC CATHETERIZATION N/A 10/20/2021    Procedure: Cardiac Coronary Angiogram;  Surgeon: Kristine Danielle MD;  Location: [address] [place] [address] CATH LAB; Service: Cardiology    CARDIAC CATHETERIZATION N/A 10/20/2021    Procedure: Cardiac pci;  Surgeon: Kristine Danielle MD;  Location: [address] [place] [address] CATH LAB;   Service: Cardiology    IR LOWER EXTREMITY ANGIOGRAM  1/20/2022    IR LOWER EXTREMITY ANGIOGRAM  2/3/2022    AR CABG, ARTERY-VEIN, FOUR N/A 2021    Procedure: CORONARY ARTERY BYPASS GRAFT (CABG) 3 VESSELS: LIMA to LAD; LLE EVH/SVG to LPL & OM2;  Surgeon: Arvin Gonzales DO;  [facility];  Service: Cardiac Surgery    MS ECHO TRANSESOPHAG R-T 2D W/PRB IMG ACQUISJ I&R N/A 2021    Procedure: TRANSESOPHAGEAL ECHOCARDIOGRAM (BENNETT); Surgeon: Arvin Gonzales DO;  [facility];  Service: Cardiac Surgery    MS ENDOSCOPY W/VIDEO-ASST VEIN HARVEST,CABG Left 2021    Procedure: HARVEST VEIN ENDOSCOPIC ([address]);   Surgeon: Arvin Gonzales DO;  [facility];  Service: Cardiac Surgery    MS [address] 3RD+ ORD SLCTV ABDL PEL/LXTR [facility] Left 2022    Procedure: ARTERIOGRAM, STENT PLACEMENT IN LEFT SUPERFICIAL [address] ARTERY;  Surgeon: Elisabeth Paris MD;  [facility];  Service: Vascular    MS VEIN BYPASS GRAFT,FEM-POP Right 2/3/2022    Procedure: BYPASS FEMORAL-POPLITEAL;  Surgeon: Elisabeth Paris MD;  [facility];  Service: Vascular    VASECTOMY         Family History   Problem Relation Age of Onset    Heart disease Father        Social History     Socioeconomic History    Marital status:      Spouse name: Not on file    Number of children: Not on file    Years of education: Not on file    Highest education level: Not on file   Occupational History    Not on file   Tobacco Use    Smoking status: Former Smoker     Packs/day: 1 00     Years: 38 00     Pack years: 38 00     Types: Cigarettes     Quit date: 2021     Years since quittin 2    Smokeless tobacco: Never Used   Vaping Use    Vaping Use: Never used   Substance and Sexual Activity    Alcohol use: Never    Drug use: Never    Sexual activity: Not Currently   Other Topics Concern    Not on file   Social History Narrative    Not on file     Social Determinants of Health     Financial Resource Strain: Not on file   Food Insecurity: Not on file   Transportation Needs: No Transportation Needs    Lack of Transportation (Medical): No    Lack of Transportation (Non-Medical):  No   Physical Activity: Not on file   Stress: Not on file   Social Connections: Not on file   Intimate Partner Violence: Not on file   Housing Stability: Not on file       No Known Allergies      Current Outpatient Medications:     aspirin (ECOTRIN LOW STRENGTH) 81 mg EC tablet, TAKE 1 TABLET BY MOUTH EVERY DAY, Disp: 90 tablet, Rfl: 3    atorvastatin (LIPITOR) 80 mg tablet, Take 1 tablet (80 mg total) by mouth daily with dinner, Disp: 90 tablet, Rfl: 0    Brilinta 90 MG, TAKE 1 TABLET BY MOUTH TWICE A DAY, Disp: 60 tablet, Rfl: 0    isosorbide mononitrate (IMDUR) 30 mg 24 hr tablet, TAKE 1 TABLET BY MOUTH TWICE A DAY, Disp: 180 tablet, Rfl: 1    losartan (COZAAR) 25 mg tablet, Take 1 tablet (25 mg total) by mouth daily, Disp: 90 tablet, Rfl: 3    metFORMIN (GLUCOPHAGE) 1000 MG tablet, TAKE 1 TABLET BY MOUTH TWICE A DAY WITH MEALS, Disp: 180 tablet, Rfl: 1    metoprolol succinate (TOPROL-XL) 25 mg 24 hr tablet, Take 0 5 tablets (12 5 mg total) by mouth daily (Patient taking differently: Take 12 5 mg by mouth daily Pt reports taking 25 mg daily), Disp: 90 tablet, Rfl: 3

## 2022-06-27 ENCOUNTER — OFFICE VISIT (OUTPATIENT)
Dept: VASCULAR SURGERY | Facility: CLINIC | Age: 57
End: 2022-06-27
Payer: COMMERCIAL

## 2022-06-27 VITALS
HEART RATE: 80 BPM | DIASTOLIC BLOOD PRESSURE: 80 MMHG | HEIGHT: 65 IN | SYSTOLIC BLOOD PRESSURE: 132 MMHG | WEIGHT: 164 LBS | TEMPERATURE: 98.4 F | BODY MASS INDEX: 27.32 KG/M2

## 2022-06-27 DIAGNOSIS — Z87.891 FORMER TOBACCO USE: ICD-10-CM

## 2022-06-27 DIAGNOSIS — E11.59 TYPE 2 DIABETES MELLITUS WITH OTHER CIRCULATORY COMPLICATION, WITHOUT LONG-TERM CURRENT USE OF INSULIN (HCC): ICD-10-CM

## 2022-06-27 DIAGNOSIS — I70.213 ATHEROSCLEROSIS OF NATIVE ARTERY OF BOTH LOWER EXTREMITIES WITH INTERMITTENT CLAUDICATION (HCC): Primary | ICD-10-CM

## 2022-06-27 PROCEDURE — 3079F DIAST BP 80-89 MM HG: CPT | Performed by: PHYSICIAN ASSISTANT

## 2022-06-27 PROCEDURE — 99213 OFFICE O/P EST LOW 20 MIN: CPT | Performed by: PHYSICIAN ASSISTANT

## 2022-06-27 PROCEDURE — 3075F SYST BP GE 130 - 139MM HG: CPT | Performed by: PHYSICIAN ASSISTANT

## 2022-06-27 PROCEDURE — 3008F BODY MASS INDEX DOCD: CPT | Performed by: PHYSICIAN ASSISTANT

## 2022-06-27 PROCEDURE — 1036F TOBACCO NON-USER: CPT | Performed by: PHYSICIAN ASSISTANT

## 2022-06-27 NOTE — PATIENT INSTRUCTIONS
-Post op after R LE CFA to above knee popliteal bypass with PTFE      -AMBER    R CF to AK pop PTFE bypass is patent   0 95/140/81    L mSFA and stent patent, diffuse fem, poplital and tibioperoneal disease 1 09/103/89     Plan:  -Overall stable after R LE bypass  -continue with aspirin 81 and atorvastatin 80 (Brilinta as per cardiology)  -Continue with regular exercise - recommend 30 minutes for 5 days weekly, heart healthy diet  -Maintain good diabetes and blood pressure control  -Follow up AMBER in 3 months (due 8/23/22)  -Office visit in 6 months or sooner, if needed

## 2022-06-29 DIAGNOSIS — I21.4 NSTEMI (NON-ST ELEVATED MYOCARDIAL INFARCTION) (HCC): ICD-10-CM

## 2022-06-29 RX ORDER — TICAGRELOR 90 MG/1
TABLET ORAL
Qty: 60 TABLET | Refills: 0 | Status: SHIPPED | OUTPATIENT
Start: 2022-06-29 | End: 2022-08-01

## 2022-06-29 NOTE — TELEPHONE ENCOUNTER
Requested medication(s) are due for refill today: Yes  Patient has already received a courtesy refill: No  Other reason request has been forwarded to provider: Dr Mikki Ruffin, are you able to refill this in the absence of Dr Ajith Ariza?

## 2022-07-31 DIAGNOSIS — I21.4 NSTEMI (NON-ST ELEVATED MYOCARDIAL INFARCTION) (HCC): ICD-10-CM

## 2022-08-01 RX ORDER — TICAGRELOR 90 MG/1
TABLET ORAL
Qty: 60 TABLET | Refills: 0 | Status: SHIPPED | OUTPATIENT
Start: 2022-08-01 | End: 2022-09-06

## 2022-08-30 DIAGNOSIS — Z95.1 S/P CABG X 3: ICD-10-CM

## 2022-08-31 RX ORDER — METOPROLOL SUCCINATE 25 MG/1
TABLET, EXTENDED RELEASE ORAL
Qty: 45 TABLET | Refills: 7 | Status: ON HOLD | OUTPATIENT
Start: 2022-08-31 | End: 2022-10-23 | Stop reason: SDUPTHER

## 2022-10-17 ENCOUNTER — APPOINTMENT (EMERGENCY)
Dept: VASCULAR ULTRASOUND | Facility: HOSPITAL | Age: 57
End: 2022-10-17
Payer: COMMERCIAL

## 2022-10-17 ENCOUNTER — HOSPITAL ENCOUNTER (EMERGENCY)
Facility: HOSPITAL | Age: 57
End: 2022-10-19
Attending: EMERGENCY MEDICINE
Payer: COMMERCIAL

## 2022-10-17 DIAGNOSIS — I21.4 NSTEMI (NON-ST ELEVATED MYOCARDIAL INFARCTION) (HCC): ICD-10-CM

## 2022-10-17 DIAGNOSIS — I73.9 CLAUDICATION OF RIGHT LOWER EXTREMITY (HCC): Primary | ICD-10-CM

## 2022-10-17 LAB
ALBUMIN SERPL BCP-MCNC: 4.2 G/DL (ref 3.5–5)
ALP SERPL-CCNC: 102 U/L (ref 46–116)
ALT SERPL W P-5'-P-CCNC: 46 U/L (ref 12–78)
ANION GAP SERPL CALCULATED.3IONS-SCNC: 8 MMOL/L (ref 4–13)
APTT PPP: 29 SECONDS (ref 23–37)
AST SERPL W P-5'-P-CCNC: 24 U/L (ref 5–45)
BASOPHILS # BLD AUTO: 0.03 THOUSANDS/ÂΜL (ref 0–0.1)
BASOPHILS NFR BLD AUTO: 1 % (ref 0–1)
BILIRUB SERPL-MCNC: 0.8 MG/DL (ref 0.2–1)
BUN SERPL-MCNC: 24 MG/DL (ref 5–25)
CALCIUM SERPL-MCNC: 9.2 MG/DL (ref 8.3–10.1)
CHLORIDE SERPL-SCNC: 105 MMOL/L (ref 96–108)
CO2 SERPL-SCNC: 28 MMOL/L (ref 21–32)
CREAT SERPL-MCNC: 1.17 MG/DL (ref 0.6–1.3)
EOSINOPHIL # BLD AUTO: 0.1 THOUSAND/ÂΜL (ref 0–0.61)
EOSINOPHIL NFR BLD AUTO: 2 % (ref 0–6)
ERYTHROCYTE [DISTWIDTH] IN BLOOD BY AUTOMATED COUNT: 13.6 % (ref 11.6–15.1)
GFR SERPL CREATININE-BSD FRML MDRD: 68 ML/MIN/1.73SQ M
GLUCOSE SERPL-MCNC: 88 MG/DL (ref 65–140)
HCT VFR BLD AUTO: 40.1 % (ref 36.5–49.3)
HGB BLD-MCNC: 12.8 G/DL (ref 12–17)
IMM GRANULOCYTES # BLD AUTO: 0.01 THOUSAND/UL (ref 0–0.2)
IMM GRANULOCYTES NFR BLD AUTO: 0 % (ref 0–2)
INR PPP: 1.18 (ref 0.84–1.19)
LACTATE SERPL-SCNC: 0.9 MMOL/L (ref 0.5–2)
LYMPHOCYTES # BLD AUTO: 2.11 THOUSANDS/ÂΜL (ref 0.6–4.47)
LYMPHOCYTES NFR BLD AUTO: 34 % (ref 14–44)
MCH RBC QN AUTO: 26.6 PG (ref 26.8–34.3)
MCHC RBC AUTO-ENTMCNC: 31.9 G/DL (ref 31.4–37.4)
MCV RBC AUTO: 83 FL (ref 82–98)
MONOCYTES # BLD AUTO: 0.62 THOUSAND/ÂΜL (ref 0.17–1.22)
MONOCYTES NFR BLD AUTO: 10 % (ref 4–12)
NEUTROPHILS # BLD AUTO: 3.43 THOUSANDS/ÂΜL (ref 1.85–7.62)
NEUTS SEG NFR BLD AUTO: 53 % (ref 43–75)
NRBC BLD AUTO-RTO: 0 /100 WBCS
PLATELET # BLD AUTO: 178 THOUSANDS/UL (ref 149–390)
PMV BLD AUTO: 9.6 FL (ref 8.9–12.7)
POTASSIUM SERPL-SCNC: 3.9 MMOL/L (ref 3.5–5.3)
PROT SERPL-MCNC: 8.7 G/DL (ref 6.4–8.4)
PROTHROMBIN TIME: 14.8 SECONDS (ref 11.6–14.5)
RBC # BLD AUTO: 4.82 MILLION/UL (ref 3.88–5.62)
SODIUM SERPL-SCNC: 141 MMOL/L (ref 135–147)
WBC # BLD AUTO: 6.3 THOUSAND/UL (ref 4.31–10.16)

## 2022-10-17 PROCEDURE — 85610 PROTHROMBIN TIME: CPT | Performed by: PHYSICIAN ASSISTANT

## 2022-10-17 PROCEDURE — 96365 THER/PROPH/DIAG IV INF INIT: CPT

## 2022-10-17 PROCEDURE — 99285 EMERGENCY DEPT VISIT HI MDM: CPT | Performed by: PHYSICIAN ASSISTANT

## 2022-10-17 PROCEDURE — 96366 THER/PROPH/DIAG IV INF ADDON: CPT

## 2022-10-17 PROCEDURE — 96375 TX/PRO/DX INJ NEW DRUG ADDON: CPT

## 2022-10-17 PROCEDURE — 85025 COMPLETE CBC W/AUTO DIFF WBC: CPT | Performed by: PHYSICIAN ASSISTANT

## 2022-10-17 PROCEDURE — 83605 ASSAY OF LACTIC ACID: CPT | Performed by: PHYSICIAN ASSISTANT

## 2022-10-17 PROCEDURE — 85730 THROMBOPLASTIN TIME PARTIAL: CPT | Performed by: PHYSICIAN ASSISTANT

## 2022-10-17 PROCEDURE — 36415 COLL VENOUS BLD VENIPUNCTURE: CPT | Performed by: PHYSICIAN ASSISTANT

## 2022-10-17 PROCEDURE — 99285 EMERGENCY DEPT VISIT HI MDM: CPT

## 2022-10-17 PROCEDURE — 80053 COMPREHEN METABOLIC PANEL: CPT | Performed by: PHYSICIAN ASSISTANT

## 2022-10-17 PROCEDURE — 93926 LOWER EXTREMITY STUDY: CPT

## 2022-10-17 RX ORDER — HEPARIN SODIUM 1000 [USP'U]/ML
2800 INJECTION, SOLUTION INTRAVENOUS; SUBCUTANEOUS
Status: DISCONTINUED | OUTPATIENT
Start: 2022-10-17 | End: 2022-10-19 | Stop reason: HOSPADM

## 2022-10-17 RX ORDER — HEPARIN SODIUM 10000 [USP'U]/100ML
3-30 INJECTION, SOLUTION INTRAVENOUS
Status: DISCONTINUED | OUTPATIENT
Start: 2022-10-17 | End: 2022-10-19 | Stop reason: HOSPADM

## 2022-10-17 RX ORDER — HEPARIN SODIUM 1000 [USP'U]/ML
5600 INJECTION, SOLUTION INTRAVENOUS; SUBCUTANEOUS
Status: DISCONTINUED | OUTPATIENT
Start: 2022-10-17 | End: 2022-10-19 | Stop reason: HOSPADM

## 2022-10-17 RX ORDER — HEPARIN SODIUM 1000 [USP'U]/ML
5600 INJECTION, SOLUTION INTRAVENOUS; SUBCUTANEOUS ONCE
Status: COMPLETED | OUTPATIENT
Start: 2022-10-17 | End: 2022-10-17

## 2022-10-17 RX ADMIN — HEPARIN SODIUM 18 UNITS/KG/HR: 10000 INJECTION, SOLUTION INTRAVENOUS at 22:39

## 2022-10-17 RX ADMIN — HEPARIN SODIUM 5600 UNITS: 1000 INJECTION INTRAVENOUS; SUBCUTANEOUS at 22:40

## 2022-10-18 ENCOUNTER — APPOINTMENT (EMERGENCY)
Dept: CT IMAGING | Facility: HOSPITAL | Age: 57
End: 2022-10-18
Payer: COMMERCIAL

## 2022-10-18 VITALS
SYSTOLIC BLOOD PRESSURE: 152 MMHG | WEIGHT: 166.67 LBS | BODY MASS INDEX: 27.73 KG/M2 | RESPIRATION RATE: 16 BRPM | HEART RATE: 81 BPM | TEMPERATURE: 98.7 F | OXYGEN SATURATION: 96 % | DIASTOLIC BLOOD PRESSURE: 89 MMHG

## 2022-10-18 PROBLEM — I73.9 CLAUDICATION OF RIGHT LOWER EXTREMITY (HCC): Status: ACTIVE | Noted: 2022-10-18

## 2022-10-18 LAB
ALBUMIN SERPL BCP-MCNC: 3.6 G/DL (ref 3.5–5)
ALP SERPL-CCNC: 85 U/L (ref 46–116)
ALT SERPL W P-5'-P-CCNC: 39 U/L (ref 12–78)
ANION GAP SERPL CALCULATED.3IONS-SCNC: 9 MMOL/L (ref 4–13)
APTT PPP: 59 SECONDS (ref 23–37)
APTT PPP: 72 SECONDS (ref 23–37)
APTT PPP: >210 SECONDS (ref 23–37)
AST SERPL W P-5'-P-CCNC: 21 U/L (ref 5–45)
BILIRUB SERPL-MCNC: 0.66 MG/DL (ref 0.2–1)
BUN SERPL-MCNC: 22 MG/DL (ref 5–25)
CALCIUM SERPL-MCNC: 8.7 MG/DL (ref 8.3–10.1)
CHLORIDE SERPL-SCNC: 106 MMOL/L (ref 96–108)
CO2 SERPL-SCNC: 26 MMOL/L (ref 21–32)
CREAT SERPL-MCNC: 1.07 MG/DL (ref 0.6–1.3)
ERYTHROCYTE [DISTWIDTH] IN BLOOD BY AUTOMATED COUNT: 13.8 % (ref 11.6–15.1)
GFR SERPL CREATININE-BSD FRML MDRD: 76 ML/MIN/1.73SQ M
GLUCOSE SERPL-MCNC: 120 MG/DL (ref 65–140)
GLUCOSE SERPL-MCNC: 136 MG/DL (ref 65–140)
GLUCOSE SERPL-MCNC: 150 MG/DL (ref 65–140)
GLUCOSE SERPL-MCNC: 228 MG/DL (ref 65–140)
HCT VFR BLD AUTO: 34.2 % (ref 36.5–49.3)
HGB BLD-MCNC: 11.3 G/DL (ref 12–17)
MCH RBC QN AUTO: 26.4 PG (ref 26.8–34.3)
MCHC RBC AUTO-ENTMCNC: 33 G/DL (ref 31.4–37.4)
MCV RBC AUTO: 80 FL (ref 82–98)
PLATELET # BLD AUTO: 150 THOUSANDS/UL (ref 149–390)
PMV BLD AUTO: 9.7 FL (ref 8.9–12.7)
POTASSIUM SERPL-SCNC: 3.5 MMOL/L (ref 3.5–5.3)
PROT SERPL-MCNC: 7.4 G/DL (ref 6.4–8.4)
RBC # BLD AUTO: 4.28 MILLION/UL (ref 3.88–5.62)
SODIUM SERPL-SCNC: 141 MMOL/L (ref 135–147)
WBC # BLD AUTO: 5.75 THOUSAND/UL (ref 4.31–10.16)

## 2022-10-18 PROCEDURE — 99223 1ST HOSP IP/OBS HIGH 75: CPT | Performed by: HOSPITALIST

## 2022-10-18 PROCEDURE — G1004 CDSM NDSC: HCPCS

## 2022-10-18 PROCEDURE — 96372 THER/PROPH/DIAG INJ SC/IM: CPT

## 2022-10-18 PROCEDURE — 93922 UPR/L XTREMITY ART 2 LEVELS: CPT | Performed by: SURGERY

## 2022-10-18 PROCEDURE — 85730 THROMBOPLASTIN TIME PARTIAL: CPT

## 2022-10-18 PROCEDURE — 99221 1ST HOSP IP/OBS SF/LOW 40: CPT | Performed by: SURGERY

## 2022-10-18 PROCEDURE — 75635 CT ANGIO ABDOMINAL ARTERIES: CPT

## 2022-10-18 PROCEDURE — 36415 COLL VENOUS BLD VENIPUNCTURE: CPT | Performed by: PHYSICIAN ASSISTANT

## 2022-10-18 PROCEDURE — 99204 OFFICE O/P NEW MOD 45 MIN: CPT | Performed by: FAMILY MEDICINE

## 2022-10-18 PROCEDURE — 85027 COMPLETE CBC AUTOMATED: CPT | Performed by: INTERNAL MEDICINE

## 2022-10-18 PROCEDURE — NC001 PR NO CHARGE: Performed by: PHYSICIAN ASSISTANT

## 2022-10-18 PROCEDURE — 96374 THER/PROPH/DIAG INJ IV PUSH: CPT

## 2022-10-18 PROCEDURE — 85730 THROMBOPLASTIN TIME PARTIAL: CPT | Performed by: PHYSICIAN ASSISTANT

## 2022-10-18 PROCEDURE — 82948 REAGENT STRIP/BLOOD GLUCOSE: CPT

## 2022-10-18 PROCEDURE — 96366 THER/PROPH/DIAG IV INF ADDON: CPT

## 2022-10-18 PROCEDURE — 93926 LOWER EXTREMITY STUDY: CPT | Performed by: SURGERY

## 2022-10-18 PROCEDURE — 85730 THROMBOPLASTIN TIME PARTIAL: CPT | Performed by: FAMILY MEDICINE

## 2022-10-18 PROCEDURE — 80053 COMPREHEN METABOLIC PANEL: CPT | Performed by: INTERNAL MEDICINE

## 2022-10-18 RX ORDER — ISOSORBIDE MONONITRATE 30 MG/1
TABLET, EXTENDED RELEASE ORAL
Qty: 180 TABLET | Refills: 1 | Status: SHIPPED | OUTPATIENT
Start: 2022-10-18

## 2022-10-18 RX ORDER — INSULIN LISPRO 100 [IU]/ML
1-6 INJECTION, SOLUTION INTRAVENOUS; SUBCUTANEOUS EVERY 6 HOURS
Status: DISCONTINUED | OUTPATIENT
Start: 2022-10-18 | End: 2022-10-19 | Stop reason: HOSPADM

## 2022-10-18 RX ORDER — LOSARTAN POTASSIUM 25 MG/1
25 TABLET ORAL DAILY
Status: DISCONTINUED | OUTPATIENT
Start: 2022-10-18 | End: 2022-10-19 | Stop reason: HOSPADM

## 2022-10-18 RX ORDER — ISOSORBIDE MONONITRATE 30 MG/1
30 TABLET, EXTENDED RELEASE ORAL 2 TIMES DAILY
Status: DISCONTINUED | OUTPATIENT
Start: 2022-10-18 | End: 2022-10-19 | Stop reason: HOSPADM

## 2022-10-18 RX ORDER — ATORVASTATIN CALCIUM 40 MG/1
80 TABLET, FILM COATED ORAL
Status: DISCONTINUED | OUTPATIENT
Start: 2022-10-18 | End: 2022-10-19 | Stop reason: HOSPADM

## 2022-10-18 RX ORDER — ASPIRIN 81 MG/1
81 TABLET ORAL DAILY
Refills: 3 | Status: DISCONTINUED | OUTPATIENT
Start: 2022-10-18 | End: 2022-10-19 | Stop reason: HOSPADM

## 2022-10-18 RX ORDER — METOPROLOL SUCCINATE 25 MG/1
12.5 TABLET, EXTENDED RELEASE ORAL DAILY
Status: DISCONTINUED | OUTPATIENT
Start: 2022-10-18 | End: 2022-10-19 | Stop reason: HOSPADM

## 2022-10-18 RX ADMIN — INSULIN LISPRO 2 UNITS: 100 INJECTION, SOLUTION INTRAVENOUS; SUBCUTANEOUS at 17:50

## 2022-10-18 RX ADMIN — TICAGRELOR 90 MG: 90 TABLET ORAL at 17:50

## 2022-10-18 RX ADMIN — ASPIRIN 81 MG: 81 TABLET, COATED ORAL at 09:13

## 2022-10-18 RX ADMIN — INSULIN LISPRO 1 UNITS: 100 INJECTION, SOLUTION INTRAVENOUS; SUBCUTANEOUS at 12:30

## 2022-10-18 RX ADMIN — TICAGRELOR 90 MG: 90 TABLET ORAL at 09:13

## 2022-10-18 RX ADMIN — LOSARTAN POTASSIUM 25 MG: 25 TABLET, FILM COATED ORAL at 09:13

## 2022-10-18 RX ADMIN — IOHEXOL 100 ML: 350 INJECTION, SOLUTION INTRAVENOUS at 00:23

## 2022-10-18 RX ADMIN — METOPROLOL SUCCINATE 12.5 MG: 25 TABLET, EXTENDED RELEASE ORAL at 09:13

## 2022-10-18 RX ADMIN — HEPARIN SODIUM 17 UNITS/KG/HR: 10000 INJECTION, SOLUTION INTRAVENOUS at 23:17

## 2022-10-18 RX ADMIN — ISOSORBIDE MONONITRATE 30 MG: 30 TABLET, EXTENDED RELEASE ORAL at 17:49

## 2022-10-18 RX ADMIN — ISOSORBIDE MONONITRATE 30 MG: 30 TABLET, EXTENDED RELEASE ORAL at 09:13

## 2022-10-18 RX ADMIN — HEPARIN SODIUM 2800 UNITS: 1000 INJECTION INTRAVENOUS; SUBCUTANEOUS at 21:30

## 2022-10-18 RX ADMIN — ATORVASTATIN CALCIUM 80 MG: 40 TABLET, FILM COATED ORAL at 17:52

## 2022-10-18 NOTE — ED NOTES
PT's last PTT 72 seconds  No change according to administration parameters of therapeutic range being 60-90 seconds         Mitchel Cochran RN  10/18/22 9505

## 2022-10-18 NOTE — ASSESSMENT & PLAN NOTE
Patient with history of CAD with CABG x3  Will continue on aspirin, beta-blocker, statin, Brilinta, and imdur

## 2022-10-18 NOTE — ED NOTES
Pt returned from [address] [place] [address] [address]       Jairo Guillory RN  10/17/22 4474

## 2022-10-18 NOTE — ED NOTES
FROM: [facility] ([address])  TO: --[place] med surg no telem  DX: Claudication of right lower extremity [facility])  EMS Tx Reason: Vascular  Transfer Priority Level (1,2,3): 2  Referring: Gene Lobo PA-C/ Dr Ralf Gregory  Accepting: Dr Maira Crowell  Transport (ALS/BLS, etc):  ALS  Reason for ALS/CCT if applicable (O2, tele, IVF, meds):No covid testing, Heparin, room air, no telem  P/U Time: TBD  Number for Report:  [phone] [facility], RN  10/18/22 6337

## 2022-10-18 NOTE — ED NOTES
Going to [facility] room [id number]  [facility] by June Kasper  Accepting Dr  [place] Gibson  Call report [phone]     [place]ana Weiss, [address] [facility]  10/18/22 8071

## 2022-10-18 NOTE — ASSESSMENT & PLAN NOTE
63yoM with a significant PMHx of former smoker, T2DM, HTN, CAD (s/p CABGx3 with L GSV harvest Apr 2021 and PCI Oct 2021 on DAPT ASA/Brillinta), PAD (s/p R CFA-AK pop bypass with PTFE 2/3/22 with Dr Anya Kaufman, L proximal SFA PTA/NIRMALA 1/20/22) who presented to [facility] for two week history of RLE claudication and 2 day history of ischemic rest pain  Now presents to [facility] for further management  Diagnostics  10/18/22 CTA Abdomen with runoff   - R CFA occlusion at origin with collateral filling of profunda  Bypass graft occluded and compressed as it enters adductor canal  AK Pop supplies 2-vessel runoff through peroneal and PT that fill plantar arch  Proximal AT occluded with distal reconstitution/filling of DP  - L CFA, SFA stent, profunda patent  Popliteal patent with peroneal runoff that reconstitutes distal PT and DP  PT occludes just beyond origin but fills plantar arch  AT occludes at proximal calf    10/17/22 LE Arterial Duplex  RLE: High grade stenosis vs occlusion of CFA to AK pop PTFE bypass graft   High grade stenosis vs occlusion of the CFA with trickle flow and distal AT  FRANCIE: 0 49 (0 95)/MTP 82(140)/GTP 25(81)  LLE:  FRANCIE: 1 05 (Prior 1 09)/(Prior 103)/GTV592 (Prior 89)    WBC 5 75  Hgb 11 3    BUN/Cr- 22/1 07  eGFR 76

## 2022-10-18 NOTE — ASSESSMENT & PLAN NOTE
Patient presented with worsening right lower leg pain for several days with radiation to his toes along with intermittent numbness in his toes  Patient has prior SFA stenting performed earlier this year  Doppler showed high-grade stenosis versus occlusion of the common from oral to above knee popliteal PTFE bypass graft    High-grade stenosis versus occlusion of the common femoral artery with trickle flow noted high-grade stenosis versus occlusion of the distal anterior tibial artery  Patient transferred to [facility] for vascular surgery evaluation  Will continue on heparin drip  Continue current pain management  Vascular surgery consulted

## 2022-10-18 NOTE — TELEPHONE ENCOUNTER
Name from pharmacy: ISOSORBIDE MONONIT ER 30 MG TB          Will file in chart as: isosorbide mononitrate (IMDUR) 30 mg 24 hr tablet    Sig: TAKE 1 TABLET BY MOUTH TWICE A DAY    Disp:  180 tablet    Refills:  1    Start: 10/17/2022    Class: Normal    Non-formulary For: NSTEMI (non-ST elevated myocardial infarction) ([facility] )    To pharmacy: DX Code Needed    Last ordered: 6 months ago by SEE Andino Last refill: 7/19/2022    Rx #: [id number]    Cardiovascular:  Nitrates Failed 10/17/2022 06:20 PM   Protocol Details  Valid encounter within last 6 months    BP completed in the last 12 months    No hospital admission in the last 30 days    Last Heart Rate in normal range and within 360 days      To be filled at: [id number] [place], [place] - [address]   Please review and refill if possible  Thanks!

## 2022-10-18 NOTE — ASSESSMENT & PLAN NOTE
· S/p CABG x3  · Continue asa, statin, brillinta, imdur,  BB therapy  · Outpatient Cardiology follow up

## 2022-10-18 NOTE — ED NOTES
Patient transported to [address] [place] [address] [address]       Anthony Nick, CASSANDRA  10/17/22 8958

## 2022-10-18 NOTE — CONSULTS
Please see Vascular Surgery consult as completed earlier today by Dr Martin Miranda-Scaff  10/18/2022

## 2022-10-18 NOTE — ED PROVIDER NOTES
History  Chief Complaint   Patient presents with   • Leg Pain     R leg pain going from low back to R calf for two days, new onset "numbness" to R first 3 toes today  2+ dorsalis pedis pulse to R foot  Patient is a 62year old male with a past medical history significant for CAD, DM, HTN, HLD presenting to the emergency department for evaluation of worsening right leg pain over the last 2 days  Pain starts in the lower gluteal region and radiates down to his toes  He also reports intermittent numbness to the first 3 toes of the right foot  He has a history of SFA stent in January of this year  Pain feels similar to his previous claudication requiring stent placement  Pain worse with exertion, better at rest  Pain mostly in the lower R calf  No fevers, chills, CP, SOB, abdominal pain, N/V/D  Patient currently takes aspirin and Brilinta daily  No other complaints at this time  History provided by:  Patient   used: No    Leg Pain  Location:  Leg  Injury: no    Leg location:  R leg  Pain details:     Quality:  Throbbing and aching    Severity:  Moderate    Onset quality:  Gradual    Duration:  2 days    Timing:  Intermittent    Progression:  Waxing and waning  Chronicity:  Recurrent  Dislocation: no    Foreign body present:  Unable to specify  Tetanus status:  Unknown  Prior injury to area:  No  Relieved by:  Rest  Worsened by:  Exercise and activity  Ineffective treatments:  None tried  Associated symptoms: numbness    Associated symptoms: no back pain, no decreased ROM, no fatigue, no fever, no itching, no muscle weakness, no neck pain, no stiffness, no swelling and no tingling        Prior to Admission Medications   Prescriptions Last Dose Informant Patient Reported?  Taking?   aspirin (ECOTRIN LOW STRENGTH) 81 mg EC tablet  Self No No   Sig: TAKE 1 TABLET BY MOUTH EVERY DAY   atorvastatin (LIPITOR) 80 mg tablet  Self No No   Sig: Take 1 tablet (80 mg total) by mouth daily with dinner isosorbide mononitrate (IMDUR) 30 mg 24 hr tablet   No No   Sig: TAKE 1 TABLET BY MOUTH TWICE A DAY   losartan (COZAAR) 25 mg tablet  Self No No   Sig: Take 1 tablet (25 mg total) by mouth daily   metFORMIN (GLUCOPHAGE) 1000 MG tablet  Self No No   Sig: TAKE 1 TABLET BY MOUTH TWICE A DAY WITH MEALS   metoprolol succinate (TOPROL-XL) 25 mg 24 hr tablet   No No   Sig: TAKE 1/2 TABLET BY MOUTH EVERY DAY   ticagrelor (Brilinta) 90 MG   No No   Sig: Take 1 tablet (90 mg total) by mouth 2 (two) times a day      Facility-Administered Medications: None       Past Medical History:   Diagnosis Date   • Bicuspid aortic valve     being observed   • CAD (coronary artery disease)    • Chest pain 4/6/2021   • Coronary artery disease    • Diabetes mellitus ([facility] )    • Encounter for postoperative care 5/17/2021   • Former tobacco use    • GERD (gastroesophageal reflux disease)    • Goiter    • History of myocardial infarction    • History of rib fracture    • Hyperlipidemia    • Hypertension    • Hypertensive urgency 4/6/2021   • Left carotid bruit 7/22/2021   • Leg pain, bilateral     Agram today LLE   1/20/2022   • Leukocytosis 4/6/2021   • Myocardial infarction [facility])     2021-   CABG  x3   • NSTEMI (non-ST elevated myocardial infarction) ([facility] ) 4/7/2021   • Wears glasses        Past Surgical History:   Procedure Laterality Date   • CARDIAC CATHETERIZATION     • CARDIAC CATHETERIZATION N/A 10/20/2021    Procedure: Cardiac catheterization;  Surgeon: Stephen Rose MD;  Location: [address] [place] [address] CATH LAB; Service: Cardiology   • CARDIAC CATHETERIZATION N/A 10/20/2021    Procedure: Cardiac Coronary Angiogram;  Surgeon: Stephen Rose MD;  Location: [address] [place] [address] CATH LAB; Service: Cardiology   • CARDIAC CATHETERIZATION N/A 10/20/2021    Procedure: Cardiac pci;  Surgeon: Stephen Rose MD;  Location: [address] [place] [address] CATH LAB;   Service: Cardiology   • IR LOWER EXTREMITY ANGIOGRAM  1/20/2022   • IR LOWER EXTREMITY ANGIOGRAM  2/3/2022   • OK CABG, ARTERY-VEIN, FOUR N/A 2021    Procedure: CORONARY ARTERY BYPASS GRAFT (CABG) 3 VESSELS: LIMA to LAD; LLE EVH/SVG to LPL & OM2;  Surgeon: Mami Richard DO;  Location: [facility];  Service: Cardiac Surgery   • NC ECHO TRANSESOPHAG R-T 2D W/PRB IMG ACQUISJ I&R N/A 2021    Procedure: TRANSESOPHAGEAL ECHOCARDIOGRAM (BENNETT); Surgeon: Mami Richard DO;  Location: [facility];  Service: Cardiac Surgery   • NC ENDOSCOPY W/VIDEO-ASST VEIN HARVEST,CABG Left 2021    Procedure: HARVEST VEIN ENDOSCOPIC ([address]); Surgeon: Mami Richard DO;  Location: [facility];  Service: Cardiac Surgery   • NC SLCTV CATHJ 3RD+ ORD SLCTV ABDL PEL/LXTR [facility] Left 2022    Procedure: ARTERIOGRAM, STENT PLACEMENT IN LEFT SUPERFICIAL FEMORIAL ARTERY;  Surgeon: Ivelisse Banuelos MD;  Location: [facility];  Service: Vascular   • NC VEIN BYPASS GRAFT,FEM-POP Right 2/3/2022    Procedure: BYPASS FEMORAL-POPLITEAL;  Surgeon: Ivelisse Banuelos MD;  [facility];  Service: Vascular   • VASECTOMY         Family History   Problem Relation Age of Onset   • Heart disease Father      I have reviewed and agree with the history as documented  E-Cigarette/Vaping   • E-Cigarette Use Never User      E-Cigarette/Vaping Substances   • Nicotine No    • THC No    • CBD No    • Flavoring No      Social History     Tobacco Use   • Smoking status: Former Smoker     Packs/day: 1 00     Years: 38 00     Pack years: 38 00     Types: Cigarettes     Quit date: 2021     Years since quittin 5   • Smokeless tobacco: Never Used   Vaping Use   • Vaping Use: Never used   Substance Use Topics   • Alcohol use: Never   • Drug use: Never       Review of Systems   Constitutional: Negative for chills, fatigue and fever  HENT: Negative for congestion and sore throat  Respiratory: Negative for chest tightness and shortness of breath  Cardiovascular: Negative for chest pain, palpitations and leg swelling     Gastrointestinal: Negative for abdominal pain, diarrhea, nausea and vomiting  Musculoskeletal: Positive for arthralgias and myalgias  Negative for back pain, neck pain and stiffness  Skin: Negative for itching  Neurological: Positive for numbness  All other systems reviewed and are negative  Physical Exam  Physical Exam  Vitals reviewed  Constitutional:       General: He is not in acute distress  Appearance: Normal appearance  He is normal weight  He is not ill-appearing, toxic-appearing or diaphoretic  HENT:      Head: Normocephalic and atraumatic  Right Ear: External ear normal       Left Ear: External ear normal       Mouth/Throat:      Mouth: Mucous membranes are moist       Pharynx: Oropharynx is clear  No oropharyngeal exudate or posterior oropharyngeal erythema  Eyes:      General: No scleral icterus  Right eye: No discharge  Left eye: No discharge  Extraocular Movements: Extraocular movements intact  Conjunctiva/sclera: Conjunctivae normal    Cardiovascular:      Rate and Rhythm: Normal rate and regular rhythm  Pulses:           Femoral pulses are 0 on the right side  Dorsalis pedis pulses are 0 on the right side and 2+ on the left side  Posterior tibial pulses are detected w/ Doppler on the right side and 1+ on the left side  Heart sounds: Normal heart sounds  No murmur heard  No friction rub  No gallop  Pulmonary:      Effort: Pulmonary effort is normal  No respiratory distress  Breath sounds: Normal breath sounds  No stridor  No wheezing, rhonchi or rales  Musculoskeletal:         General: Normal range of motion  Cervical back: Normal range of motion and neck supple  Right lower leg: No edema  Left lower leg: No edema  Skin:     General: Skin is warm and dry  Capillary Refill: Capillary refill takes more than 3 seconds  RLE  Neurological:      General: No focal deficit present        Mental Status: He is alert and oriented to person, place, and time     Psychiatric:         Mood and Affect: Mood normal          Behavior: Behavior normal          Vital Signs  ED Triage Vitals [10/17/22 2010]   Temperature Pulse Respirations Blood Pressure SpO2   98 7 °F (37 1 °C) 75 16 (!) 184/99 100 %      Temp Source Heart Rate Source Patient Position - Orthostatic VS BP Location FiO2 (%)   Temporal Monitor Sitting Left arm --      Pain Score       --           Vitals:    10/18/22 1900 10/18/22 2000 10/18/22 2100 10/18/22 2200   BP: (!) 172/81 125/65 141/73 152/89   Pulse: 77 70 71 81   Patient Position - Orthostatic VS:  Sitting Lying Lying         Visual Acuity  Visual Acuity    Flowsheet Row Most Recent Value   L Pupil Size (mm) 3   R Pupil Size (mm) 3          ED Medications  Medications   heparin (porcine) injection 5,600 Units (5,600 Units Intravenous Given 10/17/22 2240)   iohexol (OMNIPAQUE) 350 MG/ML injection (SINGLE-DOSE) 100 mL (100 mL Intravenous Given 10/18/22 0023)       Diagnostic Studies  Results Reviewed     Procedure Component Value Units Date/Time    Fingerstick Glucose (POCT) [[id number]]  (Normal) Collected: 10/19/22 0014    Lab Status: Final result Updated: 10/19/22 0015     POC Glucose 118 mg/dl     APTT [[id number]]  (Abnormal) Collected: 10/18/22 2045    Lab Status: Final result Specimen: Blood from Arm, Right Updated: 10/18/22 2105     PTT 59 seconds     Fingerstick Glucose (POCT) [[id number]]  (Abnormal) Collected: 10/18/22 1749    Lab Status: Final result Updated: 10/18/22 1752     POC Glucose 228 mg/dl     APTT [[id number]]  (Abnormal) Collected: 10/18/22 1141    Lab Status: Final result Specimen: Blood from Arm, Right Updated: 10/18/22 1200     PTT 72 seconds     Fingerstick Glucose (POCT) [[id number]]  (Abnormal) Collected: 10/18/22 1131    Lab Status: Final result Updated: 10/18/22 1136     POC Glucose 150 mg/dl     Fingerstick Glucose (POCT) [[id number]]  (Normal) Collected: 10/18/22 0609    Lab Status: Final result Updated: 10/18/22 0647     POC Glucose 120 mg/dl     Comprehensive metabolic panel [[id number]] Collected: 10/18/22 0609    Lab Status: Final result Specimen: Blood from Arm, Right Updated: 10/18/22 0644     Sodium 141 mmol/L      Potassium 3 5 mmol/L      Chloride 106 mmol/L      CO2 26 mmol/L      ANION GAP 9 mmol/L      BUN 22 mg/dL      Creatinine 1 07 mg/dL      Glucose 136 mg/dL      Calcium 8 7 mg/dL      AST 21 U/L      ALT 39 U/L      Alkaline Phosphatase 85 U/L      Total Protein 7 4 g/dL      Albumin 3 6 g/dL      Total Bilirubin 0 66 mg/dL      eGFR 76 ml/min/1 73sq m     Narrative:      [place] guidelines for Chronic Kidney Disease (CKD):   •  Stage 1 with normal or high GFR (GFR > 90 mL/min/1 73 square meters)  •  Stage 2 Mild CKD (GFR = 60-89 mL/min/1 73 square meters)  •  Stage 3A Moderate CKD (GFR = 45-59 mL/min/1 73 square meters)  •  Stage 3B Moderate CKD (GFR = 30-44 mL/min/1 73 square meters)  •  Stage 4 Severe CKD (GFR = 15-29 mL/min/1 73 square meters)  •  Stage 5 End Stage CKD (GFR <15 mL/min/1 73 square meters)  Note: GFR calculation is accurate only with a steady state creatinine    CBC [[id number]]  (Abnormal) Collected: 10/18/22 0609    Lab Status: Final result Specimen: Blood from Arm, Right Updated: 10/18/22 0617     WBC 5 75 Thousand/uL      RBC 4 28 Million/uL      Hemoglobin 11 3 g/dL      Hematocrit 34 2 %      MCV 80 fL      MCH 26 4 pg      MCHC 33 0 g/dL      RDW 13 8 %      Platelets 809 Thousands/uL      MPV 9 7 fL     APTT [[id number]]  (Abnormal) Collected: 10/18/22 0433    Lab Status: Final result Specimen: Blood from Arm, Right Updated: 10/18/22 0531     PTT >210 seconds     APTT [[id number]]  (Normal) Collected: 10/17/22 2229    Lab Status: Final result Specimen: Blood from Arm, Right Updated: 10/17/22 2305     PTT 29 seconds     Protime-INR [[id number]]  (Abnormal) Collected: 10/17/22 2229    Lab Status: Final result Specimen: Blood from Arm, Right Updated: 10/17/22 2305     Protime 14 8 seconds      INR 1 18    Lactic acid [[id number]]  (Normal) Collected: 10/17/22 2229    Lab Status: Final result Specimen: Blood from Arm, Right Updated: 10/17/22 2259     LACTIC ACID 0 9 mmol/L     Narrative:      Result may be elevated if tourniquet was used during collection      Comprehensive metabolic panel [[id number]]  (Abnormal) Collected: 10/17/22 2229    Lab Status: Final result Specimen: Blood from Arm, Right Updated: 10/17/22 2257     Sodium 141 mmol/L      Potassium 3 9 mmol/L      Chloride 105 mmol/L      CO2 28 mmol/L      ANION GAP 8 mmol/L      BUN 24 mg/dL      Creatinine 1 17 mg/dL      Glucose 88 mg/dL      Calcium 9 2 mg/dL      AST 24 U/L      ALT 46 U/L      Alkaline Phosphatase 102 U/L      Total Protein 8 7 g/dL      Albumin 4 2 g/dL      Total Bilirubin 0 80 mg/dL      eGFR 68 ml/min/1 73sq m     Narrative:      National Kid[employer] guidelines for Chronic Kidney Disease (CKD):   •  Stage 1 with normal or high GFR (GFR > 90 mL/min/1 73 square meters)  •  Stage 2 Mild CKD (GFR = 60-89 mL/min/1 73 square meters)  •  Stage 3A Moderate CKD (GFR = 45-59 mL/min/1 73 square meters)  •  Stage 3B Moderate CKD (GFR = 30-44 mL/min/1 73 square meters)  •  Stage 4 Severe CKD (GFR = 15-29 mL/min/1 73 square meters)  •  Stage 5 End Stage CKD (GFR <15 mL/min/1 73 square meters)  Note: GFR calculation is accurate only with a steady state creatinine    CBC and differential [[id number]]  (Abnormal) Collected: 10/17/22 2229    Lab Status: Final result Specimen: Blood from Arm, Right Updated: 10/17/22 2241     WBC 6 30 Thousand/uL      RBC 4 82 Million/uL      Hemoglobin 12 8 g/dL      Hematocrit 40 1 %      MCV 83 fL      MCH 26 6 pg      MCHC 31 9 g/dL      RDW 13 6 %      MPV 9 6 fL      Platelets 945 Thousands/uL      nRBC 0 /100 WBCs      Neutrophils Relative 53 %      Immat GRANS % 0 %      Lymphocytes Relative 34 %      Monocytes Relative 10 %      Eosinophils Relative 2 %      Basophils Relative 1 %      Neutrophils Absolute 3 43 Thousands/µL      Immature Grans Absolute 0 01 Thousand/uL      Lymphocytes Absolute 2 11 Thousands/µL      Monocytes Absolute 0 62 Thousand/µL      Eosinophils Absolute 0 10 Thousand/µL      Basophils Absolute 0 03 Thousands/µL                  CTA abdominal w run off w wo contrast   Final Result by Gail Raymond MD (10/18 5339)      Occlusion of the right common femoral artery and right femoropopliteal bypass graft with reconstitution of the popliteal artery and two-vessel continuous runoff to the foot  The graft is significantly compressed as it passes through the adductor canal       Continuous single vessel left lower extremity runoff via the peroneal artery  60% superior mesenteric artery stenosis  Hepatic steatosis  Findings are consistent with the preliminary report from [facility] which was provided shortly after completion of the exam                 Workstation performed: [id number]         VAS lower limb arterial duplex, limited/unilateral   Final Result by Escobar Moctezuma MD (10/18 8977)                 Procedures  Procedures         ED Course  ED Course as of 10/19/22 0804   Tue Oct 18, 2022   0555 FRANCIE on the right side was 0 49  He has high-grade stenosis/occlusion of his common femoral artery  He does not have any flow throughout his superficial femoral artery graft  ARYA is occluded at the ankle, PTA is open  Discussed case with vascular surgery who recommended CTA of the abdomen and pelvis with runoff for further evaluation  Patient accepted for transfer at [facility]  Heparin was initiated  Patient currently stable awaiting transport  SBIRT 22yo+    Flowsheet Row Most Recent Value   SBIRT (25 yo +)    In order to provide better care to our patients, we are screening all of our patients for alcohol and drug use   Would it be okay to ask you these screening questions? Yes Filed at: 10/17/2022 2059   Initial Alcohol Screen: US AUDIT-C     1  How often do you have a drink containing alcohol? 1 Filed at: 10/17/2022 2059   2  How many drinks containing alcohol do you have on a typical day you are drinking? 1 Filed at: 10/17/2022 2059   3a  Male UNDER 65: How often do you have five or more drinks on one occasion? 0 Filed at: 10/17/2022 2059   Audit-C Score 2 Filed at: 10/17/2022 2059   EVGENY: How many times in the past year have you    Used an illegal drug or used a prescription medication for non-medical reasons? Never Filed at: 10/17/2022 2059                    MDM  Number of Diagnoses or Management Options  Claudication of right lower extremity [facility])  Diagnosis management comments: Patient presenting for evaluation of pain to his right lower extremity  Worse with exertion, better at rest   Concern for claudication  Patient does not have palpable or dopplerable DP pulses on the right side  He does have a weak PT pulse on the right side  Capillary refill prolonged to the right lower extremity  Arterial duplex obtained, revealed complete occlusion of superficial femoral artery stent  FRANCIE 0 49  Spoke with vascular surgery who recommended CTA of the abdomen and pelvis with runoffs  Recommend initiation of heparin and transfer to [place] for vascular surgery evaluation  Heparin initiated, transfer initiated to [place]  Patient with prolonged transport time, [place] and vascular surgery consulted  Ultimately transferred to [place] in stable condition         Amount and/or Complexity of Data Reviewed  Clinical lab tests: ordered and reviewed  Tests in the radiology section of CPT®: ordered and reviewed  Discuss the patient with other providers: yes  Independent visualization of images, tracings, or specimens: yes    Risk of Complications, Morbidity, and/or Mortality  Presenting problems: moderate  Diagnostic procedures: moderate  Management options: moderate    Patient Progress  Patient progress: stable      Disposition  Final diagnoses:   Claudication of right lower extremity ([address] )     Time reflects when diagnosis was documented in both MDM as applicable and the Disposition within this note     Time User Action Codes Description Comment    10/17/2022 11:54 PM Eliza Donnelly Add [I73 9] Claudication of right lower extremity [facility])       ED Disposition     ED Disposition   Transfer to Another Facility-In Network    Condition   --    Date/Time   Mon Oct 17, 2022 11:54 PM    Comment   Dee Swann should be transferred out to [facility]             MD Documentation    Diego Forte Most Recent Value   Patient Condition The patient has been stabilized such that within reasonable medical probability, no material deterioration of the patient condition or the condition of the unborn child(susan) is likely to result from the transfer   Reason for Transfer Level of Care needed not available at this facility   Benefits of Transfer Specialized equipment and/or services available at the receiving facility (Include comment)________________________  [facility] surgery]   Risks of Transfer Potential for delay in receiving treatment   Accepting Physician Dr Brittany Calhoun Name, [address]    (Name & Tel number) PACS   Transported by (Company and Unit #) [facility]   Provider Certification General risk, such as traffic hazards, adverse weather conditions, rough terrain or turbulence, possible failure of equipment (including vehicle or aircraft), or consequences of actions of persons outside the control of the transport personnel, The possibility of a transport vehicle being unavailable, Risk of worsening condition, Unanticipated needs of medical equipment and personnel during transport      RN Documentation    [address] Name, [address]    (Name & Tel number) PACS Transported by [facility] and Unit #) [id number]      Follow-up Information    None         Discharge Medication List as of 10/19/2022 12:58 AM      CONTINUE these medications which have CHANGED    Details   isosorbide mononitrate (IMDUR) 30 mg 24 hr tablet TAKE 1 TABLET BY MOUTH TWICE A DAY, Normal         CONTINUE these medications which have NOT CHANGED    Details   aspirin (ECOTRIN LOW STRENGTH) 81 mg EC tablet TAKE 1 TABLET BY MOUTH EVERY DAY, Normal      atorvastatin (LIPITOR) 80 mg tablet Take 1 tablet (80 mg total) by mouth daily with dinner, Starting Thu 4/15/2021, Normal      losartan (COZAAR) 25 mg tablet Take 1 tablet (25 mg total) by mouth daily, Starting Mon 5/9/2022, Normal      metFORMIN (GLUCOPHAGE) 1000 MG tablet TAKE 1 TABLET BY MOUTH TWICE A DAY WITH MEALS, Normal      metoprolol succinate (TOPROL-XL) 25 mg 24 hr tablet TAKE 1/2 TABLET BY MOUTH EVERY DAY, Normal      ticagrelor (Brilinta) 90 MG Take 1 tablet (90 mg total) by mouth 2 (two) times a day, Starting Tue 9/6/2022, Until Mon 12/5/2022, Normal             No discharge procedures on file      PDMP Review       Value Time User    PDMP Reviewed  Yes 2/4/2022  2:11 PM Ginger Gillis PA-C          ED Provider  Electronically Signed by           Zach Howe PA-C  10/19/22 7015

## 2022-10-18 NOTE — CONSULTS
[address] [facility] 1965, 62 y o  male MRN: [id number]  Unit/Bed#: ED 08 Encounter: [id number]  Primary Care Provider: Kota Hagan MD   Date and time admitted to hospital: 10/17/2022  8:24 PM    Consult to internal medicine  Consult performed by: Nikita Hernandez PA-C  Consult ordered by: Harper Flores PA-C          * Claudication of right lower extremity [facility])  Assessment & Plan  Patient with a past medical history significant for hypertension, hyperlipidemia, CAD, diabetes, PAD  Patient presenting to the ED for worsening right lower leg pain that is been ongoing for the last 2 days  Patient reports the pain starts in his gluteal region radiates to his toes  Also admits to intermittent numbness of his toes in the right foot  · Patient is s/p SFA stent in January of this year  · VAS right limb: There is high grade stenosis vs occlusion of the common femoral to above knee popliteal PTFE bypass graft  High grade stenosis vs occlusion of the common femoral artery with trickle flow noted  High grade stenosis vs occlusion of the distal anterior tibial artery    · Started on IV heparin drip  · Keep NPO  · Patient will need transfer to [address] [place] [address] for vascular intervention  · Consult to Vascular Surgery, recommendations are appreciated    Hypertension  Assessment & Plan  · BP well controlled  · Continue pre-hospital losartan and metoprolol  · Monitor vitals per routine    CAD (coronary artery disease)  Assessment & Plan  · S/p CABG x3  · Continue asa, statin, brillinta, imdur,  BB therapy  · Outpatient Cardiology follow up    Type II diabetes mellitus [facility])  Assessment & Plan    Lab Results   Component Value Date    HGBA1C 6 7 (A) 04/14/2022     · Holding home metformin  · Start on SSI with accu checks  · Hypoglycemia protocol        VTE Prophylaxis: Heparin  / sequential compression device     Recommendations for Discharge:  · Transfer to [address] [place] [address] / Coordination of Care Time: 45 minutes  Greater than 50% of total time spent on patient counseling and coordination of care  Collaboration of Care: Were Recommendations Directly Discussed with Primary Treatment Team? - Yes     History of Present Illness:    Richard Mcbride is a 62 y o  male who is being held in the ED for transfer to [address] [place] [address]  We are consulted for medical management  Patient with a past medical history significant for hypertension, hyperlipidemia, CAD, diabetes, PAD  Patient presenting to the ED for worsening right lower leg pain that is been ongoing for the last 2 days  Patient reports the pain starts in his gluteal region radiates to his toes  Also admits to intermittent numbness of his toes in the right foot  Patient is s/p SFA sign January of this year  Vas study showing high-grade stenosis of the right lower extremity  Patient requiring transfer to [address] [place] [address] for vascular intervention  Patient has no other complaints at this time  All patient questions answered to the best my ability  Review of Systems:    Review of Systems   Constitutional: Negative for chills and fever  HENT: Negative for ear pain and sore throat  Eyes: Negative for pain and visual disturbance  Respiratory: Negative for cough and shortness of breath  Cardiovascular: Negative for chest pain and palpitations  Gastrointestinal: Negative for abdominal pain and vomiting  Genitourinary: Negative for dysuria and hematuria  Musculoskeletal: Positive for myalgias  Negative for back pain  Skin: Negative for color change and rash  Neurological: Positive for numbness  Negative for seizures and syncope  All other systems reviewed and are negative        Past Medical and Surgical History:     Past Medical History:   Diagnosis Date   • Bicuspid aortic valve     being observed   • CAD (coronary artery disease)    • Chest pain 4/6/2021   • Coronary artery disease    • [facility]    • Encounter for postoperative care 5/17/2021   • Former tobacco use    • GERD (gastroesophageal reflux disease)    • Goiter    • History of myocardial infarction    • History of rib fracture    • Hyperlipidemia    • Hypertension    • Hypertensive urgency 4/6/2021   • Left carotid bruit 7/22/2021   • Leg pain, bilateral     Agram today LLE   1/20/2022   • Leukocytosis 4/6/2021   • Myocardial infarction [facility])     2021-   CABG  x3   • NSTEMI (non-ST elevated myocardial infarction) ([facility] ) 4/7/2021   • Wears glasses        Past Surgical History:   Procedure Laterality Date   • CARDIAC CATHETERIZATION     • CARDIAC CATHETERIZATION N/A 10/20/2021    Procedure: Cardiac catheterization;  Surgeon: Eliane Yadav MD;  Location: [address] [place] [address] CATH LAB; Service: Cardiology   • CARDIAC CATHETERIZATION N/A 10/20/2021    Procedure: Cardiac Coronary Angiogram;  Surgeon: Eliane Yadav MD;  Location: [address] [place] [address] CATH LAB; Service: Cardiology   • CARDIAC CATHETERIZATION N/A 10/20/2021    Procedure: Cardiac pci;  Surgeon: Eliane Yadav MD;  Location: [address] [place] [address] CATH LAB; Service: Cardiology   • IR LOWER EXTREMITY ANGIOGRAM  1/20/2022   • IR LOWER EXTREMITY ANGIOGRAM  2/3/2022   • MA CABG, ARTERY-VEIN, FOUR N/A 4/9/2021    Procedure: CORONARY ARTERY BYPASS GRAFT (CABG) 3 VESSELS: LIMA to LAD; LLE EVH/SVG to LPL & OM2;  Surgeon: Umair Wolf DO;  Location: [facility];  Service: Cardiac Surgery   • MA ECHO TRANSESOPHAG R-T 2D W/PRB IMG ACQUISJ I&R N/A 4/9/2021    Procedure: TRANSESOPHAGEAL ECHOCARDIOGRAM (BENNETT); Surgeon: Umair Wolf DO;  [facility];  Service: Cardiac Surgery   • MA ENDOSCOPY W/VIDEO-ASST VEIN HARVEST,CABG Left 4/9/2021    Procedure: HARVEST VEIN ENDOSCOPIC ([address]);   Surgeon: Umair Wolf DO;  [facility];  Service: Cardiac Surgery   • MA SLCTV CATHJ 3RD+ ORD SLCTV ABDL PEL/LXTR [facility] Left 1/20/2022    Procedure: ARTERIOGRAM, STENT PLACEMENT IN LEFT SUPERFICIAL FEMORIAL ARTERY;  Surgeon: Chel Rai Jayesh Velasquez MD;  Location: [facility];  Service: Vascular   • SC VEIN BYPASS GRAFT,FEM-POP Right 2/3/2022    Procedure: BYPASS FEMORAL-POPLITEAL;  Surgeon: August Arriaza MD;  Location[facility];  Service: Vascular   • VASECTOMY         Meds/Allergies:    all medications and allergies reviewed    Allergies: No Known Allergies    Social History:     Marital Status:     Substance Use History:   Social History     Substance and Sexual Activity   Alcohol Use Never     Social History     Tobacco Use   Smoking Status Former Smoker   • Packs/day: 1 00   • Years: 38 00   • Pack years: 38 00   • Types: Cigarettes   • Quit date: 2021   • Years since quittin 5   Smokeless Tobacco Never Used     Social History     Substance and Sexual Activity   Drug Use Never       Family History:    Family History   Problem Relation Age of Onset   • Heart disease Father        Physical Exam:     Vitals:   Blood Pressure: 153/78 (10/18/22 0500)  Pulse: 68 (10/18/22 0500)  Temperature: 98 7 °F (37 1 °C) (10/17/22 2010)  Temp Source: Temporal (10/17/22 2010)  Respirations: 15 (10/18/22 0500)  Weight - Scale: 75 6 kg (166 lb 10 7 oz) (10/17/22 2220)  SpO2: 99 % (10/18/22 0500)    Physical Exam  Vitals and nursing note reviewed  Constitutional:       Appearance: He is well-developed  HENT:      Head: Normocephalic and atraumatic  Eyes:      Conjunctiva/sclera: Conjunctivae normal    Cardiovascular:      Rate and Rhythm: Normal rate and regular rhythm  Heart sounds: No murmur heard  Pulmonary:      Effort: Pulmonary effort is normal  No respiratory distress  Breath sounds: Normal breath sounds  Abdominal:      Palpations: Abdomen is soft  Tenderness: There is no abdominal tenderness  Musculoskeletal:         General: Normal range of motion  Cervical back: Neck supple  Skin:     General: Skin is warm and dry     Neurological:      Mental Status: He is alert and oriented to person, place, and time       Comments: Absent DP pulses of the right side with decreased sensation to the right leg         Additional Data:     Lab Results: I have personally reviewed pertinent reports  Results from last 7 days   Lab Units 10/17/22  2229   WBC Thousand/uL 6 30   HEMOGLOBIN g/dL 12 8   HEMATOCRIT % 40 1   PLATELETS Thousands/uL 178   NEUTROS PCT % 53   LYMPHS PCT % 34   MONOS PCT % 10   EOS PCT % 2     Results from last 7 days   Lab Units 10/17/22  2229   SODIUM mmol/L 141   POTASSIUM mmol/L 3 9   CHLORIDE mmol/L 105   CO2 mmol/L 28   BUN mg/dL 24   CREATININE mg/dL 1 17   ANION GAP mmol/L 8   CALCIUM mg/dL 9 2   ALBUMIN g/dL 4 2   TOTAL BILIRUBIN mg/dL 0 80   ALK PHOS U/L 102   ALT U/L 46   AST U/L 24   GLUCOSE RANDOM mg/dL 88     Results from last 7 days   Lab Units 10/17/22  2229   INR  1 18         Lab Results   Component Value Date/Time    HGBA1C 6 7 (A) 04/14/2022 09:27 AM    HGBA1C 7 3 (H) 01/31/2022 11:02 AM    HGBA1C 6 6 (A) 10/27/2021 09:07 AM    HGBA1C 6 5 (H) 04/06/2021 07:00 PM         Results from last 7 days   Lab Units 10/17/22  2229   LACTIC ACID mmol/L 0 9       Imaging: I have personally reviewed pertinent reports  VAS lower limb arterial duplex, limited/unilateral    (Results Pending)   CTA abdominal w run off w wo contrast    (Results Pending)       EKG, Pathology, and Other Studies Reviewed on Admission:   · EKG: None obtained    ** Please Note: This note has been constructed using a voice recognition system   **

## 2022-10-18 NOTE — EMTALA/ACUTE CARE TRANSFER
[address] [facility] [address]  [address]  [facility] [address]  Dept: [phone]      EMTALA TRANSFER CONSENT    NAME Abigail Campos                                         1965                              MRN [id number]    I have been informed of my rights regarding examination, treatment, and transfer   by Dr Maryana Vargas MD    Benefits: Specialized equipment and/or services available at the receiving facility (Include comment)________________________ (Vascular Surgery)    Risks: Potential for delay in receiving treatment, Potential deterioration of medical condition, Loss of IV, Increased discomfort during transfer, Possible worsening of condition or death during transfer      Consent for Transfer:  I acknowledge that my medical condition has been evaluated and explained to me by the emergency department physician or other qualified medical person and/or my attending physician, who has recommended that I be transferred to the service of  Accepting Physician: Dr Emerson Miek at [address]  Name, [address] : [facility]  The above potential benefits of such transfer, the potential risks associated with such transfer, and the probable risks of not being transferred have been explained to me, and I fully understand them  The doctor has explained that, in my case, the benefits of transfer outweigh the risks  I agree to be transferred  I authorize the performance of emergency medical procedures and treatments upon me in both transit and upon arrival at the receiving facility  Additionally, I authorize the release of any and all medical records to the receiving facility and request they be transported with me, if possible  I understand that the safest mode of transportation during a medical emergency is an ambulance and that the Hospital advocates the use of this mode of transport   Risks of traveling to the receiving facility by car, including absence of medical control, life sustaining equipment, such as oxygen, and medical personnel has been explained to me and I fully understand them  (RUTHY CORRECT BOX BELOW)  [  ]  I consent to the stated transfer and to be transported by ambulance/helicopter  [  ]  I consent to the stated transfer, but refuse transportation by ambulance and accept full responsibility for my transportation by car  I understand the risks of non-ambulance transfers and I exonerate the Hospital and its staff from any deterioration in my condition that results from this refusal     X___________________________________________    DATE  10/18/22  TIME________  Signature of patient or legally responsible individual signing on patient behalf           RELATIONSHIP TO PATIENT_________________________          Provider Certification    NAME Wyline Phalen                                         1965                              MRN [id number]    A medical screening exam was performed on the above named patient  Based on the examination:    Condition Necessitating Transfer The encounter diagnosis was Claudication of right lower extremity ([address] )      Patient Condition: The patient has been stabilized such that within reasonable medical probability, no material deterioration of the patient condition or the condition of the unborn child(susan) is likely to result from the transfer    Reason for Transfer: Level of Care needed not available at this facility    Transfer Requirements: Facility [facility]   · Space available and qualified personnel available for treatment as acknowledged by PACS  · Agreed to accept transfer and to provide appropriate medical treatment as acknowledged by       Dr Brenden Brown  · Appropriate medical records of the examination and treatment of the patient are provided at the time of transfer   [address] _______  · Transfer will be performed by qualified personnel from [facility]  and appropriate transfer equipment as required, including the use of necessary and appropriate life support measures  Provider Certification: I have examined the patient and explained the following risks and benefits of being transferred/refusing transfer to the patient/family:  General risk, such as traffic hazards, adverse weather conditions, rough terrain or turbulence, possible failure of equipment (including vehicle or aircraft), or consequences of actions of persons outside the control of the transport personnel, The possibility of a transport vehicle being unavailable, Risk of worsening condition, Unanticipated needs of medical equipment and personnel during transport      Based on these reasonable risks and benefits to the patient and/or the unborn child(susan), and based upon the information available at the time of the patient’s examination, I certify that the medical benefits reasonably to be expected from the provision of appropriate medical treatments at another medical facility outweigh the increasing risks, if any, to the individual’s medical condition, and in the case of labor to the unborn child, from effecting the transfer      X____________________________________________ DATE 10/18/22        TIME_______      ORIGINAL - SEND TO MEDICAL RECORDS   COPY - SEND WITH PATIENT DURING TRANSFER 93.4

## 2022-10-18 NOTE — H&P
[facility]  H&P- Fish Poisson 1965, 62 y o  male MRN: [id number]  Unit/Bed#: -01 Encounter: [id number]  Primary Care Provider: Jeanette Carranza MD   Date and time admitted to hospital: 10/19/2022  1:29 AM    * Claudication of right lower extremity [facility])  Assessment & Plan  Patient presented with worsening right lower leg pain for several days with radiation to his toes along with intermittent numbness in his toes  Patient has prior SFA stenting performed earlier this year  Doppler showed high-grade stenosis versus occlusion of the common from oral to above knee popliteal PTFE bypass graft  High-grade stenosis versus occlusion of the common femoral artery with trickle flow noted high-grade stenosis versus occlusion of the distal anterior tibial artery  Patient transferred to [address] [place] [address] for vascular surgery evaluation  Will continue on heparin drip  Continue current pain management  Vascular surgery consulted    Hypertension  Assessment & Plan  Will continue on losartan 25 mg and Toprol XL 12 5 mg daily  Blood pressure has been well controlled    CAD (coronary artery disease)  Assessment & Plan  Patient with history of CAD with CABG x3  Will continue on aspirin, beta-blocker, statin, Brilinta, and imdur    Type II diabetes mellitus ([facility] [address] )  Assessment & Plan  Lab Results   Component Value Date    HGBA1C 6 7 (A) 04/14/2022       Recent Labs     10/18/22  0609 10/18/22  1131 10/18/22  1749   POCGLU 120 150* 228*       Blood Sugar Average: Last 72 hrs:     Continue sliding scale  Continue to hold home metformin    VTE Pharmacologic Prophylaxis: VTE Score: 2 High Risk (Score >/= 5) - Pharmacological DVT Prophylaxis Ordered: heparin drip  Sequential Compression Devices Ordered  Code Status: Level 1 - Full Code level 1  Discussion with family: Patient declined call to        Anticipated Length of Stay: Patient will be admitted on an inpatient basis with an anticipated length of stay of greater than 2 midnights secondary to Pending vascular intervention  Total Time for Visit, including Counseling / Coordination of Care: 30 minutes Greater than 50% of this total time spent on direct patient counseling and coordination of care  Chief Complaint:  Leg pain    History of Present Illness:  Sg Weinberg is a 62 y o  male with a PMH of CAD status post CABG, peripheral arterial disease, diabetes, hypertension, hyperlipidemia, presented to the ER with worsening right lower leg pain along with numbness of his toes of the right foot  Of note patient has a history of prior SFA stenting in January of this year  Doppler imaging showed high-grade stenosis of the right lower extremity  Patient was transferred to [facility] for evaluation by vascular surgery likely pending catheter directed lysis  Patient currently denies any acute complaints during my evaluation    Review of Systems:  Review of Systems   Constitutional: Negative for chills, diaphoresis, fatigue and fever  HENT: Negative for ear pain and sore throat  Eyes: Negative for pain and visual disturbance  Respiratory: Negative for cough and shortness of breath  Cardiovascular: Negative for chest pain, palpitations and leg swelling  Gastrointestinal: Negative for abdominal distention, abdominal pain, diarrhea, nausea and vomiting  Genitourinary: Negative for dysuria and hematuria  Musculoskeletal: Negative for arthralgias and back pain  Skin: Negative for color change, pallor, rash and wound  Neurological: Positive for numbness  Negative for dizziness, tremors, seizures, syncope, weakness and headaches  All other systems reviewed and are negative        Past Medical and Surgical History:   Past Medical History:   Diagnosis Date   • Bicuspid aortic valve     being observed   • CAD (coronary artery disease)    • Chest pain 4/6/2021   • Coronary artery disease    • Diabetes mellitus [facility])    • Encounter for postoperative care 5/17/2021   • Former tobacco use    • GERD (gastroesophageal reflux disease)    • Goiter    • History of myocardial infarction    • History of rib fracture    • Hyperlipidemia    • Hypertension    • Hypertensive urgency 4/6/2021   • Left carotid bruit 7/22/2021   • Leg pain, bilateral     Agram today LLE   1/20/2022   • Leukocytosis 4/6/2021   • Myocardial infarction [facility])     2021-   CABG  x3   • NSTEMI (non-ST elevated myocardial infarction) ([facility] [address] ) 4/7/2021   • Wears glasses        Past Surgical History:   Procedure Laterality Date   • CARDIAC CATHETERIZATION     • CARDIAC CATHETERIZATION N/A 10/20/2021    Procedure: Cardiac catheterization;  Surgeon: Jeremías Escalante MD;  Location: [address] [place] [address] CATH LAB; Service: Cardiology   • CARDIAC CATHETERIZATION N/A 10/20/2021    Procedure: Cardiac Coronary Angiogram;  Surgeon: Jeremías Escalante MD;  Location: [address] [place] [address] CATH LAB; Service: Cardiology   • CARDIAC CATHETERIZATION N/A 10/20/2021    Procedure: Cardiac pci;  Surgeon: Jeremías Escalante MD;  Location: [address] [place] [address] CATH LAB; Service: Cardiology   • IR LOWER EXTREMITY ANGIOGRAM  1/20/2022   • IR LOWER EXTREMITY ANGIOGRAM  2/3/2022   • DE CABG, ARTERY-VEIN, FOUR N/A 4/9/2021    Procedure: CORONARY ARTERY BYPASS GRAFT (CABG) 3 VESSELS: LIMA to LAD; LLE EVH/SVG to LPL & OM2;  Surgeon: Luigi Jama DO;  Location[facility] MA[facility] OR;  Service: Cardiac Surgery   • DE ECHO TRANSESOPHAG R-T 2D W/PRB IMG ACQUISJ I&R N/A 4/9/2021    Procedure: TRANSESOPHAGEAL ECHOCARDIOGRAM (BENNETT); Surgeon: Luigi Jama DO;  Location[facility];  Service: Cardiac Surgery   • DE ENDOSCOPY W/VIDEO-ASST VEIN HARVEST,CABG Left 4/9/2021    Procedure: HARVEST VEIN ENDOSCOPIC ([address]);   Surgeon: Luigi Jama DO;  Location: [facility];  Service: Cardiac Surgery   • DE SLCTV CATHJ 3RD+ ORD SLCTV ABDL PEL/LXTR [facility] Left 1/20/2022    Procedure: ARTERIOGRAM, STENT PLACEMENT IN LEFT SUPERFICIAL FEMORIAL ARTERY; Surgeon: Kash Wilson MD;  [facility];  Service: Vascular   • MI VEIN BYPASS GRAFT,FEM-POP Right 2/3/2022    Procedure: BYPASS FEMORAL-POPLITEAL;  Surgeon: Kash Wilson MD;  [facility];  Service: Vascular   • VASECTOMY         Meds/Allergies:  Prior to Admission medications    Medication Sig Start Date End Date Taking? Authorizing Provider   aspirin (ECOTRIN LOW STRENGTH) 81 mg EC tablet TAKE 1 TABLET BY MOUTH EVERY DAY 22   SEE Arshad   atorvastatin (LIPITOR) 80 mg tablet Take 1 tablet (80 mg total) by mouth daily with dinner 4/15/21   Jose Salcedo PA-C   isosorbide mononitrate (IMDUR) 30 mg 24 hr tablet TAKE 1 TABLET BY MOUTH TWICE A DAY 10/18/22   Marisol Connelly PA-C   losartan (COZAAR) 25 mg tablet Take 1 tablet (25 mg total) by mouth daily 22   Venu Lopez MD   metFORMIN (GLUCOPHAGE) 1000 MG tablet TAKE 1 TABLET BY MOUTH TWICE A DAY WITH MEALS 6/3/22   North Barksdale MD   metoprolol succinate (TOPROL-XL) 25 mg 24 hr tablet TAKE 1/2 TABLET BY MOUTH EVERY DAY 22   Mary Carmen Roman PA-C   ticagrelor (Brilinta) 90 MG Take 1 tablet (90 mg total) by mouth 2 (two) times a day 22  North Bar[place]dale MD   isosorbide mononitrate (IMDUR) 30 mg 24 hr tablet TAKE 1 TABLET BY MOUTH TWICE A DAY 4/20/22 10/18/22  SEE Arshad     I have reviewed home medications with patient personally      Allergies: No Known Allergies    Social History:  Marital Status:    Occupation:   Patient Pre-hospital Living Situation: Home  Patient Pre-hospital Level of Mobility: walks  Patient Pre-hospital Diet Restrictions:   Substance Use History:   Social History     Substance and Sexual Activity   Alcohol Use Never     Social History     Tobacco Use   Smoking Status Former Smoker   • Packs/day: 1 00   • Years: 38 00   • Pack years: 38 00   • Types: Cigarettes   • Quit date: 2021   • Years since quittin 5   Smokeless Tobacco Never Used     Social History     Substance and Sexual Activity   Drug Use Never       Family History:  Family History   Problem Relation Age of Onset   • Heart disease Father        Physical Exam:     Vitals:   Blood Pressure: (!) 178/86 (10/19/22 0143)  Pulse: 80 (10/19/22 0143)  Temperature: 98 3 °F (36 8 °C) (10/19/22 0143)  Temp Source: Oral (10/19/22 0143)  Respirations: 20 (10/19/22 0143)  Height: 5' 5" (165 1 cm) (10/19/22 0143)  Weight - Scale: 77 2 kg (170 lb 3 1 oz) (10/19/22 0143)  SpO2: 98 % (10/19/22 0143)    Physical Exam  Vitals and nursing note reviewed  Constitutional:       General: He is not in acute distress  Appearance: He is well-developed  He is not ill-appearing, toxic-appearing or diaphoretic  HENT:      Head: Normocephalic and atraumatic  Eyes:      General: No scleral icterus  Conjunctiva/sclera: Conjunctivae normal    Cardiovascular:      Rate and Rhythm: Normal rate and regular rhythm  Heart sounds: No murmur heard  No friction rub  No gallop  Pulmonary:      Effort: Pulmonary effort is normal  No respiratory distress  Breath sounds: Normal breath sounds  No stridor  No wheezing, rhonchi or rales  Chest:      Chest wall: No tenderness  Abdominal:      General: There is no distension  Palpations: Abdomen is soft  There is no mass  Tenderness: There is no abdominal tenderness  There is no guarding or rebound  Hernia: No hernia is present  Musculoskeletal:         General: No swelling, tenderness, deformity or signs of injury  Cervical back: Neck supple  Comments: Right lower extremity cooler than the left   Skin:     General: Skin is warm and dry  Coloration: Skin is not jaundiced or pale  Findings: No bruising or erythema  Neurological:      Mental Status: He is alert and oriented to person, place, and time            Additional Data:     Lab Results:  Results from last 7 days   Lab Units 10/18/22  0609 10/17/22  2229   WBC Thousand/uL 5  75 6 30   HEMOGLOBIN g/dL 11 3* 12 8   HEMATOCRIT % 34 2* 40 1   PLATELETS Thousands/uL 150 178   NEUTROS PCT %  --  53   LYMPHS PCT %  --  34   MONOS PCT %  --  10   EOS PCT %  --  2     Results from last 7 days   Lab Units 10/18/22  0609   SODIUM mmol/L 141   POTASSIUM mmol/L 3 5   CHLORIDE mmol/L 106   CO2 mmol/L 26   BUN mg/dL 22   CREATININE mg/dL 1 07   ANION GAP mmol/L 9   CALCIUM mg/dL 8 7   ALBUMIN g/dL 3 6   TOTAL BILIRUBIN mg/dL 0 66   ALK PHOS U/L 85   ALT U/L 39   AST U/L 21   GLUCOSE RANDOM mg/dL 136     Results from last 7 days   Lab Units 10/17/22  2229   INR  1 18     Results from last 7 days   Lab Units 10/19/22  0014 10/18/22  1749 10/18/22  1131 10/18/22  0609   POC GLUCOSE mg/dl 118 228* 150* 120         Results from last 7 days   Lab Units 10/17/22  2229   LACTIC ACID mmol/L 0 9       Imaging: No pertinent imaging reviewed  No orders to display       EKG and Other Studies Reviewed on Admission:   · EKG: No EKG obtained  ** Please Note: This note has been constructed using a voice recognition system   **

## 2022-10-18 NOTE — ASSESSMENT & PLAN NOTE
Will continue on losartan 25 mg and Toprol XL 12 5 mg daily  Blood pressure has been well controlled

## 2022-10-18 NOTE — CONSULTS
Consultation - Vascular Surgery   Arlene Mcgregor 62 y o  male MRN: [id number]  Unit/Bed#: ED 08 Encounter: [id number]      Assessment/Plan      Assessment:  Occluded right fem pop graft  Plan:  Transfer to [place] for catheter directed thrombolysis  Continue heparin and NPO        History of Present Illness   Physician Requesting Consult: Danilo Ward MD  Reason for Consult / Principal Problem: limb ischemia  HPI: Arlene Mcgregor is a 62y o  year old male who presents with s/p right femoral endarterectomy and fem AK pop bypass earlier this year  Had 2 weeks of RLE claudication and 2 days of rest pain in the right foot  Denies LLE symptoms  Consults    Review of Systems    Historical Information   Past Medical History:   Diagnosis Date   • Bicuspid aortic valve     being observed   • CAD (coronary artery disease)    • Chest pain 4/6/2021   • Coronary artery disease    • Diabetes mellitus ([facility] [address] )    • Encounter for postoperative care 5/17/2021   • Former tobacco use    • GERD (gastroesophageal reflux disease)    • Goiter    • History of myocardial infarction    • History of rib fracture    • Hyperlipidemia    • Hypertension    • Hypertensive urgency 4/6/2021   • Left carotid bruit 7/22/2021   • Leg pain, bilateral     Agram today LLE   1/20/2022   • Leukocytosis 4/6/2021   • Myocardial infarction [facility])     2021-   CABG  x3   • NSTEMI (non-ST elevated myocardial infarction) ([facility] [address] ) 4/7/2021   • Wears glasses      Past Surgical History:   Procedure Laterality Date   • CARDIAC CATHETERIZATION     • CARDIAC CATHETERIZATION N/A 10/20/2021    Procedure: Cardiac catheterization;  Surgeon: Stephen Rose MD;  Location: [address] [place] [address] CATH LAB; Service: Cardiology   • CARDIAC CATHETERIZATION N/A 10/20/2021    Procedure: Cardiac Coronary Angiogram;  Surgeon: Stephen Rose MD;  Location: [address] [place] [address] CATH LAB;   Service: Cardiology   • CARDIAC CATHETERIZATION N/A 10/20/2021    Procedure: Cardiac pci;  Surgeon: Juan Tamez Rosemary Munoz MD;  Location: [address] CATH LAB; Service: Cardiology   • IR LOWER EXTREMITY ANGIOGRAM  2022   • IR LOWER EXTREMITY ANGIOGRAM  2/3/2022   • PA CABG, ARTERY-VEIN, FOUR N/A 2021    Procedure: CORONARY ARTERY BYPASS GRAFT (CABG) 3 VESSELS: LIMA to LAD; LLE EVH/SVG to LPL & OM2;  Surgeon: Umair Wolf DO;  [facility];  Service: Cardiac Surgery   • PA ECHO TRANSESOPHAG R-T 2D W/PRB IMG ACQUISJ I&R N/A 2021    Procedure: TRANSESOPHAGEAL ECHOCARDIOGRAM (BENNETT); Surgeon: Umair Wolf DO;  [facility];  Service: Cardiac Surgery   • PA ENDOSCOPY W/VIDEO-ASST VEIN HARVEST,CABG Left 2021    Procedure: HARVEST VEIN ENDOSCOPIC ([address]); Surgeon: Umair Wolf DO;  [facility];  Service: Cardiac Surgery   • PA SLCTV CATHJ 3RD+ ORD SLCTV ABDL PEL/LXTR [facility] Left 2022    Procedure: ARTERIOGRAM, STENT PLACEMENT IN LEFT SUPERFICIAL FEMORIAL ARTERY;  Surgeon: Eddie Manzanares MD;  [facility];  Service: Vascular   • PA VEIN BYPASS GRAFT,FEM-POP Right 2/3/2022    Procedure: BYPASS FEMORAL-POPLITEAL;  Surgeon: Eddie Manzanares MD;  [facility];  Service: Vascular   • VASECTOMY       Social History   Social History     Substance and Sexual Activity   Alcohol Use Never     Social History     Substance and Sexual Activity   Drug Use Never     E-Cigarette/Vaping   • E-Cigarette Use Never User      E-Cigarette/Vaping Substances   • Nicotine No    • THC No    • CBD No    • Flavoring No      Social History     Tobacco Use   Smoking Status Former Smoker   • Packs/day: 1 00   • Years: 38 00   • Pack years: 38 00   • Types: Cigarettes   • Quit date: 2021   • Years since quittin 5   Smokeless Tobacco Never Used     Family History: non-contributory}    Meds/Allergies   all current active meds have been reviewed  No Known Allergies    Objective   Vitals: Blood pressure 141/78, pulse 68, temperature 98 7 °F (37 1 °C), temperature source Temporal, resp  rate 18, weight 75 6 kg (166 lb 10 7 oz), SpO2 98 %  ,Body mass index is 27 73 kg/m²  No intake or output data in the 24 hours ending 10/18/22 1059  Invasive Devices  Report    Peripheral Intravenous Line  Duration           Peripheral IV 10/17/22 Right Antecubital <1 day    Peripheral IV 10/17/22 Right Hand <1 day                Physical Exam  NAD, alert and oriented  Palpable proximal right femoral  Right foot cooler than left  BLE motor and sensory function intact          Lab Results: I have personally reviewed pertinent reports  Imaging Studies: I have personally reviewed pertinent reports  EKG, Pathology, and Other Studies: I have personally reviewed pertinent reports      VTE Prophylaxis: Heparin     Code Status: Prior  Advance Directive and Living Will:      Power of :    POLST:      Counseling / Coordination of Care

## 2022-10-18 NOTE — ASSESSMENT & PLAN NOTE
Lab Results   Component Value Date    HGBA1C 6 7 (A) 04/14/2022       Recent Labs     10/18/22  0609 10/18/22  1131 10/18/22  1749   POCGLU 120 150* 228*       Blood Sugar Average: Last 72 hrs:     Continue sliding scale  Continue to hold home metformin

## 2022-10-18 NOTE — ASSESSMENT & PLAN NOTE
Patient with a past medical history significant for hypertension, hyperlipidemia, CAD, diabetes, PAD  Patient presenting to the ED for worsening right lower leg pain that is been ongoing for the last 2 days  Patient reports the pain starts in his gluteal region radiates to his toes  Also admits to intermittent numbness of his toes in the right foot  · Patient is s/p SFA stent in January of this year  · VAS right limb: There is high grade stenosis vs occlusion of the common femoral to above knee popliteal PTFE bypass graft  High grade stenosis vs occlusion of the common femoral artery with trickle flow noted  High grade stenosis vs occlusion of the distal anterior tibial artery    · Started on IV heparin drip  · Keep NPO  · Patient will need transfer to [facility] for vascular intervention  · Consult to Vascular Surgery, recommendations are appreciated

## 2022-10-18 NOTE — ASSESSMENT & PLAN NOTE
Lab Results   Component Value Date    HGBA1C 6 7 (A) 04/14/2022     · Holding home metformin  · Start on SSI with accu checks  · Hypoglycemia protocol

## 2022-10-18 NOTE — EMTALA/ACUTE CARE TRANSFER
[address] [place] [address] 20  [address] [place] [address]  Dept: [phone]      [facility] TRANSFER CONSENT    NAME Nadege Rudolph                                         1965                              MRN [id number]    I have been informed of my rights regarding examination, treatment, and transfer   by Dr Mauro Beckwith MD    Benefits: Specialized equipment and/or services available at the receiving facility (Include comment)________________________ (vascular surgery)    Risks: Potential for delay in receiving treatment      Transfer Request   I acknowledge that my medical condition has been evaluated and explained to me by the emergency department physician or other qualified medical person and/or my attending physician who has recommended and offered to me further medical examination and treatment  I understand the Hospital's obligation with respect to the treatment and stabilization of my emergency medical condition  I nevertheless request to be transferred  I release the Hospital, the doctor, and any other persons caring for me from all responsibility or liability for any injury or ill effects that may result from my transfer and agree to accept all responsibility for the consequences of my choice to transfer, rather than receive stabilizing treatment at the Hospital  I understand that because the transfer is my request, my insurance may not provide reimbursement for the services  The Hospital will assist and direct me and my family in how to make arrangements for transfer, but the hospital is not liable for any fees charged by the transport service  In spite of this understanding, I refuse to consent to further medical examination and treatment which has been offered to me, and request transfer to  [address] : [facility]   I authorize the performance of emergency medical procedures and treatments upon me in both transit and upon arrival at the receiving facility  Additionally, I authorize the release of any and all medical records to the receiving facility and request they be transported with me, if possible  I authorize the performance of emergency medical procedures and treatments upon me in both transit and upon arrival at the receiving facility  Additionally, I authorize the release of any and all medical records to the receiving facility and request they be transported with me, if possible  I understand that the safest mode of transportation during a medical emergency is an ambulance and that the Hospital advocates the use of this mode of transport  Risks of traveling to the receiving facility by car, including absence of medical control, life sustaining equipment, such as oxygen, and medical personnel has been explained to me and I fully understand them  (RUTHY CORRECT BOX BELOW)  [  ]  I consent to the stated transfer and to be transported by ambulance/helicopter  [  ]  I consent to the stated transfer, but refuse transportation by ambulance and accept full responsibility for my transportation by car  I understand the risks of non-ambulance transfers and I exonerate the Hospital and its staff from any deterioration in my condition that results from this refusal     X___________________________________________    DATE  10/18/22  TIME________  Signature of patient or legally responsible individual signing on patient behalf           RELATIONSHIP TO PATIENT_________________________          Provider Certification    NAME Masood Smith                                         1965                              MRN [id number]    A medical screening exam was performed on the above named patient  Based on the examination:    Condition Necessitating Transfer The encounter diagnosis was Claudication of right lower extremity ([address] )      Patient Condition: The patient has been stabilized such that within reasonable medical probability, no material deterioration of the patient condition or the condition of the unborn child(susan) is likely to result from the transfer    Reason for Transfer: Level of Care needed not available at this facility    Transfer Requirements: Facility [facility] [place]   · Space available and qualified personnel available for treatment as acknowledged by PACS  · Agreed to accept transfer and to provide appropriate medical treatment as acknowledged by       Dr Cathy Healy  · Appropriate medical records of the examination and treatment of the patient are provided at the time of transfer   [address] [facility], [address] _______  · Transfer will be performed by qualified personnel from [facility]  and appropriate transfer equipment as required, including the use of necessary and appropriate life support measures  Provider Certification: I have examined the patient and explained the following risks and benefits of being transferred/refusing transfer to the patient/family:  General risk, such as traffic hazards, adverse weather conditions, rough terrain or turbulence, possible failure of equipment (including vehicle or aircraft), or consequences of actions of persons outside the control of the transport personnel, The possibility of a transport vehicle being unavailable, Risk of worsening condition, Unanticipated needs of medical equipment and personnel during transport      Based on these reasonable risks and benefits to the patient and/or the unborn child(susan), and based upon the information available at the time of the patient’s examination, I certify that the medical benefits reasonably to be expected from the provision of appropriate medical treatments at another medical facility outweigh the increasing risks, if any, to the individual’s medical condition, and in the case of labor to the unborn child, from effecting the transfer      X____________________________________________ DATE 10/18/22        TIME_______      ORIGINAL - SEND TO MEDICAL RECORDS COPY - SEND WITH PATIENT DURING TRANSFER

## 2022-10-19 ENCOUNTER — HOSPITAL ENCOUNTER (INPATIENT)
Facility: HOSPITAL | Age: 57
LOS: 4 days | Discharge: HOME | DRG: 271 | End: 2022-10-23
Attending: HOSPITALIST | Admitting: FAMILY MEDICINE
Payer: COMMERCIAL

## 2022-10-19 ENCOUNTER — ANESTHESIA (INPATIENT)
Dept: PERIOP | Facility: HOSPITAL | Age: 57
End: 2022-10-19
Payer: COMMERCIAL

## 2022-10-19 ENCOUNTER — APPOINTMENT (INPATIENT)
Dept: RADIOLOGY | Facility: HOSPITAL | Age: 57
DRG: 271 | End: 2022-10-19
Payer: COMMERCIAL

## 2022-10-19 ENCOUNTER — APPOINTMENT (OUTPATIENT)
Dept: RADIOLOGY | Facility: HOSPITAL | Age: 57
DRG: 271 | End: 2022-10-19
Payer: COMMERCIAL

## 2022-10-19 ENCOUNTER — ANESTHESIA EVENT (INPATIENT)
Dept: PERIOP | Facility: HOSPITAL | Age: 57
End: 2022-10-19
Payer: COMMERCIAL

## 2022-10-19 DIAGNOSIS — I73.9 CLAUDICATION OF RIGHT LOWER EXTREMITY (HCC): Primary | ICD-10-CM

## 2022-10-19 DIAGNOSIS — Z95.1 S/P CABG X 3: ICD-10-CM

## 2022-10-19 DIAGNOSIS — T82.898A OCCLUSION OF FEMOROPOPLITEAL BYPASS GRAFT, INITIAL ENCOUNTER (HCC): ICD-10-CM

## 2022-10-19 PROBLEM — E11.9 TYPE II DIABETES MELLITUS (HCC): Chronic | Status: ACTIVE | Noted: 2021-04-06

## 2022-10-19 LAB
ANION GAP SERPL CALCULATED.3IONS-SCNC: 3 MMOL/L (ref 4–13)
APTT PPP: 53 SECONDS (ref 23–37)
APTT PPP: 71 SECONDS (ref 23–37)
APTT PPP: 77 SECONDS (ref 23–37)
BUN SERPL-MCNC: 20 MG/DL (ref 5–25)
CALCIUM SERPL-MCNC: 9.2 MG/DL (ref 8.3–10.1)
CHLORIDE SERPL-SCNC: 107 MMOL/L (ref 96–108)
CO2 SERPL-SCNC: 26 MMOL/L (ref 21–32)
CREAT SERPL-MCNC: 1.1 MG/DL (ref 0.6–1.3)
ERYTHROCYTE [DISTWIDTH] IN BLOOD BY AUTOMATED COUNT: 13.5 % (ref 11.6–15.1)
ERYTHROCYTE [DISTWIDTH] IN BLOOD BY AUTOMATED COUNT: 13.6 % (ref 11.6–15.1)
EST. AVERAGE GLUCOSE BLD GHB EST-MCNC: 160 MG/DL
FIBRINOGEN PPP-MCNC: 257 MG/DL (ref 227–495)
FIBRINOGEN PPP-MCNC: 309 MG/DL (ref 227–495)
GFR SERPL CREATININE-BSD FRML MDRD: 74 ML/MIN/1.73SQ M
GLUCOSE SERPL-MCNC: 118 MG/DL (ref 65–140)
GLUCOSE SERPL-MCNC: 123 MG/DL (ref 65–140)
GLUCOSE SERPL-MCNC: 139 MG/DL (ref 65–140)
GLUCOSE SERPL-MCNC: 148 MG/DL (ref 65–140)
GLUCOSE SERPL-MCNC: 186 MG/DL (ref 65–140)
GLUCOSE SERPL-MCNC: 191 MG/DL (ref 65–140)
HBA1C MFR BLD: 7.2 %
HCT VFR BLD AUTO: 35.4 % (ref 36.5–49.3)
HCT VFR BLD AUTO: 37 % (ref 36.5–49.3)
HGB BLD-MCNC: 11.6 G/DL (ref 12–17)
HGB BLD-MCNC: 12.3 G/DL (ref 12–17)
INR PPP: 1.07 (ref 0.84–1.19)
MCH RBC QN AUTO: 26.1 PG (ref 26.8–34.3)
MCH RBC QN AUTO: 26.6 PG (ref 26.8–34.3)
MCHC RBC AUTO-ENTMCNC: 32.8 G/DL (ref 31.4–37.4)
MCHC RBC AUTO-ENTMCNC: 33.2 G/DL (ref 31.4–37.4)
MCV RBC AUTO: 80 FL (ref 82–98)
MCV RBC AUTO: 80 FL (ref 82–98)
PLATELET # BLD AUTO: 143 THOUSANDS/UL (ref 149–390)
PLATELET # BLD AUTO: 173 THOUSANDS/UL (ref 149–390)
PMV BLD AUTO: 9.5 FL (ref 8.9–12.7)
PMV BLD AUTO: 9.5 FL (ref 8.9–12.7)
POTASSIUM SERPL-SCNC: 3.8 MMOL/L (ref 3.5–5.3)
PROTHROMBIN TIME: 14.2 SECONDS (ref 11.6–14.5)
RBC # BLD AUTO: 4.44 MILLION/UL (ref 3.88–5.62)
RBC # BLD AUTO: 4.62 MILLION/UL (ref 3.88–5.62)
SODIUM SERPL-SCNC: 136 MMOL/L (ref 135–147)
WBC # BLD AUTO: 5.71 THOUSAND/UL (ref 4.31–10.16)
WBC # BLD AUTO: 8.99 THOUSAND/UL (ref 4.31–10.16)

## 2022-10-19 PROCEDURE — 76937 US GUIDE VASCULAR ACCESS: CPT

## 2022-10-19 PROCEDURE — 85384 FIBRINOGEN ACTIVITY: CPT | Performed by: SURGERY

## 2022-10-19 PROCEDURE — 70450 CT HEAD/BRAIN W/O DYE: CPT

## 2022-10-19 PROCEDURE — 83036 HEMOGLOBIN GLYCOSYLATED A1C: CPT | Performed by: HOSPITALIST

## 2022-10-19 PROCEDURE — 82948 REAGENT STRIP/BLOOD GLUCOSE: CPT

## 2022-10-19 PROCEDURE — 85027 COMPLETE CBC AUTOMATED: CPT | Performed by: SURGERY

## 2022-10-19 PROCEDURE — C1894 INTRO/SHEATH, NON-LASER: HCPCS | Performed by: SURGERY

## 2022-10-19 PROCEDURE — 36573 INSJ PICC RS&I 5 YR+: CPT | Performed by: RADIOLOGY

## 2022-10-19 PROCEDURE — 02HV33Z INSERTION OF INFUSION DEVICE INTO SUPERIOR VENA CAVA, PERCUTANEOUS APPROACH: ICD-10-PCS | Performed by: SURGERY

## 2022-10-19 PROCEDURE — C1769 GUIDE WIRE: HCPCS | Performed by: SURGERY

## 2022-10-19 PROCEDURE — C1751 CATH, INF, PER/CENT/MIDLINE: HCPCS | Performed by: SURGERY

## 2022-10-19 PROCEDURE — 85610 PROTHROMBIN TIME: CPT | Performed by: HOSPITALIST

## 2022-10-19 PROCEDURE — C1887 CATHETER, GUIDING: HCPCS | Performed by: SURGERY

## 2022-10-19 PROCEDURE — 99223 1ST HOSP IP/OBS HIGH 75: CPT | Performed by: EMERGENCY MEDICINE

## 2022-10-19 PROCEDURE — 85384 FIBRINOGEN ACTIVITY: CPT

## 2022-10-19 PROCEDURE — 85027 COMPLETE CBC AUTOMATED: CPT | Performed by: HOSPITALIST

## 2022-10-19 PROCEDURE — B41F1ZZ FLUOROSCOPY OF RIGHT LOWER EXTREMITY ARTERIES USING LOW OSMOLAR CONTRAST: ICD-10-PCS | Performed by: SURGERY

## 2022-10-19 PROCEDURE — 3E04317 INTRODUCTION OF OTHER THROMBOLYTIC INTO CENTRAL VEIN, PERCUTANEOUS APPROACH: ICD-10-PCS | Performed by: SURGERY

## 2022-10-19 PROCEDURE — 77001 FLUOROGUIDE FOR VEIN DEVICE: CPT

## 2022-10-19 PROCEDURE — 80048 BASIC METABOLIC PNL TOTAL CA: CPT | Performed by: HOSPITALIST

## 2022-10-19 PROCEDURE — 85730 THROMBOPLASTIN TIME PARTIAL: CPT | Performed by: SURGERY

## 2022-10-19 PROCEDURE — G1004 CDSM NDSC: HCPCS

## 2022-10-19 PROCEDURE — C1751 CATH, INF, PER/CENT/MIDLINE: HCPCS

## 2022-10-19 PROCEDURE — 85730 THROMBOPLASTIN TIME PARTIAL: CPT | Performed by: HOSPITALIST

## 2022-10-19 RX ORDER — HEPARIN SODIUM 200 [USP'U]/100ML
20 INJECTION, SOLUTION INTRAVENOUS CONTINUOUS
Status: CANCELLED | OUTPATIENT
Start: 2022-10-19

## 2022-10-19 RX ORDER — INSULIN LISPRO 100 [IU]/ML
1-6 INJECTION, SOLUTION INTRAVENOUS; SUBCUTANEOUS
Status: DISCONTINUED | OUTPATIENT
Start: 2022-10-19 | End: 2022-10-23 | Stop reason: HOSPADM

## 2022-10-19 RX ORDER — CEFAZOLIN SODIUM 1 G/3ML
INJECTION, POWDER, FOR SOLUTION INTRAMUSCULAR; INTRAVENOUS AS NEEDED
Status: DISCONTINUED | OUTPATIENT
Start: 2022-10-19 | End: 2022-10-19

## 2022-10-19 RX ORDER — OXYCODONE HYDROCHLORIDE 5 MG/1
5 TABLET ORAL EVERY 4 HOURS PRN
Status: DISCONTINUED | OUTPATIENT
Start: 2022-10-19 | End: 2022-10-19

## 2022-10-19 RX ORDER — ONDANSETRON 2 MG/ML
4 INJECTION INTRAMUSCULAR; INTRAVENOUS EVERY 6 HOURS PRN
Status: DISCONTINUED | OUTPATIENT
Start: 2022-10-19 | End: 2022-10-19

## 2022-10-19 RX ORDER — LIDOCAINE HYDROCHLORIDE 20 MG/ML
INJECTION, SOLUTION EPIDURAL; INFILTRATION; INTRACAUDAL; PERINEURAL AS NEEDED
Status: DISCONTINUED | OUTPATIENT
Start: 2022-10-19 | End: 2022-10-19

## 2022-10-19 RX ORDER — CHLORHEXIDINE GLUCONATE 0.12 MG/ML
15 RINSE ORAL ONCE
Status: DISCONTINUED | OUTPATIENT
Start: 2022-10-19 | End: 2022-10-19 | Stop reason: HOSPADM

## 2022-10-19 RX ORDER — CEFAZOLIN SODIUM 1 G/50ML
1000 SOLUTION INTRAVENOUS ONCE
Status: DISCONTINUED | OUTPATIENT
Start: 2022-10-19 | End: 2022-10-19 | Stop reason: HOSPADM

## 2022-10-19 RX ORDER — MIDAZOLAM HYDROCHLORIDE 2 MG/2ML
INJECTION, SOLUTION INTRAMUSCULAR; INTRAVENOUS AS NEEDED
Status: DISCONTINUED | OUTPATIENT
Start: 2022-10-19 | End: 2022-10-19

## 2022-10-19 RX ORDER — OXYCODONE HYDROCHLORIDE 5 MG/1
5 TABLET ORAL EVERY 4 HOURS PRN
Status: DISCONTINUED | OUTPATIENT
Start: 2022-10-19 | End: 2022-10-23 | Stop reason: HOSPADM

## 2022-10-19 RX ORDER — METHOCARBAMOL 500 MG/1
500 TABLET, FILM COATED ORAL EVERY 6 HOURS PRN
Status: DISCONTINUED | OUTPATIENT
Start: 2022-10-19 | End: 2022-10-23 | Stop reason: HOSPADM

## 2022-10-19 RX ORDER — HYDRALAZINE HYDROCHLORIDE 20 MG/ML
10 INJECTION INTRAMUSCULAR; INTRAVENOUS EVERY 6 HOURS PRN
Status: DISCONTINUED | OUTPATIENT
Start: 2022-10-19 | End: 2022-10-23 | Stop reason: HOSPADM

## 2022-10-19 RX ORDER — GABAPENTIN 300 MG/1
300 CAPSULE ORAL 3 TIMES DAILY
Status: DISCONTINUED | OUTPATIENT
Start: 2022-10-19 | End: 2022-10-23 | Stop reason: HOSPADM

## 2022-10-19 RX ORDER — LIDOCAINE HYDROCHLORIDE 10 MG/ML
INJECTION, SOLUTION EPIDURAL; INFILTRATION; INTRACAUDAL; PERINEURAL AS NEEDED
Status: DISCONTINUED | OUTPATIENT
Start: 2022-10-19 | End: 2022-10-19 | Stop reason: HOSPADM

## 2022-10-19 RX ORDER — DOCUSATE SODIUM 100 MG/1
100 CAPSULE, LIQUID FILLED ORAL 2 TIMES DAILY
Status: DISCONTINUED | OUTPATIENT
Start: 2022-10-19 | End: 2022-10-23 | Stop reason: HOSPADM

## 2022-10-19 RX ORDER — HYDROMORPHONE HCL/PF 1 MG/ML
0.5 SYRINGE (ML) INJECTION
Status: DISCONTINUED | OUTPATIENT
Start: 2022-10-19 | End: 2022-10-23 | Stop reason: HOSPADM

## 2022-10-19 RX ORDER — HEPARIN SODIUM 1000 [USP'U]/ML
INJECTION, SOLUTION INTRAVENOUS; SUBCUTANEOUS AS NEEDED
Status: DISCONTINUED | OUTPATIENT
Start: 2022-10-19 | End: 2022-10-19

## 2022-10-19 RX ORDER — ATORVASTATIN CALCIUM 80 MG/1
80 TABLET, FILM COATED ORAL
Status: DISCONTINUED | OUTPATIENT
Start: 2022-10-19 | End: 2022-10-23 | Stop reason: HOSPADM

## 2022-10-19 RX ORDER — HEPARIN SODIUM 10000 [USP'U]/100ML
500 INJECTION, SOLUTION INTRAVENOUS
Status: DISCONTINUED | OUTPATIENT
Start: 2022-10-19 | End: 2022-10-19

## 2022-10-19 RX ORDER — PROPOFOL 10 MG/ML
INJECTION, EMULSION INTRAVENOUS CONTINUOUS PRN
Status: DISCONTINUED | OUTPATIENT
Start: 2022-10-19 | End: 2022-10-19

## 2022-10-19 RX ORDER — OXYCODONE HYDROCHLORIDE 5 MG/1
2.5 TABLET ORAL EVERY 4 HOURS PRN
Status: DISCONTINUED | OUTPATIENT
Start: 2022-10-19 | End: 2022-10-19

## 2022-10-19 RX ORDER — HEPARIN SODIUM 1000 [USP'U]/ML
3000 INJECTION, SOLUTION INTRAVENOUS; SUBCUTANEOUS
Status: DISCONTINUED | OUTPATIENT
Start: 2022-10-19 | End: 2022-10-19

## 2022-10-19 RX ORDER — IODIXANOL 320 MG/ML
INJECTION, SOLUTION INTRAVASCULAR AS NEEDED
Status: DISCONTINUED | OUTPATIENT
Start: 2022-10-19 | End: 2022-10-19 | Stop reason: HOSPADM

## 2022-10-19 RX ORDER — HEPARIN SODIUM 10000 [USP'U]/100ML
500 INJECTION, SOLUTION INTRAVENOUS CONTINUOUS
Status: DISCONTINUED | OUTPATIENT
Start: 2022-10-19 | End: 2022-10-20

## 2022-10-19 RX ORDER — OXYCODONE HYDROCHLORIDE 10 MG/1
10 TABLET ORAL EVERY 6 HOURS PRN
Status: DISCONTINUED | OUTPATIENT
Start: 2022-10-19 | End: 2022-10-23 | Stop reason: HOSPADM

## 2022-10-19 RX ORDER — SODIUM CHLORIDE 9 MG/ML
50 INJECTION, SOLUTION INTRAVENOUS CONTINUOUS
Status: DISCONTINUED | OUTPATIENT
Start: 2022-10-19 | End: 2022-10-21

## 2022-10-19 RX ORDER — ACETAMINOPHEN 325 MG/1
650 TABLET ORAL EVERY 6 HOURS PRN
Status: DISCONTINUED | OUTPATIENT
Start: 2022-10-19 | End: 2022-10-23 | Stop reason: HOSPADM

## 2022-10-19 RX ORDER — HEPARIN SODIUM 1000 [USP'U]/ML
6000 INJECTION, SOLUTION INTRAVENOUS; SUBCUTANEOUS
Status: DISCONTINUED | OUTPATIENT
Start: 2022-10-19 | End: 2022-10-19

## 2022-10-19 RX ORDER — ONDANSETRON 2 MG/ML
4 INJECTION INTRAMUSCULAR; INTRAVENOUS EVERY 4 HOURS PRN
Status: DISCONTINUED | OUTPATIENT
Start: 2022-10-19 | End: 2022-10-23 | Stop reason: HOSPADM

## 2022-10-19 RX ORDER — HEPARIN SODIUM 10000 [USP'U]/100ML
3-30 INJECTION, SOLUTION INTRAVENOUS
Status: DISCONTINUED | OUTPATIENT
Start: 2022-10-19 | End: 2022-10-19

## 2022-10-19 RX ORDER — PROPOFOL 10 MG/ML
INJECTION, EMULSION INTRAVENOUS AS NEEDED
Status: DISCONTINUED | OUTPATIENT
Start: 2022-10-19 | End: 2022-10-19

## 2022-10-19 RX ORDER — SODIUM CHLORIDE, SODIUM LACTATE, POTASSIUM CHLORIDE, CALCIUM CHLORIDE 600; 310; 30; 20 MG/100ML; MG/100ML; MG/100ML; MG/100ML
INJECTION, SOLUTION INTRAVENOUS CONTINUOUS PRN
Status: DISCONTINUED | OUTPATIENT
Start: 2022-10-19 | End: 2022-10-19

## 2022-10-19 RX ORDER — FENTANYL CITRATE 50 UG/ML
INJECTION, SOLUTION INTRAMUSCULAR; INTRAVENOUS AS NEEDED
Status: DISCONTINUED | OUTPATIENT
Start: 2022-10-19 | End: 2022-10-19

## 2022-10-19 RX ORDER — ASPIRIN 81 MG/1
81 TABLET ORAL DAILY
Refills: 3 | Status: DISCONTINUED | OUTPATIENT
Start: 2022-10-20 | End: 2022-10-22

## 2022-10-19 RX ORDER — ACETAMINOPHEN 325 MG/1
650 TABLET ORAL EVERY 6 HOURS PRN
Status: DISCONTINUED | OUTPATIENT
Start: 2022-10-19 | End: 2022-10-19

## 2022-10-19 RX ADMIN — HEPARIN SODIUM 17 UNITS/KG/HR: 10000 INJECTION, SOLUTION INTRAVENOUS at 02:23

## 2022-10-19 RX ADMIN — FENTANYL CITRATE 25 MCG: 50 INJECTION INTRAMUSCULAR; INTRAVENOUS at 10:41

## 2022-10-19 RX ADMIN — METHOCARBAMOL 500 MG: 500 TABLET ORAL at 17:27

## 2022-10-19 RX ADMIN — METOPROLOL TARTRATE 25 MG: 25 TABLET, FILM COATED ORAL at 13:34

## 2022-10-19 RX ADMIN — CEFAZOLIN 2000 MG: 1 INJECTION, POWDER, FOR SOLUTION INTRAMUSCULAR; INTRAVENOUS at 10:43

## 2022-10-19 RX ADMIN — INSULIN LISPRO 2 UNITS: 100 INJECTION, SOLUTION INTRAVENOUS; SUBCUTANEOUS at 17:09

## 2022-10-19 RX ADMIN — SODIUM CHLORIDE, SODIUM LACTATE, POTASSIUM CHLORIDE, AND CALCIUM CHLORIDE: .6; .31; .03; .02 INJECTION, SOLUTION INTRAVENOUS at 10:31

## 2022-10-19 RX ADMIN — ATORVASTATIN CALCIUM 80 MG: 80 TABLET, FILM COATED ORAL at 17:09

## 2022-10-19 RX ADMIN — ALTEPLASE 1 MG/HR: 2.2 INJECTION, POWDER, LYOPHILIZED, FOR SOLUTION INTRAVENOUS at 12:10

## 2022-10-19 RX ADMIN — HYDROMORPHONE HYDROCHLORIDE 0.5 MG: 1 INJECTION, SOLUTION INTRAMUSCULAR; INTRAVENOUS; SUBCUTANEOUS at 20:30

## 2022-10-19 RX ADMIN — HEPARIN SODIUM 500 UNITS/HR: 10000 INJECTION, SOLUTION INTRAVENOUS at 19:26

## 2022-10-19 RX ADMIN — PROPOFOL 20 MG: 10 INJECTION, EMULSION INTRAVENOUS at 10:41

## 2022-10-19 RX ADMIN — MIDAZOLAM 2 MG: 1 INJECTION INTRAMUSCULAR; INTRAVENOUS at 10:31

## 2022-10-19 RX ADMIN — ACETAMINOPHEN 650 MG: 325 TABLET, FILM COATED ORAL at 19:32

## 2022-10-19 RX ADMIN — OXYCODONE HYDROCHLORIDE 10 MG: 10 TABLET ORAL at 15:33

## 2022-10-19 RX ADMIN — SODIUM CHLORIDE 50 ML/HR: 0.9 INJECTION, SOLUTION INTRAVENOUS at 13:35

## 2022-10-19 RX ADMIN — METOPROLOL TARTRATE 25 MG: 25 TABLET, FILM COATED ORAL at 21:33

## 2022-10-19 RX ADMIN — LIDOCAINE HYDROCHLORIDE 5 ML: 20 INJECTION, SOLUTION EPIDURAL; INFILTRATION; INTRACAUDAL; PERINEURAL at 10:37

## 2022-10-19 RX ADMIN — PROPOFOL 150 MCG/KG/MIN: 10 INJECTION, EMULSION INTRAVENOUS at 10:39

## 2022-10-19 RX ADMIN — GABAPENTIN 300 MG: 300 CAPSULE ORAL at 20:32

## 2022-10-19 RX ADMIN — HYDRALAZINE HYDROCHLORIDE 10 MG: 20 INJECTION INTRAMUSCULAR; INTRAVENOUS at 13:34

## 2022-10-19 RX ADMIN — ALTEPLASE 1 MG/HR: 2.2 INJECTION, POWDER, LYOPHILIZED, FOR SOLUTION INTRAVENOUS at 20:00

## 2022-10-19 RX ADMIN — PHENYLEPHRINE HYDROCHLORIDE 20 MCG/MIN: 10 INJECTION INTRAVENOUS at 10:39

## 2022-10-19 RX ADMIN — HEPARIN SODIUM 5000 UNITS: 1000 INJECTION INTRAVENOUS; SUBCUTANEOUS at 11:35

## 2022-10-19 NOTE — CONSULTS
[address] 1965, 62 y o  male MRN: [id number]  Unit/Bed#: -01 Encounter: [id number]  Primary Care Provider: Chelsie Green MD   Date and time admitted to hospital: 10/19/2022  1:29 AM    Inpatient consult to Vascular Surgery  Consult performed by: Jaden Sims PA-C  Consult ordered by: Arla Kayser, MD        * Claudication of right lower extremity [facility])  Assessment & Plan  87WJD with a significant PMHx of former smoker, T2DM, HTN, CAD (s/p CABGx3 with L GSV harvest Apr 2021 and PCI Oct 2021 on DAPT ASA/Brillinta), PAD (s/p R CFA-AK pop bypass with PTFE 2/3/22 with Dr Lucina Hester, L proximal SFA PTA/NIRMALA 1/20/22) who presented to [address] [place] [address] 644 for three week history of RLE claudication and 2 day history of ischemic rest pain and numbness in the toes  Now presents to [facility] for further management  Diagnostics  10/18/22 CTA Abdomen with runoff   - R CFA occlusion at origin with collateral filling of profunda  Bypass graft occluded and compressed as it enters adductor canal  AK Pop supplies 2-vessel runoff through peroneal and PT that fill plantar arch  Proximal AT occluded with distal reconstitution/filling of DP  - L CFA, SFA stent, profunda patent  Popliteal patent with peroneal runoff that reconstitutes distal PT and DP  PT occludes just beyond origin but fills plantar arch  AT occludes at proximal calf    10/17/22 LE Arterial Duplex  RLE: High grade stenosis vs occlusion of CFA to AK pop PTFE bypass graft   High grade stenosis vs occlusion of the CFA with trickle flow and distal AT  FRANCIE: 0 49 (0 95)/MTP 82(140)/GTP 25(81)  LLE:  FRANCIE: 1 05 (Prior 1 09)/(Prior 103)/KVO509 (Prior 89)    WBC 5 75  Hgb 11 3    BUN/Cr- 22/1 07  eGFR 76    Plan  - Plan for possible catheter directed thrombolysis; paxton consult IR  - Will obtain CT Head and Fibrinogen  - Continue neurovascular checks  - Continue heparin gtt  - Diet NPO  - Continue ASA, Statin, Brillinta  - Will discuss and reassess with vascular team in AM  - Appreciate SLIM management for remainder of care    Consulting Service: SLIM  Chief Complaint: RLE claudication, rest pain, numbness to toes    HPI: Jerardo Tello is a 62 y o  male who presents with worsening RLE claudication x 3 weeks most recently worsened with ischemic rest pain at night yesterday night and numbness to his last three toes  He presented to [facility] with concern for ischemic rest pain and acute onset numbness to his last three toes on 10/17  He states that he is a  and sleeps during the day, and had an episode of severe tightness to his RLE that woke him from sleep on 10/17/22, but it was the numbness in his last three toes that he noticed while driving with concern he would be unable to feel the pedals that prompted his arrival for medical care  He was placed on a heparin gtt in [place], and was transferred to [facility] for further evaluation  He states that in the last three weeks he has had worsening RLE claudication symptoms that now he is only able to walk 150ft without feeling tightness in his R calf when previously s/p bypass he had been ambulating without complaint  He states that the RLE claudication is similar to that he felt after cardiac rehab which prompted his original peripheral vascular work-up and consultation  He currently denies pain, paresthesias, changes in temperature of his foot, changes in motor function  When evaluated, he was up OOB on his feet taking small steps without complaint   Denies previous history of claudication other than the above and denies claudication symptoms in his LLE    Review of Systems:  General: negative for - chills or fever  Cardiovascular: negative for - chest pain  Respiratory: negative for - cough  Gastrointestinal: negative for - abdominal pain  Musculoskeletal ROS: RLE claudication  Neurological ROS: positive for - numbness in last 3 digits of R foot  Dermatological ROS: positive for coolness of R foot as compared to left    Past Medical History:  Past Medical History:   Diagnosis Date   • Bicuspid aortic valve     being observed   • CAD (coronary artery disease)    • Chest pain 4/6/2021   • Coronary artery disease    • Diabetes mellitus ([facility] )    • Encounter for postoperative care 5/17/2021   • Former tobacco use    • GERD (gastroesophageal reflux disease)    • Goiter    • History of myocardial infarction    • History of rib fracture    • Hyperlipidemia    • Hypertension    • Hypertensive urgency 4/6/2021   • Left carotid bruit 7/22/2021   • Leg pain, bilateral     Agram today LLE   1/20/2022   • Leukocytosis 4/6/2021   • Myocardial infarction [facility])     2021-   CABG  x3   • NSTEMI (non-ST elevated myocardial infarction) ([facility] ) 4/7/2021   • Wears glasses        Past Surgical History:  Past Surgical History:   Procedure Laterality Date   • CARDIAC CATHETERIZATION     • CARDIAC CATHETERIZATION N/A 10/20/2021    Procedure: Cardiac catheterization;  Surgeon: Padmini Ball MD;  Location: [address] [place] [address] CATH LAB; Service: Cardiology   • CARDIAC CATHETERIZATION N/A 10/20/2021    Procedure: Cardiac Coronary Angiogram;  Surgeon: Padmini Ball MD;  Location: [address] [place] [address] CATH LAB; Service: Cardiology   • CARDIAC CATHETERIZATION N/A 10/20/2021    Procedure: Cardiac pci;  Surgeon: Padmini Ball MD;  Location: [address] [place] [address] CATH LAB; Service: Cardiology   • IR LOWER EXTREMITY ANGIOGRAM  1/20/2022   • IR LOWER EXTREMITY ANGIOGRAM  2/3/2022   • SC CABG, ARTERY-VEIN, FOUR N/A 4/9/2021    Procedure: CORONARY ARTERY BYPASS GRAFT (CABG) 3 VESSELS: LIMA to LAD; LLE EVH/SVG to LPL & OM2;  Surgeon: Cheo Morton DO;  Location: [facility] MA[facility] OR;  Service: Cardiac Surgery   • SC ECHO TRANSESOPHAG R-T 2D W/PRB IMG ACQUISJ I&R N/A 4/9/2021    Procedure: TRANSESOPHAGEAL ECHOCARDIOGRAM (BENNETT);   Surgeon: Cheo Morton DO;  Location: [facility];  Service: Cardiac Surgery   • SC ENDOSCOPY W/VIDEO-ASST VEIN HARVEST,CABG Left 2021    Procedure: HARVEST VEIN ENDOSCOPIC (EVH);   Surgeon: Lexie Joe DO;  Location: [facility];  Service: Cardiac Surgery   • UT SLCTV CATHJ 3RD+ ORD SLCTV ABDL PEL/LXTR [facility] Left 2022    Procedure: ARTERIOGRAM, STENT PLACEMENT IN LEFT SUPERFICIAL FEMORIAL ARTERY;  Surgeon: Kash Wilson MD;  Location: [facility];  Service: Vascular   • UT VEIN BYPASS GRAFT,FEM-POP Right 2/3/2022    Procedure: BYPASS FEMORAL-POPLITEAL;  Surgeon: Kash Wilson MD;  [facility];  Service: Vascular   • VASECTOMY         Social History:  Social History     Substance and Sexual Activity   Alcohol Use Never     Social History     Substance and Sexual Activity   Drug Use Never     Social History     Tobacco Use   Smoking Status Former Smoker   • Packs/day: 1 00   • Years: 38 00   • Pack years: 38 00   • Types: Cigarettes   • Quit date: 2021   • Years since quittin 5   Smokeless Tobacco Never Used       Family History:  Family History   Problem Relation Age of Onset   • Heart disease Father        Allergies:  No Known Allergies    Medications:  Current Facility-Administered Medications   Medication Dose Route Frequency   • acetaminophen (TYLENOL) tablet 650 mg  650 mg Oral Q6H PRN   • docusate sodium (COLACE) capsule 100 mg  100 mg Oral BID   • heparin (porcine) 25,000 units in 0 45% NaCl 250 mL infusion (premix)  3-30 Units/kg/hr (Order-Specific) Intravenous Titrated   • heparin (porcine) injection 3,000 Units  3,000 Units Intravenous Q1H PRN   • heparin (porcine) injection 6,000 Units  6,000 Units Intravenous Q1H PRN   • insulin lispro (HumaLOG) 100 units/mL subcutaneous injection 1-6 Units  1-6 Units Subcutaneous TID AC   • insulin lispro (HumaLOG) 100 units/mL subcutaneous injection 1-6 Units  1-6 Units Subcutaneous HS   • ondansetron (ZOFRAN) injection 4 mg  4 mg Intravenous Q6H PRN   • oxyCODONE (ROXICODONE) immediate release tablet 10 mg  10 mg Oral Q6H PRN   • oxyCODONE (ROXICODONE) IR tablet 5 mg  5 mg Oral Q4H PRN       Vitals:  BP (!) 178/86 (BP Location: Left arm)   Pulse 80   Temp 98 3 °F (36 8 °C) (Oral)   Resp 20   Ht 5' 5" (1 651 m)   Wt 77 2 kg (170 lb 3 1 oz)   SpO2 98%   BMI 28 32 kg/m²     I/Os:  No intake/output data recorded  Lab Results and Cultures:   Lab Results   Component Value Date    WBC 5 75 10/18/2022    HGB 11 3 (L) 10/18/2022    HCT 34 2 (L) 10/18/2022    MCV 80 (L) 10/18/2022     10/18/2022     Lab Results   Component Value Date    GLUCOSE 145 (H) 04/09/2021    CALCIUM 8 7 10/18/2022    K 3 5 10/18/2022    CO2 26 10/18/2022     10/18/2022    BUN 22 10/18/2022    CREATININE 1 07 10/18/2022     Lab Results   Component Value Date    INR 1 18 10/17/2022    INR 1 13 01/31/2022    INR 1 12 01/19/2022    PROTIME 14 8 (H) 10/17/2022    PROTIME 14 0 01/31/2022    PROTIME 13 9 01/19/2022       Lipid Panel: No results found for: CHOL,     Blood Culture: No results found for: BLOODCX,   Urinalysis:   Lab Results   Component Value Date    COLORU Yellow 04/08/2021    CLARITYU Clear 04/08/2021    SPECGRAV 1 012 04/08/2021    PHUR 8 0 04/08/2021    LEUKOCYTESUR Negative 04/08/2021    NITRITE Negative 04/08/2021    GLUCOSEU Negative 04/08/2021    KETONESU Negative 04/08/2021    BILIRUBINUR Negative 04/08/2021    BLOODU Negative 04/08/2021   ,   Urine Culture: No results found for: URINECX,   Wound Culure: No results found for: WOUNDCULT    Imaging:  Reviewed as above    Physical Exam:    General appearance: alert and oriented, in no acute distress  Skin: Grossly warm, dry and intact for the exception of extremities exam below  Neurologic: Grossly neurologically intact  Head: Normocephalic, without obvious abnormality, atraumatic  Eyes: EOMI bilaterally  Neck: supple, symmetrical, trachea midline  Lungs: Non-labored, even breathing on room air  Heart: RRR  Abdomen: soft non-tender   Noted healed R groin access site without ertythema, edema, induration, bleeding, discharge  Extremities: Bilateral LE grossly warm and dry without wounds or ischemic changes, cyanosis, or edema   RLE foot cooler than L  M/S intact bilaterally    Pulse exam:  Femoral: Right:non- palpable; doppler signal Left: 2+  Popliteal: Right: AK POP doppler signal   DP: Right: monophasic signal Left: palpable  PT: Right: doppler signal Left: palpable   Palpable L peroneal     Kenia Howe  10/19/2022  [facility], [phone]

## 2022-10-19 NOTE — CONSULTS
ICU Acceptance Note - Critical Care   Miguel Oscar 62 y o  male MRN: [id number]  Unit/Bed#: MICU 03 Encounter: [id number]      -------------------------------------------------------------------------------------------------------------  Chief Complaint: post lysis    History of Present Illness     Miguel Oscar is a 62 y o  male with PMH of PAD (s/p CFA to AK pop bypass with PTFE- Feb 2022), CAD s/p CABG with L GSV (April 2021), NIDDM, HTN, HLD, former smoker, who presented to the [facility] ED on 10/17 for right leg rest pain  Found to have occluded bypass graft and taken to the OR on 10/19 in [place] for angiogram and placement lysis catheter  Upon post op evaluation patient motor sensory intact bilateral lower legs  Awakening from anesthesia  No significant pain  Denies chest pain or shortness of breath  AVSS on RA    Pulse exam:  R PT doppler signal  R DP no signal  L PT doppler signal  L DP palpable     History obtained from the patient   -------------------------------------------------------------------------------------------------------------  Assessment and Plan:    Neuro:   • Diagnosis: post op pain control  o Plan:   o P r n   Tylenol, oxy      CV:   • Diagnosis:  Peripheral arterial disease  o Plan:   o Status post common femoral to above knee popliteal bypass with PTFE in February 2022, presenting with occluded bypass and symptoms of claudication  o Status post 10/19 right lower extremity angiogram, lysis catheter placement with vascular surgery  o Plan to return on 10/24 lysis recheck  o Continue aspirin, statin  o Continue tPA and heparin through catheter  o Q 6 CBC, fibrinogen, PTT  • Diagnosis:  CAD  o Plan:   o S/p CABG April 2021  o Holding home losartan  o Holding home Brilinta  o Continue aspirin, statin  o At home on metoprolol succinate 25 daily, will give metoprolol tartrate 25 b i d  (Higher dose because holding losartan)  • Diagnosis:  Hypertension  o See metoprolol comments in CAD  o P r n  Hydralazine      Pulm:  • Diagnosis:  No active issues  o Plan:   o Maintain SpO2 above 92%  o Supplemental oxygen as needed      GI:   • Diagnosis:  No active issues  o Plan:   o P r n  Zofran      :   • Diagnosis:  No active issues  o Plan:   o Monitor urine output  o Admission creatinine 1 1      F/E/N:   • F:  Normal Saline at 50  • E:  Replace as needed  • N:  Cardiac diet, NPO at midnight      Heme/Onc:   • Diagnosis:  Post lysis  o Plan:   o See PAD for routine labs  o Monitor hemoglobin      Endo:   • Diagnosis:  Non-insulin-dependent diabetes   o Plan:   o Sliding-scale insulin      ID:   • Diagnosis:  No active issues  o Plan:  o Monitor fever and white count      MSK/Skin:   • Diagnosis:  ICU care  o PT/OT when able      Disposition: Continue Critical Care   Code Status: Level 1 - Full Code  --------------------------------------------------------------------------------------------------------------  Review of Systems   Constitutional: Negative for chills and fever  HENT: Negative for ear pain and sore throat  Eyes: Negative for pain and visual disturbance  Respiratory: Negative for cough and shortness of breath  Cardiovascular: Negative for chest pain and palpitations  Gastrointestinal: Negative for abdominal pain and vomiting  Genitourinary: Negative for dysuria and hematuria  Musculoskeletal: Negative for arthralgias and back pain  Leg pain   Skin: Negative for color change and rash  Neurological: Negative for seizures and syncope  All other systems reviewed and are negative  A 12-point, complete review of systems was reviewed and negative except as stated above     Physical Exam  Vitals and nursing note reviewed  Constitutional:       Appearance: He is well-developed  HENT:      Head: Normocephalic and atraumatic  Eyes:      Conjunctiva/sclera: Conjunctivae normal    Cardiovascular:      Rate and Rhythm: Normal rate and regular rhythm        Heart sounds: No murmur heard  Comments: R PT doppler signal  R DP no signal  L PT doppler signal  L DP palpable   Pulmonary:      Effort: Pulmonary effort is normal  No respiratory distress  Breath sounds: Normal breath sounds  Abdominal:      Palpations: Abdomen is soft  Tenderness: There is no abdominal tenderness  Musculoskeletal:      Cervical back: Neck supple  Skin:     General: Skin is warm and dry  Neurological:      General: No focal deficit present  Mental Status: He is alert and oriented to person, place, and time  Comments: Motor sensory grossly intact       --------------------------------------------------------------------------------------------------------------  Vitals:   Vitals:    10/19/22 0143 10/19/22 0631 10/19/22 0800 10/19/22 1300   BP: (!) 178/86 169/91 159/93 168/93   BP Location: Left arm      Pulse: 80 68 71 70   Resp: 20  15    Temp: 98 3 °F (36 8 °C) 97 7 °F (36 5 °C) 97 8 °F (36 6 °C) 97 8 °F (36 6 °C)   TempSrc: Oral   Oral   SpO2: 98% 96% 94% 98%   Weight: 77 2 kg (170 lb 3 1 oz)      Height: 5' 5" (1 651 m)        Temp  Min: 97 7 °F (36 5 °C)  Max: 98 7 °F (37 1 °C)  IBW (Ideal Body Weight): 61 5 kg  Height: 5' 5" (165 1 cm)  Body mass index is 28 32 kg/m²    N/A    Laboratory and Diagnostics:  Results from last 7 days   Lab Units 10/19/22  0340 10/18/22  0609 10/17/22  2229   WBC Thousand/uL 5 71 5 75 6 30   HEMOGLOBIN g/dL 11 6* 11 3* 12 8   HEMATOCRIT % 35 4* 34 2* 40 1   PLATELETS Thousands/uL 173 150 178   NEUTROS PCT %  --   --  53   MONOS PCT %  --   --  10     Results from last 7 days   Lab Units 10/19/22  0340 10/18/22  0609 10/17/22  2229   SODIUM mmol/L 136 141 141   POTASSIUM mmol/L 3 8 3 5 3 9   CHLORIDE mmol/L 107 106 105   CO2 mmol/L 26 26 28   ANION GAP mmol/L 3* 9 8   BUN mg/dL 20 22 24   CREATININE mg/dL 1 10 1 07 1 17   CALCIUM mg/dL 9 2 8 7 9 2   GLUCOSE RANDOM mg/dL 186* 136 88   ALT U/L  --  39 46   AST U/L  --  21 24   ALK PHOS U/L --  85 102   ALBUMIN g/dL  --  3 6 4 2   TOTAL BILIRUBIN mg/dL  --  0 66 0 80          Results from last 7 days   Lab Units 10/19/22  0956 10/19/22  0340 10/18/22  2045 10/18/22  1141 10/18/22  0433 10/17/22  2229   INR   --  1 07  --   --   --  1 18   PTT seconds 71* 77* 59* 72* >210* 29          Results from last 7 days   Lab Units 10/17/22  2229   LACTIC ACID mmol/L 0 9     ABG:    VBG:          Micro:        EKG:   Imaging: I have personally reviewed pertinent reports  Historical Information   Past Medical History:   Diagnosis Date   • Bicuspid aortic valve     being observed   • CAD (coronary artery disease)    • Chest pain 4/6/2021   • Coronary artery disease    • [facility] )    • Encounter for postoperative care 5/17/2021   • Former tobacco use    • GERD (gastroesophageal reflux disease)    • Goiter    • History of myocardial infarction    • History of rib fracture    • Hyperlipidemia    • Hypertension    • Hypertensive urgency 4/6/2021   • Left carotid bruit 7/22/2021   • Leg pain, bilateral     Agram today LLE   1/20/2022   • Leukocytosis 4/6/2021   • Myocardial infarction [facility])     2021-   CABG  x3   • NSTEMI (non-ST elevated myocardial infarction) ([facility] ) 4/7/2021   • Wears glasses      Past Surgical History:   Procedure Laterality Date   • CARDIAC CATHETERIZATION     • CARDIAC CATHETERIZATION N/A 10/20/2021    Procedure: Cardiac catheterization;  Surgeon: Ludy Hebert MD;  Location: [address] [place] [address] CATH LAB; Service: Cardiology   • CARDIAC CATHETERIZATION N/A 10/20/2021    Procedure: Cardiac Coronary Angiogram;  Surgeon: Ludy Hebert MD;  Location: [address] [place] [address] CATH LAB; Service: Cardiology   • CARDIAC CATHETERIZATION N/A 10/20/2021    Procedure: Cardiac pci;  Surgeon: Ludy Hebert MD;  Location: [address] [place] [address] CATH LAB;   Service: Cardiology   • IR LOWER EXTREMITY ANGIOGRAM  1/20/2022   • IR LOWER EXTREMITY ANGIOGRAM  2/3/2022   • PA CABG, ARTERY-VEIN, FOUR N/A 4/9/2021    Procedure: CORONARY ARTERY BYPASS GRAFT (CABG) 3 VESSELS: LIMA to LAD; LLE EVH/SVG to LPL & OM2;  Surgeon: Konrad Kiser DO;  [facility];  Service: Cardiac Surgery   • WI ECHO TRANSESOPHAG R-T 2D W/PRB IMG ACQUISJ I&R N/A 2021    Procedure: TRANSESOPHAGEAL ECHOCARDIOGRAM (BENNETT); Surgeon: Konrad Kiser DO;  [facility];  Service: Cardiac Surgery   • WI ENDOSCOPY W/VIDEO-ASST VEIN HARVEST,CABG Left 2021    Procedure: HARVEST VEIN ENDOSCOPIC ([address]);   Surgeon: Konrad Kiser DO;  [facility];  Service: Cardiac Surgery   • WI SLCTV CATHJ 3RD+ ORD SLCTV ABDL PEL/LXTR [facility] Left 2022    Procedure: ARTERIOGRAM, STENT PLACEMENT IN LEFT SUPERFICIAL FEMORIAL ARTERY;  Surgeon: Jeannette Swann MD;  [facility];  Service: Vascular   • WI VEIN BYPASS GRAFT,FEM-POP Right 2/3/2022    Procedure: BYPASS FEMORAL-POPLITEAL;  Surgeon: Jeannette Swann MD;  [facility];  Service: Vascular   • VASECTOMY       Social History   Social History     Substance and Sexual Activity   Alcohol Use Never     Social History     Substance and Sexual Activity   Drug Use Never     Social History     Tobacco Use   Smoking Status Former Smoker   • Packs/day: 1 00   • Years: 38 00   • Pack years: 38 00   • Types: Cigarettes   • Quit date: 2021   • Years since quittin 5   Smokeless Tobacco Never Used     Exercise History:   Family History:   Family History   Problem Relation Age of Onset   • Heart disease Father      I have reviewed this patient's family history and commented on sigificant items within the HPI      Medications:  Current Facility-Administered Medications   Medication Dose Route Frequency   • acetaminophen (TYLENOL) tablet 650 mg  650 mg Oral Q6H PRN   • alteplase (CATHFLO) 10 mg in sodium chloride 0 9 % 250 mL infusion  1 mg/hr Intracatheter Continuous   • alteplase (CATHFLO) 10 mg in sodium chloride 0 9 % 250 mL infusion  1 mg/hr Intracatheter Continuous   • [START ON 10/20/2022] aspirin (ECOTRIN LOW STRENGTH) EC tablet 81 mg  81 mg Oral Daily   • atorvastatin (LIPITOR) tablet 80 mg  80 mg Oral Daily With Dinner   • docusate sodium (COLACE) capsule 100 mg  100 mg Oral BID   • heparin (porcine) 25,000 units in 0 45% NaCl 250 mL infusion (premix)  500 Units/hr Intravenous Continuous   • hydrALAZINE (APRESOLINE) injection 10 mg  10 mg Intravenous Q6H PRN   • insulin lispro (HumaLOG) 100 units/mL subcutaneous injection 1-6 Units  1-6 Units Subcutaneous TID AC   • insulin lispro (HumaLOG) 100 units/mL subcutaneous injection 1-6 Units  1-6 Units Subcutaneous HS   • metoprolol tartrate (LOPRESSOR) tablet 25 mg  25 mg Oral Q12H BOBBY   • ondansetron (ZOFRAN) injection 4 mg  4 mg Intravenous Q4H PRN   • oxyCODONE (ROXICODONE) immediate release tablet 10 mg  10 mg Oral Q6H PRN   • oxyCODONE (ROXICODONE) IR tablet 5 mg  5 mg Oral Q4H PRN   • sodium chloride 0 9 % infusion  50 mL/hr Intravenous Continuous     Home medications:  Prior to Admission Medications   Prescriptions Last Dose Informant Patient Reported?  Taking?   aspirin (ECOTRIN LOW STRENGTH) 81 mg EC tablet  Self No No   Sig: TAKE 1 TABLET BY MOUTH EVERY DAY   atorvastatin (LIPITOR) 80 mg tablet  Self No No   Sig: Take 1 tablet (80 mg total) by mouth daily with dinner   isosorbide mononitrate (IMDUR) 30 mg 24 hr tablet   No No   Sig: TAKE 1 TABLET BY MOUTH TWICE A DAY   losartan (COZAAR) 25 mg tablet  Self No No   Sig: Take 1 tablet (25 mg total) by mouth daily   metFORMIN (GLUCOPHAGE) 1000 MG tablet  Self No No   Sig: TAKE 1 TABLET BY MOUTH TWICE A DAY WITH MEALS   metoprolol succinate (TOPROL-XL) 25 mg 24 hr tablet   No No   Sig: TAKE 1/2 TABLET BY MOUTH EVERY DAY   ticagrelor (Brilinta) 90 MG   No No   Sig: Take 1 tablet (90 mg total) by mouth 2 (two) times a day      Facility-Administered Medications: None     Allergies:  No Known Allergies  ------------------------------------------------------------------------------------------------------------  Advance Directive and Living Will:      Power of :    POLST:    ------------------------------------------------------------------------------------------------------------  Anticipated Length of Stay is > 2 midnights    Care Time Delivered:   30 minutes      Gabe Mina MD        Portions of the record may have been created with voice recognition software  Occasional wrong word or "sound a like" substitutions may have occurred due to the inherent limitations of voice recognition software    Read the chart carefully and recognize, using context, where substitutions have occurred

## 2022-10-19 NOTE — OP NOTE
OPERATIVE REPORT  PATIENT NAME: Estrellita Finnegan    :  1965  MRN: [id number]  Pt Location: [facility] OR ROOM 02    SURGERY DATE: 10/19/2022    Surgeon(s) and Role:     * Esteban Burns MD - Primary     * Ameena Metzger MD - Assisting    Preop Diagnosis:  Claudication of right lower extremity ([address] ) [I73 9]    Post-Op Diagnosis Codes:     * Claudication of right lower extremity ([address] ) [I73 9]    Procedure(s) (LRB):  RIGHT LOWER EXTREMITY ARTERIOGRAM WITH LYSIS, PLACEMENT OF LYSIS CATHETER (Right)    Specimen(s):  * No specimens in log *    Estimated Blood Loss:   Minimal    Drains:  Urethral Catheter 18 Fr  (Active)   Number of days: 0       Anesthesia Type:   Conscious Sedation     Operative Indications:  Claudication of right lower extremity (HCC) [I73 9]  Hx of right fem-pop bypass with PTFE in 2022  New onset claudication 2 weeks ago and rest pain 2 days ago found to have occluded bypass graft    Operative Findings:  -Right lower extremity angiogram showing common fem occlusion with profunda reconstitution  -Pop and tibial vessels patent distal to bypass with AT and PT run off the to the foot, PT main flow to the foot    Complications:   None    Procedure and Technique:  Patient was brought to operating placed supine on the operating room table he was identified by MRN and name  Patient had Lomax catheter placed by nursing staff given sedation by anesthesia bilateral groins were prepped with ChloraPrep  He was draped in sterile fashion time-out was done  The case began with ultrasound-guided access of the left common femoral artery over top the mid femoral head  A sheath shot was done which showed a high access point on the top of the femoral head the micropuncture sheath was removed and pressure was held    Access was gained again of the lower aspect of the mid femoral head with ultrasound guidance placed a micropuncture wire up 5 micropuncture sheath and dinner she thought she would good access point  We then placed a Bentson wire up into the infrarenal aorta followed by a 5 Moroccan sheath then placed a Contra catheter and performed a angiogram finding the bifurcation  Using the Bentson wire were able to cannulate the contralateral iliac artery placing a Bentson wire into the external iliac and then using a KMP we were able to place a catheter up and over the bifurcation and performed a angiogram showing an PABLO occlusion of the common femoral artery with reconstitution of the profunda  Using a Glidewire and KMP we were able to cannulate the bypass graft and we were able to follow the wire down into the popliteal artery and passed our catheter down as well  We then exchanged our Glidewire out for a Bentson wire and then placed our 5 Moroccan 45 cm sheath up and over the bifurcation into the external iliac artery on the right side  Patient then was heparinized and we then advanced our KMP into the popliteal artery performed angiogram showing we were in true lumen showing our runoff being the anterior tibial peroneal and posterior tibial arteries with posterior tibial artery being the main flow into the foot  At this point we exchanged over a Bentson wire for the 40 cm Uni-fuse pre-catheter and place the tip at the distal aspect of the occlusion near the adductor hiatus and the proximal aspect sitting just above the femoral head within the common femoral artery  Hypodense we removed our wire and seated our sheath and catheter in place  We then attached our tPA through the catheter and our heparin drip to the sheath side port  Patient then had a PICC line placed by the IR team intraoperatively and was taken to ICU for recovery  Patient is planned for return to the OR or IR suite for lysis check tomorrow and plan for definitive intervention       I was present for the entire procedure    Patient Disposition:  Critical Care Unit        SIGNATURE: Cindy Ewing MD  DATE: October 19, 2022  TIME: 11:53 AM      Vascular Quality Initiative - Peripheral Vascular Intervention     Urgency: Urgent    Functional Status:  Fully active; able to carry on all predisease activities without restriction  Ambulation: Amb = independently ambulatory    Leg Symptoms    Right: Ischemic Rest Pain  Left: Asymptomatic:  documented peripheral arterial disease without symptoms of claudication or ischemic pain      Treatment of Native Artery to Maintain Bypass Patency?:  No    COVID Information  COVID Symptoms Pre-Procedure: Asymptomatic    Treatment Delayed by Pandemic: None    Access   Number of Sites: 1     Access Site 1:     Side 1: Left    Site 1: Femoral Retrograde    Access Guidance 1:U/S    Largest Sheath Size 1: 5 Fr  Closure Device 1: Sheath still in place      Number of Closure Devices: N/A     Closure Device Outcome: N/A         Procedure  Fluoro Time: 12 minutes  Contrast Volume: Visipaque 16 ml  DAP: 11 9 Gy cm2  CO2: no  Anticoagulant: Heparin  Protamine: No  If Creatinine is > 1 2 or missing, BETO Prophylaxis IV saline     Treatment Details  Indication: Occlusive Disease,    Completion Assessment  Artery 1 treated:  Com Fem  Right               Outflow: SFA, PROF, POP: 0                  Was this Site previously treated?: Yes, Other          TASC Grade: D          Total Treated Length: 2 cm          Total Occluded Length: 2 cm          Calcification: None (no calcification visible on fluoroscopic, CT or IVUS imaging)          Number of Treatment types (Devices):   1           Device 1          Treatment Type: Thrombolysis, Pharmacologic   Planned, Current Procedure          Technical result: Successful placement    Artery 2 treated: CFA+Pop byass   Right               Outflow: AT,PT,Peroneal: 3                                    Was this Site previously treated?: Yes, Other          TASC Grade: D          Total Treated Length: 40 cm           Total Occluded Length: 40 cm          Calcification: None (no calcification visible on fluoroscopic, CT or IVUS imaging)          Number of Treatment types (Devices):   1           Device 1          Treatment Type:  Thrombolysis, Pharmacologic   Planned, Current Procedure          Technical result: Successful placement     None     Post Procedure  Patient currently taking: Statin, Yes      Antiplatelet Medication, Yes    Procedure Complications: No

## 2022-10-19 NOTE — PLAN OF CARE
Problem: Potential for Falls  Goal: Patient will remain free of falls  Description: INTERVENTIONS:  - Educate patient/family on patient safety including physical limitations  - Instruct patient to call for assistance with activity   - Consult OT/PT to assist with strengthening/mobility   - Keep Call bell within reach  - Keep bed low and locked with side rails adjusted as appropriate  - Keep care items and personal belongings within reach  - Initiate and maintain comfort rounds  - Make Fall Risk Sign visible to staff  - Offer Toileting every  Hours, in advance of need  - Initiate/Maintain alarm  - Obtain necessary fall risk management equipment:   - Apply yellow socks and bracelet for high fall risk patients  - Consider moving patient to room near nurses station  Outcome: Progressing     Problem: PAIN - ADULT  Goal: Verbalizes/displays adequate comfort level or baseline comfort level  Description: Interventions:  - Encourage patient to monitor pain and request assistance  - Assess pain using appropriate pain scale  - Administer analgesics based on type and severity of pain and evaluate response  - Implement non-pharmacological measures as appropriate and evaluate response  - Consider cultural and social influences on pain and pain management  - Notify physician/advanced practitioner if interventions unsuccessful or patient reports new pain  Outcome: Progressing     Problem: INFECTION - ADULT  Goal: Absence or prevention of progression during hospitalization  Description: INTERVENTIONS:  - Assess and monitor for signs and symptoms of infection  - Monitor lab/diagnostic results  - Monitor all insertion sites, i e  indwelling lines, tubes, and drains  - Monitor endotracheal if appropriate and nasal secretions for changes in amount and color  - [place] appropriate cooling/warming therapies per order  - Administer medications as ordered  - Instruct and encourage patient and family to use good hand hygiene technique  - Identify and instruct in appropriate isolation precautions for identified infection/condition  Outcome: Progressing     Problem: SAFETY ADULT  Goal: Patient will remain free of falls  Description: INTERVENTIONS:  - Educate patient/family on patient safety including physical limitations  - Instruct patient to call for assistance with activity   - Consult OT/PT to assist with strengthening/mobility   - Keep Call bell within reach  - Keep bed low and locked with side rails adjusted as appropriate  - Keep care items and personal belongings within reach  - Initiate and maintain comfort rounds  - Make Fall Risk Sign visible to staff  - Offer Toileting every  Hours, in advance of need  - Initiate/Maintain alarm  - Obtain necessary fall risk management equipment:  - Apply yellow socks and bracelet for high fall risk patients  - Consider moving patient to room near nurses station  Outcome: Progressing  Goal: Maintain or return to baseline ADL function  Description: INTERVENTIONS:  -  Assess patient's ability to carry out ADLs; assess patient's baseline for ADL function and identify physical deficits which impact ability to perform ADLs (bathing, care of mouth/teeth, toileting, grooming, dressing, etc )  - Assess/evaluate cause of self-care deficits   - Assess range of motion  - Assess patient's mobility; develop plan if impaired  - Assess patient's need for assistive devices and provide as appropriate  - Encourage maximum independence but intervene and supervise when necessary  - Involve family in performance of ADLs  - Assess for home care needs following discharge   - Consider OT consult to assist with ADL evaluation and planning for discharge  - Provide patient education as appropriate  Outcome: Progressing     Problem: Knowledge Deficit  Goal: Patient/family/caregiver demonstrates understanding of disease process, treatment plan, medications, and discharge instructions  Description: Complete learning assessment and assess knowledge base    Interventions:  - Provide teaching at level of understanding  - Provide teaching via preferred learning methods  Outcome: Progressing

## 2022-10-19 NOTE — ED NOTES
Note PU time change     FROM: [address] [facility] ED 08-ED 08 (ED 8-01)  TO: [address] med surg no telem [address] [facility] [phone], [facility] [id number]  DX: Claudication of right lower extremity [facility])  EMS Tx Reason: Vascular  Transfer Priority Level (1,2,3): 2  Referring: Samantha Dyer, SNEHAL/ Dr Viri Lau  Accepting: Dr Padmini Hare  Transport (ALS/BLS, etc):  ALS  Reason for ALS/CCT if applicable (O2, tele, IVF, meds):No covid testing, Heparin, room air, no telem  P/U Time: 6732 slets/ changed 2200  Number for Report:  [phone]     Misty BeaversHoly Redeemer Health System  10/18/22 3532

## 2022-10-19 NOTE — PLAN OF CARE
Problem: Potential for Falls  Goal: Patient will remain free of falls  Description: INTERVENTIONS:  - Educate patient/family on patient safety including physical limitations  - Instruct patient to call for assistance with activity   - Consult OT/PT to assist with strengthening/mobility   - Keep Call bell within reach  - Keep bed low and locked with side rails adjusted as appropriate  - Keep care items and personal belongings within reach  - Initiate and maintain comfort rounds  - Make Fall Risk Sign visible to staff  - Offer Toileting every  Hours, in advance of need  - Initiate/Maintainalarm  - Obtain necessary fall risk management equipment  - Apply yellow socks and bracelet for high fall risk patients  - Consider moving patient to room near nurses station  Outcome: Progressing

## 2022-10-19 NOTE — ANESTHESIA POSTPROCEDURE EVALUATION
Post-Op Assessment Note    CV Status:  Stable  Pain Score: 0    Pain management: adequate     Mental Status:  Alert and awake   Hydration Status:  Stable   PONV Controlled:  None   Airway Patency:  Patent      Post Op Vitals Reviewed: Yes      Staff: CRNA         No complications documented      BP   156/78   Temp   97 8   Pulse  78   Resp   12   SpO2   96% room air

## 2022-10-19 NOTE — ANESTHESIA PREPROCEDURE EVALUATION
Procedure:  RIGHT LOWER EXTREMITY ARTERIOGRAM WITH LYSIS (Right Abdomen)    Relevant Problems   CARDIO   (+) CAD (coronary artery disease)   (+) History of PTCA   (+) Hypertension   (+) S/P CABG x 3   (+) Sinus bradycardia      ENDO   (+) Type II diabetes mellitus (HCC)      GI/HEPATIC   (+) GERD (gastroesophageal reflux disease)      HEMATOLOGY   (+) Postoperative anemia due to acute blood loss   (+) Thrombocytopenia (HCC)      NEURO/PSYCH   (+) 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)   (+) Claudication of right lower extremity (HCC)      Left Ventricle Left ventricular cavity size is normal  Wall thickness is mildly increased  The left ventricular ejection fraction is 65%  Systolic function is normal  Global longitudinal strain is reduced at -15%  Wall motion is normal  There is concentric remodeling  Diastolic function is abnormal   Left atrial filling pressure is normal    Right Ventricle Right ventricular cavity size is normal  Systolic function is normal    Left Atrium The atrium is normal in size  Right Atrium The atrium is normal in size  Aortic Valve The aortic valve is bicuspid  The leaflets are mildly thickened  The leaflets are mildly calcified  There is no evidence of regurgitation  There is no evidence of stenosis  Mitral Valve The mitral valve has normal structure  There is trace regurgitation  There is no evidence of stenosis  Tricuspid Valve Tricuspid valve structure is normal  There is trace regurgitation  There is no evidence of stenosis  Pulmonic Valve Pulmonic valve structure is normal  There is trace regurgitation  There is no evidence of stenosis  Ascending Aorta The aortic root is normal in size  IVC/SVC The inferior vena cava is normal in size  Respirophasic changes were normal        Physical Exam    Airway    Mallampati score:  I  TM Distance: >3 FB  Neck ROM: full     Dental       Cardiovascular      Pulmonary      Other Findings        Anesthesia Plan  ASA Score- 4 Emergent    Anesthesia Type- IV sedation with anesthesia with ASA Monitors  Additional Monitors:   Airway Plan:           Plan Factors-    Chart reviewed  Imaging results reviewed  Existing labs reviewed  Patient summary reviewed  Patient did not smoke on day of surgery  Induction- intravenous  Postoperative Plan-     Informed Consent- Anesthetic plan and risks discussed with patient  I personally reviewed this patient with the CRNA  Discussed and agreed on the Anesthesia Plan with the CRNA  Hansel Smith

## 2022-10-19 NOTE — ASSESSMENT & PLAN NOTE
63yoM with a significant PMHx of former smoker, T2DM, HTN, CAD (s/p CABGx3 with L GSV harvest Apr 2021 and PCI Oct 2021 on DAPT ASA/Brillinta), PAD (s/p R CFA-AK pop bypass with PTFE 2/3/22 with Dr Bridget Aguilar, L proximal SFA PTA/NIRMALA 1/20/22) who presented to [facility] for three week history of RLE claudication and 2 day history of ischemic rest pain and numbness in the toes  Now presents to [place] for further management  Diagnostics  10/18/22 CTA Abdomen with runoff   - R CFA occlusion at origin with collateral filling of profunda  Bypass graft occluded and compressed as it enters adductor canal  AK Pop supplies 2-vessel runoff through peroneal and PT that fill plantar arch  Proximal AT occluded with distal reconstitution/filling of DP  - L CFA, SFA stent, profunda patent  Popliteal patent with peroneal runoff that reconstitutes distal PT and DP  PT occludes just beyond origin but fills plantar arch  AT occludes at proximal calf    10/17/22 LE Arterial Duplex  RLE: High grade stenosis vs occlusion of CFA to AK pop PTFE bypass graft   High grade stenosis vs occlusion of the CFA with trickle flow and distal AT  FRANCIE: 0 49 (0 95)/MTP 82(140)/GTP 25(81)  LLE:  FRANCIE: 1 05 (Prior 1 09)/(Prior 103)/ORN430 (Prior 89)    WBC 5 75  Hgb 11 3    BUN/Cr- 22/1 07  eGFR 76    Plan  - Plan for possible catheter directed thrombolysis; paxton consult IR  - Will obtain CT Head and Fibrinogen  - Continue neurovascular checks  - Continue heparin gtt  - Diet NPO  - Continue ASA, Statin, Brillinta  - Will discuss and reassess with vascular team in AM  - Appreciate SLIM management for remainder of care

## 2022-10-19 NOTE — QUICK NOTE
Post-Op Check - Vascular Surgery   Fawn Dutton 62 y o  male MRN: [id number]  Unit/Bed#: [facility] 03 Encounter: [id number]    ASSESSMENT: Patient is s/p lysis catheter placement L femoral access in the setting of occluded R CFA- AK pop bypass with PTFE 10/19/22    Plan  - Continue neurovascular checks and alert vascular team with any changes  - Notify if pain is refractory to added pain management regimen with gabapentin, robaxin, and positional changes  - Continue tPA and heparin per protocol with serial PTT   - Clears, NPO after MN for lysis re-check tomorrow    Subjective: Was TT that patient had worsening R calf pain and when the patient was seen and examined, his pain was improved with flexed knee and elevation of his leg on pillow and on bedside table, and described as a cramping pain in the RLE with some noted cyanotic changes to his toes and some c/o paresthesias to his foot  Otherwise denies pain to LLE and motor changes to bilateral LE  Vitals:  BP (!) 168/104   Pulse 68   Temp 97 8 °F (36 6 °C) (Oral)   Resp 15   Ht 5' 5" (1 651 m)   Wt 77 2 kg (170 lb 3 1 oz)   SpO2 99%   BMI 28 32 kg/m²     Physical Exam:    General appearance: alert and oriented in mild distress  Neurologic: Grossly M/S intact to bilateral LE  Neck: supple, symmetrical, trachea midline  Lungs: Non-labored, satting well on RA  Heart: RRR  Abdomen: Soft, non-tender  Lysis sheath and catheter in place at L femoral access with some expected oozing at the site  Soft without palpable hematoma or thrill  Extremities: LLE warm and dry without noted ischemic changes, leg immobilizer in place  RLE with some duskiness to R digits, R cooler than left, with coolness extending from foot to ankle  Pulse exam:  Popliteal: Right: doppler signal   DP: Right: no signal Left: 2+  PT: Right: no signal Left: 2+    I/Os:  No intake/output data recorded  I/O this shift: In: 945 2 [I V :848 1;  IV Piggyback:97 1]  Out: 625 [Urine:625]    Invasive Lines/Tubes:  Invasive Devices  Report      Peripherally Inserted Central Catheter Line  Duration             PICC Line 10/19/22 Left Brachial <1 day              Peripheral Intravenous Line  Duration             Peripheral IV 10/17/22 Right Antecubital 1 day    Peripheral IV 10/17/22 Right Hand 1 day              Drain  Duration             Urethral Catheter 18 Fr  <1 day                    VTE Prophylaxis: Heparin    Robert Jerome PA-C  10/19/2022

## 2022-10-19 NOTE — PROCEDURES
PICC Line Insertion    Date/Time: 10/19/2022 12:52 PM  Performed by: Earnestine Parrish  Authorized by: Vinh Malave MD     Patient location:  Other (comment)  Other Assisting Provider: No    Consent:     Consent obtained:  Emergent situation    Consent given by:  Patient    Alternatives discussed:  No treatment  Universal protocol:     Procedure explained and questions answered to patient or proxy's satisfaction: yes      Relevant documents present and verified: yes      Test results available and properly labeled: yes      Radiology Images displayed and confirmed  If images not available, report reviewed: yes      Required blood products, implants, devices, and special equipment available: yes      Site/side marked: yes      Immediately prior to procedure, a time out was called: yes    Pre-procedure details:     Hand hygiene: Hand hygiene performed prior to insertion      Sterile barrier technique: All elements of maximal sterile technique followed      Skin preparation:  ChloraPrep  Indications:     PICC line indications: other (comment)    Sedation:     Sedation type: Other (comment)  Anesthesia (see MAR for exact dosages):      Anesthesia method:  Local infiltration    Local anesthetic:  Lidocaine 1% w/o epi  Procedure details:     Location:  [place]    Vessel type: vein      Laterality:  Left    Site selection rationale:  KEEP ON SAME SIDE AS LYSIS CATHETER    Approach: percutaneous technique used      Patient position:  Flat    Procedural supplies:  Double lumen    Catheter size:  4 Fr    Landmarks identified: yes      Ultrasound guidance: yes      Ultrasound image availability:  Not saved    Sterile ultrasound techniques: Sterile gel and sterile probe covers were used      Number of attempts:  1    Successful placement: yes      Total catheter length (cm):  43CM     Catheter out on skin (cm):  0CM    Arm circumference:  33CM ARM CIRC  Post-procedure details:     Post-procedure:  Dressing applied and securement device placed    Assessment:  Placement verified by x-ray    Post-procedure complications: none      Patient tolerance of procedure: Tolerated well, no immediate complications  Comments:      PT UNDER ANESTHESIA IN OR HYBRID ROOM 2, FOR ARTERIAL LYSIS  PICC FOR BLOOD DRAWS AND INFUSION TO PREVENT UNESSECARY STICKS DURING ARTERIAL LYSIS

## 2022-10-19 NOTE — LETTER
[address] [facility] [address]  Dept: [phone]    October 23, 2022     Patient: Apryl Xavier   YOB: 1965   Date of Visit: 10/18/2022       To Whom it May Concern:    Apryl Xavier is under my professional care  He was seen in the hospital from 10/18/2022   to 10/23/22  He may return to work when cleared by Cardiology and Vascular Surgery  If you have any questions or concerns, please don't hesitate to call           Sincerely,          Yanelis Saravia PA-C

## 2022-10-19 NOTE — ED NOTES
Per [facility] heparin policy, with a PTT of 59 seconds, heparin drip increased by 2 units/kg/hr       Kerry Arora RN  10/18/22 8598

## 2022-10-20 ENCOUNTER — APPOINTMENT (OUTPATIENT)
Dept: RADIOLOGY | Facility: HOSPITAL | Age: 57
DRG: 271 | End: 2022-10-20
Payer: COMMERCIAL

## 2022-10-20 LAB
ANION GAP SERPL CALCULATED.3IONS-SCNC: 3 MMOL/L (ref 4–13)
ANION GAP SERPL CALCULATED.3IONS-SCNC: 7 MMOL/L (ref 4–13)
APTT PPP: 45 SECONDS (ref 23–37)
APTT PPP: 46 SECONDS (ref 23–37)
BUN SERPL-MCNC: 15 MG/DL (ref 5–25)
BUN SERPL-MCNC: 18 MG/DL (ref 5–25)
CA-I BLD-SCNC: 1.08 MMOL/L (ref 1.12–1.32)
CALCIUM SERPL-MCNC: 7.5 MG/DL (ref 8.3–10.1)
CALCIUM SERPL-MCNC: 8.4 MG/DL (ref 8.3–10.1)
CHLORIDE SERPL-SCNC: 109 MMOL/L (ref 96–108)
CHLORIDE SERPL-SCNC: 112 MMOL/L (ref 96–108)
CO2 SERPL-SCNC: 21 MMOL/L (ref 21–32)
CO2 SERPL-SCNC: 25 MMOL/L (ref 21–32)
CREAT SERPL-MCNC: 0.7 MG/DL (ref 0.6–1.3)
CREAT SERPL-MCNC: 0.89 MG/DL (ref 0.6–1.3)
ERYTHROCYTE [DISTWIDTH] IN BLOOD BY AUTOMATED COUNT: 13.5 % (ref 11.6–15.1)
ERYTHROCYTE [DISTWIDTH] IN BLOOD BY AUTOMATED COUNT: 13.7 % (ref 11.6–15.1)
ERYTHROCYTE [DISTWIDTH] IN BLOOD BY AUTOMATED COUNT: 13.8 % (ref 11.6–15.1)
FIBRINOGEN PPP-MCNC: 103 MG/DL (ref 227–495)
FIBRINOGEN PPP-MCNC: 125 MG/DL (ref 227–495)
FIBRINOGEN PPP-MCNC: 159 MG/DL (ref 227–495)
GFR SERPL CREATININE-BSD FRML MDRD: 104 ML/MIN/1.73SQ M
GFR SERPL CREATININE-BSD FRML MDRD: 94 ML/MIN/1.73SQ M
GLUCOSE SERPL-MCNC: 111 MG/DL (ref 65–140)
GLUCOSE SERPL-MCNC: 128 MG/DL (ref 65–140)
GLUCOSE SERPL-MCNC: 147 MG/DL (ref 65–140)
GLUCOSE SERPL-MCNC: 163 MG/DL (ref 65–140)
HCT VFR BLD AUTO: 26.9 % (ref 36.5–49.3)
HCT VFR BLD AUTO: 34.7 % (ref 36.5–49.3)
HCT VFR BLD AUTO: 35.6 % (ref 36.5–49.3)
HGB BLD-MCNC: 11.3 G/DL (ref 12–17)
HGB BLD-MCNC: 11.3 G/DL (ref 12–17)
HGB BLD-MCNC: 8.9 G/DL (ref 12–17)
INR PPP: 1.28 (ref 0.84–1.19)
LACTATE SERPL-SCNC: 0.8 MMOL/L (ref 0.5–2)
MAGNESIUM SERPL-MCNC: 1.6 MG/DL (ref 1.6–2.6)
MCH RBC QN AUTO: 26 PG (ref 26.8–34.3)
MCH RBC QN AUTO: 26.2 PG (ref 26.8–34.3)
MCH RBC QN AUTO: 26.7 PG (ref 26.8–34.3)
MCHC RBC AUTO-ENTMCNC: 31.7 G/DL (ref 31.4–37.4)
MCHC RBC AUTO-ENTMCNC: 32.6 G/DL (ref 31.4–37.4)
MCHC RBC AUTO-ENTMCNC: 33.1 G/DL (ref 31.4–37.4)
MCV RBC AUTO: 80 FL (ref 82–98)
MCV RBC AUTO: 81 FL (ref 82–98)
MCV RBC AUTO: 82 FL (ref 82–98)
PHOSPHATE SERPL-MCNC: 2.5 MG/DL (ref 2.7–4.5)
PLATELET # BLD AUTO: 127 THOUSANDS/UL (ref 149–390)
PLATELET # BLD AUTO: 130 THOUSANDS/UL (ref 149–390)
PLATELET # BLD AUTO: 96 THOUSANDS/UL (ref 149–390)
PMV BLD AUTO: 9.5 FL (ref 8.9–12.7)
PMV BLD AUTO: 9.6 FL (ref 8.9–12.7)
PMV BLD AUTO: 9.7 FL (ref 8.9–12.7)
POTASSIUM SERPL-SCNC: 3.7 MMOL/L (ref 3.5–5.3)
POTASSIUM SERPL-SCNC: 3.8 MMOL/L (ref 3.5–5.3)
PROTHROMBIN TIME: 16.2 SECONDS (ref 11.6–14.5)
RBC # BLD AUTO: 3.33 MILLION/UL (ref 3.88–5.62)
RBC # BLD AUTO: 4.32 MILLION/UL (ref 3.88–5.62)
RBC # BLD AUTO: 4.35 MILLION/UL (ref 3.88–5.62)
SODIUM SERPL-SCNC: 137 MMOL/L (ref 135–147)
SODIUM SERPL-SCNC: 140 MMOL/L (ref 135–147)
WBC # BLD AUTO: 6.06 THOUSAND/UL (ref 4.31–10.16)
WBC # BLD AUTO: 7.56 THOUSAND/UL (ref 4.31–10.16)
WBC # BLD AUTO: 9.17 THOUSAND/UL (ref 4.31–10.16)

## 2022-10-20 PROCEDURE — C1760 CLOSURE DEV, VASC: HCPCS

## 2022-10-20 PROCEDURE — C1757 CATH, THROMBECTOMY/EMBOLECT: HCPCS

## 2022-10-20 PROCEDURE — 99232 SBSQ HOSP IP/OBS MODERATE 35: CPT | Performed by: SURGERY

## 2022-10-20 PROCEDURE — 85384 FIBRINOGEN ACTIVITY: CPT | Performed by: PHYSICIAN ASSISTANT

## 2022-10-20 PROCEDURE — 82948 REAGENT STRIP/BLOOD GLUCOSE: CPT

## 2022-10-20 PROCEDURE — C1887 CATHETER, GUIDING: HCPCS

## 2022-10-20 PROCEDURE — C1769 GUIDE WIRE: HCPCS

## 2022-10-20 PROCEDURE — 85610 PROTHROMBIN TIME: CPT | Performed by: PHYSICIAN ASSISTANT

## 2022-10-20 PROCEDURE — 85730 THROMBOPLASTIN TIME PARTIAL: CPT | Performed by: SURGERY

## 2022-10-20 PROCEDURE — C1884 EMBOLIZATION PROTECT SYST: HCPCS

## 2022-10-20 PROCEDURE — 83605 ASSAY OF LACTIC ACID: CPT | Performed by: PHYSICIAN ASSISTANT

## 2022-10-20 PROCEDURE — C1874 STENT, COATED/COV W/DEL SYS: HCPCS

## 2022-10-20 PROCEDURE — 04CM3ZZ EXTIRPATION OF MATTER FROM RIGHT POPLITEAL ARTERY, PERCUTANEOUS APPROACH: ICD-10-PCS | Performed by: SURGERY

## 2022-10-20 PROCEDURE — 37231: CPT

## 2022-10-20 PROCEDURE — 82330 ASSAY OF CALCIUM: CPT | Performed by: PHYSICIAN ASSISTANT

## 2022-10-20 PROCEDURE — 85384 FIBRINOGEN ACTIVITY: CPT | Performed by: SURGERY

## 2022-10-20 PROCEDURE — C1725 CATH, TRANSLUMIN NON-LASER: HCPCS

## 2022-10-20 PROCEDURE — 80048 BASIC METABOLIC PNL TOTAL CA: CPT | Performed by: STUDENT IN AN ORGANIZED HEALTH CARE EDUCATION/TRAINING PROGRAM

## 2022-10-20 PROCEDURE — 84100 ASSAY OF PHOSPHORUS: CPT | Performed by: PHYSICIAN ASSISTANT

## 2022-10-20 PROCEDURE — 04CK3ZZ EXTIRPATION OF MATTER FROM RIGHT FEMORAL ARTERY, PERCUTANEOUS APPROACH: ICD-10-PCS | Performed by: SURGERY

## 2022-10-20 PROCEDURE — 85027 COMPLETE CBC AUTOMATED: CPT | Performed by: SURGERY

## 2022-10-20 PROCEDURE — B41DYZZ FLUOROSCOPY OF AORTA AND BILATERAL LOWER EXTREMITY ARTERIES USING OTHER CONTRAST: ICD-10-PCS | Performed by: SURGERY

## 2022-10-20 PROCEDURE — 85027 COMPLETE CBC AUTOMATED: CPT | Performed by: PHYSICIAN ASSISTANT

## 2022-10-20 PROCEDURE — 83735 ASSAY OF MAGNESIUM: CPT | Performed by: PHYSICIAN ASSISTANT

## 2022-10-20 PROCEDURE — 80048 BASIC METABOLIC PNL TOTAL CA: CPT | Performed by: PHYSICIAN ASSISTANT

## 2022-10-20 RX ORDER — LIDOCAINE HYDROCHLORIDE 10 MG/ML
INJECTION, SOLUTION EPIDURAL; INFILTRATION; INTRACAUDAL; PERINEURAL CODE/TRAUMA/SEDATION MEDICATION
Status: COMPLETED | OUTPATIENT
Start: 2022-10-20 | End: 2022-10-20

## 2022-10-20 RX ORDER — MIDAZOLAM HYDROCHLORIDE 2 MG/2ML
INJECTION, SOLUTION INTRAMUSCULAR; INTRAVENOUS CODE/TRAUMA/SEDATION MEDICATION
Status: COMPLETED | OUTPATIENT
Start: 2022-10-20 | End: 2022-10-20

## 2022-10-20 RX ORDER — POTASSIUM CHLORIDE 14.9 MG/ML
20 INJECTION INTRAVENOUS ONCE
Status: COMPLETED | OUTPATIENT
Start: 2022-10-20 | End: 2022-10-20

## 2022-10-20 RX ORDER — HEPARIN SODIUM 10000 [USP'U]/100ML
3-30 INJECTION, SOLUTION INTRAVENOUS
Status: DISCONTINUED | OUTPATIENT
Start: 2022-10-20 | End: 2022-10-21

## 2022-10-20 RX ORDER — HEPARIN SODIUM 1000 [USP'U]/ML
INJECTION, SOLUTION INTRAVENOUS; SUBCUTANEOUS CODE/TRAUMA/SEDATION MEDICATION
Status: COMPLETED | OUTPATIENT
Start: 2022-10-20 | End: 2022-10-20

## 2022-10-20 RX ORDER — FENTANYL CITRATE 50 UG/ML
INJECTION, SOLUTION INTRAMUSCULAR; INTRAVENOUS CODE/TRAUMA/SEDATION MEDICATION
Status: COMPLETED | OUTPATIENT
Start: 2022-10-20 | End: 2022-10-20

## 2022-10-20 RX ORDER — IODIXANOL 320 MG/ML
200 INJECTION, SOLUTION INTRAVASCULAR
Status: COMPLETED | OUTPATIENT
Start: 2022-10-20 | End: 2022-10-20

## 2022-10-20 RX ORDER — ACETAMINOPHEN 325 MG/1
975 TABLET ORAL EVERY 6 HOURS SCHEDULED
Status: DISCONTINUED | OUTPATIENT
Start: 2022-10-20 | End: 2022-10-23 | Stop reason: HOSPADM

## 2022-10-20 RX ORDER — MAGNESIUM SULFATE HEPTAHYDRATE 40 MG/ML
2 INJECTION, SOLUTION INTRAVENOUS ONCE
Status: COMPLETED | OUTPATIENT
Start: 2022-10-20 | End: 2022-10-21

## 2022-10-20 RX ORDER — HEPARIN SODIUM 200 [USP'U]/100ML
20 INJECTION, SOLUTION INTRAVENOUS CONTINUOUS
Status: DISCONTINUED | OUTPATIENT
Start: 2022-10-20 | End: 2022-10-20

## 2022-10-20 RX ADMIN — POTASSIUM PHOSPHATE, MONOBASIC AND POTASSIUM PHOSPHATE, DIBASIC 21 MMOL: 224; 236 INJECTION, SOLUTION, CONCENTRATE INTRAVENOUS at 22:24

## 2022-10-20 RX ADMIN — ASPIRIN 81 MG: 81 TABLET, COATED ORAL at 09:31

## 2022-10-20 RX ADMIN — GABAPENTIN 300 MG: 300 CAPSULE ORAL at 21:38

## 2022-10-20 RX ADMIN — IODIXANOL 120 ML: 320 INJECTION, SOLUTION INTRAVASCULAR at 15:51

## 2022-10-20 RX ADMIN — ACETAMINOPHEN 975 MG: 325 TABLET, FILM COATED ORAL at 21:36

## 2022-10-20 RX ADMIN — GABAPENTIN 300 MG: 300 CAPSULE ORAL at 09:30

## 2022-10-20 RX ADMIN — CALCIUM GLUCONATE 3 G: 98 INJECTION, SOLUTION INTRAVENOUS at 19:41

## 2022-10-20 RX ADMIN — SODIUM CHLORIDE 50 ML/HR: 0.9 INJECTION, SOLUTION INTRAVENOUS at 09:30

## 2022-10-20 RX ADMIN — ACETAMINOPHEN 975 MG: 325 TABLET, FILM COATED ORAL at 14:54

## 2022-10-20 RX ADMIN — ATORVASTATIN CALCIUM 80 MG: 80 TABLET, FILM COATED ORAL at 16:20

## 2022-10-20 RX ADMIN — POTASSIUM CHLORIDE 20 MEQ: 14.9 INJECTION, SOLUTION INTRAVENOUS at 09:40

## 2022-10-20 RX ADMIN — HYDROMORPHONE HYDROCHLORIDE 0.5 MG: 1 INJECTION, SOLUTION INTRAMUSCULAR; INTRAVENOUS; SUBCUTANEOUS at 07:27

## 2022-10-20 RX ADMIN — HYDROMORPHONE HYDROCHLORIDE 0.5 MG: 1 INJECTION, SOLUTION INTRAMUSCULAR; INTRAVENOUS; SUBCUTANEOUS at 03:46

## 2022-10-20 RX ADMIN — MIDAZOLAM 1 MG: 1 INJECTION INTRAMUSCULAR; INTRAVENOUS at 11:12

## 2022-10-20 RX ADMIN — HEPARIN SODIUM 18 UNITS/KG/HR: 10000 INJECTION, SOLUTION INTRAVENOUS at 17:47

## 2022-10-20 RX ADMIN — LIDOCAINE HYDROCHLORIDE 10 ML: 10 INJECTION, SOLUTION EPIDURAL; INFILTRATION; INTRACAUDAL; PERINEURAL at 11:10

## 2022-10-20 RX ADMIN — GABAPENTIN 300 MG: 300 CAPSULE ORAL at 16:20

## 2022-10-20 RX ADMIN — METOPROLOL TARTRATE 25 MG: 25 TABLET, FILM COATED ORAL at 21:37

## 2022-10-20 RX ADMIN — FENTANYL CITRATE 25 MCG: 50 INJECTION INTRAMUSCULAR; INTRAVENOUS at 12:04

## 2022-10-20 RX ADMIN — HEPARIN SODIUM 1000 UNITS: 1000 INJECTION INTRAVENOUS; SUBCUTANEOUS at 12:15

## 2022-10-20 RX ADMIN — FENTANYL CITRATE 25 MCG: 50 INJECTION INTRAMUSCULAR; INTRAVENOUS at 11:09

## 2022-10-20 RX ADMIN — METOPROLOL TARTRATE 25 MG: 25 TABLET, FILM COATED ORAL at 09:31

## 2022-10-20 RX ADMIN — MIDAZOLAM 0.5 MG: 1 INJECTION INTRAMUSCULAR; INTRAVENOUS at 12:04

## 2022-10-20 RX ADMIN — MIDAZOLAM 1 MG: 1 INJECTION INTRAMUSCULAR; INTRAVENOUS at 13:19

## 2022-10-20 RX ADMIN — HEPARIN SODIUM 5000 UNITS: 1000 INJECTION INTRAVENOUS; SUBCUTANEOUS at 10:59

## 2022-10-20 RX ADMIN — FENTANYL CITRATE 50 MCG: 50 INJECTION INTRAMUSCULAR; INTRAVENOUS at 11:05

## 2022-10-20 RX ADMIN — OXYCODONE HYDROCHLORIDE 10 MG: 10 TABLET ORAL at 01:50

## 2022-10-20 RX ADMIN — FENTANYL CITRATE 50 MCG: 50 INJECTION INTRAMUSCULAR; INTRAVENOUS at 13:20

## 2022-10-20 NOTE — OP NOTE
OPERATIVE REPORT  PATIENT NAME: Cris Swanson    :  1965  MRN: [id number]  Pt Location: [facility]  - [address]     SURGERY DATE: 10/20/2022    Surgeon(s) and Role:     Sonny Malik MD     * Anna Vega DO    Preop Diagnosis:  Claudication of right lower extremity ([address] ) [I73 9]    Post-Op Diagnosis Codes:     * Claudication of right lower extremity ([address] ) [I73 9]      Specimen(s):  * No specimens in log *    Estimated Blood Loss:   Minimal    Anesthesia Type:   Conscious Sedation     Operative Indications:  Claudication of right lower extremity ([address] ) [I73 9]    61 y/o male with PMH CAD c PCI, CABG, DM, RLE PAD c R Fem-AK Pop PTFE Bypass (2022), LLE PAD c LLE SFA NIRMALA (2022) presenting with RLE Acute Limb Ischemia, Right Fem-AK Pop PTFE Thrombosis     Operative Findings:    Procedure:   1  LLE Lysis Catheter check with LLE Runoff  2  7 Fr sheath advancement to Right EIA   3  LLE BK Pop Selective Catheterization   4  LLE Fem-AK Pop Distal Anastomosis Viabahn 6 x 150 SES  5  LLE Fem-AK Pop Distal Anastomosis 6 x 150 Jorge PTA  6  LLE Profunda 4 x 30 [place] PTA  7  LLE Fem-AK Pop Prox Anastomosis 6 x 40 Jorge PTA  8  LLE Fem-AK Pop Kamran 6 placement   9  LLE Profunda 6 Fr [place] Asp Catheter Thrombectomy   10  LLE Profunda 6 x 40 Jorge PTA  11  Aortogram   12  Right Femoral Access angiogram  13  Closure of right femoral artery access site with Proglide closure device  14  Supervision and radiologic interpretation of all radiographic imaging    Surgeon: Humberto Moore MD  Assistant (s): Anna Vega DO  Anesthesia :   Moderate conscious sedation is administered and monitored by an independent and qualified practitioner under my direct supervision  Findings: see below  Comp: None  EBL: Minimal  Access: right femoral ARTERY    RADIOGRAPHIC SUPERVISION AND INTERPRETATION OF TEST:    1    Abdominal aortogram findings -   Abdominal aorta - Patent without significant stenosis or aneurysm; no active contrast extravasation   Celiac axis - Patent without significant stenosis  Left renal artery - Patent without significant stenosis   Right renal artery - Patent without significant stenosis    Superior mesenteric artery - Patent without significant stenosis   Inferior mesenteric artery - Patent without significant stenosis      2  Pelvic runoff findings -   Left common iliac artery- Patent without significant stenosis   Left internal iliac artery- Patent without significant stenosis   Left external iliac artery- Patent without significant stenosis     Right common iliac artery- Patent without significant stenosis; no active contrast extravasation   Right internal iliac artery- Patent without significant stenosis; no active contrast extravasation   Right external iliac artery- Patent without significant stenosis; no active contrast extravasation     2  Left lower extremity angiogram findings-    common femoral artery- Patent without significant stenosis    Profunda femorals artery- Patent without significant stenosis   Superficial femoral artery- Patent without significant stenosis       3    Right lower extremity angiogram findings-    common femoral artery- Patent with thrombus in proximal anastomosis of Fem-AK Pop PTFE bypass  Profunda femorals artery- Occluded at ostium with thrombus; patent distally with good runoff  Superficial femoral artery- Occluded  Fem-AK Pop PTFE Bypass - Patent with thrombus lining distal portion, thrombus and stenosis of distal anastomosis, thrombus and stenosis of proximal anastomosis   Popliteal artery- Patent without significant stenosis   Anterior tibial artery- Patent proximally with occlusion and no reconstitution   Tibioperoneal trunk- Patent without significant stenosis  Posterior tibial artery- Patent without significant stenosis   Peroneal artery- Patent without significant stenosis; small amount of thrombus that resolved without intervention         Contrast Type/Amount -  120 CC VISIPAQUE     Fluoro Time (Mandatory) -  30 5 MIN    Devices - Perclose      The patient was brought to the angio suite  After the placement of monitoring devices, a timeout was performed and conscious sedation was administered  The right groin was prepped and draped in normal sterile fashion  Local anesthesia was infiltrated in the skin and subcutaneous tissues of the right groin and the right femoral artery  There was a prior 5Fr sheath in place from the lysis catheter  Wire was advanced under imaging  A Left lower extremity runoff revealed patent Fem-AK Pop PTFE Pop bypass with proximal anastomosis and distal anastomosis narrowing and thrombus as well as thrombus lining the distal portion of the graft  There was a short segment peroneal embolic occlusion as well  The decision was made to intervene  A 6Fr sheath was exchanged over a stiff wire into the right iliofemoral vessels  The patient was fully heparinized  The graft was crossed with a advantage glidewire and glide catheter and angiogram confirmed we were in the lumen of the below knee pop  A V18 wire was then placed across the graft  A 6 x 150 [place] SES was deployed across the distal anastomosis and the thrombus lined portion of the graft  This was post-deployment dilated with a 6 x 150 Jorge PTA  Post-deployment angiogram revealed good result with patent stent and minor gutter leak from the 6mm SES in a 8mm PTFE graft  The distal peroneal embolism appeared resolved  Next, attention was turned to the proximal anastomosis and profunda thrombus  The advantage glidewire was re-introduced to facilitate and a 7Fr sheath placed in the right external iliac vessel  Next, a 0 18 Advantage glidewire and V18 0 18 Wire were simultaneously introduced through the 7Fr sheath  A 4 x 30 [place] Balloon and a 6 x 150 Jorge balloon were simultaneously inflated in the profunda and bypass graft in a kissing balloon technique   Post-balloon angiogram revealed residual thrombus in the profunda  Next, a Kamran 6 embolic filter device was deployed in the Fem-AK Pop PTFE Bypass and a adv glide 0 18 wire maintained across the bypass  Then a 6 Fr [place] aspirational catheter was introduced and a mechanical thrombectomy performed on the profunda  Next, a 6 x 40 Jorge balloon was again inflated across the profunda to further macerate the thrombus  Post-treatment angiogram revealed adequate result in profunda with patent distal branches and minimal residual thrombus  Next, completion angiogram revealed patent Fem-AK Pop PTFE bypass graft, patent Viabahn, and PT/Peroneal runoff to the foot  Next, an aortogram and right iliac/femoral angiogram was performed to investigate the left groin access site hematoma  There was no source of arterial contrast extravasation or signs of retroperitoneal hematoma  Next a short sheath was exchanged / placed over a wire and secured after flushing an aspirating  A Proglide closure device was utilized  Patient tolerated procedure well; is going to ICU  At the end of the case, patient's bilateral feet were well perfused and had good capillary refill  Complications:   None     I was present for the entire procedure    Patient Disposition:  Critical Care Unit        SIGNATURE: Shanelle Castillo DO  DATE: October 20, 2022  TIME: 3:45 PM      Vascular Quality Initiative - Peripheral Vascular Intervention     Urgency: Urgent    Functional Status:  Fully active; able to carry on all predisease activities without restriction  Ambulation: Amb = independently ambulatory    Leg Symptoms    Right: Acute Ischemia:  Acute limb ischemia is defined as a sudden decrease in limb perfusion that causes a potential threat to limb viability (manifested by ischemic rest pain, ischemic ulcers, and/or gangrene) in patients who present within two weeks of the acute event  Marginally Threatened limbs: are salvageable if treated promptly   There is minimal pain (in the toes) or no sensory loss, no muscle weakness, arterial Doppler signals are often inaudible, and venous Doppler signals are audible  Left: Asymptomatic:  documented peripheral arterial disease without symptoms of claudication or ischemic pain      Treatment of Native Artery to Maintain Bypass Patency?:  No    COVID Information  COVID Symptoms Pre-Procedure: Asymptomatic    Treatment Delayed by Pandemic: None    Access   Number of Sites: 1     Access Site 1:     Side 1: Left    Site 1: Femoral Retrograde    Access Guidance 1:U/S    Largest Sheath Size 1: 7 Fr      Closure Device 1: Perclose      Number of Closure Devices: 1     Closure Device Outcome: Closure device successful         Procedure  Fluoro Time: 30 5 minutes  Contrast Volume: Visipaque 120 ml  DAP: 85466 Gy cm2  CO2: no  Anticoagulant: Heparin  Protamine: No  If Creatinine is > 1 2 or missing, BETO Prophylaxis none     Treatment Details  Indication: Occlusive Disease,    Completion Assessment  Artery 1 treated: SFA+Pop   Right               Outflow: AT,PT,Peroneal: 2                       Segments treated: P1                      Was this Site previously treated?: Yes, Other          TASC Grade: B          Total Treated Length: 150 cm          Total Occluded Length: 10 cm          Calcification: None (no calcification visible on fluoroscopic, CT or IVUS imaging)          Number of Treatment types (Devices):   3           Device 1          Treatment Type: Stent Graft                Diameter: 6 mm          Length: 150 mm         Device 2          Treatment Type: Plain Balloon      Artery 2 treated: Profunda  Right               Outflow: AT,PT,Peroneal: 2                  Was this Site previously treated?: No          TASC Grade: D          Total Treated Length: 40 cm           Total Occluded Length: 20 cm          Calcification: None (no calcification visible on fluoroscopic, CT or IVUS imaging)          Number of Treatment types (Devices): 2           Device 1          Treatment Type: Plain Balloon                     Concomitant: Thrombolysis, Pharmacologic   Prior Procedure          Technical result: Stenosis >30% or 10 mm Gradient  Medical    Artery 3 treated:  Com Fem  Right               Outflow: SFA, PROF, POP: 1                  Was this Site previously treated?: Yes, Other          TASC Grade: D          Total Treated Length: 150 cm           Total Occluded Length: 20 cm          Calcification: None (no calcification visible on fluoroscopic, CT or IVUS imaging)          Number of Treatment types (Devices):   1           Device 1          Treatment Type: Plain Balloon            Concomitant:  Thrombolysis, Pharmacologic   Prior Procedure          Technical result: Successful (stenosis <=30%)      None       Post Procedure  Patient currently taking: Statin, Yes      Antiplatelet Medication, Yes    Procedure Complications: No

## 2022-10-20 NOTE — ASSESSMENT & PLAN NOTE
63yoM with a significant PMHx of former smoker, T2DM, HTN, CAD (s/p CABGx3 with L GSV harvest Apr 2021 and PCI Oct 2021 on DAPT ASA/Brillinta), PAD (s/p R CFA-AK pop bypass with PTFE 2/3/22 with Dr Braulio Lopez, L proximal SFA PTA/NIRMALA 1/20/22) who presented to [facility] for worsening RLE claudication/ ischemic rest pain and numbness in the toes  Now presents to [facility] for catheter directed thrombolysis       10/19 Lysis catheter placement (Qaqish)    WBC 6 06 (5 75)  Hgb 11 3  Cr     PTT 46  Fibrinogen 125    Plan  - Plan for lysis re-check today  - Continue neurovascular checks  - Continue pain management regimen  - Continue ASA, Statin  - Appreciate Critical Care management following lysis catheter placement and lysis re-check

## 2022-10-20 NOTE — UTILIZATION REVIEW
Initial Clinical Review    Admission: Date/Time/Statement:   Admission Orders (From admission, onward)     Ordered        10/19/22 0141  Inpatient Admission  Once                      Orders Placed This Encounter   Procedures   • Inpatient Admission     Standing Status:   Standing     Number of Occurrences:   1     Order Specific Question:   Level of Care     Answer:   Med Surg [16]     Order Specific Question:   Estimated length of stay     Answer:   More than 2 Midnights     Order Specific Question:   Certification     Answer:   I certify that inpatient services are medically necessary for this patient for a duration of greater than two midnights  See H&P and MD Progress Notes for additional information about the patient's course of treatment  Initial Presentation: 62 y o  male with PMH of CAD s/p CABG, PAD, DM, HTN, HLD was transferred form [facility] to [facility] for a higher level of care  Pt initially presented to [facility] w/ worsening R lower leg pain along w/ numbness of the R foot x 2 days  Doppler imaging showed high-grade stenosis of the right lower extremity  He has a hx SFA stent 01/2022  Started on hep gtt prior to transfer  Transferred to [facility] for Vascular surg eval    Pt arrived at [facility] on hep gtt  On exam, aaox3, RLE cooler than left  BP elevated- 178/86  Plan: Inpatient admission for evaluation and treatment of claudication of RLE: Vascular Surg consult  Cont hep gtt  Cont current pain management  Cont Losartan and Toprol XL  Cont ASA, statin, Brilinta, Imdur  10/19  Vascular Surg Consult: Claudication of RLE: Plan for possible catheter directed thrombolysis; wiill consult IR  Will obtain CT Head and Fibrinogen  Continue neurovascular checks  Continue heparin gtt  Diet NPO   Continue ASA, Statin, Brillinta    OP Note:  SURGERY DATE: 10/19/2022  Procedure(s) (LRB):  RIGHT LOWER EXTREMITY ARTERIOGRAM WITH LYSIS, PLACEMENT OF LYSIS CATHETER (Right)  Anesthesia Type: Conscious Sedation Operative Findings:  -Right lower extremity angiogram showing common fem occlusion with profunda reconstitution  -Pop and tibial vessels patent distal to bypass with AT and PT run off the to the foot, PT main flow to the foot    Critical Care Consult: Pt s/p RLE agram w/ lysis, placement of lysis cath today  On exam, motor sensory intact bilateral lower legs  Awakening from anesthesia  No significant pain  Denies chest pain or shortness of breath  Plan: To return to IR for recheck tomorrow  Continue TPA and heparin per protocol  Monitor CBC, fibrinogen, PTT q6h  Goal fibrinogen >150  Consider holding TPA or transfusing cryoprecipitate if fibrinogen <150  Serial neuro exams, stat head CT for change in mental status or new deficits  Local care at sheath site  Continue ICU care while thrombolysis continuing  Pain control  Hold home [address]  Cont ASA, statin  NPO at [facility]  IVF  Vascular Notes: Pt c/o worsening R calf pain, described as cramping pain in the RLE and some noted cyanotic changes to his toes and some c/o paresthesias to his foot, improved w/ flexed knee and elevation  Cont neurovascular checks  Notify if pain is refractory to added pain management regimen with gabapentin, robaxin, and positional changes  Continue tPA and heparin per protocol with serial PTT  Clears, NPO after MN for lysis re-check tomorrow    Date: 10/20   Day 2:   Vascular Surg Notes: Pt developed L groin hematoma overnight, not expanding  Reported improvement w/ R foot pain  Plan for lysis recheck today  Keep NPO/IV fluids  Continue to hold the tPA  Continue flat right heparin infusion 500 units/hour through left groin sheath  Continue serial labs  Continue neurovascular checks  Continue aspirin, Lipitor  On exam, L grpinw/ non expanding hematoma  Dressing in place w/ mild strike through  Right DP and PT signal, motor and sensation intact  Vasc Surg Quick Note: L groin TPA catheter removed at the bedside   5 Fr sheath left in place  Patient tolerated procedure without any issues  L groin hematoma appears stable at this time      ED Triage Vitals   Temperature Pulse Respirations Blood Pressure SpO2   10/19/22 0143 10/19/22 0143 10/19/22 0143 10/19/22 0143 10/19/22 0143   98 3 °F (36 8 °C) 80 20 (!) 178/86 98 %      Temp Source Heart Rate Source Patient Position - Orthostatic VS BP Location FiO2 (%)   10/19/22 0143 -- 10/19/22 0143 10/19/22 0143 --   Oral  Sitting Left arm       Pain Score       10/19/22 0310       No Pain          Wt Readings from Last 1 Encounters:   10/19/22 77 2 kg (170 lb 3 1 oz)     Additional Vital Signs:   Date/Time Temp Pulse Resp BP MAP (mmHg) SpO2 O2 Device Patient Position - Orthostatic VS   10/20/22 0931 -- 64 -- 110/67 -- -- -- --   10/20/22 0900 -- 62 16 110/67 90 98 % -- --   10/20/22 0800 98 7 °F (37 1 °C) 64 16 131/63 97 100 % -- --   10/20/22 0700 -- 64 11 Abnormal  127/66 90 -- -- --   10/20/22 0600 -- 58 9 Abnormal  136/65 90 -- -- --   10/20/22 0500 -- 58 14 146/70 110 -- -- --   10/20/22 0400 98 1 °F (36 7 °C) 54 Abnormal  8 Abnormal  130/76 97 -- -- --   10/20/22 0300 -- 56 8 Abnormal  141/73 100 97 % -- --   10/20/22 0200 -- 60 13 162/89 123 100 % -- --   10/20/22 0100 -- 68 9 Abnormal  150/72 93 99 % -- --   10/20/22 0000 -- 62 8 Abnormal  124/70 102 98 % -- --   10/19/22 2300 -- 70 10 Abnormal  125/65 84 95 % -- --   10/19/22 2200 -- 62 9 Abnormal  149/59 75 98 % -- --   10/19/22 2100 -- 66 -- 148/65 95 98 % None (Room air) --   10/19/22 2000 98 4 °F (36 9 °C) 66 -- 151/72 102 98 % -- --   10/19/22 1900 -- 68 -- 137/69 105 99 % -- --   10/19/22 1800 -- 60 -- 133/69 91 99 % -- --   10/19/22 1700 -- 64 -- 155/79 104 99 % -- --   10/19/22 1600 -- 72 -- 161/87 110 100 % -- --   10/19/22 1530 -- 68 -- 168/104 Abnormal  128 99 % -- --   10/19/22 1515 -- 74 -- 167/94 127 100 % -- --   10/19/22 1500 -- 76 -- 171/97 Abnormal  121 100 % -- --   10/19/22 1445 -- 74 -- 164/92 116 100 % -- --   10/19/22 1430 -- 78 -- 182/89 Abnormal  133 100 % -- --   10/19/22 1415 -- 82 -- 160/83 121 99 % -- --   10/19/22 1400 -- 78 -- 137/75 112 100 % -- --   10/19/22 1345 -- 72 -- 165/88 129 99 % -- --   10/19/22 1330 -- 70 -- 164/93 124 100 % -- --   10/19/22 1315 -- 74 -- 171/101 Abnormal  140 100 % -- --   10/19/22 1300 97 8 °F (36 6 °C) 70 -- 168/93 131 98 % -- --   10/19/22 08:00:08 97 8 °F (36 6 °C) 71 15 159/93 115 94 % -- --   10/19/22 06:31:47 97 7 °F (36 5 °C) 68 -- 169/91 117 96 % -- --       Pertinent Labs/Diagnostic Test Results:  10/18 CTA abdominal w/ run off:   Occlusion of the right common femoral artery and right femoropopliteal bypass graft with reconstitution of the popliteal artery and two-vessel continuous runoff to the foot  The graft is significantly compressed as it passes through the adductor canal      Continuous single vessel left lower extremity runoff via the peroneal artery      60% superior mesenteric artery stenosis      Hepatic steatosis  10/17 VAS lower limb arterial duplex:   RIGHT LOWER LIMB:  There is high grade stenosis vs occlusion of the common femoral to above knee  popliteal PTFE bypass graft  High grade stenosis vs occlusion of the common femoral artery with trickle flow  noted  High grade stenosis vs occlusion of the distal anterior tibial artery  Ankle/Brachial index: 0 49, within the Ischemic disease range (Prior 0 95)  PVR/ PPG tracings are dampened  Monophasic waveforms at the ankle  Metatarsal pressure of 82 mmHg (Prior 140 mmHg)  Great toe pressure of 25 mmHg, below the diabetic healing range (Prior 81  mmHg)  LEFT LOWER LIMB:  Ankle/Brachial index: 1 05, within the normal range (Prior 1 09)  PVR/ PPG tracings are normal  Triphasic waveforms at the ankle  Metatarsal pressure of 204 mmHg (Prior 103 mmHg)  Great toe pressure of 139 mmHg, within the diabetic healing range (Prior 89  mmHg)       Compared to previous study on 5/23/2022, there is an occlusion of the right  bypass graft and a significant decrease in the right FRANCIE  IR PICC placement double lumen   Final Result by Federico Tesfaye MD (10/20 6167)   Impression:      PICC line centrally positioned  Workstation performed: [id number]         CT head wo contrast   Final Result by Octavio Viera MD (10/19 1016)      No acute intracranial abnormality  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, new since 7/13/2017                    Workstation performed: [id number]         IR lower extremity angiogram    (Results Pending)   IR TPA lysis check    (Results Pending)   IR PICC placement double lumen    (Results Pending)   CT abdomen pelvis wo contrast    (Results Pending)         Results from last 7 days   Lab Units 10/20/22  0459 10/20/22  0024 10/19/22  1715 10/19/22  0340 10/18/22  0609 10/17/22  2229   WBC Thousand/uL 9 17 6 06 8 99 5 71 5 75 6 30   HEMOGLOBIN g/dL 11 3* 11 3* 12 3 11 6* 11 3* 12 8   HEMATOCRIT % 35 6* 34 7* 37 0 35 4* 34 2* 40 1   PLATELETS Thousands/uL 127* 130* 143* 173 150 178   NEUTROS ABS Thousands/µL  --   --   --   --   --  3 43         Results from last 7 days   Lab Units 10/20/22  0459 10/19/22  0340 10/18/22  0609 10/17/22  2229   SODIUM mmol/L 137 136 141 141   POTASSIUM mmol/L 3 8 3 8 3 5 3 9   CHLORIDE mmol/L 109* 107 106 105   CO2 mmol/L 25 26 26 28   ANION GAP mmol/L 3* 3* 9 8   BUN mg/dL 18 20 22 24   CREATININE mg/dL 0 89 1 10 1 07 1 17   EGFR ml/min/1 73sq m 94 74 76 68   CALCIUM mg/dL 8 4 9 2 8 7 9 2     Results from last 7 days   Lab Units 10/18/22  0609 10/17/22  2229   AST U/L 21 24   ALT U/L 39 46   ALK PHOS U/L 85 102   TOTAL PROTEIN g/dL 7 4 8 7*   ALBUMIN g/dL 3 6 4 2   TOTAL BILIRUBIN mg/dL 0 66 0 80     Results from last 7 days   Lab Units 10/20/22  0723 10/19/22  2139 10/19/22  1607 10/19/22  1330 10/19/22  0602 10/19/22  0014 10/18/22  1749 10/18/22  1131 10/18/22  0609   POC GLUCOSE mg/dl 147* 139 191* 148* 123 118 228* 150* 120     Results from last 7 days   Lab Units 10/20/22  0459 10/19/22  0340 10/18/22  0609 10/17/22  2229   GLUCOSE RANDOM mg/dL 163* 186* 136 88         Results from last 7 days   Lab Units 10/19/22  0340   HEMOGLOBIN A1C % 7 2*   EAG mg/dl 160     Results from last 7 days   Lab Units 10/20/22  0459 10/20/22  0024 10/19/22  1715 10/19/22  0956 10/19/22  0340 10/18/22  0433 10/17/22  2229   PROTIME seconds  --   --   --   --  14 2  --  14 8*   INR   --   --   --   --  1 07  --  1 18   PTT seconds 45* 46* 53*   < > 77*   < > 29    < > = values in this interval not displayed               Results from last 7 days   Lab Units 10/17/22  2229   LACTIC ACID mmol/L 0 9     ED Treatment:   Medication Administration - No Administrations Displayed (No Start Event Found)     None        Past Medical History:   Diagnosis Date   • Bicuspid aortic valve     being observed   • CAD (coronary artery disease)    • Chest pain 4/6/2021   • Coronary artery disease    • Diabetes mellitus ([facility] )    • Encounter for postoperative care 5/17/2021   • Former tobacco use    • GERD (gastroesophageal reflux disease)    • Goiter    • History of myocardial infarction    • History of rib fracture    • Hyperlipidemia    • Hypertension    • Hypertensive urgency 4/6/2021   • Left carotid bruit 7/22/2021   • Leg pain, bilateral     Agram today LLE   1/20/2022   • Leukocytosis 4/6/2021   • Myocardial infarction [facility])     2021-   CABG  x3   • NSTEMI (non-ST elevated myocardial infarction) ([facility] ) 4/7/2021   • Wears glasses      Present on Admission:  • Claudication of right lower extremity ([facility] )  • Hypertension  • CAD (coronary artery disease)  • GERD (gastroesophageal reflux disease)  • Type II diabetes mellitus ([facility] )  • 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)  • Thrombocytopenia (HCC)  • Postoperative anemia due to acute blood loss  • Sinus bradycardia      Admitting Diagnosis: Claudication of left lower extremity ([facility] ) [I73 9]  Age/Sex: 62 y o  male  Admission Orders:  Neuro checks  Neurovascular checks  NPO      Scheduled Medications:  aspirin, 81 mg, Oral, Daily  atorvastatin, 80 mg, Oral, Daily With Dinner  docusate sodium, 100 mg, Oral, BID  gabapentin, 300 mg, Oral, TID  insulin lispro, 1-6 Units, Subcutaneous, TID AC  insulin lispro, 1-6 Units, Subcutaneous, HS  metoprolol tartrate, 25 mg, Oral, Q12H [facility]  potassium chloride, 20 mEq, Intravenous, Once      Continuous IV Infusions:  heparin (porcine), 500 Units/hr, Intravenous, Continuous  sodium chloride, 50 mL/hr, Intravenous, Continuous  alteplase (CATHFLO) 10 mg in sodium chloride 0 9 % 250 mL infusion  Rate: 25 mL/hr Dose: 1 mg/hr  Freq: Continuous Route: IK  Last Dose: Stopped (10/20/22 0531)  Start: 10/19/22 1300 End: 10/20/22 0532  alteplase (CATHFLO) 10 mg in sodium chloride 0 9 % 250 mL infusion  Rate: 25 mL/hr Dose: 1 mg/hr  Freq: Continuous Route: IK  Last Dose: Stopped (10/19/22 1352)  Start: 10/19/22 1015 End: 10/20/22 0532  heparin (porcine) 25,000 units in 0 45% NaCl 250 mL infusion (premix)  Rate: 2 3-22 5 mL/hr Dose: 3-30 Units/kg/hr  Weight Dosing Info: 75 kg (Order-Specific)  Freq: Titrated Route: IV  Last Dose: Stopped (10/19/22 1353)  Start: 10/19/22 0215 End: 10/19/22 1152    PRN Meds:  acetaminophen, 650 mg, Oral, Q6H PRN 10/19 x 1  hydrALAZINE, 10 mg, Intravenous, Q6H PRN 10/19 x 1  HYDROmorphone, 0 5 mg, Intravenous, Q3H PRN 10/19 x 1, 10/20 x 2  methocarbamol, 500 mg, Oral, Q6H PRN  ondansetron, 4 mg, Intravenous, Q4H PRN  oxyCODONE, 10 mg, Oral, Q6H PRN 10/19 x 1, 10/20 x 1  oxyCODONE, 5 mg, Oral, Q4H PRN        IP CONSULT TO VASCULAR SURGERY  IP CONSULT TO SURGICAL CRITICAL CARE    Network Utilization Review Department  ATTENTION: Please call with any questions or concerns to [phone] and carefully listen to the prompts so that you are directed to the right person   All voicemails are confidential   See Ba all requests for admission clinical reviews, approved or denied determinations and any other requests to dedicated fax number below belonging to the campus where the patient is receiving treatment   List of dedicated fax numbers for the Facilities:  [address] [place] [address] DENIALS (Administrative/Medical Necessity) [phone]   [address] (Maternity/NICU/Pediatrics) [phone]   [address] Aaliyah Thomas [phone]   [place] [facility] [phone]   [address] [address] [facility] 28 [phone]   15[address] [facility] 134 815 [facility] [phone]

## 2022-10-20 NOTE — SEDATION DOCUMENTATION
Lysis completed by Dr Jeanine Chavarria and Dr Radha Ha  Tolerated well without complications  Perclose prostyle closure with dry occlusive dressing to Left CFA  Report called to ICU RN Mary transport to MICU 3 Via bed CT canceled as per Dr Jeanine Chavarria

## 2022-10-20 NOTE — BRIEF OP NOTE (RAD/CATH)
Patient Name: Whit Owusu    MRN [id number]   : 1965         Date of Operation: 10/20/22   Time: 2:22 PM         Pre-Operative Diagnosis: LLE Acute Limb Ischemia; Fem-AK Pop PTFE Graft Thrombosis     Post-Operative Diagnosis: same         Surgeon: Dr Alex Randall  Assistant: Irl Cockayne                   Procedure:   1  LLE Lysis Catheter check with LLE Runoff  2  7 Fr sheath advancement to Right EIA   3  LLE BK Pop Selective Catheterization   4  LLE Fem-AK Pop Distal Anastomosis Viabahn 6 x 150 SES  5  LLE Fem-AK Pop Distal Anastomosis 6 x 150 Jorge PTA  6  LLE Profunda 4 x 30 [place] PTA  7  LLE Fem-AK Pop Prox Anastomosis 6 x 40 Jorge PTA  8  LLE Fem-AK Pop Kamran 6 placement   9  LLE Profunda 6 Fr Norton Asp Catheter Thrombectomy   10  LLE Profunda 6 x 40 Jorge PTA  11  Aortogram   12  Right Femoral Access angiogram  13  Closure of right femoral artery access site with Proglide closure device  14   Supervision and radiologic interpretation of all radiographic imaging    At conclusion of case patient had a right groin hematoma from prior lysis cathter  Patient had a palpable Right PT and dop signals in DP; LLE Dop PT    120cc Visipaque   30 5 min fluoro time  956 mGy  [id number] DAP

## 2022-10-20 NOTE — CASE MANAGEMENT
Case Management Assessment & Discharge Planning Note    Patient name Matteo Akins  Location MICU 03/MICU 58 MRN [id number]  : 1965 Date 10/20/2022       Current Admission Date: 10/19/2022  Current Admission Diagnosis:Claudication of right lower extremity [facility])   Patient Active Problem List    Diagnosis Date Noted   • Claudication of right lower extremity ([email or web address] ) 10/18/2022   • Former tobacco use    • History of PTCA    • Atheroscler native arteries the extremities w/intermit claudication ([place] [address] ) 2022   • GERD (gastroesophageal reflux disease)    • Hypertension    • CAD (coronary artery disease)    • Postoperative anemia due to acute blood loss 2021   • Sinus bradycardia 2021   • Thrombocytopenia ([place] [address] ) 2021   • S/P CABG x 3 2021   • Type II diabetes mellitus ([place] [address] ) 2021      LOS (days): 1  Geometric Mean LOS (GMLOS) (days): 2 70  Days to GMLOS:1 1     OBJECTIVE:    Risk of Unplanned Readmission Score: 10 45         Current admission status: Inpatient       Preferred Pharmacy:   [id number]- [place], [place] - [address]  Phone: [phone] Fax: [phone]    Primary Care Provider: Donnie Gan MD    Primary Insurance: [employer]  Secondary Insurance:     ASSESSMENT:  Padmaja Delia  Representative - Brother   Primary Phone: [phone] (Mobile)                         Readmission Root Cause  30 Day Readmission: No    Patient Information  Admitted from[de-identified] Home  Mental Status: Alert  During Assessment patient was accompanied by: Not accompanied during assessment  Assessment information provided by[de-identified] Patient  Primary Caregiver: Self  Support Systems: Family members, [address] [place] [address] Avenue of Residence: [facility]  What city do you live in?: [place]  Type of Current Residence: [address]  Upon entering residence, is there a bedroom on the main floor (no further steps)?: No  A bedroom is located on the following floor levels of residence (select all that apply):: 2nd Floor  Upon entering residence, is there a bathroom on the main floor (no further steps)?: Yes  Number of steps to 2nd floor from main floor: One Flight  In the last 12 months, was there a time when you were not able to pay the mortgage or rent on time?: No  In the last 12 months, was there a time when you did not have a steady place to sleep or slept in a shelter (including now)?: No  Homeless/housing insecurity resource given?: N/A  Living Arrangements: Lives Alone  Is patient a ?: No    Activities of Daily Living Prior to Admission  Functional Status: Independent  Completes ADLs independently?: Yes  Ambulates independently?: Yes  Does patient use assisted devices?: No  Does patient currently own DME?: No  Does patient have a history of Outpatient Therapy (PT/OT)?: Yes  Does the patient have a history of Short-Term Rehab?: No  Does patient have a history of HHC?: Yes ([facility])  Does patient currently have [facility]?: No         Patient Information Continued  Income Source: Employed  Does patient have prescription coverage?: Yes  Within the past 12 months, you worried that your food would run out before you got the money to buy more : Never true  Within the past 12 months, the food you bought just didn't last and you didn't have money to get more : Never true  Food insecurity resource given?: N/A  Does patient receive dialysis treatments?: No  Does patient have a history of substance abuse?: No  Does patient have a history of Mental Health Diagnosis?: No         Means of Transportation  Means of Transport to Appts[de-identified] Drives Self  In the past 12 months, has lack of transportation kept you from medical appointments or from getting medications?: No  In the past 12 months, has lack of transportation kept you from meetings, work, or from getting things needed for daily living?: No  Was application for public transport provided?: N/A        DISCHARGE DETAILS:    Discharge planning discussed with[de-identified] patient  Freedom of Choice: Yes     CM contacted family/caregiver?: No- see comments (patient alert & oriented)  Were Treatment Team discharge recommendations reviewed with patient/caregiver?: Yes  Did patient/caregiver verbalize understanding of patient care needs?: Yes       Contacts  Patient Contacts: Maximo Garcia, brother  Relationship to Patient[de-identified] Family  Contact Method: Phone  Phone Number: [phone]  Reason/Outcome: Continuity of Care, Emergency Contact, Discharge Planning                        Treatment Team Recommendation: Home  Discharge Destination Plan[de-identified] Home  Transport at Discharge : Family                Patient/caregiver received discharge checklist   Content reviewed  Patient/caregiver encouraged to participate in discharge plan of care prior to discharge home  CM reviewed d/c planning process including the following: identifying help at home, patient preference for d/c planning needs, Discharge Lounge, [facility] Meds to Bed program, availability of treatment team to discuss questions or concerns patient and/or family may have regarding understanding medications and recognizing signs and symptoms once discharged  CM also encouraged patient to follow up with all recommended appointments after discharge  Patient advised of importance for patient and family to participate in managing patient’s medical well being  Additional Comments: Patient is a transfer from [facility]  Patient independent at baseline, employed full-time drives  Friend "Lorenza Barcenas" should be able to provide discharge transportation  Patient's vehicle is in the parking lot at [facility]  CM will continue to follow for d/c needs

## 2022-10-20 NOTE — PROGRESS NOTES
Progress Note - Vascular Surgery   Tara Barrett 62 y o  male MRN: [id number]  Unit/Bed#: MICU 03 Encounter: [id number]    Assessment:  63M with HTN, CAD, DM and previous R CFA-AK pop bypass with PTFE in 2/3/22 presenting with 2 week history right lower extremity claudication and worsening ischemic rest pain found to have occluded bypass graft  10/19/2022 lysis initiation    Left groin hematoma, not expanding  Right DP and PT signal present, motor and sensation intact      Plan:  Plan for lysis recheck today  Keep NPO/IV fluids  Continue to hold the tPA  Continue flat right heparin infusion 500 units/hour through left groin sheath  Continue serial labs  Continue neurovascular checks  Continue aspirin, Lipitor    Cori Brill  10/20/2022    Subjective:  Developed left groin hematoma overnight, not expanding  Patient reports improvement in right foot pain  He has a right DP and PT signal, motor and sensation intact  Afebrile, vital signs stable  Hemoglobin 11 3 from 11 3  Platelet 326 from 87/41 K  Fibrinogen 103 from 125    Vitals:  /65   Pulse 58   Temp 98 1 °F (36 7 °C) (Oral)   Resp (!) 9   Ht 5' 5" (1 651 m)   Wt 77 2 kg (170 lb 3 1 oz)   SpO2 97%   BMI 28 32 kg/m²     I/Os:  I/O last 3 completed shifts: In: 1105 2 [I V :958 1; IV Piggyback:147 1]  Out: 750 [Urine:750]  I/O this shift:  In: 1048 8 [P O :100;  I V :660 9; IV Piggyback:287 9]  Out: 235 [Urine:235]    Lab Results and Cultures:   CBC with diff:   Lab Results   Component Value Date    WBC 9 17 10/20/2022    HGB 11 3 (L) 10/20/2022    HCT 35 6 (L) 10/20/2022    MCV 82 10/20/2022     (L) 10/20/2022    MCH 26 2 (L) 10/20/2022    MCHC 31 7 10/20/2022    RDW 13 7 10/20/2022    MPV 9 5 10/20/2022    NRBC 0 10/17/2022   ,   BMP/CMP:  Lab Results   Component Value Date    SODIUM 137 10/20/2022    K 3 8 10/20/2022     (H) 10/20/2022    CO2 25 10/20/2022    CO2 22 04/09/2021    BUN 18 10/20/2022    CREATININE 0 89 10/20/2022 GLUCOSE 145 (H) 04/09/2021    CALCIUM 8 4 10/20/2022    AST 21 10/18/2022    ALT 39 10/18/2022    ALKPHOS 85 10/18/2022    EGFR 94 10/20/2022   ,   Lipid Panel: No results found for: CHOL,   Coags:   Lab Results   Component Value Date    PTT 45 (H) 10/20/2022    INR 1 07 10/19/2022   ,     Blood Culture: No results found for: BLOODCX,   Urinalysis:   Lab Results   Component Value Date    COLORU Yellow 04/08/2021    CLARITYU Clear 04/08/2021    SPECGRAV 1 012 04/08/2021    PHUR 8 0 04/08/2021    LEUKOCYTESUR Negative 04/08/2021    NITRITE Negative 04/08/2021    GLUCOSEU Negative 04/08/2021    KETONESU Negative 04/08/2021    BILIRUBINUR Negative 04/08/2021    BLOODU Negative 04/08/2021   ,   Urine Culture: No results found for: URINECX,   Wound Culure: No results found for: WOUNDCULT    Medications:  Current Facility-Administered Medications   Medication Dose Route Frequency   • acetaminophen (TYLENOL) tablet 650 mg  650 mg Oral Q6H PRN   • aspirin (ECOTRIN LOW STRENGTH) EC tablet 81 mg  81 mg Oral Daily   • atorvastatin (LIPITOR) tablet 80 mg  80 mg Oral Daily With Dinner   • docusate sodium (COLACE) capsule 100 mg  100 mg Oral BID   • gabapentin (NEURONTIN) capsule 300 mg  300 mg Oral TID   • heparin (porcine) 25,000 units in 0 45% NaCl 250 mL infusion (premix)  500 Units/hr Intravenous Continuous   • hydrALAZINE (APRESOLINE) injection 10 mg  10 mg Intravenous Q6H PRN   • HYDROmorphone (DILAUDID) injection 0 5 mg  0 5 mg Intravenous Q3H PRN   • insulin lispro (HumaLOG) 100 units/mL subcutaneous injection 1-6 Units  1-6 Units Subcutaneous TID AC   • insulin lispro (HumaLOG) 100 units/mL subcutaneous injection 1-6 Units  1-6 Units Subcutaneous HS   • methocarbamol (ROBAXIN) tablet 500 mg  500 mg Oral Q6H PRN   • metoprolol tartrate (LOPRESSOR) tablet 25 mg  25 mg Oral Q12H [facility]   • ondansetron (ZOFRAN) injection 4 mg  4 mg Intravenous Q4H PRN   • oxyCODONE (ROXICODONE) immediate release tablet 10 mg  10 mg Oral Q6H PRN • oxyCODONE (ROXICODONE) IR tablet 5 mg  5 mg Oral Q4H PRN   • sodium chloride 0 9 % infusion  50 mL/hr Intravenous Continuous       Physical Exam:    General: No acute distress  CV: Normal rate  Respiratory: Non-labored breathing  Abdominal: Soft  Extremities: Warm  Neurologic: Alert    Wound/Incision:  Left groin with non expanding hematoma  Dressing in place with mild strike through    Pulse exam:  Left DP and PT signal present, no motor sensory deficits  Right DP and PT signal present

## 2022-10-20 NOTE — DISCHARGE INSTRUCTIONS
ARTERIOGRAM    WHAT YOU SHOULD KNOW:   An angiogram is a procedure to look at arteries in your body  Arteries are the blood vessels that carry blood from your heart to your body  AFTER YOU LEAVE:     Self-care:   Limit activity: Rest for the remainder of the day of your procedure  Have some one with you until the next morning  Keep your arm or leg straight as much as possible  Rest as much as possible, sitting lying or reclining  Walk only to go to the bathroom, to bed or to eat  If the angiogram catheter was put in your leg, use the stairs as little as possible  No driving  Keep your wound clean and dry  You may shower 24 hours after your procedure  The bandage you have on should fall off in 2-3 days  If there is any drainage from the puncture site, you should put on a clean bandage  Watch for bleeding and bruising: It is normal to have a bruise and soreness where the angiogram catheter went in  Diet:   You may resume your regular diet, Sips of flat soda will help with mild nausea  Drink more liquids than usual for the next 24 hours      IMMEDIATELY Contact Interventional Radiology at [phone] [facility] PATIENTS: Contact Interventional Radiology at [phone]) [facility] PATIENTS: Contact Interventional Radiology at [phone]) if any of the following occur: If your bruise gets larger or if you notice any active bleeding  APPLY DIRECT PRESSURE TO THE BLEEDING SITE  If you notice increased swelling or have increased pain at the puncture site   If you have any numbness or pain in the extremity of the puncture site   If that extremity seems cold or pale  You have fever greater than 101  Persistent nausea or vomitting    Follow up with your primary healthcare provider  as directed: Write down your questions so you remember to ask them during your visits          Procedural Sedation   WHAT YOU NEED TO KNOW:   Procedural sedation is medicine used during procedures to help you feel relaxed and calm  You will remember little to none of the procedure  After sedation you may feel tired, weak, or unsteady on your feet  You may also have trouble concentrating or short-term memory loss  These symptoms should go away in 24 hours or less  DISCHARGE INSTRUCTIONS:     Call 911 or have someone else call for any of the following: You have sudden trouble breathing  You cannot be woken  Contact Interventional Radiology at [phone]   [place] PATIENTS: Contact Interventional Radiology at [phone]) [facility] PATIENTS: Contact Interventional Radiology at [phone]) if any of the following occur: You have a severe headache or dizziness  Your heart is beating faster than usual     You have a fever or chills  Your skin is itchy, swollen, or you have a rash  You have nausea or are vomiting for more than 8 hours after the procedure  You have questions or concerns about your condition or care  Self-care:   Have someone stay with you for 24 hours  This person can drive you to errands and help you do things around the house  This person can also watch for problems  Rest and do quiet activities for 24 hours  Do not exercise, ride a bike, or play sports  Stand up slowly to prevent dizziness and falls  Take short walks around the house with another person  Slowly return to your usual activities the next day  Do not drive or use dangerous machines or tools for 24 hours  You may injure yourself or others  Examples include a lawnmower, saw, or drill  Do not return to work for 24 hours if you use dangerous machines or tools for work  Do not make important decisions for 24 hours  For example, do not sign important papers or invest money  Drink liquids as directed  Liquids help flush the sedation medicine out of your body  Ask how much liquid to drink each day and which liquids are best for you  Eat small, frequent meals to prevent nausea and vomiting   Start with clear liquids such as juice or broth  If you do not vomit after clear liquids, you can eat your usual foods  Do not drink alcohol or take medicines that make you drowsy  This includes medicines that help you sleep and anxiety medicines  Ask your healthcare provider if it is safe for you to take pain medicine  Follow up with your healthcare provider as directed: Write down your questions so you remember to ask them during your visits

## 2022-10-20 NOTE — QUICK NOTE
L groin TPA catheter removed at the bedside  5 Fr sheath left in place  Patient tolerated procedure without any issues  L groin hematoma appears stable at this time

## 2022-10-20 NOTE — PROGRESS NOTES
Daily Progress Note - Critical Care   Jonatan Gould 62 y o  male MRN: [id number]  Unit/Bed#: MICU 03 Encounter: [id number]        ----------------------------------------------------------------------------------------  HPI/24hr events:      Jonatan Gould is a 62 y o  male with PMH of PAD (s/p CFA to AK pop bypass with PTFE- Feb 2022), CAD s/p CABG with L GSV (April 2021), NIDDM, HTN, HLD, former smoker, who presented to the [facility] ED on 10/17 for right leg rest pain  Found to have occluded bypass graft and taken to the OR on 10/19 in [place] for angiogram and placement lysis catheter       Overnight, pt developed non-expanding L groin hematoma around catheter site  Pain with palpation  No other issues overnight     Vitals:   BP:  120-160s/50-80s  Pulse: 50-70s bpm  SpO2: 97% on RA  Temp 98 1F    Labs:   Hgb: 11 3 <- 12 3  WBC: 8 9 <- 6 06  Plt: 130k <- 143k  Fibrinogen: 103 <- 125 <- 257 <- 309  APTT: 45 <- 46 <- 53 <- 71  Cr: 0 89 <- 1 1    Lines: PICC L Brahcial, Peripheral IV R Hand,   Lysis Catheter L Groin    I/O: 350ml since 2100    Pulse exam:  R PT doppler signal  R DP no signal  L PT doppler signal  L DP palpable     ---------------------------------------------------------------------------------------  SUBJECTIVE  Reports slightly improved pain in RLE  No other complaints at this time    Review of Systems   Constitutional: Negative for fatigue  Respiratory: Negative for cough and shortness of breath  Cardiovascular: Negative for chest pain and palpitations  Gastrointestinal: Negative for abdominal pain and nausea  Musculoskeletal:        R calf pain     Review of systems was reviewed and negative unless stated above in HPI/24-hour events   ---------------------------------------------------------------------------------------  Assessment and Plan:  Neuro:   · Diagnosis: post op pain control  ? Plan:    ? Scheduled: Gabapentin 300 mg TID  ?  PRN: Tylenol 650 q6,  Dilaudid 0 5 q3, oxy 5,10       CV:   · Diagnosis:  Peripheral arterial disease  ? Plan:   ? Status post common femoral to above knee popliteal bypass with PTFE in February 2022, presenting with occluded bypass and symptoms of claudication  ? Status post 10/19 right lower extremity angiogram, lysis catheter placement with vascular surgery  ? Plan for lysis recheck today on 10/20   ? Continue aspirin, statin  ? Per vascular, Hold tPA and and continue flat rate heparin infusion at rate of 500ml/hr through L groin sheath  ? Q 6 CBC, fibrinogen, PTT  ? Fibrinogen: 103 (0459) <- 125 (0024) <- 257 <- 309  ? Vascular aware  · Diagnosis:  CAD  ? Plan:   ? S/p CABG April 2021  ? Holding home losartan  ? Holding home Brilinta  ? Continue aspirin, statin  ? At home on metoprolol succinate 25 daily, will give metoprolol tartrate 25 b i d  (Higher dose because holding losartan)  · Diagnosis:  Hypertension  ? See metoprolol comments in CAD  ? P r n  Hydralazine      Pulm:  · Diagnosis:  No active issues  ? Plan:   ? Maintain SpO2 above 92%  ? Supplemental oxygen as needed   GI:   · Diagnosis:  No active issues  ? Plan: Bowel Regimen  ? Colace BID  · Diagnosis: Nausea  ? P r n  Zofran      :   · Diagnosis:  No active issues  ? Plan:   ? Monitor urine output  ? Admission creatinine 1 1, improved to 0 89 (10/20)      F/E/N:   · F:  Normal Saline at 50  · E:  Replace as needed  · N:  NPO prior to lysis procedure, continue cardiac diet after lysis recheck      Heme/Onc:   · Diagnosis:  Post lysis  ? Plan:   ? See PAD for routine labs  ? Monitor hemoglobin     Endo:   · Diagnosis:  Non-insulin-dependent diabetes   ? Plan:  continue Sliding-scale insulin      ID:   · Diagnosis:  No active issues  ? Plan:  ? Monitor fever and white count      MSK/Skin:   - Diagnosis:  Muscle Spasms  ? Plan: Robaxin 500mg q6 PRN  ? Frequent repositioning in bed  ?   PT/OT when able    Patient appropriate for transfer out of the ICU today?: No  Disposition: Continue Critical Care Code Status: Level 1 - Full Code  ---------------------------------------------------------------------------------------  ICU CORE MEASURES    Prophylaxis   VTE Pharmacologic Prophylaxis: Heparin Drip  VTE Mechanical Prophylaxis: reason for no mechanical VTE prophylaxis s/p lysis  Stress Ulcer Prophylaxis: Prophylaxis Not Indicated     Invasive Devices Review  Invasive Devices  Report    Peripherally Inserted Central Catheter Line  Duration           PICC Line 10/19/22 Left Brachial <1 day          Peripheral Intravenous Line  Duration           Peripheral IV 10/17/22 Right Antecubital 2 days    Peripheral IV 10/17/22 Right Hand 2 days          Drain  Duration           Urethral Catheter 18 Fr  <1 day              Can any invasive devices be discontinued today? No  ---------------------------------------------------------------------------------------  OBJECTIVE    Vitals   Vitals:    10/20/22 0200 10/20/22 0300 10/20/22 0400 10/20/22 0500   BP: 162/89 141/73 130/76 146/70   Pulse: 60 56 (!) 54 58   Resp: 13 (!) 8 (!) 8 14   Temp:   98 1 °F (36 7 °C)    TempSrc:   Oral    SpO2: 100% 97%     Weight:       Height:         Temp (24hrs), Av °F (36 7 °C), Min:97 7 °F (36 5 °C), Max:98 4 °F (36 9 °C)  Current: Temperature: 98 1 °F (36 7 °C)  BP: 138/65  HR: 58*  RR: 9  SpO2: 97% on RA    Respiratory:  SpO2: SpO2: 97 %       Invasive/non-invasive ventilation settings   Respiratory  Report   Lab Data (Last 4 hours)    None         O2/Vent Data (Last 4 hours)    None                Physical Exam  HENT:      Head: Normocephalic and atraumatic  Cardiovascular:      Rate and Rhythm: Regular rhythm  Bradycardia present  Heart sounds: Normal heart sounds  Comments: right DP and PT signal  Left DP and PT signal present  Pulmonary:      Effort: Pulmonary effort is normal  No respiratory distress  Abdominal:      General: There is no distension  Palpations: Abdomen is soft  Tenderness:  There is no abdominal tenderness  There is no guarding or rebound  Musculoskeletal:         General: Normal range of motion  Cervical back: Normal range of motion  Comments: Motor in tact b/l lower extremities    Skin:     Comments: Nonexpanding L groin hematoma   Neurological:      Comments: Sensationin tact b/l lower extremities       Laboratory and Diagnostics:  Results from last 7 days   Lab Units 10/20/22  0459 10/20/22  0024 10/19/22  1715 10/19/22  0340 10/18/22  0609 10/17/22  2229   WBC Thousand/uL 9 17 6 06 8 99 5 71 5 75 6 30   HEMOGLOBIN g/dL 11 3* 11 3* 12 3 11 6* 11 3* 12 8   HEMATOCRIT % 35 6* 34 7* 37 0 35 4* 34 2* 40 1   PLATELETS Thousands/uL 127* 130* 143* 173 150 178   NEUTROS PCT %  --   --   --   --   --  53   MONOS PCT %  --   --   --   --   --  10     Results from last 7 days   Lab Units 10/20/22  0459 10/19/22  0340 10/18/22  0609 10/17/22  2229   SODIUM mmol/L 137 136 141 141   POTASSIUM mmol/L 3 8 3 8 3 5 3 9   CHLORIDE mmol/L 109* 107 106 105   CO2 mmol/L 25 26 26 28   ANION GAP mmol/L 3* 3* 9 8   BUN mg/dL 18 20 22 24   CREATININE mg/dL 0 89 1 10 1 07 1 17   CALCIUM mg/dL 8 4 9 2 8 7 9 2   GLUCOSE RANDOM mg/dL 163* 186* 136 88   ALT U/L  --   --  39 46   AST U/L  --   --  21 24   ALK PHOS U/L  --   --  85 102   ALBUMIN g/dL  --   --  3 6 4 2   TOTAL BILIRUBIN mg/dL  --   --  0 66 0 80          Results from last 7 days   Lab Units 10/20/22  0459 10/20/22  0024 10/19/22  1715 10/19/22  0956 10/19/22  0340 10/18/22  2045 10/18/22  1141 10/18/22  0433 10/17/22  2229   INR   --   --   --   --  1 07  --   --   --  1 18   PTT seconds 45* 46* 53* 71* 77* 59* 72*   < > 29    < > = values in this interval not displayed  Results from last 7 days   Lab Units 10/17/22  2229   LACTIC ACID mmol/L 0 9     ABG:    VBG:          Micro        EKG:   Imaging:  I have personally reviewed pertinent reports  Intake and Output  I/O       10/18 0701  10/19 0700 10/19 0701  10/20 0700    P  O   100 I V  (mL/kg)  1226 7 (15 9)    IV Piggyback  269 2    Total Intake(mL/kg)  1595 9 (20 7)    Urine (mL/kg/hr)  985 (0 5)    Total Output  985    Net  +610 9              UOP: 46 ml/hr for past 24 hrs    Height and Weights   Height: 5' 5" (165 1 cm)  IBW (Ideal Body Weight): 61 5 kg  Body mass index is 28 32 kg/m²  Weight (last 2 days)     Date/Time Weight    10/19/22 01:43:41 77 2 (170 2)            Nutrition       Diet Orders   (From admission, onward)             Start     Ordered    10/20/22 0001  Diet NPO  Diet effective midnight        References:    Nutrtion Support Algorithm Enteral vs  Parenteral   Question Answer Comment   Diet Type NPO    RD to adjust diet per protocol?  Yes        10/19/22 1320              Active Medications  Scheduled Meds:  Current Facility-Administered Medications   Medication Dose Route Frequency Provider Last Rate   • acetaminophen  650 mg Oral Q6H PRN Zeinab Walker MD     • aspirin  81 mg Oral Daily Zeinab Walker MD     • atorvastatin  80 mg Oral Daily With Bhavna Esparza MD     • docusate sodium  100 mg Oral BID Zeinab Walker MD     • gabapentin  300 mg Oral TID Tona Graham MD     • heparin (porcine)  500 Units/hr Intravenous Continuous Zeinab Walker  Units/hr (10/19/22 1926)   • hydrALAZINE  10 mg Intravenous Q6H PRN Renuka Whitt PA-C     • HYDROmorphone  0 5 mg Intravenous Q3H PRN Reji Mccoy DO     • insulin lispro  1-6 Units Subcutaneous TID AC Zeinab Walker MD     • insulin lispro  1-6 Units Subcutaneous HS Zeinab Walker MD     • methocarbamol  500 mg Oral Q6H PRN Tona Graham MD     • metoprolol tartrate  25 mg Oral Q12H Ne Bliss PA-C     • ondansetron  4 mg Intravenous Q4H PRN Zeinab Walker MD     • oxyCODONE  10 mg Oral Q6H PRN Zeinab Walker MD     • oxyCODONE  5 mg Oral Q4H PRN Zeinab Walker MD     • sodium chloride  50 mL/hr Intravenous Continuous Zeinab Walker MD 50 mL/hr (10/19/22 1800)     Continuous Infusions:  heparin (porcine), 500 Units/hr, Last Rate: 500 Units/hr (10/19/22 1926)  sodium chloride, 50 mL/hr, Last Rate: 50 mL/hr (10/19/22 1800)      PRN Meds:   acetaminophen, 650 mg, Q6H PRN  hydrALAZINE, 10 mg, Q6H PRN  HYDROmorphone, 0 5 mg, Q3H PRN  methocarbamol, 500 mg, Q6H PRN  ondansetron, 4 mg, Q4H PRN  oxyCODONE, 10 mg, Q6H PRN  oxyCODONE, 5 mg, Q4H PRN        Allergies   No Known Allergies  ---------------------------------------------------------------------------------------  Advance Directive and Living Will:      Power of :    POLST:    ---------------------------------------------------------------------------------------  Care Time Delivered:   No Critical Care time spent     Kashif Gómez,       Portions of the record may have been created with voice recognition software  Occasional wrong word or "sound a like" substitutions may have occurred due to the inherent limitations of voice recognition software    Read the chart carefully and recognize, using context, where substitutions have occurred]

## 2022-10-20 NOTE — PLAN OF CARE
Problem: Potential for Falls  Goal: Patient will remain free of falls  Description: INTERVENTIONS:  - Educate patient/family on patient safety including physical limitations  - Instruct patient to call for assistance with activity   - Consult OT/PT to assist with strengthening/mobility   - Keep Call bell within reach  - Keep bed low and locked with side rails adjusted as appropriate  - Keep care items and personal belongings within reach  - Initiate and maintain comfort rounds  - Make Fall Risk Sign visible to staff  - Offer Toileting every 2 Hours, in advance of need  - Initiate/Maintain bed alarm  - Obtain necessary fall risk management equipment  - Apply yellow socks and bracelet for high fall risk patients  - Consider moving patient to room near nurses station  Outcome: Progressing     Problem: PAIN - ADULT  Goal: Verbalizes/displays adequate comfort level or baseline comfort level  Description: Interventions:  - Encourage patient to monitor pain and request assistance  - Assess pain using appropriate pain scale  - Administer analgesics based on type and severity of pain and evaluate response  - Implement non-pharmacological measures as appropriate and evaluate response  - Consider cultural and social influences on pain and pain management  - Notify physician/advanced practitioner if interventions unsuccessful or patient reports new pain  Outcome: Progressing     Problem: INFECTION - ADULT  Goal: Absence or prevention of progression during hospitalization  Description: INTERVENTIONS:  - Assess and monitor for signs and symptoms of infection  - Monitor lab/diagnostic results  - Monitor all insertion sites, i e  indwelling lines, tubes, and drains  - Monitor endotracheal if appropriate and nasal secretions for changes in amount and color  - Hydes appropriate cooling/warming therapies per order  - Administer medications as ordered  - Instruct and encourage patient and family to use good hand hygiene technique  - Identify and instruct in appropriate isolation precautions for identified infection/condition  Outcome: Progressing     Problem: SAFETY ADULT  Goal: Patient will remain free of falls  Description: INTERVENTIONS:  - Educate patient/family on patient safety including physical limitations  - Instruct patient to call for assistance with activity   - Consult OT/PT to assist with strengthening/mobility   - Keep Call bell within reach  - Keep bed low and locked with side rails adjusted as appropriate  - Keep care items and personal belongings within reach  - Initiate and maintain comfort rounds  - Make Fall Risk Sign visible to staff  - Offer Toileting every 2 Hours, in advance of need  - Initiate/Maintain bed alarm  - Obtain necessary fall risk management equipment  - Apply yellow socks and bracelet for high fall risk patients  - Consider moving patient to room near nurses station  Outcome: Progressing  Goal: Maintain or return to baseline ADL function  Description: INTERVENTIONS:  -  Assess patient's ability to carry out ADLs; assess patient's baseline for ADL function and identify physical deficits which impact ability to perform ADLs (bathing, care of mouth/teeth, toileting, grooming, dressing, etc )  - Assess/evaluate cause of self-care deficits   - Assess range of motion  - Assess patient's mobility; develop plan if impaired  - Assess patient's need for assistive devices and provide as appropriate  - Encourage maximum independence but intervene and supervise when necessary  - Involve family in performance of ADLs  - Assess for home care needs following discharge   - Consider OT consult to assist with ADL evaluation and planning for discharge  - Provide patient education as appropriate  Outcome: Progressing     Problem: Knowledge Deficit  Goal: Patient/family/caregiver demonstrates understanding of disease process, treatment plan, medications, and discharge instructions  Description: Complete learning assessment and assess knowledge base    Interventions:  - Provide teaching at level of understanding  - Provide teaching via preferred learning methods  Outcome: Progressing     Problem: Prexisting or High Potential for Compromised Skin Integrity  Goal: Skin integrity is maintained or improved  Description: INTERVENTIONS:  - Identify patients at risk for skin breakdown  - Assess and monitor skin integrity  - Assess and monitor nutrition and hydration status  - Monitor labs   - Assess for incontinence   - Turn and reposition patient  - Assist with mobility/ambulation  - Relieve pressure over bony prominences  - Avoid friction and shearing  - Provide appropriate hygiene as needed including keeping skin clean and dry  - Evaluate need for skin moisturizer/barrier cream  - Collaborate with interdisciplinary team   - Patient/family teaching  - Consider wound care consult   Outcome: Progressing     Problem: MOBILITY - ADULT  Goal: Maintain or return to baseline ADL function  Description: INTERVENTIONS:  -  Assess patient's ability to carry out ADLs; assess patient's baseline for ADL function and identify physical deficits which impact ability to perform ADLs (bathing, care of mouth/teeth, toileting, grooming, dressing, etc )  - Assess/evaluate cause of self-care deficits   - Assess range of motion  - Assess patient's mobility; develop plan if impaired  - Assess patient's need for assistive devices and provide as appropriate  - Encourage maximum independence but intervene and supervise when necessary  - Involve family in performance of ADLs  - Assess for home care needs following discharge   - Consider OT consult to assist with ADL evaluation and planning for discharge  - Provide patient education as appropriate  Outcome: Progressing  Goal: Maintains/Returns to pre admission functional level  Description: INTERVENTIONS:  - Perform BMAT or MOVE assessment daily    - Set and communicate daily mobility goal to care team and patient/family/caregiver  - Collaborate with rehabilitation services on mobility goals if consulted  - Perform Range of Motion 4 times a day  - Reposition patient every 2 hours    - Dangle patient 3 times a day  - Stand patient 3 times a day  - Ambulate patient 3 times a day  - Out of bed to chair 3 times a day   - Out of bed for meals 3 times a day  - Out of bed for toileting  - Record patient progress and toleration of activity level   Outcome: Progressing

## 2022-10-21 ENCOUNTER — APPOINTMENT (INPATIENT)
Dept: NON INVASIVE DIAGNOSTICS | Facility: HOSPITAL | Age: 57
DRG: 271 | End: 2022-10-21
Payer: COMMERCIAL

## 2022-10-21 LAB
2HR DELTA HS TROPONIN: 18 NG/L
4HR DELTA HS TROPONIN: 18 NG/L
ANION GAP SERPL CALCULATED.3IONS-SCNC: 4 MMOL/L (ref 4–13)
AORTIC VALVE MEAN VELOCITY: 15.5 M/S
APTT PPP: 109 SECONDS (ref 23–37)
APTT PPP: 57 SECONDS (ref 23–37)
APTT PPP: 73 SECONDS (ref 23–37)
APTT PPP: 76 SECONDS (ref 23–37)
APTT PPP: >210 SECONDS (ref 23–37)
ATRIAL RATE: 76 BPM
ATRIAL RATE: 78 BPM
AV AREA BY CONTINUOUS VTI: 1.7 CM2
AV AREA PEAK VELOCITY: 1.7 CM2
AV LVOT MEAN GRADIENT: 3 MMHG
AV LVOT PEAK GRADIENT: 6 MMHG
AV MEAN GRADIENT: 11 MMHG
AV PEAK GRADIENT: 21 MMHG
AV VALVE AREA: 1.7 CM2
AV VELOCITY RATIO: 0.53
BASOPHILS # BLD AUTO: 0.02 THOUSANDS/ÂΜL (ref 0–0.1)
BASOPHILS NFR BLD AUTO: 0 % (ref 0–1)
BUN SERPL-MCNC: 17 MG/DL (ref 5–25)
CALCIUM SERPL-MCNC: 8.2 MG/DL (ref 8.3–10.1)
CARDIAC TROPONIN I PNL SERPL HS: 41 NG/L
CARDIAC TROPONIN I PNL SERPL HS: 59 NG/L
CARDIAC TROPONIN I PNL SERPL HS: 59 NG/L
CHLORIDE SERPL-SCNC: 111 MMOL/L (ref 96–108)
CO2 SERPL-SCNC: 25 MMOL/L (ref 21–32)
CREAT SERPL-MCNC: 0.84 MG/DL (ref 0.6–1.3)
DOP CALC AO PEAK VEL: 2.29 M/S
DOP CALC AO VTI: 47.02 CM
DOP CALC LVOT AREA: 3.14 CM2
DOP CALC LVOT DIAMETER: 2 CM
DOP CALC LVOT PEAK VEL VTI: 25.43 CM
DOP CALC LVOT PEAK VEL: 1.21 M/S
DOP CALC LVOT STROKE INDEX: 42.5 ML/M2
DOP CALC LVOT STROKE VOLUME: 79.85 CM3
E WAVE DECELERATION TIME: 173 MS
EOSINOPHIL # BLD AUTO: 0.1 THOUSAND/ÂΜL (ref 0–0.61)
EOSINOPHIL NFR BLD AUTO: 2 % (ref 0–6)
ERYTHROCYTE [DISTWIDTH] IN BLOOD BY AUTOMATED COUNT: 13.6 % (ref 11.6–15.1)
ERYTHROCYTE [DISTWIDTH] IN BLOOD BY AUTOMATED COUNT: 13.6 % (ref 11.6–15.1)
ERYTHROCYTE [DISTWIDTH] IN BLOOD BY AUTOMATED COUNT: 13.8 % (ref 11.6–15.1)
ERYTHROCYTE [DISTWIDTH] IN BLOOD BY AUTOMATED COUNT: 14 % (ref 11.6–15.1)
FIBRINOGEN PPP-MCNC: 189 MG/DL (ref 227–495)
FIBRINOGEN PPP-MCNC: 208 MG/DL (ref 227–495)
FRACTIONAL SHORTENING: 36 % (ref 28–44)
GFR SERPL CREATININE-BSD FRML MDRD: 97 ML/MIN/1.73SQ M
GLUCOSE SERPL-MCNC: 137 MG/DL (ref 65–140)
GLUCOSE SERPL-MCNC: 146 MG/DL (ref 65–140)
GLUCOSE SERPL-MCNC: 169 MG/DL (ref 65–140)
GLUCOSE SERPL-MCNC: 259 MG/DL (ref 65–140)
GLUCOSE SERPL-MCNC: 286 MG/DL (ref 65–140)
HCT VFR BLD AUTO: 22.4 % (ref 36.5–49.3)
HCT VFR BLD AUTO: 23.2 % (ref 36.5–49.3)
HCT VFR BLD AUTO: 24.4 % (ref 36.5–49.3)
HCT VFR BLD AUTO: 25.3 % (ref 36.5–49.3)
HGB BLD-MCNC: 7.3 G/DL (ref 12–17)
HGB BLD-MCNC: 7.5 G/DL (ref 12–17)
HGB BLD-MCNC: 8 G/DL (ref 12–17)
HGB BLD-MCNC: 8.3 G/DL (ref 12–17)
IMM GRANULOCYTES # BLD AUTO: 0.01 THOUSAND/UL (ref 0–0.2)
IMM GRANULOCYTES NFR BLD AUTO: 0 % (ref 0–2)
INTERVENTRICULAR SEPTUM IN DIASTOLE (PARASTERNAL SHORT AXIS VIEW): 1.2 CM
INTERVENTRICULAR SEPTUM: 1.2 CM (ref 0.6–1.1)
LAAS-AP4: 14.2 CM2
LEFT ATRIUM AREA SYSTOLE SINGLE PLANE A4C: 14.7 CM2
LEFT ATRIUM SIZE: 3.8 CM
LEFT INTERNAL DIMENSION IN SYSTOLE: 2.8 CM (ref 2.1–4)
LEFT VENTRICULAR INTERNAL DIMENSION IN DIASTOLE: 4.4 CM (ref 3.5–6)
LEFT VENTRICULAR POSTERIOR WALL IN END DIASTOLE: 1.1 CM
LEFT VENTRICULAR STROKE VOLUME: 58 ML
LVSV (TEICH): 58 ML
LYMPHOCYTES # BLD AUTO: 1.71 THOUSANDS/ÂΜL (ref 0.6–4.47)
LYMPHOCYTES NFR BLD AUTO: 27 % (ref 14–44)
MAGNESIUM SERPL-MCNC: 2.5 MG/DL (ref 1.6–2.6)
MCH RBC QN AUTO: 26.3 PG (ref 26.8–34.3)
MCH RBC QN AUTO: 26.4 PG (ref 26.8–34.3)
MCH RBC QN AUTO: 26.5 PG (ref 26.8–34.3)
MCH RBC QN AUTO: 26.6 PG (ref 26.8–34.3)
MCHC RBC AUTO-ENTMCNC: 32.3 G/DL (ref 31.4–37.4)
MCHC RBC AUTO-ENTMCNC: 32.6 G/DL (ref 31.4–37.4)
MCHC RBC AUTO-ENTMCNC: 32.8 G/DL (ref 31.4–37.4)
MCHC RBC AUTO-ENTMCNC: 32.8 G/DL (ref 31.4–37.4)
MCV RBC AUTO: 81 FL (ref 82–98)
MCV RBC AUTO: 82 FL (ref 82–98)
MONOCYTES # BLD AUTO: 0.58 THOUSAND/ÂΜL (ref 0.17–1.22)
MONOCYTES NFR BLD AUTO: 9 % (ref 4–12)
MV E'TISSUE VEL-LAT: 11 CM/S
MV E'TISSUE VEL-SEP: 7 CM/S
MV PEAK A VEL: 1.13 M/S
MV PEAK E VEL: 141 CM/S
MV STENOSIS PRESSURE HALF TIME: 50 MS
MV VALVE AREA P 1/2 METHOD: 4.4 CM2
NEUTROPHILS # BLD AUTO: 3.86 THOUSANDS/ÂΜL (ref 1.85–7.62)
NEUTS SEG NFR BLD AUTO: 62 % (ref 43–75)
NRBC BLD AUTO-RTO: 0 /100 WBCS
P AXIS: 75 DEGREES
P AXIS: 77 DEGREES
PA SYSTOLIC PRESSURE: 35 MMHG
PHOSPHATE SERPL-MCNC: 3.2 MG/DL (ref 2.7–4.5)
PLATELET # BLD AUTO: 100 THOUSANDS/UL (ref 149–390)
PLATELET # BLD AUTO: 102 THOUSANDS/UL (ref 149–390)
PLATELET # BLD AUTO: 104 THOUSANDS/UL (ref 149–390)
PLATELET # BLD AUTO: 106 THOUSANDS/UL (ref 149–390)
PMV BLD AUTO: 10 FL (ref 8.9–12.7)
PMV BLD AUTO: 10 FL (ref 8.9–12.7)
PMV BLD AUTO: 9.7 FL (ref 8.9–12.7)
PMV BLD AUTO: 9.8 FL (ref 8.9–12.7)
POTASSIUM SERPL-SCNC: 3.8 MMOL/L (ref 3.5–5.3)
PR INTERVAL: 167 MS
PR INTERVAL: 175 MS
QRS AXIS: 74 DEGREES
QRS AXIS: 76 DEGREES
QRSD INTERVAL: 96 MS
QRSD INTERVAL: 96 MS
QT INTERVAL: 367 MS
QT INTERVAL: 371 MS
QTC INTERVAL: 413 MS
QTC INTERVAL: 423 MS
RBC # BLD AUTO: 2.78 MILLION/UL (ref 3.88–5.62)
RBC # BLD AUTO: 2.84 MILLION/UL (ref 3.88–5.62)
RBC # BLD AUTO: 3.01 MILLION/UL (ref 3.88–5.62)
RBC # BLD AUTO: 3.13 MILLION/UL (ref 3.88–5.62)
RIGHT ATRIUM AREA SYSTOLE A4C: 13 CM2
RIGHT VENTRICLE ID DIMENSION: 4.2 CM
SL CV LV EF: 65
SL CV PED ECHO LEFT VENTRICLE DIASTOLIC VOLUME (MOD BIPLANE) 2D: 87 ML
SL CV PED ECHO LEFT VENTRICLE SYSTOLIC VOLUME (MOD BIPLANE) 2D: 29 ML
SODIUM SERPL-SCNC: 140 MMOL/L (ref 135–147)
T WAVE AXIS: 67 DEGREES
T WAVE AXIS: 83 DEGREES
TR MAX PG: 29 MMHG
TR PEAK VELOCITY: 2.7 M/S
TRICUSPID VALVE PEAK REGURGITATION VELOCITY: 2.71 M/S
VENTRICULAR RATE: 76 BPM
VENTRICULAR RATE: 78 BPM
WBC # BLD AUTO: 5.7 THOUSAND/UL (ref 4.31–10.16)
WBC # BLD AUTO: 5.83 THOUSAND/UL (ref 4.31–10.16)
WBC # BLD AUTO: 6.24 THOUSAND/UL (ref 4.31–10.16)
WBC # BLD AUTO: 6.28 THOUSAND/UL (ref 4.31–10.16)

## 2022-10-21 PROCEDURE — 93325 DOPPLER ECHO COLOR FLOW MAPG: CPT | Performed by: INTERNAL MEDICINE

## 2022-10-21 PROCEDURE — 85730 THROMBOPLASTIN TIME PARTIAL: CPT | Performed by: PHYSICIAN ASSISTANT

## 2022-10-21 PROCEDURE — 85025 COMPLETE CBC W/AUTO DIFF WBC: CPT | Performed by: PHYSICIAN ASSISTANT

## 2022-10-21 PROCEDURE — 82948 REAGENT STRIP/BLOOD GLUCOSE: CPT

## 2022-10-21 PROCEDURE — 93010 ELECTROCARDIOGRAM REPORT: CPT | Performed by: INTERNAL MEDICINE

## 2022-10-21 PROCEDURE — NC001 PR NO CHARGE: Performed by: PHYSICIAN ASSISTANT

## 2022-10-21 PROCEDURE — 85027 COMPLETE CBC AUTOMATED: CPT

## 2022-10-21 PROCEDURE — 93321 DOPPLER ECHO F-UP/LMTD STD: CPT | Performed by: INTERNAL MEDICINE

## 2022-10-21 PROCEDURE — 93005 ELECTROCARDIOGRAM TRACING: CPT

## 2022-10-21 PROCEDURE — 80048 BASIC METABOLIC PNL TOTAL CA: CPT | Performed by: PHYSICIAN ASSISTANT

## 2022-10-21 PROCEDURE — 85730 THROMBOPLASTIN TIME PARTIAL: CPT | Performed by: EMERGENCY MEDICINE

## 2022-10-21 PROCEDURE — NC001 PR NO CHARGE: Performed by: SURGERY

## 2022-10-21 PROCEDURE — 99233 SBSQ HOSP IP/OBS HIGH 50: CPT | Performed by: SURGERY

## 2022-10-21 PROCEDURE — 85384 FIBRINOGEN ACTIVITY: CPT | Performed by: PHYSICIAN ASSISTANT

## 2022-10-21 PROCEDURE — 83735 ASSAY OF MAGNESIUM: CPT | Performed by: PHYSICIAN ASSISTANT

## 2022-10-21 PROCEDURE — 85027 COMPLETE CBC AUTOMATED: CPT | Performed by: PHYSICIAN ASSISTANT

## 2022-10-21 PROCEDURE — 93308 TTE F-UP OR LMTD: CPT

## 2022-10-21 PROCEDURE — 99221 1ST HOSP IP/OBS SF/LOW 40: CPT | Performed by: INTERNAL MEDICINE

## 2022-10-21 PROCEDURE — 84100 ASSAY OF PHOSPHORUS: CPT | Performed by: PHYSICIAN ASSISTANT

## 2022-10-21 PROCEDURE — 84484 ASSAY OF TROPONIN QUANT: CPT

## 2022-10-21 PROCEDURE — 93325 DOPPLER ECHO COLOR FLOW MAPG: CPT

## 2022-10-21 PROCEDURE — 93321 DOPPLER ECHO F-UP/LMTD STD: CPT

## 2022-10-21 PROCEDURE — 93308 TTE F-UP OR LMTD: CPT | Performed by: INTERNAL MEDICINE

## 2022-10-21 RX ORDER — NITROGLYCERIN 0.4 MG/1
0.4 TABLET SUBLINGUAL
Status: DISCONTINUED | OUTPATIENT
Start: 2022-10-21 | End: 2022-10-23 | Stop reason: HOSPADM

## 2022-10-21 RX ORDER — ISOSORBIDE MONONITRATE 30 MG/1
30 TABLET, EXTENDED RELEASE ORAL DAILY
Status: DISCONTINUED | OUTPATIENT
Start: 2022-10-21 | End: 2022-10-23 | Stop reason: HOSPADM

## 2022-10-21 RX ORDER — HEPARIN SODIUM 1000 [USP'U]/ML
6000 INJECTION, SOLUTION INTRAVENOUS; SUBCUTANEOUS
Status: DISCONTINUED | OUTPATIENT
Start: 2022-10-21 | End: 2022-10-22

## 2022-10-21 RX ORDER — HEPARIN SODIUM 1000 [USP'U]/ML
3000 INJECTION, SOLUTION INTRAVENOUS; SUBCUTANEOUS
Status: DISCONTINUED | OUTPATIENT
Start: 2022-10-21 | End: 2022-10-22

## 2022-10-21 RX ORDER — LOSARTAN POTASSIUM 25 MG/1
25 TABLET ORAL DAILY
Status: DISCONTINUED | OUTPATIENT
Start: 2022-10-21 | End: 2022-10-23 | Stop reason: HOSPADM

## 2022-10-21 RX ORDER — ISOSORBIDE MONONITRATE 30 MG/1
30 TABLET, EXTENDED RELEASE ORAL DAILY
Status: DISCONTINUED | OUTPATIENT
Start: 2022-10-22 | End: 2022-10-21

## 2022-10-21 RX ORDER — NITROGLYCERIN 0.4 MG/1
TABLET SUBLINGUAL
Status: COMPLETED
Start: 2022-10-21 | End: 2022-10-21

## 2022-10-21 RX ORDER — CALCIUM CARBONATE 200(500)MG
1000 TABLET,CHEWABLE ORAL DAILY PRN
Status: DISCONTINUED | OUTPATIENT
Start: 2022-10-21 | End: 2022-10-23 | Stop reason: HOSPADM

## 2022-10-21 RX ORDER — HEPARIN SODIUM 10000 [USP'U]/100ML
3-30 INJECTION, SOLUTION INTRAVENOUS
Status: DISCONTINUED | OUTPATIENT
Start: 2022-10-21 | End: 2022-10-22

## 2022-10-21 RX ORDER — ASPIRIN 325 MG
TABLET ORAL
Status: COMPLETED
Start: 2022-10-21 | End: 2022-10-21

## 2022-10-21 RX ADMIN — HEPARIN SODIUM 16 UNITS/KG/HR: 10000 INJECTION, SOLUTION INTRAVENOUS at 10:21

## 2022-10-21 RX ADMIN — SODIUM CHLORIDE 50 ML/HR: 0.9 INJECTION, SOLUTION INTRAVENOUS at 05:34

## 2022-10-21 RX ADMIN — INSULIN LISPRO 3 UNITS: 100 INJECTION, SOLUTION INTRAVENOUS; SUBCUTANEOUS at 11:36

## 2022-10-21 RX ADMIN — GABAPENTIN 300 MG: 300 CAPSULE ORAL at 08:13

## 2022-10-21 RX ADMIN — METOPROLOL TARTRATE 25 MG: 25 TABLET, FILM COATED ORAL at 21:43

## 2022-10-21 RX ADMIN — NITROGLYCERIN: 0.4 TABLET SUBLINGUAL at 11:37

## 2022-10-21 RX ADMIN — ISOSORBIDE MONONITRATE 30 MG: 30 TABLET, EXTENDED RELEASE ORAL at 13:28

## 2022-10-21 RX ADMIN — ATORVASTATIN CALCIUM 80 MG: 80 TABLET, FILM COATED ORAL at 17:02

## 2022-10-21 RX ADMIN — ASPIRIN 325 MG ORAL TABLET 325 MG: 325 PILL ORAL at 11:35

## 2022-10-21 RX ADMIN — METOPROLOL TARTRATE 25 MG: 25 TABLET, FILM COATED ORAL at 08:14

## 2022-10-21 RX ADMIN — GABAPENTIN 300 MG: 300 CAPSULE ORAL at 21:43

## 2022-10-21 RX ADMIN — ACETAMINOPHEN 975 MG: 325 TABLET, FILM COATED ORAL at 11:40

## 2022-10-21 RX ADMIN — INSULIN LISPRO 1 UNITS: 100 INJECTION, SOLUTION INTRAVENOUS; SUBCUTANEOUS at 21:43

## 2022-10-21 RX ADMIN — LOSARTAN POTASSIUM 25 MG: 25 TABLET, FILM COATED ORAL at 09:14

## 2022-10-21 RX ADMIN — APIXABAN 2.5 MG: 2.5 TABLET, FILM COATED ORAL at 09:14

## 2022-10-21 RX ADMIN — GABAPENTIN 300 MG: 300 CAPSULE ORAL at 17:02

## 2022-10-21 RX ADMIN — INSULIN LISPRO 4 UNITS: 100 INJECTION, SOLUTION INTRAVENOUS; SUBCUTANEOUS at 00:06

## 2022-10-21 RX ADMIN — MAGNESIUM SULFATE HEPTAHYDRATE 2 G: 40 INJECTION, SOLUTION INTRAVENOUS at 03:22

## 2022-10-21 RX ADMIN — DOCUSATE SODIUM 100 MG: 100 CAPSULE, LIQUID FILLED ORAL at 08:13

## 2022-10-21 RX ADMIN — ACETAMINOPHEN 975 MG: 325 TABLET, FILM COATED ORAL at 17:03

## 2022-10-21 RX ADMIN — ASPIRIN 81 MG: 81 TABLET, COATED ORAL at 08:13

## 2022-10-21 NOTE — QUICK NOTE
Post Op Check Note - Surgery Resident  Nya Neff 62 y o  male MRN: [id number]  Unit/Bed#: MICU 03 Encounter: [id number]    ASSESSMENT:  Nya Neff is a 62 y o  male who is status post Lysis check with intervention  Subjective: Patient seen and evaluated at bedside  Stated that he was doing well  Denied any significant pain to the lower extremities  Did report pain at his left groin site  Patient now back on heparin  Physical Exam:  GEN: NAD  CV: RRR  Lung: Normal effort  Ab: Soft, NT/ND  Neuro: A+Ox3  Incisions: C/D/I  Pulses: Dop Signal BL AT/PT  Blood pressure 144/65, pulse 86, temperature 98 4 °F (36 9 °C), temperature source Oral, resp  rate 20, height 5' 5" (1 651 m), weight 77 2 kg (170 lb 3 1 oz), SpO2 100 %  ,Body mass index is 28 32 kg/m²        Intake/Output Summary (Last 24 hours) at 10/20/2022 2218  Last data filed at 10/20/2022 2157  Gross per 24 hour   Intake 2010 44 ml   Output 585 ml   Net 1425 44 ml       Invasive Devices  Report    Peripherally Inserted Central Catheter Line  Duration           PICC Line 10/19/22 Left Brachial 1 day          Peripheral Intravenous Line  Duration           Peripheral IV 10/17/22 Right Antecubital 2 days    Peripheral IV 10/17/22 Right Hand 2 days          Drain  Duration           Urethral Catheter 18 Fr  1 day                VTE Pharmacologic Prophylaxis: Heparin

## 2022-10-21 NOTE — PROGRESS NOTES
Daily Progress Note - Critical Care   Milad Davis 62 y o  male MRN: [id number]  Unit/Bed#: MICU 03 Encounter: [id number]        ----------------------------------------------------------------------------------------  HPI/24hr events:    Milad Davis is a 62 y o  male with PMH of PAD (s/p CFA to AK pop bypass with PTFE- Feb 2022), CAD s/p CABG with L GSV (April 2021), NIDDM, HTN, HLD, former smoker, who presented to the [facility] ED on 10/17 for right leg pain  Found to have occluded bypass graft and taken to the OR on 10/19 in [place] for angiogram and placement lysis catheter  In past 24 hrs, pt developed a left groin hematoma at catheter site  TPA catheter was removed yesterday morning and 5 Fr sheath remained in place  He had lysis recheck completed yesterday afternoon with no complications  Sepsis time zero was triggered at 2216 hrs last evening  qSOFA score of 1  No concern for new or worsening infection  No acute issues overnight per bedside RN  Labs:   Hgb 11 3 -> 8 9 - > 8 3 -> 8 0  WBC: 7 5k -> 6 24k -> 6 28k  Plt 127k->96k-> 106k  PTT 45 -> 76 -> 73 -> 109    Vitals:   BP: 100s-150s/50s-80s  HR: 60s-90s bpm  Afebrile  SpO2 100% on room air  I/O: Urine output 48cc/hr   Lines & drains: L Brachial PICC, peripheral IV, polanco catheter    LLE Dop signals DP/PT  RLE Palp PT, Dop signal DP  ---------------------------------------------------------------------------------------  SUBJECTIVE  Pt feels improved  No complaints this morning  Review of Systems  Review of systems was reviewed and negative unless stated above in HPI/24-hour events   ---------------------------------------------------------------------------------------  Assessment and Plan:    Neuro:   · Diagnosis: post op pain control  ? Plan:    ? Scheduled: Gabapentin 300 mg TID  ? PRN: Tylenol 650 q6,  Dilaudid 0 5 q3, oxy 5,10       CV:   · Diagnosis:  Peripheral arterial disease  ?  Plan:   ? Status post common femoral to above knee popliteal bypass with PTFE in February 2022, presenting with occluded bypass and symptoms of claudication  ? Status post 10/19 right lower extremity angiogram, lysis catheter placement with vascular surgery  ? S/p lysis recheck today on 10/20   ? Continue aspirin, statin  ? Per vascular, Hold tPA and and continue flat rate heparin infusion at rate of 500ml/hr through L groin sheath  ? Labs: q6 CBC, fibrinogen, PTT  · Diagnosis: Left Groin Hematoma  · Plan  · Developed overnight on 10/19  · tPA catheter removed 10/20 prior to lysis  5 Fr sheath remained in place  · S/p Lysis recheck 10/20  · Pen markings placed, hematoma soft on exam  · Continue to monitor  · Diagnosis:  CAD  ? Plan:   ? S/p CABG April 2021  ? Holding home losartan  ? Holding home Brilinta  ? Continue aspirin, statin  ? At home on metoprolol succinate 25 daily, will give metoprolol tartrate 25 b i d  (Higher dose because holding losartan)  · Diagnosis:  Hypertension  ? See metoprolol comments in CAD  ? P r n  Hydralazine      Pulm:  · Diagnosis:  No active issues  ? Plan:   ? Maintain SpO2 above 92%  ? Supplemental oxygen as needed   GI:   · Diagnosis:  No active issues  ? Plan: Bowel Regimen  § Colace BID  · Diagnosis: Nausea  ? Zofran PRN      :   · Diagnosis:  No active issues  ? Plan:   ? Continue to monitor I/O  ? Admission creatinine 1 1, improved to 0 89 -> 0 84 (10/21)      F/E/N:   · F: Bolus PRN  · E:  Replace as needed  · N: Cardoac diet       Heme/Onc:   · Diagnosis:  Post lysis  ? Plan:   ? See PAD for routine labs  ? Heparin started on 10/20   ? Per vascular, recommend transitioning to [address] when Hgb stable  Cardiology consult for recommendations on Antiplatelet therapy with DOAC  · Diagnosis: anemia  · Plan:   · Hgb decreased from 11 3 -> 8 3 -> 8 0 s/p lysis recheck  · Per vascular, suspect acute intra-op losses, groin hematoma;  Less likely retroperitoneal hematoma, given clinical presentation  · Continue to monitor Hgb    Endo:   · Diagnosis:  Non-insulin-dependent diabetes   ? Plan:  continue Sliding-scale insulin      ID:   1  Diagnosis:    ? Plan:  ? 10/20: qSOFA score: 1 for RR > 22  Sepsis time zero triggered   No concern for new or worsening infection  ? Monitor fever and white count      MSK/Skin:   Diagnosis:  Muscle Spasms  ? Plan: Robaxin 500mg q6 PRN  ? Frequent repositioning in bed  ? PT/OT when able    Patient appropriate for transfer out of ICU today? Yes  Disposition: Transfer to Med-Surg   Code Status: Level 1 - Full Code  ---------------------------------------------------------------------------------------  ICU CORE MEASURES    Prophylaxis   VTE Pharmacologic Prophylaxis: Heparin Drip  VTE Mechanical Prophylaxis: reason for no mechanical VTE prophylaxis 2/2 procedure  Stress Ulcer Prophylaxis: Prophylaxis Not Indicated     Invasive Devices Review  Invasive Devices  Report    Peripherally Inserted Central Catheter Line  Duration           PICC Line 10/19/22 Left Brachial 1 day          Peripheral Intravenous Line  Duration           Peripheral IV 10/17/22 Right Antecubital 3 days    Peripheral IV 10/17/22 Right Hand 3 days          Drain  Duration           Urethral Catheter 18 Fr  1 day              Can any invasive devices be discontinued today?  No  ---------------------------------------------------------------------------------------  OBJECTIVE    Vitals   Vitals:    10/20/22 2137 10/20/22 2140 10/20/22 2210 10/21/22 0218   BP: 144/65 144/65  (!) 89/56   Pulse: 90 92 86 60   Resp:  14 20 13   Temp:       TempSrc:       SpO2:  97% 100% 100%   Weight:       Height:         Temp (24hrs), Av °F (36 7 °C), Min:97 3 °F (36 3 °C), Max:98 7 °F (37 1 °C)  Current: Temperature: 97 5 °F (36 4 °C)  HR: 71  BP: 121/68  RR: 19  SpO2: 100%    Respiratory:  SpO2: SpO2: 100 %  Nasal Cannula O2 Flow Rate (L/min): 3 L/min    Invasive/non-invasive ventilation settings   Respiratory  Report   Lab Data (Last 4 hours) None         O2/Vent Data (Last 4 hours)    None                Physical Exam  Constitutional:       General: He is not in acute distress  Appearance: Normal appearance  HENT:      Head: Normocephalic and atraumatic  Right Ear: External ear normal       Left Ear: External ear normal       Nose: Nose normal       Mouth/Throat:      Mouth: Mucous membranes are moist    Eyes:      Conjunctiva/sclera: Conjunctivae normal    Cardiovascular:      Rate and Rhythm: Normal rate  Heart sounds: Normal heart sounds  Comments: B/L Palp fem pulse  LLE Dop signals DP/PT  RLE Palp PT, Dop signal DP  Pulmonary:      Effort: Pulmonary effort is normal  No respiratory distress  Abdominal:      Palpations: Abdomen is soft  Tenderness: There is no abdominal tenderness  Musculoskeletal:      Cervical back: Normal range of motion  Skin:     General: Skin is warm and dry  Comments: L groin hematoma: soft, bandage in place   Neurological:      General: No focal deficit present  Mental Status: He is alert  Mental status is at baseline  Psychiatric:         Mood and Affect: Mood normal          Judgment: Judgment normal        Laboratory and Diagnostics:  Results from last 7 days   Lab Units 10/21/22  0500 10/21/22  0004 10/20/22  1755 10/20/22  0459 10/20/22  0024 10/19/22  1715 10/19/22  0340 10/18/22  0609 10/17/22  2229   WBC Thousand/uL 6 28 6 24 7 56 9 17 6 06 8 99 5 71   < > 6 30   HEMOGLOBIN g/dL 8 0* 8 3* 8 9* 11 3* 11 3* 12 3 11 6*   < > 12 8   HEMATOCRIT % 24 4* 25 3* 26 9* 35 6* 34 7* 37 0 35 4*   < > 40 1   PLATELETS Thousands/uL 100* 106* 96* 127* 130* 143* 173   < > 178   NEUTROS PCT % 62  --   --   --   --   --   --   --  53   MONOS PCT % 9  --   --   --   --   --   --   --  10    < > = values in this interval not displayed       Results from last 7 days   Lab Units 10/21/22  0500 10/20/22  1510 10/20/22  0459 10/19/22  0340 10/18/22  0609 10/17/22  2229   SODIUM mmol/L 140 140 137 136 141 141   POTASSIUM mmol/L 3 8 3 7 3 8 3 8 3 5 3 9   CHLORIDE mmol/L 111* 112* 109* 107 106 105   CO2 mmol/L 25 21 25 26 26 28   ANION GAP mmol/L 4 7 3* 3* 9 8   BUN mg/dL 17 15 18 20 22 24   CREATININE mg/dL 0 84 0 70 0 89 1 10 1 07 1 17   CALCIUM mg/dL 8 2* 7 5* 8 4 9 2 8 7 9 2   GLUCOSE RANDOM mg/dL 146* 128 163* 186* 136 88   ALT U/L  --   --   --   --  39 46   AST U/L  --   --   --   --  21 24   ALK PHOS U/L  --   --   --   --  85 102   ALBUMIN g/dL  --   --   --   --  3 6 4 2   TOTAL BILIRUBIN mg/dL  --   --   --   --  0 66 0 80     Results from last 7 days   Lab Units 10/21/22  0500 10/20/22  1510   MAGNESIUM mg/dL 2 5 1 6   PHOSPHORUS mg/dL 3 2 2 5*      Results from last 7 days   Lab Units 10/21/22  0500 10/21/22  0004 10/20/22  1510 10/20/22  0459 10/20/22  0024 10/19/22  1715 10/19/22  0956 10/19/22  0340 10/18/22  0433 10/17/22  2229   INR   --   --  1 28*  --   --   --   --  1 07  --  1 18   PTT seconds 109* 73*  76*  --  45* 46* 53* 71* 77*   < > 29    < > = values in this interval not displayed  Results from last 7 days   Lab Units 10/20/22  1510 10/17/22  2229   LACTIC ACID mmol/L 0 8 0 9     ABG:    VBG:          Micro        EKG:   Imaging:  I have personally reviewed pertinent reports  Intake and Output  I/O       10/19 0701  10/20 0700 10/20 0701  10/21 0700    P  O  100 200    I V  (mL/kg) 1619 (21) 973 8 (12 6)    IV Piggyback 435 100    Total Intake(mL/kg) 2154 (27 9) 1273 8 (16 5)    Urine (mL/kg/hr) 1110 (0 6) 1130 (0 6)    Total Output 1110 1130    Net +1044 +143 8              UOP: 48 ml/hr     Height and Weights   Height: 5' 5" (165 1 cm)  IBW (Ideal Body Weight): 61 5 kg  Body mass index is 28 32 kg/m²    Weight (last 2 days)     Date/Time Weight    10/19/22 01:43:41 77 2 (170 2)            Nutrition       Diet Orders   (From admission, onward)             Start     Ordered    10/20/22 2000  Diet Regular; Regular House  Diet effective now        References:    Nutrtion Support Algorithm Enteral vs  Parenteral   Question Answer Comment   Diet Type Regular    Regular Regular House    RD to adjust diet per protocol?  Yes        10/20/22 8827              Active Medications  Scheduled Meds:  Current Facility-Administered Medications   Medication Dose Route Frequency Provider Last Rate   • acetaminophen  650 mg Oral Q6H PRN Cori Nesbitt MD     • acetaminophen  975 mg Oral Q6H [address] Euel Gene, DO     • aspirin  81 mg Oral Daily Cori Nesbitt MD     • atorvastatin  80 mg Oral Daily With [address] Son Heaven Zuleta MD     • docusate sodium  100 mg Oral BID Cori Nesbitt MD     • gabapentin  300 mg Oral TID Olivia Odonnell MD     • heparin (porcine)  3-30 Units/kg/hr (Order-Specific) Intravenous Titrated Gunner Freeman DO 18 Units/kg/hr (10/20/22 1747)   • hydrALAZINE  10 mg Intravenous Q6H PRN Lamar Quiroga PA-C     • HYDROmorphone  0 5 mg Intravenous Q3H PRN Trevor Loaiza DO     • insulin lispro  1-6 Units Subcutaneous TID AC Cori Nesbitt MD     • insulin lispro  1-6 Units Subcutaneous HS Cori Nesbitt MD     • lactated ringers  500 mL Intravenous Once PRN Euel Gene, DO      And   • lactated ringers  500 mL Intravenous Once PRN Euel Gene, DO     • methocarbamol  500 mg Oral Q6H PRN Olivia Odonnell MD     • metoprolol tartrate  25 mg Oral Q12H Miya Martines PA-C     • ondansetron  4 mg Intravenous Q4H PRN Cori Nesbitt MD     • oxyCODONE  10 mg Oral Q6H PRN Cori Nesbitt MD     • oxyCODONE  5 mg Oral Q4H PRN Cori Nesbitt MD     • sodium chloride  500 mL Intravenous Once PRN Euel Gene, DO      And   • sodium chloride  500 mL Intravenous Once PRN Euel Gene, DO     • sodium chloride  50 mL/hr Intravenous Continuous Cori Nesbitt MD 50 mL/hr (10/21/22 5525)     Continuous Infusions:  heparin (porcine), 3-30 Units/kg/hr (Order-Specific), Last Rate: 18 Units/kg/hr (10/20/22 1747)  sodium chloride, 50 mL/hr, Last Rate: 50 mL/hr (10/21/22 0534)      PRN Meds:   acetaminophen, 650 mg, Q6H PRN  hydrALAZINE, 10 mg, Q6H PRN  HYDROmorphone, 0 5 mg, Q3H PRN  lactated ringers, 500 mL, Once PRN   And  lactated ringers, 500 mL, Once PRN  methocarbamol, 500 mg, Q6H PRN  ondansetron, 4 mg, Q4H PRN  oxyCODONE, 10 mg, Q6H PRN  oxyCODONE, 5 mg, Q4H PRN  sodium chloride, 500 mL, Once PRN   And  sodium chloride, 500 mL, Once PRN        Allergies   No Known Allergies  ---------------------------------------------------------------------------------------  Advance Directive and Living Will:      Power of :    POLST:    ---------------------------------------------------------------------------------------  Care Time Delivered:   No Critical Care time spent     Miles Zhou, DO      Portions of the record may have been created with voice recognition software  Occasional wrong word or "sound a like" substitutions may have occurred due to the inherent limitations of voice recognition software    Read the chart carefully and recognize, using context, where substitutions have occurred

## 2022-10-21 NOTE — ASSESSMENT & PLAN NOTE
Lab Results   Component Value Date    HGBA1C 7 2 (H) 10/19/2022       Recent Labs     10/19/22  2139 10/20/22  0723 10/20/22  1619 10/21/22  0002   POCGLU 139 147* 111 286*       Blood Sugar Average: Last 72 hrs:  (P) 163 [id number]       SSI as inpatient    Managed on Metformin 1000mg BID as outpatient

## 2022-10-21 NOTE — PROGRESS NOTES
Vascular Surgery    Update:  Continue Heparin gtt today  If Hgb and groin access site stable tomorrow will start Xarelto 15 mg BID w/ transition to Xarelto 20 mg daily after 21 days  Team d/w Cardiology patient is recommended to continue Brilinta and will discontinue ASA w/ addition of DOAC  Xarelto scripts sent to patients outpatient pharmacy and cost is $0 copay   D/W Dr Krishna Simmons and nursing team     Cari Durbin  10/21/2022

## 2022-10-21 NOTE — CONSULTS
Consultation - Cardiology   Whit Owusu 62 y o  male MRN: [id number]  Unit/Bed#: MICU 03 Encounter: [id number]      Assessment and Plan:  Principal Problem:    Claudication of right lower extremity ([facility] [address] )  Active Problems:    Type II diabetes mellitus ([facility] [address] )    Thrombocytopenia ([facility] [address] )    S/P CABG x 3    Postoperative anemia due to acute blood loss    Sinus bradycardia    CAD (coronary artery disease)    GERD (gastroesophageal reflux disease)    Hypertension    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)    Former tobacco use    History of PTCA    26-year-old man with history of hypertension, diabetes, smoking (former), history of CABG (4/2021), occlusion of venous grafts and NSTEMI with PCI to LCX (10/2021),  intervention to OM (12/2021), PAD s/p Rt fem-pop bypass presented with acute critical limb ischemia of the right lower extremity recurring 2 days of CDT complicated by left groin hematoma  Cardiology consulted for recommendations regarding antiplatelet therapy  His last PCI was in December 2021 and he has been maintained on aspirin and Brilinta outpatient  He is to be started on DOAC for anticoagulation  Left groin hematoma has been stable from vascular surgery standpoint  Recommend to stop the aspirin and continue with brillinta along with Rivaroxaban        Acute problem:  # PAD s/p Rt Fem-pop bypass with acute limb ischemia from thrombotic graft occlusion s/p lysis therapy now with restored perfusion  # Left groin hematoma  # Anemia of acute blood loss    Chronic problems:  # CAD s/p CABG (4/2021)  # NSTEMI s/p PCI to LCx (10/2021) and PCI to  OM (12/2021)  # HTN  # DM  # Former smoker    Plan:  - stop aspirin and restart brillinta 90 mg Bid  - Xarelto dose as per vascular surgery team  - c/w atorvastatin 80 mg QD  - switch home lopressor to Toprol XL 25 mg BID  - c/w home losartan and imdur    History of Present Illness   Physician Requesting Consult: Ham Kwan MD  Reason for Consult / Principal Problem: Antiplatelet managemebt  HPI: Milad Davis is a 62y o  year old male who presents with above mentioned history presents mentioned history presented with toe numbness of his right foot  He was found to have acute graft thrombosis of his fem-pop bypass  He underwent CVT for 2 days  Overnight he was found to have left groin hematoma at the access site  Currently he has restored perfusion to his lower extremity without any symptoms of chest pain, shortness of breath, leg pain  Consults    Review of Systems:  Review of Systems  All negative except as mentioned above    Historical Information   Past Medical History:   Diagnosis Date   • Bicuspid aortic valve     being observed   • CAD (coronary artery disease)    • Chest pain 4/6/2021   • Coronary artery disease    • Diabetes mellitus ([facility] [address] )    • Encounter for postoperative care 5/17/2021   • Former tobacco use    • GERD (gastroesophageal reflux disease)    • Goiter    • History of myocardial infarction    • History of rib fracture    • Hyperlipidemia    • Hypertension    • Hypertensive urgency 4/6/2021   • Left carotid bruit 7/22/2021   • Leg pain, bilateral     Agram today LLE   1/20/2022   • Leukocytosis 4/6/2021   • Myocardial infarction [facility])     2021-   CABG  x3   • NSTEMI (non-ST elevated myocardial infarction) ([facility] [address] ) 4/7/2021   • Wears glasses      Past Surgical History:   Procedure Laterality Date   • ARTERIOGRAM Right 10/19/2022    Procedure: RIGHT LOWER EXTREMITY ARTERIOGRAM WITH LYSIS, PLACEMENT OF LYSIS CATHETER; third order cannulation of right popliteal artery via left groin access;  Surgeon: Bobo Jackson MD;  Location:  MAIN OR;  Service: Vascular   • CARDIAC CATHETERIZATION     • CARDIAC CATHETERIZATION N/A 10/20/2021    Procedure: Cardiac catheterization;  Surgeon: Arlin Boucher MD;  Location: [address] [place] [address] CATH LAB;   Service: Cardiology   • CARDIAC CATHETERIZATION N/A 10/20/2021    Procedure: Cardiac Coronary Angiogram;  Surgeon: Tiesha Menon MD;  Location: [facility] CATH LAB; Service: Cardiology   • CARDIAC CATHETERIZATION N/A 10/20/2021    Procedure: Cardiac pci;  Surgeon: Tiesha Menon MD;  Location: [facility] CATH LAB; Service: Cardiology   • IR LOWER EXTREMITY ANGIOGRAM  2022   • IR LOWER EXTREMITY ANGIOGRAM  2/3/2022   • IR PICC PLACEMENT DOUBLE LUMEN  10/19/2022   • NH CABG, ARTERY-VEIN, FOUR N/A 2021    Procedure: CORONARY ARTERY BYPASS GRAFT (CABG) 3 VESSELS: LIMA to LAD; LLE EVH/SVG to LPL & OM2;  Surgeon: Harmeet Palacios DO;  Location: [facility] MAIN OR;  Service: Cardiac Surgery   • NH ECHO TRANSESOPHAG R-T 2D W/PRB IMG ACQUISJ I&R N/A 2021    Procedure: TRANSESOPHAGEAL ECHOCARDIOGRAM (BENNETT); Surgeon: Harmeet Palacios DO;  Location: [facility] MA[facility];  Service: Cardiac Surgery   • NH ENDOSCOPY W/VIDEO-ASST VEIN HARVEST,CABG Left 2021    Procedure: HARVEST VEIN ENDOSCOPIC ([address]);   Surgeon: Harmeet Palacios DO;  Location: [facility];  Service: Cardiac Surgery   • NH SLCTV CATHJ 3RD+ ORD SLCTV ABDL PEL/LXTR [facility] Left 2022    Procedure: ARTERIOGRAM, STENT PLACEMENT IN LEFT SUPERFICIAL FEMORIAL ARTERY;  Surgeon: Ang Garsia MD;  Location: [facility] OR;  Service: Vascular   • NH VEIN BYPASS GRAFT,FEM-POP Right 2/3/2022    Procedure: BYPASS FEMORAL-POPLITEAL;  Surgeon: Ang Garsia MD;  Location: [facility];  Service: Vascular   • VASECTOMY       Social History     Substance and Sexual Activity   Alcohol Use Never     Social History     Substance and Sexual Activity   Drug Use Never     Social History     Tobacco Use   Smoking Status Former Smoker   • Packs/day: 1 00   • Years: 38 00   • Pack years: 38 00   • Types: Cigarettes   • Quit date: 2021   • Years since quittin 5   Smokeless Tobacco Never Used     Family History: non-contributory    Meds/Allergies   all current active meds have been reviewed  No Known Allergies    Objective   Vitals: Blood pressure 132/83, pulse 77, temperature 97 5 °F (36 4 °C), temperature source Oral, resp  rate 16, height 5' 5" (1 651 m), weight 78 4 kg (172 lb 13 5 oz), SpO2 100 %  , Body mass index is 28 76 kg/m² ,   Orthostatic Blood Pressures    Flowsheet Row Most Recent Value   Blood Pressure 132/83 filed at 10/21/2022 0800   Patient Position - Orthostatic VS Lying filed at 10/21/2022 0800            Intake/Output Summary (Last 24 hours) at 10/21/2022 0959  Last data filed at 10/21/2022 3271  Gross per 24 hour   Intake 2122 74 ml   Output 1125 ml   Net 997 74 ml       Invasive Devices  Report    Peripherally Inserted Central Catheter Line  Duration           PICC Line 10/19/22 Left Brachial 1 day          Peripheral Intravenous Line  Duration           Peripheral IV 10/17/22 Right Antecubital 3 days    Peripheral IV 10/17/22 Right Hand 3 days                    Physical Exam:  Physical Exam  General: alert, oriented X 3 , comfortable  Neck: No JVD  Cardiac: normal S1, S2, no m/r/g  Respiratory: normal breath sounds, no wheezes or crackles  Abdomen: soft and non-tender  Extremities: warm, no cyanosis, no lower extremity edema, left groin soft on palpation      Lab Results:     Lab Results   Component Value Date    TROPONINI 0 89 (H) 10/20/2021    TROPONINI 0 54 (H) 10/20/2021    TROPONINI 0 39 (H) 10/19/2021       Lab Results   Component Value Date    GLUCOSE 145 (H) 04/09/2021    CALCIUM 8 2 (L) 10/21/2022    K 3 8 10/21/2022    CO2 25 10/21/2022     (H) 10/21/2022    BUN 17 10/21/2022    CREATININE 0 84 10/21/2022       Lab Results   Component Value Date    WBC 6 28 10/21/2022    HGB 8 0 (L) 10/21/2022    HCT 24 4 (L) 10/21/2022    MCV 81 (L) 10/21/2022     (L) 10/21/2022       No results found for: CHOL  Lab Results   Component Value Date    HDL 34 (L) 04/08/2021    HDL 35 (L) 04/07/2021     Lab Results   Component Value Date    LDLCALC 44 04/08/2021    LDLCALC 79 04/07/2021     Lab Results   Component Value Date    TRIG 97 04/08/2021    TRIG 91 04/07/2021       Lab Results   Component Value Date    ALT 39 10/18/2022    AST 21 10/18/2022       Results from last 7 days   Lab Units 10/20/22  1510   INR  1 28*         Imaging: I have personally reviewed pertinent reports        EKG: NSR, no acute ST changes

## 2022-10-21 NOTE — SEPSIS NOTE
Sepsis Note   Eduardo Garcia 62 y o  male MRN: [id number]  Unit/Bed#: MICU 03 Encounter: [id number]    Sepsis time zero triggered 2216  No concern for new or worsening infection        qSOFA     Row Name 10/20/22 2210 10/20/22 2140 10/20/22 2137 10/20/22 2110 10/20/22 2040    Altered mental status GCS < 15 -- -- -- -- --    Respiratory Rate > / =22 0 0 -- 0 0    Systolic BP < / =501 -- 0 0 0 0    Q Sofa Score 0 0 0 0 0    Row Name 10/20/22 2010 10/20/22 2000 10/20/22 1940 10/20/22 1910 10/20/22 1810    Altered mental status GCS < 15 -- 0 -- -- --    Respiratory Rate > / =22 1 -- 0 0 0    Systolic BP < / =856 0 -- 0 0 0    Q Sofa Score 1 0 0 0 0    Row Name 10/20/22 1610 10/20/22 1600 10/20/22 1510 10/20/22 1439 10/20/22 1415    Altered mental status GCS < 15 -- 0 -- -- 0    Respiratory Rate > / =22 0 -- 0 0 --    Systolic BP < / =015 0 -- 0 0 --    Q Sofa Score 0 0 0 0 0    Row Name 10/20/22 1345 10/20/22 1344 10/20/22 1340 10/20/22 1335 10/20/22 1330    Altered mental status GCS < 15 -- -- -- -- --    Respiratory Rate > / =22 0 0 0 0 0    Systolic BP < / =292 -- -- 0 0 0    Q Sofa Score 0 0 0 0 0    Row Name 10/20/22 1325 10/20/22 1320 10/20/22 1315 10/20/22 1310 10/20/22 1307    Altered mental status GCS < 15 -- -- -- -- --    Respiratory Rate > / =22 0 0 0 0 0    Systolic BP < / =269 0 0 0 0 0    Q Sofa Score 0 0 0 0 0    Row Name 10/20/22 1300 10/20/22 1255 10/20/22 1250 10/20/22 1246 10/20/22 1240    Altered mental status GCS < 15 -- -- -- -- --    Respiratory Rate > / =22 0 0 0 0 0    Systolic BP < / =144 0 0 0 0 0    Q Sofa Score 0 0 0 0 0    Row Name 10/20/22 1235 10/20/22 1230 10/20/22 1225 10/20/22 1220 10/20/22 1215    Altered mental status GCS < 15 -- -- -- -- --    Respiratory Rate > / =22 0 0 0 0 0    Systolic BP < / =698 0 0 0 0 0    Q Sofa Score 0 0 0 0 0    Row Name 10/20/22 1210 10/20/22 1205 10/20/22 1200 10/20/22 1155 10/20/22 1150    Altered mental status GCS < 15 -- -- -- -- --    Respiratory Rate > / =22 0 0 0 0 0    Systolic BP < / =795 0 0 0 0 0    Q Sofa Score 0 0 0 0 0    Row Name 10/20/22 1145 10/20/22 1140 10/20/22 1135 10/20/22 1130 10/20/22 1125    Altered mental status GCS < 15 -- -- -- -- --    Respiratory Rate > / =22 0 0 0 0 0    Systolic BP < / =406 0 0 0 0 0    Q Sofa Score 0 0 0 0 0    Row Name 10/20/22 1120 10/20/22 1115 10/20/22 1110 10/20/22 1105 10/20/22 1100    Altered mental status GCS < 15 -- -- -- -- --    Respiratory Rate > / =22 0 0 0 0 0    Systolic BP < / =713 0 0 0 0 0    Q Sofa Score 0 0 0 0 0    Row Name 10/20/22 1055 10/20/22 1050 10/20/22 1045 10/20/22 1040 10/20/22 1035    Altered mental status GCS < 15 -- -- -- -- --    Respiratory Rate > / =22 0 0 0 0 0    Systolic BP < / =763 0 0 0 0 0    Q Sofa Score 0 0 0 0 0    Row Name 10/20/22 1000 10/20/22 0931 10/20/22 0900 10/20/22 0815 10/20/22 0800    Altered mental status GCS < 15 -- -- -- 0 --    Respiratory Rate > / =22 1 -- 0 -- 0    Systolic BP < / =287 0 0 0 -- 0    Q Sofa Score 1 0 0 0 0    Row Name 10/20/22 0700 10/20/22 0600 10/20/22 0500 10/20/22 0400 10/20/22 0300    Altered mental status GCS < 15 -- -- -- 0 --    Respiratory Rate > / =22 0 0 0 0 0    Systolic BP < / =047 0 0 0 0 0    Q Sofa Score 0 0 0 0 0    Row Name 10/20/22 0200 10/20/22 0100 10/20/22 0000 10/19/22 2300 10/19/22 2200    Altered mental status GCS < 15 -- -- 0 -- --    Respiratory Rate > / =22 0 0 0 0 0    Systolic BP < / =729 0 0 0 0 0    Q Sofa Score 0 0 0 0 0    Row Name 10/19/22 2100 10/19/22 2000 10/19/22 1900 10/19/22 1800 10/19/22 1700    Altered mental status GCS < 15 -- 0 -- -- --    Respiratory Rate > / =22 -- -- -- -- --    Systolic BP < / =973 0 0 0 0 0    Q Sofa Score -- -- -- -- --    Row Name 10/19/22 1600 10/19/22 1530 10/19/22 1515 10/19/22 1500 10/19/22 1445    Altered mental status GCS < 15 -- -- -- -- --    Respiratory Rate > / =22 -- -- -- -- --    Systolic BP < / =872 0 0 0 0 0    Q Sofa Score -- -- -- -- --    Row Name 10/19/22 1430 10/19/22 1415 10/19/22 1400 10/19/22 1345 10/19/22 1330    Altered mental status GCS < 15 -- -- -- -- --    Respiratory Rate > / =22 -- -- -- -- --    Systolic BP < / =674 0 0 0 0 0    Q Sofa Score -- -- -- -- --    Row Name 10/19/22 1315 10/19/22 1300 10/19/22 08:00:08 10/19/22 06:31:47 10/19/22 01:43:41    Altered mental status GCS < 15 -- -- -- -- --    Respiratory Rate > / =22 -- -- 0 -- 0    Systolic BP < / =457 0 0 0 0 0    Q Sofa Score -- -- 0 -- 0               Initial Sepsis Screening     Row Name 10/20/22 4080                Is the patient's history suggestive of a new or worsening infection? No  -        Suspected source of infection --        Are two or more of the following signs & symptoms of infection both present and new to the patient? --        Indicate SIRS criteria --        If the answer is yes to both questions, suspicion of sepsis is present --        If severe sepsis is present AND tissue hypoperfusion perists in the hour after fluid resuscitation or lactate > 4, the patient meets criteria for SEPTIC SHOCK --        Are any of the following organ dysfunction criteria present within 6 hours of suspected infection and SIRS criteria that are NOT considered to be chronic conditions?  --        Organ dysfunction --        Date of presentation of severe sepsis --        Time of presentation of severe sepsis --        Tissue hypoperfusion persists in the hour after crystalloid fluid administration, evidenced, by either: --        Was hypotension present within one hour of the conclusion of crystalloid fluid administration? --        Date of presentation of septic shock --        Time of presentation of septic shock --              User Key  (r) = Recorded By, (t) = Taken By, (c) = Cosigned By    [address] Name Provider Type     Temo Wilson PA-C Physician Assistant

## 2022-10-21 NOTE — PROGRESS NOTES
Code Status: Level 1 - Full Code  POA:    POLST:      Reason for ICU admission:   S/p Lysis of occluded bypass in RLE    Active problems:   Principal Problem:    Claudication of right lower extremity ([facility] [address] )  Active Problems:    Type II diabetes mellitus ([facility] [address] )    Thrombocytopenia ([facility] [address] )    S/P CABG x 3    Postoperative anemia due to acute blood loss    Sinus bradycardia    CAD (coronary artery disease)    GERD (gastroesophageal reflux disease)    Hypertension    Atheroscler native arteries the extremities w/intermit claudication ([facility] [address] )    Former tobacco use    History of PTCA  Resolved Problems:    * No resolved hospital problems  *      Consultants:   Cardiology  Vascular Surgrey    History of Present Illness:   Milad Davis is a 62 y o  male with PMHx of PAD (s/p CFA to AK pop bypass with PTFE- Feb 2022), CAD s/p CABG with L GSV (April 2021), NIDDM, HTN, HLD, former smoker, who presented to the emergency department for evaluation of worsening right leg pain over the last 2 days  Pain started in the lower gluteal region and radiates down to his toes  He also reported ntermittent numbness to the first 3 toes of the right foot  He has a history of SFA stent in January of this year  Pain feels similar to his previous claudication requiring stent placement  Pain worse with exertion, better at rest  Pain mostly in the lower R calf  He was found to have occluded bypass graft  He was transferred to [facility] for catheter directed lysis procedure  Summary of clinical course:     Yanique Aguilar was evaluated at [facility] on 10/18/22 for RLE pain  He was found to have occluded bypass graft and was transferred to [facility] for catheter directed thrombolysis, which he underwent on 10/19  He developed a left groin hematoma after initial procedure  The sheath was removed on postoperative day #1  He underwent lysis recheck procedure on 89/54 with no complications   Groin hematoma has decreased in size since onset and pain has improved  On postoperative day #2, he had  DP and PT doppler signals on his LLE and Palpable PT and doppler DP on his RLE  Hgb is stable at 8 0 postoperatively from 11 3 prior to lysis recheck  He is currently on Heparin gtt for DVT prophylaxis now with plans to start Eliquis tomorrow morning  Cardiology has been consulted for anticoagulation and antiplatelet management  Recent or scheduled procedures:   10/19: s/p catheter directed thrombolysisf occluded bypass graft  10/20: s/p lysis recheck    Outstanding/pending diagnostics:   n/a    Cultures:   n/a       Mobilization Plan:   OOB as tolerated, ambulation and activity as tolerated    Nutrition Plan:   Cardiac diet    Invasive Devices Review  Invasive Devices  Report    Peripherally Inserted Central Catheter Line  Duration           PICC Line 10/19/22 Left Brachial 1 day          Peripheral Intravenous Line  Duration           Peripheral IV 10/17/22 Right Antecubital 3 days    Peripheral IV 10/17/22 Right Hand 3 days              Rationale for remaining devices: PICC line for continued central access    VTE Pharmacologic Prophylaxis: Heparin  VTE Mechanical Prophylaxis: reason for no mechanical VTE prophylaxis s/p lysis procedure of RLE    Discharge Plan:   Patient should be ready for discharge home when medically stable    Initial Physical Therapy Recommendations: pending evaluation  Initial Occupational Therapy Recommendations: pending evaluation    Initial /Plan:   Treatment Team Recommendation: Home  Discharge Destination Plan[de-identified] Home  Transport at Discharge : Family   CM reviewed d/c planning process including the following: identifying help at home, patient preference for d/c planning needs, Discharge Lounge, [facility] Meds to Bed program, availability of treatment team to discuss questions or concerns patient and/or family may have regarding understanding medications and recognizing signs and symptoms once discharged    CM also encouraged patient to follow up with all recommended appointments after discharge  Patient advised of importance for patient and family to participate in managing patient’s medical well being  Additional Comments: Patient is a transfer from [place] [facility]  Patient independent at baseline, employed full-time drives  Friend "Jose Schilling" should be able to provide discharge transportation  Patient's vehicle is in the parking lot at [place] [facility]  CM will continue to follow for d/c needs        Home medications that are not reordered and reason why:   Brilinta - changes in antiplatelet therapy  Metformin - held as inpatient    Spoke with Dr Artur Howe regarding transfer  Please contact critical care via [employer] with any questions or concerns  Portions of the record may have been created with voice recognition software  Occasional wrong word or "sound a like" substitutions may have occurred due to the inherent limitations of voice recognition software  Read the chart carefully and recognize, using context, where substitutions have occurred

## 2022-10-21 NOTE — PROGRESS NOTES
Progress Note - Vascular Surgery   Davdison Costello 62 y o  male [id number]  Unit/Bed#:MICU 03 Encounter: [id number]      Assessment:    62 y o  with PMH  CAD c PCI, CABG, DM, RLE PAD c R Fem-AK Pop PTFE Bypass (2/2022), LLE PAD c LLE SFA NIRMALA (1/2022) presenting with RLE Acute Limb Ischemia, Right Fem-AK Pop PTFE Thrombosis  Vascular surgery consulted for Right Fem-AK Pop Thrombosis, ALI  10/19: RLE Agram c Lysis  10/20: RLE Agram c Fem-AK Pop SES/PTA, Profunda PTA c Mech Thrombectomy    Plan:  - No further vascular surgery interventions planned at this time  - Cont Hep Gtt; Recommend transitioning to DOAC once Hgb stable  - Hgb 8 from 11; Suspect acute intra-op losses, groin hematoma; Less likely RPH given clinical presentation  - Can repeat Hgb this PM  - Cont Groin checks, NV Chks  - OOB   - Diet as tolerated  - Cont Aspirin, Plavix  - On Brillinta as outpatient for CAD c PCI/CABG  - Cards c/s for recommendations on Antiplatelet therapy with [address] Thiago Mireles DO  Vascular Surgery Fellow   7/23/2022        Subjective:    Pt s/e  No acute events overnight  Pt awake, alert  Pt complains of mild pain at groin  States his left flank pain has improved, still with some midline chronic pain  Tolerating diet  Not OOB  No new complaints  Denies n/v, sob, chest pain, f/c  No sensory change, numbness, or weakness of bilateral lower extremities     I/Os:  I/O last 3 completed shifts: In: 1509 2 [I V :1173 4; IV Piggyback:335 8]  Out: 0500 [Urine:2860]  I/O this shift:  In: -   Out: 425 [Urine:425]    Review of Systems   All other systems reviewed and are negative  Vitals:  /66   Pulse 80   Temp 97 6 °F (36 4 °C) (Oral)   Resp 12   Ht 5' 9" (1 753 m)   Wt 74 4 kg (164 lb)   SpO2 96%   BMI 24 22 kg/m²     Physical Exam  Vitals and nursing note reviewed  Constitutional:       General: He is not in acute distress  Appearance: He is well-developed   He is not ill-appearing, toxic-appearing or diaphoretic  HENT:      Head: Normocephalic and atraumatic  Eyes:      Conjunctiva/sclera: Conjunctivae normal    Cardiovascular:      Rate and Rhythm: Normal rate and regular rhythm  Heart sounds: No murmur heard  Comments: B/L Palp fem pulse  LLE Dop signals DP/PT  RLE Palp PT, Dop signal DP  Pulmonary:      [place]: Pulmonary effort is normal  No respiratory distress  Breath sounds: Normal breath sounds  Abdominal:      General: There is no distension  Palpations: Abdomen is soft  Tenderness: There is no abdominal tenderness  There is no guarding or rebound  Hernia: No hernia is present  Musculoskeletal:      Cervical back: Neck supple  Right lower leg: No edema  Left lower leg: No edema  Comments: Left groin hematoma stable, soft, ecchymotic   Left groin dressing with saturation    Skin:     General: Skin is warm and dry  Capillary Refill: Capillary refill takes less than 2 seconds  Neurological:      General: No focal deficit present  Mental Status: He is alert and oriented to person, place, and time  Mental status is at baseline  Imaging:I have personally reviewed pertinent reports        Lab Results and Cultures:   CBC with diff:   Lab Results   Component Value Date    WBC 11 47 (H) 07/23/2022    HGB 10 4 (L) 07/23/2022    HCT 32 0 (L) 07/23/2022     (H) 07/23/2022     07/23/2022    MCH 33 5 07/23/2022    MCHC 32 5 07/23/2022    RDW 13 1 07/23/2022    MPV 9 2 07/23/2022    NRBC 0 07/21/2022   ,   BMP/CMP:  Lab Results   Component Value Date    SODIUM 133 (L) 07/23/2022    K 4 1 07/23/2022     07/23/2022    CO2 21 07/23/2022    CO2 24 05/05/2022    BUN 6 07/23/2022    CREATININE 0 53 (L) 07/23/2022    GLUCOSE 103 05/05/2022    CALCIUM 8 8 07/23/2022     (H) 07/23/2022     (H) 07/23/2022    ALKPHOS 161 (H) 07/23/2022    EGFR 109 07/23/2022   ,   Lipid Panel: No results found for: CHOL, Coags:   Lab Results   Component Value Date    PTT 54 (H) 07/23/2022    INR 0 94 07/22/2022   ,     Blood Culture:   Lab Results   Component Value Date    BLOODCX No Growth at 24 hrs  07/21/2022   ,   Urinalysis: No results found for: COLORU, CLARITYU, SPECGRAV, PHUR, LEUKOCYTESUR, NITRITE, PROTEINUA, GLUCOSEU, KETONESU, BILIRUBINUR, BLOODU,   Urine Culture: No results found for: URINECX,   Wound Culure: No results found for: WOUNDCULT    Medications:  Current Facility-Administered Medications   Medication Dose Route Frequency   • acetaminophen (TYLENOL) tablet 650 mg  650 mg Oral Q6H PRN   • alteplase (CATHFLO) 10 mg in sodium chloride 0 9 % 250 mL infusion  1 mg/hr Intracatheter Continuous   • amLODIPine (NORVASC) tablet 10 mg  10 mg Oral QPM   • aspirin (ECOTRIN LOW STRENGTH) EC tablet 81 mg  81 mg Oral Daily   • clopidogrel (PLAVIX) tablet 75 mg  75 mg Oral Daily   • ezetimibe (ZETIA) tablet 10 mg  10 mg Oral Daily   • folic acid (FOLVITE) tablet 1 mg  1 mg Oral Daily   • heparin (porcine) 25,000 units in 0 45% NaCl 250 mL infusion (premix)  3-30 Units/kg/hr (Order-Specific) Intravenous Titrated   • heparin (porcine) 25,000 units in 0 45% NaCl 250 mL infusion (premix)  500 Units/hr Intravenous Titrated   • heparin (porcine) injection 2,800 Units  2,800 Units Intravenous Q1H PRN   • heparin (porcine) injection 5,600 Units  5,600 Units Intravenous Q1H PRN   • heparin 1000 units in 500 mL infusion (premix) for sheath patency  20 Units/hr Intravenous Continuous   • HYDROmorphone HCl (DILAUDID) injection 0 2 mg  0 2 mg Intravenous Q4H PRN   • metoprolol succinate (TOPROL-XL) 24 hr tablet 25 mg  25 mg Oral Daily   • multi-electrolyte (PLASMALYTE-A/ISOLYTE-S PH 7 4) IV solution  75 mL/hr Intravenous Continuous   • multivitamin-minerals (CENTRUM) tablet 1 tablet  1 tablet Oral Daily   • oxyCODONE (ROXICODONE) IR tablet 2 5 mg  2 5 mg Oral Q4H PRN   • oxyCODONE (ROXICODONE) IR tablet 5 mg  5 mg Oral Q4H PRN   • pantoprazole (PROTONIX) EC tablet 40 mg  40 mg Oral Early Morning   • pravastatin (PRAVACHOL) tablet 80 mg  80 mg Oral Daily With Dinner   • thiamine tablet 100 mg  100 mg Oral Daily

## 2022-10-21 NOTE — ASSESSMENT & PLAN NOTE
- Status post common femoral to above knee popliteal bypass with PTFE in February 2022, who presented with occluded bypass and symptoms of claudication  - Status post 10/19 right lower extremity angiogram, lysis catheter placement with vascular surgery  S/p tPA and heparin infusion through L groin sheath  - Pt developed L groin hematoma overnight on 10/19, hematoma has been marked and has decreased in size since onset  - S/p Lysis recheck on 10/20      Pt on Aspirin, statin and Brilinta, Imdur as outpatient  Currently on Heparin gtt  Plan to switch to Xarelto on 10/22   Cardiology consulted

## 2022-10-21 NOTE — CASE MANAGEMENT
Case Management Discharge Planning Note    Patient name Sarabjit Fair  Location MICU 03/MICU 46 MRN [id number]  : 1965 Date 10/21/2022       Current Admission Date: 10/19/2022  Current Admission Diagnosis:Claudication of right lower extremity [facility])   Patient Active Problem List    Diagnosis Date Noted   • Claudication of right lower extremity ([place] [address] ) 10/18/2022   • Former tobacco use    • History of PTCA    • Atheroscler native arteries the extremities w/intermit claudication ([place] [address] ) 2022   • GERD (gastroesophageal reflux disease)    • Hypertension    • CAD (coronary artery disease)    • Postoperative anemia due to acute blood loss 2021   • Sinus bradycardia 2021   • Thrombocytopenia ([place] [address] ) 2021   • S/P CABG x 3 2021   • Type II diabetes mellitus ([place] [address] ) 2021      LOS (days): 2  Geometric Mean LOS (GMLOS) (days): 2 70  Days to GMLOS:0 4     OBJECTIVE:  Risk of Unplanned Readmission Score: 14 94         Current admission status: Inpatient   Preferred Pharmacy:   [facility]/pharmacy #[id number]- [place], [place] - [address]  Phone: [phone] Fax: [phone]    Primary Care Provider: Dimas Caraballo MD    Primary Insurance: [address] [place] [address]  Secondary Insurance:     DISCHARGE DETAILS:                  Additional Comments: Called patient's pharmacy, current co-pay for Xarelto starter pack and continuing monthly prescription is $0   Provider made aware

## 2022-10-21 NOTE — ASSESSMENT & PLAN NOTE
Hgb 11 3 -> 8 9 -> 8 3 -> 8 0  Continue to monitor Hgb  Likely 2/2 postoperative blood loss and L groin hematoma

## 2022-10-21 NOTE — QUICK NOTE
Called to bedside with sudden onset 8/10 chest burning  On arrival patient had active chest pain which was similar to what he felt in October  ECG was sinus with non-specific ST-T changes and no obvious ischemia  Patient was loaded with aspirin 325 mg and SL nitro was given  Pain completely resolved with SL nitro  Bedside TTE after resolution of pain did not show any WMA  EF was preserved  Given lack of CHRISTINA, WMA and quick response to nitro -> in-stent thrombosis is highly unlikely  Plan:  - hold DOAC and c/w heparin for now  - serial ECG/trops  - give Imdur 30 mg   - monitor for now   Ischemia guided revascularization if needed

## 2022-10-21 NOTE — ASSESSMENT & PLAN NOTE
Has been managed on Metoprolol tartrate 25 mg BID as inpatient  Hydralazine 10mg q6 PRN     Managed on  Metoprolol succinate 25 mg - 1/2 tablet qd and Losartan 25 mg qd as outpatient

## 2022-10-22 LAB
ABO GROUP BLD: NORMAL
ANION GAP SERPL CALCULATED.3IONS-SCNC: 6 MMOL/L (ref 4–13)
APTT PPP: 67 SECONDS (ref 23–37)
APTT PPP: 76 SECONDS (ref 23–37)
BLD GP AB SCN SERPL QL: NEGATIVE
BUN SERPL-MCNC: 11 MG/DL (ref 5–25)
CALCIUM SERPL-MCNC: 8 MG/DL (ref 8.3–10.1)
CHLORIDE SERPL-SCNC: 110 MMOL/L (ref 96–108)
CO2 SERPL-SCNC: 25 MMOL/L (ref 21–32)
CREAT SERPL-MCNC: 0.83 MG/DL (ref 0.6–1.3)
ERYTHROCYTE [DISTWIDTH] IN BLOOD BY AUTOMATED COUNT: 13.8 % (ref 11.6–15.1)
GFR SERPL CREATININE-BSD FRML MDRD: 97 ML/MIN/1.73SQ M
GLUCOSE SERPL-MCNC: 158 MG/DL (ref 65–140)
GLUCOSE SERPL-MCNC: 159 MG/DL (ref 65–140)
GLUCOSE SERPL-MCNC: 190 MG/DL (ref 65–140)
GLUCOSE SERPL-MCNC: 198 MG/DL (ref 65–140)
GLUCOSE SERPL-MCNC: 233 MG/DL (ref 65–140)
HCT VFR BLD AUTO: 21 % (ref 36.5–49.3)
HCT VFR BLD AUTO: 24.5 % (ref 36.5–49.3)
HGB BLD-MCNC: 7 G/DL (ref 12–17)
HGB BLD-MCNC: 8.3 G/DL (ref 12–17)
MAGNESIUM SERPL-MCNC: 1.9 MG/DL (ref 1.6–2.6)
MCH RBC QN AUTO: 26.7 PG (ref 26.8–34.3)
MCHC RBC AUTO-ENTMCNC: 32.9 G/DL (ref 31.4–37.4)
MCV RBC AUTO: 81 FL (ref 82–98)
PHOSPHATE SERPL-MCNC: 2.5 MG/DL (ref 2.7–4.5)
PLATELET # BLD AUTO: 110 THOUSANDS/UL (ref 149–390)
PMV BLD AUTO: 9.8 FL (ref 8.9–12.7)
POTASSIUM SERPL-SCNC: 3.7 MMOL/L (ref 3.5–5.3)
RBC # BLD AUTO: 2.58 MILLION/UL (ref 3.88–5.62)
RH BLD: NEGATIVE
SODIUM SERPL-SCNC: 141 MMOL/L (ref 135–147)
SPECIMEN EXPIRATION DATE: NORMAL
WBC # BLD AUTO: 4.68 THOUSAND/UL (ref 4.31–10.16)

## 2022-10-22 PROCEDURE — 86900 BLOOD TYPING SEROLOGIC ABO: CPT | Performed by: PHYSICIAN ASSISTANT

## 2022-10-22 PROCEDURE — P9040 RBC LEUKOREDUCED IRRADIATED: HCPCS

## 2022-10-22 PROCEDURE — 85014 HEMATOCRIT: CPT

## 2022-10-22 PROCEDURE — 85018 HEMOGLOBIN: CPT

## 2022-10-22 PROCEDURE — 99232 SBSQ HOSP IP/OBS MODERATE 35: CPT | Performed by: PHYSICIAN ASSISTANT

## 2022-10-22 PROCEDURE — 86901 BLOOD TYPING SEROLOGIC RH(D): CPT | Performed by: PHYSICIAN ASSISTANT

## 2022-10-22 PROCEDURE — 85730 THROMBOPLASTIN TIME PARTIAL: CPT | Performed by: FAMILY MEDICINE

## 2022-10-22 PROCEDURE — 80048 BASIC METABOLIC PNL TOTAL CA: CPT | Performed by: FAMILY MEDICINE

## 2022-10-22 PROCEDURE — 85027 COMPLETE CBC AUTOMATED: CPT | Performed by: FAMILY MEDICINE

## 2022-10-22 PROCEDURE — 83735 ASSAY OF MAGNESIUM: CPT | Performed by: FAMILY MEDICINE

## 2022-10-22 PROCEDURE — 99233 SBSQ HOSP IP/OBS HIGH 50: CPT | Performed by: INTERNAL MEDICINE

## 2022-10-22 PROCEDURE — 82948 REAGENT STRIP/BLOOD GLUCOSE: CPT

## 2022-10-22 PROCEDURE — 86923 COMPATIBILITY TEST ELECTRIC: CPT

## 2022-10-22 PROCEDURE — 86850 RBC ANTIBODY SCREEN: CPT | Performed by: PHYSICIAN ASSISTANT

## 2022-10-22 PROCEDURE — 84100 ASSAY OF PHOSPHORUS: CPT | Performed by: FAMILY MEDICINE

## 2022-10-22 RX ORDER — METOPROLOL SUCCINATE 25 MG/1
25 TABLET, EXTENDED RELEASE ORAL DAILY
Status: DISCONTINUED | OUTPATIENT
Start: 2022-10-23 | End: 2022-10-23 | Stop reason: HOSPADM

## 2022-10-22 RX ADMIN — OXYCODONE HYDROCHLORIDE 5 MG: 5 TABLET ORAL at 20:58

## 2022-10-22 RX ADMIN — GABAPENTIN 300 MG: 300 CAPSULE ORAL at 17:27

## 2022-10-22 RX ADMIN — INSULIN LISPRO 3 UNITS: 100 INJECTION, SOLUTION INTRAVENOUS; SUBCUTANEOUS at 21:00

## 2022-10-22 RX ADMIN — HEPARIN SODIUM 14 UNITS/KG/HR: 10000 INJECTION, SOLUTION INTRAVENOUS at 07:36

## 2022-10-22 RX ADMIN — ACETAMINOPHEN 975 MG: 325 TABLET, FILM COATED ORAL at 06:36

## 2022-10-22 RX ADMIN — INSULIN LISPRO 1 UNITS: 100 INJECTION, SOLUTION INTRAVENOUS; SUBCUTANEOUS at 08:38

## 2022-10-22 RX ADMIN — ACETAMINOPHEN 975 MG: 325 TABLET, FILM COATED ORAL at 00:44

## 2022-10-22 RX ADMIN — INSULIN LISPRO 2 UNITS: 100 INJECTION, SOLUTION INTRAVENOUS; SUBCUTANEOUS at 14:07

## 2022-10-22 RX ADMIN — GABAPENTIN 300 MG: 300 CAPSULE ORAL at 21:00

## 2022-10-22 RX ADMIN — METOPROLOL TARTRATE 25 MG: 25 TABLET, FILM COATED ORAL at 08:39

## 2022-10-22 RX ADMIN — INSULIN LISPRO 2 UNITS: 100 INJECTION, SOLUTION INTRAVENOUS; SUBCUTANEOUS at 17:28

## 2022-10-22 RX ADMIN — ATORVASTATIN CALCIUM 80 MG: 80 TABLET, FILM COATED ORAL at 17:27

## 2022-10-22 RX ADMIN — RIVAROXABAN 15 MG: 15 TABLET, FILM COATED ORAL at 17:28

## 2022-10-22 RX ADMIN — TICAGRELOR 90 MG: 90 TABLET ORAL at 21:00

## 2022-10-22 RX ADMIN — DOCUSATE SODIUM 100 MG: 100 CAPSULE, LIQUID FILLED ORAL at 08:39

## 2022-10-22 RX ADMIN — LOSARTAN POTASSIUM 25 MG: 25 TABLET, FILM COATED ORAL at 08:39

## 2022-10-22 RX ADMIN — GABAPENTIN 300 MG: 300 CAPSULE ORAL at 08:39

## 2022-10-22 RX ADMIN — TICAGRELOR 90 MG: 90 TABLET ORAL at 14:06

## 2022-10-22 RX ADMIN — ACETAMINOPHEN 975 MG: 325 TABLET, FILM COATED ORAL at 11:59

## 2022-10-22 RX ADMIN — ISOSORBIDE MONONITRATE 30 MG: 30 TABLET, EXTENDED RELEASE ORAL at 08:39

## 2022-10-22 NOTE — PROGRESS NOTES
Cardiology Progress Note - Galina Hassan 62 y o  male MRN: [id number]    Unit/Bed#: -01 Encounter: [id number]      Assessment:  1  Right LE femoral-popliteal PTFE thrombosis s/p IR lysis/mechanical thrombectomy  2  CAD s/p CABG with patent LIMA to LAD, occluded SVG to OM/LPDA       A) NIRMALA to LCx, B)  intervention to OM  3  Preserved LV systolic function   4  Acute blood loss anemia with groin hematoma   5  Benign essential hypertension  6  Dyslipidemia     Plan:  1  Chest pain yesterday in setting of anemia - troponin unremarkable, echo reviewed without wall motion abnormalities  2  Transfuse as necessary per vascular surgery   3  Remains on IV heparin - transition to [address] when appropriate per vascular surgery   4  Stop aspirin and begin Brilinta to avoid triple therapy - discussed with vascular surgery   5  Continue statin therapy, Imdur   6  Blood pressure controlled on current regimen of beta-blocker and losartan   7  SLCA will be available, please call with questions - follow-up with Dr Jayro Ordaz upon discharge     Subjective:   Patient seen and examined  No significant events overnight  No further chest pain  Objective:     Vitals: Blood pressure 118/74, pulse 70, temperature 98 4 °F (36 9 °C), resp  rate 14, height 5' 5" (1 651 m), weight 78 kg (172 lb), SpO2 99 %  , Body mass index is 28 62 kg/m² ,   Orthostatic Blood Pressures    Flowsheet Row Most Recent Value   Blood Pressure 118/74 filed at 10/22/2022 9143   Patient Position - Orthostatic VS Lying filed at 10/21/2022 0800            Intake/Output Summary (Last 24 hours) at 10/22/2022 0758  Last data filed at 10/21/2022 1700  Gross per 24 hour   Intake 247 71 ml   Output 950 ml   Net -702 29 ml       Physical Exam:    GEN: Galina Hassan appears well, alert and oriented x 3, pleasant and cooperative   HEENT: pupils equal, round, and reactive to light; extraocular muscles intact  NECK: supple, no carotid bruits   HEART: regular rhythm, normal S1 and S2, no murmurs, clicks, gallops or rubs   LUNGS: clear to auscultation bilaterally; no wheezes, rales, or rhonchi   ABDOMEN: normal bowel sounds, soft, no tenderness, no distention  EXTREMITIES: peripheral pulses normal; no clubbing, cyanosis, or edema  NEURO: no focal findings   SKIN: normal without suspicious lesions on exposed skin    Medications:      Current Facility-Administered Medications:   •  acetaminophen (TYLENOL) tablet 650 mg, 650 mg, Oral, Q6H PRN, Vista Gina, DO, 650 mg at 10/19/22 1932  •  acetaminophen (TYLENOL) tablet 975 mg, 975 mg, Oral, Q6H [address], [place], DO, 975 mg at 10/22/22 0636  •  atorvastatin (LIPITOR) tablet 80 mg, 80 mg, Oral, Daily With Dinner, Vista [place], DO, 80 mg at 10/21/22 1702  •  calcium carbonate (TUMS) chewable tablet 1,000 mg, 1,000 mg, Oral, Daily PRN, Ivan Hamm, DO  •  docusate sodium (COLACE) capsule 100 mg, 100 mg, Oral, BID, Vista Gina, DO, 100 mg at 10/21/22 1072  •  gabapentin (NEURONTIN) capsule 300 mg, 300 mg, Oral, TID, Vista Gina, DO, 300 mg at 10/21/22 2143  •  heparin (porcine) 25,000 units in 0 45% NaCl 250 mL infusion (premix), 3-30 Units/kg/hr (Order-Specific), Intravenous, Titrated, Vista Gina, DO, Last Rate: 10 5 mL/hr at 10/22/22 0736, 14 Units/kg/hr at 10/22/22 0736  •  heparin (porcine) injection 3,000 Units, 3,000 Units, Intravenous, Q1H PRN, Vista Gina, DO  •  heparin (porcine) injection 6,000 Units, 6,000 Units, Intravenous, Q1H PRN, Vista [place], DO  •  hydrALAZINE (APRESOLINE) injection 10 mg, 10 mg, Intravenous, Q6H PRN, Vista Gina, DO, 10 mg at 10/19/22 1334  •  HYDROmorphone (DILAUDID) injection 0 5 mg, 0 5 mg, Intravenous, Q3H PRN, Lukas Fuentes, , 0 5 mg at 10/20/22 3553  •  insulin lispro (HumaLOG) 100 units/mL subcutaneous injection 1-6 Units, 1-6 Units, Subcutaneous, TID AC, 3 Units at 10/21/22 1136 **AND** Fingerstick Glucose (POCT), , , TID AC, Leighann Starr DO  •  insulin lispro (HumaLOG) 100 units/mL subcutaneous injection 1-6 Units, 1-6 Units, Subcutaneous, HS, Leighann Starr DO, 1 Units at 10/21/22 2143  •  isosorbide mononitrate (IMDUR) 24 hr tablet 30 mg, 30 mg, Oral, Daily, Telma Monahan DO, 30 mg at 10/21/22 1328  •  losartan (COZAAR) tablet 25 mg, 25 mg, Oral, Daily, Leighann Starr DO, 25 mg at 10/21/22 9124  •  methocarbamol (ROBAXIN) tablet 500 mg, 500 mg, Oral, Q6H PRN, Leighann Starr DO, 500 mg at 10/19/22 1727  •  metoprolol tartrate (LOPRESSOR) tablet 25 mg, 25 mg, Oral, Q12H [facility], Leighann Starr DO, 25 mg at 10/21/22 2143  •  nitroglycerin (NITROSTAT) SL tablet 0 4 mg, 0 4 mg, Sublingual, Q5 Min PRN, Traci Lorenzo DO  •  ondansetron (ZOFRAN) injection 4 mg, 4 mg, Intravenous, Q4H PRN, Leighann Starr DO  •  oxyCODONE (ROXICODONE) immediate release tablet 10 mg, 10 mg, Oral, Q6H PRN, Leighann Starr DO, 10 mg at 10/20/22 0150  •  oxyCODONE (ROXICODONE) IR tablet 5 mg, 5 mg, Oral, Q4H PRN, Leighann Starr DO  •  ticagrelor [facility]) tablet 90 mg, 90 mg, Oral, Q12H [facility], Portillo Beebe DO     Labs & Results:        Results from last 7 days   Lab Units 10/22/22  0639 10/21/22  2154 10/21/22  1604   WBC Thousand/uL 4 68 5 83 5 70   HEMOGLOBIN g/dL 7 0* 7 5* 7 3*   HEMATOCRIT % 21 0* 23 2* 22 4*   PLATELETS Thousands/uL 110* 104* 102*         Results from last 7 days   Lab Units 10/22/22  0639 10/21/22  0500 10/20/22  1510 10/19/22  0340 10/18/22  0609 10/17/22  2229   POTASSIUM mmol/L 3 7 3 8 3 7   < > 3 5 3 9   CHLORIDE mmol/L 110* 111* 112*   < > 106 105   CO2 mmol/L 25 25 21   < > 26 28   BUN mg/dL 11 17 15   < > 22 24   CREATININE mg/dL 0 83 0 84 0 70   < > 1 07 1 17   CALCIUM mg/dL 8 0* 8 2* 7 5*   < > 8 7 9 2   ALK PHOS U/L  --   --   --   --  85 102   ALT U/L  --   --   --   --  39 46   AST U/L  --   --   --   --  21 24    < > = values in this interval not displayed  Results from last 7 days   Lab Units 10/22/22  0639 10/22/22  0045 10/21/22  1611 10/21/22  0004 10/20/22  1510 10/19/22  0956 10/19/22  0340 10/18/22  0433 10/17/22  2229   INR   --   --   --   --  1 28*  --  1 07  --  1 18   PTT seconds 76* 67* >210*   < >  --    < > 77*   < > 29    < > = values in this interval not displayed  Results from last 7 days   Lab Units 10/22/22  0639 10/21/22  0500 10/20/22  1510   MAGNESIUM mg/dL 1 9 2 5 1 6         Counseling / Coordination of Care  Total floor / unit time spent today 25 minutes  Greater than 50% of total time was spent with the patient and / or family counseling and / or coordination of care

## 2022-10-22 NOTE — ASSESSMENT & PLAN NOTE
Will continue on losartan 25 mg   Toprol XL increased to 25mg QD per Cardiology  Blood pressure has been well controlled

## 2022-10-22 NOTE — ASSESSMENT & PLAN NOTE
Patient presented with worsening right lower leg pain for several days with radiation to his toes along with intermittent numbness in his toes  Patient has prior SFA stenting performed earlier this year  Doppler showed high-grade stenosis versus occlusion of the common from oral to above knee popliteal PTFE bypass graft  High-grade stenosis versus occlusion of the common femoral artery with trickle flow noted high-grade stenosis versus occlusion of the distal anterior tibial artery  Patient transferred to [facility] for vascular surgery evaluation  Vascular surgery consulted  S/P RLE Agram with lysis on 10/19 and RLE Agram with thrombectomy on 50/88  Complicated by groin hematoma  OK to D/C heparin drip and start Xarelto and Algernon Beau today per Vascular surgery  If stable, can likely D/C tomorrow

## 2022-10-22 NOTE — ASSESSMENT & PLAN NOTE
Patient with history of CAD with CABG x3  Will continue beta-blocker (metoprolol increased to 25mg QD), statin, Brilinta, and imdur  Aspirin stopped in order to avoid triple therapy with addition of Xarelto  Had chest pain 10/21 felt to be secondary to anemia  No further chest pain  Cardiology following - has signed off   Outpatient follow up

## 2022-10-22 NOTE — PROGRESS NOTES
Progress Note - Vascular Surgery   Ellie Gautam 62 y o  male [id number]  Unit/Bed#:-01 Encounter: [id number]      Assessment:    62 y o  with PMH  CAD c PCI, CABG, DM, RLE PAD c R Fem-AK Pop PTFE Bypass (2/2022), LLE PAD c LLE SFA NIRMALA (1/2022) presenting with RLE Acute Limb Ischemia, Right Fem-AK Pop PTFE Thrombosis  Vascular surgery consulted for Right Fem-AK Pop Thrombosis, ALI  10/19: RLE Agram c Lysis  10/20: RLE Agram c Fem-AK Pop SES/PTA, Profunda PTA c Mech Thrombectomy    Plan:  - No further vascular surgery interventions planned at this time  - Hgb 7 3; recommend transfusing 1 PRBC  - Cont Groin checks, NV Chks  - OOB   - Diet as tolerated  - Cards recs appreciated; Brillinta this AM; Hx CAD c PCI/CABG  - Can start DOAC this AM      Martina Infante DO  Vascular Surgery Fellow   7/23/2022        Subjective:    Pt s/e  No acute events overnight  Pt awake, alert  Pt complains of mild pain at groin  States his left flank pain has improved, still with some midline chronic pain  Tolerating diet  OOB  No new complaints  Denies n/v, sob, chest pain, f/c  No sensory change, numbness, or weakness of bilateral lower extremities     I/Os:  I/O last 3 completed shifts: In: 1509 2 [I V :1173 4; IV Piggyback:335 8]  Out: 4107 [Urine:2860]  I/O this shift:  In: -   Out: 425 [Urine:425]    Review of Systems   All other systems reviewed and are negative  Vitals:  /66   Pulse 80   Temp 97 6 °F (36 4 °C) (Oral)   Resp 12   Ht 5' 9" (1 753 m)   Wt 74 4 kg (164 lb)   SpO2 96%   BMI 24 22 kg/m²     Physical Exam  Vitals and nursing note reviewed  Constitutional:       General: He is not in acute distress  Appearance: He is well-developed  He is not ill-appearing, toxic-appearing or diaphoretic  HENT:      Head: Normocephalic and atraumatic  Eyes:      Conjunctiva/sclera: Conjunctivae normal    Cardiovascular:      Rate and Rhythm: Normal rate and regular rhythm  Heart sounds: No murmur heard  Comments: B/L Palp fem pulse  LLE Dop signals DP/PT  RLE Palp PT, Dop signal DP  Pulmonary:      [place]: Pulmonary effort is normal  No respiratory distress  Breath sounds: Normal breath sounds  Abdominal:      General: There is no distension  Palpations: Abdomen is soft  Tenderness: There is no abdominal tenderness  There is no guarding or rebound  Hernia: No hernia is present  Musculoskeletal:      Cervical back: Neck supple  Right lower leg: No edema  Left lower leg: No edema  Comments: Left groin hematoma stable, soft, ecchymotic   Left groin dressing with saturation    Skin:     General: Skin is warm and dry  Capillary Refill: Capillary refill takes less than 2 seconds  Neurological:      General: No focal deficit present  Mental Status: He is alert and oriented to person, place, and time  Mental status is at baseline  Imaging:I have personally reviewed pertinent reports        Lab Results and Cultures:   CBC with diff:   Lab Results   Component Value Date    WBC 11 47 (H) 07/23/2022    HGB 10 4 (L) 07/23/2022    HCT 32 0 (L) 07/23/2022     (H) 07/23/2022     07/23/2022    MCH 33 5 07/23/2022    MCHC 32 5 07/23/2022    RDW 13 1 07/23/2022    MPV 9 2 07/23/2022    NRBC 0 07/21/2022   ,   BMP/CMP:  Lab Results   Component Value Date    SODIUM 133 (L) 07/23/2022    K 4 1 07/23/2022     07/23/2022    CO2 21 07/23/2022    CO2 24 05/05/2022    BUN 6 07/23/2022    CREATININE 0 53 (L) 07/23/2022    GLUCOSE 103 05/05/2022    CALCIUM 8 8 07/23/2022     (H) 07/23/2022     (H) 07/23/2022    ALKPHOS 161 (H) 07/23/2022    EGFR 109 07/23/2022   ,   Lipid Panel: No results found for: CHOL,   Coags:   Lab Results   Component Value Date    PTT 54 (H) 07/23/2022    INR 0 94 07/22/2022   ,     Blood Culture:   Lab Results   Component Value Date    BLOODCX No Growth at 24 hrs  07/21/2022   ,   Urinalysis: No results found for: COLORU, CLARITYU, SPECGRAV, PHUR, LEUKOCYTESUR, NITRITE, PROTEINUA, GLUCOSEU, KETONESU, BILIRUBINUR, BLOODU,   Urine Culture: No results found for: URINECX,   Wound Culure: No results found for: WOUNDCULT    Medications:  Current Facility-Administered Medications   Medication Dose Route Frequency   • acetaminophen (TYLENOL) tablet 650 mg  650 mg Oral Q6H PRN   • alteplase (CATHFLO) 10 mg in sodium chloride 0 9 % 250 mL infusion  1 mg/hr Intracatheter Continuous   • amLODIPine (NORVASC) tablet 10 mg  10 mg Oral QPM   • aspirin (ECOTRIN LOW STRENGTH) EC tablet 81 mg  81 mg Oral Daily   • clopidogrel (PLAVIX) tablet 75 mg  75 mg Oral Daily   • ezetimibe (ZETIA) tablet 10 mg  10 mg Oral Daily   • folic acid (FOLVITE) tablet 1 mg  1 mg Oral Daily   • heparin (porcine) 25,000 units in 0 45% NaCl 250 mL infusion (premix)  3-30 Units/kg/hr (Order-Specific) Intravenous Titrated   • heparin (porcine) 25,000 units in 0 45% NaCl 250 mL infusion (premix)  500 Units/hr Intravenous Titrated   • heparin (porcine) injection 2,800 Units  2,800 Units Intravenous Q1H PRN   • heparin (porcine) injection 5,600 Units  5,600 Units Intravenous Q1H PRN   • heparin 1000 units in 500 mL infusion (premix) for sheath patency  20 Units/hr Intravenous Continuous   • HYDROmorphone HCl (DILAUDID) injection 0 2 mg  0 2 mg Intravenous Q4H PRN   • metoprolol succinate (TOPROL-XL) 24 hr tablet 25 mg  25 mg Oral Daily   • multi-electrolyte (PLASMALYTE-A/ISOLYTE-S PH 7 4) IV solution  75 mL/hr Intravenous Continuous   • multivitamin-minerals (CENTRUM) tablet 1 tablet  1 tablet Oral Daily   • oxyCODONE (ROXICODONE) IR tablet 2 5 mg  2 5 mg Oral Q4H PRN   • oxyCODONE (ROXICODONE) IR tablet 5 mg  5 mg Oral Q4H PRN   • pantoprazole (PROTONIX) EC tablet 40 mg  40 mg Oral Early Morning   • pravastatin (PRAVACHOL) tablet 80 mg  80 mg Oral Daily With Dinner   • thiamine tablet 100 mg  100 mg Oral Daily

## 2022-10-22 NOTE — PHYSICAL THERAPY NOTE
Physical Therapy Screen    Patient's Name: Yessi Mohs       10/22/22 1004   PT Last Visit   PT Visit Date 10/22/22   Note Type   Note type Screen   Additional Comments Orders received  Chart reviewed  RN + pt confirmed that pt has been independently taking himself to the bathroom  Spoke w/ pt who reports no mobility concerns  Reports 1/10 pain in L groin  Pt steady on his feet  Reviewed sequencing on stairs but pt reports he was already aware of this from previous vascular surgery  As pt is independent w/ functional mobility, no skilled PT needs  Will d/c from caseload  Pt can + was encouraged to continue mobilizing independently       Servandose Ramirez, PT, DPT

## 2022-10-22 NOTE — PLAN OF CARE
Problem: Potential for Falls  Goal: Patient will remain free of falls  Description: INTERVENTIONS:  - Educate patient/family on patient safety including physical limitations  - Instruct patient to call for assistance with activity   - Consult OT/PT to assist with strengthening/mobility   - Keep Call bell within reach  - Keep bed low and locked with side rails adjusted as appropriate  - Keep care items and personal belongings within reach  - Initiate and maintain comfort rounds  - Make Fall Risk Sign visible to staff  - Offer Toileting every  Hours, in advance of need  - Initiate/Maintainalarm  - Obtain necessary fall risk management equipment:   - Apply yellow socks and bracelet for high fall risk patients  - Consider moving patient to room near nurses station  10/22/2022 1952 by Lopez Haas RN  Outcome: Progressing  10/22/2022 1950 by Lopez Haas RN  Outcome: Progressing  10/22/2022 1950 by Lopez Haas RN  Outcome: Progressing     Problem: PAIN - ADULT  Goal: Verbalizes/displays adequate comfort level or baseline comfort level  Description: Interventions:  - Encourage patient to monitor pain and request assistance  - Assess pain using appropriate pain scale  - Administer analgesics based on type and severity of pain and evaluate response  - Implement non-pharmacological measures as appropriate and evaluate response  - Consider cultural and social influences on pain and pain management  - Notify physician/advanced practitioner if interventions unsuccessful or patient reports new pain  10/22/2022 1952 by Lopez Haas RN  Outcome: Progressing  10/22/2022 1950 by Lopez Haas RN  Outcome: Progressing  10/22/2022 1950 by Lopez Haas RN  Outcome: Progressing     Problem: INFECTION - ADULT  Goal: Absence or prevention of progression during hospitalization  Description: INTERVENTIONS:  - Assess and monitor for signs and symptoms of infection  - Monitor lab/diagnostic results  - Monitor all insertion sites, i e  indwelling lines, tubes, and drains  - Monitor endotracheal if appropriate and nasal secretions for changes in amount and color  - [place] appropriate cooling/warming therapies per order  - Administer medications as ordered  - Instruct and encourage patient and family to use good hand hygiene technique  - Identify and instruct in appropriate isolation precautions for identified infection/condition  10/22/2022 1952 by Shaun Villafuerte RN  Outcome: Progressing  10/22/2022 1950 by Shaun Villafuerte RN  Outcome: Progressing  10/22/2022 1950 by Shaun Villafuerte RN  Outcome: Progressing     Problem: SAFETY ADULT  Goal: Patient will remain free of falls  Description: INTERVENTIONS:  - Educate patient/family on patient safety including physical limitations  - Instruct patient to call for assistance with activity   - Consult OT/PT to assist with strengthening/mobility   - Keep Call bell within reach  - Keep bed low and locked with side rails adjusted as appropriate  - Keep care items and personal belongings within reach  - Initiate and maintain comfort rounds  - Make Fall Risk Sign visible to staff  - Offer Toileting every Hours, in advance of need  - Initiate/Maintain alarm  - Obtain necessary fall risk management equipment:  - Apply yellow socks and bracelet for high fall risk patients  - Consider moving patient to room near nurses station  10/22/2022 1952 by Shaun Villafuerte RN  Outcome: Progressing  10/22/2022 1950 by Shaun Villafuerte RN  Outcome: Progressing  10/22/2022 1950 by Shaun Villafuerte RN  Outcome: Progressing  Goal: Maintain or return to baseline ADL function  Description: INTERVENTIONS:  -  Assess patient's ability to carry out ADLs; assess patient's baseline for ADL function and identify physical deficits which impact ability to perform ADLs (bathing, care of mouth/teeth, toileting, grooming, dressing, etc )  - Assess/evaluate cause of self-care deficits   - Assess range of motion  - Assess patient's mobility; develop plan if impaired  - Assess patient's need for assistive devices and provide as appropriate  - Encourage maximum independence but intervene and supervise when necessary  - Involve family in performance of ADLs  - Assess for home care needs following discharge   - Consider OT consult to assist with ADL evaluation and planning for discharge  - Provide patient education as appropriate  10/22/2022 1952 by Red Membreno RN  Outcome: Progressing  10/22/2022 1950 by Red Membreno RN  Outcome:  10/22/2022 1950 by Red Membreno RN  Outcome: Progressing     Problem: Knowledge Deficit  Goal: Patient/family/caregiver demonstrates understanding of disease process, treatment plan, medications, and discharge instructions  Description: Complete learning assessment and assess knowledge base    Interventions:  - Provide teaching at level of understanding  - Provide teaching via preferred learning methods  10/22/2022 1952 by Red Membreno RN  Outcome: Progressing  10/22/2022 1950 by Red Membreno RN  Outcome: Progressing  10/22/2022 1950 by Red Membreno RN  Outcome: Progressing     Problem: Prexisting or High Potential for Compromised Skin Integrity  Goal: Skin integrity is maintained or improved  Description: INTERVENTIONS:  - Identify patients at risk for skin breakdown  - Assess and monitor skin integrity  - Assess and monitor nutrition and hydration status  - Monitor labs   - Assess for incontinence   - Turn and reposition patient  - Assist with mobility/ambulation  - Relieve pressure over bony prominences  - Avoid friction and shearing  - Provide appropriate hygiene as needed including keeping skin clean and dry  - Evaluate need for skin moisturizer/barrier cream  - Collaborate with interdisciplinary team   - Patient/family teaching  - Consider wound care consult   10/22/2022 1952 by Red Membreno RN  Outcome: Progressing  10/22/2022 1950 by Pao Major RN  Outcome:   10/22/2022 1950 by Pao Major RN  Outcome: Progressing     Problem: MOBILITY - ADULT  Goal: Maintain or return to baseline ADL function  Description: INTERVENTIONS:  -  Assess patient's ability to carry out ADLs; assess patient's baseline for ADL function and identify physical deficits which impact ability to perform ADLs (bathing, care of mouth/teeth, toileting, grooming, dressing, etc )  - Assess/evaluate cause of self-care deficits   - Assess range of motion  - Assess patient's mobility; develop plan if impaired  - Assess patient's need for assistive devices and provide as appropriate  - Encourage maximum independence but intervene and supervise when necessary  - Involve family in performance of ADLs  - Assess for home care needs following discharge   - Consider OT consult to assist with ADL evaluation and planning for discharge  - Provide patient education as appropriate  10/22/2022 1952 by Pao Major RN  Outcome: Progressing  10/22/2022 1950 by Pao Major RN  Outcome:   10/22/2022 1950 by Pao Major RN  Outcome: Progressing  Goal: Maintains/Returns to pre admission functional level  Description: INTERVENTIONS:  - Perform BMAT or MOVE assessment daily    - Set and communicate daily mobility goal to care team and patient/family/caregiver  - Collaborate with rehabilitation services on mobility goals if consulted  - Perform Range of Motion  times a day  - Reposition patient every hours    - Dangle patient times a day  - Stand patient  times a day  - Ambulate patient times a day  - Out of bed to chair  times a day   - Out of bed for meals  times a day  - Out of bed for toileting  - Record patient progress and toleration of activity level   10/22/2022 1952 by Pao Major RN  Outcome: Progressing  10/22/2022 1950 by Pao Major RN  Outcome:  10/22/2022 1950 by Pao Major RN  Outcome: Progressing     Problem: MOBILITY - ADULT  Goal: Maintains/Returns to pre admission functional level  Description: INTERVENTIONS:  - Perform BMAT or MOVE assessment daily    - Set and communicate daily mobility goal to care team and patient/family/caregiver  - Collaborate with rehabilitation services on mobility goals if consulted  - Perform Range of Motion  times a day  - Reposition patient every  hours    - Dangle patient times a day  - Stand patient  times a day  - Ambulate patient times a day  - Out of bed to chair  times a day   - Out of bed for zuleima times a day  - Out of bed for toileting  - Record patient progress and toleration of activity level   10/22/2022 1952 by Gerson Parry, RN  Outcome: Progressing  10/22/2022 1950 by Gerson Parry, RN  Outcome:  10/22/2022 1950 by Gerson Parry, RN  Outcome: Progressing

## 2022-10-22 NOTE — PROGRESS NOTES
[address] [facility]  Progress Note - Matteo Akins 1965, 62 y o  male MRN: [id number]  Unit/Bed#: [facility] Encounter: [id number]  Primary Care Provider: Donnie Gan MD   Date and time admitted to hospital: 10/19/2022  1:29 AM    * Claudication of right lower extremity [facility])  Assessment & Plan  Patient presented with worsening right lower leg pain for several days with radiation to his toes along with intermittent numbness in his toes  Patient has prior SFA stenting performed earlier this year  Doppler showed high-grade stenosis versus occlusion of the common from oral to above knee popliteal PTFE bypass graft  High-grade stenosis versus occlusion of the common femoral artery with trickle flow noted high-grade stenosis versus occlusion of the distal anterior tibial artery  Patient transferred to [facility] for vascular surgery evaluation  Vascular surgery consulted  S/P RLE Agram with lysis on 10/19 and RLE Agram with thrombectomy on 46/60  Complicated by groin hematoma  OK to D/C heparin drip and start Xarelto and Thyra Ratel today per Vascular surgery  If stable, can likely D/C tomorrow  Postoperative anemia due to acute blood loss  Assessment & Plan  · Secondary to groin hematoma from Agram  · Transfuse 1 unit of PRBCs today  · Continue to monitor     CAD (coronary artery disease)  Assessment & Plan  Patient with history of CAD with CABG x3  Will continue beta-blocker (metoprolol increased to 25mg QD), statin, Brilinta, and imdur  Aspirin stopped in order to avoid triple therapy with addition of Xarelto  Had chest pain 10/21 felt to be secondary to anemia  No further chest pain  Cardiology following - has signed off   Outpatient follow up    Type II diabetes mellitus [facility])  Assessment & Plan  Lab Results   Component Value Date    HGBA1C 7 2 (H) 10/19/2022       Recent Labs     10/21/22  1558 10/21/22  2102 10/22/22  0645 10/22/22  1240   POCGLU 137 169* 158* 198*       Blood Sugar Average: Last 72 hrs:  (P) 912 [id number]   Continue sliding scale  Continue to hold home metformin    Thrombocytopenia (HCC)  Assessment & Plan  · Platelets stable around 100,000  · Monitor    Hypertension  Assessment & Plan  Will continue on losartan 25 mg   Toprol XL increased to 25mg QD per Cardiology  Blood pressure has been well controlled        VTE Pharmacologic Prophylaxis: VTE Score: 2 Moderate Risk (Score 3-4) - Pharmacological DVT Prophylaxis Ordered: rivaroxaban (Xarelto)  Patient Centered Rounds: I performed bedside rounds with nursing staff today  Discussions with Specialists or Other Care Team Provider: Cardiology, Vascular surgery    Education and Discussions with Family / Patient: Patient declined call to   Time Spent for Care: 45 minutes  More than 50% of total time spent on counseling and coordination of care as described above  Time spent reviewing chart and coordinating care with team      Current Length of Stay: 3 day(s)  Current Patient Status: Inpatient   Certification Statement: The patient will continue to require additional inpatient hospital stay due to monitoring anemia with [facility] initiation  Discharge Plan: Anticipate discharge tomorrow to home  Code Status: Level 1 - Full Code    Subjective:   No further chest pain  Overall feels well  Objective:     Vitals:   Temp (24hrs), Av 4 °F (36 9 °C), Min:97 7 °F (36 5 °C), Max:100 °F (37 8 °C)    Temp:  [97 7 °F (36 5 °C)-100 °F (37 8 °C)] 98 6 °F (37 °C)  HR:  [68-76] 69  Resp:  [14-20] 20  BP: ()/(54-78) 147/69  SpO2:  [99 %-100 %] 100 %  Body mass index is 28 62 kg/m²  Input and Output Summary (last 24 hours): Intake/Output Summary (Last 24 hours) at 10/22/2022 1556  Last data filed at 10/22/2022 1411  Gross per 24 hour   Intake 714 5 ml   Output 750 ml   Net -35 5 ml       Physical Exam:   Physical Exam  Vitals and nursing note reviewed     Constitutional: Appearance: He is well-developed  HENT:      Head: Normocephalic and atraumatic  Eyes:      Conjunctiva/sclera: Conjunctivae normal    Cardiovascular:      Rate and Rhythm: Normal rate and regular rhythm  Heart sounds: No murmur heard  Pulmonary:      Effort: Pulmonary effort is normal  No respiratory distress  Breath sounds: Normal breath sounds  Abdominal:      Palpations: Abdomen is soft  Tenderness: There is no abdominal tenderness  Comments: Left groin hematoma   Musculoskeletal:      Cervical back: Neck supple  Skin:     General: Skin is warm and dry  Neurological:      Mental Status: He is alert  Additional Data:     Labs:  Results from last 7 days   Lab Units 10/22/22  0639 10/21/22  1604 10/21/22  0500   WBC Thousand/uL 4 68   < > 6 28   HEMOGLOBIN g/dL 7 0*   < > 8 0*   HEMATOCRIT % 21 0*   < > 24 4*   PLATELETS Thousands/uL 110*   < > 100*   NEUTROS PCT %  --   --  62   LYMPHS PCT %  --   --  27   MONOS PCT %  --   --  9   EOS PCT %  --   --  2    < > = values in this interval not displayed  Results from last 7 days   Lab Units 10/22/22  0639 10/19/22  0340 10/18/22  0609   SODIUM mmol/L 141   < > 141   POTASSIUM mmol/L 3 7   < > 3 5   CHLORIDE mmol/L 110*   < > 106   CO2 mmol/L 25   < > 26   BUN mg/dL 11   < > 22   CREATININE mg/dL 0 83   < > 1 07   ANION GAP mmol/L 6   < > 9   CALCIUM mg/dL 8 0*   < > 8 7   ALBUMIN g/dL  --   --  3 6   TOTAL BILIRUBIN mg/dL  --   --  0 66   ALK PHOS U/L  --   --  85   ALT U/L  --   --  39   AST U/L  --   --  21   GLUCOSE RANDOM mg/dL 159*   < > 136    < > = values in this interval not displayed       Results from last 7 days   Lab Units 10/20/22  1510   INR  1 28*     Results from last 7 days   Lab Units 10/22/22  1240 10/22/22  0645 10/21/22  2102 10/21/22  1558 10/21/22  1057 10/21/22  0002 10/20/22  1619 10/20/22  0723 10/19/22  2139 10/19/22  1607 10/19/22  1330 10/19/22  0602   POC GLUCOSE mg/dl 198* 158* 169* 137 259* 286* 111 147* 139 191* 148* 123     Results from last 7 days   Lab Units 10/19/22  0340   HEMOGLOBIN A1C % 7 2*     Results from last 7 days   Lab Units 10/20/22  1510 10/17/22  2229   LACTIC ACID mmol/L 0 8 0 9       Lines/Drains:  Invasive Devices  Report    Peripherally Inserted Central Catheter Line  Duration           PICC Line 10/19/22 Left Brachial 3 days                Central Line:  Goal for removal: Will discontinue when meds requiring line are completed               Imaging: Reviewed radiology reports from this admission including: CT head and procedure reports    Recent Cultures (last 7 days):         Last 24 Hours Medication List:   Current Facility-Administered Medications   Medication Dose Route Frequency Provider Last Rate   • acetaminophen  650 mg Oral Q6H PRN Chilango Salines, DO     • acetaminophen  975 mg Oral [facility]AT[facility] MARYBETH Chilango Salines, DO     • atorvastatin  80 mg Oral Daily With [address], DO     • calcium carbonate  1,000 mg Oral Daily PRN Solomon Heckle, DO     • docusate sodium  100 mg Oral BID Chilango Salines, DO     • gabapentin  300 mg Oral TID Chilango Salines, DO     • hydrALAZINE  10 mg Intravenous Q6H PRN Chilango Salines, DO     • HYDROmorphone  0 5 mg Intravenous Q3H PRN Chilango Salines, DO     • insulin lispro  1-6 Units Subcutaneous TID AC Chilango Salines, DO     • insulin lispro  1-6 Units Subcutaneous HS Chilango Salines, DO     • isosorbide mononitrate  30 mg Oral Daily Telma Monahan, DO     • losartan  25 mg Oral Daily Chilango Salines, DO     • methocarbamol  500 mg Oral Q6H PRN Chilango Salines, DO     • [START ON 10/23/2022] metoprolol succinate  25 mg Oral Daily Pablo Arellano PA-C     • nitroglycerin  0 4 mg Sublingual Q5 Min PRN Solomon Heckle, DO     • ondansetron  4 mg Intravenous Q4H PRN Chilango Salines, DO     • oxyCODONE  10 mg Oral Q6H PRN Chilango Salines, DO     • oxyCODONE  5 mg Oral Q4H PRN Fabian Millan DO     • rivaroxaban  15 mg Oral BID With Meals Mary Mai PA-C     • ticagrelor  90 mg Oral Q12H [address] [facility], DO          Today, Patient Was Seen By: Mary Mai    **Please Note: This note may have been constructed using a voice recognition system  **

## 2022-10-22 NOTE — ASSESSMENT & PLAN NOTE
Lab Results   Component Value Date    HGBA1C 7 2 (H) 10/19/2022       Recent Labs     10/21/22  1558 10/21/22  2102 10/22/22  0645 10/22/22  1240   POCGLU 137 169* 158* 198*       Blood Sugar Average: Last 72 hrs:  (P) 854 [id number]   Continue sliding scale  Continue to hold home metformin

## 2022-10-23 VITALS
HEIGHT: 65 IN | BODY MASS INDEX: 28.66 KG/M2 | RESPIRATION RATE: 17 BRPM | OXYGEN SATURATION: 100 % | HEART RATE: 77 BPM | SYSTOLIC BLOOD PRESSURE: 137 MMHG | DIASTOLIC BLOOD PRESSURE: 73 MMHG | TEMPERATURE: 98.5 F | WEIGHT: 172 LBS

## 2022-10-23 LAB
ABO GROUP BLD BPU: NORMAL
ALBUMIN SERPL BCP-MCNC: 3.2 G/DL (ref 3.5–5)
ALP SERPL-CCNC: 79 U/L (ref 46–116)
ALT SERPL W P-5'-P-CCNC: 81 U/L (ref 12–78)
ANION GAP SERPL CALCULATED.3IONS-SCNC: 3 MMOL/L (ref 4–13)
AST SERPL W P-5'-P-CCNC: 67 U/L (ref 5–45)
ATRIAL RATE: 84 BPM
ATRIAL RATE: 93 BPM
BASOPHILS # BLD AUTO: 0.01 THOUSANDS/ÂΜL (ref 0–0.1)
BASOPHILS NFR BLD AUTO: 0 % (ref 0–1)
BILIRUB SERPL-MCNC: 1.66 MG/DL (ref 0.2–1)
BPU ID: NORMAL
BUN SERPL-MCNC: 10 MG/DL (ref 5–25)
CALCIUM ALBUM COR SERPL-MCNC: 9 MG/DL (ref 8.3–10.1)
CALCIUM SERPL-MCNC: 8.4 MG/DL (ref 8.3–10.1)
CARDIAC TROPONIN I PNL SERPL HS: 174 NG/L (ref 8–18)
CARDIAC TROPONIN I PNL SERPL HS: 313 NG/L (ref 8–18)
CHLORIDE SERPL-SCNC: 108 MMOL/L (ref 96–108)
CO2 SERPL-SCNC: 26 MMOL/L (ref 21–32)
CREAT SERPL-MCNC: 0.92 MG/DL (ref 0.6–1.3)
CROSSMATCH: NORMAL
EOSINOPHIL # BLD AUTO: 0.12 THOUSAND/ÂΜL (ref 0–0.61)
EOSINOPHIL NFR BLD AUTO: 2 % (ref 0–6)
ERYTHROCYTE [DISTWIDTH] IN BLOOD BY AUTOMATED COUNT: 13.9 % (ref 11.6–15.1)
GFR SERPL CREATININE-BSD FRML MDRD: 91 ML/MIN/1.73SQ M
GLUCOSE SERPL-MCNC: 155 MG/DL (ref 65–140)
GLUCOSE SERPL-MCNC: 158 MG/DL (ref 65–140)
GLUCOSE SERPL-MCNC: 280 MG/DL (ref 65–140)
HCT VFR BLD AUTO: 23.2 % (ref 36.5–49.3)
HGB BLD-MCNC: 7.8 G/DL (ref 12–17)
IMM GRANULOCYTES # BLD AUTO: 0.03 THOUSAND/UL (ref 0–0.2)
IMM GRANULOCYTES NFR BLD AUTO: 1 % (ref 0–2)
LYMPHOCYTES # BLD AUTO: 1.26 THOUSANDS/ÂΜL (ref 0.6–4.47)
LYMPHOCYTES NFR BLD AUTO: 20 % (ref 14–44)
MCH RBC QN AUTO: 27.2 PG (ref 26.8–34.3)
MCHC RBC AUTO-ENTMCNC: 33.6 G/DL (ref 31.4–37.4)
MCV RBC AUTO: 81 FL (ref 82–98)
MONOCYTES # BLD AUTO: 0.66 THOUSAND/ÂΜL (ref 0.17–1.22)
MONOCYTES NFR BLD AUTO: 11 % (ref 4–12)
NEUTROPHILS # BLD AUTO: 4.2 THOUSANDS/ÂΜL (ref 1.85–7.62)
NEUTS SEG NFR BLD AUTO: 66 % (ref 43–75)
NRBC BLD AUTO-RTO: 0 /100 WBCS
P AXIS: -12 DEGREES
P AXIS: 72 DEGREES
PLATELET # BLD AUTO: 128 THOUSANDS/UL (ref 149–390)
PMV BLD AUTO: 10 FL (ref 8.9–12.7)
POTASSIUM SERPL-SCNC: 3.6 MMOL/L (ref 3.5–5.3)
PR INTERVAL: 162 MS
PR INTERVAL: 164 MS
PROT SERPL-MCNC: 6.7 G/DL (ref 6.4–8.4)
QRS AXIS: -12 DEGREES
QRS AXIS: 68 DEGREES
QRSD INTERVAL: 90 MS
QRSD INTERVAL: 94 MS
QT INTERVAL: 364 MS
QT INTERVAL: 366 MS
QTC INTERVAL: 432 MS
QTC INTERVAL: 452 MS
RBC # BLD AUTO: 2.87 MILLION/UL (ref 3.88–5.62)
SODIUM SERPL-SCNC: 137 MMOL/L (ref 135–147)
T WAVE AXIS: -62 DEGREES
T WAVE AXIS: 86 DEGREES
UNIT DISPENSE STATUS: NORMAL
UNIT PRODUCT CODE: NORMAL
UNIT PRODUCT VOLUME: 250 ML
UNIT RH: NORMAL
VENTRICULAR RATE: 84 BPM
VENTRICULAR RATE: 93 BPM
WBC # BLD AUTO: 6.28 THOUSAND/UL (ref 4.31–10.16)

## 2022-10-23 PROCEDURE — 84484 ASSAY OF TROPONIN QUANT: CPT | Performed by: NURSE PRACTITIONER

## 2022-10-23 PROCEDURE — 80053 COMPREHEN METABOLIC PANEL: CPT | Performed by: NURSE PRACTITIONER

## 2022-10-23 PROCEDURE — 99239 HOSP IP/OBS DSCHRG MGMT >30: CPT | Performed by: PHYSICIAN ASSISTANT

## 2022-10-23 PROCEDURE — 99232 SBSQ HOSP IP/OBS MODERATE 35: CPT | Performed by: INTERNAL MEDICINE

## 2022-10-23 PROCEDURE — 82948 REAGENT STRIP/BLOOD GLUCOSE: CPT

## 2022-10-23 PROCEDURE — 93010 ELECTROCARDIOGRAM REPORT: CPT | Performed by: INTERNAL MEDICINE

## 2022-10-23 PROCEDURE — 93005 ELECTROCARDIOGRAM TRACING: CPT

## 2022-10-23 PROCEDURE — 85025 COMPLETE CBC W/AUTO DIFF WBC: CPT | Performed by: FAMILY MEDICINE

## 2022-10-23 RX ORDER — METOPROLOL SUCCINATE 25 MG/1
25 TABLET, EXTENDED RELEASE ORAL DAILY
Qty: 30 TABLET | Refills: 0 | Status: SHIPPED | OUTPATIENT
Start: 2022-10-23

## 2022-10-23 RX ADMIN — ISOSORBIDE MONONITRATE 30 MG: 30 TABLET, EXTENDED RELEASE ORAL at 08:43

## 2022-10-23 RX ADMIN — RIVAROXABAN 15 MG: 15 TABLET, FILM COATED ORAL at 08:43

## 2022-10-23 RX ADMIN — METOPROLOL SUCCINATE 25 MG: 25 TABLET, EXTENDED RELEASE ORAL at 08:43

## 2022-10-23 RX ADMIN — LOSARTAN POTASSIUM 25 MG: 25 TABLET, FILM COATED ORAL at 08:43

## 2022-10-23 RX ADMIN — TICAGRELOR 90 MG: 90 TABLET ORAL at 08:43

## 2022-10-23 RX ADMIN — INSULIN LISPRO 1 UNITS: 100 INJECTION, SOLUTION INTRAVENOUS; SUBCUTANEOUS at 08:44

## 2022-10-23 RX ADMIN — GABAPENTIN 300 MG: 300 CAPSULE ORAL at 08:43

## 2022-10-23 NOTE — PROGRESS NOTES
Cardiology Progress Note - gS Weinberg 62 y o  male MRN: [id number]    Unit/Bed#: -01 Encounter: [id number]      Assessment:  1  Right LE femoral-popliteal PTFE thrombosis s/p IR lysis/mechanical thrombectomy  2  CAD s/p CABG with patent LIMA to LAD, occluded SVG to OM/LPDA       A) NIRMALA to LCx, B)  intervention to OM  3  Preserved LV systolic function   4  Acute blood loss anemia with groin hematoma   5  Benign essential hypertension  6  Dyslipidemia     Plan:  1  Chest pain sounds more GI in nature - ambulated the hallways this morning without any symptoms concerning for angina  2  Minimal troponin in setting of anemia  3  Discharge on Xarelto per vascular surgery along with Brilinta in setting of #2 - aspirin discontinued to avoid triple therapy  4  Continue increased dose of metoprolol succinate  5  Continue Imdur, high-intensity statin  6  Stable for discharge from cardiac perspective - can follow-up with Dr Rex Arriaga:   Patient seen and examined  Two episodes of burning in chest yesterday, once after eating relieved with belching  Recurrent episode overnight relieved with flatus and ginger ale  Ambulating hallways this morning with no symptoms  Objective:     Vitals: Blood pressure 133/73, pulse 81, temperature 98 8 °F (37 1 °C), temperature source Oral, resp  rate 15, height 5' 5" (1 651 m), weight 78 kg (172 lb), SpO2 98 %  , Body mass index is 28 62 kg/m² ,   Orthostatic Blood Pressures    Flowsheet Row Most Recent Value   Blood Pressure 133/73 filed at 10/23/2022 0725   Patient Position - Orthostatic VS Lying filed at 10/22/2022 2257            Intake/Output Summary (Last 24 hours) at 10/23/2022 0936  Last data filed at 10/22/2022 1912  Gross per 24 hour   Intake 1014 75 ml   Output --   Net 1014 75 ml     Physical Exam:  General:  Alert and cooperative, appears stated age  HEENT:  PERRLA, EOMI, no scleral icterus, no conjunctival pallor  Neck:  No lymphadenopathy, no thyromegaly, no carotid bruits, no elevated JVP  Heart:  Regular rate and rhythm, normal S1/S2, no S3/S4, no murmur  Lungs:  Clear to auscultation bilaterally   Abdomen:  Soft, non-tender, positive bowel sounds, no rebound or guarding,   no organomegaly   Extremities:  No clubbing, cyanosis or edema   Vascular:  2+ pedal pulses  Skin:  No rashes or lesions on exposed skin  Neurologic:  Cranial nerves II-XII grossly intact without focal deficits  Medications:      Current Facility-Administered Medications:   •  acetaminophen (TYLENOL) tablet 650 mg, 650 mg, Oral, Q6H PRN, Brenton Schwalbe, DO, 650 mg at 10/19/22 1932  •  acetaminophen (TYLENOL) tablet 975 mg, 975 mg, Oral, Q6H [address], Brentonjuly Couchalbe, DO, 975 mg at 10/22/22 1159  •  atorvastatin (LIPITOR) tablet 80 mg, 80 mg, Oral, Daily With Dinner, Brenton Pina, DO, 80 mg at 10/22/22 1727  •  calcium carbonate (TUMS) chewable tablet 1,000 mg, 1,000 mg, Oral, Daily PRN, Alveda Mohit, DO  •  docusate sodium (COLACE) capsule 100 mg, 100 mg, Oral, BID, Brenton Couchalbe, DO, 100 mg at 10/22/22 7047  •  gabapentin (NEURONTIN) capsule 300 mg, 300 mg, Oral, TID, Brenton Couchalbe, DO, 300 mg at 10/23/22 4929  •  hydrALAZINE (APRESOLINE) injection 10 mg, 10 mg, Intravenous, Q6H PRN, Brenton Couchalbe, DO, 10 mg at 10/19/22 1334  •  HYDROmorphone (DILAUDID) injection 0 5 mg, 0 5 mg, Intravenous, Q3H PRN, Brenton Simsbe, DO, 0 5 mg at 10/20/22 9410  •  insulin lispro (HumaLOG) 100 units/mL subcutaneous injection 1-6 Units, 1-6 Units, Subcutaneous, TID AC, 1 Units at 10/23/22 9074 **AND** Fingerstick Glucose (POCT), , , TID AC, Brenton Simsbe, DO  •  insulin lispro (HumaLOG) 100 units/mL subcutaneous injection 1-6 Units, 1-6 Units, Subcutaneous, HS, Brenton Pina DO, 3 Units at 10/22/22 2100  •  isosorbide mononitrate (IMDUR) 24 hr tablet 30 mg, 30 mg, Oral, Daily, Telma Monahan DO, 30 mg at 10/23/22 0843  •  losartan (COZAAR) tablet 25 mg, 25 mg, Oral, Daily, Chilango Salines, DO, 25 mg at 10/23/22 2590  •  methocarbamol (ROBAXIN) tablet 500 mg, 500 mg, Oral, Q6H PRN, Chilango Salines, DO, 500 mg at 10/19/22 1727  •  metoprolol succinate (TOPROL-XL) 24 hr tablet 25 mg, 25 mg, Oral, Daily, Vira Pierson PA-C, 25 mg at 10/23/22 1424  •  nitroglycerin (NITROSTAT) SL tablet 0 4 mg, 0 4 mg, Sublingual, Q5 Min PRN, Solomon Sylvester DO  •  ondansetron (ZOFRAN) injection 4 mg, 4 mg, Intravenous, Q4H PRN, Chilango Salines, DO  •  oxyCODONE (ROXICODONE) immediate release tablet 10 mg, 10 mg, Oral, Q6H PRN, Chilango Salines, DO, 10 mg at 10/20/22 0150  •  oxyCODONE (ROXICODONE) IR tablet 5 mg, 5 mg, Oral, Q4H PRN, Chilango Salines, DO, 5 mg at 10/22/22 2058  •  rivaroxaban (XARELTO) tablet 15 mg, 15 mg, Oral, BID With Meals, Pablo Arellano PA-C, 15 mg at 10/23/22 5605  •  ticagrelor (BRILINTA) tablet 90 mg, 90 mg, Oral, Q12H [address], [place], DO, 90 mg at 10/23/22 0843     Labs & Results:        Results from last 7 days   Lab Units 10/23/22  0555 10/22/22  2054 10/22/22  0639 10/21/22  2154   WBC Thousand/uL 6 28  --  4 68 5 83   HEMOGLOBIN g/dL 7 8* 8 3* 7 0* 7 5*   HEMATOCRIT % 23 2* 24 5* 21 0* 23 2*   PLATELETS Thousands/uL 128*  --  110* 104*         Results from last 7 days   Lab Units 10/23/22  0414 10/22/22  0639 10/21/22  0500 10/19/22  0340 10/18/22  0609 10/17/22  2229   POTASSIUM mmol/L 3 6 3 7 3 8   < > 3 5 3 9   CHLORIDE mmol/L 108 110* 111*   < > 106 105   CO2 mmol/L 26 25 25   < > 26 28   BUN mg/dL 10 11 17   < > 22 24   CREATININE mg/dL 0 92 0 83 0 84   < > 1 07 1 17   CALCIUM mg/dL 8 4 8 0* 8 2*   < > 8 7 9 2   ALK PHOS U/L 79  --   --   --  85 102   ALT U/L 81*  --   --   --  39 46   AST U/L 67*  --   --   --  21 24    < > = values in this interval not displayed       Results from last 7 days   Lab Units 10/22/22  5327 10/22/22  0045 10/21/22  1611 10/21/22  0004 10/20/22  1510 10/19/22  6762 10/19/22  0340 10/18/22  0433 10/17/22  2229   INR   --   --   --   --  1 28*  --  1 07  --  1 18   PTT seconds 76* 67* >210*   < >  --    < > 77*   < > 29    < > = values in this interval not displayed  Results from last 7 days   Lab Units 10/22/22  0639 10/21/22  0500 10/20/22  1510   MAGNESIUM mg/dL 1 9 2 5 1 6         Counseling / Coordination of Care  Total floor / unit time spent today 25 minutes  Greater than 50% of total time was spent with the patient and / or family counseling and / or coordination of care

## 2022-10-23 NOTE — PROGRESS NOTES
Progress Note - Vascular Surgery   Nica Person 62 y o  male [id number]  Unit/Bed#:-01 Encounter: [id number]      Assessment:    62 y o  with PMH  CAD c PCI, CABG, DM, RLE PAD c R Fem-AK Pop PTFE Bypass (2/2022), LLE PAD c LLE SFA NIRMALA (1/2022) presenting with RLE Acute Limb Ischemia, Right Fem-AK Pop PTFE Thrombosis  Vascular surgery consulted for Right Fem-AK Pop Thrombosis, ALI  10/19: RLE Agram c Lysis  10/20: RLE Agram c Fem-AK Pop SES/PTA, Pro[employer] PTA c Mech Thrombectomy    Plan:  - No further vascular surgery interventions planned at this time  - Hgb 7 9  - Cont Groin checks, NV Chks  - OOB   - Diet as tolerated  - Cards recs appreciated; Brillinta for Hx CAD c PCI/CABG  - Sha Parekh  - Cleared for DC from vascular surgery standpoint      Claudia Bueno DO  Vascular Surgery Fellow   7/23/2022        Subjective:    Pt s/e  No acute events overnight  Pt awake, alert  Pt complains of mild pain chest pain after dinner, likely reflux related  Tolerating diet  OOB  No new complaints  Denies n/v, sob, chest pain, f/c  No sensory change, numbness, or weakness of bilateral lower extremities     I/Os:  I/O last 3 completed shifts: In: 1509 2 [I V :1173 4; IV Piggyback:335 8]  Out: 6670 [Urine:2860]  I/O this shift:  In: -   Out: 425 [Urine:425]    Review of Systems   All other systems reviewed and are negative  Vitals:  /66   Pulse 80   Temp 97 6 °F (36 4 °C) (Oral)   Resp 12   Ht 5' 9" (1 753 m)   Wt 74 4 kg (164 lb)   SpO2 96%   BMI 24 22 kg/m²     Physical Exam  Vitals and nursing note reviewed  Constitutional:       General: He is not in acute distress  Appearance: He is well-developed  He is not ill-appearing, toxic-appearing or diaphoretic  HENT:      Head: Normocephalic and atraumatic  Eyes:      Conjunctiva/sclera: Conjunctivae normal    Cardiovascular:      Rate and Rhythm: Normal rate and regular rhythm  Heart sounds: No murmur heard  Comments: B/L Palp fem pulse  LLE Dop signals DP/PT  RLE Palp PT, Dop signal DP  Pulmonary:      Effort: Pulmonary effort is normal  No respiratory distress  Breath sounds: Normal breath sounds  Abdominal:      General: There is no distension  Palpations: Abdomen is soft  Tenderness: There is no abdominal tenderness  There is no guarding or rebound  Hernia: No hernia is present  Musculoskeletal:      Cervical back: Neck supple  Right lower leg: No edema  Left lower leg: No edema  Comments: Left groin hematoma stable, soft, ecchymotic   Left groin dressing with saturation    Skin:     General: Skin is warm and dry  Capillary Refill: Capillary refill takes less than 2 seconds  Neurological:      General: No focal deficit present  Mental Status: He is alert and oriented to person, place, and time  Mental status is at baseline  Imaging:I have personally reviewed pertinent reports        Lab Results and Cultures:   CBC with diff:   Lab Results   Component Value Date    WBC 11 47 (H) 07/23/2022    HGB 10 4 (L) 07/23/2022    HCT 32 0 (L) 07/23/2022     (H) 07/23/2022     07/23/2022    MCH 33 5 07/23/2022    MCHC 32 5 07/23/2022    RDW 13 1 07/23/2022    MPV 9 2 07/23/2022    NRBC 0 07/21/2022   ,   BMP/CMP:  Lab Results   Component Value Date    SODIUM 133 (L) 07/23/2022    K 4 1 07/23/2022     07/23/2022    CO2 21 07/23/2022    CO2 24 05/05/2022    BUN 6 07/23/2022    CREATININE 0 53 (L) 07/23/2022    GLUCOSE 103 05/05/2022    CALCIUM 8 8 07/23/2022     (H) 07/23/2022     (H) 07/23/2022    ALKPHOS 161 (H) 07/23/2022    EGFR 109 07/23/2022   ,   Lipid Panel: No results found for: CHOL,   Coags:   Lab Results   Component Value Date    PTT 54 (H) 07/23/2022    INR 0 94 07/22/2022   ,     Blood Culture:   Lab Results   Component Value Date    BLOODCX No Growth at 24 hrs  07/21/2022   ,   Urinalysis: No results found for: COLORU, CLARITYU, SPECGRAV, PHUR, LEUKOCYTESUR, NITRITE, PROTEINUA, GLUCOSEU, KETONESU, BILIRUBINUR, BLOODU,   Urine Culture: No results found for: URINECX,   Wound Culure: No results found for: WOUNDCULT    Medications:  Current Facility-Administered Medications   Medication Dose Route Frequency   • acetaminophen (TYLENOL) tablet 650 mg  650 mg Oral Q6H PRN   • alteplase (CATHFLO) 10 mg in sodium chloride 0 9 % 250 mL infusion  1 mg/hr Intracatheter Continuous   • amLODIPine (NORVASC) tablet 10 mg  10 mg Oral QPM   • aspirin (ECOTRIN LOW STRENGTH) EC tablet 81 mg  81 mg Oral Daily   • clopidogrel (PLAVIX) tablet 75 mg  75 mg Oral Daily   • ezetimibe (ZETIA) tablet 10 mg  10 mg Oral Daily   • folic acid (FOLVITE) tablet 1 mg  1 mg Oral Daily   • heparin (porcine) 25,000 units in 0 45% NaCl 250 mL infusion (premix)  3-30 Units/kg/hr (Order-Specific) Intravenous Titrated   • heparin (porcine) 25,000 units in 0 45% NaCl 250 mL infusion (premix)  500 Units/hr Intravenous Titrated   • heparin (porcine) injection 2,800 Units  2,800 Units Intravenous Q1H PRN   • heparin (porcine) injection 5,600 Units  5,600 Units Intravenous Q1H PRN   • heparin 1000 units in 500 mL infusion (premix) for sheath patency  20 Units/hr Intravenous Continuous   • HYDROmorphone HCl (DILAUDID) injection 0 2 mg  0 2 mg Intravenous Q4H PRN   • metoprolol succinate (TOPROL-XL) 24 hr tablet 25 mg  25 mg Oral Daily   • multi-electrolyte (PLASMALYTE-A/ISOLYTE-S PH 7 4) IV solution  75 mL/hr Intravenous Continuous   • multivitamin-minerals (CENTRUM) tablet 1 tablet  1 tablet Oral Daily   • oxyCODONE (ROXICODONE) IR tablet 2 5 mg  2 5 mg Oral Q4H PRN   • oxyCODONE (ROXICODONE) IR tablet 5 mg  5 mg Oral Q4H PRN   • pantoprazole (PROTONIX) EC tablet 40 mg  40 mg Oral Early Morning   • pravastatin (PRAVACHOL) tablet 80 mg  80 mg Oral Daily With Dinner   • thiamine tablet 100 mg  100 mg Oral Daily

## 2022-10-23 NOTE — ASSESSMENT & PLAN NOTE
Lab Results   Component Value Date    HGBA1C 7 2 (H) 10/19/2022       Recent Labs     10/22/22  1240 10/22/22  1726 10/22/22  2058 10/23/22  0555   POCGLU 198* 190* 233* 158*       Blood Sugar Average: Last 72 hrs:  (P) 186   Continue sliding scale  Continue to hold home metformin - resume at discharge

## 2022-10-23 NOTE — DISCHARGE SUMMARY
[facility]  Discharge- Caroline Perezty 1965, 62 y o  male MRN: [id number]  Unit/Bed#: -01 Encounter: [id number]  Primary Care Provider: Claudia Bah MD   Date and time admitted to hospital: 10/19/2022  1:29 AM    * Claudication of right lower extremity [facility])  Assessment & Plan  Patient presented with worsening right lower leg pain for several days with radiation to his toes along with intermittent numbness in his toes  Patient has prior SFA stenting performed earlier this year  Doppler showed high-grade stenosis versus occlusion of the common from oral to above knee popliteal PTFE bypass graft  High-grade stenosis versus occlusion of the common femoral artery with trickle flow noted high-grade stenosis versus occlusion of the distal anterior tibial artery  Patient transferred to [facility] for vascular surgery evaluation  Vascular surgery consulted  S/P RLE Agram with lysis on 10/19 and RLE Agram with thrombectomy on 87/85  Complicated by groin hematoma  Started Xarelto and Maryjo Guiles 45/89 without complication  Stable for discharge per Vascular surgery  Postoperative anemia due to acute blood loss  Assessment & Plan  · Secondary to groin hematoma from Agram  · Transfused 1 unit of PRBCs 10/22  · Hb 7 8     CAD (coronary artery disease)  Assessment & Plan  Patient with history of CAD with CABG x3  Will continue beta-blocker (metoprolol increased to 25mg QD), statin, Brilinta, and imdur  Aspirin stopped in order to avoid triple therapy with addition of Xarelto  Had chest pain 10/21 felt to be secondary to anemia  Chest pain on 10/23 likely GI related  Improved after belching and BM  EKG without changes  Minimal troponin bump  Now walking halls without difficulty  D/W Cardiology, stable for discharge   Outpatient Cardiology follow up    Type II diabetes mellitus [facility])  Assessment & Plan  Lab Results   Component Value Date    HGBA1C 7 2 (H) 10/19/2022       Recent Labs     10/22/22  1240 10/22/22  1726 10/22/22  2058 10/23/22  0555   POCGLU 198* 190* 233* 158*       Blood Sugar Average: Last 72 hrs:  (P) 186   Continue sliding scale  Continue to hold home metformin - resume at discharge    Thrombocytopenia ([address] )  Assessment & Plan  · Platelets stable around 120,000  · Trending up daily    Hypertension  Assessment & Plan  Will continue on losartan 25 mg   Toprol XL increased to 25mg QD per Cardiology  Blood pressure has been well controlled        Medical Problems             Resolved Problems  Date Reviewed: 10/23/2022   None               Discharging Physician / Practitioner: Marlyn Nunez  PCP: Dimas Caraballo MD  Admission Date:   Admission Orders (From admission, onward)     Ordered        10/19/22 0141  Inpatient Admission  Once                      Discharge Date: 10/23/22    Consultations During Hospital Stay:  · Dr Kirstie Heart  · Critical care  · Dr Adrian Hamlin    Procedures Performed:     10/20 TPA lysis check    10/19 PICC placement    10/19 Agram    CT head  No acute intracranial abnormality  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, new since 7/13/2017  Significant Findings / Test Results:   · See above    Incidental Findings:   · none     Test Results Pending at Discharge (will require follow up):   · none     Outpatient Tests Requested:  · none    Complications:  Groin hematoma    Reason for Admission: leg pain    Hospital Course:   Sarabjit Fair is a 62 y o  male patient who originally presented to the hospital on 10/19/2022 due to leg pain  Patient initially presented to [facility] with right leg pain  He has a history of a right femoral end arterectomy and fem pop bypass earlier this year  Patient was found to have occlusion of his right lower extremity bypass graft  He was transferred to [facility] for vascular surgery intervention  Patient underwent arteriogram with lysis and mechanical thrombectomy    His procedure was complicated by a large left groin hematoma  He developed anemia and required 1 unit of packed red blood cells  Patient did stabilize and was cleared from a vascular surgery standpoint for discharge  Cardiology was consulted to help with an anticoagulation plan given his history of CABG  Patient was started on Xarelto and Brilinta  His aspirin was discontinued  Patient did have 2 episodes of chest pain while hospitalized  His 1st episode of chest pain was likely in the setting of anemia  Patient was transfused  Patient did have a 2nd episode of chest pain this was felt to be GI related  Patient stable from a cardiology standpoint for discharge and he will follow up with his outpatient cardiologist       Please see above list of diagnoses and related plan for additional information  Condition at Discharge: good    Discharge Day Visit / Exam:   Subjective:  Had some chest pain earlier this morning, but improved with belching and giong to the bathroom  Vitals: Blood Pressure: 137/73 (10/23/22 1045)  Pulse: 77 (10/23/22 1045)  Temperature: 98 5 °F (36 9 °C) (10/23/22 1045)  Temp Source: Oral (10/23/22 0725)  Respirations: 17 (10/23/22 1045)  Height: 5' 5" (165 1 cm) (10/21/22 1115)  Weight - Scale: 78 kg (172 lb) (10/21/22 1115)  SpO2: 100 % (10/23/22 1045)  Exam:   Physical Exam  Vitals and nursing note reviewed  Constitutional:       Appearance: He is well-developed  HENT:      Head: Normocephalic and atraumatic  Eyes:      Conjunctiva/sclera: Conjunctivae normal    Cardiovascular:      Rate and Rhythm: Normal rate and regular rhythm  Heart sounds: No murmur heard  Pulmonary:      Effort: Pulmonary effort is normal  No respiratory distress  Breath sounds: Normal breath sounds  Abdominal:      Palpations: Abdomen is soft  Tenderness: There is no abdominal tenderness  Musculoskeletal:      Cervical back: Neck supple  Skin:     General: Skin is warm and dry        Comments: Left groin hematoma   Neurological:      Mental Status: He is alert  Discussion with Family: Patient declined call to   Discharge instructions/Information to patient and family:   See after visit summary for information provided to patient and family  Provisions for Follow-Up Care:  See after visit summary for information related to follow-up care and any pertinent home health orders  Disposition:   Home    Planned Readmission: none     Discharge Statement:  I spent 45 minutes discharging the patient  This time was spent on the day of discharge  I had direct contact with the patient on the day of discharge  Greater than 50% of the total time was spent examining patient, answering all patient questions, arranging and discussing plan of care with patient as well as directly providing post-discharge instructions  Additional time then spent on discharge activities  Discharge Medications:  See after visit summary for reconciled discharge medications provided to patient and/or family        **Please Note: This note may have been constructed using a voice recognition system**

## 2022-10-23 NOTE — ASSESSMENT & PLAN NOTE
Patient with history of CAD with CABG x3  Will continue beta-blocker (metoprolol increased to 25mg QD), statin, Brilinta, and imdur  Aspirin stopped in order to avoid triple therapy with addition of Xarelto  Had chest pain 10/21 felt to be secondary to anemia  Chest pain on 10/23 likely GI related  Improved after belching and BM  EKG without changes  Minimal troponin bump  Now walking halls without difficulty  D/W Cardiology, stable for discharge   Outpatient Cardiology follow up

## 2022-10-23 NOTE — ASSESSMENT & PLAN NOTE
Patient presented with worsening right lower leg pain for several days with radiation to his toes along with intermittent numbness in his toes  Patient has prior SFA stenting performed earlier this year  Doppler showed high-grade stenosis versus occlusion of the common from oral to above knee popliteal PTFE bypass graft  High-grade stenosis versus occlusion of the common femoral artery with trickle flow noted high-grade stenosis versus occlusion of the distal anterior tibial artery  Patient transferred to [address] [place] [address] for vascular surgery evaluation  Vascular surgery consulted  S/P RLE Agram with lysis on 10/19 and RLE Agram with thrombectomy on 46/07  Complicated by groin hematoma  Started Xarelto and [place] 15/76 without complication  Stable for discharge per Vascular surgery

## 2022-10-24 ENCOUNTER — TRANSITIONAL CARE MANAGEMENT (OUTPATIENT)
Dept: FAMILY MEDICINE CLINIC | Facility: CLINIC | Age: 57
End: 2022-10-24

## 2022-10-24 ENCOUNTER — TELEPHONE (OUTPATIENT)
Dept: FAMILY MEDICINE CLINIC | Facility: CLINIC | Age: 57
End: 2022-10-24

## 2022-10-24 NOTE — UTILIZATION REVIEW
NOTIFICATION OF ADMISSION DISCHARGE   This is a Notification of Discharge from [address]  Please be advised that this patient has been discharge from our facility  Below you will find the admission and discharge date and time including the patient’s disposition  UTILIZATION REVIEW CONTACT:  Dama Essex  Utilization   Network Utilization Review Department  Phone: [phone] x carefully listen to the prompts  All voicemails are confidential   Email: [email or web address]     ADMISSION INFORMATION  PRESENTATION DATE: 10/19/2022  1:29 AM  OBERVATION ADMISSION DATE:   INPATIENT ADMISSION DATE: 10/19/22  1:29 AM   DISCHARGE DATE: 10/23/2022  3:43 PM  DISPOSITION: Home/Self Care Home/Self Care      IMPORTANT INFORMATION:  Send all requests for admission clinical reviews, approved or denied determinations and any other requests to dedicated fax number below belonging to the campus where the patient is receiving treatment   List of dedicated fax numbers:  [address] DENIALS (Administrative/Medical Necessity) [phone]   [address] (Maternity/NICU/Pediatrics) [phone]   [facility] [phone]   [facility] 87 [phone]   [address] [phone]   220 [facility] [phone]   90 [facility] [phone]   [address] [place], [place] [address] [phone]   [facility]  [phone]   4058 [facility] [phone]   412 [facility] 850 [facility] [phone]

## 2022-10-24 NOTE — TELEPHONE ENCOUNTER
Scheduled TCM for 10/25 -- DO NOT CALL WITH TCM QUESTIONNAIRE, he does not want call, wants to wait until his appt

## 2022-10-25 ENCOUNTER — OFFICE VISIT (OUTPATIENT)
Dept: FAMILY MEDICINE CLINIC | Facility: CLINIC | Age: 57
End: 2022-10-25
Payer: COMMERCIAL

## 2022-10-25 VITALS
HEART RATE: 91 BPM | WEIGHT: 173 LBS | TEMPERATURE: 97.7 F | SYSTOLIC BLOOD PRESSURE: 130 MMHG | HEIGHT: 65 IN | OXYGEN SATURATION: 98 % | BODY MASS INDEX: 28.82 KG/M2 | DIASTOLIC BLOOD PRESSURE: 80 MMHG

## 2022-10-25 DIAGNOSIS — I10 PRIMARY HYPERTENSION: Chronic | ICD-10-CM

## 2022-10-25 DIAGNOSIS — E11.59 TYPE 2 DIABETES MELLITUS WITH OTHER CIRCULATORY COMPLICATION, WITHOUT LONG-TERM CURRENT USE OF INSULIN (HCC): Primary | Chronic | ICD-10-CM

## 2022-10-25 DIAGNOSIS — Z95.1 S/P CABG X 3: ICD-10-CM

## 2022-10-25 DIAGNOSIS — Z09 HOSPITAL DISCHARGE FOLLOW-UP: ICD-10-CM

## 2022-10-25 DIAGNOSIS — I73.9 CLAUDICATION OF RIGHT LOWER EXTREMITY (HCC): ICD-10-CM

## 2022-10-25 PROCEDURE — 99496 TRANSJ CARE MGMT HIGH F2F 7D: CPT | Performed by: STUDENT IN AN ORGANIZED HEALTH CARE EDUCATION/TRAINING PROGRAM

## 2022-10-25 NOTE — ASSESSMENT & PLAN NOTE
Lab Results   Component Value Date    HGBA1C 7 2 (H) 10/19/2022     continue metformin, on ARB and statin   Foot and eye exam up to date

## 2022-10-25 NOTE — PROGRESS NOTES
Name: Ellie Gautam      : 1965      MRN: [id number]  Encounter Provider: Jessica Zavaleta MD  Encounter Date: 10/25/2022   Encounter department: [place] [address] [address] [facility] [address]     1  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 complication, without long-term current use of insulin ([facility])  Assessment & Plan:    Lab Results   Component Value Date    HGBA1C 7 2 (H) 10/19/2022     continue metformin, on ARB and statin  Foot and eye exam up to date      2  Hospital discharge follow-up    3  S/P CABG x 3    4  Claudication of right lower extremity ([address] )    5  Primary hypertension        Depression Screening and Follow-up Plan: Patient was screened for depression during today's encounter  They screened negative with a PHQ-2 score of 0  Subjective      Patient coming in for hospital discharge follow up  Seen for RLE claudication S/P RLE Agram with lysis on 10/19 and RLE Agram with thrombectomy on 10/20  Doing well now  Has a hematoma from after the surgica intervention, but is improving    Review of Systems   Constitutional: Negative for chills, fatigue and fever  HENT: Negative for rhinorrhea and sore throat  Eyes: Negative for visual disturbance  Respiratory: Negative for cough and shortness of breath  Cardiovascular: Negative for chest pain and palpitations  Gastrointestinal: Negative for abdominal pain, constipation, diarrhea, nausea and vomiting  Genitourinary: Negative for difficulty urinating, dysuria and frequency  Musculoskeletal: Negative for arthralgias and myalgias  Skin: Positive for color change and wound  Negative for rash  Neurological: Negative for weakness and headaches         Current Outpatient Medications on File Prior to Visit   Medication Sig   • atorvastatin (LIPITOR) 80 mg tablet Take 1 tablet (80 mg total) by mouth daily with dinner   • isosorbide mononitrate (IMDUR) 30 mg 24 hr tablet TAKE 1 TABLET BY MOUTH TWICE A DAY   • losartan (COZAAR) 25 mg tablet Take 1 tablet (25 mg total) by mouth daily   • metFORMIN (GLUCOPHAGE) 1000 MG tablet TAKE 1 TABLET BY MOUTH TWICE A DAY WITH MEALS   • metoprolol succinate (TOPROL-XL) 25 mg 24 hr tablet Take 1 tablet (25 mg total) by mouth daily   • rivaroxaban (XARELTO) 15 & 20 MG starter pack Take 1 Package by mouth see administration instructions   • [START ON 11/21/2022] rivaroxaban (Xarelto) 20 mg tablet Take 1 tablet (20 mg total) by mouth daily with breakfast Do not start before November 21, 2022  • ticagrelor (Brilinta) 90 MG Take 1 tablet (90 mg total) by mouth 2 (two) times a day       Objective     /80 (BP Location: Right arm, Patient Position: Sitting, Cuff Size: Adult)   Pulse 91   Temp 97 7 °F (36 5 °C) (Tympanic)   Ht 5' 5" (1 651 m)   Wt 78 5 kg (173 lb)   SpO2 98%   BMI 28 79 kg/m²     Physical Exam  Constitutional:       General: He is not in acute distress  Appearance: He is not ill-appearing  HENT:      Head: Normocephalic and atraumatic  Right Ear: External ear normal       Left Ear: External ear normal       Nose: Nose normal  No congestion or rhinorrhea  Mouth/Throat:      Mouth: Mucous membranes are moist       Pharynx: Oropharynx is clear  No oropharyngeal exudate or posterior oropharyngeal erythema  Eyes:      Extraocular Movements: Extraocular movements intact  Conjunctiva/sclera: Conjunctivae normal       Pupils: Pupils are equal, round, and reactive to light  Cardiovascular:      Rate and Rhythm: Normal rate and regular rhythm  Pulses: Normal pulses  Heart sounds: No murmur heard  Pulmonary:      Effort: Pulmonary effort is normal  No respiratory distress  Breath sounds: Normal breath sounds  No wheezing  Chest:      Chest wall: No tenderness  Abdominal:      General: Bowel sounds are normal       Palpations: Abdomen is soft  Tenderness: There is no abdominal tenderness     Musculoskeletal: General: Normal range of motion  Cervical back: Normal range of motion  Skin:     General: Skin is warm and dry  Capillary Refill: Capillary refill takes less than 2 seconds  Findings: Bruising and ecchymosis present  No rash  Neurological:      General: No focal deficit present  Mental Status: He is alert  Mental status is at baseline         Thelma Castillo MD

## 2022-10-31 ENCOUNTER — OFFICE VISIT (OUTPATIENT)
Dept: CARDIOLOGY CLINIC | Facility: CLINIC | Age: 57
End: 2022-10-31

## 2022-10-31 VITALS
BODY MASS INDEX: 28.49 KG/M2 | HEART RATE: 85 BPM | DIASTOLIC BLOOD PRESSURE: 82 MMHG | HEIGHT: 65 IN | OXYGEN SATURATION: 99 % | SYSTOLIC BLOOD PRESSURE: 126 MMHG | WEIGHT: 171 LBS | RESPIRATION RATE: 16 BRPM

## 2022-10-31 DIAGNOSIS — I25.10 CORONARY ARTERY DISEASE INVOLVING NATIVE HEART WITHOUT ANGINA PECTORIS, UNSPECIFIED VESSEL OR LESION TYPE: Primary | ICD-10-CM

## 2022-10-31 NOTE — PROGRESS NOTES
Cardiology Follow Up    Nica Person  1965  [id number]  [facility] [place]  [address] [place] [address] [phone]    Discussion/Summary:  1  CAD s/p CABG s/p PCI to distal LCX, PCI of  OM  2  Peripheral arterial disease  3  Postop anemia  4  Diabetes  5  Hypertension    Doing well cardiac-wise  No complaints  Continue with Brilinta and rivaroxaban  He was recently in the hospital for right lower extremity thrombosis/acute limb ischemia  Was switched over from aspirin and Brilinta to [address]  States he has no symptoms since discharge  Post op, was noted to have left groin hematoma and anemia  States this is improving  Strongly recommend patient follows up with vascular surgery  He has not scheduled any follow-up since discharge  Recommend patient reports to vascular surgery or emergency room if he has any vascular issues  He will make an appointment for this week  Will check CBC and BMP    Continue with medical management of CAD including Imdur and metoprolol  He denies all complaints  Continue with atorvastatin for dyslipidemia  Recommended that patient informs our office if he has any cardiac complaints including, but not limited to, chest pain, pressure, heartburn or shortness of breath  Sign and symptoms of heart disease to look out for and report that may need further investigation were discussed in detail with patient  Recommend patient presents to the emergency room or call our office if he has any new/persistent or progressive symptoms  Previous studies were reviewed  Safety measures were reviewed  Questions were entertained and answered  Patient was advised to report any problems requiring medical attention  Follow-up with PCP and appropriate specialist and lab work as discussed      Return for follow up visit as scheduled or earlier, if needed  Thank you for allowing me to participate in the care and evaluation of your patient  Should you have any questions, please feel free to contact me  History of Present Illness:     Manas Rivera is a 62 y o  male who presents for follow up for cardiovascular care  Denies chest pain, chest pressure, shortness of breath, dizziness, lightheadedness, presyncope or syncope  Denies orthopnea, PND or lower limb edema  He completely denies chest pain, pressure or heartburn  States that he is ambulating with no symptoms  Prior notes were reviewed  Patient noted to have episodes of heartburn during hospitalization  States this has completely resolved  Has had nose recurrence since then        Patient Active Problem List   Diagnosis   • Type II diabetes mellitus (HCC)   • Thrombocytopenia (HCC)   • S/P CABG x 3   • Postoperative anemia due to acute blood loss   • Sinus bradycardia   • CAD (coronary artery disease)   • GERD (gastroesophageal reflux disease)   • Hypertension   • Atheroscler native arteries the extremities w/intermit claudication ([facility])   • Former tobacco use   • History of PTCA   • Claudication of right lower extremity ([facility])     Past Medical History:   Diagnosis Date   • Bicuspid aortic valve     being observed   • CAD (coronary artery disease)    • Chest pain 4/6/2021   • Coronary artery disease    • Diabetes mellitus ([facility] )    • Encounter for postoperative care 5/17/2021   • Former tobacco use    • GERD (gastroesophageal reflux disease)    • Goiter    • History of myocardial infarction    • History of rib fracture    • Hyperlipidemia    • Hypertension    • Hypertensive urgency 4/6/2021   • Left carotid bruit 7/22/2021   • Leg pain, bilateral     Agram today LLE   1/20/2022   • Leukocytosis 4/6/2021   • Myocardial infarction [facility])     2021-   CABG  x3   • NSTEMI (non-ST elevated myocardial infarction) ([facility] ) 4/7/2021   • Wears glasses      Social History     Socioeconomic History   • Marital status:      Spouse name: Not on file   • Number of children: Not on file   • Years of education: Not on file   • Highest education level: Not on file   Occupational History   • Not on file   Tobacco Use   • Smoking status: Former Smoker     Packs/day: 1 00     Years: 38 00     Pack years: 38 00     Types: Cigarettes     Quit date: 2021     Years since quittin 5   • Smokeless tobacco: Never Used   Vaping Use   • Vaping Use: Never used   Substance and Sexual Activity   • Alcohol use: Never   • Drug use: Never   • Sexual activity: Not Currently   Other Topics Concern   • Not on file   Social History Narrative   • Not on file     Social Determinants of Health     Financial Resource Strain: Not on file   Food Insecurity: No Food Insecurity   • Worried About Running Out of Food in the Last Year: Never true   • Ran Out of Food in the Last Year: Never true   Transportation Needs: No Transportation Needs   • Lack of Transportation (Medical): No   • Lack of Transportation (Non-Medical): No   Physical Activity: Not on file   Stress: Not on file   Social Connections: Not on file   Intimate Partner Violence: Not on file   Housing Stability: Unknown   • Unable to Pay for Housing in the Last Year: No   • Number of Places Lived in the Last Year: Not on file   • Unstable Housing in the Last Year: No      Family History   Problem Relation Age of Onset   • Heart disease Father      Past Surgical History:   Procedure Laterality Date   • ARTERIOGRAM Right 10/19/2022    Procedure: RIGHT LOWER EXTREMITY ARTERIOGRAM WITH LYSIS, PLACEMENT OF LYSIS CATHETER; third order cannulation of right popliteal artery via left groin access;  Surgeon: Adam Amaral MD;  Location:  MAIN OR;  Service: Vascular   • CARDIAC CATHETERIZATION     • CARDIAC CATHETERIZATION N/A 10/20/2021    Procedure: Cardiac catheterization;  Surgeon: Tiesha Menon MD;  Location: [address] [place] [address] CATH LAB;   Service: Cardiology   • CARDIAC CATHETERIZATION N/A 10/20/2021    Procedure: Cardiac Coronary Angiogram;  Surgeon: Earnestine Lopez MD;  Location: [facility] CATH LAB; Service: Cardiology   • CARDIAC CATHETERIZATION N/A 10/20/2021    Procedure: Cardiac pci;  Surgeon: Earnestine Lopez MD;  Location: [facility] CATH LAB; Service: Cardiology   • IR LOWER EXTREMITY ANGIOGRAM  1/20/2022   • IR LOWER EXTREMITY ANGIOGRAM  2/3/2022   • IR PICC PLACEMENT DOUBLE LUMEN  10/19/2022   • IR TPA LYSIS CHECK  10/20/2022   • SD CABG, ARTERY-VEIN, FOUR N/A 4/9/2021    Procedure: CORONARY ARTERY BYPASS GRAFT (CABG) 3 VESSELS: LIMA to LAD; LLE EVH/SVG to LPL & OM2;  Surgeon: Gerardo Bowens DO;  Location: [facility] MAIN OR;  Service: Cardiac Surgery   • SD ECHO TRANSESOPHAG R-T 2D W/PRB IMG ACQUISJ I&R N/A 4/9/2021    Procedure: TRANSESOPHAGEAL ECHOCARDIOGRAM (BENNETT); Surgeon: Gerardo Bowens DO;  Location: [facility] MAIN OR;  Service: Cardiac Surgery   • SD ENDOSCOPY W/VIDEO-ASST VEIN HARVEST,CABG Left 4/9/2021    Procedure: HARVEST VEIN ENDOSCOPIC ([address]);   Surgeon: Gerardo Bowens DO;  Location: [facility] MAIN OR;  Service: Cardiac Surgery   • SD Adeline Sox 3RD+ ORD SLCTV ABDL PEL/TR [facility] Left 1/20/2022    Procedure: ARTERIOGRAM, STENT PLACEMENT IN LEFT SUPERFICIAL FEMORIAL ARTERY;  Surgeon: Kita Najjar, MD;  Location: [facility] Main OR;  Service: Vascular   • SD VEIN BYPASS GRAFT,FEM-POP Right 2/3/2022    Procedure: BYPASS FEMORAL-POPLITEAL;  Surgeon: Kita Najjar, MD;  Location: [facility] Main OR;  Service: Vascular   • VASECTOMY         Current Outpatient Medications:   •  atorvastatin (LIPITOR) 80 mg tablet, Take 1 tablet (80 mg total) by mouth daily with dinner, Disp: 90 tablet, Rfl: 0  •  isosorbide mononitrate (IMDUR) 30 mg 24 hr tablet, TAKE 1 TABLET BY MOUTH TWICE A DAY, Disp: 180 tablet, Rfl: 1  •  losartan (COZAAR) 25 mg tablet, Take 1 tablet (25 mg total) by mouth daily, Disp: 90 tablet, Rfl: 3  •  metFORMIN (GLUCOPHAGE) 1000 MG tablet, TAKE 1 TABLET BY MOUTH TWICE A DAY WITH MEALS, Disp: 180 tablet, Rfl: 1  •  metoprolol succinate (TOPROL-XL) 25 mg 24 hr tablet, Take 1 tablet (25 mg total) by mouth daily, Disp: 30 tablet, Rfl: 0  •  rivaroxaban (XARELTO) 15 & 20 MG starter pack, Take 1 Package by mouth see administration instructions, Disp: 1 each, Rfl: 0  •  [START ON 11/21/2022] rivaroxaban (Xarelto) 20 mg tablet, Take 1 tablet (20 mg total) by mouth daily with breakfast Do not start before November 21, 2022 , Disp: 30 tablet, Rfl: 6  •  ticagrelor (Brilinta) 90 MG, Take 1 tablet (90 mg total) by mouth 2 (two) times a day, Disp: 180 tablet, Rfl: 1  No Known Allergies    Labs:  Lab Results   Component Value Date    WBC 6 28 10/23/2022    HGB 7 8 (L) 10/23/2022    HCT 23 2 (L) 10/23/2022    MCV 81 (L) 10/23/2022     (L) 10/23/2022     Lab Results   Component Value Date    GLUCOSE 145 (H) 04/09/2021    CALCIUM 8 4 10/23/2022    K 3 6 10/23/2022    CO2 26 10/23/2022     10/23/2022    BUN 10 10/23/2022    CREATININE 0 92 10/23/2022     Lab Results   Component Value Date    HGBA1C 7 2 (H) 10/19/2022     No results found for: CHOL  Lab Results   Component Value Date    HDL 34 (L) 04/08/2021    HDL 35 (L) 04/07/2021     Lab Results   Component Value Date    LDLCALC 44 04/08/2021    LDLCALC 79 04/07/2021     Lab Results   Component Value Date    TRIG 97 04/08/2021    TRIG 91 04/07/2021     No results found for: CHOLHDL    Review of Systems:  Review of Systems    Physical Exam:  Physical Exam  Vitals and nursing note reviewed  Constitutional:       General: He is not in acute distress  Appearance: Normal appearance  He is not ill-appearing or diaphoretic  HENT:      Head: Normocephalic and atraumatic  Eyes:      Extraocular Movements: Extraocular movements intact  Cardiovascular:      Rate and Rhythm: Normal rate and regular rhythm  Pulses: Normal pulses  Heart sounds: No murmur heard  No gallop     Pulmonary:      Effort: Pulmonary effort is normal  No respiratory distress  Breath sounds: Normal breath sounds  Abdominal:      General: There is no distension  Palpations: Abdomen is soft  Musculoskeletal:         General: No swelling  Normal range of motion  Cervical back: Normal range of motion and neck supple  No rigidity  Comments: Mild left groin swelling, states improving  No tenderness, or hematoma   Skin:     General: Skin is warm  Capillary Refill: Capillary refill takes less than 2 seconds  Neurological:      General: No focal deficit present  Mental Status: He is alert and oriented to person, place, and time     Psychiatric:         Mood and Affect: Mood normal

## 2022-11-01 ENCOUNTER — OFFICE VISIT (OUTPATIENT)
Dept: VASCULAR SURGERY | Facility: CLINIC | Age: 57
End: 2022-11-01

## 2022-11-01 VITALS
HEART RATE: 62 BPM | DIASTOLIC BLOOD PRESSURE: 70 MMHG | HEIGHT: 65 IN | WEIGHT: 170 LBS | SYSTOLIC BLOOD PRESSURE: 124 MMHG | BODY MASS INDEX: 28.32 KG/M2

## 2022-11-01 DIAGNOSIS — I70.213 ATHEROSCLEROSIS OF NATIVE ARTERY OF BOTH LOWER EXTREMITIES WITH INTERMITTENT CLAUDICATION (HCC): Primary | ICD-10-CM

## 2022-11-01 NOTE — PATIENT INSTRUCTIONS
Continue Brilinta and Xarelto   Lower extremity arterial duplex in January with return office visit   call or return to office immediately with new or worsening symptoms  Go to ED with increased pain, changes in color, temperature, or sensation to feet  Encourage taking walking breaks from driving when back to work

## 2022-11-01 NOTE — PROGRESS NOTES
Assessment/Plan:    Claudication of right lower extremity [facility])  14-year-old male former smoker with CAD- PCI, CABG x3, DM, PAD s/p R Fem-AK Pop PTFE Bypass (2/2022) s/p LLE SFA NIRMALA (1/2022) presented with RLE Acute Limb Ischemia, Right Fem-AK Pop PTFE Thrombosis now s/p lysis and R Fem-AK Pop SES/PTA, Pro[facility] PTA w/[facility] Thrombectomy 10/19-20  presents for post intervention evaluation and return to work clearance  -s/p lysis of R fem-AK pop PTFE thrombosis and right fem-AK pop SES/PTA, profundua PTA with mechanical thrombectomy c/b left groin hematoma ABLA w/ 1 unit PRBC transfusion    - patient without complaints  Denies pain  - left groin access site and hematoma site soft, nontender with resolving ecchymosis  Left palpable femoral pulse  - bilateral palpable distal DP/PT pulses    Plan:  -Continue Brilinta (per cards) and Xarelto   -Continue statin  -Lower extremity arterial duplex in January with return office visit   -Call or return to office immediately with new or worsening symptoms  -Go to ED with increased pain, changes in color, temperature, or sensation to feet  -return to work note generated  -Encourage taking walking breaks from driving when back to work  -cc Dr Genia Romo       Diagnoses and all orders for this visit:    Atherosclerosis of native artery of both lower extremities with intermittent claudication ([facility] )          Subjective:      Patient ID: Gerardo Bazzi is a 62 y o  male  Pt is here to rev  IR LE ANGIOGRAM 10/19/22  Joycelyn Anguiano Pt is taking Atorvastatin, Xarelto and Brilinta  Pt is a former smoker  HPI    The following portions of the patient's history were reviewed and updated as appropriate: allergies, current medications, past family history, past medical history, past social history, past surgical history and problem list     Review of Systems   Constitutional: Negative  HENT: Negative  Eyes: Negative  Respiratory: Negative  Cardiovascular: Negative      Gastrointestinal: Negative  Endocrine: Negative  Genitourinary: Negative  Musculoskeletal: Negative  Skin: Negative  Allergic/Immunologic: Negative  Neurological: Negative  Hematological: Negative  Psychiatric/Behavioral: Negative  I have reviewed and made appropriate changes to the review of systems input by the medical assistant  Objective:      /70 (BP Location: Right arm, Patient Position: Sitting, Cuff Size: Standard)   Pulse 62   Ht 5' 5" (1 651 m)   Wt 77 1 kg (170 lb)   BMI 28 29 kg/m²          Physical Exam  Vitals reviewed  Constitutional:       Appearance: Normal appearance  Cardiovascular:      Rate and Rhythm: Normal rate and regular rhythm  Pulses:           Femoral pulses are 2+ on the right side and 2+ on the left side  Dorsalis pedis pulses are 2+ on the right side and detected w/ Doppler on the left side  Posterior tibial pulses are 2+ on the right side and 2+ on the left side  Musculoskeletal:         General: No swelling  Normal range of motion  Right lower leg: No edema  Left lower leg: No edema  Skin:     General: Skin is warm and dry  Capillary Refill: Capillary refill takes less than 2 seconds  Findings: Bruising present  Comments: L groin ecchymosis resolving, soft non tender  Neurological:      Mental Status: He is alert and oriented to person, place, and time     Psychiatric:         Behavior: Behavior normal              Vitals:    11/01/22 1021   BP: 124/70   BP Location: Right arm   Patient Position: Sitting   Cuff Size: Standard   Pulse: 62   Weight: 77 1 kg (170 lb)   Height: 5' 5" (1 651 m)       Patient Active Problem List   Diagnosis   • Type II diabetes mellitus (HCC)   • Thrombocytopenia (HCC)   • S/P CABG x 3   • Postoperative anemia due to acute blood loss   • Sinus bradycardia   • CAD (coronary artery disease)   • GERD (gastroesophageal reflux disease)   • Hypertension   • Atheroscler native arteries the extremities w/intermit claudication ([facility] )   • Former tobacco use   • History of PTCA   • Claudication of right lower extremity ([facility] )       Past Surgical History:   Procedure Laterality Date   • ARTERIOGRAM Right 10/19/2022    Procedure: RIGHT LOWER EXTREMITY ARTERIOGRAM WITH LYSIS, PLACEMENT OF LYSIS CATHETER; third order cannulation of right popliteal artery via left groin access;  Surgeon: Milena Bell MD;  Location: [facility] MA[facility] OR;  Service: Vascular   • CARDIAC CATHETERIZATION     • CARDIAC CATHETERIZATION N/A 10/20/2021    Procedure: Cardiac catheterization;  Surgeon: Sheryl Noe MD;  Location: [address] [place] [address] CATH LAB; Service: Cardiology   • CARDIAC CATHETERIZATION N/A 10/20/2021    Procedure: Cardiac Coronary Angiogram;  Surgeon: Sheryl Noe MD;  Location: [address] [place] [address] CATH LAB; Service: Cardiology   • CARDIAC CATHETERIZATION N/A 10/20/2021    Procedure: Cardiac pci;  Surgeon: Sheryl Noe MD;  Location: [address] [place] [address] CATH LAB; Service: Cardiology   • IR LOWER EXTREMITY ANGIOGRAM  1/20/2022   • IR LOWER EXTREMITY ANGIOGRAM  2/3/2022   • IR PICC PLACEMENT DOUBLE LUMEN  10/19/2022   • IR TPA LYSIS CHECK  10/20/2022   • VT CABG, ARTERY-VEIN, FOUR N/A 4/9/2021    Procedure: CORONARY ARTERY BYPASS GRAFT (CABG) 3 VESSELS: LIMA to LAD; LLE EVH/SVG to LPL & OM2;  Surgeon: Konrad Kiser DO;  Location: [facility] MA[facility] OR;  Service: Cardiac Surgery   • VT ECHO TRANSESOPHAG R-T 2D W/PRB IMG ACQUISJ I&R N/A 4/9/2021    Procedure: TRANSESOPHAGEAL ECHOCARDIOGRAM (BENNETT); Surgeon: Konrad Kiser DO;  Location: [facility] OR;  Service: Cardiac Surgery   • VT ENDOSCOPY W/VIDEO-ASST VEIN HARVEST,CABG Left 4/9/2021    Procedure: HARVEST VEIN ENDOSCOPIC ([address]);   Surgeon: Konrad Kiser DO;  Location: [facility] MAIN OR;  Service: Cardiac Surgery   • VT SLCTV CATHJ 3RD+ ORD SLCTV ABDL PEL/LXTR [facility] Left 1/20/2022    Procedure: ARTERIOGRAM, STENT PLACEMENT IN LEFT SUPERFICIAL FEMORIAL ARTERY;  Surgeon: Jeannette Swann, MD;  Location: [facility] OR;  Service: Vascular   • OK VEIN BYPASS GRAFT,FEM-POP Right 2/3/2022    Procedure: BYPASS FEMORAL-POPLITEAL;  Surgeon: Shane Wilson MD;  Location: [facility];  Service: Vascular   • VASECTOMY         Family History   Problem Relation Age of Onset   • Heart disease Father        Social History     Socioeconomic History   • Marital status:      Spouse name: Not on file   • Number of children: Not on file   • Years of education: Not on file   • Highest education level: Not on file   Occupational History   • Not on file   Tobacco Use   • Smoking status: Former Smoker     Packs/day: 1 00     Years: 38 00     Pack years: 38 00     Types: Cigarettes     Quit date: 2021     Years since quittin 5   • Smokeless tobacco: Never Used   Vaping Use   • Vaping Use: Never used   Substance and Sexual Activity   • Alcohol use: Never   • Drug use: Never   • Sexual activity: Not Currently   Other Topics Concern   • Not on file   Social History Narrative   • Not on file     Social Determinants of Health     Financial Resource Strain: Not on file   Food Insecurity: No Food Insecurity   • Worried About Running Out of Food in the Last Year: Never true   • Ran Out of Food in the Last Year: Never true   Transportation Needs: No Transportation Needs   • Lack of Transportation (Medical): No   • Lack of Transportation (Non-Medical):  No   Physical Activity: Not on file   Stress: Not on file   Social Connections: Not on file   Intimate Partner Violence: Not on file   Housing Stability: Unknown   • Unable to Pay for Housing in the Last Year: No   • Number of Places Lived in the Last Year: Not on file   • Unstable Housing in the Last Year: No       No Known Allergies      Current Outpatient Medications:   •  atorvastatin (LIPITOR) 80 mg tablet, Take 1 tablet (80 mg total) by mouth daily with dinner, Disp: 90 tablet, Rfl: 0  •  isosorbide mononitrate (IMDUR) 30 mg 24 hr tablet, TAKE 1 TABLET BY MOUTH TWICE A DAY, Disp: 180 tablet, Rfl: 1  •  losartan (COZAAR) 25 mg tablet, Take 1 tablet (25 mg total) by mouth daily, Disp: 90 tablet, Rfl: 3  •  metFORMIN (GLUCOPHAGE) 1000 MG tablet, TAKE 1 TABLET BY MOUTH TWICE A DAY WITH MEALS, Disp: 180 tablet, Rfl: 1  •  metoprolol succinate (TOPROL-XL) 25 mg 24 hr tablet, Take 1 tablet (25 mg total) by mouth daily, Disp: 30 tablet, Rfl: 0  •  rivaroxaban (XARELTO) 15 & 20 MG starter pack, Take 1 Package by mouth see administration instructions, Disp: 1 each, Rfl: 0  •  [START ON 11/21/2022] rivaroxaban (Xarelto) 20 mg tablet, Take 1 tablet (20 mg total) by mouth daily with breakfast Do not start before November 21, 2022 , Disp: 30 tablet, Rfl: 6  •  ticagrelor (Brilinta) 90 MG, Take 1 tablet (90 mg total) by mouth 2 (two) times a day, Disp: 180 tablet, Rfl: 1

## 2022-11-01 NOTE — ASSESSMENT & PLAN NOTE
77-year-old male former smoker with CAD- PCI, CABG x3, DM, PAD s/p R Fem-AK Pop PTFE Bypass (2/2022) s/p LLE SFA NIRMALA (1/2022) presented with RLE Acute Limb Ischemia, Right Fem-AK Pop PTFE Thrombosis now s/p lysis and R Fem-AK Pop SES/PTA, Profunda PTA w/[facility] Thrombectomy 10/19-20  presents for post intervention evaluation and return to work clearance  -s/p lysis of R fem-AK pop PTFE thrombosis and right fem-AK pop SES/PTA, profundua PTA with mechanical thrombectomy c/b left groin hematoma ABLA w/ 1 unit PRBC transfusion    - patient without complaints  Denies pain  - left groin access site and hematoma site soft, nontender with resolving ecchymosis    Left palpable femoral pulse  - bilateral palpable distal DP/PT pulses    Plan:  -Continue Brilinta (per cards) and Xarelto   -Continue statin  -Lower extremity arterial duplex in January with return office visit   -Call or return to office immediately with new or worsening symptoms  -Go to ED with increased pain, changes in color, temperature, or sensation to feet  -return to work note generated  -Encourage taking walking breaks from driving when back to work  -cc Dr Lenard Bermudez

## 2022-11-01 NOTE — LETTER
November 1, 2022     Patient: Fawn Dutton  YOB: 1965  Date of Visit: 11/1/2022      To Whom it May Concern:    Fawn Dutton is under my professional care  Roselyn Resendiz was seen in my office on 11/1/2022  Roselyn Resendiz may return to work on 11/7/22 without restriction  If you have any questions or concerns, please don't hesitate to call           Sincerely,          SEE [employer]        CC: No Recipients

## 2022-11-03 ENCOUNTER — TELEPHONE (OUTPATIENT)
Dept: VASCULAR SURGERY | Facility: CLINIC | Age: 57
End: 2022-11-03

## 2022-11-03 NOTE — TELEPHONE ENCOUNTER
Patient called stating note provided 11/1 to RTW 11/7 is not sufficient  When d/c from hospital he was given note from hospital that covered him through 10/23/22, he needs note stating he was unable to work 10/24/22 until he returns 11/7/22    Note placed in chart and faxed to Attn: Joshua Pabon [phone] per patient request

## 2022-11-04 ENCOUNTER — TELEPHONE (OUTPATIENT)
Dept: FAMILY MEDICINE CLINIC | Facility: CLINIC | Age: 57
End: 2022-11-04

## 2022-11-04 ENCOUNTER — TELEPHONE (OUTPATIENT)
Dept: CARDIOLOGY CLINIC | Facility: CLINIC | Age: 57
End: 2022-11-04

## 2022-11-04 NOTE — TELEPHONE ENCOUNTER
Patient Pierce June [phone] stopped in office today and dropped off cardiovascular form to be filled out by Dr Enma Ventura  Once filled out, please fax to: [phone] and contact patient to advise  Form scanned in patient's chart and given to Estuardo Abbott to put into Dr Enma Ventura folder

## 2022-11-04 NOTE — TELEPHONE ENCOUNTER
Completed form faxed to # [phone] - confirmation fax received  Copy scanned  See media  Pt notified  Emailed to pt

## 2022-11-07 ENCOUNTER — TELEPHONE (OUTPATIENT)
Dept: CARDIOLOGY CLINIC | Facility: CLINIC | Age: 57
End: 2022-11-07

## 2022-11-08 ENCOUNTER — TELEPHONE (OUTPATIENT)
Dept: VASCULAR SURGERY | Facility: CLINIC | Age: 57
End: 2022-11-08

## 2022-11-08 NOTE — TELEPHONE ENCOUNTER
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

## 2022-11-09 NOTE — TELEPHONE ENCOUNTER
Pt called regarding this  He would like these forms completed today, because he is without an income  The forms would then need to be faxed to Heather Reardon  so he can return to work

## 2022-11-09 NOTE — TELEPHONE ENCOUNTER
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 Thanks

## 2022-11-27 DIAGNOSIS — D69.6 THROMBOCYTOPENIA (HCC): ICD-10-CM

## 2022-11-27 DIAGNOSIS — Z72.0 TOBACCO ABUSE: ICD-10-CM

## 2022-11-27 DIAGNOSIS — Z95.1 S/P CABG X 3: ICD-10-CM

## 2022-11-27 DIAGNOSIS — R73.09 ELEVATED HEMOGLOBIN A1C: ICD-10-CM

## 2022-12-08 DIAGNOSIS — Z95.1 S/P CABG X 3: ICD-10-CM

## 2022-12-09 RX ORDER — METOPROLOL SUCCINATE 25 MG/1
25 TABLET, EXTENDED RELEASE ORAL DAILY
Qty: 30 TABLET | Refills: 0 | Status: SHIPPED | OUTPATIENT
Start: 2022-12-09

## 2023-01-08 DIAGNOSIS — Z95.1 S/P CABG X 3: ICD-10-CM

## 2023-01-10 RX ORDER — METOPROLOL SUCCINATE 25 MG/1
25 TABLET, EXTENDED RELEASE ORAL DAILY
Qty: 30 TABLET | Refills: 0 | Status: SHIPPED | OUTPATIENT
Start: 2023-01-10

## 2023-01-16 ENCOUNTER — TELEPHONE (OUTPATIENT)
Dept: VASCULAR SURGERY | Facility: CLINIC | Age: 58
End: 2023-01-16

## 2023-01-16 DIAGNOSIS — I70.213 ATHEROSCLEROSIS OF NATIVE ARTERY OF BOTH LOWER EXTREMITIES WITH INTERMITTENT CLAUDICATION (HCC): Primary | ICD-10-CM

## 2023-01-16 NOTE — TELEPHONE ENCOUNTER
Received fax from [employer] requesting us to complete a prior-auth for xarelto 20 mg  Attempted to complete prior-auth on [email or web address] and received message, "ELDA does not manage PA for this patient  Please contact the number on the back of the members card for further assistance "    Called [employer] pharmacy and spoke w/ pharmacist  They gave me the help line number to start a prior auth [phone] and member ID number [id number]  Called that number and received message that they are closed today due to the holiday  Will call back tomorrow

## 2023-01-17 NOTE — TELEPHONE ENCOUNTER
Dr Willian Street signed prior-auth form  Faxed over the form and most recent OV note to rx benefits

## 2023-01-17 NOTE — TELEPHONE ENCOUNTER
Received prior-auth questionnaire via fax to be completed  Completed and signed by Dr Arian Peacock  Faxed to [phone]

## 2023-01-17 NOTE — TELEPHONE ENCOUNTER
Called rx benefits- they do not do prior-authorizations over the phone  Printed out form on [employer] and completed  Sent to Dr Jayson Cheng to sign

## 2023-01-18 NOTE — TELEPHONE ENCOUNTER
Spoke w/ pt and informed him of denial  He said he currently has about 4 tablets left of xarelto 20 mg  Dr Duron Males, pt's insurance is denying the xarelto  Please advise on how to proceed

## 2023-01-19 ENCOUNTER — TELEPHONE (OUTPATIENT)
Dept: VASCULAR SURGERY | Facility: CLINIC | Age: 58
End: 2023-01-19

## 2023-01-19 NOTE — TELEPHONE ENCOUNTER
Codi Palacios, DO  You 9 hours ago (10:19 PM)     JF  This patient has peripheral vascular disease and had a thrombosed fem pop graft that underwent thrombolysis  It is essential he be maintained on anticoagulation  His insurance co  Will not approve for this indication?

## 2023-01-19 NOTE — TELEPHONE ENCOUNTER
Per denial letter, the medication can only be approved if the pt has peripheral artery disease AND is taking at least 75 mg of ASA  Denial letter states we can start an appeal on the pt's behalf  He will need to sign form for authorization to disclose information  Pt will stop by the [place] office tomorrow to sign the form and  xarelto samples  4 bottles of xarelto 20 mg (7 tablets each) Lot [id number], exp 02/2025; Lot [id number], exp 04/2024

## 2023-01-19 NOTE — TELEPHONE ENCOUNTER
Attempted to contact patient to schedule appointment(s) listed below  Requested patient call [phone] option 3 to schedule appointment(s)  Patient's appointment(s) are due now      Dopplers  [] Abdominal Aorta Iliac (AOIL)  [] Carotid (CV)   [] Celiac and/or Mesenteric  [] Endovascular Aortic Repair (EVAR)   [] Hemodialysis Access (HD)     [x] Lower Limb Arterial (AMBER)  SCHEDULED 1/30/2023    [] Lower Limb Venous Duplex with Reflux (LEVDR)  [] Renal Artery  [] Upper Limb Arterial (UEA)    [] Upper Limb Venous (UEV)              [] FRANCIE and Waveform analysis     Advanced Imaging   [] CTA head/neck    [] CTA abdomen    [] CTA abdomen & pelvis    [] CT abdomen with/ without contrast  [] CT abdomen with contrast  [] CT abdomen without contrast    [] CT abdomen & pelvis with/ without contrast  [] CT abdomen & pelvis with contrast  [] CT abdomen & pelvis without contrast    Office Visit   [] New patient, patient last seen over 3 years ago  [] Risk factor modification (RFM)   [x] Follow up     NEEDS F/U OV SCHEDULED AFTER 1/30/23  [] Lost to follow up (LTFU)

## 2023-01-20 NOTE — TELEPHONE ENCOUNTER
Spoke to Ambrocio bedoya, at the [employer]  Pt did  the samples and signed the authorization form

## 2023-01-23 NOTE — TELEPHONE ENCOUNTER
Authorization form and letter of medical necessity were signed by Dr Willian Street  Faxed these documents as well as office and hospital notes to [employer] Benefits [phone]

## 2023-01-26 RX ORDER — ASPIRIN 81 MG/1
81 TABLET ORAL DAILY
Qty: 30 TABLET | Refills: 0
Start: 2023-01-26

## 2023-01-26 NOTE — TELEPHONE ENCOUNTER
Pt called back and was advised to start ASA 81 mg in addition to plavix and brilinta  Informed him we would resubmit to his insurance

## 2023-01-26 NOTE — TELEPHONE ENCOUNTER
Per the denial  "Prior authorization requirements have not been met  The requested medication can only be approved for the reduction in risk of major cardiovascular events if the patient has either coronary artery disease or peripheral artery disease AND is taking at least 75mg of asa daily as documented in the patient medical record  Prior authorization requirements have not been met    The requested medication can only be approved for a diagnosis of atrial fibrillation/atrial flutter, DVT, PE, or for the treatment or prevention of other thromboembolic-related indications as documented int he patient medical record "

## 2023-01-26 NOTE — TELEPHONE ENCOUNTER
Received denial of appeal letter from rx benefits  Again states pt must be taking at least 75mg of ASA  Please advise

## 2023-01-26 NOTE — TELEPHONE ENCOUNTER
Cheo Swann, DO  [facility] 7 minutes ago (3:34 PM)     JF  So lets start him on aspirin and resubmit

## 2023-01-30 ENCOUNTER — HOSPITAL ENCOUNTER (OUTPATIENT)
Dept: NON INVASIVE DIAGNOSTICS | Facility: CLINIC | Age: 58
Discharge: HOME | End: 2023-01-30

## 2023-01-30 DIAGNOSIS — I70.213 ATHEROSCLEROSIS OF NATIVE ARTERY OF BOTH LOWER EXTREMITIES WITH INTERMITTENT CLAUDICATION (HCC): Primary | ICD-10-CM

## 2023-01-30 DIAGNOSIS — I70.213 ATHEROSCLEROSIS OF NATIVE ARTERY OF BOTH LOWER EXTREMITIES WITH INTERMITTENT CLAUDICATION (HCC): ICD-10-CM

## 2023-01-30 RX ORDER — ASPIRIN 81 MG/1
81 TABLET ORAL DAILY
Qty: 30 TABLET | Refills: 0
Start: 2023-01-30

## 2023-01-30 NOTE — TELEPHONE ENCOUNTER
Dr Santana Hamper, as previously discussed can you sign order for ASA so that it can be added to pt's med list so that we can resubmit appeal for xarelto denial? Thank you

## 2023-02-03 DIAGNOSIS — Z95.1 S/P CABG X 3: ICD-10-CM

## 2023-02-03 RX ORDER — METOPROLOL SUCCINATE 25 MG/1
25 TABLET, EXTENDED RELEASE ORAL DAILY
Qty: 90 TABLET | Refills: 1 | Status: SHIPPED | OUTPATIENT
Start: 2023-02-03

## 2023-02-14 ENCOUNTER — TELEPHONE (OUTPATIENT)
Dept: VASCULAR SURGERY | Facility: CLINIC | Age: 58
End: 2023-02-14

## 2023-02-14 NOTE — TELEPHONE ENCOUNTER
Pt called stating that he believes he needs a refill of Xarelto sent to the pharmacy  Called [facility] pharmacy and spoke with the pharmacist  Pt has refills on file, and will get it ready for pt  Called pt and made him aware

## 2023-03-06 ENCOUNTER — OFFICE VISIT (OUTPATIENT)
Dept: VASCULAR SURGERY | Facility: CLINIC | Age: 58
End: 2023-03-06

## 2023-03-06 VITALS
WEIGHT: 170 LBS | HEART RATE: 74 BPM | BODY MASS INDEX: 28.32 KG/M2 | HEIGHT: 65 IN | DIASTOLIC BLOOD PRESSURE: 70 MMHG | SYSTOLIC BLOOD PRESSURE: 124 MMHG

## 2023-03-06 DIAGNOSIS — I74.3 EMBOLISM AND THROMBOSIS OF ARTERIES OF THE LOWER EXTREMITIES (HCC): ICD-10-CM

## 2023-03-06 DIAGNOSIS — E11.59 TYPE 2 DIABETES MELLITUS WITH OTHER CIRCULATORY COMPLICATION, WITHOUT LONG-TERM CURRENT USE OF INSULIN (HCC): ICD-10-CM

## 2023-03-06 DIAGNOSIS — I73.9 CLAUDICATION OF RIGHT LOWER EXTREMITY (HCC): Primary | ICD-10-CM

## 2023-03-06 DIAGNOSIS — I70.213 ATHEROSCLEROSIS OF NATIVE ARTERY OF BOTH LOWER EXTREMITIES WITH INTERMITTENT CLAUDICATION (HCC): ICD-10-CM

## 2023-03-06 DIAGNOSIS — I73.9 PAD (PERIPHERAL ARTERY DISEASE) (HCC): ICD-10-CM

## 2023-03-06 NOTE — PROGRESS NOTES
Assessment/Plan:    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)  LLE disabling claudication   Left SFA stents 2022    RLE fem AK pop bypass with PTFE 2/2022    Right graft occlusion 10/2022    Treated with lysis and SFA viabahn    Doing well at this point  Asymptomatic  Not smoking  Cont asa/xarelto    LEAD done 1/2023 shows normal FRANCIE and no elevation of velocities  Repeat LEAD 6 months  Will also need carotid duplex 1 year for mild bilateral disease       Diagnoses and all orders for this visit:    Claudication of right lower extremity ([address] )    PAD (peripheral artery disease) ([facility] [address] )  -     VAS lower limb arterial duplex, complete bilateral; Future  -     VAS carotid complete study; Future    Embolism and thrombosis of arteries of the lower extremities (HCC)    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 complication, without long-term current use of insulin (HCC)    Atherosclerosis of native artery of both lower extremities with intermittent claudication (HCC)          Subjective:      Patient ID: Rachel Mayberry is a 62 y o  male  Pt presents to [facility] 1/30/23  Pt reports constant pain in buttocks, denies tissue loss  HPI    The following portions of the patient's history were reviewed and updated as appropriate: allergies, current medications, past family history, past medical history, past social history, past surgical history and problem list   Parthenia Severance left SFA intervention and right fem AK pop bypass  Right graft occluded 10/2022 and had lysis and intervention  Doing well at this point, denies leg pain at rest or with walking  Not currently smoking, compliant with xarleto and aspirin  Review of Systems   Constitutional: Negative  HENT: Negative  Eyes: Negative  Respiratory: Negative  Cardiovascular: Negative  Gastrointestinal: Negative  Endocrine: Negative  Genitourinary: Negative  Musculoskeletal: Negative  Skin: Negative  Allergic/Immunologic: Negative  Neurological: Negative  Hematological: Negative  Psychiatric/Behavioral: Negative  I have reviewed the ROS above and made changes as needed  Objective:      /70 (BP Location: Left arm, Patient Position: Sitting, Cuff Size: Standard)   Pulse 74   Ht 5' 5" (1 651 m)   Wt 77 1 kg (170 lb)   BMI 28 29 kg/m²          Physical Exam      General  Exam: alert, awake, oriented, no distress, consistent with stated age    Integumentary  Exam: warm, dry, no gross lesions, no bruises and normal color    Head and Neck  Exam: supple, no bruits, trachea midline, no JVD, no mass or palpable nodes    Eye  Exam: extraoccular movements intact, no scleral icterus, sclera clear, pupils equal round and reactive to light    ENMT  Exam: oral mucosa pink and moist    Chest and Lung  Exam: chest normal without deformity, bilaterally expansive, clear to auscultation    Cardiovascular  Exam: regular rate, regular rhythm, no murmurs, no rubs or gallops    Adbomen  Exam: soft, non-tender, non-distended, no pulsatile abdominal masses, no abdominal bruit    Peripheral Vascular  Exam: no clubbing of the digits of the upper extremity, no cyanosis, no edema, both feet are warm, radial pulses 2+ bilaterally, skin well perfused, without and no varicosities      No widened popliteal pulse noted bilaterally    Upper Extremity:  Palpation: Radial pulse- Bilateral 2+    Lower Extremity:  Palpation: Femoral pulse- Bilateral 2+         Popliteal pulse - Bilateral 2+        Dorsalis Pedis - Bilateral 2+ on the left, absent on the right        Posterior tibial - Bilateral 2+    Neurologic  Exam:alert, non-focal, oriented x 3, cranial nerves II-XII grossly intact

## 2023-03-06 NOTE — ASSESSMENT & PLAN NOTE
LLE disabling claudication   Left SFA stents 2022    RLE fem AK pop bypass with PTFE 2/2022    Right graft occlusion 10/2022    Treated with lysis and SFA viabahn    Doing well at this point  Asymptomatic  Not smoking  Cont asa/xarelto    LEAD done 1/2023 shows normal FRANCIE and no elevation of velocities  Repeat LEAD 6 months  Will also need carotid duplex 1 year for mild bilateral disease

## 2023-03-06 NOTE — PATIENT INSTRUCTIONS
Circulation to both legs is doing well  Continue xarelto and aspirin   Will need surveillance ultrasound in 6 months for legs and carotid  Follow up in 1 year

## 2023-03-12 DIAGNOSIS — I21.4 NSTEMI (NON-ST ELEVATED MYOCARDIAL INFARCTION) (HCC): ICD-10-CM

## 2023-03-13 RX ORDER — TICAGRELOR 90 MG/1
TABLET ORAL
Qty: 180 TABLET | Refills: 1 | Status: SHIPPED | OUTPATIENT
Start: 2023-03-13

## 2023-03-27 ENCOUNTER — APPOINTMENT (OUTPATIENT)
Dept: LAB | Facility: CLINIC | Age: 58
End: 2023-03-27

## 2023-03-27 LAB
ALBUMIN SERPL BCP-MCNC: 4.3 G/DL (ref 3.5–5)
ALP SERPL-CCNC: 84 U/L (ref 46–116)
ALT SERPL W P-5'-P-CCNC: 50 U/L (ref 12–78)
ANION GAP SERPL CALCULATED.3IONS-SCNC: 4 MMOL/L (ref 4–13)
AST SERPL W P-5'-P-CCNC: 28 U/L (ref 5–45)
BASOPHILS # BLD AUTO: 0.04 THOUSANDS/ÂΜL (ref 0–0.1)
BASOPHILS NFR BLD AUTO: 1 % (ref 0–1)
BILIRUB SERPL-MCNC: 0.72 MG/DL (ref 0.2–1)
BUN SERPL-MCNC: 30 MG/DL (ref 5–25)
CALCIUM SERPL-MCNC: 10.2 MG/DL (ref 8.3–10.1)
CHLORIDE SERPL-SCNC: 100 MMOL/L (ref 96–108)
CHOLEST SERPL-MCNC: 134 MG/DL
CO2 SERPL-SCNC: 28 MMOL/L (ref 21–32)
CREAT SERPL-MCNC: 1.19 MG/DL (ref 0.6–1.3)
EOSINOPHIL # BLD AUTO: 0.17 THOUSAND/ÂΜL (ref 0–0.61)
EOSINOPHIL NFR BLD AUTO: 2 % (ref 0–6)
ERYTHROCYTE [DISTWIDTH] IN BLOOD BY AUTOMATED COUNT: 14.7 % (ref 11.6–15.1)
GFR SERPL CREATININE-BSD FRML MDRD: 66 ML/MIN/1.73SQ M
GLUCOSE P FAST SERPL-MCNC: 165 MG/DL (ref 65–99)
HCT VFR BLD AUTO: 41.6 % (ref 36.5–49.3)
HDLC SERPL-MCNC: 43 MG/DL
HGB BLD-MCNC: 12.9 G/DL (ref 12–17)
IMM GRANULOCYTES # BLD AUTO: 0.02 THOUSAND/UL (ref 0–0.2)
IMM GRANULOCYTES NFR BLD AUTO: 0 % (ref 0–2)
LDLC SERPL CALC-MCNC: 66 MG/DL (ref 0–100)
LYMPHOCYTES # BLD AUTO: 2.17 THOUSANDS/ÂΜL (ref 0.6–4.47)
LYMPHOCYTES NFR BLD AUTO: 30 % (ref 14–44)
MCH RBC QN AUTO: 25.3 PG (ref 26.8–34.3)
MCHC RBC AUTO-ENTMCNC: 31 G/DL (ref 31.4–37.4)
MCV RBC AUTO: 82 FL (ref 82–98)
MONOCYTES # BLD AUTO: 0.58 THOUSAND/ÂΜL (ref 0.17–1.22)
MONOCYTES NFR BLD AUTO: 8 % (ref 4–12)
NEUTROPHILS # BLD AUTO: 4.26 THOUSANDS/ÂΜL (ref 1.85–7.62)
NEUTS SEG NFR BLD AUTO: 59 % (ref 43–75)
NONHDLC SERPL-MCNC: 91 MG/DL
NRBC BLD AUTO-RTO: 0 /100 WBCS
PLATELET # BLD AUTO: 224 THOUSANDS/UL (ref 149–390)
PMV BLD AUTO: 10.2 FL (ref 8.9–12.7)
POTASSIUM SERPL-SCNC: 4.7 MMOL/L (ref 3.5–5.3)
PROT SERPL-MCNC: 8.5 G/DL (ref 6.4–8.4)
PSA SERPL-MCNC: 0.3 NG/ML (ref 0–4)
RBC # BLD AUTO: 5.1 MILLION/UL (ref 3.88–5.62)
SODIUM SERPL-SCNC: 132 MMOL/L (ref 135–147)
TRIGL SERPL-MCNC: 126 MG/DL
WBC # BLD AUTO: 7.24 THOUSAND/UL (ref 4.31–10.16)

## 2023-03-28 ENCOUNTER — TELEPHONE (OUTPATIENT)
Dept: CARDIOLOGY CLINIC | Facility: CLINIC | Age: 58
End: 2023-03-28

## 2023-03-28 NOTE — TELEPHONE ENCOUNTER
----- Message from Isabella Weaver MD sent at 3/28/2023  4:11 PM EDT -----  Please let patient know that his lab work was essentially unremarkable

## 2023-05-03 DIAGNOSIS — I25.110 CORONARY ARTERY DISEASE INVOLVING NATIVE CORONARY ARTERY OF NATIVE HEART WITH UNSTABLE ANGINA PECTORIS (HCC): ICD-10-CM

## 2023-05-03 DIAGNOSIS — I10 HYPERTENSION, UNSPECIFIED TYPE: ICD-10-CM

## 2023-05-03 RX ORDER — LOSARTAN POTASSIUM 25 MG/1
25 TABLET ORAL DAILY
Qty: 90 TABLET | Refills: 3 | Status: SHIPPED | OUTPATIENT
Start: 2023-05-03

## 2023-05-03 NOTE — TELEPHONE ENCOUNTER
Name from pharmacy: LOSARTAN POTASSIUM 25 MG TAB         Will file in chart as: losartan (COZAAR) 25 mg tablet    Sig: Take 1 tablet (25 mg total) by mouth daily    Original sig: TAKE 1 TABLET (25 MG TOTAL) BY MOUTH DAILY  Disp:  90 tablet    Refills:  3    Start: 5/3/2023    Class: Normal    Non-formulary For: Coronary artery disease involving native coronary artery of native heart with unstable angina pectoris ([facility] [address] ); Hypertension, unspecified type    Last ordered: 11 months ago by Anca Vargas MD Last refill: 1/30/2023    Rx #: [id number]    Cardiovascular:  Angiotensin Receptor Blockers Failed 05/03/2023 02:51 AM   Protocol Details  Valid encounter within last 6 months    BP completed in the last 6 months    K is 5 3 or below and within 360 days    eGFR is 60 or above and within 360 days      To be filled at: [id number] [place], [place] - [address]   Please review and refill if possible  Thanks!

## 2023-06-17 DIAGNOSIS — Z95.1 S/P CABG X 3: ICD-10-CM

## 2023-06-17 DIAGNOSIS — D69.6 THROMBOCYTOPENIA (HCC): ICD-10-CM

## 2023-06-17 DIAGNOSIS — R73.09 ELEVATED HEMOGLOBIN A1C: ICD-10-CM

## 2023-06-17 DIAGNOSIS — Z72.0 TOBACCO ABUSE: ICD-10-CM

## 2023-06-19 DIAGNOSIS — Z95.1 S/P CABG X 3: ICD-10-CM

## 2023-06-19 RX ORDER — METOPROLOL SUCCINATE 25 MG/1
25 TABLET, EXTENDED RELEASE ORAL DAILY
Qty: 90 TABLET | Refills: 0 | Status: SHIPPED | OUTPATIENT
Start: 2023-06-19

## 2023-07-10 ENCOUNTER — OFFICE VISIT (OUTPATIENT)
Dept: FAMILY MEDICINE CLINIC | Facility: CLINIC | Age: 58
End: 2023-07-10
Payer: COMMERCIAL

## 2023-07-10 VITALS
SYSTOLIC BLOOD PRESSURE: 124 MMHG | HEART RATE: 71 BPM | HEIGHT: 65 IN | TEMPERATURE: 97.8 F | WEIGHT: 168 LBS | DIASTOLIC BLOOD PRESSURE: 70 MMHG | BODY MASS INDEX: 27.99 KG/M2 | OXYGEN SATURATION: 99 %

## 2023-07-10 DIAGNOSIS — Z12.11 COLON CANCER SCREENING: ICD-10-CM

## 2023-07-10 DIAGNOSIS — Z12.2 ENCOUNTER FOR SCREENING FOR LUNG CANCER: ICD-10-CM

## 2023-07-10 DIAGNOSIS — D62 POSTOPERATIVE ANEMIA DUE TO ACUTE BLOOD LOSS: ICD-10-CM

## 2023-07-10 DIAGNOSIS — D69.6 THROMBOCYTOPENIA (HCC): ICD-10-CM

## 2023-07-10 DIAGNOSIS — R91.8 LUNG NODULES: ICD-10-CM

## 2023-07-10 DIAGNOSIS — I25.110 CORONARY ARTERY DISEASE INVOLVING NATIVE CORONARY ARTERY OF NATIVE HEART WITH UNSTABLE ANGINA PECTORIS (HCC): Chronic | ICD-10-CM

## 2023-07-10 DIAGNOSIS — I10 PRIMARY HYPERTENSION: Chronic | ICD-10-CM

## 2023-07-10 DIAGNOSIS — F17.211 NICOTINE DEPENDENCE, CIGARETTES, IN REMISSION: ICD-10-CM

## 2023-07-10 DIAGNOSIS — E11.59 TYPE 2 DIABETES MELLITUS WITH OTHER CIRCULATORY COMPLICATION, WITHOUT LONG-TERM CURRENT USE OF INSULIN (HCC): ICD-10-CM

## 2023-07-10 DIAGNOSIS — Z95.1 S/P CABG X 3: ICD-10-CM

## 2023-07-10 DIAGNOSIS — I70.213 ATHEROSCLEROSIS OF NATIVE ARTERY OF BOTH LOWER EXTREMITIES WITH INTERMITTENT CLAUDICATION (HCC): ICD-10-CM

## 2023-07-10 DIAGNOSIS — Z00.00 ANNUAL PHYSICAL EXAM: Primary | ICD-10-CM

## 2023-07-10 PROBLEM — I74.3 EMBOLISM AND THROMBOSIS OF ARTERIES OF THE LOWER EXTREMITIES (HCC): Status: RESOLVED | Noted: 2023-03-06 | Resolved: 2023-07-10

## 2023-07-10 PROBLEM — R00.1 SINUS BRADYCARDIA: Status: RESOLVED | Noted: 2021-04-14 | Resolved: 2023-07-10

## 2023-07-10 LAB — SL AMB POCT HEMOGLOBIN AIC: 9 (ref ?–6.5)

## 2023-07-10 PROCEDURE — 99396 PREV VISIT EST AGE 40-64: CPT | Performed by: STUDENT IN AN ORGANIZED HEALTH CARE EDUCATION/TRAINING PROGRAM

## 2023-07-10 PROCEDURE — 83036 HEMOGLOBIN GLYCOSYLATED A1C: CPT | Performed by: STUDENT IN AN ORGANIZED HEALTH CARE EDUCATION/TRAINING PROGRAM

## 2023-07-10 RX ORDER — PRAVASTATIN SODIUM 20 MG
20 TABLET ORAL
Qty: 90 TABLET | Refills: 3 | Status: SHIPPED | OUTPATIENT
Start: 2023-07-10

## 2023-07-10 NOTE — ASSESSMENT & PLAN NOTE
Lab Results   Component Value Date    HGBA1C 9.0 (A) 07/10/2023     Continue metformin. Did discuss going on additional medication however patient would like to address with diet first.  Has been eating lots of extra carbs and this is likely the reason for the increases in his A1c. Switch from atorvastatin to pravastatin for the muscle aches.   Did discuss at length the benefit of a statin medication be on just the cholesterol and triglyceride improvement

## 2023-07-10 NOTE — PROGRESS NOTES
[address]    NAME: Willette Heimlich  AGE: 62 y.o. SEX: male  : 1965     DATE: 7/10/2023     Assessment and Plan:     Problem List Items Addressed This Visit        Endocrine    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 complication, without long-term current use of insulin (HCC)       Lab Results   Component Value Date    HGBA1C 9.0 (A) 07/10/2023     Continue metformin. Did discuss going on additional medication however patient would like to address with diet first.  Has been eating lots of extra carbs and this is likely the reason for the increases in his A1c. Switch from atorvastatin to pravastatin for the muscle aches. Did discuss at length the benefit of a statin medication be on just the cholesterol and triglyceride improvement         Relevant Medications    pravastatin (PRAVACHOL) 20 mg tablet    Other Relevant Orders    POCT hemoglobin A1c (Completed)    Lipid Panel with Direct LDL reflex    TSH, 3rd generation with Free T4 reflex    CBC and differential    Albumin / creatinine urine ratio       Cardiovascular and Mediastinum    CAD (coronary artery disease) (Chronic)     Stable. On aspirin, Brilinta. Beta-blocker, imdur and start pravastatin. Follows with cardiology         Relevant Medications    pravastatin (PRAVACHOL) 20 mg tablet    Hypertension (Chronic)     Stable on losartan 25 and metoprolol         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)     Asymptomatic and his continued smoking cessation.   Discussed switching from Lipitor to pravastatin            Hematopoietic and Hemostatic    RESOLVED: Thrombocytopenia (HCC)     Appears to have resolved but we will follow-up CBC            Other    S/P CABG x 3    RESOLVED: Postoperative anemia due to acute blood loss   Other Visit Diagnoses     Annual physical exam    -  Primary    Lung nodules Relevant Orders    CT lung screening program    BMI 27.0-27.9,adult        Encounter for screening for lung cancer        Relevant Orders    CT lung screening program    Nicotine dependence, cigarettes, in remission        Relevant Orders    CT lung screening program    Colon cancer screening        Relevant Orders    Ambulatory Referral to Gastroenterology          Immunizations and preventive care screenings were discussed with patient today. Appropriate education was printed on patient's after visit summary. Discussed risks and benefits of prostate cancer screening. We discussed the controversial history of PSA screening for prostate cancer in the [facility] as well as the risk of over detection and over treatment of prostate cancer by way of PSA screening. The patient understands that PSA blood testing is an imperfect way to screen for prostate cancer and that elevated PSA levels in the blood may also be caused by infection, inflammation, prostatic trauma or manipulation, urological procedures, or by benign prostatic enlargement. The role of the digital rectal examination in prostate cancer screening was also discussed and I discussed with him that there is large interobserver variability in the findings of digital rectal examination. Counseling:  Exercise: the importance of regular exercise/physical activity was discussed. Recommend exercise 3-5 times per week for at least 30 minutes. BMI Counseling: Body mass index is 27.96 kg/m². The BMI is above normal. Nutrition recommendations include decreasing portion sizes, encouraging healthy choices of fruits and vegetables, decreasing fast food intake, consuming healthier snacks, limiting drinks that contain sugar and moderation in carbohydrate intake. Exercise recommendations include moderate physical activity 150 minutes/week, exercising 3-5 times per week and strength training exercises. No pharmacotherapy was ordered.  Rationale for BMI follow-up plan is due to patient being overweight or obese. Depression Screening and Follow-up Plan: Patient was screened for depression during today's encounter. They screened negative with a PHQ-2 score of 0. Return in 4 months (on 11/10/2023) for Recheck a1c. Chief Complaint:     Chief Complaint   Patient presents with   • Physical Exam     PE due   • Diabetes     A1C, micro urine, and foot exam due. History of Present Illness:     Adult Annual Physical   Patient here for a comprehensive physical exam. The patient reports problems - see below. Has been eating more rice the last few months, gained weight    Diet and Physical Activity  Diet/Nutrition: poor diet. Exercise: walking and strength training exercises. Depression Screening  PHQ-2/9 Depression Screening    Little interest or pleasure in doing things: 0 - not at all  Feeling down, depressed, or hopeless: 0 - not at all  PHQ-2 Score: 0  PHQ-2 Interpretation: Negative depression screen       General Health  Sleep: sleeps well. Hearing: normal - bilateral.  Vision: no vision problems and goes for regular eye exams. Dental: regular dental visits.  Health  Symptoms include: none     Review of Systems:     Review of Systems   Constitutional: Negative for chills, fatigue and fever. HENT: Negative for rhinorrhea and sore throat. Eyes: Negative for visual disturbance. Respiratory: Negative for cough and shortness of breath. Cardiovascular: Negative for chest pain and palpitations. Gastrointestinal: Negative for abdominal pain, constipation, diarrhea, nausea and vomiting. Genitourinary: Negative for difficulty urinating, dysuria and frequency. Musculoskeletal: Negative for arthralgias and myalgias. Skin: Negative for color change and rash. Neurological: Negative for weakness and headaches.       Past Medical History:     Past Medical History:   Diagnosis Date   • Bicuspid aortic valve     being observed • CAD (coronary artery disease)    • Chest pain 4/6/2021   • Coronary artery disease    • Diabetes mellitus ([address])    • Encounter for postoperative care 5/17/2021   • Former tobacco use    • GERD (gastroesophageal reflux disease)    • Goiter    • History of myocardial infarction    • History of rib fracture    • Hyperlipidemia    • Hypertension    • Hypertensive urgency 4/6/2021   • Left carotid bruit 7/22/2021   • Leg pain, bilateral     Agram today LLE   1/20/2022   • Leukocytosis 4/6/2021   • Myocardial infarction [facility])     2021-   CABG  x3   • NSTEMI (non-ST elevated myocardial infarction) ([address]) 4/7/2021   • Wears glasses       Past Surgical History:     Past Surgical History:   Procedure Laterality Date   • ARTERIOGRAM Right 10/19/2022    Procedure: RIGHT LOWER EXTREMITY ARTERIOGRAM WITH LYSIS, PLACEMENT OF LYSIS CATHETER; third order cannulation of right popliteal artery via left groin access;  Surgeon: Kelly Santos MD;  [facility];  Service: Vascular   • CARDIAC CATHETERIZATION     • CARDIAC CATHETERIZATION N/A 10/20/2021    Procedure: Cardiac catheterization;  Surgeon: Oracio Mcintyre MD;  Location: [address] CATH LAB; Service: Cardiology   • CARDIAC CATHETERIZATION N/A 10/20/2021    Procedure: Cardiac Coronary Angiogram;  Surgeon: Oracio Mcintyre MD;  Location: [address] CATH LAB; Service: Cardiology   • CARDIAC CATHETERIZATION N/A 10/20/2021    Procedure: Cardiac pci;  Surgeon: Oracio Mcintyre MD;  Location: [address] CATH LAB;   Service: Cardiology   • IR LOWER EXTREMITY ANGIOGRAM  1/20/2022   • IR LOWER EXTREMITY ANGIOGRAM  2/3/2022   • IR LOWER EXTREMITY ANGIOGRAM  10/19/2022   • IR PICC PLACEMENT DOUBLE LUMEN  10/19/2022   • IR TPA LYSIS CHECK  10/20/2022   • VA BYPASS W/VEIN FEMORAL-POPLITEAL Right 2/3/2022    Procedure: BYPASS FEMORAL-POPLITEAL;  Surgeon: Meera Renae MD;  [facility];  Service: Vascular   • VA CORONARY ARTERY BYP W/VEIN & ARTERY GRAFT 4 VEIN N/A 2021    Procedure: CORONARY ARTERY BYPASS GRAFT (CABG) 3 VESSELS: LIMA to LAD; LLE EVH/SVG to LPL & OM2;  Surgeon: Antony Gomez DO;  [facility];  Service: Cardiac Surgery   • VA ECHO TRANSESOPHAG R-T 2D W/PRB IMG ACQUISJ I&R N/A 2021    Procedure: TRANSESOPHAGEAL ECHOCARDIOGRAM (BENNETT); Surgeon: Antony Gomez DO;  [facility];  Service: Cardiac Surgery   • VA NDSC SURG W/VIDEO-ASSISTED HARVEST VEIN CABG Left 2021    Procedure: HARVEST VEIN ENDOSCOPIC ([address]);   Surgeon: Antony Gomez DO;  [facility];  Service: Cardiac Surgery   • VA SLCTV CATHJ 3RD+ ORD SLCTV ABDL PEL/LXTR [facility] Left 2022    Procedure: ARTERIOGRAM, STENT PLACEMENT IN LEFT SUPERFICIAL FEMORIAL ARTERY;  Surgeon: Erika Sparks MD;  [facility];  Service: Vascular   • VASECTOMY        Family History:     Family History   Problem Relation Age of Onset   • Heart disease Father       Social History:     Social History     Socioeconomic History   • Marital status:      Spouse name: None   • Number of children: None   • Years of education: None   • Highest education level: None   Occupational History   • None   Tobacco Use   • Smoking status: Former     Packs/day: 1.00     Years: 38.00     Total pack years: 38.00     Types: Cigarettes     Quit date: 2021     Years since quittin.2   • Smokeless tobacco: Never   Vaping Use   • Vaping Use: Never used   Substance and Sexual Activity   • Alcohol use: Never   • Drug use: Never   • Sexual activity: Not Currently   Other Topics Concern   • None   Social History Narrative   • None     Social Determinants of Health     Financial Resource Strain: Not on file   Food Insecurity: No Food Insecurity (10/20/2022)    Hunger Vital Sign    • Worried About Running Out of Food in the Last Year: Never true    • Ran Out of Food in the Last Year: Never true   Transportation Needs: No Transportation Needs (10/20/2022)    PRAPARE - Transportation • Lack of Transportation (Medical): No    • Lack of Transportation (Non-Medical): No   Physical Activity: Not on file   Stress: Not on file   Social Connections: Not on file   Intimate Partner Violence: Not on file   Housing Stability: Unknown (10/20/2022)    Housing Stability Vital Sign    • Unable to Pay for Housing in the Last Year: No    • Number of Places Lived in the Last Year: Not on file    • Unstable Housing in the Last Year: No      Current Medications:     Current Outpatient Medications   Medication Sig Dispense Refill   • aspirin (ECOTRIN LOW STRENGTH) 81 mg EC tablet Take 1 tablet (81 mg total) by mouth daily 30 tablet 0   • Brilinta 90 MG TAKE 1 TABLET BY MOUTH 2 TIMES A DAY. 180 tablet 1   • isosorbide mononitrate (IMDUR) 30 mg 24 hr tablet TAKE 1 TABLET BY MOUTH TWICE A  tablet 1   • losartan (COZAAR) 25 mg tablet TAKE 1 TABLET (25 MG TOTAL) BY MOUTH DAILY. 90 tablet 3   • metFORMIN (GLUCOPHAGE) 1000 MG tablet TAKE 1 TABLET BY MOUTH TWICE A DAY WITH MEALS 180 tablet 1   • metoprolol succinate (TOPROL-XL) 25 mg 24 hr tablet Take 1 tablet (25 mg total) by mouth daily 90 tablet 0   • pravastatin (PRAVACHOL) 20 mg tablet Take 1 tablet (20 mg total) by mouth daily at bedtime 90 tablet 3   • rivaroxaban (XARELTO) 15 & 20 MG starter pack Take 1 Package by mouth see administration instructions 1 each 0   • rivaroxaban (Xarelto) 20 mg tablet Take 1 tablet (20 mg total) by mouth daily with breakfast 90 tablet 1     No current facility-administered medications for this visit. Allergies:     No Known Allergies   Physical Exam:     /70 (BP Location: Left arm, Patient Position: Sitting, Cuff Size: Standard)   Pulse 71   Temp 97.8 °F (36.6 °C)   Ht 5' 5" (1.651 m)   Wt 76.2 kg (168 lb)   SpO2 99%   BMI 27.96 kg/m²     Physical Exam  Constitutional:       General: He is not in acute distress. Appearance: Normal appearance. He is not ill-appearing.    HENT:      Head: Normocephalic and atraumatic. Right Ear: Tympanic membrane, ear canal and external ear normal.      Left Ear: Tympanic membrane, ear canal and external ear normal.      Nose: Nose normal.      Mouth/Throat:      Mouth: Mucous membranes are moist.      Pharynx: Oropharynx is clear. No oropharyngeal exudate or posterior oropharyngeal erythema. Eyes:      General: No scleral icterus. Right eye: No discharge. Left eye: No discharge. Extraocular Movements: Extraocular movements intact. Conjunctiva/sclera: Conjunctivae normal.      Pupils: Pupils are equal, round, and reactive to light. Cardiovascular:      Rate and Rhythm: Normal rate and regular rhythm. Pulses: Normal pulses. no weak pulses          Dorsalis pedis pulses are 2+ on the right side and 2+ on the left side. Posterior tibial pulses are 2+ on the right side and 2+ on the left side. Heart sounds: Normal heart sounds. No murmur heard. Pulmonary:      Effort: Pulmonary effort is normal. No respiratory distress. Breath sounds: Normal breath sounds. Abdominal:      General: Bowel sounds are normal.      Palpations: Abdomen is soft. Tenderness: There is no abdominal tenderness. Musculoskeletal:         General: Normal range of motion. Cervical back: Normal range of motion and neck supple. Feet:      Right foot:      Skin integrity: No ulcer, skin breakdown, erythema, warmth, callus or dry skin. Left foot:      Skin integrity: No ulcer, skin breakdown, erythema, warmth, callus or dry skin. Lymphadenopathy:      Cervical: No cervical adenopathy. Skin:     General: Skin is warm and dry. Capillary Refill: Capillary refill takes less than 2 seconds. Neurological:      General: No focal deficit present. Mental Status: He is alert and oriented to person, place, and time. Mental status is at baseline. Cranial Nerves: No cranial nerve deficit.    Psychiatric:         Mood and Affect: Mood normal.          Joe Fang MD  [address] with him that he is a candidate for lung cancer CT screening. The following Shared Decision-Making points were covered:  Benefits of screening were discussed, including the rates of reduction in death from lung cancer and other causes. Harms of screening were reviewed, including false positive tests, radiation exposure levels, risks of invasive procedures, risks of complications of screening, and risk of overdiagnosis. I counseled on the importance of adherence to annual lung cancer LDCT screening, impact of co-morbidities, and ability or willingness to undergo diagnosis and treatment. I counseled on the importance of maintaining abstinence as a former smoker or was counseled on the importance of smoking cessation if a current smoker    Review of Eligibility Criteria: He meets all of the criteria for Lung Cancer Screening. He is 62 y.o. He has 20 pack year tobacco history and is a current smoker or has quit within the past 15 years  He presents no signs or symptoms of lung cancer    After discussion, the patient decided to elect lung cancer screening. Patient's shoes and socks removed. Right Foot/Ankle   Right Foot Inspection  Skin Exam: skin normal and skin intact. No dry skin, no warmth, no callus, no erythema, no maceration, no abnormal color, no pre-ulcer, no ulcer and no callus. Toe Exam: ROM and strength within normal limits. No swelling, no tenderness, erythema and  no right toe deformity    Sensory   Monofilament testing: intact    Vascular  Capillary refills: < 3 seconds  The right DP pulse is 2+. The right PT pulse is 2+. Left Foot/Ankle  Left Foot Inspection  Skin Exam: skin normal and skin intact. No dry skin, no warmth, no erythema, no maceration, normal color, no pre-ulcer, no ulcer and no callus. Toe Exam: ROM and strength within normal limits.  No swelling, no tenderness, no erythema and no left toe deformity. Sensory   Monofilament testing: intact    Vascular  Capillary refills: < 3 seconds  The left DP pulse is 2+. The left PT pulse is 2+.      Assign Risk Category  No deformity present  No loss of protective sensation  No weak pulses  Risk: 0

## 2023-08-05 DIAGNOSIS — I21.4 NSTEMI (NON-ST ELEVATED MYOCARDIAL INFARCTION) (HCC): ICD-10-CM

## 2023-08-07 RX ORDER — TICAGRELOR 90 MG/1
90 TABLET ORAL 2 TIMES DAILY
Qty: 180 TABLET | Refills: 1 | Status: SHIPPED | OUTPATIENT
Start: 2023-08-07

## 2023-08-26 DIAGNOSIS — T82.898A OCCLUSION OF FEMOROPOPLITEAL BYPASS GRAFT, INITIAL ENCOUNTER (HCC): ICD-10-CM

## 2023-09-18 ENCOUNTER — HOSPITAL ENCOUNTER (OUTPATIENT)
Dept: NON INVASIVE DIAGNOSTICS | Facility: CLINIC | Age: 58
Discharge: HOME | End: 2023-09-18
Payer: COMMERCIAL

## 2023-09-18 DIAGNOSIS — I73.9 PAD (PERIPHERAL ARTERY DISEASE) (HCC): ICD-10-CM

## 2023-09-18 DIAGNOSIS — Z95.1 S/P CABG X 3: ICD-10-CM

## 2023-09-18 PROCEDURE — 93925 LOWER EXTREMITY STUDY: CPT

## 2023-09-18 PROCEDURE — 93880 EXTRACRANIAL BILAT STUDY: CPT

## 2023-09-18 PROCEDURE — 93923 UPR/LXTR ART STDY 3+ LVLS: CPT

## 2023-09-18 RX ORDER — METOPROLOL SUCCINATE 25 MG/1
25 TABLET, EXTENDED RELEASE ORAL DAILY
Qty: 90 TABLET | Refills: 0 | Status: SHIPPED | OUTPATIENT
Start: 2023-09-18

## 2023-09-19 PROCEDURE — 93880 EXTRACRANIAL BILAT STUDY: CPT | Performed by: SURGERY

## 2023-09-19 PROCEDURE — 93923 UPR/LXTR ART STDY 3+ LVLS: CPT | Performed by: SURGERY

## 2023-09-19 PROCEDURE — 93925 LOWER EXTREMITY STUDY: CPT | Performed by: SURGERY

## 2023-09-20 ENCOUNTER — TRANSCRIBE ORDERS (OUTPATIENT)
Dept: ADMINISTRATIVE | Facility: HOSPITAL | Age: 58
End: 2023-09-20

## 2023-09-20 DIAGNOSIS — I73.9 PAD (PERIPHERAL ARTERY DISEASE) (HCC): Primary | ICD-10-CM

## 2023-09-20 DIAGNOSIS — I65.23 CAROTID STENOSIS, ASYMPTOMATIC, BILATERAL: Primary | ICD-10-CM

## 2023-10-18 ENCOUNTER — VBI (OUTPATIENT)
Dept: ADMINISTRATIVE | Facility: OTHER | Age: 58
End: 2023-10-18

## 2023-10-18 NOTE — TELEPHONE ENCOUNTER
10/18/23 10:31 AM     [employer] SmartForm used to document caregap status.     Bernadette Fernandez

## 2023-10-20 DIAGNOSIS — I21.4 NSTEMI (NON-ST ELEVATED MYOCARDIAL INFARCTION) (HCC): ICD-10-CM

## 2023-10-20 RX ORDER — ISOSORBIDE MONONITRATE 30 MG/1
TABLET, EXTENDED RELEASE ORAL
Qty: 180 TABLET | Refills: 1 | Status: SHIPPED | OUTPATIENT
Start: 2023-10-20

## 2023-10-27 ENCOUNTER — TELEPHONE (OUTPATIENT)
Dept: CARDIOLOGY CLINIC | Facility: CLINIC | Age: 58
End: 2023-10-27

## 2023-10-27 NOTE — TELEPHONE ENCOUNTER
Patient has been scheduled 10/30/2023 w/Dr. Vinh Mckee @ [facility] for 10 am.  Left message for patient to contact office to confirm this appt.

## 2023-10-27 NOTE — TELEPHONE ENCOUNTER
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 If so, what day & time?   The forms have been scanned into [employer]

## 2023-10-30 ENCOUNTER — TELEPHONE (OUTPATIENT)
Dept: FAMILY MEDICINE CLINIC | Facility: CLINIC | Age: 58
End: 2023-10-30

## 2023-10-30 ENCOUNTER — APPOINTMENT (OUTPATIENT)
Dept: LAB | Facility: CLINIC | Age: 58
End: 2023-10-30
Payer: COMMERCIAL

## 2023-10-30 ENCOUNTER — OFFICE VISIT (OUTPATIENT)
Dept: CARDIOLOGY CLINIC | Facility: CLINIC | Age: 58
End: 2023-10-30
Payer: COMMERCIAL

## 2023-10-30 ENCOUNTER — TELEPHONE (OUTPATIENT)
Dept: CARDIOLOGY CLINIC | Facility: CLINIC | Age: 58
End: 2023-10-30

## 2023-10-30 VITALS
HEART RATE: 65 BPM | RESPIRATION RATE: 16 BRPM | OXYGEN SATURATION: 99 % | WEIGHT: 159 LBS | BODY MASS INDEX: 26.49 KG/M2 | HEIGHT: 65 IN

## 2023-10-30 DIAGNOSIS — E11.59 TYPE 2 DIABETES MELLITUS WITH OTHER CIRCULATORY COMPLICATION, WITHOUT LONG-TERM CURRENT USE OF INSULIN (HCC): ICD-10-CM

## 2023-10-30 DIAGNOSIS — I73.9 PERIPHERAL VASCULAR DISEASE (HCC): ICD-10-CM

## 2023-10-30 DIAGNOSIS — Z95.1 HX OF CABG: ICD-10-CM

## 2023-10-30 DIAGNOSIS — I20.89 ANGINAL EQUIVALENT: Primary | ICD-10-CM

## 2023-10-30 LAB
BASOPHILS # BLD AUTO: 0.04 THOUSANDS/ÂΜL (ref 0–0.1)
BASOPHILS NFR BLD AUTO: 1 % (ref 0–1)
CHOLEST SERPL-MCNC: 145 MG/DL
CREAT UR-MCNC: 169 MG/DL
EOSINOPHIL # BLD AUTO: 0.12 THOUSAND/ÂΜL (ref 0–0.61)
EOSINOPHIL NFR BLD AUTO: 2 % (ref 0–6)
ERYTHROCYTE [DISTWIDTH] IN BLOOD BY AUTOMATED COUNT: 13.6 % (ref 11.6–15.1)
HCT VFR BLD AUTO: 38.5 % (ref 36.5–49.3)
HDLC SERPL-MCNC: 43 MG/DL
HGB BLD-MCNC: 12.5 G/DL (ref 12–17)
IMM GRANULOCYTES # BLD AUTO: 0.01 THOUSAND/UL (ref 0–0.2)
IMM GRANULOCYTES NFR BLD AUTO: 0 % (ref 0–2)
LDLC SERPL CALC-MCNC: 86 MG/DL (ref 0–100)
LYMPHOCYTES # BLD AUTO: 1.71 THOUSANDS/ÂΜL (ref 0.6–4.47)
LYMPHOCYTES NFR BLD AUTO: 29 % (ref 14–44)
MCH RBC QN AUTO: 26.9 PG (ref 26.8–34.3)
MCHC RBC AUTO-ENTMCNC: 32.5 G/DL (ref 31.4–37.4)
MCV RBC AUTO: 83 FL (ref 82–98)
MICROALBUMIN UR-MCNC: 26.2 MG/L
MICROALBUMIN/CREAT 24H UR: 16 MG/G CREATININE (ref 0–30)
MONOCYTES # BLD AUTO: 0.53 THOUSAND/ÂΜL (ref 0.17–1.22)
MONOCYTES NFR BLD AUTO: 9 % (ref 4–12)
NEUTROPHILS # BLD AUTO: 3.46 THOUSANDS/ÂΜL (ref 1.85–7.62)
NEUTS SEG NFR BLD AUTO: 59 % (ref 43–75)
NRBC BLD AUTO-RTO: 0 /100 WBCS
PLATELET # BLD AUTO: 184 THOUSANDS/UL (ref 149–390)
PMV BLD AUTO: 10.2 FL (ref 8.9–12.7)
RBC # BLD AUTO: 4.65 MILLION/UL (ref 3.88–5.62)
TRIGL SERPL-MCNC: 79 MG/DL
TSH SERPL DL<=0.05 MIU/L-ACNC: 2.83 UIU/ML (ref 0.45–4.5)
WBC # BLD AUTO: 5.87 THOUSAND/UL (ref 4.31–10.16)

## 2023-10-30 PROCEDURE — 84443 ASSAY THYROID STIM HORMONE: CPT

## 2023-10-30 PROCEDURE — 99213 OFFICE O/P EST LOW 20 MIN: CPT | Performed by: INTERNAL MEDICINE

## 2023-10-30 PROCEDURE — 80061 LIPID PANEL: CPT

## 2023-10-30 PROCEDURE — 82043 UR ALBUMIN QUANTITATIVE: CPT

## 2023-10-30 PROCEDURE — 3061F NEG MICROALBUMINURIA REV: CPT | Performed by: INTERNAL MEDICINE

## 2023-10-30 PROCEDURE — 82570 ASSAY OF URINE CREATININE: CPT

## 2023-10-30 PROCEDURE — 85025 COMPLETE CBC W/AUTO DIFF WBC: CPT

## 2023-10-30 PROCEDURE — 36415 COLL VENOUS BLD VENIPUNCTURE: CPT

## 2023-10-30 NOTE — TELEPHONE ENCOUNTER
Pt was seen in the office today and DOT forms were filled out and sign by Dr. Mack Monge. Forms were faxed to [facility] occupational meds and scanned into pt's chart.

## 2023-10-30 NOTE — TELEPHONE ENCOUNTER
Pt dropped off a form to be completed by Dr Cleve Ng. Signed form fee. Placed form in provider's bin. Please call pt to pay via phone and fax# provided on form after payment.

## 2023-10-30 NOTE — TELEPHONE ENCOUNTER
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 Pt would like to come in some time this week to talk with Dr Arthur Dillard about what can be done so he doesn't lose his CDL or medical card. Pt asking for an early morning appt. , latst be Monday morning. Please advise if pt can be put on schedule.

## 2023-10-30 NOTE — PROGRESS NOTES
Cardiology Follow Up    Christina Kelly  1965  [id number]  [address] [facility]  [place] [address] [phone]    1. Hx of CABG    2. 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 Bypass surgery at that time in mid to the LAD and vein grafts a posterior descending branch and marginal circumflex branch. He had recurrence of discomfort and ultimately had PCI of the  in [place]. Since then he has been physically active without any exertional discomfort currently rest.  He goes to the gym and lifts weights and walks on the treadmill. He has not had to stop because of discomfort chest.    He does have known peripheral vascular disease and had an occlusion of the graft to the popliteal artery which developed stenosis and he was placed on Xarelto. He now takes aspirin, Brilinta, and Xarelto. He has had nosebleeds.     LDL cholesterol is 66  Patient Active Problem List   Diagnosis    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 complication, without long-term current use of insulin ([facility])    S/P CABG x 3    CAD (coronary artery disease)    GERD (gastroesophageal reflux disease)    Hypertension    Atheroscler native arteries the extremities w/intermit claudication ([facility])    Former tobacco use    History of PTCA    Claudication of right lower extremity ([facility])     Past Medical History:   Diagnosis Date    Bicuspid aortic valve     being observed    CAD (coronary artery disease)     Chest pain 4/6/2021    Coronary artery disease     Diabetes mellitus ([address])     Encounter for postoperative care 5/17/2021    Former tobacco use     GERD (gastroesophageal reflux disease)     Goiter     History of myocardial infarction     History of rib fracture     Hyperlipidemia Hypertension     Hypertensive urgency 2021    Left carotid bruit 2021    Leg pain, bilateral     Agram today LLE   2022    Leukocytosis 2021    Myocardial infarction [facility])     -   CABG  x3    NSTEMI (non-ST elevated myocardial infarction) ([address]) 2021    Wears glasses      Social History     Socioeconomic History    Marital status:      Spouse name: Not on file    Number of children: Not on file    Years of education: Not on file    Highest education level: Not on file   Occupational History    Not on file   Tobacco Use    Smoking status: Former     Packs/day: 1.00     Years: 38.00     Total pack years: 38.00     Types: Cigarettes     Quit date: 2021     Years since quittin.5    Smokeless tobacco: Never   Vaping Use    Vaping Use: Never used   Substance and Sexual Activity    Alcohol use: Never    Drug use: Never    Sexual activity: Not Currently   Other Topics Concern    Not on file   Social History Narrative    Not on file     Social Determinants of Health     Financial Resource Strain: Not on file   Food Insecurity: No Food Insecurity (10/20/2022)    Hunger Vital Sign     Worried About Running Out of Food in the Last Year: Never true     Ran Out of Food in the Last Year: Never true   Transportation Needs: No Transportation Needs (10/20/2022)    PRAPARE - Transportation     Lack of Transportation (Medical): No     Lack of Transportation (Non-Medical):  No   Physical Activity: Not on file   Stress: Not on file   Social Connections: Not on file   Intimate Partner Violence: Not on file   Housing Stability: Unknown (10/20/2022)    Housing Stability Vital Sign     Unable to Pay for Housing in the Last Year: No     Number of Places Lived in the Last Year: Not on file     Unstable Housing in the Last Year: No      Family History   Problem Relation Age of Onset    Heart disease Father      Past Surgical History:   Procedure Laterality Date    ARTERIOGRAM Right 10/19/2022 Procedure: RIGHT LOWER EXTREMITY ARTERIOGRAM WITH LYSIS, PLACEMENT OF LYSIS CATHETER; third order cannulation of right popliteal artery via left groin access;  Surgeon: Landy Menon MD;  [facility];  Service: Vascular    CARDIAC CATHETERIZATION      CARDIAC CATHETERIZATION N/A 10/20/2021    Procedure: Cardiac catheterization;  Surgeon: Louis Kenney MD;  Location: [address] CATH LAB; Service: Cardiology    CARDIAC CATHETERIZATION N/A 10/20/2021    Procedure: Cardiac Coronary Angiogram;  Surgeon: Louis Kenney MD;  Location: [address] CATH LAB; Service: Cardiology    CARDIAC CATHETERIZATION N/A 10/20/2021    Procedure: Cardiac pci;  Surgeon: Louis Kenney MD;  Location: [address] CATH LAB; Service: Cardiology    IR LOWER EXTREMITY ANGIOGRAM  1/20/2022    IR LOWER EXTREMITY ANGIOGRAM  2/3/2022    IR LOWER EXTREMITY ANGIOGRAM  10/19/2022    IR PICC PLACEMENT DOUBLE LUMEN  10/19/2022    IR TPA LYSIS CHECK  10/20/2022    NM BYPASS W/VEIN FEMORAL-POPLITEAL Right 2/3/2022    Procedure: BYPASS FEMORAL-POPLITEAL;  Surgeon: Cassidy Elias MD;  [facility];  Service: Vascular    NM CORONARY ARTERY BYP W/VEIN & ARTERY GRAFT 4 VEIN N/A 4/9/2021    Procedure: CORONARY ARTERY BYPASS GRAFT (CABG) 3 VESSELS: LIMA to LAD; LLE EVH/SVG to LPL & OM2;  Surgeon: Candy Saez DO;  [facility];  Service: Cardiac Surgery    NM ECHO TRANSESOPHAG R-T 2D W/PRB IMG ACQUISJ I&R N/A 4/9/2021    Procedure: TRANSESOPHAGEAL ECHOCARDIOGRAM (BENNETT); Surgeon: Candy Saez DO;  [facility];  Service: Cardiac Surgery    NM NDSC SURG W/VIDEO-ASSISTED HARVEST VEIN CABG Left 4/9/2021    Procedure: HARVEST VEIN ENDOSCOPIC ([address]);   Surgeon: Candy Saez DO;  [facility];  Service: Cardiac Surgery    NM Fan Pamela 3RD+ ORD SLCTV ABDL PEL/LXTR [facility] Left 1/20/2022    Procedure: ARTERIOGRAM, STENT PLACEMENT IN LEFT SUPERFICIAL FEMORIAL ARTERY;  Surgeon: Cassidy Elias MD; Location: [facility] OR;  Service: Vascular    VASECTOMY         Current Outpatient Medications:     aspirin (ECOTRIN LOW STRENGTH) 81 mg EC tablet, Take 1 tablet (81 mg total) by mouth daily, Disp: 30 tablet, Rfl: 0    Brilinta 90 MG, TAKE 1 TABLET BY MOUTH TWICE A DAY, Disp: 180 tablet, Rfl: 1    isosorbide mononitrate (IMDUR) 30 mg 24 hr tablet, TAKE 1 TABLET BY MOUTH TWICE A DAY, Disp: 180 tablet, Rfl: 1    losartan (COZAAR) 25 mg tablet, TAKE 1 TABLET (25 MG TOTAL) BY MOUTH DAILY. , Disp: 90 tablet, Rfl: 3    metFORMIN (GLUCOPHAGE) 1000 MG tablet, TAKE 1 TABLET BY MOUTH TWICE A DAY WITH MEALS, Disp: 180 tablet, Rfl: 1    metoprolol succinate (TOPROL-XL) 25 mg 24 hr tablet, TAKE 1 TABLET (25 MG TOTAL) BY MOUTH DAILY. , Disp: 90 tablet, Rfl: 0    pravastatin (PRAVACHOL) 20 mg tablet, Take 1 tablet (20 mg total) by mouth daily at bedtime, Disp: 90 tablet, Rfl: 3    rivaroxaban (Xarelto) 20 mg tablet, Take 1 tablet (20 mg total) by mouth daily with breakfast, Disp: 90 tablet, Rfl: 0    rivaroxaban (XARELTO) 15 & 20 MG starter pack, Take 1 Package by mouth see administration instructions (Patient not taking: Reported on 10/30/2023), Disp: 1 each, Rfl: 0  No Known Allergies    Labs:  No visits with results within 2 Month(s) from this visit. Latest known visit with results is:   Office Visit on 07/10/2023   Component Date Value    Hemoglobin A1C 07/10/2023 9.0 (A)      Imaging: No results found. Review of Systems:  Review of Systems    Physical Exam:  Skin. Well-developed well-nourished appearing. Blood pressure 130/70. Heart rate 68 regular. Carotids 2+ without bruits. Lungs clear. Rhythm regular. Grade 1 short systolic ejection murmur at the base. No edema. Discussion/Summary:    1. Coronary artery disease with history of CABG and previous PCI now symptom-free  2. Peripheral vascular disease        1. Stop Brilinta  2. I filled out his form for [employer]  3.   Follow-up with primary cardiologist in 1 year      Juliana Zaman MD

## 2023-10-31 NOTE — TELEPHONE ENCOUNTER
His A1c is in the uncontrolled territory at 9.0%. it was previously controlled at 7.2%. by definition it is uncontrolled.  CDL companies have their own cut off (usually 10%)

## 2023-11-02 ENCOUNTER — OFFICE VISIT (OUTPATIENT)
Dept: FAMILY MEDICINE CLINIC | Facility: CLINIC | Age: 58
End: 2023-11-02
Payer: COMMERCIAL

## 2023-11-02 VITALS
DIASTOLIC BLOOD PRESSURE: 86 MMHG | OXYGEN SATURATION: 100 % | WEIGHT: 155.4 LBS | BODY MASS INDEX: 25.89 KG/M2 | HEIGHT: 65 IN | HEART RATE: 67 BPM | TEMPERATURE: 96 F | SYSTOLIC BLOOD PRESSURE: 122 MMHG

## 2023-11-02 DIAGNOSIS — I25.110 CORONARY ARTERY DISEASE INVOLVING NATIVE CORONARY ARTERY OF NATIVE HEART WITH UNSTABLE ANGINA PECTORIS (HCC): Chronic | ICD-10-CM

## 2023-11-02 DIAGNOSIS — I10 PRIMARY HYPERTENSION: Chronic | ICD-10-CM

## 2023-11-02 DIAGNOSIS — E11.59 TYPE 2 DIABETES MELLITUS WITH OTHER CIRCULATORY COMPLICATION, WITHOUT LONG-TERM CURRENT USE OF INSULIN (HCC): Primary | ICD-10-CM

## 2023-11-02 LAB — SL AMB POCT HEMOGLOBIN AIC: 6.9 (ref ?–6.5)

## 2023-11-02 PROCEDURE — 99214 OFFICE O/P EST MOD 30 MIN: CPT | Performed by: STUDENT IN AN ORGANIZED HEALTH CARE EDUCATION/TRAINING PROGRAM

## 2023-11-02 PROCEDURE — 83036 HEMOGLOBIN GLYCOSYLATED A1C: CPT | Performed by: STUDENT IN AN ORGANIZED HEALTH CARE EDUCATION/TRAINING PROGRAM

## 2023-11-02 NOTE — PROGRESS NOTES
Assessment/Plan:         Problem List Items Addressed This Visit        Endocrine    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 complication, without long-term current use of insulin ([address]) - Primary       Lab Results   Component Value Date    HGBA1C 6.9 (A) 11/02/2023       A1c is much more improved since previously, should not be considered uncontrolled for his Cdl at this time. On arb and statin, continue with dietary changes. Foot exam utd, last eye exam at [facility] in the last year         Relevant Orders    POCT hemoglobin A1c (Completed)       Cardiovascular and Mediastinum    CAD (coronary artery disease) (Chronic)     Stress test tomorrow         Hypertension (Chronic)         Subjective:      Patient ID: Stephanie Capellan is a 62 y.o. male. HPI    Was not allowed to drive casue I8J was beyond 9.0%    Needs a nuclear stress test tomorrow now required for cdl per patient    The following portions of the patient's history were reviewed and updated as appropriate:   Past Medical History:  He has a past medical history of Bicuspid aortic valve, CAD (coronary artery disease), Chest pain (4/6/2021), Coronary artery disease, Diabetes mellitus ([address]), Encounter for postoperative care (5/17/2021), Former tobacco use, GERD (gastroesophageal reflux disease), Goiter, History of myocardial infarction, History of rib fracture, Hyperlipidemia, Hypertension, Hypertensive urgency (4/6/2021), Left carotid bruit (7/22/2021), Leg pain, bilateral, Leukocytosis (4/6/2021), Myocardial infarction [facility]), NSTEMI (non-ST elevated myocardial infarction) ([address]) (4/7/2021), and Wears glasses. ,  _______________________________________________________________________  Medical Problems:  does not have any pertinent problems on file.,  _______________________________________________________________________  Past Surgical History:   has a past surgical history that includes Vasectomy; Cardiac catheterization; pr coronary artery byp w/vein & artery graft 4 vein (N/A, 4/9/2021); pr ndsc surg w/video-assisted harvest vein cabg (Left, 4/9/2021); pr echo transesophag r-t 2d w/prb img acquisj i&r (N/A, 4/9/2021); Cardiac catheterization (N/A, 10/20/2021); Cardiac catheterization (N/A, 10/20/2021); Cardiac catheterization (N/A, 10/20/2021); pr slctv cathj 3rd+ ord slctv abdl pel/lxtr brnch (Left, 1/20/2022); IR lower extremity angiogram (1/20/2022); IR lower extremity angiogram (2/3/2022); pr bypass w/vein femoral-popliteal (Right, 2/3/2022); IR PICC placement double lumen (10/19/2022); ARTERIOGRAM (Right, 10/19/2022); IR TPA lysis check (10/20/2022); and IR lower extremity angiogram (10/19/2022). ,  _______________________________________________________________________  Family History:  family history includes Heart disease in his father.,  _______________________________________________________________________  Social History:   reports that he quit smoking about 2 years ago. His smoking use included cigarettes. He has a 38.00 pack-year smoking history. He has never used smokeless tobacco. He reports that he does not drink alcohol and does not use drugs. ,  _______________________________________________________________________  Allergies:  has No Known Allergies. .  _______________________________________________________________________  Current Outpatient Medications   Medication Sig Dispense Refill   • aspirin (ECOTRIN LOW STRENGTH) 81 mg EC tablet Take 1 tablet (81 mg total) by mouth daily 30 tablet 0   • isosorbide mononitrate (IMDUR) 30 mg 24 hr tablet TAKE 1 TABLET BY MOUTH TWICE A  tablet 1   • losartan (COZAAR) 25 mg tablet TAKE 1 TABLET (25 MG TOTAL) BY MOUTH DAILY. 90 tablet 3   • metFORMIN (GLUCOPHAGE) 1000 MG tablet TAKE 1 TABLET BY MOUTH TWICE A DAY WITH MEALS 180 tablet 1   • metoprolol succinate (TOPROL-XL) 25 mg 24 hr tablet TAKE 1 TABLET (25 MG TOTAL) BY MOUTH DAILY.  90 tablet 0   • pravastatin (PRAVACHOL) 20 mg tablet Take 1 tablet (20 mg total) by mouth daily at bedtime 90 tablet 3   • rivaroxaban (Xarelto) 20 mg tablet Take 1 tablet (20 mg total) by mouth daily with breakfast 90 tablet 0     No current facility-administered medications for this visit.     _______________________________________________________________________  Review of Systems   Constitutional:  Negative for activity change, appetite change, fatigue and fever. HENT:  Negative for congestion, rhinorrhea and sore throat. Eyes:  Negative for visual disturbance. Respiratory:  Negative for cough and shortness of breath. Cardiovascular:  Negative for chest pain. Gastrointestinal:  Negative for nausea and vomiting. Objective:  Vitals:    11/02/23 1409   BP: 122/86   BP Location: Left arm   Patient Position: Sitting   Cuff Size: Standard   Pulse: 67   Temp: (!) 96 °F (35.6 °C)   TempSrc: Tympanic   SpO2: 100%   Weight: 70.5 kg (155 lb 6.4 oz)   Height: 5' 5" (1.651 m)     Body mass index is 25.86 kg/m². Physical Exam  Constitutional:       General: He is not in acute distress. Appearance: Normal appearance. HENT:      Head: Normocephalic and atraumatic. Pulmonary:      Effort: Pulmonary effort is normal. No respiratory distress. Neurological:      Mental Status: He is alert and oriented to person, place, and time. Mental status is at baseline.    Psychiatric:         Mood and Affect: Mood normal.         Behavior: Behavior normal.

## 2023-11-02 NOTE — ASSESSMENT & PLAN NOTE
Lab Results   Component Value Date    HGBA1C 6.9 (A) 11/02/2023       A1c is much more improved since previously, should not be considered uncontrolled for his Cdl at this time. On arb and statin, continue with dietary changes.  Foot exam utd, last eye exam at [facility] in the last year

## 2023-11-03 ENCOUNTER — HOSPITAL ENCOUNTER (OUTPATIENT)
Dept: NUCLEAR MEDICINE | Facility: HOSPITAL | Age: 58
End: 2023-11-03
Attending: INTERNAL MEDICINE
Payer: COMMERCIAL

## 2023-11-03 ENCOUNTER — HOSPITAL ENCOUNTER (OUTPATIENT)
Dept: NON INVASIVE DIAGNOSTICS | Facility: HOSPITAL | Age: 58
Discharge: HOME | End: 2023-11-03
Attending: INTERNAL MEDICINE
Payer: COMMERCIAL

## 2023-11-03 VITALS
BODY MASS INDEX: 26.49 KG/M2 | WEIGHT: 159 LBS | OXYGEN SATURATION: 99 % | SYSTOLIC BLOOD PRESSURE: 110 MMHG | RESPIRATION RATE: 20 BRPM | HEART RATE: 62 BPM | DIASTOLIC BLOOD PRESSURE: 70 MMHG | HEIGHT: 65 IN

## 2023-11-03 DIAGNOSIS — I20.89 ANGINAL EQUIVALENT: ICD-10-CM

## 2023-11-03 LAB
ARRHY DURING EX: NORMAL
CHEST PAIN STATEMENT: NORMAL
MAX DIASTOLIC BP: 84 MMHG
MAX PREDICTED HEART RATE: 162 BPM
NUC STRESS EJECTION FRACTION: 67 %
PROTOCOL NAME: NORMAL
RATE PRESSURE PRODUCT: NORMAL
SL CV REST NUCLEAR ISOTOPE DOSE: 10.6 MCI
SL CV STRESS NUCLEAR ISOTOPE DOSE: 33 MCI
SL CV STRESS RECOVERY BP: NORMAL MMHG
SL CV STRESS RECOVERY HR: 85 BPM
SL CV STRESS RECOVERY O2 SAT: 99 %
STRESS ANGINA INDEX: 0
STRESS BASELINE BP: NORMAL MMHG
STRESS BASELINE HR: 62 BPM
STRESS O2 SAT REST: 99 %
STRESS PEAK HR: 117 BPM
STRESS POST EXERCISE DUR MIN: 9 MIN
STRESS POST EXERCISE DUR SEC: 56 SEC
STRESS POST O2 SAT PEAK: 99 %
STRESS POST PEAK BP: 154 MMHG
STRESS POST PEAK HR: 117 BPM
STRESS POST PEAK SYSTOLIC BP: 154 MMHG
STRESS/REST PERFUSION RATIO: 1.11
TARGET HR FORMULA: NORMAL
TEST INDICATION: NORMAL

## 2023-11-03 PROCEDURE — G1004 CDSM NDSC: HCPCS

## 2023-11-03 PROCEDURE — 93018 CV STRESS TEST I&R ONLY: CPT | Performed by: INTERNAL MEDICINE

## 2023-11-03 PROCEDURE — 78452 HT MUSCLE IMAGE SPECT MULT: CPT

## 2023-11-03 PROCEDURE — 93016 CV STRESS TEST SUPVJ ONLY: CPT | Performed by: INTERNAL MEDICINE

## 2023-11-03 PROCEDURE — 93017 CV STRESS TEST TRACING ONLY: CPT

## 2023-11-03 PROCEDURE — 78452 HT MUSCLE IMAGE SPECT MULT: CPT | Performed by: INTERNAL MEDICINE

## 2023-11-03 PROCEDURE — A9502 TC99M TETROFOSMIN: HCPCS

## 2023-11-03 RX ORDER — REGADENOSON 0.08 MG/ML
0.4 INJECTION, SOLUTION INTRAVENOUS ONCE
Status: COMPLETED | OUTPATIENT
Start: 2023-11-03 | End: 2023-11-03

## 2023-11-03 RX ADMIN — REGADENOSON 0.4 MG: 0.08 INJECTION, SOLUTION INTRAVENOUS at 09:24

## 2023-11-07 ENCOUNTER — OFFICE VISIT (OUTPATIENT)
Dept: CARDIOLOGY CLINIC | Facility: CLINIC | Age: 58
End: 2023-11-07
Payer: COMMERCIAL

## 2023-11-07 VITALS
RESPIRATION RATE: 16 BRPM | OXYGEN SATURATION: 99 % | SYSTOLIC BLOOD PRESSURE: 102 MMHG | HEART RATE: 88 BPM | BODY MASS INDEX: 26.82 KG/M2 | WEIGHT: 161 LBS | HEIGHT: 65 IN | DIASTOLIC BLOOD PRESSURE: 60 MMHG

## 2023-11-07 DIAGNOSIS — Z95.1 HX OF CABG: Primary | ICD-10-CM

## 2023-11-07 DIAGNOSIS — I73.9 PERIPHERAL VASCULAR DISEASE (HCC): ICD-10-CM

## 2023-11-07 PROCEDURE — 99213 OFFICE O/P EST LOW 20 MIN: CPT | Performed by: INTERNAL MEDICINE

## 2023-11-07 NOTE — PROGRESS NOTES
Cardiology Follow Up    Ludwin Yanes  1965  [id number]  [address] [facility]  [place] [id number] [phone]    1. Hx of CABG    2. Peripheral vascular disease ([address])          Interval History: 51-year-old man with a history of CABG with patent LIMA to the LAD and was subsequent PCI of the  in [place]. Prior to these procedures he had an exertionally related burning in the throat relieved by rest.  He is physically active and no longer has the symptoms. Resting EKG shows nonspecific ST-T changes and thus any exercise changes are insignificant. He did have a stress test that was interpreted as showing a fixed and reversible defect. 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)    Former tobacco use    History of PTCA    Peripheral vascular disease ([address])     Past Medical History:   Diagnosis Date    Bicuspid aortic valve     being observed    CAD (coronary artery disease)     Chest pain 4/6/2021    Coronary artery disease     Diabetes mellitus ([address])     Encounter for postoperative care 5/17/2021    Former tobacco use     GERD (gastroesophageal reflux disease)     Goiter     History of myocardial infarction     History of rib fracture     Hyperlipidemia     Hypertension     Hypertensive urgency 4/6/2021    Left carotid bruit 7/22/2021    Leg pain, bilateral     Agram today LLE   1/20/2022    Leukocytosis 4/6/2021    Myocardial infarction ([address])     2021-   CABG  x3    NSTEMI (non-ST elevated myocardial infarction) ([address]) 2021    Wears glasses      Social History     Socioeconomic History    Marital status:      Spouse name: Not on file    Number of children: Not on file    Years of education: Not on file    Highest education level: Not on file   Occupational History    Not on file   Tobacco Use    Smoking status: Former     Packs/day: 1.00     Years: 38.00     Total pack years: 38.00     Types: Cigarettes     Quit date: 2021     Years since quittin.5    Smokeless tobacco: Never   Vaping Use    Vaping Use: Never used   Substance and Sexual Activity    Alcohol use: Never    Drug use: Never    Sexual activity: Not Currently   Other Topics Concern    Not on file   Social History Narrative    Not on file     Social Determinants of Health     Financial Resource Strain: Not on file   Food Insecurity: No Food Insecurity (10/20/2022)    Hunger Vital Sign     Worried About Running Out of Food in the Last Year: Never true     Ran Out of Food in the Last Year: Never true   Transportation Needs: No Transportation Needs (10/20/2022)    PRAPARE - Transportation     Lack of Transportation (Medical): No     Lack of Transportation (Non-Medical):  No   Physical Activity: Not on file   Stress: Not on file   Social Connections: Not on file   Intimate Partner Violence: Not on file   Housing Stability: Unknown (10/20/2022)    Housing Stability Vital Sign     Unable to Pay for Housing in the Last Year: No     Number of Places Lived in the Last Year: Not on file     Unstable Housing in the Last Year: No      Family History   Problem Relation Age of Onset    Heart disease Father      Past Surgical History:   Procedure Laterality Date    ARTERIOGRAM Right 10/19/2022    Procedure: RIGHT LOWER EXTREMITY ARTERIOGRAM WITH LYSIS, PLACEMENT OF LYSIS CATHETER; third order cannulation of right popliteal artery via left groin access;  Surgeon: Eliane Nicholas MD;  [facility];  Service: Vascular    CARDIAC CATHETERIZATION      CARDIAC CATHETERIZATION N/A 10/20/2021    Procedure: Cardiac catheterization;  Surgeon: Radha Fajardo MD;  Location: [address] CATH LAB; Service: Cardiology    CARDIAC CATHETERIZATION N/A 10/20/2021    Procedure: Cardiac Coronary Angiogram;  Surgeon: Radha Fajardo MD;  Location: [address] CATH LAB; Service: Cardiology    CARDIAC CATHETERIZATION N/A 10/20/2021    Procedure: Cardiac pci;  Surgeon: Radha Fajardo MD;  Location: [address] CATH LAB; Service: Cardiology    IR LOWER EXTREMITY ANGIOGRAM  1/20/2022    IR LOWER EXTREMITY ANGIOGRAM  2/3/2022    IR LOWER EXTREMITY ANGIOGRAM  10/19/2022    IR PICC PLACEMENT DOUBLE LUMEN  10/19/2022    IR TPA LYSIS CHECK  10/20/2022    [place] BYPASS W/VEIN FEMORAL-POPLITEAL Right 2/3/2022    Procedure: BYPASS FEMORAL-POPLITEAL;  Surgeon: Becca Goyal MD;  [facility];  Service: Vascular    [place] CORONARY ARTERY BYP W/VEIN & ARTERY GRAFT 4 VEIN N/A 4/9/2021    Procedure: CORONARY ARTERY BYPASS GRAFT (CABG) 3 VESSELS: LIMA to LAD; LLE EVH/SVG to LPL & OM2;  Surgeon: Winter Claire DO;  [facility];  Service: Cardiac Surgery    WV ECHO TRANSESOPHAG R-T 2D W/PRB IMG ACQUISJ I&R N/A 4/9/2021    Procedure: TRANSESOPHAGEAL ECHOCARDIOGRAM (BENNETT); Surgeon: Winter Claire DO;  [facility];  Service: Cardiac Surgery    WV NDSC SURG W/VIDEO-ASSISTED HARVEST VEIN CABG Left 4/9/2021    Procedure: HARVEST VEIN ENDOSCOPIC ([address]);   Surgeon: Winter Claire DO;  [facility];  Service: Cardiac Surgery    WV Cece Overall 3RD+ ORD SLCTV ABDL PEL/LXTR [facility] Left 1/20/2022    Procedure: ARTERIOGRAM, STENT PLACEMENT IN LEFT SUPERFICIAL FEMORIAL ARTERY;  Surgeon: Becca Goyal MD;  [facility];  Service: Vascular    VASECTOMY         Current Outpatient Medications:     aspirin (ECOTRIN LOW STRENGTH) 81 mg EC tablet, Take 1 tablet (81 mg total) by mouth daily, Disp: 30 tablet, Rfl: 0    isosorbide mononitrate (IMDUR) 30 mg 24 hr tablet, TAKE 1 TABLET BY MOUTH TWICE A DAY, Disp: 180 tablet, Rfl: 1    losartan (COZAAR) 25 mg tablet, TAKE 1 TABLET (25 MG TOTAL) BY MOUTH DAILY. , Disp: 90 tablet, Rfl: 3    metFORMIN (GLUCOPHAGE) 1000 MG tablet, TAKE 1 TABLET BY MOUTH TWICE A DAY WITH MEALS, Disp: 180 tablet, Rfl: 1    metoprolol succinate (TOPROL-XL) 25 mg 24 hr tablet, TAKE 1 TABLET (25 MG TOTAL) BY MOUTH DAILY. , Disp: 90 tablet, Rfl: 0    pravastatin (PRAVACHOL) 20 mg tablet, Take 1 tablet (20 mg total) by mouth daily at bedtime, Disp: 90 tablet, Rfl: 3    rivaroxaban (Xarelto) 20 mg tablet, Take 1 tablet (20 mg total) by mouth daily with breakfast, Disp: 90 tablet, Rfl: 0  No Known Allergies    Labs:  Hospital Outpatient Visit on 11/03/2023   Component Date Value    Protocol Name 11/03/2023 FABIANO     Exercise duration (min) 11/03/2023 9     Exercise duration (sec) 11/03/2023 56     Post Peak Systolic BP 87/41/1260 843     Max Diastolic Bp 93/62/1055 84     Peak HR 11/03/2023 117     Max Predicted Heart Rate 11/03/2023 162     Reason for Termination 11/03/2023                      Value:Fatigue  Dyspnea      Test Indication 11/03/2023 Screening for known CAD     Target Hr Formular 11/03/2023 (220 - Age)*85%     Arrhy During Ex 11/03/2023 none     Chest Pain Statement 11/03/2023 none    Hospital Outpatient Visit on 11/03/2023   Component Date Value    Baseline HR 11/03/2023 62     Baseline BP 11/03/2023 110/70     O2 sat rest 11/03/2023 99     Stress peak HR 11/03/2023 117     Post peak BP 11/03/2023 154     O2 sat peak 11/03/2023 99     Recovery HR 11/03/2023 85     Recovery BP 11/03/2023 126/70     O2 sat recovery 11/03/2023 99     Rate Pressure Product 11/03/2023 18,018.0     Rest Nuclear Isotope Dose 11/03/2023 10.60     Stress Nuclear Isotope D* 11/03/2023 33.00     EF (%) 11/03/2023 67     Angina Index 11/03/2023 0     Stress/rest perfusion ra* 11/03/2023 1.11    Office Visit on 11/02/2023   Component Date Value    Hemoglobin A1C 11/02/2023 6.9 (A) Appointment on 10/30/2023   Component Date Value    Cholesterol 10/30/2023 145     Triglycerides 10/30/2023 79     HDL, Direct 10/30/2023 43     LDL Calculated 10/30/2023 86     TSH 3RD GENERATON 10/30/2023 2.833     WBC 10/30/2023 5.87     RBC 10/30/2023 4.65     Hemoglobin 10/30/2023 12.5     Hematocrit 10/30/2023 38.5     MCV 10/30/2023 83     MCH 10/30/2023 26.9     MCHC 10/30/2023 32.5     RDW 10/30/2023 13.6     MPV 10/30/2023 10.2     Platelets 59/87/5134 184     nRBC 10/30/2023 0     Neutrophils Relative 10/30/2023 59     Immat GRANS % 10/30/2023 0     Lymphocytes Relative 10/30/2023 29     Monocytes Relative 10/30/2023 9     Eosinophils Relative 10/30/2023 2     Basophils Relative 10/30/2023 1     Neutrophils Absolute 10/30/2023 3.46     Immature Grans Absolute 10/30/2023 0.01     Lymphocytes Absolute 10/30/2023 1.71     Monocytes Absolute 10/30/2023 0.53     Eosinophils Absolute 10/30/2023 0.12     Basophils Absolute 10/30/2023 0.04     Creatinine, Ur 10/30/2023 169.0     Albumin,U,Random 10/30/2023 26.2 (H)     Albumin Creat Ratio 10/30/2023 16      Imaging: NM myocardial perfusion spect (stress and/or rest)    Result Date: 11/3/2023  Narrative:   Stress ECG: ST elevation (aVR) and ST depression (II, III, aVF, V3 and V4) is noted. The ECG was positive for ischemia. Perfusion: There is a left ventricular perfusion defect that is medium in size present in the basal anteroseptal, basal inferior and basal-mid inferoseptal location(s) that is partially reversible. Stress Combined Conclusion: Left ventricular perfusion is abnormal. The study is abnormal with both ECG and perfusion imaging concerning for possible ischemia. Message sent to ordering provider to alert for abnormal study. Review of Systems:  Review of Systems    Physical Exam:  102/60. Heart rate 88 and regular. Lungs clear. Short grade 2/6 systolic ejection murmur at the base. Regular rhythm. No edema. Discussion/Summary:    1. History of CABG and PCI and currently asymptomatic with activity    Recommendations:    1. 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   Return to see Dr. Araceli Bahena in 1 year      Dwayne Mitchell MD

## 2023-11-15 LAB
MAX HR PERCENT: 85 %
MAX HR: 97 BPM
STRESS BASELINE BP: NORMAL MMHG
STRESS BASELINE HR: 73 BPM
STRESS POST ESTIMATED WORKLOAD: 1.9 METS
STRESS POST PEAK HR: 117 BPM
STRESS POST PEAK SYSTOLIC BP: 154 MMHG

## 2023-11-29 DIAGNOSIS — T82.898A OCCLUSION OF FEMOROPOPLITEAL BYPASS GRAFT, INITIAL ENCOUNTER (HCC): ICD-10-CM

## 2023-11-29 NOTE — TELEPHONE ENCOUNTER
Requested medication(s) are due for refill today: Yes  Patient has already received a courtesy refill: No  Other reason request has been forwarded to provider: Flagged for labs

## 2023-12-26 DIAGNOSIS — Z95.1 S/P CABG X 3: ICD-10-CM

## 2023-12-26 RX ORDER — METOPROLOL SUCCINATE 25 MG/1
25 TABLET, EXTENDED RELEASE ORAL DAILY
Qty: 90 TABLET | Refills: 0 | Status: SHIPPED | OUTPATIENT
Start: 2023-12-26

## 2024-01-05 DIAGNOSIS — R73.09 ELEVATED HEMOGLOBIN A1C: ICD-10-CM

## 2024-01-05 DIAGNOSIS — Z95.1 S/P CABG X 3: ICD-10-CM

## 2024-01-05 DIAGNOSIS — Z72.0 TOBACCO ABUSE: ICD-10-CM

## 2024-01-05 DIAGNOSIS — D69.6 THROMBOCYTOPENIA (HCC): ICD-10-CM

## 2024-02-09 ENCOUNTER — TELEPHONE (OUTPATIENT)
Dept: CARDIOLOGY CLINIC | Facility: CLINIC | Age: 59
End: 2024-02-09

## 2024-02-09 NOTE — TELEPHONE ENCOUNTER
Patient medical response form is in  folder to be filled out.     stated, pt needs a follow up a appt to be seen in order for paperwork to be fill out.    Clerical reached out today 2/9/24, lvm for pt to give the office a call back to schedule a appt with .

## 2024-02-14 NOTE — TELEPHONE ENCOUNTER
Patient saw Dr. Pina in November 2023.      Paperwork was addressed and taken care of.    Patient not due back to see Dr. Reeder until November 2024.    Will place patient on recall list.

## 2024-03-01 DIAGNOSIS — T82.898A OCCLUSION OF FEMOROPOPLITEAL BYPASS GRAFT, INITIAL ENCOUNTER (HCC): ICD-10-CM

## 2024-03-31 DIAGNOSIS — Z95.1 S/P CABG X 3: ICD-10-CM

## 2024-04-01 RX ORDER — METOPROLOL SUCCINATE 25 MG/1
25 TABLET, EXTENDED RELEASE ORAL DAILY
Qty: 90 TABLET | Refills: 0 | Status: SHIPPED | OUTPATIENT
Start: 2024-04-01

## 2024-04-02 DIAGNOSIS — D69.6 THROMBOCYTOPENIA (HCC): ICD-10-CM

## 2024-04-02 DIAGNOSIS — R73.09 ELEVATED HEMOGLOBIN A1C: ICD-10-CM

## 2024-04-02 DIAGNOSIS — Z95.1 S/P CABG X 3: ICD-10-CM

## 2024-04-02 DIAGNOSIS — Z72.0 TOBACCO ABUSE: ICD-10-CM

## 2024-04-21 DIAGNOSIS — I21.4 NSTEMI (NON-ST ELEVATED MYOCARDIAL INFARCTION) (HCC): ICD-10-CM

## 2024-04-22 RX ORDER — ISOSORBIDE MONONITRATE 30 MG/1
TABLET, EXTENDED RELEASE ORAL
Qty: 180 TABLET | Refills: 1 | Status: SHIPPED | OUTPATIENT
Start: 2024-04-22

## 2024-05-02 DIAGNOSIS — I25.110 CORONARY ARTERY DISEASE INVOLVING NATIVE CORONARY ARTERY OF NATIVE HEART WITH UNSTABLE ANGINA PECTORIS (HCC): ICD-10-CM

## 2024-05-02 DIAGNOSIS — I10 HYPERTENSION, UNSPECIFIED TYPE: ICD-10-CM

## 2024-05-03 RX ORDER — LOSARTAN POTASSIUM 25 MG/1
25 TABLET ORAL DAILY
Qty: 30 TABLET | Refills: 0 | Status: SHIPPED | OUTPATIENT
Start: 2024-05-03

## 2024-05-27 DIAGNOSIS — T82.898A OCCLUSION OF FEMOROPOPLITEAL BYPASS GRAFT, INITIAL ENCOUNTER (HCC): ICD-10-CM

## 2024-05-29 RX ORDER — RIVAROXABAN 20 MG/1
20 TABLET, FILM COATED ORAL
Qty: 90 TABLET | Refills: 0 | Status: SHIPPED | OUTPATIENT
Start: 2024-05-29

## 2024-05-30 DIAGNOSIS — I25.110 CORONARY ARTERY DISEASE INVOLVING NATIVE CORONARY ARTERY OF NATIVE HEART WITH UNSTABLE ANGINA PECTORIS (HCC): ICD-10-CM

## 2024-05-30 DIAGNOSIS — I10 HYPERTENSION, UNSPECIFIED TYPE: ICD-10-CM

## 2024-06-04 RX ORDER — LOSARTAN POTASSIUM 25 MG/1
25 TABLET ORAL DAILY
Qty: 90 TABLET | Refills: 3 | Status: SHIPPED | OUTPATIENT
Start: 2024-06-04

## 2024-06-29 DIAGNOSIS — R73.09 ELEVATED HEMOGLOBIN A1C: ICD-10-CM

## 2024-06-29 DIAGNOSIS — D69.6 THROMBOCYTOPENIA (HCC): ICD-10-CM

## 2024-06-29 DIAGNOSIS — Z95.1 S/P CABG X 3: ICD-10-CM

## 2024-06-29 DIAGNOSIS — Z72.0 TOBACCO ABUSE: ICD-10-CM

## 2024-06-29 RX ORDER — METOPROLOL SUCCINATE 25 MG/1
25 TABLET, EXTENDED RELEASE ORAL DAILY
Qty: 30 TABLET | Refills: 0 | Status: SHIPPED | OUTPATIENT
Start: 2024-06-29

## 2024-07-29 DIAGNOSIS — D69.6 THROMBOCYTOPENIA (HCC): ICD-10-CM

## 2024-07-29 DIAGNOSIS — Z72.0 TOBACCO ABUSE: ICD-10-CM

## 2024-07-29 DIAGNOSIS — Z95.1 S/P CABG X 3: ICD-10-CM

## 2024-07-29 DIAGNOSIS — R73.09 ELEVATED HEMOGLOBIN A1C: ICD-10-CM

## 2024-07-29 RX ORDER — METOPROLOL SUCCINATE 25 MG/1
25 TABLET, EXTENDED RELEASE ORAL DAILY
Qty: 30 TABLET | Refills: 0 | Status: CANCELLED | OUTPATIENT
Start: 2024-07-29

## 2024-08-08 DIAGNOSIS — Z72.0 TOBACCO ABUSE: ICD-10-CM

## 2024-08-08 DIAGNOSIS — D69.6 THROMBOCYTOPENIA (HCC): ICD-10-CM

## 2024-08-08 DIAGNOSIS — Z95.1 S/P CABG X 3: ICD-10-CM

## 2024-08-08 DIAGNOSIS — R73.09 ELEVATED HEMOGLOBIN A1C: ICD-10-CM

## 2024-09-04 DIAGNOSIS — D69.6 THROMBOCYTOPENIA (HCC): ICD-10-CM

## 2024-09-04 DIAGNOSIS — R73.09 ELEVATED HEMOGLOBIN A1C: ICD-10-CM

## 2024-09-04 DIAGNOSIS — Z72.0 TOBACCO ABUSE: ICD-10-CM

## 2024-09-04 DIAGNOSIS — Z95.1 S/P CABG X 3: ICD-10-CM

## 2024-09-11 ENCOUNTER — VBI (OUTPATIENT)
Dept: ADMINISTRATIVE | Facility: OTHER | Age: 59
End: 2024-09-11

## 2024-09-11 NOTE — TELEPHONE ENCOUNTER
09/11/24 1:57 PM     Chart reviewed for CRC: Colonoscopy and Diabetic Eye Exam was/were not submitted to the patient's insurance.     Carly Posey MA   [facility]

## 2024-09-13 ENCOUNTER — OFFICE VISIT (OUTPATIENT)
Dept: FAMILY MEDICINE CLINIC | Facility: CLINIC | Age: 59
End: 2024-09-13
Payer: COMMERCIAL

## 2024-09-13 VITALS
HEART RATE: 73 BPM | OXYGEN SATURATION: 100 % | BODY MASS INDEX: 26.99 KG/M2 | TEMPERATURE: 97.6 F | HEIGHT: 65 IN | WEIGHT: 162 LBS | SYSTOLIC BLOOD PRESSURE: 116 MMHG | DIASTOLIC BLOOD PRESSURE: 72 MMHG

## 2024-09-13 DIAGNOSIS — I70.213 ATHEROSCLEROSIS OF NATIVE ARTERY OF BOTH LOWER EXTREMITIES WITH INTERMITTENT CLAUDICATION (HCC): ICD-10-CM

## 2024-09-13 DIAGNOSIS — I25.110 CORONARY ARTERY DISEASE INVOLVING NATIVE CORONARY ARTERY OF NATIVE HEART WITH UNSTABLE ANGINA PECTORIS (HCC): Chronic | ICD-10-CM

## 2024-09-13 DIAGNOSIS — Z95.1 S/P CABG X 3: ICD-10-CM

## 2024-09-13 DIAGNOSIS — I10 PRIMARY HYPERTENSION: Chronic | ICD-10-CM

## 2024-09-13 DIAGNOSIS — Z12.5 SCREENING FOR PROSTATE CANCER: ICD-10-CM

## 2024-09-13 DIAGNOSIS — Z00.00 ANNUAL PHYSICAL EXAM: Primary | ICD-10-CM

## 2024-09-13 DIAGNOSIS — E11.59 TYPE 2 DIABETES MELLITUS WITH OTHER CIRCULATORY COMPLICATION, WITHOUT LONG-TERM CURRENT USE OF INSULIN (HCC): ICD-10-CM

## 2024-09-13 LAB
LEFT EYE DIABETIC RETINOPATHY: NORMAL
LEFT EYE IMAGE QUALITY: NORMAL
LEFT EYE MACULAR EDEMA: NORMAL
LEFT EYE OTHER RETINOPATHY: NORMAL
RIGHT EYE DIABETIC RETINOPATHY: NORMAL
RIGHT EYE IMAGE QUALITY: NORMAL
RIGHT EYE MACULAR EDEMA: NORMAL
RIGHT EYE OTHER RETINOPATHY: NORMAL
SEVERITY (EYE EXAM): NORMAL
SL AMB POCT HEMOGLOBIN AIC: 7.4 (ref ?–6.5)

## 2024-09-13 PROCEDURE — 92250 FUNDUS PHOTOGRAPHY W/I&R: CPT | Performed by: STUDENT IN AN ORGANIZED HEALTH CARE EDUCATION/TRAINING PROGRAM

## 2024-09-13 PROCEDURE — 83036 HEMOGLOBIN GLYCOSYLATED A1C: CPT | Performed by: STUDENT IN AN ORGANIZED HEALTH CARE EDUCATION/TRAINING PROGRAM

## 2024-09-13 PROCEDURE — 99396 PREV VISIT EST AGE 40-64: CPT | Performed by: STUDENT IN AN ORGANIZED HEALTH CARE EDUCATION/TRAINING PROGRAM

## 2024-09-13 PROCEDURE — 99214 OFFICE O/P EST MOD 30 MIN: CPT | Performed by: STUDENT IN AN ORGANIZED HEALTH CARE EDUCATION/TRAINING PROGRAM

## 2024-09-13 RX ORDER — METOPROLOL SUCCINATE 25 MG/1
25 TABLET, EXTENDED RELEASE ORAL DAILY
Qty: 90 TABLET | Refills: 1 | Status: SHIPPED | OUTPATIENT
Start: 2024-09-13

## 2024-09-13 RX ORDER — ROSUVASTATIN CALCIUM 10 MG/1
10 TABLET, COATED ORAL EVERY OTHER DAY
Qty: 100 TABLET | Refills: 1 | Status: SHIPPED | OUTPATIENT
Start: 2024-09-13

## 2024-09-13 NOTE — PATIENT INSTRUCTIONS
"Patient Education     Routine physical for adults   The Basics   Written by the doctors and editors at [facility]   What is a physical? -- A physical is a routine visit, or \"check-up,\" with your doctor. You might also hear it called a \"wellness visit\" or \"preventive visit.\"  During each visit, the doctor will:   Ask about your physical and mental health   Ask about your habits, behaviors, and lifestyle   Do an exam   Give you vaccines if needed   Talk to you about any medicines you take   Give advice about your health   Answer your questions  Getting regular check-ups is an important part of taking care of your health. It can help your doctor find and treat any problems you have. But it's also important for preventing health problems.  A routine physical is different from a \"sick visit.\" A sick visit is when you see a doctor because of a health concern or problem. Since physicals are scheduled ahead of time, you can think about what you want to ask the doctor.  How often should I get a physical? -- It depends on your age and health. In general, for people age 21 years and older:   If you are younger than 50 years, you might be able to get a physical every 3 years.   If you are 50 years or older, your doctor might recommend a physical every year.  If you have an ongoing health condition, like diabetes or high blood pressure, your doctor will probably want to see you more often.  What happens during a physical? -- In general, each visit will include:   Physical exam - The doctor or nurse will check your height, weight, heart rate, and blood pressure. They will also look at your eyes and ears. They will ask about how you are feeling and whether you have any symptoms that bother you.   Medicines - It's a good idea to bring a list of all the medicines you take to each doctor visit. Your doctor will talk to you about your medicines and answer any questions. Tell them if you are having any side effects that bother you. You " "should also tell them if you are having trouble paying for any of your medicines.   Habits and behaviors - This includes:   Your diet   Your exercise habits   Whether you smoke, drink alcohol, or use drugs   Whether you are sexually active   Whether you feel safe at home  Your doctor will talk to you about things you can do to improve your health and lower your risk of health problems. They will also offer help and support. For example, if you want to quit smoking, they can give you advice and might prescribe medicines. If you want to improve your diet or get more physical activity, they can help you with this, too.   Lab tests, if needed - The tests you get will depend on your age and situation. For example, your doctor might want to check your:   Cholesterol   Blood sugar   Iron level   Vaccines - The recommended vaccines will depend on your age, health, and what vaccines you already had. Vaccines are very important because they can prevent certain serious or deadly infections.   Discussion of screening - \"Screening\" means checking for diseases or other health problems before they cause symptoms. Your doctor can recommend screening based on your age, risk, and preferences. This might include tests to check for:   Cancer, such as breast, prostate, cervical, ovarian, colorectal, prostate, lung, or skin cancer   Sexually transmitted infections, such as chlamydia and gonorrhea   Mental health conditions like depression and anxiety  Your doctor will talk to you about the different types of screening tests. They can help you decide which screenings to have. They can also explain what the results might mean.   Answering questions - The physical is a good time to ask the doctor or nurse questions about your health. If needed, they can refer you to other doctors or specialists, too.  Adults older than 65 years often need other care, too. As you get older, your doctor will talk to you about:   How to prevent falling at " home   Hearing or vision tests   Memory testing   How to take your medicines safely   Making sure that you have the help and support you need at home  All topics are updated as new evidence becomes available and our peer review process is complete.  This topic retrieved from [employer] on: May 02, 2024.  Topic 566815 Version 1.0  Release: 32.4.3 - C32.122  © 2024 UpToDate, Inc. and/or its affiliates. All rights reserved.  Consumer Information Use and Disclaimer   Disclaimer: This generalized information is a limited summary of diagnosis, treatment, and/or medication information. It is not meant to be comprehensive and should be used as a tool to help the user understand and/or assess potential diagnostic and treatment options. It does NOT include all information about conditions, treatments, medications, side effects, or risks that may apply to a specific patient. It is not intended to be medical advice or a substitute for the medical advice, diagnosis, or treatment of a health care provider based on the health care provider's examination and assessment of a patient's specific and unique circumstances. Patients must speak with a health care provider for complete information about their health, medical questions, and treatment options, including any risks or benefits regarding use of medications. This information does not endorse any treatments or medications as safe, effective, or approved for treating a specific patient. UpToDate, Inc. and its affiliates disclaim any warranty or liability relating to this information or the use thereof.The use of this information is governed by the Terms of Use, available at https://www.woltersWineDemonuwer.com/en/know/clinical-effectiveness-terms. 2024© UpToDate, Inc. and its affiliates and/or licensors. All rights reserved.  Copyright   © 2024 UpToDate, Inc. and/or its affiliates. All rights reserved.

## 2024-09-13 NOTE — ASSESSMENT & PLAN NOTE
Lab Results   Component Value Date    HGBA1C 6.9 (A) 11/02/2023   Would recommend stopping losartan over BB, start jardiance to help with nephroprotection and diabetes control. On metformin    Orders:    IRIS Diabetic eye exam    POCT hemoglobin A1c    Comprehensive metabolic panel; Future    CBC and differential; Future    TSH, 3rd generation with Free T4 reflex; Future    Lipid Panel with Direct LDL reflex; Future    Empagliflozin (Jardiance) 25 MG TABS; Take 1 tablet (25 mg total) by mouth daily    rosuvastatin (CRESTOR) 10 MG tablet; Take 1 tablet (10 mg total) by mouth every other day    Albumin / creatinine urine ratio; Future

## 2024-09-13 NOTE — PROGRESS NOTES
Adult Annual Physical  Name: Maxime Green      : 1965      MRN: [id number]  Encounter Provider: Michael Stapleton MD  Encounter Date: 2024   Encounter department: [facility] [address] [place] [address]    Assessment & Plan  Annual physical exam  Will need to bring back and discuss colon cancer screening lung cancer screening due to time constraints       Screening for prostate cancer    Orders:    PSA, Total Screen; Future    Coronary artery disease involving native coronary artery of native heart with unstable angina pectoris (HCC)  Continue with aspirin, recommend staying on metoprolol and will hold losartan given the periods of symptomatic low blood pressure.  Also had a discussion about the importance of being on a lipid-lowering agent such as statin.  Will try Crestor and take the medicine every other day       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 complication, without long-term current use of insulin (HCC)    Lab Results   Component Value Date    HGBA1C 6.9 (A) 2023   Would recommend stopping losartan over BB, start jardiance to help with nephroprotection and diabetes control. On metformin    Orders:    IRIS Diabetic eye exam    POCT hemoglobin A1c    Comprehensive metabolic panel; Future    CBC and differential; Future    TSH, 3rd generation with Free T4 reflex; Future    Lipid Panel with Direct LDL reflex; Future    Empagliflozin (Jardiance) 25 MG TABS; Take 1 tablet (25 mg total) by mouth daily    rosuvastatin (CRESTOR) 10 MG tablet; Take 1 tablet (10 mg total) by mouth every other day    Albumin / creatinine urine ratio; Future    Primary hypertension  Stop losartan, restart metoprolol.       S/P CABG x 3    Orders:    metoprolol succinate (TOPROL-XL) 25 mg 24 hr tablet; Take 1 tablet (25 mg total) by mouth daily    rosuvastatin (CRESTOR) 10 MG tablet; Take 1 tablet (10 mg total) by mouth every other day    Atherosclerosis of native artery of both lower  extremities with intermittent claudication (HCC)  Secondary optimization with Crestor and aspirin           Immunizations and preventive care screenings were discussed with patient today. Appropriate education was printed on patient's after visit summary.    Discussed risks and benefits of prostate cancer screening. We discussed the controversial history of PSA screening for prostate cancer in the [place] as well as the risk of over detection and over treatment of prostate cancer by way of PSA screening.  The patient understands that PSA blood testing is an imperfect way to screen for prostate cancer and that elevated PSA levels in the blood may also be caused by infection, inflammation, prostatic trauma or manipulation, urological procedures, or by benign prostatic enlargement.    The role of the digital rectal examination in prostate cancer screening was also discussed and I discussed with him that there is large interobserver variability in the findings of digital rectal examination.    Counseling:  Exercise: the importance of regular exercise/physical activity was discussed. Recommend exercise 3-5 times per week for at least 30 minutes.       Depression Screening and Follow-up Plan: Patient was screened for depression during today's encounter. They screened negative with a PHQ-2 score of 0.        History of Present Illness     Adult Annual Physical    Follow up chronic conditions, active with gym and follows healthy diabetic diet per patient. Home glucose testing is 160-190. He will fast for 24 hours on the weekends and glucose will still be 135.    Stopped metoprolol because he was getting dizzy and low blood pressure readings    Review of Systems  Current Outpatient Medications on File Prior to Visit   Medication Sig Dispense Refill    aspirin (ECOTRIN LOW STRENGTH) 81 mg EC tablet Take 1 tablet (81 mg total) by mouth daily 30 tablet 0    isosorbide mononitrate (IMDUR) 30 mg 24 hr tablet TAKE 1 TABLET  "BY MOUTH TWICE A  tablet 1    metFORMIN (GLUCOPHAGE) 1000 MG tablet TAKE 1 TABLET BY MOUTH TWICE A DAY WITH MEALS 180 tablet 1    Xarelto 20 MG tablet TAKE 1 TABLET BY MOUTH DAILY WITH BREAKFAST 90 tablet 0    [DISCONTINUED] losartan (COZAAR) 25 mg tablet Take 1 tablet (25 mg total) by mouth daily 90 tablet 3    [DISCONTINUED] metoprolol succinate (TOPROL-XL) 25 mg 24 hr tablet TAKE 1 TABLET (25 MG TOTAL) BY MOUTH DAILY. (Patient not taking: Reported on 9/13/2024) 30 tablet 0    [DISCONTINUED] pravastatin (PRAVACHOL) 20 mg tablet Take 1 tablet (20 mg total) by mouth daily at bedtime (Patient not taking: Reported on 9/13/2024) 90 tablet 3     No current facility-administered medications on file prior to visit.      Social History     Tobacco Use    Smoking status: Former     Current packs/day: 0.00     Average packs/day: 1 pack/day for 38.0 years (38.0 ttl pk-yrs)     Types: Cigarettes     Start date: 4/6/1983     Quit date: 4/6/2021     Years since quitting: 3.4    Smokeless tobacco: Never   Vaping Use    Vaping status: Never Used   Substance and Sexual Activity    Alcohol use: Never    Drug use: Never    Sexual activity: Not Currently       Objective     /72   Pulse 73   Temp 97.6 °F (36.4 °C)   Ht 5' 5\" (1.651 m)   Wt 73.5 kg (162 lb)   SpO2 100%   BMI 26.96 kg/m²     Physical Exam  Cardiovascular:      Pulses: no weak pulses.           Dorsalis pedis pulses are 2+ on the right side and 2+ on the left side.        Posterior tibial pulses are 2+ on the right side and 2+ on the left side.   Feet:      Right foot:      Skin integrity: No ulcer, skin breakdown, erythema, warmth, callus or dry skin.      Left foot:      Skin integrity: No ulcer, skin breakdown, erythema, warmth, callus or dry skin.         Patient's shoes and socks removed.    Right Foot/Ankle   Right Foot Inspection  Skin Exam: skin normal and skin intact. No dry skin, no warmth, no callus, no erythema, no maceration, no abnormal " color, no pre-ulcer, no ulcer and no callus.     Toe Exam: ROM and strength within normal limits. No swelling, no tenderness, erythema and  no right toe deformity    Sensory   Monofilament testing: intact    Vascular  Capillary refills: < 3 seconds  The right DP pulse is 2+. The right PT pulse is 2+.     Left Foot/Ankle  Left Foot Inspection  Skin Exam: skin normal and skin intact. No dry skin, no warmth, no erythema, no maceration, normal color, no pre-ulcer, no ulcer and no callus.     Toe Exam: ROM and strength within normal limits. No swelling, no tenderness, no erythema and no left toe deformity.     Sensory   Monofilament testing: intact    Vascular  Capillary refills: < 3 seconds  The left DP pulse is 2+. The left PT pulse is 2+.     Assign Risk Category  No deformity present  No loss of protective sensation  No weak pulses  Risk: 0

## 2024-09-13 NOTE — ASSESSMENT & PLAN NOTE
Continue with aspirin, recommend staying on metoprolol and will hold losartan given the periods of symptomatic low blood pressure.  Also had a discussion about the importance of being on a lipid-lowering agent such as statin.  Will try Crestor and take the medicine every other day

## 2024-09-16 ENCOUNTER — HOSPITAL ENCOUNTER (OUTPATIENT)
Dept: NON INVASIVE DIAGNOSTICS | Facility: HOSPITAL | Age: 59
Discharge: HOME | End: 2024-09-16
Attending: SURGERY
Payer: COMMERCIAL

## 2024-09-16 DIAGNOSIS — I65.23 CAROTID STENOSIS, ASYMPTOMATIC, BILATERAL: ICD-10-CM

## 2024-09-16 DIAGNOSIS — T82.898A OCCLUSION OF FEMOROPOPLITEAL BYPASS GRAFT, INITIAL ENCOUNTER (HCC): ICD-10-CM

## 2024-09-16 DIAGNOSIS — I73.9 PAD (PERIPHERAL ARTERY DISEASE) (HCC): ICD-10-CM

## 2024-09-16 PROCEDURE — 93923 UPR/LXTR ART STDY 3+ LVLS: CPT

## 2024-09-16 PROCEDURE — 93880 EXTRACRANIAL BILAT STUDY: CPT

## 2024-09-16 PROCEDURE — 93880 EXTRACRANIAL BILAT STUDY: CPT | Performed by: SURGERY

## 2024-09-16 PROCEDURE — 93925 LOWER EXTREMITY STUDY: CPT

## 2024-09-17 PROCEDURE — 93922 UPR/L XTREMITY ART 2 LEVELS: CPT | Performed by: SURGERY

## 2024-09-17 PROCEDURE — 93925 LOWER EXTREMITY STUDY: CPT | Performed by: SURGERY

## 2024-09-18 ENCOUNTER — TELEPHONE (OUTPATIENT)
Dept: VASCULAR SURGERY | Facility: CLINIC | Age: 59
End: 2024-09-18

## 2024-09-18 NOTE — TELEPHONE ENCOUNTER
Attempted to contact patient to schedule appointment(s) listed below.  Requested patient call [phone] to schedule appointment(s).    Patient's appointment(s) are past due, expected ASAP.    Dopplers  [] Abdominal Aorta Iliac (AOIL)  [] Carotid (CV)   [] Celiac and/or Mesenteric  [] Endovascular Aortic Repair (EVAR)   [] Hemodialysis Access (HD)   [] Lower Limb Arterial (AMBER)  [] Lower Limb Venous (LEV)  [] Lower Limb Venous Duplex with Reflux (LEVDR)  [] Renal Artery  [] Upper Limb Arterial (UEA)    [] Upper Limb Venous (UEV)              [] FRANCIE and Waveform analysis     Advanced Imaging   [] CTA head/neck    [] CTA abdomen    [] CTA abdomen & pelvis    [] CT abdomen with/ without contrast  [] CT abdomen with contrast  [] CT abdomen without contrast    [] CT abdomen & pelvis with/ without contrast  [] CT abdomen & pelvis with contrast  [] CT abdomen & pelvis without contrast    Office Visit   [] New patient, patient last seen over 3 years ago  [] Risk factor modification (RFM)   [x] Follow up   [] Lost to follow up (LTFU)   Called patient & LMOM to schedule ov fu/pt l/s 3/6/23 w/Dorinda Winslow/FLOR[place] AMBER 9/17/24

## 2024-09-20 DIAGNOSIS — I73.9 PAD (PERIPHERAL ARTERY DISEASE) (HCC): ICD-10-CM

## 2024-09-20 DIAGNOSIS — I10 PRIMARY HYPERTENSION: Primary | Chronic | ICD-10-CM

## 2024-09-24 ENCOUNTER — TELEPHONE (OUTPATIENT)
Age: 59
End: 2024-09-24

## 2024-09-24 NOTE — TELEPHONE ENCOUNTER
Hello,    The following message was sent via e-mail to the leadership team:     Please advise if you can help facilitate the following overbook request:    Patient Name: Maxime Green    Patient MRN: [id number]     Call back #: [phone]    Insurance: [facility]    Department:Cardiology    Specialty: General Cardiology    Reason for overbook request: PATIENT REQUEST    Comments  Patient seen by Dr. Pina/Dr. Reeder in the past.  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     Requested doctor and location: Dr. Pina - either location    Date of current appointment: Nothing scheduled      Thank you.

## 2024-10-11 ENCOUNTER — APPOINTMENT (OUTPATIENT)
Dept: LAB | Facility: CLINIC | Age: 59
End: 2024-10-11
Payer: COMMERCIAL

## 2024-10-11 DIAGNOSIS — Z12.5 SCREENING FOR PROSTATE CANCER: ICD-10-CM

## 2024-10-11 DIAGNOSIS — E11.59 TYPE 2 DIABETES MELLITUS WITH OTHER CIRCULATORY COMPLICATION, WITHOUT LONG-TERM CURRENT USE OF INSULIN (HCC): ICD-10-CM

## 2024-10-11 LAB
ALBUMIN SERPL BCG-MCNC: 4.7 G/DL (ref 3.5–5)
ALP SERPL-CCNC: 61 U/L (ref 34–104)
ALT SERPL W P-5'-P-CCNC: 24 U/L (ref 7–52)
ANION GAP SERPL CALCULATED.3IONS-SCNC: 8 MMOL/L (ref 4–13)
AST SERPL W P-5'-P-CCNC: 25 U/L (ref 13–39)
BASOPHILS # BLD AUTO: 0.03 THOUSANDS/ΜL (ref 0–0.1)
BASOPHILS NFR BLD AUTO: 1 % (ref 0–1)
BILIRUB SERPL-MCNC: 0.57 MG/DL (ref 0.2–1)
BUN SERPL-MCNC: 31 MG/DL (ref 5–25)
CALCIUM SERPL-MCNC: 9.7 MG/DL (ref 8.4–10.2)
CHLORIDE SERPL-SCNC: 99 MMOL/L (ref 96–108)
CHOLEST SERPL-MCNC: 183 MG/DL
CO2 SERPL-SCNC: 27 MMOL/L (ref 21–32)
CREAT SERPL-MCNC: 1.2 MG/DL (ref 0.6–1.3)
CREAT UR-MCNC: 43.7 MG/DL
EOSINOPHIL # BLD AUTO: 0.13 THOUSAND/ΜL (ref 0–0.61)
EOSINOPHIL NFR BLD AUTO: 2 % (ref 0–6)
ERYTHROCYTE [DISTWIDTH] IN BLOOD BY AUTOMATED COUNT: 13.5 % (ref 11.6–15.1)
GFR SERPL CREATININE-BSD FRML MDRD: 65 ML/MIN/1.73SQ M
GLUCOSE P FAST SERPL-MCNC: 126 MG/DL (ref 65–99)
HCT VFR BLD AUTO: 42.6 % (ref 36.5–49.3)
HDLC SERPL-MCNC: 40 MG/DL
HGB BLD-MCNC: 13.6 G/DL (ref 12–17)
IMM GRANULOCYTES # BLD AUTO: 0.02 THOUSAND/UL (ref 0–0.2)
IMM GRANULOCYTES NFR BLD AUTO: 0 % (ref 0–2)
LDLC SERPL CALC-MCNC: 101 MG/DL (ref 0–100)
LYMPHOCYTES # BLD AUTO: 1.75 THOUSANDS/ΜL (ref 0.6–4.47)
LYMPHOCYTES NFR BLD AUTO: 31 % (ref 14–44)
MCH RBC QN AUTO: 26.2 PG (ref 26.8–34.3)
MCHC RBC AUTO-ENTMCNC: 31.9 G/DL (ref 31.4–37.4)
MCV RBC AUTO: 82 FL (ref 82–98)
MICROALBUMIN UR-MCNC: 22.1 MG/L
MICROALBUMIN/CREAT 24H UR: 51 MG/G CREATININE (ref 0–30)
MONOCYTES # BLD AUTO: 0.5 THOUSAND/ΜL (ref 0.17–1.22)
MONOCYTES NFR BLD AUTO: 9 % (ref 4–12)
NEUTROPHILS # BLD AUTO: 3.3 THOUSANDS/ΜL (ref 1.85–7.62)
NEUTS SEG NFR BLD AUTO: 57 % (ref 43–75)
NRBC BLD AUTO-RTO: 0 /100 WBCS
PLATELET # BLD AUTO: 192 THOUSANDS/UL (ref 149–390)
PMV BLD AUTO: 10 FL (ref 8.9–12.7)
POTASSIUM SERPL-SCNC: 4.2 MMOL/L (ref 3.5–5.3)
PROT SERPL-MCNC: 7.9 G/DL (ref 6.4–8.4)
PSA SERPL-MCNC: 0.32 NG/ML (ref 0–4)
RBC # BLD AUTO: 5.19 MILLION/UL (ref 3.88–5.62)
SODIUM SERPL-SCNC: 134 MMOL/L (ref 135–147)
TRIGL SERPL-MCNC: 208 MG/DL
TSH SERPL DL<=0.05 MIU/L-ACNC: 2.28 UIU/ML (ref 0.45–4.5)
WBC # BLD AUTO: 5.73 THOUSAND/UL (ref 4.31–10.16)

## 2024-10-11 PROCEDURE — 80061 LIPID PANEL: CPT

## 2024-10-11 PROCEDURE — 82043 UR ALBUMIN QUANTITATIVE: CPT

## 2024-10-11 PROCEDURE — 80053 COMPREHEN METABOLIC PANEL: CPT

## 2024-10-11 PROCEDURE — 82570 ASSAY OF URINE CREATININE: CPT

## 2024-10-11 PROCEDURE — 84443 ASSAY THYROID STIM HORMONE: CPT

## 2024-10-11 PROCEDURE — 36415 COLL VENOUS BLD VENIPUNCTURE: CPT

## 2024-10-11 PROCEDURE — G0103 PSA SCREENING: HCPCS

## 2024-10-11 PROCEDURE — 85025 COMPLETE CBC W/AUTO DIFF WBC: CPT

## 2024-10-15 ENCOUNTER — OFFICE VISIT (OUTPATIENT)
Dept: CARDIOLOGY CLINIC | Facility: CLINIC | Age: 59
End: 2024-10-15
Payer: COMMERCIAL

## 2024-10-15 VITALS
BODY MASS INDEX: 26.66 KG/M2 | DIASTOLIC BLOOD PRESSURE: 68 MMHG | HEIGHT: 65 IN | WEIGHT: 160 LBS | HEART RATE: 69 BPM | OXYGEN SATURATION: 97 % | RESPIRATION RATE: 16 BRPM | SYSTOLIC BLOOD PRESSURE: 148 MMHG

## 2024-10-15 DIAGNOSIS — Z95.1 HX OF CABG: ICD-10-CM

## 2024-10-15 DIAGNOSIS — I73.9 PERIPHERAL VASCULAR DISEASE (HCC): Primary | ICD-10-CM

## 2024-10-15 DIAGNOSIS — T82.898A OCCLUSION OF FEMOROPOPLITEAL BYPASS GRAFT, INITIAL ENCOUNTER (HCC): ICD-10-CM

## 2024-10-15 DIAGNOSIS — Z98.61 HISTORY OF PTCA: ICD-10-CM

## 2024-10-15 PROCEDURE — 99213 OFFICE O/P EST LOW 20 MIN: CPT | Performed by: INTERNAL MEDICINE

## 2024-10-15 NOTE — PROGRESS NOTES
Cardiology Follow Up    Maxime Green  1965  [id number]  [facility] [place] [id number] [phone]    1. Peripheral vascular disease (HCC)  2. Occlusion of femoropopliteal bypass graft, initial encounter ([facility])  -     rivaroxaban (Xarelto) 20 mg tablet; Take 1 tablet (20 mg total) by mouth daily with breakfast  3. Hx of CABG  4. History of PTCA        Interval History: Patient with past history of burning discomfort in the throat with exertion relieved by rest.  He had a CABG in the past with a LIMA to the LAD and SVG to a posterolateral branch and OM 2.  Subsequently he had angioplasty of a  of the circumflex.    He is physically active including walking up to a mile on the treadmill including on a grade and he no longer gets exertional throat discomfort relieved by rest.  He did have a question of some reversibility on a stress test but he has been asymptomatic and has done well.    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)    Former tobacco use    History of PTCA    Peripheral vascular disease ([facility])     Past Medical History:   Diagnosis Date    Bicuspid aortic valve     being observed    CAD (coronary artery disease)     Chest pain 4/6/2021    Coronary artery disease     Diabetes mellitus (HCC)     Encounter for postoperative care 5/17/2021    Former tobacco use     GERD (gastroesophageal reflux disease)     Goiter     History of myocardial infarction     History of rib fracture     Hyperlipidemia     Hypertension     Hypertensive urgency 4/6/2021    Left carotid bruit 7/22/2021    Leg pain, bilateral      Agram today LLE   1/20/2022    Leukocytosis 4/6/2021    Myocardial infarction (HCC)     2021-   CABG  x3    NSTEMI (non-ST elevated myocardial infarction) (HCC) 4/7/2021    Wears glasses      Social History     Socioeconomic History    Marital status:      Spouse name: Not on file    Number of children: Not on file    Years of education: Not on file    Highest education level: Not on file   Occupational History    Not on file   Tobacco Use    Smoking status: Former     Current packs/day: 0.00     Average packs/day: 1 pack/day for 38.0 years (38.0 ttl pk-yrs)     Types: Cigarettes     Start date: 4/6/1983     Quit date: 4/6/2021     Years since quitting: 3.5    Smokeless tobacco: Never   Vaping Use    Vaping status: Never Used   Substance and Sexual Activity    Alcohol use: Never    Drug use: Never    Sexual activity: Not Currently   Other Topics Concern    Not on file   Social History Narrative    Not on file     Social Determinants of Health     Financial Resource Strain: Not on file   Food Insecurity: No Food Insecurity (10/20/2022)    Hunger Vital Sign     Worried About Running Out of Food in the Last Year: Never true     Ran Out of Food in the Last Year: Never true   Transportation Needs: No Transportation Needs (10/20/2022)    PRAPARE - Transportation     Lack of Transportation (Medical): No     Lack of Transportation (Non-Medical): No   Physical Activity: Not on file   Stress: Not on file   Social Connections: Not on file   Intimate Partner Violence: Not on file   Housing Stability: Unknown (10/20/2022)    Housing Stability Vital Sign     Unable to Pay for Housing in the Last Year: No     Number of Places Lived in the Last Year: Not on file     Unstable Housing in the Last Year: No      Family History   Problem Relation Age of Onset    Heart disease Father      Past Surgical History:   Procedure Laterality Date    ARTERIOGRAM Right 10/19/2022    Procedure: RIGHT LOWER EXTREMITY ARTERIOGRAM WITH  LYSIS, PLACEMENT OF LYSIS CATHETER; third order cannulation of right popliteal artery via left groin access;  Surgeon: Ronald Ordonez MD;  [facility];  Service: Vascular    CARDIAC CATHETERIZATION      CARDIAC CATHETERIZATION N/A 10/20/2021    Procedure: Cardiac catheterization;  Surgeon: Lynnette Reeder MD;  Location: [facility];  Service: Cardiology    CARDIAC CATHETERIZATION N/A 10/20/2021    Procedure: Cardiac Coronary Angiogram;  Surgeon: Lynnette Reeder MD;  [facility];  Service: Cardiology    CARDIAC CATHETERIZATION N/A 10/20/2021    Procedure: Cardiac pci;  Surgeon: Lynnette Reeder MD;  [facility];  Service: Cardiology    IR LOWER EXTREMITY ANGIOGRAM  1/20/2022    IR LOWER EXTREMITY ANGIOGRAM  2/3/2022    IR LOWER EXTREMITY ANGIOGRAM  10/19/2022    IR PICC PLACEMENT DOUBLE LUMEN  10/19/2022    IR TPA LYSIS CHECK  10/20/2022    MT BYPASS W/VEIN FEMORAL-POPLITEAL Right 2/3/2022    Procedure: BYPASS FEMORAL-POPLITEAL;  Surgeon: Pradeep Brothers MD;  [facility]: [facility];  Service: Vascular    MT CORONARY ARTERY BYP W/VEIN & ARTERY GRAFT 4 VEIN N/A 4/9/2021    Procedure: CORONARY ARTERY BYPASS GRAFT (CABG) 3 VESSELS: LIMA to LAD; LLE EVH/SVG to LPL & OM2;  Surgeon: Jose Sanchez DO;  [facility];  Service: Cardiac Surgery    MT ECHO TRANSESOPHAG R-T 2D W/PRB IMG ACQUISJ I&R N/A 4/9/2021    Procedure: TRANSESOPHAGEAL ECHOCARDIOGRAM (BENNETT);  Surgeon: Jose Sanchez DO;  [facility]: [facility];  Service: Cardiac Surgery    MT NDSC SURG W/VIDEO-ASSISTED HARVEST VEIN CABG Left 4/9/2021    Procedure: HARVEST VEIN ENDOSCOPIC (EVH);  Surgeon: Jose Sanchez DO;  [facility]: [facility];  Service: Cardiac Surgery    MT SLCTV CATHJ 3RD+ ORD SLCTV ABDL PEL/LXTR BRNCH Left 1/20/2022    Procedure: ARTERIOGRAM, STENT PLACEMENT IN LEFT SUPERFICIAL FEMORIAL ARTERY;  Surgeon: Pradeep Brothers MD;  [facility];  Service: Vascular    VASECTOMY          Current Outpatient Medications:     aspirin (ECOTRIN LOW STRENGTH) 81 mg EC tablet, Take 1 tablet (81 mg total) by mouth daily, Disp: 30 tablet, Rfl: 0    Empagliflozin (Jardiance) 25 MG TABS, Take 1 tablet (25 mg total) by mouth daily, Disp: 90 tablet, Rfl: 1    isosorbide mononitrate (IMDUR) 30 mg 24 hr tablet, TAKE 1 TABLET BY MOUTH TWICE A DAY, Disp: 180 tablet, Rfl: 1    metFORMIN (GLUCOPHAGE) 1000 MG tablet, TAKE 1 TABLET BY MOUTH TWICE A DAY WITH MEALS, Disp: 180 tablet, Rfl: 1    metoprolol succinate (TOPROL-XL) 25 mg 24 hr tablet, Take 1 tablet (25 mg total) by mouth daily, Disp: 90 tablet, Rfl: 1    rivaroxaban (Xarelto) 20 mg tablet, Take 1 tablet (20 mg total) by mouth daily with breakfast, Disp: 90 tablet, Rfl: 3    rosuvastatin (CRESTOR) 10 MG tablet, Take 1 tablet (10 mg total) by mouth every other day, Disp: 100 tablet, Rfl: 1  No Known Allergies    Labs:  Appointment on 10/11/2024   Component Date Value    PSA 10/11/2024 0.320     Sodium 10/11/2024 134 (L)     Potassium 10/11/2024 4.2     Chloride 10/11/2024 99     CO2 10/11/2024 27     ANION GAP 10/11/2024 8     BUN 10/11/2024 31 (H)     Creatinine 10/11/2024 1.20     Glucose, Fasting 10/11/2024 126 (H)     Calcium 10/11/2024 9.7     AST 10/11/2024 25     ALT 10/11/2024 24     Alkaline Phosphatase 10/11/2024 61     Total Protein 10/11/2024 7.9     Albumin 10/11/2024 4.7     Total Bilirubin 10/11/2024 0.57     eGFR 10/11/2024 65     WBC 10/11/2024 5.73     RBC 10/11/2024 5.19     Hemoglobin 10/11/2024 13.6     Hematocrit 10/11/2024 42.6     MCV 10/11/2024 82     MCH 10/11/2024 26.2 (L)     MCHC 10/11/2024 31.9     RDW 10/11/2024 13.5     MPV 10/11/2024 10.0     Platelets 10/11/2024 192     nRBC 10/11/2024 0     Segmented % 10/11/2024 57     Immature Grans % 10/11/2024 0     Lymphocytes % 10/11/2024 31     Monocytes % 10/11/2024 9     Eosinophils Relative 10/11/2024 2     Basophils Relative 10/11/2024 1     Absolute Neutrophils 10/11/2024 3.30      Absolute Immature Grans 10/11/2024 0.02     Absolute Lymphocytes 10/11/2024 1.75     Absolute Monocytes 10/11/2024 0.50     Eosinophils Absolute 10/11/2024 0.13     Basophils Absolute 10/11/2024 0.03     TSH 3RD GENERATON 10/11/2024 2.279     Cholesterol 10/11/2024 183     Triglycerides 10/11/2024 208 (H)     HDL, Direct 10/11/2024 40     LDL Calculated 10/11/2024 101 (H)     Creatinine, Ur 10/11/2024 43.7     Albumin,U,Random 10/11/2024 22.1 (H)     Albumin Creat Ratio 10/11/2024 51 (H)    Office Visit on 2024   Component Date Value    Severity 2024 NORMAL     Right Eye Diabetic Retin* 2024 None     Right Eye Macular Edema 2024 None     Right Eye Other Retinopa* 2024 None     Right Eye Image Quality 2024 Gradable Image     Left Eye Diabetic Retino* 2024 None     Left Eye Macular Edema 2024 None     Left Eye Other Retinopat* 2024 None     Left Eye Image Quality 2024 Gradable Image     Result 2024 Retinal Study Result for LEXIE FRANCOIS     Result 2024 onesimo MCKINNON 60 y/o, M (: 1965, MRN: [id number])     Result 2024 presented to [facility] on 2024 for a retinal imaging study of the left and right eyes.     Result 2024 Based on the findings of the study, the following is recommended for LEXIE FRANCOIS     Result 2024 Normal Study: Re-scan the patient in 12 months or in the next calendar year.     Result 2024 Interpreting Provider's Comments:  No comments provided     Result 2024 Diagnoses Present:  - 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 complications     Result 2024 Right eye findings: Negative for Diabetic Retinopathy     Result 2024 Negative for Macular Edema     Result 2024 Left eye findings: Negative for Diabetic Retinopathy     Result 2024 Negative for Macular Edema     Result 2024 This result was electronically signed by Gordy  MD Anthony, NPI: [id number], Taxonomy: [id number] on 09- 12:52 [facility].     Result 09/13/2024 NOTE:  Any pathology noted on this diabetic retinal evaluation should be confirmed by an appropriate ophthalmic examination.     Hemoglobin A1C 09/13/2024 7.4 (A)      Imaging: VAS carotid complete study    Result Date: 9/17/2024  Narrative:  [facility] REPORT [facility]INICAL: Indications: Yearly surveillance of carotid artery disease.  Patient is asymptomatic at this time. Operative History: 2022-10-20 Right Thrombolysis of fem-pop bypass graft 2022-02-03 Right CFA to above knee popliteal PTFE bypass graft (Dr. Brothers) 2022-01-20 Left Proximal to mid superficial femoral stent placement (Dr. Brothers) 2021-10-20 Coronary angiogram 2021-04-09 CABG x 3 2021-04-09 Left GSV harvest for CABG Risk Factors The patient has history of HTN, Diabetes (NIDDM (oral meds)), Hyperlipidemia, PAD, CAD and previous smoking (quit 1-5yrs ago).  FINDINGS:  Right        Impression  PSV  EDV (cm/s)  Ratio  Dist. ICA                 70          31   1.14  Mid. ICA                  57          20   0.94  Prox. ICA    1 - 49%     109          38   1.78  Dist CCA                  54          16         Mid CCA                   61          22   0.80  Prox CCA                  77          21         Ext Carotid  Moderate    221          31   3.63  Prox Vert                 63          22         Subclavian               128          12          Left         Impression  PSV  EDV (cm/s)  Ratio  Dist. ICA                 47          16   0.79  Mid. ICA                  48          16   0.81  Prox. ICA    1 - 49%      68          18   1.14  Dist CCA                  77          22         Mid CCA                   59          19   0.99  Prox CCA                  60          17         Ext Carotid              130          19   2.20  Prox Vert                 40           6         Subclavian               247           0            CONCLUSION:  Impression  RIGHT: There is <50% stenosis noted in the internal carotid artery. Plaque is heterogenous and irregular. Vertebral artery flow is antegrade. There is no significant subclavian artery disease. Increased PSV at the ECA suggests moderate disease, this is a new finding. LEFT: There is <50% stenosis noted in the internal carotid artery. Plaque is heterogenous and irregular. Vertebral artery flow is antegrade. There is no significant subclavian artery disease.  Compared to previous study on 09/2023, there is a new finding of Right ECA stenosis, no other significant change.  SIGNATURE: Electronically Signed by: BRENDAN ZARAGOZA DO, RPVI on 2024-09-17 10:04:08 AM    VAS lower limb arterial duplex, complete bilateral    Result Date: 9/17/2024  Narrative:  [facility] REPORT CLINICAL: Indications:  Patient referred for PVD surveillance, denies symptoms at this time. Operative History: 2022-10-20 Right Thrombolysis of fem-pop bypass graft 2022-02-03 Right CFA to above knee popliteal PTFE bypass graft (Dr. Brothers) 2022-01-20 Left Proximal to mid superficial femoral stent placement (Dr. Brothers) 2021-10-20 Coronary angiogram 2021-04-09 CABG x 3 2021-04-09 Left GSV harvest for CABG Risk Factors The patient has history of HTN, Diabetes (NIDDM (oral meds)), Hyperlipidemia, PAD, CAD and previous smoking (quit 1-5yrs ago). Clinical Right Pressure:  116/ mm Hg, Left Pressure:  115/ mm Hg.  FINDINGS:  Segment                    Right            Left                                                  PSV (cm/s)  EDV  Impression  PSV (cm/s)  EDV  Inflow Anastomosis                 98                                    High Thigh (Graft)                149                                    Mid Thigh (Graft) - Stent          75                                    Low Thigh (Graft) - Stent          56                                    Outflow Anastomosis                76                                    Common Femoral Artery              120    0                      96    3  Prox Profunda                     123    0                      66    0  Prox SFA - Stent                                                60    0  Mid SFA - Stent                                                 67    0  Dist SFA                           96   15                               Dist SFA - Stent                            50-75%             178    0  Proximal Pop                       74   12                      75    0  Distal Pop                         52    9                      45    4  Tibioperoneal                      43    0                      78    6  Dist Post Tibial                   39   10                      22    0  Prox. Ant. Tibial                  17    3                               Dist. Ant. Tibial                  48    5                      34    0     CONCLUSION:  Impression: RIGHT LOWER LIMB: No evidence of significant lower extremity arterial occlusive disease. Ankle/Brachial index:  1.13 which is in the normal disease category (1.14 ) PVR/ PPG tracings are normal. Metatarsal pressure of 151mmHg Great toe pressure of 88mmHg, within the healing range  LEFT LOWER LIMB: Increased PSV at the distal superficial femoral artery post stent. No other evidence of significant lower extremity arterial occlusive disease. Ankle/Brachial index:  1.20  which is in the disease category ( 1.15) PVR/ PPG tracings are normal. Metatarsal pressure of 116mmHg Great toe pressure of 83mmHg, within the healing range   Compared to previous study on 09/18/2023 , there is no significant change.  SIGNATURE: Electronically Signed by: BRENDAN ZARAGOZA DO, RPVI on 2024-09-17 10:03:29 AM      Review of Systems:  Review of Systems    Physical Exam:  Well-developed well-nourished.  Heart rate 69 and regular blood pressure 140/69.  Regular rhythm.  Grade 2 short systolic ejection murmur at the base.  No edema.  No neck vein distention.    Discussion/Summary:    1.   "Stable coronary artery disease with history of previous CABG and     Recommendations:    He had stopped taking his Crestor and his cholesterol (LDL) creeped up to 101 (LDL).  It had been 44 in the past.  He had stopped taking it because of \"pain in the kidney\".    I told him it reduces his chance of heart attack or stroke by 50% he agreed to start taking it again.    Follow-up 1 year      Jayesh Pina MD  "

## 2024-10-16 RX ORDER — RIVAROXABAN 20 MG/1
20 TABLET, FILM COATED ORAL
Qty: 30 TABLET | Refills: 5 | Status: SHIPPED | OUTPATIENT
Start: 2024-10-16

## 2024-10-19 DIAGNOSIS — I21.4 NSTEMI (NON-ST ELEVATED MYOCARDIAL INFARCTION) (HCC): ICD-10-CM

## 2024-10-21 RX ORDER — ISOSORBIDE MONONITRATE 30 MG/1
TABLET, EXTENDED RELEASE ORAL
Qty: 180 TABLET | Refills: 1 | Status: SHIPPED | OUTPATIENT
Start: 2024-10-21

## 2024-10-30 ENCOUNTER — TELEPHONE (OUTPATIENT)
Dept: FAMILY MEDICINE CLINIC | Facility: CLINIC | Age: 59
End: 2024-10-30

## 2024-10-30 NOTE — TELEPHONE ENCOUNTER
Patient dropped off [employer] medical response, diabetes form.    Call patient when ready for .    Placed in provider's bin.

## 2024-11-06 ENCOUNTER — APPOINTMENT (OUTPATIENT)
Dept: URGENT CARE | Facility: CLINIC | Age: 59
End: 2024-11-06

## 2024-11-21 ENCOUNTER — OFFICE VISIT (OUTPATIENT)
Dept: VASCULAR SURGERY | Facility: CLINIC | Age: 59
End: 2024-11-21
Payer: COMMERCIAL

## 2024-11-21 VITALS
HEIGHT: 65 IN | DIASTOLIC BLOOD PRESSURE: 78 MMHG | BODY MASS INDEX: 26.66 KG/M2 | OXYGEN SATURATION: 97 % | SYSTOLIC BLOOD PRESSURE: 138 MMHG | HEART RATE: 62 BPM | WEIGHT: 160 LBS

## 2024-11-21 DIAGNOSIS — I65.23 ASYMPTOMATIC BILATERAL CAROTID ARTERY STENOSIS: ICD-10-CM

## 2024-11-21 DIAGNOSIS — I73.9 PERIPHERAL VASCULAR DISEASE (HCC): Primary | ICD-10-CM

## 2024-11-21 PROCEDURE — 99214 OFFICE O/P EST MOD 30 MIN: CPT | Performed by: SURGERY

## 2024-11-21 NOTE — ASSESSMENT & PLAN NOTE
59-year-old male with a past medical history of CAD, hypertension, asymptomatic bilateral carotid artery stenosis of less than 50%, and peripheral arterial disease status post  left SFA stenting performed January 2022 and right fem above-knee pop bypass with PTFE performed February 2022 complicated by right lower extremity acute limb ischemia October 2022 status post lysis and balloon angioplasty.    Overall patient's been doing well.  No complaints of the lower extremities.  He remains on aspirin and Xarelto.  Duplex was reviewed which shows patency of the bilateral lower extremities.  He has palpable pulses bilaterally.    At this point we will continue with surveillance.  Continue medications as prescribed.  Follow-up in 1 year with duplex

## 2024-11-21 NOTE — ASSESSMENT & PLAN NOTE
Patient has a known less than 50% bilateral carotid artery stenosis.  Patient is currently on aspirin and statin.  No history of strokes.  Recent duplex showed no changes.  Recommend continuing surveillance imaging.  Will repeat duplex in 1 year

## 2024-11-21 NOTE — PROGRESS NOTES
Name: Maxime Green      : 1965      MRN: [id number]  Encounter Provider: Ronald Ordonez MD  Encounter Date: 2024   Encounter [facility]: [facility]  :  Assessment & Plan  Peripheral vascular disease (HCC)  59-year-old male with a past medical history of CAD, hypertension, asymptomatic bilateral carotid artery stenosis of less than 50%, and peripheral arterial disease status post  left SFA stenting performed 2022 and right fem above-knee pop bypass with PTFE performed 2022 complicated by right lower extremity acute limb ischemia 2022 status post lysis and balloon angioplasty.    Overall patient's been doing well.  No complaints of the lower extremities.  He remains on aspirin and Xarelto.  Duplex was reviewed which shows patency of the bilateral lower extremities.  He has palpable pulses bilaterally.    At this point we will continue with surveillance.  Continue medications as prescribed.  Follow-up in 1 year with duplex         Asymptomatic bilateral carotid artery stenosis  Patient has a known less than 50% bilateral carotid artery stenosis.  Patient is currently on aspirin and statin.  No history of strokes.  Recent duplex showed no changes.  Recommend continuing surveillance imaging.  Will repeat duplex in 1 year             History of Present Illness       HPI  Maxime Green is a 59 y.o. male who presents     Patient is here today to review results of a AMBER and CV done 2024. He has a history of multiple vascular interventions. Patient denies any pain in his legs when walking or any open wounds. He also denies any TIA of Stroke symptoms. Patient is taking ASA 81 mg, Xarelto and Rosuvastatin. He is a former smoker.     History obtained from: patient    Review of Systems   Constitutional: Negative.    HENT: Negative.     Eyes: Negative.    Respiratory: Negative.     Cardiovascular: Negative.    Gastrointestinal: Negative.    Endocrine:  "Negative.    Genitourinary: Negative.    Musculoskeletal: Negative.    Skin: Negative.    Allergic/Immunologic: Negative.    Neurological: Negative.    Hematological: Negative.    Psychiatric/Behavioral: Negative.       Medical History Reviewed by provider this encounter:  Tobacco  Allergies  Meds  Problems  Med Hx  Surg Hx  Fam Hx     .     Objective   /78 (BP Location: Left arm, Patient Position: Sitting, Cuff Size: Standard)   Pulse 62   Ht 5' 5\" (1.651 m)   Wt 72.6 kg (160 lb)   SpO2 97%   BMI 26.63 kg/m²      Physical Exam  Vitals and nursing note reviewed.   Constitutional:       Appearance: He is well-developed.   HENT:      Head: Normocephalic and atraumatic.   Eyes:      Conjunctiva/sclera: Conjunctivae normal.   Cardiovascular:      Rate and Rhythm: Normal rate and regular rhythm.      Pulses:           Femoral pulses are 2+ on the right side and 2+ on the left side.       Popliteal pulses are 2+ on the right side and 2+ on the left side.   Pulmonary:      Effort: Pulmonary effort is normal.      Breath sounds: Normal breath sounds.   Abdominal:      Palpations: Abdomen is soft.      Tenderness: There is no abdominal tenderness.   Musculoskeletal:      Cervical back: Neck supple.   Skin:     General: Skin is warm and dry.      Capillary Refill: Capillary refill takes less than 2 seconds.   Neurological:      General: No focal deficit present.      Mental Status: He is alert and oriented to person, place, and time.   Psychiatric:         Mood and Affect: Mood normal.         Administrative Statements   I have spent a total time of 30 minutes in caring for this patient on the day of the visit/encounter including Diagnostic results, Prognosis, Risks and benefits of tx options, Instructions for management, Patient and family education, Importance of tx compliance, Risk factor reductions, Impressions, Counseling / Coordination of care, Documenting in the medical record, Reviewing / ordering " tests, medicine, procedures  , and Obtaining or reviewing history  .

## 2024-12-12 NOTE — ASSESSMENT & PLAN NOTE
Patient used to smoke 1-2 ppd  for 38 years,   He quit smoking in April,2021 U/A is weakly positive. If she is symptomatic we can start antibiotics awaiting culture  Recommend Augmentin BID x 7 days  Rx pulled up and Ok to send to pharmacy of choice

## 2025-01-30 ENCOUNTER — VBI (OUTPATIENT)
Dept: ADMINISTRATIVE | Facility: OTHER | Age: 60
End: 2025-01-30

## 2025-01-30 NOTE — TELEPHONE ENCOUNTER
01/30/25 11:34 AM     Chart reviewed for Blood Pressure was/were submitted to the patient's insurance.     Princess Cary MA   [facility]

## 2025-03-09 DIAGNOSIS — I21.4 NSTEMI (NON-ST ELEVATED MYOCARDIAL INFARCTION) (HCC): ICD-10-CM

## 2025-03-10 ENCOUNTER — OFFICE VISIT (OUTPATIENT)
Dept: CARDIOLOGY CLINIC | Facility: CLINIC | Age: 60
End: 2025-03-10
Payer: COMMERCIAL

## 2025-03-10 VITALS
DIASTOLIC BLOOD PRESSURE: 86 MMHG | HEART RATE: 78 BPM | BODY MASS INDEX: 26.66 KG/M2 | HEIGHT: 65 IN | WEIGHT: 160 LBS | SYSTOLIC BLOOD PRESSURE: 152 MMHG | RESPIRATION RATE: 18 BRPM | OXYGEN SATURATION: 98 %

## 2025-03-10 DIAGNOSIS — Z95.1 HX OF CABG: Primary | ICD-10-CM

## 2025-03-10 DIAGNOSIS — I73.9 PERIPHERAL VASCULAR DISEASE (HCC): ICD-10-CM

## 2025-03-10 DIAGNOSIS — Z01.810 PREOP CARDIOVASCULAR EXAM: ICD-10-CM

## 2025-03-10 DIAGNOSIS — I10 PRIMARY HYPERTENSION: Chronic | ICD-10-CM

## 2025-03-10 PROCEDURE — 99214 OFFICE O/P EST MOD 30 MIN: CPT | Performed by: INTERNAL MEDICINE

## 2025-03-10 RX ORDER — ISOSORBIDE MONONITRATE 30 MG/1
30 TABLET, EXTENDED RELEASE ORAL 2 TIMES DAILY
Qty: 180 TABLET | Refills: 1 | Status: SHIPPED | OUTPATIENT
Start: 2025-03-10

## 2025-03-10 NOTE — PATIENT INSTRUCTIONS
Restart Metoprolol succinate today.  Restart Xarelto as soon as safe from bleeding from procedure standpoint.   Schedule to have lipid panel

## 2025-03-10 NOTE — ASSESSMENT & PLAN NOTE
BP goal should be less than 130/80.  Blood pressure is elevated as he held his metoprolol for the past 3 days.  Patient was advised to restart Metoprolol succinate starting today without any discontinuation sotero-procedurally.

## 2025-03-10 NOTE — PROGRESS NOTES
Cardiology Follow Up    Maxime Green  1965  [id number]  [facility] [place] [id number] [phone]    Assessment & Plan  Hx of CABG  Patient has known CAD, history of NSTEMI,  status post CABG x 3 on April 2021, status post PCI with NIRMALA on October 2021.  At present, CAD is clinically stable.  No angina.  Continue OMT for CAD.  Primary hypertension  BP goal should be less than 130/80.  Blood pressure is elevated as he held his metoprolol for the past 3 days.  Patient was advised to restart Metoprolol succinate starting today without any discontinuation sotero-procedurally.   Peripheral vascular disease (HCC)  Patient is s/p LLE angiogram and treatment of SFA with balloon angioplasty and stenting (1/20/22, Zev), 2022-02-03 Right CFA to above knee popliteal PTFE bypass graft (Dr. Brothers), 2022-01-20 Left Proximal to mid superficial femoral stent placement (Dr. Brothers).  Patient is being followed by [facility] surgery.  At present, he denies any lifestyle limiting claudication. He is on maintenance Xarelto 20 mg po daily and low dose ASA daily. Repeat lipid panel with low threshold of increasing to high intensity Rosuvastatin with LDL goal of <55.   Preop cardiovascular exam  At present, patient denies angina, no clinical heart failure, no symptomatic arrhythmia. For preoperative cardiac evaluation prior to dental procedure, patient has low risk of developing perioperative cardiac events. Xarelto has been held for the past 3 days.  Patient was advised to restart metoprolol as blood pressure is elevated during today's office visit.     Interval History:   Since last office visit, patient denies any cardiac complaints.  Denies any chest pain or exertional angina.  He claims to be doing regular walking exercise without any issues.  He denies leg claudication.  He also came today for preoperative  cardiac evaluation prior to dental procedure.  His blood pressure is elevated today as he discontinued his metoprolol for the last 3 days.  He also held his Xarelto for his dental procedure.    Patient Active Problem List   Diagnosis    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 complication, without long-term current use of insulin ([facility])    Hx of CABG    CAD (coronary artery disease)    GERD (gastroesophageal reflux disease)    Hypertension    Atheroscler native arteries the extremities w/intermit claudication ([facility])    Former tobacco use    History of PTCA    Peripheral vascular disease ([facility])    Asymptomatic bilateral carotid artery stenosis     Past Medical History:   Diagnosis Date    Bicuspid aortic valve     being observed    CAD (coronary artery disease)     Chest pain 4/6/2021    Coronary artery disease     Diabetes mellitus ([facility])     Encounter for postoperative care 5/17/2021    Former tobacco use     GERD (gastroesophageal reflux disease)     Goiter     History of myocardial infarction     History of rib fracture     Hyperlipidemia     Hypertension     Hypertensive urgency 4/6/2021    Left carotid bruit 7/22/2021    Leg pain, bilateral     Agram today LLE   1/20/2022    Leukocytosis 4/6/2021    Myocardial infarction ([facility])     2021-   CABG  x3    NSTEMI (non-ST elevated myocardial infarction) ([facility]) 4/7/2021    Wears glasses      Social History     Socioeconomic History    Marital status:      Spouse name: Not on file    Number of children: Not on file    Years of education: Not on file    Highest education level: Not on file   Occupational History    Not on file   Tobacco Use    Smoking status: Former     Current packs/day: 0.00     Average packs/day: 1 pack/day for 38.0 years (38.0 ttl pk-yrs)     Types: Cigarettes     Start date: 4/6/1983     Quit date: 4/6/2021     Years since quitting: 3.9    Smokeless tobacco: Never   Vaping Use    Vaping status: Never Used   Substance and Sexual Activity     Alcohol use: Never    Drug use: Never    Sexual activity: Not Currently   Other Topics Concern    Not on file   Social History Narrative    Not on file     Social Drivers of Health     Financial Resource Strain: Not on file   Food Insecurity: No Food Insecurity (10/20/2022)    Hunger Vital Sign     Worried About Running Out of Food in the Last Year: Never true     Ran Out of Food in the Last Year: Never true   Transportation Needs: No Transportation Needs (10/20/2022)    PRAPARE - Transportation     Lack of Transportation (Medical): No     Lack of Transportation (Non-Medical): No   Physical Activity: Not on file   Stress: Not on file   Social Connections: Not on file   Intimate Partner Violence: Not on file   Housing Stability: Unknown (10/20/2022)    Housing Stability Vital Sign     Unable to Pay for Housing in the Last Year: No     Number of Places Lived in the Last Year: Not on file     Unstable Housing in the Last Year: No      Family History   Problem Relation Age of Onset    Heart disease Father      Past Surgical History:   Procedure Laterality Date    ARTERIOGRAM Right 10/19/2022    Procedure: RIGHT LOWER EXTREMITY ARTERIOGRAM WITH LYSIS, PLACEMENT OF LYSIS CATHETER; third order cannulation of right popliteal artery via left groin access;  Surgeon: Ronald Ordonez MD;  Location: [facility];  Service: Vascular    CARDIAC CATHETERIZATION      CARDIAC CATHETERIZATION N/A 10/20/2021    Procedure: Cardiac catheterization;  Surgeon: Lynnette Reeder MD;  Location: [facility];  Service: Cardiology    CARDIAC CATHETERIZATION N/A 10/20/2021    Procedure: Cardiac Coronary Angiogram;  Surgeon: Lynnette Reeder MD;  [facility]: [facility];  Service: Cardiology    CARDIAC CATHETERIZATION N/A 10/20/2021    Procedure: Cardiac pci;  Surgeon: Lynnette Reeder MD;  Location: [facility];  Service: Cardiology    IR LOWER EXTREMITY ANGIOGRAM  1/20/2022    IR LOWER EXTREMITY ANGIOGRAM  2/3/2022     IR LOWER EXTREMITY ANGIOGRAM  10/19/2022    IR PICC PLACEMENT DOUBLE LUMEN  10/19/2022    IR TPA LYSIS CHECK  10/20/2022    IN BYPASS W/VEIN FEMORAL-POPLITEAL Right 2/3/2022    Procedure: BYPASS FEMORAL-POPLITEAL;  Surgeon: Pradeep Brothers MD;  Location: [facility];  Service: Vascular    IN CORONARY ARTERY BYP W/VEIN & ARTERY GRAFT 4 VEIN N/A 4/9/2021    Procedure: CORONARY ARTERY BYPASS GRAFT (CABG) 3 VESSELS: LIMA to LAD; LLE EVH/SVG to LPL & OM2;  Surgeon: Jose Sanchez DO;  Location: [facility];  Service: Cardiac Surgery    IN ECHO TRANSESOPHAG R-T 2D W/PRB IMG ACQUISJ I&R N/A 4/9/2021    Procedure: TRANSESOPHAGEAL ECHOCARDIOGRAM (BENNETT);  Surgeon: Jose Sanchez DO;  Location: [facility];  Service: Cardiac Surgery    IN NDSC SURG W/VIDEO-ASSISTED HARVEST VEIN CABG Left 4/9/2021    Procedure: HARVEST VEIN ENDOSCOPIC ([facility]);  Surgeon: Jose Sanchez DO;  Location: [facility];  Service: Cardiac Surgery    IN SLCTV CATHJ 3RD+ ORD SLCTV ABDL PEL/LXTR BRNCH Left 1/20/2022    Procedure: ARTERIOGRAM, STENT PLACEMENT IN LEFT SUPERFICIAL FEMORIAL ARTERY;  Surgeon: Pradeep Brothers MD;  Location: [facility];  Service: Vascular    VASECTOMY         Current Outpatient Medications:     aspirin (ECOTRIN LOW STRENGTH) 81 mg EC tablet, Take 1 tablet (81 mg total) by mouth daily, Disp: 30 tablet, Rfl: 0    Empagliflozin (Jardiance) 25 MG TABS, Take 1 tablet (25 mg total) by mouth daily, Disp: 90 tablet, Rfl: 1    isosorbide mononitrate (IMDUR) 30 mg 24 hr tablet, TAKE 1 TABLET BY MOUTH TWICE A DAY, Disp: 180 tablet, Rfl: 1    metFORMIN (GLUCOPHAGE) 1000 MG tablet, TAKE 1 TABLET BY MOUTH TWICE A DAY WITH MEALS, Disp: 180 tablet, Rfl: 1    metoprolol succinate (TOPROL-XL) 25 mg 24 hr tablet, Take 1 tablet (25 mg total) by mouth daily, Disp: 90 tablet, Rfl: 1    rivaroxaban (Xarelto) 20 mg tablet, Take 1 tablet (20 mg total) by mouth daily with breakfast, Disp: 90 tablet, Rfl: 3    rosuvastatin (CRESTOR) 10 MG tablet,  Take 1 tablet (10 mg total) by mouth every other day, Disp: 100 tablet, Rfl: 1    rivaroxaban (Xarelto) 20 mg tablet, TAKE 1 TABLET BY MOUTH DAILY WITH BREAKFAST, Disp: 30 tablet, Rfl: 5  No Known Allergies    Labs:  Appointment on 10/11/2024   Component Date Value    PSA 10/11/2024 0.320     Sodium 10/11/2024 134 (L)     Potassium 10/11/2024 4.2     Chloride 10/11/2024 99     CO2 10/11/2024 27     ANION GAP 10/11/2024 8     BUN 10/11/2024 31 (H)     Creatinine 10/11/2024 1.20     Glucose, Fasting 10/11/2024 126 (H)     Calcium 10/11/2024 9.7     AST 10/11/2024 25     ALT 10/11/2024 24     Alkaline Phosphatase 10/11/2024 61     Total Protein 10/11/2024 7.9     Albumin 10/11/2024 4.7     Total Bilirubin 10/11/2024 0.57     eGFR 10/11/2024 65     WBC 10/11/2024 5.73     RBC 10/11/2024 5.19     Hemoglobin 10/11/2024 13.6     Hematocrit 10/11/2024 42.6     MCV 10/11/2024 82     MCH 10/11/2024 26.2 (L)     MCHC 10/11/2024 31.9     RDW 10/11/2024 13.5     MPV 10/11/2024 10.0     Platelets 10/11/2024 192     nRBC 10/11/2024 0     Segmented % 10/11/2024 57     Immature Grans % 10/11/2024 0     Lymphocytes % 10/11/2024 31     Monocytes % 10/11/2024 9     Eosinophils Relative 10/11/2024 2     Basophils Relative 10/11/2024 1     Absolute Neutrophils 10/11/2024 3.30     Absolute Immature Grans 10/11/2024 0.02     Absolute Lymphocytes 10/11/2024 1.75     Absolute Monocytes 10/11/2024 0.50     Eosinophils Absolute 10/11/2024 0.13     Basophils Absolute 10/11/2024 0.03     TSH 3RD GENERATON 10/11/2024 2.279     Cholesterol 10/11/2024 183     Triglycerides 10/11/2024 208 (H)     HDL, Direct 10/11/2024 40     LDL Calculated 10/11/2024 101 (H)     Creatinine, Ur 10/11/2024 43.7     Albumin,U,Random 10/11/2024 22.1 (H)     Albumin Creat Ratio 10/11/2024 51 (H)    Office Visit on 09/13/2024   Component Date Value    Severity 09/13/2024 NORMAL     Right Eye Diabetic Retin* 09/13/2024 None     Right Eye Macular Edema 09/13/2024 None      Right Eye Other Retinopa* 2024 None     Right Eye Image Quality 2024 Gradable Image     Left Eye Diabetic Retino* 2024 None     Left Eye Macular Edema 2024 None     Left Eye Other Retinopat* 2024 None     Left Eye Image Quality 2024 Gradable Image     Result 2024 Retinal Study Result for LEXIE FRANCOIS     Result 2024 LEXIE FRANCOIS, onesimo 60 y/o, M (: 1965, MRN: [id number])     Result 2024 presented to [facility] on 2024 for a retinal imaging study of the left and right eyes.     Result 2024 Based on the findings of the study, the following is recommended for LEXIE FRANCOIS     Result 2024 Normal Study: Re-scan the patient in 12 months or in the next calendar year.     Result 2024 Interpreting Provider's Comments:  No comments provided     Result 2024 Diagnoses Present:  - 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 complications     Result 2024 Right eye findings: Negative for Diabetic Retinopathy     Result 2024 Negative for Macular Edema     Result 2024 Left eye findings: Negative for Diabetic Retinopathy     Result 2024 Negative for Macular Edema     Result 2024 This result was electronically signed by Anthony Kaminski MD, NPI: [id number], Taxonomy: [id number] on 2024 12:52 UTC.     Result 2024 NOTE:  Any pathology noted on this diabetic retinal evaluation should be confirmed by an appropriate ophthalmic examination.     Hemoglobin A1C 2024 7.4 (A)      Imaging: No results found.    My personal review of EKG during today's office visit showed NSR, NSST-T abnormalities, no change from previous.    Review of Systems:  Review of Systems   Respiratory:  Negative for shortness of breath.    Cardiovascular:  Negative for chest pain, palpitations and leg swelling.   All other systems reviewed and are negative.      Physical Exam:  Vitals and nursing note  reviewed.   Constitutional:       General: not in acute distress.     Appearance: well-developed.   HENT:      Head: Normocephalic and atraumatic.   Neck:     Supple, no JVD, no carotid bruit.  Cardiovascular:      Rate and Rhythm: Normal rate. Rhythm regular.     Heart sounds: No murmur. Normal S1S2, No gallops. No rubs.      No pedal edema.   Pulmonary:      Effort: Pulmonary effort is normal. No respiratory distress.      Breath sounds: Normal breath sounds.   Abdominal:      Palpations: Abdomen is soft, no distention.     Tenderness: There is no abdominal tenderness.   Musculoskeletal:         General: No swelling.      Cervical back: Neck supple.   Skin:     General: Skin is warm and dry.    Neurological:      Mental Status: awake, alert, oriented x 3.   Psychiatric:         Mood and Affect: Mood normal.

## 2025-03-10 NOTE — LETTER
March 10, 2025     Patient: Maxime Green  YOB: 1965  Date of Visit: 3/10/2025      To Whom it May Concern:    Maxime Green is under my professional care. Maxime was seen in my office on 3/10/2025. Maxime may return to work on March 12, 2025. .    If you have any questions or concerns, please don't hesitate to call.         Sincerely,          Arnold Rqoue MD        CC: No Recipients

## 2025-03-10 NOTE — ASSESSMENT & PLAN NOTE
Patient is s/p LLE angiogram and treatment of SFA with balloon angioplasty and stenting (1/20/22, Zev), 2022-02-03 Right CFA to above knee popliteal PTFE bypass graft (Dr. Brothers), 2022-01-20 Left Proximal to mid superficial femoral stent placement (Dr. Brothers).  Patient is being followed by [facility] surgery.  At present, he denies any lifestyle limiting claudication. He is on maintenance Xarelto 20 mg po daily and low dose ASA daily. Repeat lipid panel with low threshold of increasing to high intensity Rosuvastatin with LDL goal of <55.

## 2025-03-10 NOTE — ASSESSMENT & PLAN NOTE
Patient has known CAD, history of NSTEMI,  status post CABG x 3 on April 2021, status post PCI with NIRMALA on October 2021.  At present, CAD is clinically stable.  No angina.  Continue OMT for CAD.

## 2025-03-11 PROCEDURE — 93000 ELECTROCARDIOGRAM COMPLETE: CPT | Performed by: INTERNAL MEDICINE

## 2025-03-13 DIAGNOSIS — Z72.0 TOBACCO ABUSE: ICD-10-CM

## 2025-03-13 DIAGNOSIS — Z95.1 S/P CABG X 3: ICD-10-CM

## 2025-03-13 DIAGNOSIS — E11.59 TYPE 2 DIABETES MELLITUS WITH OTHER CIRCULATORY COMPLICATION, WITHOUT LONG-TERM CURRENT USE OF INSULIN (HCC): ICD-10-CM

## 2025-03-13 DIAGNOSIS — R73.09 ELEVATED HEMOGLOBIN A1C: ICD-10-CM

## 2025-03-13 DIAGNOSIS — D69.6 THROMBOCYTOPENIA (HCC): ICD-10-CM

## 2025-03-13 RX ORDER — EMPAGLIFLOZIN 25 MG/1
TABLET, FILM COATED ORAL
Qty: 90 TABLET | Refills: 1 | Status: SHIPPED | OUTPATIENT
Start: 2025-03-13

## 2025-03-13 RX ORDER — METOPROLOL SUCCINATE 25 MG/1
25 TABLET, EXTENDED RELEASE ORAL DAILY
Qty: 90 TABLET | Refills: 1 | Status: SHIPPED | OUTPATIENT
Start: 2025-03-13

## 2025-03-17 ENCOUNTER — APPOINTMENT (OUTPATIENT)
Dept: LAB | Facility: CLINIC | Age: 60
End: 2025-03-17
Payer: COMMERCIAL

## 2025-03-17 DIAGNOSIS — Z95.1 HX OF CABG: ICD-10-CM

## 2025-03-17 PROCEDURE — 36415 COLL VENOUS BLD VENIPUNCTURE: CPT

## 2025-03-17 PROCEDURE — 80061 LIPID PANEL: CPT

## 2025-03-18 ENCOUNTER — HOSPITAL ENCOUNTER (EMERGENCY)
Facility: HOSPITAL | Age: 60
Discharge: HOME | End: 2025-03-18
Attending: EMERGENCY MEDICINE | Admitting: EMERGENCY MEDICINE
Payer: COMMERCIAL

## 2025-03-18 VITALS
TEMPERATURE: 97.9 F | SYSTOLIC BLOOD PRESSURE: 150 MMHG | HEART RATE: 80 BPM | RESPIRATION RATE: 18 BRPM | OXYGEN SATURATION: 97 % | DIASTOLIC BLOOD PRESSURE: 73 MMHG

## 2025-03-18 DIAGNOSIS — R07.9 CHEST PAIN: Primary | ICD-10-CM

## 2025-03-18 LAB
2HR DELTA HS TROPONIN: 2 NG/L
ALBUMIN SERPL BCG-MCNC: 4.4 G/DL (ref 3.5–5)
ALP SERPL-CCNC: 84 U/L (ref 34–104)
ALT SERPL W P-5'-P-CCNC: 31 U/L (ref 7–52)
ANION GAP SERPL CALCULATED.3IONS-SCNC: 8 MMOL/L (ref 4–13)
AST SERPL W P-5'-P-CCNC: 26 U/L (ref 13–39)
BASOPHILS # BLD AUTO: 0.03 THOUSANDS/ÂΜL (ref 0–0.1)
BASOPHILS NFR BLD AUTO: 0 % (ref 0–1)
BILIRUB SERPL-MCNC: 0.6 MG/DL (ref 0.2–1)
BUN SERPL-MCNC: 25 MG/DL (ref 5–25)
CALCIUM SERPL-MCNC: 9.8 MG/DL (ref 8.4–10.2)
CARDIAC TROPONIN I PNL SERPL HS: 19 NG/L (ref ?–50)
CARDIAC TROPONIN I PNL SERPL HS: 21 NG/L (ref ?–50)
CHLORIDE SERPL-SCNC: 98 MMOL/L (ref 96–108)
CHOLEST SERPL-MCNC: 139 MG/DL (ref ?–200)
CO2 SERPL-SCNC: 27 MMOL/L (ref 21–32)
CREAT SERPL-MCNC: 0.98 MG/DL (ref 0.6–1.3)
EOSINOPHIL # BLD AUTO: 0.17 THOUSAND/ÂΜL (ref 0–0.61)
EOSINOPHIL NFR BLD AUTO: 2 % (ref 0–6)
ERYTHROCYTE [DISTWIDTH] IN BLOOD BY AUTOMATED COUNT: 13.7 % (ref 11.6–15.1)
GFR SERPL CREATININE-BSD FRML MDRD: 83 ML/MIN/1.73SQ M
GLUCOSE SERPL-MCNC: 190 MG/DL (ref 65–140)
HCT VFR BLD AUTO: 44 % (ref 36.5–49.3)
HDLC SERPL-MCNC: 41 MG/DL
HGB BLD-MCNC: 14 G/DL (ref 12–17)
IMM GRANULOCYTES # BLD AUTO: 0.03 THOUSAND/UL (ref 0–0.2)
IMM GRANULOCYTES NFR BLD AUTO: 0 % (ref 0–2)
LDLC SERPL CALC-MCNC: 69 MG/DL (ref 0–100)
LIPASE SERPL-CCNC: 78 U/L (ref 11–82)
LYMPHOCYTES # BLD AUTO: 1.51 THOUSANDS/ÂΜL (ref 0.6–4.47)
LYMPHOCYTES NFR BLD AUTO: 21 % (ref 14–44)
MCH RBC QN AUTO: 25.4 PG (ref 26.8–34.3)
MCHC RBC AUTO-ENTMCNC: 31.8 G/DL (ref 31.4–37.4)
MCV RBC AUTO: 80 FL (ref 82–98)
MONOCYTES # BLD AUTO: 0.61 THOUSAND/ÂΜL (ref 0.17–1.22)
MONOCYTES NFR BLD AUTO: 8 % (ref 4–12)
NEUTROPHILS # BLD AUTO: 4.94 THOUSANDS/ÂΜL (ref 1.85–7.62)
NEUTS SEG NFR BLD AUTO: 69 % (ref 43–75)
NONHDLC SERPL-MCNC: 98 MG/DL
NRBC BLD AUTO-RTO: 0 /100 WBCS
PLATELET # BLD AUTO: 218 THOUSANDS/UL (ref 149–390)
PMV BLD AUTO: 9.2 FL (ref 8.9–12.7)
POTASSIUM SERPL-SCNC: 4.1 MMOL/L (ref 3.5–5.3)
PROT SERPL-MCNC: 8.2 G/DL (ref 6.4–8.4)
RBC # BLD AUTO: 5.51 MILLION/UL (ref 3.88–5.62)
SODIUM SERPL-SCNC: 133 MMOL/L (ref 135–147)
TRIGL SERPL-MCNC: 143 MG/DL (ref ?–150)
WBC # BLD AUTO: 7.29 THOUSAND/UL (ref 4.31–10.16)

## 2025-03-18 PROCEDURE — 84484 ASSAY OF TROPONIN QUANT: CPT | Performed by: EMERGENCY MEDICINE

## 2025-03-18 PROCEDURE — 99285 EMERGENCY DEPT VISIT HI MDM: CPT

## 2025-03-18 PROCEDURE — 80053 COMPREHEN METABOLIC PANEL: CPT | Performed by: EMERGENCY MEDICINE

## 2025-03-18 PROCEDURE — 85025 COMPLETE CBC W/AUTO DIFF WBC: CPT | Performed by: EMERGENCY MEDICINE

## 2025-03-18 PROCEDURE — 99285 EMERGENCY DEPT VISIT HI MDM: CPT | Performed by: EMERGENCY MEDICINE

## 2025-03-18 PROCEDURE — 83690 ASSAY OF LIPASE: CPT | Performed by: EMERGENCY MEDICINE

## 2025-03-18 PROCEDURE — 36415 COLL VENOUS BLD VENIPUNCTURE: CPT | Performed by: EMERGENCY MEDICINE

## 2025-03-18 PROCEDURE — 93005 ELECTROCARDIOGRAM TRACING: CPT

## 2025-03-18 RX ORDER — FAMOTIDINE 20 MG/1
20 TABLET, FILM COATED ORAL DAILY
Qty: 60 TABLET | Refills: 0 | Status: SHIPPED | OUTPATIENT
Start: 2025-03-18

## 2025-03-18 NOTE — Clinical Note
Maxime Green was seen and treated in our emergency department on 3/18/2025.                Diagnosis: chest pain    Maxime  may return to work on return date.    He may return on this date: 03/19/2025         If you have any questions or concerns, please don't hesitate to call.      Sandeep Dickinson MD    ______________________________           _______________          _______________  Hospital Representative                              Date                                Time

## 2025-03-18 NOTE — ED PROVIDER NOTES
Time reflects when diagnosis was documented in both MDM as applicable and the Disposition within this note       Time User Action Codes Description Comment    3/18/2025  4:27 PM Sandeep Dickinson Add [R07.9] Chest pain           ED Disposition       ED Disposition   Discharge    Condition   Stable    Date/Time   Tue Mar 18, 2025  5:30 PM    Comment   Maxime Green discharge to home/self care.                   Assessment & Plan       Medical Decision Making  60-year-old male history of CAD, diabetes, hypertension presenting with chest pain.  Plan for cardiac evaluation including EKG troponin.  Basic labs.  Currently asymptomatic.  Reassess.    EKG interpreted by me with normal sinus rhythm and nonspecific ST abnormality.  Labs interpreted by me notable for troponin of 19 with normal delta.  Remains asymptomatic.  Prescription sent to pharmacy.  Cardiology referral. Discussed results and recommendations. Advised follow up PCP and cardiology. Medication recommendations. Given instructions and return precautions. Patient/family at bedside acknowledged understanding of all written and verbal instructions and return precautions. Discharged.     Amount and/or Complexity of Data Reviewed  Labs: ordered.             Medications - No data to display    ED Risk Strat Scores   HEART Risk Score      Flowsheet Row Most Recent Value   Heart Score Risk Calculator    History 0 Filed at: 03/18/2025 1627   ECG 1 Filed at: 03/18/2025 1627   Age 1 Filed at: 03/18/2025 1627   Risk Factors 2 Filed at: 03/18/2025 1627   Troponin 1 Filed at: 03/18/2025 1627   HEART Score 5 Filed at: 03/18/2025 1627          HEART Risk Score      Flowsheet Row Most Recent Value   Heart Score Risk Calculator    History 0 Filed at: 03/18/2025 1627   ECG 1 Filed at: 03/18/2025 1627   Age 1 Filed at: 03/18/2025 1627   Risk Factors 2 Filed at: 03/18/2025 1627   Troponin 1 Filed at: 03/18/2025 1627   HEART Score 5 Filed at: 03/18/2025 1627                               SBIRT 20yo+      Flowsheet Row Most Recent Value   Initial Alcohol Screen: US AUDIT-C     1. How often do you have a drink containing alcohol? 1 Filed at: 03/18/2025 6205   2. How many drinks containing alcohol do you have on a typical day you are drinking?  0 Filed at: 03/18/2025 4228   3a. Male UNDER 65: How often do you have five or more drinks on one occasion? 0 Filed at: 03/18/2025 8667   Audit-C Score 1 Filed at: 03/18/2025 1383   EVGENY: How many times in the past year have you...    Used an illegal drug or used a prescription medication for non-medical reasons? Never Filed at: 03/18/2025 3652                            History of Present Illness       Chief Complaint   Patient presents with    Chest Pain     Patient reports chest pain that started approximatley 2 hours prior to arrival, with SOB.  Reports chest pain and SOB relieved with ambulation.       Past Medical History:   Diagnosis Date    Bicuspid aortic valve     being observed    CAD (coronary artery disease)     Chest pain 4/6/2021    Coronary artery disease     Diabetes mellitus (HCC)     Encounter for postoperative care 5/17/2021    Former tobacco use     GERD (gastroesophageal reflux disease)     Goiter     History of myocardial infarction     History of rib fracture     Hyperlipidemia     Hypertension     Hypertensive urgency 4/6/2021    Left carotid bruit 7/22/2021    Leg pain, bilateral     Agram today LLE   1/20/2022    Leukocytosis 4/6/2021    Myocardial infarction (HCC)     2021-   CABG  x3    NSTEMI (non-ST elevated myocardial infarction) ([facility]) 4/7/2021    Wears glasses       Past Surgical History:   Procedure Laterality Date    ARTERIOGRAM Right 10/19/2022    Procedure: RIGHT LOWER EXTREMITY ARTERIOGRAM WITH LYSIS, PLACEMENT OF LYSIS CATHETER; third order cannulation of right popliteal artery via left groin access;  Surgeon: Ronald Ordonez MD;  [facility];  Service: Vascular    CARDIAC  CATHETERIZATION      CARDIAC CATHETERIZATION N/A 10/20/2021    Procedure: Cardiac catheterization;  Surgeon: Lynnette Reeder MD;  [facility];  Service: Cardiology    CARDIAC CATHETERIZATION N/A 10/20/2021    Procedure: Cardiac Coronary Angiogram;  Surgeon: Lynnette Reeder MD;  [facility];  Service: Cardiology    CARDIAC CATHETERIZATION N/A 10/20/2021    Procedure: Cardiac pci;  Surgeon: Lynnette Reeder MD;  [facility];  Service: Cardiology    IR LOWER EXTREMITY ANGIOGRAM  1/20/2022    IR LOWER EXTREMITY ANGIOGRAM  2/3/2022    IR LOWER EXTREMITY ANGIOGRAM  10/19/2022    IR PICC PLACEMENT DOUBLE LUMEN  10/19/2022    IR TPA LYSIS CHECK  10/20/2022    MT BYPASS W/VEIN FEMORAL-POPLITEAL Right 2/3/2022    Procedure: BYPASS FEMORAL-POPLITEAL;  Surgeon: Pradeep Brothers MD;  Location: [facility];  Service: Vascular    MT CORONARY ARTERY BYP W/VEIN & ARTERY GRAFT 4 VEIN N/A 4/9/2021    Procedure: CORONARY ARTERY BYPASS GRAFT (CABG) 3 VESSELS: LIMA to LAD; LLE EVH/SVG to LPL & OM2;  Surgeon: Jose Sanchez DO;  [facility];  Service: Cardiac Surgery    MT ECHO TRANSESOPHAG R-T 2D W/PRB IMG ACQUISJ I&R N/A 4/9/2021    Procedure: TRANSESOPHAGEAL ECHOCARDIOGRAM (BENNETT);  Surgeon: Jose Sanchez DO;  Location: [facility];  Service: Cardiac Surgery    MT NDSC SURG W/VIDEO-ASSISTED HARVEST VEIN CABG Left 4/9/2021    Procedure: HARVEST VEIN ENDOSCOPIC (EVH);  Surgeon: Jose Sanchez DO;  [facility];  Service: Cardiac Surgery    MT SLCTV CATHJ 3RD+ ORD SLCTV ABDL PEL/LXTR BRNCH Left 1/20/2022    Procedure: ARTERIOGRAM, STENT PLACEMENT IN LEFT SUPERFICIAL FEMORIAL ARTERY;  Surgeon: Pradeep Brothers MD;  Location[facility];  Service: Vascular    VASECTOMY        Family History   Problem Relation Age of Onset    Heart disease Father       Social History     Tobacco Use    Smoking status: Former     Current packs/day: 0.00     Average packs/day: 1  pack/day for 38.0 years (38.0 ttl pk-yrs)     Types: Cigarettes     Start date: 4/6/1983     Quit date: 4/6/2021     Years since quitting: 3.9    Smokeless tobacco: Never   Vaping Use    Vaping status: Never Used   Substance Use Topics    Alcohol use: Never    Drug use: Never      E-Cigarette/Vaping    E-Cigarette Use Never User       E-Cigarette/Vaping Substances    Nicotine No     THC No     CBD No     Flavoring No       I have reviewed and agree with the history as documented.     60-year-old male history of CAD, diabetes, hypertension presenting with chest pain.  Patient reports that he works night shift and ate breakfast just before going to bed around 11 this morning.  Shortly after lying down patient reports acute onset of low sternal chest pain improved with exercise and going up and down stairs and associated with lots of belching.  Patient reports frequent resolution of symptoms.  Denies any current symptoms.  Denies any association with exertion, diaphoresis, nausea/vomiting, lightheadedness/syncope, radiation.  Denies any other complaints.  Chart reviewed.    Past Medical History:  No date: Bicuspid aortic valve      Comment:  being observed  No date: CAD (coronary artery disease)  4/6/2021: Chest pain  No date: Coronary artery disease  No date: Diabetes mellitus (HCC)  5/17/2021: Encounter for postoperative care  No date: Former tobacco use  No date: GERD (gastroesophageal reflux disease)  No date: Goiter  No date: History of myocardial infarction  No date: History of rib fracture  No date: Hyperlipidemia  No date: Hypertension  4/6/2021: Hypertensive urgency  7/22/2021: Left carotid bruit  No date: Leg pain, bilateral      Comment:  Agram today LLE   1/20/2022 4/6/2021: Leukocytosis  No date: Myocardial infarction (HCC)      Comment:  2021-   CABG  x3  4/7/2021: NSTEMI (non-ST elevated myocardial infarction) ([facility])  No date: Wears glasses  Family History: non-contributory  Social  History          Review of Systems   Constitutional:  Negative for appetite change, chills, diaphoresis, fever and unexpected weight change.   HENT:  Negative for congestion and rhinorrhea.    Eyes:  Negative for photophobia and visual disturbance.   Respiratory:  Negative for cough, chest tightness and shortness of breath.    Cardiovascular:  Positive for chest pain. Negative for palpitations and leg swelling.   Gastrointestinal:  Negative for abdominal distention, abdominal pain, blood in stool, constipation, diarrhea, nausea and vomiting.   Genitourinary:  Negative for dysuria and hematuria.   Musculoskeletal:  Negative for back pain, joint swelling, neck pain and neck stiffness.   Skin:  Negative for color change, pallor, rash and wound.   Neurological:  Negative for dizziness, syncope, weakness, light-headedness and headaches.   Psychiatric/Behavioral:  Negative for agitation.    All other systems reviewed and are negative.          Objective       ED Triage Vitals [03/18/25 1311]   Temperature Pulse Blood Pressure Respirations SpO2 Patient Position - Orthostatic VS   97.9 °F (36.6 °C) 80 150/73 18 97 % Sitting      Temp Source Heart Rate Source BP Location FiO2 (%) Pain Score    Oral -- Left arm -- No Pain      Vitals      Date and Time Temp Pulse SpO2 Resp BP Pain Score FACES Pain Rating User   03/18/25 1311 97.9 °F (36.6 °C) 80 97 % 18 150/73 No Pain -- EMR            Physical Exam  Vitals and nursing note reviewed.   Constitutional:       General: He is not in acute distress.     Appearance: Normal appearance. He is well-developed. He is not ill-appearing, toxic-appearing or diaphoretic.   HENT:      Head: Normocephalic and atraumatic.      Nose: Nose normal. No congestion or rhinorrhea.      Mouth/Throat:      Mouth: Mucous membranes are moist.      Pharynx: Oropharynx is clear. No oropharyngeal exudate or posterior oropharyngeal erythema.   Eyes:      General: No scleral icterus.        Right eye: No  discharge.         Left eye: No discharge.      Extraocular Movements: Extraocular movements intact.      Conjunctiva/sclera: Conjunctivae normal.      Pupils: Pupils are equal, round, and reactive to light.   Neck:      Vascular: No JVD.      Trachea: No tracheal deviation.      Comments: Supple. Normal range of motion.   Cardiovascular:      Rate and Rhythm: Normal rate and regular rhythm.      Heart sounds: Normal heart sounds. No murmur heard.     No friction rub. No gallop.      Comments: Normal rate and regular rhythm  Pulmonary:      Effort: Pulmonary effort is normal. No respiratory distress.      Breath sounds: Normal breath sounds. No stridor. No wheezing or rales.      Comments: Clear to auscultation bilaterally  Chest:      Chest wall: No tenderness.   Abdominal:      General: Bowel sounds are normal. There is no distension.      Palpations: Abdomen is soft.      Tenderness: There is no abdominal tenderness. There is no right CVA tenderness, left CVA tenderness, guarding or rebound.      Comments: Soft, nontender, nondistended.  Normal bowel sounds throughout   Musculoskeletal:         General: No swelling, tenderness, deformity or signs of injury. Normal range of motion.      Cervical back: Normal range of motion and neck supple. No rigidity. No muscular tenderness.      Right lower leg: No edema.      Left lower leg: No edema.   Lymphadenopathy:      Cervical: No cervical adenopathy.   Skin:     General: Skin is warm and dry.      Coloration: Skin is not pale.      Findings: No erythema or rash.   Neurological:      General: No focal deficit present.      Mental Status: He is alert. Mental status is at baseline.      Sensory: No sensory deficit.      Motor: No weakness or abnormal muscle tone.      Coordination: Coordination normal.      Gait: Gait normal.      Comments: Alert.  Strength and sensation grossly intact.  Ambulatory without difficulty at baseline.    Psychiatric:         Behavior: Behavior  normal.         Thought Content: Thought content normal.         Results Reviewed       Procedure Component Value Units Date/Time    HS Troponin I 2hr [[id number]]  (Normal) Collected: 03/18/25 1712    Lab Status: Final result Specimen: Blood from Arm, Left Updated: 03/18/25 1750     hs TnI 2hr 21 ng/L      Delta 2hr hsTnI 2 ng/L     HS Troponin 0hr (reflex protocol) [[id number]]  (Normal) Collected: 03/18/25 1536    Lab Status: Final result Specimen: Blood from Arm, Left Updated: 03/18/25 1603     hs TnI 0hr 19 ng/L     HS Troponin I 4hr [[id number]]     Lab Status: No result Specimen: Blood     Comprehensive metabolic panel [[id number]]  (Abnormal) Collected: 03/18/25 1536    Lab Status: Final result Specimen: Blood from Arm, Left Updated: 03/18/25 1556     Sodium 133 mmol/L      Potassium 4.1 mmol/L      Chloride 98 mmol/L      CO2 27 mmol/L      ANION GAP 8 mmol/L      BUN 25 mg/dL      Creatinine 0.98 mg/dL      Glucose 190 mg/dL      Calcium 9.8 mg/dL      AST 26 U/L      ALT 31 U/L      Alkaline Phosphatase 84 U/L      Total Protein 8.2 g/dL      Albumin 4.4 g/dL      Total Bilirubin 0.60 mg/dL      eGFR 83 ml/min/1.73sq m     Narrative:      National Kidney Disease Foundation guidelines for Chronic Kidney Disease (CKD):     Stage 1 with normal or high GFR (GFR > 90 mL/min/1.73 square meters)    Stage 2 Mild CKD (GFR = 60-89 mL/min/1.73 square meters)    Stage 3A Moderate CKD (GFR = 45-59 mL/min/1.73 square meters)    Stage 3B Moderate CKD (GFR = 30-44 mL/min/1.73 square meters)    Stage 4 Severe CKD (GFR = 15-29 mL/min/1.73 square meters)    Stage 5 End Stage CKD (GFR <15 mL/min/1.73 square meters)  Note: GFR calculation is accurate only with a steady state creatinine    Lipase [[id number]]  (Normal) Collected: 03/18/25 1536    Lab Status: Final result Specimen: Blood from Arm, Left Updated: 03/18/25 1556     Lipase 78 u/L     CBC and differential [[id number]]  (Abnormal) Collected: 03/18/25 1536    Lab Status:  Final result Specimen: Blood from Arm, Left Updated: 03/18/25 1544     WBC 7.29 Thousand/uL      RBC 5.51 Million/uL      Hemoglobin 14.0 g/dL      Hematocrit 44.0 %      MCV 80 fL      MCH 25.4 pg      MCHC 31.8 g/dL      RDW 13.7 %      MPV 9.2 fL      Platelets 218 Thousands/uL      nRBC 0 /100 WBCs      Segmented % 69 %      Immature Grans % 0 %      Lymphocytes % 21 %      Monocytes % 8 %      Eosinophils Relative 2 %      Basophils Relative 0 %      Absolute Neutrophils 4.94 Thousands/µL      Absolute Immature Grans 0.03 Thousand/uL      Absolute Lymphocytes 1.51 Thousands/µL      Absolute Monocytes 0.61 Thousand/µL      Eosinophils Absolute 0.17 Thousand/µL      Basophils Absolute 0.03 Thousands/µL             No orders to display       Procedures    ED Medication and Procedure Management   Prior to Admission Medications   Prescriptions Last Dose Informant Patient Reported? Taking?   Jardiance 25 MG TABS   No No   Sig: TAKE 1 TABLET (25 MG TOTAL) BY MOUTH DAILY.   aspirin (ECOTRIN LOW STRENGTH) 81 mg EC tablet  Self No No   Sig: Take 1 tablet (81 mg total) by mouth daily   isosorbide mononitrate (IMDUR) 30 mg 24 hr tablet   No No   Sig: TAKE 1 TABLET BY MOUTH TWICE A DAY   metFORMIN (GLUCOPHAGE) 1000 MG tablet   No No   Sig: TAKE 1 TABLET BY MOUTH TWICE A DAY WITH FOOD   metoprolol succinate (TOPROL-XL) 25 mg 24 hr tablet   No No   Sig: TAKE 1 TABLET (25 MG TOTAL) BY MOUTH DAILY.   rivaroxaban (Xarelto) 20 mg tablet  Self No No   Sig: TAKE 1 TABLET BY MOUTH DAILY WITH BREAKFAST   rivaroxaban (Xarelto) 20 mg tablet  Self No No   Sig: Take 1 tablet (20 mg total) by mouth daily with breakfast   rosuvastatin (CRESTOR) 10 MG tablet  Self No No   Sig: Take 1 tablet (10 mg total) by mouth every other day      Facility-Administered Medications: None     Patient's Medications   Discharge Prescriptions    FAMOTIDINE (PEPCID) 20 MG TABLET    Take 1 tablet (20 mg total) by mouth daily       Start Date: 3/18/2025 End  Date: --       Order Dose: 20 mg       Quantity: 60 tablet    Refills: 0       ED SEPSIS DOCUMENTATION   Time reflects when diagnosis was documented in both MDM as applicable and the Disposition within this note       Time User Action Codes Description Comment    3/18/2025  4:27 PM Sandeep Dickinson Add [R07.9] Chest pain                  Sandeep Dickinson MD  03/18/25 5068

## 2025-03-18 NOTE — DISCHARGE INSTRUCTIONS
Please follow up PCP and cardiology.  Can consider Pepcid daily for the next 2 weeks.  Recommend tylenol 650 mg and ibuprofen 600 mg every 6 hours as needed for pain. Please return for severe chest pain, significant shortness of breath, severely worsening symptoms, or any other concerning signs or symptoms. Please refer to the following documents for additional instructions and return precautions.

## 2025-03-19 ENCOUNTER — VBI (OUTPATIENT)
Dept: FAMILY MEDICINE CLINIC | Facility: CLINIC | Age: 60
End: 2025-03-19

## 2025-03-19 LAB
ATRIAL RATE: 79 BPM
P AXIS: 80 DEGREES
PR INTERVAL: 162 MS
QRS AXIS: 90 DEGREES
QRSD INTERVAL: 92 MS
QT INTERVAL: 370 MS
QTC INTERVAL: 424 MS
T WAVE AXIS: 93 DEGREES
VENTRICULAR RATE: 79 BPM

## 2025-03-19 PROCEDURE — 93010 ELECTROCARDIOGRAM REPORT: CPT | Performed by: INTERNAL MEDICINE

## 2025-03-19 NOTE — TELEPHONE ENCOUNTER
03/19/25 9:27 AM    Patient contacted post ED visit, first outreach attempt made. Message was left for patient to return a call to the VBI Department at [facility]: Phone [phone].    Thank you.  Sole Oliveros MA  [facility]

## 2025-03-19 NOTE — LETTER
[facility] [address]  [address] [facility] [address]  [facility] [place] [id number] [phone]    Date: 03/21/25    Maxime Green  [address] [place] [address] [place] [address]    Dear Maxime:                                                                                                                                Thank you for choosing [facility] emergency department for care.  Your primary care provider wants to make sure that your ongoing medical care is being addressed. If you require follow up care as a result of your emergency department visit, there are a few things the practice would like you to know.                As part of the network's continuing commitment to caring for our patients, we have added more same day appointments and have extended office hours to meet your medical needs. After hours, on-call physicians are available via your primary care provider's main office line.               We encourage you to contact our office prior to seeking treatment to discuss your symptoms with the medical staff.  Together, we can determine the correct course of action.  A majority of non-emergent conditions such as: common cold, flu-like symptoms, fevers, strains/sprains, dislocations, minor burns, cuts and animal bites can be treated at [facility] facilities. Diagnostic testing is available at some sites.               Of course, if you are experiencing a life threatening medical emergency call 911 or proceed directly to the nearest emergency room.    Your nearest [facility] facility is conveniently located at:    [facility]  [address]  [place] [address]  [phone]  SKIP THE WAIT  Conveniently offered at most [facility] locations  Kyle your spot online at [email or web address] or on the [facility]    Sincerely,    [facility] [address]  Dept:  [phone]

## 2025-03-20 NOTE — TELEPHONE ENCOUNTER
03/20/25 9:10 AM    Patient contacted post ED visit, second outreach attempt made. Message was left for patient to return a call to the VBI Department at [facility]: Phone [phone].    Thank you.  Sole Oliveros MA  [facility]

## 2025-03-21 NOTE — TELEPHONE ENCOUNTER
03/21/25 10:35 AM    Patient contacted post ED visit, phone outreaches were unsuccessful and a MyChart letter has been sent to the patient as follow-up.    Thank you.  Sole Oliveros MA  [facility]

## 2025-03-24 ENCOUNTER — RESULTS FOLLOW-UP (OUTPATIENT)
Dept: CARDIOLOGY CLINIC | Facility: CLINIC | Age: 60
End: 2025-03-24

## 2025-06-01 ENCOUNTER — APPOINTMENT (EMERGENCY)
Dept: RADIOLOGY | Facility: HOSPITAL | Age: 60
End: 2025-06-01
Payer: COMMERCIAL

## 2025-06-01 ENCOUNTER — APPOINTMENT (EMERGENCY)
Dept: CT IMAGING | Facility: HOSPITAL | Age: 60
End: 2025-06-01
Payer: COMMERCIAL

## 2025-06-01 ENCOUNTER — HOSPITAL ENCOUNTER (EMERGENCY)
Facility: HOSPITAL | Age: 60
End: 2025-06-02
Attending: EMERGENCY MEDICINE | Admitting: EMERGENCY MEDICINE
Payer: COMMERCIAL

## 2025-06-01 DIAGNOSIS — I99.8 LIMB ISCHEMIA: ICD-10-CM

## 2025-06-01 DIAGNOSIS — M79.604 RIGHT LEG PAIN: Primary | ICD-10-CM

## 2025-06-01 LAB
ALBUMIN SERPL BCG-MCNC: 4.5 G/DL (ref 3.5–5)
ALP SERPL-CCNC: 74 U/L (ref 34–104)
ALT SERPL W P-5'-P-CCNC: 33 U/L (ref 7–52)
ANION GAP SERPL CALCULATED.3IONS-SCNC: 14 MMOL/L (ref 4–13)
APTT PPP: 36 SECONDS (ref 23–34)
APTT PPP: 37 SECONDS (ref 23–34)
AST SERPL W P-5'-P-CCNC: 28 U/L (ref 13–39)
BASOPHILS # BLD AUTO: 0.04 THOUSANDS/ÂΜL (ref 0–0.1)
BASOPHILS NFR BLD AUTO: 0 % (ref 0–1)
BILIRUB SERPL-MCNC: 0.87 MG/DL (ref 0.2–1)
BUN SERPL-MCNC: 30 MG/DL (ref 5–25)
CALCIUM SERPL-MCNC: 9.5 MG/DL (ref 8.4–10.2)
CHLORIDE SERPL-SCNC: 98 MMOL/L (ref 96–108)
CO2 SERPL-SCNC: 19 MMOL/L (ref 21–32)
CREAT SERPL-MCNC: 1.01 MG/DL (ref 0.6–1.3)
EOSINOPHIL # BLD AUTO: 0.06 THOUSAND/ÂΜL (ref 0–0.61)
EOSINOPHIL NFR BLD AUTO: 1 % (ref 0–6)
ERYTHROCYTE [DISTWIDTH] IN BLOOD BY AUTOMATED COUNT: 13.3 % (ref 11.6–15.1)
GFR SERPL CREATININE-BSD FRML MDRD: 80 ML/MIN/1.73SQ M
GLUCOSE SERPL-MCNC: 288 MG/DL (ref 65–140)
HCT VFR BLD AUTO: 44 % (ref 36.5–49.3)
HGB BLD-MCNC: 14.8 G/DL (ref 12–17)
IMM GRANULOCYTES # BLD AUTO: 0.04 THOUSAND/UL (ref 0–0.2)
IMM GRANULOCYTES NFR BLD AUTO: 0 % (ref 0–2)
INR PPP: 2.32 (ref 0.85–1.19)
INR PPP: 2.34 (ref 0.85–1.19)
LACTATE SERPL-SCNC: 1.7 MMOL/L (ref 0.5–2)
LYMPHOCYTES # BLD AUTO: 1.46 THOUSANDS/ÂΜL (ref 0.6–4.47)
LYMPHOCYTES NFR BLD AUTO: 13 % (ref 14–44)
MCH RBC QN AUTO: 26 PG (ref 26.8–34.3)
MCHC RBC AUTO-ENTMCNC: 33.6 G/DL (ref 31.4–37.4)
MCV RBC AUTO: 77 FL (ref 82–98)
MONOCYTES # BLD AUTO: 0.78 THOUSAND/ÂΜL (ref 0.17–1.22)
MONOCYTES NFR BLD AUTO: 7 % (ref 4–12)
NEUTROPHILS # BLD AUTO: 9.16 THOUSANDS/ÂΜL (ref 1.85–7.62)
NEUTS SEG NFR BLD AUTO: 79 % (ref 43–75)
NRBC BLD AUTO-RTO: 0 /100 WBCS
PLATELET # BLD AUTO: 153 THOUSANDS/UL (ref 149–390)
PMV BLD AUTO: 9.8 FL (ref 8.9–12.7)
POTASSIUM SERPL-SCNC: 3.6 MMOL/L (ref 3.5–5.3)
PROT SERPL-MCNC: 8 G/DL (ref 6.4–8.4)
PROTHROMBIN TIME: 26.2 SECONDS (ref 12.3–15)
PROTHROMBIN TIME: 26.4 SECONDS (ref 12.3–15)
RBC # BLD AUTO: 5.69 MILLION/UL (ref 3.88–5.62)
SODIUM SERPL-SCNC: 131 MMOL/L (ref 135–147)
WBC # BLD AUTO: 11.54 THOUSAND/UL (ref 4.31–10.16)

## 2025-06-01 PROCEDURE — 75635 CT ANGIO ABDOMINAL ARTERIES: CPT

## 2025-06-01 PROCEDURE — 80053 COMPREHEN METABOLIC PANEL: CPT | Performed by: EMERGENCY MEDICINE

## 2025-06-01 PROCEDURE — 96365 THER/PROPH/DIAG IV INF INIT: CPT

## 2025-06-01 PROCEDURE — 93005 ELECTROCARDIOGRAM TRACING: CPT

## 2025-06-01 PROCEDURE — 73564 X-RAY EXAM KNEE 4 OR MORE: CPT

## 2025-06-01 PROCEDURE — 36415 COLL VENOUS BLD VENIPUNCTURE: CPT | Performed by: EMERGENCY MEDICINE

## 2025-06-01 PROCEDURE — 85730 THROMBOPLASTIN TIME PARTIAL: CPT | Performed by: PHYSICIAN ASSISTANT

## 2025-06-01 PROCEDURE — 85730 THROMBOPLASTIN TIME PARTIAL: CPT | Performed by: EMERGENCY MEDICINE

## 2025-06-01 PROCEDURE — 96376 TX/PRO/DX INJ SAME DRUG ADON: CPT

## 2025-06-01 PROCEDURE — 85025 COMPLETE CBC W/AUTO DIFF WBC: CPT | Performed by: EMERGENCY MEDICINE

## 2025-06-01 PROCEDURE — 99284 EMERGENCY DEPT VISIT MOD MDM: CPT

## 2025-06-01 PROCEDURE — 85610 PROTHROMBIN TIME: CPT | Performed by: PHYSICIAN ASSISTANT

## 2025-06-01 PROCEDURE — 83605 ASSAY OF LACTIC ACID: CPT | Performed by: EMERGENCY MEDICINE

## 2025-06-01 PROCEDURE — 85610 PROTHROMBIN TIME: CPT | Performed by: EMERGENCY MEDICINE

## 2025-06-01 PROCEDURE — 96374 THER/PROPH/DIAG INJ IV PUSH: CPT

## 2025-06-01 RX ORDER — HEPARIN SODIUM 1000 [USP'U]/ML
5600 INJECTION, SOLUTION INTRAVENOUS; SUBCUTANEOUS ONCE
Status: COMPLETED | OUTPATIENT
Start: 2025-06-01 | End: 2025-06-01

## 2025-06-01 RX ORDER — FENTANYL CITRATE 50 UG/ML
75 INJECTION, SOLUTION INTRAMUSCULAR; INTRAVENOUS ONCE
Refills: 0 | Status: COMPLETED | OUTPATIENT
Start: 2025-06-01 | End: 2025-06-01

## 2025-06-01 RX ORDER — HEPARIN SODIUM 1000 [USP'U]/ML
5600 INJECTION, SOLUTION INTRAVENOUS; SUBCUTANEOUS EVERY 6 HOURS PRN
Status: DISCONTINUED | OUTPATIENT
Start: 2025-06-01 | End: 2025-06-02 | Stop reason: HOSPADM

## 2025-06-01 RX ORDER — HEPARIN SODIUM 1000 [USP'U]/ML
2800 INJECTION, SOLUTION INTRAVENOUS; SUBCUTANEOUS EVERY 6 HOURS PRN
Status: DISCONTINUED | OUTPATIENT
Start: 2025-06-01 | End: 2025-06-02 | Stop reason: HOSPADM

## 2025-06-01 RX ORDER — HEPARIN SODIUM 10000 [USP'U]/100ML
3-30 INJECTION, SOLUTION INTRAVENOUS
Status: DISCONTINUED | OUTPATIENT
Start: 2025-06-01 | End: 2025-06-02 | Stop reason: HOSPADM

## 2025-06-01 RX ADMIN — FENTANYL CITRATE 75 MCG: 50 INJECTION INTRAMUSCULAR; INTRAVENOUS at 22:35

## 2025-06-01 RX ADMIN — IOHEXOL 100 ML: 350 INJECTION, SOLUTION INTRAVENOUS at 22:57

## 2025-06-01 RX ADMIN — HEPARIN SODIUM 5600 UNITS: 1000 INJECTION, SOLUTION INTRAVENOUS; SUBCUTANEOUS at 23:09

## 2025-06-01 RX ADMIN — HEPARIN SODIUM 18 UNITS/KG/HR: 10000 INJECTION, SOLUTION INTRAVENOUS at 23:10

## 2025-06-01 RX ADMIN — FENTANYL CITRATE 75 MCG: 50 INJECTION INTRAMUSCULAR; INTRAVENOUS at 23:17

## 2025-06-02 ENCOUNTER — HOSPITAL ENCOUNTER (INPATIENT)
Facility: HOSPITAL | Age: 60
LOS: 2 days | Discharge: HOME | DRG: 253 | End: 2025-06-04
Attending: SURGERY | Admitting: SURGERY
Payer: COMMERCIAL

## 2025-06-02 ENCOUNTER — APPOINTMENT (INPATIENT)
Dept: RADIOLOGY | Facility: HOSPITAL | Age: 60
DRG: 253 | End: 2025-06-02
Attending: NURSE PRACTITIONER
Payer: COMMERCIAL

## 2025-06-02 ENCOUNTER — RESULTS FOLLOW-UP (OUTPATIENT)
Dept: EMERGENCY DEPT | Facility: HOSPITAL | Age: 60
End: 2025-06-02

## 2025-06-02 ENCOUNTER — APPOINTMENT (EMERGENCY)
Dept: CT IMAGING | Facility: HOSPITAL | Age: 60
End: 2025-06-02
Payer: COMMERCIAL

## 2025-06-02 VITALS
RESPIRATION RATE: 22 BRPM | DIASTOLIC BLOOD PRESSURE: 77 MMHG | OXYGEN SATURATION: 96 % | TEMPERATURE: 97.6 F | HEART RATE: 63 BPM | SYSTOLIC BLOOD PRESSURE: 173 MMHG

## 2025-06-02 PROBLEM — I99.8 ACUTE LOWER LIMB ISCHEMIA: Status: ACTIVE | Noted: 2025-06-02

## 2025-06-02 LAB
ATRIAL RATE: 68 BPM
FIBRINOGEN PPP-MCNC: 253 MG/DL (ref 206–523)
P AXIS: 78 DEGREES
PR INTERVAL: 172 MS
QRS AXIS: 79 DEGREES
QRSD INTERVAL: 96 MS
QT INTERVAL: 398 MS
QTC INTERVAL: 423 MS
T WAVE AXIS: 67 DEGREES
VENTRICULAR RATE: 68 BPM

## 2025-06-02 PROCEDURE — 93010 ELECTROCARDIOGRAM REPORT: CPT | Performed by: STUDENT IN AN ORGANIZED HEALTH CARE EDUCATION/TRAINING PROGRAM

## 2025-06-02 PROCEDURE — 70450 CT HEAD/BRAIN W/O DYE: CPT

## 2025-06-02 PROCEDURE — 96375 TX/PRO/DX INJ NEW DRUG ADDON: CPT

## 2025-06-02 PROCEDURE — 85384 FIBRINOGEN ACTIVITY: CPT | Performed by: EMERGENCY MEDICINE

## 2025-06-02 PROCEDURE — 96376 TX/PRO/DX INJ SAME DRUG ADON: CPT

## 2025-06-02 PROCEDURE — 36415 COLL VENOUS BLD VENIPUNCTURE: CPT | Performed by: EMERGENCY MEDICINE

## 2025-06-02 PROCEDURE — 99285 EMERGENCY DEPT VISIT HI MDM: CPT | Performed by: EMERGENCY MEDICINE

## 2025-06-02 PROCEDURE — 96366 THER/PROPH/DIAG IV INF ADDON: CPT

## 2025-06-02 RX ORDER — MORPHINE SULFATE 4 MG/ML
4 INJECTION, SOLUTION INTRAMUSCULAR; INTRAVENOUS ONCE
Status: COMPLETED | OUTPATIENT
Start: 2025-06-02 | End: 2025-06-02

## 2025-06-02 RX ORDER — KETOROLAC TROMETHAMINE 30 MG/ML
15 INJECTION, SOLUTION INTRAMUSCULAR; INTRAVENOUS ONCE
Status: COMPLETED | OUTPATIENT
Start: 2025-06-02 | End: 2025-06-02

## 2025-06-02 RX ORDER — GABAPENTIN 300 MG/1
600 CAPSULE ORAL 3 TIMES DAILY
Status: DISCONTINUED | OUTPATIENT
Start: 2025-06-02 | End: 2025-06-02

## 2025-06-02 RX ORDER — MORPHINE SULFATE 4 MG/ML
4 INJECTION, SOLUTION INTRAMUSCULAR; INTRAVENOUS
Status: DISCONTINUED | OUTPATIENT
Start: 2025-06-02 | End: 2025-06-02 | Stop reason: HOSPADM

## 2025-06-02 RX ORDER — FENTANYL CITRATE 50 UG/ML
75 INJECTION, SOLUTION INTRAMUSCULAR; INTRAVENOUS ONCE
Refills: 0 | Status: DISCONTINUED | OUTPATIENT
Start: 2025-06-02 | End: 2025-06-02 | Stop reason: HOSPADM

## 2025-06-02 RX ORDER — ACETAMINOPHEN 325 MG/1
975 TABLET ORAL EVERY 6 HOURS SCHEDULED
Status: DISCONTINUED | OUTPATIENT
Start: 2025-06-02 | End: 2025-06-02

## 2025-06-02 RX ORDER — GABAPENTIN 300 MG/1
600 CAPSULE ORAL 3 TIMES DAILY
Status: DISCONTINUED | OUTPATIENT
Start: 2025-06-02 | End: 2025-06-02 | Stop reason: HOSPADM

## 2025-06-02 RX ORDER — GABAPENTIN 300 MG/1
600 CAPSULE ORAL ONCE
Status: DISCONTINUED | OUTPATIENT
Start: 2025-06-02 | End: 2025-06-02

## 2025-06-02 RX ORDER — ACETAMINOPHEN 325 MG/1
975 TABLET ORAL EVERY 6 HOURS SCHEDULED
Status: DISCONTINUED | OUTPATIENT
Start: 2025-06-02 | End: 2025-06-02 | Stop reason: HOSPADM

## 2025-06-02 RX ADMIN — MORPHINE SULFATE 4 MG: 4 INJECTION INTRAVENOUS at 02:41

## 2025-06-02 RX ADMIN — ACETAMINOPHEN 975 MG: 325 TABLET ORAL at 03:42

## 2025-06-02 RX ADMIN — DICLOFENAC SODIUM 2 G: 10 GEL TOPICAL at 03:17

## 2025-06-02 RX ADMIN — GABAPENTIN 600 MG: 300 CAPSULE ORAL at 03:43

## 2025-06-02 RX ADMIN — KETOROLAC TROMETHAMINE 15 MG: 30 INJECTION, SOLUTION INTRAMUSCULAR at 02:14

## 2025-06-02 RX ADMIN — MORPHINE SULFATE 4 MG: 4 INJECTION INTRAVENOUS at 01:31

## 2025-06-02 RX ADMIN — MORPHINE SULFATE 4 MG: 4 INJECTION INTRAVENOUS at 00:16

## 2025-06-02 NOTE — PROGRESS NOTES
"Post-Op Check - Vascular Surgery   Maxime Green 60 y.o. male MRN: [id number]  Unit/Bed#: [facility] 513-01 Encounter: [id number]    Assessment:  60yoM s/p RLE angiogram, fem-ak pop bypass/pop angiojet thrombectomy, lysis initiation through L femoral access    Plan:  - Appreciate ICU level care while lysis catheter in place  - Incentive spirometry  - Diet NPOpMN for lysis recheck with IR 6/3  - PRN analgesia, anti-emetics  - Maintain normotension to mitigate risk of ICH while undergoing thrombolysis  - I/Os  - alteplase/hep gtt per IR  - q1hr compartment/neurovascular checks    Subjective: Patient seen and examined at bedside, tolerating clear without nausea, primarily anterior tibial pain he presented with improved, improved dorsi/planarflexion which was previously limited by pain. He now endorses some calf pain post-procedure, controlled with current pain regimen. Denies new paresthesias or weakness to bilateral LE.     Vitals:  /70   Pulse 66   Temp 97.7 °F (36.5 °C) (Oral)   Resp 16   Ht 5' 5\" (1.651 m)   Wt 72.9 kg (160 lb 11.2 oz)   SpO2 93%   BMI 26.74 kg/m²     Physical Exam:  General appearance: alert and oriented, in no acute distress  Neurologic: Bilateral LE M/S intact, able to localize multiple areas of light touch to R foot. Intact movement of bilateral toes, dorsi/plantarflexion  Neck: supple, symmetrical, trachea midline  Lungs: Normal work of breathing, no accessory muscle use  Heart: Regular rate  Abdomen: Soft, nontender abdomen. L groin with lysis catheter in place, soft without noted hematoma  Extremities: Bilateral LE M/S intact. R proximal calf with tenderness to palpation however easily compressible. Without significant tenderness to anterior compartments or distal calf.    Pulse exam:  DP: Right: doppler signal Left: 2+  PT: Right: doppler signal Left: doppler signal    I/Os:  No intake/output data recorded.  I/O this shift:  In: 300.1 [I.V.:119.3; IV Piggyback:180.8]  Out: 400 " [Urine:400]    Invasive Lines/Tubes:  Invasive Devices       Peripherally Inserted Central Catheter Line  Duration             PICC Line 06/02/25 <1 day              Peripheral Intravenous Line  Duration             Peripheral IV 06/01/25 Left Antecubital <1 day    Peripheral IV 06/01/25 Proximal;Right;Ventral (anterior) Forearm <1 day              Line  Duration             Arterial Sheath Left Femoral <1 day                    VTE Prophylaxis: Reason for no pharmacologic prophylaxis thrombolytic therapy    Allie Clemente PA-C  6/2/2025

## 2025-06-02 NOTE — LETTER
[facility] [address]  [address] [address]  Dept: [phone]    June 4, 2025     Patient: Maxime Green   YOB: 1965   Date of Visit: 6/2/2025       To Whom it May Concern:    Maxime Green is under our professional care. He was seen in the hospital from 6/2/2025 to 06/04/25. He may return to work on  6/9/2025 without limitations.    If you have any questions or concerns, please don't hesitate to call.         Sincerely,          SNEHAL Solares [address]  [facility]  [address], [address]  [place] [address] [phone]

## 2025-06-02 NOTE — QUICK NOTE
VASCULAR SURGERY:    Contacted through PACS for ALI (R2a).  Vascular surgeon and fellow in OR.    Per ED physician, patient mowed the grass today.  Thereafter had acute onset of RLE pain at 8:30 pm.  No motor or sensory deficits, but + paresthesias.    Relevant vascular hx:   peripheral arterial disease status post left SFA stenting performed January 2022 and right fem above-knee pop bypass with PTFE performed February 2022 complicated by right lower extremity acute limb ischemia October 2022 status post lysis and balloon angioplasty.     Takes ASA and Xarelto-compliant, last dosage today.    CTA abd/pelvis with runoff completed.  Bypass graft appears occluded.      Discussed with Dr. Riojas in OR.  Accepted as P1/P2 transfer, continue hep gtt.  Plan for lysis tomorrow.    All parties notified. Notified [facility] ED for hourly NV checks. If clinical condition deteriorates, would require P1 transfer.  Plan for lysis tomorrow, continue hep gtt, npo, will consult IR.  Ordered CT brain (pre-lysis) and fibrinogen.

## 2025-06-02 NOTE — EMTALA/ACUTE CARE TRANSFER
[facility] EMERGENCY DEPARTMENT  [address] [facility]  [facility] [address]  Dept: [phone]      EMTALA TRANSFER CONSENT    NAME Maxime COOK 1965                              MRN [id number]    I have been informed of my rights regarding examination, treatment, and transfer   by Dr. Nestor Duque MD    Benefits: Specialized equipment and/or services available at the receiving facility (Include comment)________________________    Risks: Potential for delay in receiving treatment      Transfer Request   I acknowledge that my medical condition has been evaluated and explained to me by the emergency department physician or other qualified medical person and/or my attending physician who has recommended and offered to me further medical examination and treatment. I understand the Hospital's obligation with respect to the treatment and stabilization of my emergency medical condition. I nevertheless request to be transferred. I release the Hospital, the doctor, and any other persons caring for me from all responsibility or liability for any injury or ill effects that may result from my transfer and agree to accept all responsibility for the consequences of my choice to transfer, rather than receive stabilizing treatment at the Hospital. I understand that because the transfer is my request, my insurance may not provide reimbursement for the services.  The Hospital will assist and direct me and my family in how to make arrangements for transfer, but the hospital is not liable for any fees charged by the transport service.  In spite of this understanding, I refuse to consent to further medical examination and treatment which has been offered to me, and request transfer to Accepting Facility Name, City & State : [facility] [place]. I authorize the performance of emergency medical procedures and treatments upon me in both transit and upon arrival at the receiving facility.   Additionally, I authorize the release of any and all medical records to the receiving facility and request they be transported with me, if possible.    I authorize the performance of emergency medical procedures and treatments upon me in both transit and upon arrival at the receiving facility.  Additionally, I authorize the release of any and all medical records to the receiving facility and request they be transported with me, if possible.  I understand that the safest mode of transportation during a medical emergency is an ambulance and that the Hospital advocates the use of this mode of transport. Risks of traveling to the receiving facility by car, including absence of medical control, life sustaining equipment, such as oxygen, and medical personnel has been explained to me and I fully understand them.    (RUTHY CORRECT BOX BELOW)  [  ]  I consent to the stated transfer and to be transported by ambulance/helicopter.  [  ]  I consent to the stated transfer, but refuse transportation by ambulance and accept full responsibility for my transportation by car.  I understand the risks of non-ambulance transfers and I exonerate the Hospital and its staff from any deterioration in my condition that results from this refusal.    X___________________________________________    DATE  25  TIME________  Signature of patient or legally responsible individual signing on patient behalf           RELATIONSHIP TO PATIENT_________________________          Provider Certification    NAME Maxime Green                                         1965                              MRN [id number]    A medical screening exam was performed on the above named patient.  Based on the examination:    Condition Necessitating Transfer The primary encounter diagnosis was Right leg pain. A diagnosis of Limb ischemia was also pertinent to this visit.    Patient Condition: The patient has been stabilized such that within reasonable medical  probability, no material deterioration of the patient condition or the condition of the unborn child(susan) is likely to result from the transfer    Reason for Transfer: Level of Care needed not available at this facility    Transfer Requirements: Facility SLB   Space available and qualified personnel available for treatment as acknowledged by    Agreed to accept transfer and to provide appropriate medical treatment as acknowledged by       Dr. Riojas  Appropriate medical records of the examination and treatment of the patient are provided at the time of transfer   STAFF INITIAL WHEN COMPLETED _______  Transfer will be performed by qualified personnel from    and appropriate transfer equipment as required, including the use of necessary and appropriate life support measures.    Provider Certification: I have examined the patient and explained the following risks and benefits of being transferred/refusing transfer to the patient/family:         Based on these reasonable risks and benefits to the patient and/or the unborn child(susan), and based upon the information available at the time of the patient’s examination, I certify that the medical benefits reasonably to be expected from the provision of appropriate medical treatments at another medical facility outweigh the increasing risks, if any, to the individual’s medical condition, and in the case of labor to the unborn child, from effecting the transfer.    X____________________________________________ DATE 06/01/25        TIME_______      ORIGINAL - SEND TO MEDICAL RECORDS   COPY - SEND WITH PATIENT DURING TRANSFER

## 2025-06-02 NOTE — PLAN OF CARE
Problem: PAIN - ADULT  Goal: Verbalizes/displays adequate comfort level or baseline comfort level  Description: Interventions:  - Encourage patient to monitor pain and request assistance  - Assess pain using appropriate pain scale  - Administer analgesics as ordered based on type and severity of pain and evaluate response  - Implement non-pharmacological measures as appropriate and evaluate response  - Consider cultural and social influences on pain and pain management  - Notify physician/advanced practitioner if interventions unsuccessful or patient reports new pain  - Educate patient/family on pain management process including their role and importance of  reporting pain   - Provide non-pharmacologic/complimentary pain relief interventions  Outcome: Progressing     Problem: INFECTION - ADULT  Goal: Absence or prevention of progression during hospitalization  Description: INTERVENTIONS:  - Assess and monitor for signs and symptoms of infection  - Monitor lab/diagnostic results  - Monitor all insertion sites, i.e. indwelling lines, tubes, and drains  - Monitor endotracheal if appropriate and nasal secretions for changes in amount and color  - [place] appropriate cooling/warming therapies per order  - Administer medications as ordered  - Instruct and encourage patient and family to use good hand hygiene technique  - Identify and instruct in appropriate isolation precautions for identified infection/condition  Outcome: Progressing  Goal: Absence of fever/infection during neutropenic period  Description: INTERVENTIONS:  - Monitor WBC  - Perform strict hand hygiene  - Limit to healthy visitors only  - No plants, dried, fresh or silk flowers with phipps in patient room  Outcome: Progressing     Problem: SAFETY ADULT  Goal: Patient will remain free of falls  Description: INTERVENTIONS:  - Educate patient/family on patient safety including physical limitations  - Instruct patient to call for assistance with activity   -  Consider consulting OT/PT to assist with strengthening/mobility based on AM PAC & JH-HLM score  - Consult OT/PT to assist with strengthening/mobility   - Keep Call bell within reach  - Keep bed low and locked with side rails adjusted as appropriate  - Keep care items and personal belongings within reach  - Initiate and maintain comfort rounds  - Make Fall Risk Sign visible to staff  - Apply yellow socks and bracelet for high fall risk patients  - Consider moving patient to room near nurses station  Outcome: Progressing  Goal: Maintain or return to baseline ADL function  Description: INTERVENTIONS:  -  Assess patient's ability to carry out ADLs; assess patient's baseline for ADL function and identify physical deficits which impact ability to perform ADLs (bathing, care of mouth/teeth, toileting, grooming, dressing, etc.)  - Assess/evaluate cause of self-care deficits   - Assess range of motion  - Assess patient's mobility; develop plan if impaired  - Assess patient's need for assistive devices and provide as appropriate  - Encourage maximum independence but intervene and supervise when necessary  - Involve family in performance of ADLs  - Assess for home care needs following discharge   - Consider OT consult to assist with ADL evaluation and planning for discharge  - Provide patient education as appropriate  - Monitor functional capacity and physical performance, use of AM PAC & JH-HLM   - Monitor gait, balance and fatigue with ambulation    Outcome: Progressing  Goal: Maintains/Returns to pre admission functional level  Description: INTERVENTIONS:  - Perform AM-PAC 6 Click Basic Mobility/ Daily Activity assessment daily.  - Set and communicate daily mobility goal to care team and patient/family/caregiver.   - Collaborate with rehabilitation services on mobility goals if consulted  - Out of bed for toileting  - Record patient progress and toleration of activity level   Outcome: Progressing     Problem: DISCHARGE  PLANNING  Goal: Discharge to home or other facility with appropriate resources  Description: INTERVENTIONS:  - Identify barriers to discharge w/patient and caregiver  - Arrange for needed discharge resources and transportation as appropriate  - Identify discharge learning needs (meds, wound care, etc.)  - Arrange for interpretive services to assist at discharge as needed  - Refer to Case Management Department for coordinating discharge planning if the patient needs post-hospital services based on physician/advanced practitioner order or complex needs related to functional status, cognitive ability, or social support system  Outcome: Progressing     Problem: Knowledge Deficit  Goal: Patient/family/caregiver demonstrates understanding of disease process, treatment plan, medications, and discharge instructions  Description: Complete learning assessment and assess knowledge base.  Interventions:  - Provide teaching at level of understanding  - Provide teaching via preferred learning methods  Outcome: Progressing

## 2025-06-02 NOTE — CONSULTS
Consultation - Critical Care/ICU   Name: Maxime Green 60 y.o. male I MRN: [id number]  Unit/Bed#: [facility] 513-01 I Date of Admission: 6/2/2025   Date of Service: 6/2/2025 I Hospital Day: 0   Inpatient consult to Surgical Critical Care  Consult performed by: Ro Miller MD  Consult ordered by: Jacinto Benoit MD        Physician Requesting Evaluation: Miles Jones*   Reason for Evaluation / Principal Problem: RLE acute ischemia     Please contact the SecureChat role for the Critical Care/ICU service with any questions/concerns.    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       Neuro/Psych:  Diagnoses: RLE ischemia, pain control  Plan:  Neuro checks Every 15 minutes x2, Every 30 minutes x2, Every 2 hours x4, Then Per unit routine   Sedation: None  Analgesia: Multimodal. Oxy 2.5/5 Q4H PRN, Dilaudid 0.2 mg IV Q2H PRN    CV:  Diagnosis: RLE acute lower limb ischemia, CAD, PAD, HTN, asx carotid artery stenosis, former tobacco user, h/o Cardiac cath 10/21 and CABG 4/2021   IR Lysis check 6/3  Doppl R PT signal, no DP or AT signal  Doppl L DP, AT, PT signals   Plan:  IR Lysis check 6/3  Continue TPA @ 1 mg/hr and non-weight-based heparin protocol  Continuous cardiopulmonary monitoring. Maintain MAP >65.  Continue home metoprolol  PRN  Continue home statin   Continue home ASA  PRN labetalol 10 mg Q6H PRN for SBP > 160      Pulm:  Diagnoses: No active issues   Currently on room air  Plan:  Continuous pulse oximetry. Maintain O2 sat >92%.     GI:  Diagnoses: No active issues, hx of GERD  Plan:  Bowel regimen: N/A  GI  prophylaxis: N/A    :  Diagnoses: No active issues   Baseline creatinine: 1  Plan:  Cr 0.98 from 1.01  Monitor I/Os.  UO: 400 cc     F/E/N:  F: Fluid resuscitation prn.  E: Monitor and replete electrolytes for Mg >2, Phos >3, K >4.  N: Diabetic clear liquids, NPO @ mn for IR lysis check 6/3    Heme/Onc:  Diagnoses: RLE acute limb ischemia   Plan:  Continue TPA @ 1 mg/hr and non-weight-based heparin protocol  6/3 IR lysis check   VTE prophylaxis: hep gtt    Endo:  Diagnoses: DM  Plan:   Holding home metformin  Insulin: Sliding scale. Monitor blood glucose.    ID:  Diagnoses: No active issues   Plan:  Monitor fever curve and WBC.    MSK/Skin:  Diagnoses: No active issues  Plan:  PT/OT when appropriate. Encourage OOB and ambulation when appropriate. Local wound care prn.    LDAs:  Lines - PICC, L femoral arterial sheath, peripheral IV  Drains - N/A  Airways -  N/A    Disposition: Critical care    History of Present Illness   Maxime Green is a 60 y.o. who presents to the critical care unit after RLE angiogram with thrombectomy and initiation of thrombolysis with interventional radiology. PMH CAD, DM, PAD, GERD, HTN, h/o Cardiac cath 10/21 and CABG 4/2021, with asx carotid artery stenosis who is a former tobacco user with RLE acute onset pain starting at 8:30 pm on 6/1/2025, found to have occluded R bypass graft for which patient was transferred to [facility] from [facility] for intervention.On ASA and Xarelto at home. Patient was started on a heparin gtt at [facility]. He is known to vascular service as he has a prior L SFA stent, R fem to AK pop bypass (2/2022) with PTFE, lysis of of R bypass with lysis and resulting angioplasty and viabahn stent (10/2022).     History obtained from the patient.  Review of Systems: Review of Systems   Constitutional:  Negative for chills and fever.   HENT:  Negative for congestion.    Respiratory:  Negative for apnea.    Cardiovascular:  Negative for chest pain.   Gastrointestinal:  Negative  for abdominal distention.   Genitourinary:  Negative for difficulty urinating.   Musculoskeletal:  Negative for arthralgias.   Skin:  Negative for color change.   Neurological:  Negative for facial asymmetry and weakness.   Psychiatric/Behavioral:  Negative for agitation.        Historical Information   Past Medical History:  No date: Bicuspid aortic valve      Comment:  being observed  No date: CAD (coronary artery disease)  4/6/2021: Chest pain  No date: Coronary artery disease  No date: Diabetes mellitus (HCC)  5/17/2021: Encounter for postoperative care  No date: Former tobacco use  No date: GERD (gastroesophageal reflux disease)  No date: Goiter  No date: History of myocardial infarction  No date: History of rib fracture  No date: Hyperlipidemia  No date: Hypertension  4/6/2021: Hypertensive urgency  7/22/2021: Left carotid bruit  No date: Leg pain, bilateral      Comment:  Agram today LLE   1/20/2022 4/6/2021: Leukocytosis  No date: Myocardial infarction ([facility])      Comment:  2021-   CABG  x3  4/7/2021: NSTEMI (non-ST elevated myocardial infarction) ([facility])  No date: Wears glasses Past Surgical History:  10/19/2022: ARTERIOGRAM; Right      Comment:  Procedure: RIGHT LOWER EXTREMITY ARTERIOGRAM WITH LYSIS,               PLACEMENT OF LYSIS CATHETER; third order cannulation of                right popliteal artery via left groin access;  Surgeon:                Ronald Ordonez MD;  Location: [facility];                 Service: Vascular  No date: CARDIAC CATHETERIZATION  10/20/2021: CARDIAC CATHETERIZATION; N/A      Comment:  Procedure: Cardiac catheterization;  Surgeon: Lynnette Reeder MD;  Location: [facility];  Service:                Cardiology  10/20/2021: CARDIAC CATHETERIZATION; N/A      Comment:  Procedure: Cardiac Coronary Angiogram;  Surgeon:                Lynnette Reeder MD;  Location: [facility];                 Service: Cardiology  10/20/2021: CARDIAC  CATHETERIZATION; N/A      Comment:  Procedure: Cardiac pci;  Surgeon: Lynnette Reeder MD;                 Location: [facility];  Service: Cardiology  1/20/2022: IR LOWER EXTREMITY ANGIOGRAM  2/3/2022: IR LOWER EXTREMITY ANGIOGRAM  10/19/2022: IR LOWER EXTREMITY ANGIOGRAM  6/2/2025: IR LOWER EXTREMITY ANGIOGRAM  10/19/2022: IR PICC PLACEMENT DOUBLE LUMEN  6/2/2025: IR PICC PLACEMENT DOUBLE LUMEN  10/20/2022: IR TPA LYSIS CHECK  2/3/2022: PA BYPASS W/VEIN FEMORAL-POPLITEAL; Right      Comment:  Procedure: BYPASS FEMORAL-POPLITEAL;  Surgeon: Pradeep Brothers MD;  Location: [facility];  Service:                Vascular  4/9/2021: PA CORONARY ARTERY BYP W/VEIN & ARTERY GRAFT 4 VEIN; N/A      Comment:  Procedure: CORONARY ARTERY BYPASS GRAFT (CABG) 3                VESSELS: LIMA to LAD; LLE EVH/SVG to LPL & OM2;  Surgeon:               Jose Sanchez DO;  Location: [facility];  Service:                Cardiac Surgery  4/9/2021: PA ECHO TRANSESOPHAG R-T 2D W/PRB IMG ACQUISJ I&R; N/A      Comment:  Procedure: TRANSESOPHAGEAL ECHOCARDIOGRAM (BENNETT);                 Surgeon: Jose Sanchez DO;  Location: [facility];                 Service: Cardiac Surgery  4/9/2021: PA NDSC SURG W/VIDEO-ASSISTED HARVEST VEIN CABG; Left      Comment:  Procedure: HARVEST VEIN ENDOSCOPIC (EVH);  Surgeon:                Jose Sanchez DO;  Location: [facility];  Service:                Cardiac Surgery  1/20/2022: PA SLCTV CATHJ 3RD+ ORD SLCTV ABDL PEL/LXTR BRNCH; Left      Comment:  Procedure: ARTERIOGRAM, STENT PLACEMENT IN LEFT                SUPERFICIAL FEMORIAL ARTERY;  Surgeon: Pradeep Brothers MD;  Location: [facility];  Service: Vascular  No date: VASECTOMY   Current Outpatient Medications   Medication Instructions    aspirin (ECOTRIN LOW STRENGTH) 81 mg, Oral, Daily    famotidine (PEPCID) 20 mg, Oral, Daily    isosorbide mononitrate (IMDUR) 30 mg, Oral, 2 times daily    Jardiance 25 MG TABS  TAKE 1 TABLET (25 MG TOTAL) BY MOUTH DAILY.    metFORMIN (GLUCOPHAGE) 1,000 mg, Oral, 2 times daily with meals    metoprolol succinate (TOPROL-XL) 25 mg, Oral, Daily    rosuvastatin (CRESTOR) 10 mg, Oral, Every other day    Xarelto 20 mg, Oral, Daily with breakfast    Allergies[1]   Social History[2] Family History[3]       Objective :                   Vitals I/O      Most Recent Min/Max in 24hrs   Temp 97.7 °F (36.5 °C) Temp  Min: 97.6 °F (36.4 °C)  Max: 97.9 °F (36.6 °C)   Pulse 60 Pulse  Min: 56  Max: 98   Resp 15 Resp  Min: 14  Max: 22   /75 BP  Min: 129/71  Max: 219/105   O2 Sat 98 % SpO2  Min: 94 %  Max: 100 %      Intake/Output Summary (Last 24 hours) at 6/2/2025 1428  Last data filed at 6/2/2025 0701  Gross per 24 hour   Intake 16.17 ml   Output 400 ml   Net -383.83 ml       Diet NPO; Sips with meds  Diet Surgical; Diabetic CL Liquids    Invasive Monitoring           Physical Exam   Physical Exam  Eyes:      Extraocular Movements: Extraocular movements intact.   Skin:     Findings: No wound.   HENT:      Head: Normocephalic and atraumatic.      Nose: No congestion.   Cardiovascular:      Rate and Rhythm: Normal rate.   Musculoskeletal:         General: Normal range of motion.   Abdominal:      Palpations: Abdomen is soft.      Tenderness: There is no abdominal tenderness.   Constitutional:       Appearance: He is not toxic-appearing.   Pulmonary:      Effort: Pulmonary effort is normal.   Neurological:      General: No focal deficit present.      Mental Status: He is alert and oriented to person, place and time. He is not agitated.      Sensory: No sensory deficit.      Motor: gross motor function is at baseline for patient. No motor deficit.          Diagnostic Studies        Lab Results: I have reviewed the following results:     Medications:  Scheduled PRN   aspirin, 81 mg, Daily  atorvastatin, 40 mg, Daily With Dinner  chlorhexidine, 15 mL, Q12H BOBBY  heparin (porcine), 5,600 Units, Once  insulin  lispro, 1-5 Units, TID AC  metoprolol succinate, 25 mg, Daily      heparin (porcine), 1,000 Units, Q6H PRN  heparin (porcine), 2,000 Units, Q6H PRN  heparin (porcine), 2,800 Units, Q6H PRN  heparin (porcine), 5,600 Units, Q6H PRN  HYDROmorphone, 0.2 mg, Q2H PRN       Continuous    alteplase (CATHFLO) 10 mg in sodium chloride 0.9 % 250 mL infusion, 1 mg/hr, Last Rate: 1 mg/hr (25)  heparin (porcine), 3-30 Units/kg/hr (Order-Specific), Last Rate: Stopped (25)  heparin (porcine), 300-2,000 Units/hr, Last Rate: 300 Units/hr (25)  heparin, 20 Units/hr, Last Rate: 20 Units/hr (25)         Labs:   CBC    Recent Labs     25  0510 25  1332   WBC 8.54 6.54   HGB 14.3 13.7   HCT 43.8 41.3    127*     BMP    Recent Labs     25  0510   SODIUM 131* 131*   K 3.6 5.1   CL 98 98   CO2 19* 22   AGAP 14* 11   BUN 30* 26*   CREATININE 1.01 0.98   CALCIUM 9.5 9.2       Coags    Recent Labs     25  2309 25  0510 25  1332   INR 2.32* 1.44*  --    PTT 37* 120* 87*        Additional Electrolytes  No recent results       Blood Gas    No recent results  No recent results LFTs  Recent Labs     25   ALT 33   AST 28   ALKPHOS 74   ALB 4.5   TBILI 0.87       Infectious  No recent results  Glucose  Recent Labs     25  0510   GLUC 288* 196*               [1] No Known Allergies  [2]   Social History  Tobacco Use    Smoking status: Former     Current packs/day: 0.00     Average packs/day: 1 pack/day for 38.0 years (38.0 ttl pk-yrs)     Types: Cigarettes     Start date: 1983     Quit date: 2021     Years since quittin.1    Smokeless tobacco: Never   Vaping Use    Vaping status: Never Used   Substance Use Topics    Alcohol use: Never    Drug use: Never   [3]   Family History  Problem Relation Name Age of Onset    Heart disease Father

## 2025-06-02 NOTE — DISCHARGE INSTRUCTIONS
ARTERIOGRAM    WHAT YOU SHOULD KNOW:   An angiogram is a procedure to look at arteries in your body. Arteries are the blood vessels that carry blood from your heart to your body.     AFTER YOU LEAVE:     Self-care:   Limit activity: Rest for the remainder of the day of your procedure.Have some one with you until the next morning. Keep your arm or leg straight as much as possible.Rest as much as possible, sitting lying or reclining. Walk only to go to the bathroom, to bed or to eat. If the angiogram catheter was put in your leg, use the stairs as little as possible. No driving for 24-48 hours. No heavy lifting, >10 lbs. Or strenuous activity for 48 hours.      Keep your wound clean and dry. Remove band aid/ dressing tomorrow. You may shower 24 hours after your procedure. Shower and wash groin area or wrist area gently with soap and water: beginning tomorrow. Rinse and pat Dry. Apply new water seal band aid. Repeat this process for 5 days.  If there is any drainage from the puncture site, you should put on a clean bandage. No Powders, creams, lotions or antibiotic ointments for 5 days.  No tub baths, hot tubs or swimming for 5 days.    Watch for bleeding and bruising: It is normal to have a bruise and soreness where the angiogram catheter went in.  Medication: If your angiogram was performed to treat blockages in your leg arteries, it is strongly recommended that you take both an antiplatelet medication (like aspirin or Plavix) to prevent clotting AND a statin drug (like Lipitor or Crestor), even if you have normal cholesterol. If these drugs are not ordered for you please contact either your Vascular Surgery office or the Interventional Radiology Dept during normal daytime working hours. See Interventional Radiology telephone numbers below.  You Should Have Follow up with the vascular surgeon   call [phone] with questions  Diet:   You may resume your regular diet, Sips of flat soda will help with mild  nausea.  Drink more liquids than usual for the next 24 hours      IMMEDIATELY Contact Interventional Radiology at [phone]  if any of the following occur:  If your bruise gets larger or if you notice any active bleeding. APPLY DIRECT PRESSURE TO THE BLEEDING SITE.   If you notice increased swelling or have increased pain at the puncture site   If you have any numbness or pain in the extremity of the puncture site   If that extremity seems cold or pale.    You have fever greater than 101  Persistent nausea or vomitting    Follow up with your primary healthcare provider  as directed: Write down your questions so you remember to ask them during your visits.

## 2025-06-02 NOTE — PROCEDURES
Venous Access Line Insertion    Date/Time: 6/2/2025 10:37 AM    Performed by: Laine Jones  Authorized by: Paulo Sahu MD    Patient location:  IR  Other Assisting Provider: Yes (comment)    Consent:     Consent obtained:  Verbal    Consent given by:  Patient  Universal protocol:     Procedure explained and questions answered to patient or proxy's satisfaction: yes      Immediately prior to procedure, a time out was called: yes      Relevant documents present and verified: yes      Test results available and properly labeled: yes      Radiology Images displayed and confirmed.  If images not available, report reviewed: yes      Required blood products, implants, devices, and special equipment available: yes      Site/side marked: yes      Patient identity confirmed:  Verbally with patient, hospital-assigned identification number and arm band  Pre-procedure details:     Hand hygiene: Hand hygiene performed prior to insertion      Sterile barrier technique: All elements of maximal sterile technique followed      Skin preparation:  2% chlorhexidine    Skin preparation agent: Skin preparation agent completely dried prior to procedure    Procedure details:     Complex Venous Access Line Type: PICC      Complex Venous Access Line Indications: medications requiring central line      Catheter tip vessel location: atriocaval junction      Orientation:  Left    Location:  Basilic    Procedural supplies:  Double lumen    Catheter size:  5 Fr    Total catheter length (cm):  39    Catheter out on skin (cm):  0    Max flow rate:  999    Patient evaluated for contraindications to access (i.e. fistula, thrombosis, etc): Yes      Approach: percutaneous technique used      Patient position:  Flat    Ultrasound image availability:  Images available in PACS    Sterile ultrasound techniques: Sterile gel and sterile probe covers were used      Number of attempts:  1    Successful placement: yes      Landmarks identified: yes      Cath  access vessel: cxr confirmed.  Anesthesia (see MAR for exact dosages):     Anesthesia method:  Local infiltration    Local anesthetic:  Lidocaine 1% w/o epi  Post-procedure details:     Post-procedure:  Dressing applied and securement device placed    Assessment:  Blood return through all ports, no pneumothorax on x-ray and placement verified by x-ray    Post-procedure complications: none      Patient tolerance of procedure:  Tolerated well, no immediate complications    Observer: Yes      Observer name:  CASSANDRA Mckeon

## 2025-06-02 NOTE — CONSULTS
e-Consult (IPC)  - Interventional Radiology  Maxime Green 60 y.o. male MRN: [id number]  Unit/Bed#: [facility] 508-01 Encounter: [id number]      Interventional Radiology has been consulted to evaluate Maxime Green    We were consulted by Vascular surgery concerning this patient with acute limb ischemia.    Inpatient Consult to IR  Consult performed by: ESE Jean  Consult ordered by: Jesenia Armstrong PA-C        06/02/25    Assessment/Recommendation:     60 year old male who presented to the emergency department with right lower extremity acute pain starting approximately 8:30 at night onset/1/25.  He reports additional paresthesias of pins-and-needles.    Past medical history includes CAD, DM, PAD, GERD, HTN, h/o Cardiac cath 10/21 and CABG 4/2021, with asx carotid artery stenosis and former tobacco use.  Patient with history of right lower extremity acute limb ischemia 10/2022 status post lysis, angioplasty and Viabahn stent placement.  Managed on home ASA, Xarelto.    CTA abdomen with runoff with noted right occluded bypass graft.    Interventional radiology has been consulted for patient evaluation and right lower extremity angiogram +/- intervention.     Component      Latest Ref Rng 6/2/2025   POCT INR      0.85 - 1.19  1.44 (H)       Component      Latest Ref Rng 6/2/2025   Creatinine      0.60 - 1.30 mg/dL 0.98      Component      Latest Ref Rng 6/2/2025   Creatinine      0.60 - 1.30 mg/dL 0.98      Component      Latest Ref Rng 6/1/2025   Platelet Count      149 - 390 Thousands/uL 153      Reviewed diagnostic imaging and laboratory findings  Plan for right lower extremity angiogram +/- intervention today.  Time to be determined prior availability  Maintain NPO  Remainder of care per primary care service team  Please do not hesitate to contact interventional radiology for questions/concerns    11-20 minutes, >50% of the total time devoted to medical consultative verbal/EMR discussion between providers.  Written report will be generated in the EMR.     Thank you for allowing Interventional Radiology to participate in the care of Maxime Green.     SEE Jean

## 2025-06-02 NOTE — H&P
"H&P - Vascular Surgery   Name: Maxime Green 60 y.o. male I MRN: [id number]  Unit/Bed#: [facility] 508-01 I Date of Admission: (Not on file)   Date of Service: 6/2/2025 I Hospital Day: 0     Assessment & Plan  Acute lower limb ischemia  Patient known to vascular surgery service for PAD s/p left SFA stenting January 2022 due to disabling claudication; right fem above-knee pop bypass with PTFE performed February 2022; right lower extremity acute limb ischemia October 2022 status post lysis, angioplasty and Viabahn stent.     Patient now with acute RLE ischemia due to occluded R bypass graft.  Onset of pain at 8:30 pm 6/1/25.  Patient with subjective paresthesia, no sensory or motor deficits.  Compliant with ASA and Xarelto at home.    Started on hep gtt at [facility].  Continue ASA and statin.      Plan for lysis.  CT brain and fibrinogen completed.  Will place IR consult.  Continue NPO.  Pain meds PRN.  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 complication, without long-term current use of insulin (HCC)  Lab Results   Component Value Date    HGBA1C 7.4 (A) 09/13/2024       No results for input(s): \"POCGLU\" in the last 72 hours.    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    History of Present Illness   Maxime Green is a 60 y.o. male with who presents with CAD, DM, PAD, GERD, HTN, h/o Cardiac cath 10/21 and CABG 4/2021, with asx carotid artery stenosis who is a former tobacco user now with RLE acute onset pain starting at 8:30 pm on 6/1/2025.  The patient reports that he was mowing his grass earlier in the evening and around 8:30 at night he had sudden onset pain in the RLE.  He states that he is having some feelings of pins and needles as well as ongoing severe pain.  He reports that at [facility] he was unable to dorsiflex, but now upon exam, he " endorses that has improved.    He is known to our service as he has a prior L SFA stent, R fem to AK pop bypass (2/2022) with PTFE, lysis of of R bypass with lysis and resulting angioplasty and viabahn stent (10/2022).    Review of Systems   Constitutional:  Negative for activity change and appetite change.   HENT: Negative.     Eyes: Negative.    Respiratory: Negative.     Cardiovascular: Negative.    Gastrointestinal: Negative.    Endocrine: Negative.    Genitourinary: Negative.    Musculoskeletal:  Positive for arthralgias.   Skin: Negative.    Allergic/Immunologic: Negative.    Neurological:  Positive for weakness.   Psychiatric/Behavioral: Negative.       Medical History Review: I have reviewed the patient's PMH, PSH, Social History, Family History, Meds, and Allergies     Objective :  Temp:  [97.6 °F (36.4 °C)] 97.6 °F (36.4 °C)  HR:  [68-98] 68  BP: (167-219)/() 167/81  Resp:  [16-22] 18  SpO2:  [96 %-100 %] 96 %  O2 Device: None (Room air)    I/O       None            Physical Exam  Vitals and nursing note reviewed.   Constitutional:       General: He is not in acute distress.     Appearance: Normal appearance. He is not toxic-appearing.   HENT:      Head: Normocephalic and atraumatic.     Eyes:      Extraocular Movements: Extraocular movements intact.     Pulmonary:      Effort: Pulmonary effort is normal.   Abdominal:      General: Abdomen is flat.      Palpations: Abdomen is soft.     Musculoskeletal:      Comments: +plantar/dorsiflexion, equal strength bilaterally     Skin:     General: Skin is warm and dry.     Neurological:      General: No focal deficit present.      Mental Status: He is alert and oriented to person, place, and time.      Pulses: R distal bypass faint signal, R pop signal, absent DP/PT  L: fem dopp signal only, DP/PT doppler signals      Lab Results: I have reviewed the following results:  Recent Labs     06/01/25 2235 06/01/25  2309   WBC 11.54*  --    HGB 14.8  --    HCT 44.0   --      --    SODIUM 131*  --    K 3.6  --    CL 98  --    CO2 19*  --    BUN 30*  --    CREATININE 1.01  --    GLUC 288*  --    AST 28  --    ALT 33  --    ALB 4.5  --    TBILI 0.87  --    ALKPHOS 74  --    PTT 36* 37*   INR 2.34* 2.32*   LACTICACID 1.7  --        Imaging Results Review: I personally reviewed the following image studies in PACS and associated radiology reports: CTA abd/pelvis with runoff. My interpretation of the radiology images/reports is: occluded bypass graft.  Other Study Results Review: No additional pertinent studies reviewed.    VTE Prophylaxis: full anticoagulation with hep gtt

## 2025-06-02 NOTE — ASSESSMENT & PLAN NOTE
Patient known to vascular surgery service for PAD s/p left SFA stenting January 2022 due to disabling claudication; right fem above-knee pop bypass with PTFE performed February 2022; right lower extremity acute limb ischemia October 2022 status post lysis, angioplasty and Viabahn stent.     Patient now with acute RLE ischemia due to occluded R bypass graft.  Onset of pain at 8:30 pm 6/1/25.  Patient with subjective paresthesia, no sensory or motor deficits.  Compliant with ASA and Xarelto at home.    Started on hep gtt at [facility].  Continue ASA and statin.      Plan for lysis.  CT brain and fibrinogen completed.  Will place IR consult.  Continue NPO.  Pain meds PRN.

## 2025-06-02 NOTE — BRIEF OP NOTE (RAD/CATH)
INTERVENTIONAL RADIOLOGY PROCEDURE NOTE    Date: 6/2/2025    Procedure: RLE angiogram with thrombectomy and initiation of thrombolysis  Procedure Summary       Date:  Room / Location:     Anesthesia Start:  Anesthesia Stop:     Procedure:  Diagnosis:     Scheduled Providers:  Responsible Provider:     Anesthesia Type: Not recorded ASA Status: Not recorded            Preoperative diagnosis:   1. Acute lower limb ischemia         Postoperative diagnosis: Same.    Surgeon: Paulo Sahu MD     Assistant: Dr. Garcia    Blood loss: 10 mL    Specimens: None     Findings:   Occluded right femoral to AK pop bypass was occluded with reconstitution of right peroneal and posterior tibial arteries.  AngioJet thrombectomy performed throughout the right fem to AK pop bypass and popliteal arteries.  Right peroneal and PT arteries were not visualized following AngioJet thrombectomy likely due to distal embolization of thrombus.  Lysis initiated through 90 cm infusion length catheter placed across the right fem to AK pop bypass into the proximal right peroneal artery.  TPA to run at 1 mg/hr and non-weight based heparin protocol.  Return tomorrow for lysis check.    Complications: None immediate.    Anesthesia: conscious sedation and local        Vascular Quality Initiative - Peripheral Vascular Intervention     Urgency: Urgent    Functional Status:  Fully active; able to carry on all predisease activities without restriction.   Ambulation: Amb = independently ambulatory    Leg Symptoms    Right: Ischemic Rest Pain  Left: Asymptomatic:  documented peripheral arterial disease without symptoms of claudication or ischemic pain      Treatment of Native Artery to Maintain Bypass Patency?:  No    COVID Information  COVID Symptoms Pre-Procedure: Asymptomatic    Treatment Delayed by Pandemic: None    Access   Number of Sites: 1     Access Site 1:     Side 1: Left    Site 1: Femoral Retro to Antegrade    Access Guidance 1:U/S    Largest Sheath  Size 1: 6 Fr.    Closure Device 1: None   Sheath left in place for lysis    Procedure  Fluoro Time: 10.8 minutes  Contrast Volume: Visipaque 78 ml  DAP: 7 Gy.cm2  CO2: no  Anticoagulant: Heparin  Protamine: No  If Creatinine is > 1.2 or missing, BETO Prophylaxis none     Treatment Details  Indication: Occlusive Disease,    Completion Assessment  Artery 1 treated: SFA to AK Pop  bypass Right               Outflow: AT,PT,Peroneal: 2                       Segments treated: P2+P3                    Was this Site previously treated?: Yes, Other          TASC Grade: D          Total Treated Length: 40 cm          Total Occluded Length: 40 cm          Calcification: None (no calcification visible on fluoroscopic, CT or IVUS imaging)          Number of Treatment types (Devices):   1           Device 1          Treatment Type: AngioJet thrombectomy          Concomitant: None          Technical result: Right peroneal and PT artery Occlusion.  Lysis initiated.    None     Post Procedure  Patient currently taking: Statin, Yes      Antiplatelet Medication, Yes    Procedure Complications: No

## 2025-06-02 NOTE — SEDATION DOCUMENTATION
Interventional Radiology Procedure Nursing Note    Procedure: Lower extrem angio  Performing Provider: Dr. Sahu    Access site: Left groin  Closure: catheter  Bedrest start time: 1020.    Medications administered:  Midazolam IV: 1 mg  Fentanyl IV: 50 mcg    Events:  Patient safely transferred back to [facility] with assistance. Patient tolerated procedure well. Report called to [facility] 5 RN and transported to designated room.

## 2025-06-02 NOTE — ED PROVIDER NOTES
Time reflects when diagnosis was documented in both MDM as applicable and the Disposition within this note       Time User Action Codes Description Comment    6/1/2025 11:57 PM Nestor Duque Add [M79.604] Right leg pain     6/1/2025 11:57 PM Nestor Duque [I99.8] Limb ischemia           ED Disposition       ED Disposition   Transfer to Another Facility-In Network    Condition   --    Date/Time   Sun Jun 1, 2025 11:58 PM    Comment   Maxime Green should be transferred out to [facility] under the care of Dr. Riojas.               Assessment & Plan       Medical Decision Making  60-year-old male with right leg pain after mowing lawn concern for limb ischemia due to pale cool leg.  Brought Doppler to bedside not able to find DP or PT pulse.  Placed that transfer to order to discuss with vascular surgery for priority transfer given concern for limb ischemia.  Vascular surgery consulted promptly, recommends CTA and will except transfer.    Started on heparin drip.  Patient has paresthesias but otherwise is otherwise neurologically intact.  Unfortunately they are not available to evaluate for lysis until more.  Will continue neurovascular checks with plan to transfer for morning.    Amount and/or Complexity of Data Reviewed  Labs: ordered.  Radiology: ordered.    Risk  OTC drugs.  Prescription drug management.             Medications   heparin (porcine) 25,000 units in 0.45% NaCl 250 mL infusion (premix) (18 Units/kg/hr × 70 kg (Order-Specific) Intravenous New Bag 6/1/25 2310)   heparin (porcine) injection 5,600 Units (has no administration in time range)   heparin (porcine) injection 2,800 Units (has no administration in time range)   fentaNYL injection 75 mcg (0 mcg Intravenous Hold 6/2/25 0230)   morphine injection 4 mg (has no administration in time range)   Diclofenac Sodium (VOLTAREN) 1 % topical gel 2 g (2 g Topical Given 6/2/25 0317)   gabapentin (NEURONTIN) capsule 600 mg (has no administration in time range)    acetaminophen (TYLENOL) tablet 975 mg (has no administration in time range)   fentaNYL injection 75 mcg (75 mcg Intravenous Given 6/1/25 2235)   iohexol (OMNIPAQUE) 350 MG/ML injection (MULTI-DOSE) 100 mL (100 mL Intravenous Given 6/1/25 2257)   heparin (porcine) injection 5,600 Units (5,600 Units Intravenous Given 6/1/25 2309)   fentaNYL injection 75 mcg (75 mcg Intravenous Given 6/1/25 2317)   morphine injection 4 mg (4 mg Intravenous Given 6/2/25 0016)   morphine injection 4 mg (4 mg Intravenous Given 6/2/25 0131)   ketorolac (TORADOL) injection 15 mg (15 mg Intravenous Given 6/2/25 0214)   morphine injection 4 mg (4 mg Intravenous Given 6/2/25 0241)       ED Risk Strat Scores                    No data recorded        SBIRT 22yo+      Flowsheet Row Most Recent Value   Initial Alcohol Screen: US AUDIT-C     1. How often do you have a drink containing alcohol? 0 Filed at: 06/01/2025 2215   2. How many drinks containing alcohol do you have on a typical day you are drinking?  0 Filed at: 06/01/2025 2215   3a. Male UNDER 65: How often do you have five or more drinks on one occasion? 0 Filed at: 06/01/2025 2215   Audit-C Score 0 Filed at: 06/01/2025 2215   EVGENY: How many times in the past year have you...    Used an illegal drug or used a prescription medication for non-medical reasons? Never Filed at: 06/01/2025 2215                            History of Present Illness       Chief Complaint   Patient presents with    Leg Pain     Pt states pain to R leg from knee down after mowing the lawn. Pt denies fall/trauma/Injury +BT. Pt states bypass graft done through the R groin 1.5 yrs ago       Past Medical History[1]   Past Surgical History[2]   Family History[3]   Social History[4]   E-Cigarette/Vaping    E-Cigarette Use Never User       E-Cigarette/Vaping Substances    Nicotine No     THC No     CBD No     Flavoring No       I have reviewed and agree with the history as documented.     6-year-old male presenting to  the emergency department for evaluation of right leg pain.  Patient has aching right leg pain which he states goes from his knee through his calf to his foot is worse after he mowed his lawn today.  He thinks he may have pulled a muscle.  He does note that he has a history of vascular disease and has a graft in the leg.        Review of Systems   Constitutional:  Negative for appetite change, chills, fatigue and fever.   HENT:  Negative for sneezing and sore throat.    Eyes:  Negative for visual disturbance.   Respiratory:  Negative for cough, choking, chest tightness, shortness of breath and wheezing.    Cardiovascular:  Negative for chest pain and palpitations.   Gastrointestinal:  Negative for abdominal pain, constipation, diarrhea, nausea and vomiting.   Genitourinary:  Negative for difficulty urinating and dysuria.   Musculoskeletal:  Positive for arthralgias and myalgias.   Neurological:  Negative for dizziness, weakness, light-headedness, numbness and headaches.   All other systems reviewed and are negative.          Objective       ED Triage Vitals   Temperature Pulse Blood Pressure Respirations SpO2 Patient Position - Orthostatic VS   06/01/25 2211 06/01/25 2211 06/01/25 2211 06/01/25 2211 06/01/25 2211 06/01/25 2211   97.6 °F (36.4 °C) 77 (!) 219/105 22 100 % Sitting      Temp Source Heart Rate Source BP Location FiO2 (%) Pain Score    06/01/25 2211 06/01/25 2211 06/01/25 2211 -- 06/01/25 2235    Oral Monitor Left arm  10 - Worst Possible Pain      Vitals      Date and Time Temp Pulse SpO2 Resp BP Pain Score FACES Pain Rating User   06/02/25 0258 -- -- -- -- -- 10 - Worst Possible Pain -- KT   06/02/25 0241 -- -- -- -- -- 10 - Worst Possible Pain -- BY   06/02/25 0200 -- 68 96 % 18 167/81 10 - Worst Possible Pain -- BY   06/02/25 0130 -- 68 96 % 20 183/98 -- -- BY   06/02/25 0100 -- 98 98 % -- 183/98 10 - Worst Possible Pain -- BY   06/02/25 0016 -- -- -- 22 -- 10 - Worst Possible Pain -- BY   06/02/25  0000 -- 71 97 % 20 182/86 10 - Worst Possible Pain -- BY   06/01/25 2330 -- 70 97 % 16 183/83 -- -- BY   06/01/25 2317 -- -- -- -- -- 10 - Worst Possible Pain -- BY   06/01/25 2235 -- -- -- -- -- 10 - Worst Possible Pain -- KT   06/01/25 2211 97.6 °F (36.4 °C) 77 100 % 22 219/105 -- -- JK            Physical Exam  Vitals and nursing note reviewed.   Constitutional:       General: He is not in acute distress.     Appearance: He is well-developed. He is not diaphoretic.   HENT:      Head: Normocephalic and atraumatic.     Eyes:      Pupils: Pupils are equal, round, and reactive to light.     Neck:      Vascular: No JVD.      Trachea: No tracheal deviation.     Cardiovascular:      Rate and Rhythm: Normal rate and regular rhythm.      Heart sounds: Normal heart sounds. No murmur heard.     No friction rub. No gallop.   Pulmonary:      Effort: Pulmonary effort is normal. No respiratory distress.      Breath sounds: Normal breath sounds. No wheezing or rales.   Abdominal:      General: Bowel sounds are normal. There is no distension.      Palpations: Abdomen is soft.      Tenderness: There is no abdominal tenderness. There is no guarding or rebound.     Musculoskeletal:      Comments: Patient has pale cool leg with blanched capillary beds in the toes of the right leg.  He has no DP or PT pulse.     Skin:     General: Skin is warm and dry.      Coloration: Skin is not pale.     Neurological:      Mental Status: He is alert and oriented to person, place, and time.      Cranial Nerves: No cranial nerve deficit.      Motor: No abnormal muscle tone.     Psychiatric:         Behavior: Behavior normal.         Results Reviewed       Procedure Component Value Units Date/Time    APTT [[id number]]     Lab Status: No result Specimen: Blood     Fibrinogen [[id number]] Collected: 06/02/25 0145    Lab Status: In process Specimen: Blood from Arm, Left Updated: 06/02/25 0150    APTT [[id number]]  (Abnormal) Collected: 06/01/25 0579    Lab  Status: Final result Specimen: Blood from Arm, Right Updated: 06/01/25 2336     PTT 37 seconds     Protime-INR [[id number]]  (Abnormal) Collected: 06/01/25 2309    Lab Status: Final result Specimen: Blood from Arm, Right Updated: 06/01/25 2336     Protime 26.2 seconds      INR 2.32    Narrative:      INR Therapeutic Range    Indication                                             INR Range      Atrial Fibrillation                                               2.0-3.0  Hypercoagulable State                                    2.0.2.3  Left Ventricular Asist Device                            2.0-3.0  Mechanical Heart Valve                                  -    Aortic(with afib, MI, embolism, HF, LA enlargement,    and/or coagulopathy)                                     2.0-3.0 (2.5-3.5)     Mitral                                                             2.5-3.5  Prosthetic/Bioprosthetic Heart Valve               2.0-3.0  Venous thromboembolism (VTE: VT, PE        2.0-3.0    Protime-INR [[id number]]  (Abnormal) Collected: 06/01/25 2235    Lab Status: Final result Specimen: Blood from Arm, Left Updated: 06/01/25 2324     Protime 26.4 seconds      INR 2.34    Narrative:      INR Therapeutic Range    Indication                                             INR Range      Atrial Fibrillation                                               2.0-3.0  Hypercoagulable State                                    2.0.2.3  Left Ventricular Asist Device                            2.0-3.0  Mechanical Heart Valve                                  -    Aortic(with afib, MI, embolism, HF, LA enlargement,    and/or coagulopathy)                                     2.0-3.0 (2.5-3.5)     Mitral                                                             2.5-3.5  Prosthetic/Bioprosthetic Heart Valve               2.0-3.0  Venous thromboembolism (VTE: VT, PE        2.0-3.0    APTT [[id number]]  (Abnormal) Collected: 06/01/25 2235    Lab Status:  Final result Specimen: Blood from Arm, Left Updated: 06/01/25 2324     PTT 36 seconds     Comprehensive metabolic panel [[id number]]  (Abnormal) Collected: 06/01/25 2235    Lab Status: Final result Specimen: Blood from Arm, Left Updated: 06/01/25 2311     Sodium 131 mmol/L      Potassium 3.6 mmol/L      Chloride 98 mmol/L      CO2 19 mmol/L      ANION GAP 14 mmol/L      BUN 30 mg/dL      Creatinine 1.01 mg/dL      Glucose 288 mg/dL      Calcium 9.5 mg/dL      AST 28 U/L      ALT 33 U/L      Alkaline Phosphatase 74 U/L      Total Protein 8.0 g/dL      Albumin 4.5 g/dL      Total Bilirubin 0.87 mg/dL      eGFR 80 ml/min/1.73sq m     Narrative:      National Kid[employer] Disease Foundation guidelines for Chronic Kidney Disease (CKD):     Stage 1 with normal or high GFR (GFR > 90 mL/min/1.73 square meters)    Stage 2 Mild CKD (GFR = 60-89 mL/min/1.73 square meters)    Stage 3A Moderate CKD (GFR = 45-59 mL/min/1.73 square meters)    Stage 3B Moderate CKD (GFR = 30-44 mL/min/1.73 square meters)    Stage 4 Severe CKD (GFR = 15-29 mL/min/1.73 square meters)    Stage 5 End Stage CKD (GFR <15 mL/min/1.73 square meters)  Note: GFR calculation is accurate only with a steady state creatinine    Lactic acid, plasma (w/reflex if result > 2.0) [[id number]]  (Normal) Collected: 06/01/25 2235    Lab Status: Final result Specimen: Blood from Arm, Left Updated: 06/01/25 2310     LACTIC ACID 1.7 mmol/L     Narrative:      Result may be elevated if tourniquet was used during collection.    CBC and differential [[id number]]  (Abnormal) Collected: 06/01/25 2235    Lab Status: Final result Specimen: Blood from Arm, Left Updated: 06/01/25 2247     WBC 11.54 Thousand/uL      RBC 5.69 Million/uL      Hemoglobin 14.8 g/dL      Hematocrit 44.0 %      MCV 77 fL      MCH 26.0 pg      MCHC 33.6 g/dL      RDW 13.3 %      MPV 9.8 fL      Platelets 153 Thousands/uL      nRBC 0 /100 WBCs      Segmented % 79 %      Immature Grans % 0 %      Lymphocytes % 13 %       Monocytes % 7 %      Eosinophils Relative 1 %      Basophils Relative 0 %      Absolute Neutrophils 9.16 Thousands/µL      Absolute Immature Grans 0.04 Thousand/uL      Absolute Lymphocytes 1.46 Thousands/µL      Absolute Monocytes 0.78 Thousand/µL      Eosinophils Absolute 0.06 Thousand/µL      Basophils Absolute 0.04 Thousands/µL             CT head wo contrast   Final Interpretation by Surjit Maria MD (06/02 0153)      No acute intracranial hemorrhage, midline shift, or mass effect.                  Workstation performed: [id number]         XR knee 4+ vw right injury    (Results Pending)   CTA abdominal w run off w wo contrast    (Results Pending)       Procedures    ED Medication and Procedure Management   Prior to Admission Medications   Prescriptions Last Dose Informant Patient Reported? Taking?   Jardiance 25 MG TABS   No No   Sig: TAKE 1 TABLET (25 MG TOTAL) BY MOUTH DAILY.   aspirin (ECOTRIN LOW STRENGTH) 81 mg EC tablet  Self No No   Sig: Take 1 tablet (81 mg total) by mouth daily   famotidine (PEPCID) 20 mg tablet   No No   Sig: Take 1 tablet (20 mg total) by mouth daily   isosorbide mononitrate (IMDUR) 30 mg 24 hr tablet   No No   Sig: TAKE 1 TABLET BY MOUTH TWICE A DAY   metFORMIN (GLUCOPHAGE) 1000 MG tablet   No No   Sig: TAKE 1 TABLET BY MOUTH TWICE A DAY WITH FOOD   metoprolol succinate (TOPROL-XL) 25 mg 24 hr tablet   No No   Sig: TAKE 1 TABLET (25 MG TOTAL) BY MOUTH DAILY.   rivaroxaban (Xarelto) 20 mg tablet  Self No No   Sig: TAKE 1 TABLET BY MOUTH DAILY WITH BREAKFAST   rivaroxaban (Xarelto) 20 mg tablet  Self No No   Sig: Take 1 tablet (20 mg total) by mouth daily with breakfast   rosuvastatin (CRESTOR) 10 MG tablet  Self No No   Sig: Take 1 tablet (10 mg total) by mouth every other day      Facility-Administered Medications: None     Patient's Medications   Discharge Prescriptions    No medications on file     No discharge procedures on file.  ED SEPSIS DOCUMENTATION   Time reflects when  diagnosis was documented in both MDM as applicable and the Disposition within this note       Time User Action Codes Description Comment    6/1/2025 11:57 PM Nestor Duque [M79.604] Right leg pain     6/1/2025 11:57 PM Nestor Duque [I99.8] Limb ischemia                    [1]   Past Medical History:  Diagnosis Date    Bicuspid aortic valve     being observed    CAD (coronary artery disease)     Chest pain 4/6/2021    Coronary artery disease     Diabetes mellitus (HCC)     Encounter for postoperative care 5/17/2021    Former tobacco use     GERD (gastroesophageal reflux disease)     Goiter     History of myocardial infarction     History of rib fracture     Hyperlipidemia     Hypertension     Hypertensive urgency 4/6/2021    Left carotid bruit 7/22/2021    Leg pain, bilateral     Agram today LLE   1/20/2022    Leukocytosis 4/6/2021    Myocardial infarction ([facility])     2021-   CABG  x3    NSTEMI (non-ST elevated myocardial infarction) ([facility]) 4/7/2021    Wears glasses    [2]   Past Surgical History:  Procedure Laterality Date    ARTERIOGRAM Right 10/19/2022    Procedure: RIGHT LOWER EXTREMITY ARTERIOGRAM WITH LYSIS, PLACEMENT OF LYSIS CATHETER; third order cannulation of right popliteal artery via left groin access;  Surgeon: Ronald Ordonez MD;  [facility];  Service: Vascular    CARDIAC CATHETERIZATION      CARDIAC CATHETERIZATION N/A 10/20/2021    Procedure: Cardiac catheterization;  Surgeon: Lynnette Reeder MD;  Location: [facility];  Service: Cardiology    CARDIAC CATHETERIZATION N/A 10/20/2021    Procedure: Cardiac Coronary Angiogram;  Surgeon: Lynnette Reeder MD;  [facility];  Service: Cardiology    CARDIAC CATHETERIZATION N/A 10/20/2021    Procedure: Cardiac pci;  Surgeon: Lynnette Reeder MD;  [facility];  Service: Cardiology    IR LOWER EXTREMITY ANGIOGRAM  1/20/2022    IR LOWER EXTREMITY ANGIOGRAM  2/3/2022    IR LOWER EXTREMITY ANGIOGRAM   10/19/2022    IR PICC PLACEMENT DOUBLE LUMEN  10/19/2022    IR TPA LYSIS CHECK  10/20/2022    DC BYPASS W/VEIN FEMORAL-POPLITEAL Right 2/3/2022    Procedure: BYPASS FEMORAL-POPLITEAL;  Surgeon: Pradeep Brothers MD;  [facility];  Service: Vascular    DC CORONARY ARTERY BYP W/VEIN & ARTERY GRAFT 4 VEIN N/A 2021    Procedure: CORONARY ARTERY BYPASS GRAFT (CABG) 3 VESSELS: LIMA to LAD; LLE EVH/SVG to LPL & OM2;  Surgeon: Jose Sanchez DO;  [facility];  Service: Cardiac Surgery    DC ECHO TRANSESOPHAG R-T 2D W/PRB IMG ACQUISJ I&R N/A 2021    Procedure: TRANSESOPHAGEAL ECHOCARDIOGRAM (BENNETT);  Surgeon: Jose Sanchez DO;  [facility];  Service: Cardiac Surgery    DC NDSC SURG W/VIDEO-ASSISTED HARVEST VEIN CABG Left 2021    Procedure: HARVEST VEIN ENDOSCOPIC (EVH);  Surgeon: Jose Sanchez DO;  [facility];  Service: Cardiac Surgery    DC SLCTV CATHJ 3RD+ ORD SLCTV ABDL PEL/LXTR BRNCH Left 2022    Procedure: ARTERIOGRAM, STENT PLACEMENT IN LEFT SUPERFICIAL FEMORIAL ARTERY;  Surgeon: Pradeep Brothers MD;  [facility];  Service: Vascular    VASECTOMY     [3]   Family History  Problem Relation Name Age of Onset    Heart disease Father     [4]   Social History  Tobacco Use    Smoking status: Former     Current packs/day: 0.00     Average packs/day: 1 pack/day for 38.0 years (38.0 ttl pk-yrs)     Types: Cigarettes     Start date: 1983     Quit date: 2021     Years since quittin.1    Smokeless tobacco: Never   Vaping Use    Vaping status: Never Used   Substance Use Topics    Alcohol use: Never    Drug use: Never        Nestor Duque MD  25 [phone]

## 2025-06-03 ENCOUNTER — APPOINTMENT (INPATIENT)
Dept: RADIOLOGY | Facility: HOSPITAL | Age: 60
DRG: 253 | End: 2025-06-03
Attending: RADIOLOGY
Payer: COMMERCIAL

## 2025-06-03 NOTE — PLAN OF CARE
Problem: PAIN - ADULT  Goal: Verbalizes/displays adequate comfort level or baseline comfort level  Description: Interventions:  - Encourage patient to monitor pain and request assistance  - Assess pain using appropriate pain scale  - Administer analgesics as ordered based on type and severity of pain and evaluate response  - Implement non-pharmacological measures as appropriate and evaluate response  - Consider cultural and social influences on pain and pain management  - Notify physician/advanced practitioner if interventions unsuccessful or patient reports new pain  - Educate patient/family on pain management process including their role and importance of  reporting pain   - Provide non-pharmacologic/complimentary pain relief interventions  Outcome: Progressing     Problem: INFECTION - ADULT  Goal: Absence or prevention of progression during hospitalization  Description: INTERVENTIONS:  - Assess and monitor for signs and symptoms of infection  - Monitor lab/diagnostic results  - Monitor all insertion sites, i.e. indwelling lines, tubes, and drains  - Monitor endotracheal if appropriate and nasal secretions for changes in amount and color  - [place] appropriate cooling/warming therapies per order  - Administer medications as ordered  - Instruct and encourage patient and family to use good hand hygiene technique  - Identify and instruct in appropriate isolation precautions for identified infection/condition  Outcome: Progressing  Goal: Absence of fever/infection during neutropenic period  Description: INTERVENTIONS:  - Monitor WBC  - Perform strict hand hygiene  - Limit to healthy visitors only  - No plants, dried, fresh or silk flowers with phipps in patient room  Outcome: Progressing     Problem: SAFETY ADULT  Goal: Patient will remain free of falls  Description: INTERVENTIONS:  - Educate patient/family on patient safety including physical limitations  - Instruct patient to call for assistance with activity   -  Consider consulting OT/PT to assist with strengthening/mobility based on AM PAC & JH-HLM score  - Consult OT/PT to assist with strengthening/mobility   - Keep Call bell within reach  - Keep bed low and locked with side rails adjusted as appropriate  - Keep care items and personal belongings within reach  - Initiate and maintain comfort rounds  - Make Fall Risk Sign visible to staff  - Offer Toileting every 2 Hours, in advance of need  - Initiate/Maintain necessary alarm  - Obtain necessary fall risk management equipment  - Apply yellow socks and bracelet for high fall risk patients  - Consider moving patient to room near nurses station  Outcome: Progressing  Goal: Maintain or return to baseline ADL function  Description: INTERVENTIONS:  -  Assess patient's ability to carry out ADLs; assess patient's baseline for ADL function and identify physical deficits which impact ability to perform ADLs (bathing, care of mouth/teeth, toileting, grooming, dressing, etc.)  - Assess/evaluate cause of self-care deficits   - Assess range of motion  - Assess patient's mobility; develop plan if impaired  - Assess patient's need for assistive devices and provide as appropriate  - Encourage maximum independence but intervene and supervise when necessary  - Involve family in performance of ADLs  - Assess for home care needs following discharge   - Consider OT consult to assist with ADL evaluation and planning for discharge  - Provide patient education as appropriate  - Monitor functional capacity and physical performance, use of AM PAC & JH-HLM   - Monitor gait, balance and fatigue with ambulation    Outcome: Progressing  Goal: Maintains/Returns to pre admission functional level  Description: INTERVENTIONS:  - Perform AM-PAC 6 Click Basic Mobility/ Daily Activity assessment daily.  - Set and communicate daily mobility goal to care team and patient/family/caregiver.   - Collaborate with rehabilitation services on mobility goals if  consulted  - Perform Range of Motion 3 times a day.  - Reposition patient every 2 hours.  - Dangle patient 3 times a day  - Stand patient 3 times a day  - Ambulate patient 3 times a day  - Out of bed to chair 3 times a day   - Out of bed for meals 3 times a day  - Out of bed for toileting  - Record patient progress and toleration of activity level   Outcome: Progressing     Problem: DISCHARGE PLANNING  Goal: Discharge to home or other facility with appropriate resources  Description: INTERVENTIONS:  - Identify barriers to discharge w/patient and caregiver  - Arrange for needed discharge resources and transportation as appropriate  - Identify discharge learning needs (meds, wound care, etc.)  - Arrange for interpretive services to assist at discharge as needed  - Refer to Case Management Department for coordinating discharge planning if the patient needs post-hospital services based on physician/advanced practitioner order or complex needs related to functional status, cognitive ability, or social support system  Outcome: Progressing     Problem: Knowledge Deficit  Goal: Patient/family/caregiver demonstrates understanding of disease process, treatment plan, medications, and discharge instructions  Description: Complete learning assessment and assess knowledge base.  Interventions:  - Provide teaching at level of understanding  - Provide teaching via preferred learning methods  Outcome: Progressing     Problem: Prexisting or High Potential for Compromised Skin Integrity  Goal: Skin integrity is maintained or improved  Description: INTERVENTIONS:  - Identify patients at risk for skin breakdown  - Assess and monitor skin integrity including under and around medical devices   - Assess and monitor nutrition and hydration status  - Monitor labs  - Assess for incontinence   - Turn and reposition patient  - Assist with mobility/ambulation  - Relieve pressure over doreen prominences   - Avoid friction and shearing  - Provide  appropriate hygiene as needed including keeping skin clean and dry  - Evaluate need for skin moisturizer/barrier cream  - Collaborate with interdisciplinary team  - Patient/family teaching  - Consider wound care consult

## 2025-06-03 NOTE — QUICK NOTE
Surgical Critical Care Post-Op Check  Maxime Green 60 y.o. male MRN: [id number]  Unit/Bed#: [facility] 513-01 Encounter: [id number]     Patient seen and evaluated at bedside after arrival to floor. Patient doing well, feeling better. Resting comfortably in bed on room air.     Vitals:    06/03/25 1600   BP: 141/83   Pulse: 67   Resp: (!) 23   Temp: 97.6 °F (36.4 °C)   SpO2: 100%     I/O         06/01 0701 06/02 0700 06/02 0701  06/03 0700 06/03 0701 06/04 0700    P.O.  900 220    I.V. (mL/kg)  159.3 (2.2)     IV Piggyback  459.6 25    Total Intake(mL/kg)  1518.9 (20.8) 245 (3.4)    Urine (mL/kg/hr)  1750 (1) 0 (0)    Stool  0 0    Total Output  1750 0    Net  -231.1 +245           Unmeasured Urine Occurrence  0 x 0 x    Unmeasured Stool Occurrence  0 x 0 x          Gen:  No acute distress  CV:  Warm, well-perfused  Lung:  Normal work of breathing, no respiratory distress  Abd:  Soft, non tender, nondistended   Ext:  Moving all extremities  Neuro:  Alert and oriented, M/S grossly intact  Skin:  R access site Tegaderm / gauze dressing clean, dry, intact. No swelling or hematoma, no bleeding noted.      L palp DP pulse, doppl PT signal  R palp DP and PT pulse      Lab Results   Component Value Date    WBC 6.39 06/03/2025    HGB 12.7 06/03/2025    HCT 38.0 06/03/2025    MCV 81 (L) 06/03/2025     (L) 06/03/2025     Lab Results   Component Value Date    GLUCOSE 145 (H) 04/09/2021    CALCIUM 8.3 (L) 06/03/2025    K 3.8 06/03/2025    CO2 25 06/03/2025     06/03/2025    BUN 18 06/03/2025    CREATININE 0.73 06/03/2025       Ro Miller MD  PGYI, General Surgery

## 2025-06-03 NOTE — PROGRESS NOTES
Progress Note - Critical Care/ICU   Name: Maxime Green 60 y.o. 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     Neuro/Psych:  Diagnoses: RLE ischemia, pain control  Plan:  Neuro checks Every 4 hours  Sedation: None  Analgesia: Multimodal. Oxy 2.5/5 Q4H PRN, Dilaudid 0.2 mg IV Q2H PRN     CV:  Diagnosis: RLE acute lower limb ischemia, CAD, PAD, HTN, asx carotid artery stenosis, former tobacco user, h/o Cardiac cath 10/21 and CABG 4/2021   IR Lysis check 6/3  Palpable R PT pulse, doppl DP amd AT signals  Doppl L DP, AT, PT signals   Overnight non-weight-based heparin protocol held for bleeding around access site.  Plan:  IR Lysis check today 6/3  Continue TPA @ 0.5 mg/hr.   Continuous cardiopulmonary monitoring. Maintain MAP >65.  Continue home metoprolol  Continue home statin   Continue home ASA  PRN labetalol 10 mg Q6H PRN for SBP > 160        Pulm:  Diagnoses: No active issues   Currently on room air  Plan:  Continuous pulse oximetry. Maintain O2 sat >92%.      GI:  Diagnoses: No active issues, hx of GERD  Plan:  Bowel regimen: N/A  GI prophylaxis: N/A     :  Diagnoses: Urinary retention  Baseline creatinine: 1  Straight cath x2  Plan:  Cr 0.73 from 0.98 from 1.01  Monitor I/Os.  UO: 1750 cc (total from straight cath x2)  Continue urinary retention protocol     F/E/N:  F: Fluid resuscitation prn.  E: Monitor and replete electrolytes for Mg >2, Phos >3, K >4.  N: NPO  for IR lysis check 6/3      Heme/Onc:  Diagnoses: RLE acute limb ischemia   Plan:  Continue TPA @ 0.5 mg/hr   6/3 IR lysis check   Hgb 12.9 (13.2)  PTT 39 (39, 35, 87, 120)  VTE prophylaxis: hanticoagulated     Endo:  Diagnoses: DM  Plan:   Holding home metformin  Insulin: Sliding scale. Monitor blood glucose.     ID:  Diagnoses: No active issues   Plan:  Monitor fever curve and WBC.     MSK/Skin:  Diagnoses: No active issues  Plan:  PT/OT when appropriate. Encourage OOB and ambulation when appropriate. Local wound care prn.     LDAs:  Lines - PICC, L femoral arterial sheath, peripheral IV  Drains - N/A  Airways -  N/A  Disposition: Critical care    ICU Core Measures     A: Assess, Prevent, and Manage Pain Has pain been assessed? Yes  Need for changes to pain regimen? No   B: Both SAT/SAT  N/A   C: Choice of Sedation RASS Goal: 0 Alert and Calm  Need for changes to sedation or analgesia regimen? No   D: Delirium CAM-ICU: Negative   E: Early Mobility  Plan for early mobility? No   F: Family Engagement Plan for family engagement today? No         Prophylaxis:  VTE VTE covered by:  heparin (porcine), Intravenous, Stopped at 06/03/25 0048  heparin (porcine), Intravenous  heparin (porcine), Intravenous  heparin, Intravenous, 20 Units/hr at 06/02/25 1046       Stress Ulcer  not orderedcovered byfamotidine (PEPCID) 20 mg tablet [[id number]] (Long-Term Med)         24 Hour Events : None  Subjective   No acute events overnight. Patient reports pain in upper left back. They were tolerating their liquid diet. They have been having urinary retention requiring straight cath x2. They deny nausea, vomiting, chest pain, shortness of breath, fevers, chills.      Review of Systems: Review of Systems   Constitutional:  Negative for appetite change, chills and fever.   HENT:  Negative for congestion.    Eyes:  Negative for discharge.   Respiratory:  Negative for apnea and shortness of breath.    Cardiovascular:  Negative for chest pain.   Gastrointestinal:   Negative for abdominal distention.   Genitourinary:  Negative for hematuria.   Musculoskeletal:  Positive for back pain.   Skin:  Negative for color change.   Neurological:  Negative for dizziness.   Psychiatric/Behavioral:  Negative for agitation.        Objective :                   Vitals I/O      Most Recent Min/Max in 24hrs   Temp 98 °F (36.7 °C) Temp  Min: 97.5 °F (36.4 °C)  Max: 98 °F (36.7 °C)   Pulse 62 Pulse  Min: 56  Max: 70   Resp (!) 11 Resp  Min: 9  Max: 31   /59 BP  Min: 125/62  Max: 200/91   O2 Sat 96 % SpO2  Min: 93 %  Max: 100 %      Intake/Output Summary (Last 24 hours) at 6/3/2025 0653  Last data filed at 6/3/2025 0600  Gross per 24 hour   Intake 1518.86 ml   Output 1750 ml   Net -231.14 ml       Diet NPO; Sips with meds    Invasive Monitoring           Physical Exam   Physical Exam  Eyes:      Extraocular Movements: Extraocular movements intact.   Skin:     General: Skin is warm and dry.      Comments: Bleeding around L access site   HENT:      Head: Normocephalic and atraumatic.      Nose: No congestion.      Mouth/Throat:      Mouth: Mucous membranes are moist.   Cardiovascular:      Rate and Rhythm: Normal rate.      Pulses: Pulses are R palpable PT pulse, doppl DP AT signals  .   Musculoskeletal:         General: No swelling.   Abdominal:      Palpations: Abdomen is soft.      Tenderness: There is no abdominal tenderness.   Constitutional:       Appearance: He is not toxic-appearing.   Pulmonary:      Effort: Pulmonary effort is normal.   Neurological:      General: No focal deficit present.      Mental Status: He is alert. He is not agitated.      Motor: gross motor function is at baseline for patient.          Diagnostic Studies        Lab Results: I have reviewed the following results:     Medications:  Scheduled PRN   aspirin, 81 mg, Daily  atorvastatin, 40 mg, Daily With Dinner  chlorhexidine, 15 mL, Q12H BOBBY  insulin lispro, 1-5 Units, TID AC  metoprolol succinate, 25 mg,  Daily      heparin (porcine), 1,000 Units, Q6H PRN  heparin (porcine), 2,000 Units, Q6H PRN  HYDROmorphone, 0.2 mg, Q2H PRN  labetalol, 10 mg, Q6H PRN  oxyCODONE, 2.5 mg, Q4H PRN   Or  oxyCODONE, 5 mg, Q4H PRN       Continuous    alteplase (CATHFLO) 10 mg in sodium chloride 0.9 % 250 mL infusion, 1 mg/hr, Last Rate: 1 mg/hr (06/03/25 0230)  heparin (porcine), 300-2,000 Units/hr, Last Rate: Stopped (06/03/25 0048)  heparin, 20 Units/hr, Last Rate: 20 Units/hr (06/02/25 1046)         Labs:   CBC    Recent Labs     06/03/25  0038 06/03/25  0546   WBC 6.77 6.62   HGB 13.2 12.9   HCT 40.4 39.9   * 115*     BMP    Recent Labs     06/02/25  0510 06/03/25  0546   SODIUM 131* 133*   K 5.1 3.8   CL 98 100   CO2 22 25   AGAP 11 8   BUN 26* 18   CREATININE 0.98 0.73   CALCIUM 9.2 8.3*       Coags    Recent Labs     06/01/25  2309 06/02/25  0510 06/02/25  1332 06/03/25  0038 06/03/25  0546   INR 2.32* 1.44*  --   --   --    PTT 37* 120*   < > 39* 39*    < > = values in this interval not displayed.        Additional Electrolytes  No recent results       Blood Gas    No recent results  No recent results LFTs  Recent Labs     06/01/25  2235   ALT 33   AST 28   ALKPHOS 74   ALB 4.5   TBILI 0.87       Infectious  No recent results  Glucose  Recent Labs     06/01/25  2235 06/02/25  0510 06/03/25  0546   GLUC 288* 196* 151*

## 2025-06-03 NOTE — ASSESSMENT & PLAN NOTE
Lab Results   Component Value Date    HGBA1C 7.4 (A) 09/13/2024       Recent Labs     06/02/25  1646 06/03/25  0553   POCGLU 185* 115       Blood Sugar Average: Last 72 hrs:  (P) 150    Follow insulin sliding scale protocol

## 2025-06-03 NOTE — BRIEF OP NOTE (RAD/CATH)
INTERVENTIONAL RADIOLOGY PROCEDURE NOTE    Date: 6/3/2025    Procedure: IR TPA LYSIS CHECK     Preoperative diagnosis:   1. Acute lower limb ischemia         Postoperative diagnosis: Same.    Surgeon: Agnes Gregory MD     Assistant: None. No qualified resident was available.    Blood loss: 3 mL    Specimens: None    Findings: Successful complete lysis of right lower extremity graft.  Patent PT and peroneal arteries. Mild stenosis of distal common femoral artery successfully treated with 6 mm angioplasty.  No other lesions identified.    Complications: None immediate.    Anesthesia: conscious sedation      Vascular Quality Initiative - Peripheral Vascular Intervention     Urgency: Urgent    This is f/u lysis from yesterday:pt presented with lysis catheter in the right leg via the left CFA access, and had had his TPA discontinued overnight due to left groin oozing.    Procedure  Fluoro Time: 3.5 minutes  Contrast Volume: Visipaque 40 ml  DAP: 61 mGy  CO2: no  Anticoagulant: Heparin  Protamine: No  If Creatinine is > 1.2 or missing, BETO Prophylaxis none     Treatment Details  Indication: Occlusive Disease,    Completion Assessment  Artery 1 treated:  Com Fem  Right               Outflow: bypass, PROF, POP: 3                  Was this Site previously treated?: Unsure          TASC Grade: A          Total Treated Length: 1 cm          Total Occluded Length: 0 cm          Calcification: None (no calcification visible on fluoroscopic, CT or IVUS imaging)          Number of Treatment types (Devices):   1           Device 1          Treatment Type: Plain Balloon          Concomitant: None          Technical result: Successful (stenosis <=30%)      None, there was successful lysis with restoration of two-vessel runoff and no residual clot.    Post Procedure  Patient currently taking: Statin, Yes      Antiplatelet Medication, Yes    Procedure Complications: No

## 2025-06-03 NOTE — SEDATION DOCUMENTATION
Right lower extremity lysis check by Dr. Gregory. Lysis catheters removed. Will restart heparin in 4 hours with no bolus. Please see orders. Will return to P4.

## 2025-06-03 NOTE — PROGRESS NOTES
Progress Note - Vascular Surgery   Name: Maxime Green 60 y.o. male I MRN: [id number]  Unit/Bed#: [facility] 513-01 I Date of Admission: 6/2/2025   Date of Service: 6/3/2025 I Hospital Day: 1    Assessment & Plan  Acute lower limb ischemia  Patient known to vascular surgery service for PAD s/p left SFA stenting January 2022 due to disabling claudication; right fem above-knee pop bypass with PTFE performed February 2022; right lower extremity acute limb ischemia October 2022 status post lysis, angioplasty and Viabahn stent.   Patient now with acute RLE ischemia due to occluded R bypass graft.  Onset of pain at 8:30 pm 6/1/25.  Patient with subjective paresthesia, no sensory or motor deficits.  Compliant with ASA and Xarelto at home.     POD 1 - RLE agram, bypass angiojet thrombectomy, lysis initiation through L fem access  L groin site with oozing and clotted blood under tegaderm, groin remains soft with some ecchymosis. L sided back pain, flank remains soft.   R DP/PT doppler signal present, remains motor/sensory intact       Plan:  --Lysis recheck today with IR 6/3  --Keep NPO  --With unilateral L sided back pain, likely positional. Flank and groin remain soft, mild oozing from catheter site with collection of clotted blood below tegaderm but surrounding groin is soft. Continue to monitor  --PRN pain control   --Continue to hold Xarelto  --Continue ASA/statin  --Urinary retention protocol x 1 straight cath yesterday  --q1hr neurovascular checks  --Appreciate ICU level of care with lysis treatment  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 complication, without long-term current use of insulin (HCC)  Lab Results   Component Value Date    HGBA1C 7.4 (A) 09/13/2024       Recent Labs     06/02/25  1646 06/03/25  0553   POCGLU 185* 115       Blood Sugar Average: Last 72 hrs:  (P) 150    Follow insulin sliding scale protocol    CAD (coronary artery disease)  Continue ASA  Atheroscler native arteries the extremities  w/intermit claudication (HCC)  Continue statin.    24 Hour Events : IR with dose adjustment of tpa and halved dose of hep straight rate.   Subjective : Pt resting in bed, endorses urinary retention because he cannot stand up post-lysis procedure. Endorses pain to RLE, but remains motor/sensory intact. Npo for lysis today.    Objective :  Temp:  [97.5 °F (36.4 °C)-98 °F (36.7 °C)] 98 °F (36.7 °C)  HR:  [56-70] 62  BP: (125-200)/(59-91) 129/59  Resp:  [9-31] 11  SpO2:  [93 %-100 %] 96 %  O2 Device: None (Room air)  Nasal Cannula O2 Flow Rate (L/min):  [2 L/min] 2 L/min  FiO2 (%):  [22] 22     I/O         06/01 0701  06/02 0700 06/02 0701  06/03 0700 06/03 0701  06/04 0700    P.O.  900     I.V. (mL/kg)  159.3 (2.2)     IV Piggyback  459.6     Total Intake(mL/kg)  1518.9 (20.8)     Urine (mL/kg/hr)  1750 (1)     Stool  0     Total Output  1750     Net  -231.1            Unmeasured Urine Occurrence  0 x     Unmeasured Stool Occurrence  0 x           Lines/Drains/Airways       Active Status       Name Placement date Placement time Site Days    PICC Line 06/02/25 06/02/25  1107  --  less than 1                  Physical Exam  Vitals and nursing note reviewed.   Constitutional:       General: He is not in acute distress.     Appearance: He is well-developed.   HENT:      Head: Normocephalic and atraumatic.     Eyes:      Conjunctiva/sclera: Conjunctivae normal.       Cardiovascular:      Rate and Rhythm: Normal rate.   Pulmonary:      Effort: Pulmonary effort is normal. No respiratory distress.   Abdominal:      Palpations: Abdomen is soft.      Tenderness: There is no abdominal tenderness.      Comments: L groin soft, oozing from catheter but no hematoma     Musculoskeletal:         General: No swelling.      Cervical back: Neck supple.     Skin:     General: Skin is warm and dry.      Capillary Refill: Capillary refill takes 2 to 3 seconds.      Findings: Erythema present.     Neurological:      Mental Status: He is alert  "and oriented to person, place, and time.      Sensory: No sensory deficit.      Motor: No weakness.     Psychiatric:         Mood and Affect: Mood normal.           Lab Results: I have reviewed the following results:  CBC with diff:   Lab Results   Component Value Date    WBC 6.62 06/03/2025    HGB 12.9 06/03/2025    HCT 39.9 06/03/2025    MCV 82 06/03/2025     (L) 06/03/2025    RBC 4.87 06/03/2025    MCH 26.5 (L) 06/03/2025    MCHC 32.3 06/03/2025    RDW 13.9 06/03/2025    MPV 9.0 06/03/2025    NRBC 0 06/01/2025   ,   BMP/CMP:  Lab Results   Component Value Date    SODIUM 133 (L) 06/03/2025    K 3.8 06/03/2025     06/03/2025    CO2 25 06/03/2025    CO2 22 04/09/2021    BUN 18 06/03/2025    CREATININE 0.73 06/03/2025    GLUCOSE 145 (H) 04/09/2021    CALCIUM 8.3 (L) 06/03/2025    AST 28 06/01/2025    ALT 33 06/01/2025    ALKPHOS 74 06/01/2025    EGFR 100 06/03/2025   ,   Lipid Panel: No results found for: \"CHOL\",   Coags:   Lab Results   Component Value Date    PTT 39 (H) 06/03/2025    INR 1.44 (H) 06/02/2025   ,   Blood Culture: No results found for: \"BLOODCX\",   Urinalysis:   Lab Results   Component Value Date    COLORU Yellow 04/08/2021    CLARITYU Clear 04/08/2021    SPECGRAV 1.012 04/08/2021    PHUR 8.0 04/08/2021    LEUKOCYTESUR Negative 04/08/2021    NITRITE Negative 04/08/2021    GLUCOSEU Negative 04/08/2021    KETONESU Negative 04/08/2021    BILIRUBINUR Negative 04/08/2021    BLOODU Negative 04/08/2021   ,   Urine Culture: No results found for: \"URINECX\",   Wound Culure: No results found for: \"WOUNDCULT\"    Imaging Results Review: No pertinent imaging studies reviewed.  Other Study Results Review: No additional pertinent studies reviewed.    VTE Prophylaxis: VTE covered by:  heparin (porcine), Intravenous, Stopped at 06/03/25 0048  heparin (porcine), Intravenous  heparin (porcine), Intravenous  heparin, Intravenous, 20 Units/hr at 06/02/25 1046     "

## 2025-06-03 NOTE — ASSESSMENT & PLAN NOTE
Patient known to vascular surgery service for PAD s/p left SFA stenting January 2022 due to disabling claudication; right fem above-knee pop bypass with PTFE performed February 2022; right lower extremity acute limb ischemia October 2022 status post lysis, angioplasty and Viabahn stent.   Patient now with acute RLE ischemia due to occluded R bypass graft.  Onset of pain at 8:30 pm 6/1/25.  Patient with subjective paresthesia, no sensory or motor deficits.  Compliant with ASA and Xarelto at home.     POD 1 - RLE agram, bypass angiojet thrombectomy, lysis initiation through L fem access  L groin site with oozing and clotted blood under tegaderm, groin remains soft with some ecchymosis. L sided back pain, flank remains soft.   R DP/PT doppler signal present, remains motor/sensory intact       Plan:  --Lysis recheck today with IR 6/3  --Keep NPO  --With unilateral L sided back pain, likely positional. Flank and groin remain soft, mild oozing from catheter site with collection of clotted blood below tegaderm but surrounding groin is soft. Continue to monitor  --PRN pain control   --Continue to hold Xarelto  --Continue ASA/statin  --Urinary retention protocol x 1 straight cath yesterday  --q1hr neurovascular checks  --Appreciate ICU level of care with lysis treatment

## 2025-06-03 NOTE — CASE MANAGEMENT
Case Management Assessment & Discharge Planning Note    Patient name Maxime Green  [facility] MRN [id number]  : 1965 Date 6/3/2025       Current Admission Date: 2025  Current Admission Diagnosis:Acute lower limb ischemia   Patient Active Problem List    Diagnosis Date Noted    Acute lower limb ischemia 2025    Asymptomatic bilateral carotid artery stenosis 2024    Peripheral vascular disease (HCC) 10/18/2022    Former tobacco use     History of PTCA     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) 2022    GERD (gastroesophageal reflux disease)     Hypertension     CAD (coronary artery disease)     Hx of CABG 2021    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 complication, without long-term current use of insulin (HCC)       LOS (days): 1  Geometric Mean LOS (GMLOS) (days):   Days to GMLOS:     OBJECTIVE:    Risk of Unplanned Readmission Score: 12.5         Current admission status: Inpatient       Preferred Pharmacy:   [id number] - [place], [place] - [address]  [address]  [place] [address]  Phone: [phone] Fax: [phone]    Primary Care Provider: Michael Stapleton MD    Primary Insurance: [employer]  Secondary Insurance:     ASSESSMENT:  Active Health Care Proxies       Ronald Green Health Care Representative - [place]   Primary Phone: [phone] (Mobile)                 Advance Directives  Does patient have a Health Care POA?: No  Was patient offered paperwork?: No  Does patient currently have a Health Care decision maker?: Yes, please see Health Care Proxy section  Does patient have Advance Directives?: No  Was patient offered paperwork?: No  Primary Contact: Ronadl Green (brother)         Readmission Root Cause  30 Day Readmission: No    Patient Information  Admitted from:: Home  Mental Status: Alert  During Assessment patient was accompanied by: Not accompanied during  assessment  Assessment information provided by:: Patient  Primary Caregiver: Self  Support Systems: Family members  County of Residence: [place]  What city do you live in?: [place]  Home entry access options. Select all that apply.: Stairs  Number of steps to enter home.: 2  Do the steps have railings?: No  Type of Current Residence: 2 [place] home  Upon entering residence, is there a bedroom on the main floor (no further steps)?: No  A bedroom is located on the following floor levels of residence (select all that apply):: 2nd Floor  Upon entering residence, is there a bathroom on the main floor (no further steps)?: No  Indicate which floors of current residence have a bathroom (select all the apply):: 2nd Floor  Number of steps to 2nd floor from main floor: One Flight  Living Arrangements: Lives Alone  Is patient a ?: Yes  Is patient [facility] with [facility] ([facility])?: No    Activities of Daily Living Prior to Admission  Functional Status: Independent  Completes ADLs independently?: Yes  Ambulates independently?: Yes  Does patient use assisted devices?: No  Does patient currently own DME?: No  Does patient have a history of Outpatient Therapy (PT/OT)?: No  Does the patient have a history of Short-Term Rehab?: No  Does patient have a history of HHC?: Yes  Does patient currently have HHC?: No         Patient Information Continued  Income Source: Employed  Does patient have prescription coverage?: Yes  Can the patient afford their medications and any related supplies (such as glucometers or test strips)?: Yes  Does patient receive dialysis treatments?: No  Does patient have a history of substance abuse?: No  Does patient have a history of Mental Health Diagnosis?: No         Means of Transportation  Means of Transport to [facility] [place]:: Drives Self      Social Determinants of Health (SDOH)      Flowsheet Row Most Recent Value   Housing Stability    In the last 12 months, was there a time when you were not able to pay the  mortgage or rent on time? N   In the past 12 months, how many times have you moved where you were living? 0   At any time in the past 12 months, were you homeless or living in a shelter (including now)? N   Transportation Needs    In the past 12 months, has lack of transportation kept you from medical appointments or from getting medications? no   In the past 12 months, has lack of transportation kept you from meetings, work, or from getting things needed for daily living? No   Food Insecurity    Within the past 12 months, you worried that your food would run out before you got the money to buy more. Never true   Within the past 12 months, the food you bought just didn't last and you didn't have money to get more. Never true   [employer]    In the past 12 months has the electric, gas, oil, or water company threatened to shut off services in your home? No            DISCHARGE DETAILS:    Discharge planning discussed with:: Patient  Freedom of Choice: Yes  Comments - Freedom of Choice: Discussed FOC  CM contacted family/caregiver?: No- see comments (Patient declined)  Were Treatment Team discharge recommendations reviewed with patient/caregiver?: Yes  Did patient/caregiver verbalize understanding of patient care needs?: Yes  Were patient/caregiver advised of the risks associated with not following Treatment Team discharge recommendations?: Yes    Contacts  Patient Contacts: Ronald Green, brother  Relationship to Patient:: Family  Contact Method: Phone  Phone Number: [phone]  Reason/Outcome: Continuity of Care, Emergency Contact, Discharge Planning    This CM introduced self and role.  Patient lives alone in a 2 story home- bedroom and bathroom on 2nd floor. Patient is independent with ADL's- does not use or own DME.  He is employed as a .  CM will follow for discharge needs.

## 2025-06-03 NOTE — UTILIZATION REVIEW
Initial Clinical Review    Admission: Date/Time/Statement:   Admission Orders (From admission, onward)       Ordered        06/02/25 0443  Inpatient Admission  Once                          Orders Placed This Encounter   Procedures    Inpatient Admission     Standing Status:   Standing     Number of Occurrences:   1     Level of Care:   Level 1 Stepdown [13]     Estimated length of stay:   More than 2 Midnights     Certification:   I certify that inpatient services are medically necessary for this patient for a duration of greater than two midnights. See H&P and MD Progress Notes for additional information about the patient's course of treatment.     ED Arrival Information       Patient not seen in ED                       No chief complaint on file.      Initial Presentation: 60 y.o. male w/ PMH of  CAD, DM, PAD, GERD, HTN, h/o Cardiac cath 10/21 and CABG 4/2021, w/ asx carotid artery stenosis, prior tobacco use was transferred form [facility] to [facility] for a higher level of care.  Pt initially presented to [facility] w/ RLE acute onset pain starting at 8:30 pm on 6/1/202, found to have occluded R bypass graft for which patient was transferred to [facility] from [facility] for intervention.On ASA and Xarelto at home. Started on Hep gtt. He is known to vascular service as he has a prior L SFA stent, R fem to AK pop bypass (2/2022) with PTFE, lysis of of R bypass with lysis and resulting angioplasty and viabahn stent (10/2022).   On arrival to [facility], pton exam, aaox3, R distal bypass faint signal, R pop signal, absent DP/PT, L fem dopp signal only, DP/PT doppler signals.+plantar/dorsiflexion, equal strength bilaterally.    Admit as Inpatient for evaluation and treatment of acute lower limb ischemia d/t occluded R bypass graft.  Plan: cont hep gtt. Continue ASA and statin. Plan for lysis.  CT brain and fibrinogen. IR consulted.  Continue NPO.  Pain meds PRN    IR Consult:right lower extremity acute   CTA abdomen with runoff  with noted right occluded bypass graft.   Plan: RLE angiogram +/- intervention today. Maintain NPO     IR Procedure Note:  Date: 6/2/2025  Procedure: RLE angiogram with thrombectomy and initiation of thrombolysis  Anesthesia: conscious sedation and local   Findings:   Occluded right femoral to AK pop bypass was occluded with reconstitution of right peroneal and posterior tibial arteries.  AngioJet thrombectomy performed throughout the right fem to AK pop bypass and popliteal arteries.  Right peroneal and PT arteries were not visualized following AngioJet thrombectomy likely due to distal embolization of thrombus.  Lysis initiated through 90 cm infusion length catheter placed across the right fem to AK pop bypass into the proximal right peroneal artery.  TPA to run at 1 mg/hr and non-weight based heparin protocol.  Return tomorrow for lysis check.    Critical Care Consult: Acute on chronic limb ischemia status post lytic catheter placement   Plan: IR Lysis check 6/3. Continue TPA @ 1 mg/hr and non-weight-based heparin protocol. Cont Hep gtt. Maintain MAP >65. Continue home metoprolol, statin, ASA. PRN labetalol 10 mg Q6H PRN for SBP > 160. Neuro checks Every 15 minutes x2, Every 30 minutes x2, Every 2 hours x4, Then Per unit routine. Multimodal analgesia. Monitor I/Os. Diabetic clear liquids, NPO @ mn for IR lysis check 6/3     Vasc Surg Notes: Pt tolerating clear without nausea, primarily anterior tibial pain he presented with improved, improved dorsi/planarflexion which was previously limited by pain. Nor reports some calf pain post-procedure, controlled with current pain regimen.     Date: 06/03   Day 2  Vasc Surg Notes: POD 1 - RLE agram, bypass angiojet thrombectomy, lysis initiation through L fem access  Pt reports urinary retention because he cannot stand up post-lysis procedure. Reports RLE pain.  On exam, L groin site with oozing and clotted blood under tegaderm, groin remains soft with some ecchymosis. L  sided back pain, flank remains soft. R DP/PT doppler signal present, remains motor/sensory intact.  Plan: Lysis recheck today with IR 6/3. Keep NPO. Cont to mon cath site for bleeding. PRN pain control. Continue to hold Xarelto. Continue ASA/statin. Urinary retention protocol x 1 straight cath yesterday. q1hr neurovascular checks    Critical Care Notes:Pain controlled on multimodal analgesia.  GCS is 15.  Vascular exam is improved, signals are now noted on both sides - monophasic on the right side.  Noted to have sheath bleeding overnight, heparin stopped and tPA [place].  Hypofibrinogenemia this morning with ongoing sheath bleeding. Map Goal >65. Urine output has been adequate, straight cath-continue urinary retention protocol at this time. Creatinine is noted to be 1. Cont SSI for goal BG<180. continue to trend hemoglobin and discuss plan for ongoing lysis versus halting lysis given hypofibrinogenemia with vascular service.     ED Treatment-Medication Administration - No Administrations Displayed (No Start Event Found)       None            Scheduled Medications:  aspirin, 81 mg, Oral, Daily  atorvastatin, 40 mg, Oral, Daily With Dinner  chlorhexidine, 15 mL, Mouth/Throat, Q12H BOBBY  insulin lispro, 1-5 Units, Subcutaneous, TID AC  metoprolol succinate, 25 mg, Oral, Daily      Continuous IV Infusions:  heparin (porcine), 300-2,000 Units/hr, Intravenous, Titrated  heparin, 20 Units/hr, Intravenous, Continuous  heparin 1000 units in 500 mL infusion (premix) for sheath patency  Rate: 10 mL/hr Dose: 20 Units/hr  Freq: Continuous Route: IV  Last Dose: Stopped (06/03/25 0914)  Start: 06/02/25 1000 End: 06/03/25 0911   alteplase (CATHFLO) 10 mg in sodium chloride 0.9 % 250 mL infusion  Rate: 25 mL/hr Dose: 1 mg/hr  Freq: Continuous Route: IK  Last Dose: Stopped (06/03/25 0914)  Start: 06/02/25 1000 End: 06/03/25 0858    PRN Meds:  heparin (porcine), 1,000 Units, Intravenous, Q6H PRN  heparin (porcine), 2,000 Units,  Intravenous, Q6H PRN  HYDROmorphone, 0.2 mg, Intravenous, Q2H PRN  labetalol, 10 mg, Intravenous, Q6H PRN 06/02 x 1  oxyCODONE, 2.5 mg, Oral, Q4H PRN   Or  oxyCODONE, 5 mg, Oral, Q4H PRN 06/02 x 1, 06/03 x 2      ED Triage Vitals   Temperature Pulse Respirations Blood Pressure SpO2 Pain Score   06/02/25 0458 06/02/25 0458 06/02/25 0458 06/02/25 0458 06/02/25 0909 06/02/25 0458   97.9 °F (36.6 °C) 63 20 (!) 187/91 96 % 8     Weight (last 2 days)       Date/Time Weight    06/02/25 0458 72.9 (160.7)            Vital Signs (last 3 days)       Date/Time Temp Pulse Resp BP MAP (mmHg) SpO2 FiO2 (%) Calculated FIO2 (%) - Nasal Cannula Nasal Cannula O2 Flow Rate (L/min) O2 Device Patient Position - Orthostatic VS Hurley Coma Scale Score Pain    06/03/25 0742 -- -- -- -- -- -- -- -- -- -- -- -- 7    06/03/25 0700 -- 66 16 141/72 100 99 % -- -- -- -- -- -- --    06/03/25 0600 -- 71 34 129/61 88 96 % -- -- -- -- -- -- --    06/03/25 0500 -- 65 19 140/64 92 98 % -- -- -- -- -- -- --    06/03/25 0400 -- 62 11 129/59 85 96 % -- -- -- -- -- 15 --    06/03/25 0300 -- 64 10 129/59 84 96 % -- -- -- -- -- -- --    06/03/25 0200 -- 63 14 143/70 100 96 % -- -- -- -- -- -- --    06/03/25 0100 -- 66 22 138/74 99 99 % -- -- -- -- -- -- --    06/03/25 0000 98 °F (36.7 °C) 63 10 125/62 89 98 % -- -- -- None (Room air) Lying 15 --    06/02/25 2300 -- 62 10 130/63 90 97 % -- -- -- -- -- -- --    06/02/25 2200 -- 64 13 169/79 113 99 % -- -- -- -- -- -- --    06/02/25 2100 -- 63 18 162/79 114 98 % -- -- -- -- -- -- --    06/02/25 2000 97.5 °F (36.4 °C) 65 31 142/79 105 98 % -- -- -- -- Lying 15 2    06/02/25 1900 -- 59 13 139/67 96 96 % -- -- -- -- -- -- --    06/02/25 1800 -- 66 16 156/70 100 93 % -- -- -- -- -- -- --    06/02/25 1700 -- 64 14 144/69 99 95 % -- -- -- -- -- -- --    06/02/25 1600 97.7 °F (36.5 °C) 70 29 200/91 130 98 % -- -- -- -- -- 15 2    06/02/25 1536 -- -- -- -- -- -- -- -- -- -- -- -- 9    06/02/25 1500 -- 63 9 188/81 117  97 % -- -- -- -- -- -- --    06/02/25 1430 -- -- -- -- -- -- -- -- -- -- -- 15 --    06/02/25 1400 -- 61 9 158/74 106 98 % -- -- -- -- -- -- --    06/02/25 1319 97.7 °F (36.5 °C) 66 16 156/70 -- 93 % -- -- -- -- -- -- --    06/02/25 1254 -- 60 15 148/75 105 98 % -- -- -- -- -- -- --    06/02/25 1230 -- 61 16 145/76 106 96 % -- -- -- -- -- -- --    06/02/25 1200 -- 62 16 166/80 115 97 % -- -- -- None (Room air) -- -- --    06/02/25 1130 -- 65 -- 178/86 -- 94 % -- -- -- -- -- -- --    06/02/25 1115 -- 63 -- 178/86 -- 95 % -- -- -- -- -- -- --    06/02/25 1100 -- 62 17 173/84 -- 95 % -- -- -- -- -- -- --    06/02/25 1019 -- 61 17 156/77 -- 100 % 22 -- -- -- -- -- --    06/02/25 1014 -- 66 17 174/82 -- 99 % 22 -- -- -- -- -- --    06/02/25 1009 -- 63 16 158/81 -- 99 % 22 -- -- -- -- -- --    06/02/25 1004 -- 63 16 156/79 -- 99 % 22 -- -- None (Room air) -- -- --    06/02/25 0959 -- 65 16 168/78 -- 99 % 22 -- -- -- -- -- --    06/02/25 0954 -- 63 15 180/84 -- 98 % 22 -- -- -- -- -- --    06/02/25 0949 -- 66 15 136/69 -- 99 % 22 -- -- -- -- -- --    06/02/25 0944 -- 67 14 134/65 -- 98 % 22 -- -- -- -- -- --    06/02/25 0939 -- 67 15 145/67 -- 98 % 22 -- -- -- -- -- --    06/02/25 0934 -- 65 15 139/65 -- 99 % 22 -- -- -- -- -- --    06/02/25 0929 -- 65 15 129/71 -- 97 % 22 -- -- -- -- -- --    06/02/25 0924 -- 67 15 145/76 -- 96 % 22 -- -- -- -- -- --    06/02/25 0919 -- 63 15 155/82 -- 100 % 22 -- -- -- -- -- --    06/02/25 0914 -- 61 16 181/83 -- 100 % 22 28 2 L/min Nasal cannula -- -- --    06/02/25 0909 -- 64 16 161/77 -- 96 % 22 -- -- None (Room air) -- -- --    06/02/25 0800 -- -- -- -- -- -- -- -- -- None (Room air) -- -- No Pain    06/02/25 0726 97.7 °F (36.5 °C) 56 16 136/69 96 -- -- -- -- -- Lying -- --    06/02/25 0600 -- -- -- 134/64 -- -- -- -- -- -- -- -- --    06/02/25 0514 -- -- -- -- -- -- -- -- -- -- -- -- 8    06/02/25 0458 97.9 °F (36.6 °C) 63 20 187/91 -- -- -- -- -- -- -- 15 8              Pertinent  Labs/Diagnostic Test Results:   Radiology:  IR lower extremity angiogram   Final Interpretation by Paulo Sahu MD (06/02 1218)      1. Right femoral to above-knee popliteal artery bypass graft was occluded.   2. Right peroneal and posterior tibial arteries reconstituted through collateral flow.   3. Occluded bypass graft was recanalized followed by AngioJet thrombectomy.   4. Post thrombectomy arteriogram showed non-visualization of the right peroneal and posterior tibial arteries likely due to distal embolization of thrombus.   5. Thrombolysis initiated through 90 cm infusion length catheter placed across the bypass graft and into the proximal right peroneal artery.   6. Left arm 5 Iranian double lumen power PICC placement. Catheter tip terminates near the cavoatrial junction.      Plan:      - TPA to run at 1 mg/hr.   - Heparin drip per non-weight based protocol.   - All blood draws through PICC.   - Return tomorrow for lysis check.            Workstation performed: [id number]         IR PICC placement double lumen   Final Interpretation by Paulo Sahu MD (06/02 1218)      1. Right femoral to above-knee popliteal artery bypass graft was occluded.   2. Right peroneal and posterior tibial arteries reconstituted through collateral flow.   3. Occluded bypass graft was recanalized followed by AngioJet thrombectomy.   4. Post thrombectomy arteriogram showed non-visualization of the right peroneal and posterior tibial arteries likely due to distal embolization of thrombus.   5. Thrombolysis initiated through 90 cm infusion length catheter placed across the bypass graft and into the proximal right peroneal artery.   6. Left arm 5 Iranian double lumen power PICC placement. Catheter tip terminates near the cavoatrial junction.      Plan:      - TPA to run at 1 mg/hr.   - Heparin drip per non-weight based protocol.   - All blood draws through PICC.   - Return tomorrow for lysis check.            Workstation performed:  "[id number]         IR TPA lysis check    (Results Pending)     Cardiology:  No orders to display     GI:  No orders to display           Results from last 7 days   Lab Units 06/03/25  0546 06/03/25  0038 06/02/25  1758 06/02/25  1332 06/02/25  0510 06/01/25  2235   WBC Thousand/uL 6.62 6.77 8.58 6.54 8.54 11.54*   HEMOGLOBIN g/dL 12.9 13.2 13.9 13.7 14.3 14.8   HEMATOCRIT % 39.9 40.4 42.7 41.3 43.8 44.0   PLATELETS Thousands/uL 115* 110* 138* 127* 171 153   TOTAL NEUT ABS Thousands/µL  --   --   --   --   --  9.16*         Results from last 7 days   Lab Units 06/03/25  0546 06/02/25  0510 06/01/25  2235   SODIUM mmol/L 133* 131* 131*   POTASSIUM mmol/L 3.8 5.1 3.6   CHLORIDE mmol/L 100 98 98   CO2 mmol/L 25 22 19*   ANION GAP mmol/L 8 11 14*   BUN mg/dL 18 26* 30*   CREATININE mg/dL 0.73 0.98 1.01   EGFR ml/min/1.73sq m 100 83 80   CALCIUM mg/dL 8.3* 9.2 9.5     Results from last 7 days   Lab Units 06/01/25  2235   AST U/L 28   ALT U/L 33   ALK PHOS U/L 74   TOTAL PROTEIN g/dL 8.0   ALBUMIN g/dL 4.5   TOTAL BILIRUBIN mg/dL 0.87     Results from last 7 days   Lab Units 06/03/25  0553 06/02/25  1646   POC GLUCOSE mg/dl 115 185*     Results from last 7 days   Lab Units 06/03/25  0546 06/02/25  0510 06/01/25  2235   GLUCOSE RANDOM mg/dL 151* 196* 288*             No results found for: \"BETA-HYDROXYBUTYRATE\"                           Results from last 7 days   Lab Units 06/03/25  0546 06/03/25  0038 06/02/25  1758 06/02/25  1332 06/02/25  0510 06/01/25  2309 06/01/25  2235   PROTIME seconds  --   --   --   --  17.8* 26.2* 26.4*   INR   --   --   --   --  1.44* 2.32* 2.34*   PTT seconds 39* 39* 35*   < > 120* 37* 36*    < > = values in this interval not displayed.             Results from last 7 days   Lab Units 06/01/25  2235   LACTIC ACID mmol/L 1.7                                                                                                   Past Medical History[1]  Present on Admission:   CAD (coronary artery " disease)   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 complication, without long-term current use of insulin (HCC)   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)   Acute lower limb ischemia      Admitting Diagnosis: Limb ischemia [I99.8]  Age/Sex: 60 y.o. male    Network Utilization Review Department  ATTENTION: Please call with any questions or concerns to [phone] and carefully listen to the prompts so that you are directed to the right person. All voicemails are confidential.   For Discharge needs, contact Care Management DC Support Team at [phone] opt. 2  Send all requests for admission clinical reviews, approved or denied determinations and any other requests to dedicated fax number below belonging to the campus where the patient is receiving treatment. List of dedicated fax numbers for the Facilities:  FACILITY NAME UR FAX NUMBER   ADMISSION DENIALS (Administrative/Medical Necessity) [phone]   DISCHARGE SUPPORT TEAM (NETWORK) [phone]   PARENT CHILD HEALTH (Maternity/NICU/Pediatrics) [phone]   [facility] [phone]   [facility] [phone]   [facility] [phone]   [facility] [phone]   [facility] [phone]   [facility] [phone]   [facility] [phone]   [facility] [phone]   [facility] [phone]   [facility] [phone]   [facility] [phone]   [facility] [phone]              [1]   Past Medical History:  Diagnosis Date    Bicuspid aortic valve     being observed    CAD (coronary artery disease)     Chest pain 4/6/2021    Coronary artery disease     Diabetes mellitus (HCC)      Encounter for postoperative care 5/17/2021    Former tobacco use     GERD (gastroesophageal reflux disease)     Goiter     History of myocardial infarction     History of rib fracture     Hyperlipidemia     Hypertension     Hypertensive urgency 4/6/2021    Left carotid bruit 7/22/2021    Leg pain, bilateral     Agram today LLE   1/20/2022    Leukocytosis 4/6/2021    Myocardial infarction (HCC)     2021-   CABG  x3    NSTEMI (non-ST elevated myocardial infarction) ([facility]) 4/7/2021    Wears glasses

## 2025-06-04 NOTE — PROGRESS NOTES
Progress Note - Vascular Surgery   Name: Maxime Grene 60 y.o. male I MRN: [id number]  Unit/Bed#: [facility] 513-01 I Date of Admission: 6/2/2025   Date of Service: 6/4/2025 I Hospital Day: 2    Assessment & Plan  Acute lower limb ischemia  Patient known to vascular surgery service for PAD s/p left SFA stenting January 2022 due to disabling claudication; right fem above-knee pop bypass with PTFE performed February 2022; right lower extremity acute limb ischemia October 2022 status post lysis, angioplasty and Viabahn stent.   Patient now with acute RLE ischemia due to occluded R bypass graft.  Onset of pain at 8:30 pm 6/1/25.  Patient with subjective paresthesia, no sensory or motor deficits.  Compliant with ASA and Xarelto at home.     POD 2 - RLE agram, bypass angiojet thrombectomy, lysis initiation through L fem access  POD 1 - RLE agram, lysis discontinuation, angioplasty of R distal CFA  L groin site with small hematoma after lysis catheter removal, manual compression overnight x approx 20 mins with no further expansion. Surrounding groin is soft, tender to palpation.  R palpable PT, remains motor/sensory intact       Plan:  --S/p lysis recheck with IR on 6/3, discontinuation of lysis catheter. Now with distal palpable PT, m/s intact  --Resume diet  --PRN pain control   --Plan to discontinue hep gtt and restart home Xarelto today  --Continue ASA/statin  --Urinary retention protocol x 2 straight cath's this admission. Voiding spontaneously, improvement with standing  --Neurovascular checks per routine  --[facility]  --Anticipate discharge in 24-48 hours  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 complication, without long-term current use of insulin (HCC)  Lab Results   Component Value Date    HGBA1C 7.4 (A) 09/13/2024       Recent Labs     06/02/25  1646 06/03/25  0553 06/03/25  1700 06/04/25  0627   POCGLU 185* 115 126 166*       Blood Sugar Average: Last 72 hrs:  (P) 148    Follow  insulin sliding scale protocol    CAD (coronary artery disease)  Continue ASA  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    24 Hour Events : Discontinuation of thrombolysis  Subjective : Pt resting in hospital bed, endorses being able to urinate since he is lifted from bed rest. Will work towards ambulation today. Denies significant pain, numbness, weakness or tingling. He reports his RLE feels much improved since the initial event.     Objective :  Temp:  [97.5 °F (36.4 °C)-98.1 °F (36.7 °C)] 97.5 °F (36.4 °C)  HR:  [58-78] 67  BP: (102-175)/(56-84) 125/66  Resp:  [9-31] 13  SpO2:  [93 %-100 %] 96 %  O2 Device: None (Room air)  Nasal Cannula O2 Flow Rate (L/min):  [2 L/min] 2 L/min  FiO2 (%):  [21] 21     I/O         06/02 0701  06/03 0700 06/03 0701  06/04 0700 06/04 0701  06/05 0700    P.O. 900 1340     I.V. (mL/kg) 159.3 (2.2) 186.2 (2.8)     IV Piggyback 459.6 25     Total Intake(mL/kg) 1518.9 (20.8) 1551.2 (23.4)     Urine (mL/kg/hr) 1750 (1) 1650 (1)     Stool 0 0     Total Output 1750 1650     Net -231.1 -98.8            Unmeasured Urine Occurrence 0 x 0 x     Unmeasured Stool Occurrence 0 x 0 x           Lines/Drains/Airways       Active Status       Name Placement date Placement time Site Days    PICC Line 06/02/25 06/02/25  1107  --  1                  Physical Exam  Vitals and nursing note reviewed.   Constitutional:       General: He is not in acute distress.     Appearance: He is well-developed.   HENT:      Head: Normocephalic and atraumatic.     Eyes:      Conjunctiva/sclera: Conjunctivae normal.       Cardiovascular:      Rate and Rhythm: Normal rate and regular rhythm.      Heart sounds: No murmur heard.  Pulmonary:      Effort: Pulmonary effort is normal. No respiratory distress.   Abdominal:      Palpations: Abdomen is soft.      Tenderness: There is no abdominal tenderness.     Musculoskeletal:         General: No swelling.      Cervical back:  "Neck supple.      Comments: L groin with focal hematoma from previous access site     Skin:     General: Skin is warm and dry.      Capillary Refill: Capillary refill takes less than 2 seconds.      Coloration: Skin is not pale.      Findings: Bruising present. No erythema.     Neurological:      Mental Status: He is alert.      Sensory: No sensory deficit.      Motor: No weakness.     Psychiatric:         Mood and Affect: Mood normal.           Lab Results: I have reviewed the following results:  CBC with diff:   Lab Results   Component Value Date    WBC 6.50 06/04/2025    HGB 12.0 06/04/2025    HCT 36.4 (L) 06/04/2025    MCV 81 (L) 06/04/2025    PLT 96 (L) 06/04/2025    RBC 4.50 06/04/2025    MCH 26.7 (L) 06/04/2025    MCHC 33.0 06/04/2025    RDW 13.6 06/04/2025    MPV 9.6 06/04/2025    NRBC 0 06/01/2025   ,   BMP/CMP:  Lab Results   Component Value Date    SODIUM 134 (L) 06/04/2025    K 3.8 06/04/2025     06/04/2025    CO2 27 06/04/2025    CO2 22 04/09/2021    BUN 15 06/04/2025    CREATININE 0.70 06/04/2025    GLUCOSE 145 (H) 04/09/2021    CALCIUM 8.5 06/04/2025    AST 28 06/01/2025    ALT 33 06/01/2025    ALKPHOS 74 06/01/2025    EGFR 102 06/04/2025   ,   Lipid Panel: No results found for: \"CHOL\",   Coags:   Lab Results   Component Value Date    PTT 98 (H) 06/04/2025    INR 1.32 (H) 06/03/2025   ,   Blood Culture: No results found for: \"BLOODCX\",   Urinalysis:   Lab Results   Component Value Date    COLORU Yellow 04/08/2021    CLARITYU Clear 04/08/2021    SPECGRAV 1.012 04/08/2021    PHUR 8.0 04/08/2021    LEUKOCYTESUR Negative 04/08/2021    NITRITE Negative 04/08/2021    GLUCOSEU Negative 04/08/2021    KETONESU Negative 04/08/2021    BILIRUBINUR Negative 04/08/2021    BLOODU Negative 04/08/2021   ,   Urine Culture: No results found for: \"URINECX\",   Wound Culure: No results found for: \"WOUNDCULT\"    Imaging Results Review: No pertinent imaging studies reviewed.  Other Study Results Review: No additional " pertinent studies reviewed.    VTE Prophylaxis: VTE covered by:  heparin (porcine), Intravenous, 16 Units/kg/hr at 06/04/25 0400  heparin (porcine), Intravenous  heparin (porcine), Intravenous

## 2025-06-04 NOTE — ASSESSMENT & PLAN NOTE
Patient known to vascular surgery service for PAD s/p left SFA stenting January 2022 due to disabling claudication; right fem above-knee pop bypass with PTFE performed February 2022; right lower extremity acute limb ischemia October 2022 status post lysis, angioplasty and Viabahn stent.   Patient now with acute RLE ischemia due to occluded R bypass graft.  Onset of pain at 8:30 pm 6/1/25.  Patient with subjective paresthesia, no sensory or motor deficits.  Compliant with ASA and Xarelto at home.     POD 2 - RLE agram, bypass angiojet thrombectomy, lysis initiation through L fem access  POD 1 - RLE agram, lysis discontinuation, angioplasty of R distal CFA  L groin site with small hematoma after lysis catheter removal, manual compression overnight x approx 20 mins with no further expansion. Surrounding groin is soft, tender to palpation.  R palpable PT, remains motor/sensory intact       Plan:  --S/p lysis recheck with IR on 6/3, discontinuation of lysis catheter. Now with distal palpable PT, m/s intact  --Resume diet  --PRN pain control   --Plan to discontinue hep gtt and restart home Xarelto today  --Continue ASA/statin  --Urinary retention protocol x 2 straight cath's this admission. Voiding spontaneously, improvement with standing  --Neurovascular checks per routine  --Stable for downgrade to [facility] today  --Anticipate discharge in 24-48 hours

## 2025-06-04 NOTE — DISCHARGE INSTR - AVS FIRST PAGE
DISCHARGE INSTRUCTIONS  ARTERIOGRAM/ANGIOPLASTY/STENT    ACTIVITY: On the evening following the procedure, you should be mostly resting.  Someone should remain with you during the evening and overnight following the procedure.     On the day after your procedure, limit your activity to walking.  Avoid heavy lifting (no more than 15 lbs) for the first three days. Walking up steps and normal activities may be resumed as you feel ready.   You should not drive a car for at least two days following discharge from the hospital. You may ride in a car.   If you have any questions regarding a particular activity, please discuss with your doctor or nurse before you are discharged.    DIET:  Resume your normal diet.  Drink more water than usual for the next 24 hours.    PROCEDURE SITE: You may have a procedure site in your groin, arm, or foot.  You may have surgical glue at your procedure site.  The glue is used to cover the procedure site, assist in closure, and prevent contamination. This adhesive will darken and peel away on its own within one to two weeks. Do not pick at it.    You should shower daily.  Wash incision daily with soap and water, but do not rub or scrub the incision; rinse thoroughly and pat dry.  Do not bathe in a tub or swim for the first 2 week following your procedure or if you have any open wounds.  It is normal to have some bruising, swelling or discoloration around the procedure site.  IF increasing redness, pain, or a bulge develops, call our office immediately.    If present, you may remove the band-aid or “steri-strips” over your procedure site after two days.   If you notice any active bleeding at the site, apply pressure to the site and call our office ([phone]) or [phone].    FOLLOW UP STUDIES:  Your doctor will discuss whether further treatments or follow-up studies are necessary at your first post procedure visit.    FOLLOW UP APPOINTMENTS:  Making and keeping follow up appointments and  ultrasound tests are important to your recovery.  If you have difficulty making it to or keeping your follow up appointments, call the office.    If you have increased pain, fever >101.5, increased drainage, redness or a bad smell at your surgery site, new coldness/numbness of your arm or leg, please call us immediately and GO directly to the ER.    Appt w/ Dr. Riojas: 2025 at 9am, [place] office      PLEASE CALL THE OFFICE IF YOU HAVE ANY QUESTIONS  [phone]  [phone] [address], [address], [place] [address]  [address] [place] [address]  [address] [place] [address] [place] [address]  [address] [facility], [place] [address]  [address] [facility], [address], [place] [address]  [address] [facility], [address], [place] [address]  [address] [facility] [address], [address]nd Floor, [place] [address]  [address] [facility], 1st Floor, [address], [place] [address]  [address] [facility], [facility] [address], [place] [address]  [address] [facility], [address], [place] [address]

## 2025-06-04 NOTE — PROGRESS NOTES
Vascular Surgery    Procedure Check:    POD#0 Lysis recheck, arteriogram, successful bypass lysis, distal CFA PTA, patent PT/Peroneal    Pt recovering in ICU, improved RLE symptoms from prior    AVSS    Right foot warm, M/S intact, 2+ DP/PT  Left groin no hematoma, no active bleeding, small drop of strike-through on 4x4 dressing.    Continue Heparin gtt, ASA

## 2025-06-04 NOTE — PLAN OF CARE
Problem: PAIN - ADULT  Goal: Verbalizes/displays adequate comfort level or baseline comfort level  Description: Interventions:  - Encourage patient to monitor pain and request assistance  - Assess pain using appropriate pain scale  - Administer analgesics as ordered based on type and severity of pain and evaluate response  - Implement non-pharmacological measures as appropriate and evaluate response  - Consider cultural and social influences on pain and pain management  - Notify physician/advanced practitioner if interventions unsuccessful or patient reports new pain  - Educate patient/family on pain management process including their role and importance of  reporting pain   - Provide non-pharmacologic/complimentary pain relief interventions  Outcome: Progressing     Problem: INFECTION - ADULT  Goal: Absence or prevention of progression during hospitalization  Description: INTERVENTIONS:  - Assess and monitor for signs and symptoms of infection  - Monitor lab/diagnostic results  - Monitor all insertion sites, i.e. indwelling lines, tubes, and drains  - Monitor endotracheal if appropriate and nasal secretions for changes in amount and color  - [place] appropriate cooling/warming therapies per order  - Administer medications as ordered  - Instruct and encourage patient and family to use good hand hygiene technique  - Identify and instruct in appropriate isolation precautions for identified infection/condition  Outcome: Progressing     Problem: SAFETY ADULT  Goal: Patient will remain free of falls  Description: INTERVENTIONS:  - Educate patient/family on patient safety including physical limitations  - Instruct patient to call for assistance with activity   - Consider consulting OT/PT to assist with strengthening/mobility based on AM PAC & JH-HLM score  - Consult OT/PT to assist with strengthening/mobility   - Keep Call bell within reach  - Keep bed low and locked with side rails adjusted as appropriate  - Keep  care items and personal belongings within reach  - Initiate and maintain comfort rounds  - Make Fall Risk Sign visible to staff  - Offer Toileting every 2 Hours, in advance of need  - Initiate/Maintain necessary alarms  - Obtain necessary fall risk management equipment  - Apply yellow socks and bracelet for high fall risk patients  - Consider moving patient to room near nurses station  Outcome: Progressing     Problem: SAFETY ADULT  Goal: Maintain or return to baseline ADL function  Description: INTERVENTIONS:  -  Assess patient's ability to carry out ADLs; assess patient's baseline for ADL function and identify physical deficits which impact ability to perform ADLs (bathing, care of mouth/teeth, toileting, grooming, dressing, etc.)  - Assess/evaluate cause of self-care deficits   - Assess range of motion  - Assess patient's mobility; develop plan if impaired  - Assess patient's need for assistive devices and provide as appropriate  - Encourage maximum independence but intervene and supervise when necessary  - Involve family in performance of ADLs  - Assess for home care needs following discharge   - Consider OT consult to assist with ADL evaluation and planning for discharge  - Provide patient education as appropriate  - Monitor functional capacity and physical performance, use of AM PAC & -HLM   - Monitor gait, balance and fatigue with ambulation    Outcome: Progressing     Problem: SAFETY ADULT  Goal: Maintains/Returns to pre admission functional level  Description: INTERVENTIONS:  - Perform AM-PAC 6 Click Basic Mobility/ Daily Activity assessment daily.  - Set and communicate daily mobility goal to care team and patient/family/caregiver.   - Collaborate with rehabilitation services on mobility goals if consulted  - Perform Range of Motion 3 times a day.  - Reposition patient every 2 hours.  - Dangle patient 3 times a day  - Stand patient 3 times a day  - Ambulate patient 3 times a day  - Out of bed to chair 3  times a day   - Out of bed for meals 3 times a day  - Out of bed for toileting  - Record patient progress and toleration of activity level   Outcome: Progressing     Problem: DISCHARGE PLANNING  Goal: Discharge to home or other facility with appropriate resources  Description: INTERVENTIONS:  - Identify barriers to discharge w/patient and caregiver  - Arrange for needed discharge resources and transportation as appropriate  - Identify discharge learning needs (meds, wound care, etc.)  - Arrange for interpretive services to assist at discharge as needed  - Refer to Case Management Department for coordinating discharge planning if the patient needs post-hospital services based on physician/advanced practitioner order or complex needs related to functional status, cognitive ability, or social support system  Outcome: Progressing     Problem: Knowledge Deficit  Goal: Patient/family/caregiver demonstrates understanding of disease process, treatment plan, medications, and discharge instructions  Description: Complete learning assessment and assess knowledge base.  Interventions:  - Provide teaching at level of understanding  - Provide teaching via preferred learning methods  Outcome: Progressing

## 2025-06-04 NOTE — DISCHARGE SUMMARY
Discharge Summary   Maxime Green 60 y.o. male MRN: [id number]  [facility] Encounter: [id number]    Admission Date: 6/2/2025     Discharge Date:06/04/25    Attending:Miles Jones*     Consultants:   Interventional radiology  Surgical critical care medicine    Admitting Diagnosis: Limb ischemia [I99.8]    Principle/ Secondary Diagnosis:  Stephens 2A acute limb ischemia secondary to occluded right lower extremity bypass graft    Past Medical History[1]  Past Surgical History[2]    Procedures Performed:  6/2/2025 right lower extremity arteriogram.  AngioJet thrombectomy.  Initiation of thrombolysis-IR  6/3/2025 right lower extremity arteriogram.  Lysis recheck.  PTA of distal right CFA-IR      Laboratory data at discharge:   Results from last 7 days   Lab Units 06/04/25 0327 06/03/25 1944 06/03/25  1238   WBC Thousand/uL 6.50 7.11 6.39   HEMOGLOBIN g/dL 12.0 12.2 12.7   HEMATOCRIT % 36.4* 38.0 38.0   PLATELETS Thousands/uL 96* 107* 102*     Results from last 7 days   Lab Units 06/04/25  0327 06/03/25  0546 06/02/25  0510   POTASSIUM mmol/L 3.8 3.8 5.1   CHLORIDE mmol/L 100 100 98   CO2 mmol/L 27 25 22   BUN mg/dL 15 18 26*   CREATININE mg/dL 0.70 0.73 0.98   CALCIUM mg/dL 8.5 8.3* 9.2     Results from last 7 days   Lab Units 06/04/25  1032 06/04/25  0327 06/03/25  2128 06/03/25  1944 06/02/25  1332 06/02/25  0510 06/01/25  2309   INR   --   --   --  1.32*  --  1.44* 2.32*   PTT seconds 69* 98* 86* 82*   < > 120* 37*    < > = values in this interval not displayed.           Discharge instructions/Information to patient and family:   See after visit summary for information provided to patient and family.   Arteriogram instructions  Return to work excuse for Monday, 6/9/2025    Discharge Medications:  See after visit summary for reconciled discharge medications provided to patient and family.    Continued anticoagulation-Xarelto for graft patency  Continued antiplatelet therapy-aspirin  Addition  of antiplatelet therapy with Plavix to aspirin and Xarelto for triple therapy given second episode of graft thrombosis    Hospital Course:   Maxime Green is a 60-year-old male with diabetes, CAD s/p cardiac cath 10/2021 and CABG 4/2021, GERD, hypertension, hyperlipidemia, and remote tobacco abuse who is known to the vascular center for carotid stenosis and PAD having undergone the following:  Left SFA stenting 1/2022  Right femoral-AK popliteal bypass with PTFE 2/2022  Presentation with right lower extremity acute limb ischemia s/p lysis with right bypass distal anastomosis Viabahn/SCS and PTA, right PFA PTA and aspiration thrombectomy, and right proximal bypass anastomosis PTA 10/2022-[facility]  He [facility] on [facility] having mowed his grass earlier in the evening with sudden onset of right lower extremity pain approximately 8:30 PM.  He noted feeling of pins-and-needles as well as ongoing severe pain.  Motor exam was not normal with inability to dorsiflex.  He noted being compliant with prior aspirin and Xarelto therapy.  CTA was performed revealing right lower extremity bypass graft occlusion.  The patient was initiated on heparin drip and transferred to [facility] for escalated vascular care.    On 6/2/2025, the patient was taken to the interventional radiology suite at which time he underwent right lower extremity arteriogram with AngioJet thrombectomy and initiation of thrombolysis.  The right femoral-AK popliteal bypass was occluded with reconstitution of the right peroneal and PT arteries.  These arteries were not visualized following AngioJet thrombectomy likely due to distal embolization.  The patient was therefore initiated on Lasix therapy with tPA and nonweight-based heparin protocol.    Postprocedure, the patient was maintained in the ICU where his overall medical management was assisted by our surgical critical care colleagues.    On 6/3/2025 the patient was  returned to the interventional radiology suite at which time he underwent right lower extremity arteriogram for lysis recheck.  The PT and peroneal arteries were patent.  There was mild stenosis of the distal common femoral artery successfully treated with angioplasty.  No other lesions were identified.  Catheters were removed and the patient was returned to the ICU    Postprocedure, the patient remained on anticoagulation therapy in the form of heparin drip.  This was eventually transitioned back to his prior oral anticoagulation with Xarelto.  Antiplatelet therapy was continued with aspirin however, given second episode of graft thrombosis, Plavix was added to his regimen for triple therapy.  He remained with a palpable DP and PT pulse.  There was concern for some left groin access site bleeding while sheaths were still present.  Postprocedure, the patient was noted with left groin ecchymosis and small nonexpanding hematoma.    By PPD #2/#1, the patient remained neurovascularly intact with palpable right DP and PT pulses.  He noted some residual cramping in the calf.  The left groin hematoma was stable.  The access site was with Steri-Strips.  His pain was controlled with minimal use of narcotic.  He was tolerating a diet.  He was ambulatory.  He was deemed appropriate and stable for discharge and was discharged on 6/4/2025.      Prior to discharge, the patient was given instructions for outpatient care and follow-up.  The patient has been instructed to call w/ any questions, changes, or concerns for the medical condition.    For further details of the hospitalization, please refer to the medical record.    Condition at Discharge: stable     Provisions for Follow-Up Care:  See after visit summary for information related to follow-up care and any pertinent home health orders.      Disposition: Home    Planned Readmission: No    Discharge Statement   I spent 30 minutes discharging the patient. This time was spent on  the day of discharge. I had direct contact with the patient on the day of discharge. Additional documentation is required if more than 30 minutes were spent on discharge.     Megan Vargas PA-C  6/4/2025      This text is generated with voice recognition software. There may be translation, syntax,  or grammatical errors. If you have any questions, please contact the dictating provider.           [1]   Past Medical History:  Diagnosis Date    Bicuspid aortic valve     being observed    CAD (coronary artery disease)     Chest pain 4/6/2021    Coronary artery disease     Diabetes mellitus (HCC)     Encounter for postoperative care 5/17/2021    Former tobacco use     GERD (gastroesophageal reflux disease)     Goiter     History of myocardial infarction     History of rib fracture     Hyperlipidemia     Hypertension     Hypertensive urgency 4/6/2021    Left carotid bruit 7/22/2021    Leg pain, bilateral     Agram today LLE   1/20/2022    Leukocytosis 4/6/2021    Myocardial infarction (HCC)     2021-   CABG  x3    NSTEMI (non-ST elevated myocardial infarction) ([facility]) 4/7/2021    Wears glasses    [2]   Past Surgical History:  Procedure Laterality Date    ARTERIOGRAM Right 10/19/2022    Procedure: RIGHT LOWER EXTREMITY ARTERIOGRAM WITH LYSIS, PLACEMENT OF LYSIS CATHETER; third order cannulation of right popliteal artery via left groin access;  Surgeon: Ronald Ordonez MD;  [facility];  Service: Vascular    CARDIAC CATHETERIZATION      CARDIAC CATHETERIZATION N/A 10/20/2021    Procedure: Cardiac catheterization;  Surgeon: Lynnette Reeder MD;  Location: [facility];  Service: Cardiology    CARDIAC CATHETERIZATION N/A 10/20/2021    Procedure: Cardiac Coronary Angiogram;  Surgeon: Lynnette Reeder MD;  [facility];  Service: Cardiology    CARDIAC CATHETERIZATION N/A 10/20/2021    Procedure: Cardiac pci;  Surgeon: Lynnette Reeder MD;  [facility];  Service:  Cardiology    IR LOWER EXTREMITY ANGIOGRAM  1/20/2022    IR LOWER EXTREMITY ANGIOGRAM  2/3/2022    IR LOWER EXTREMITY ANGIOGRAM  10/19/2022    IR LOWER EXTREMITY ANGIOGRAM  6/2/2025    IR PICC PLACEMENT DOUBLE LUMEN  10/19/2022    IR PICC PLACEMENT DOUBLE LUMEN  6/2/2025    IR TPA LYSIS CHECK  10/20/2022    IR TPA LYSIS CHECK  6/3/2025    AK BYPASS W/VEIN FEMORAL-POPLITEAL Right 2/3/2022    Procedure: BYPASS FEMORAL-POPLITEAL;  Surgeon: Pradeep Brothers MD;  Location[facility];  Service: Vascular    AK CORONARY ARTERY BYP W/VEIN & ARTERY GRAFT 4 VEIN N/A 4/9/2021    Procedure: CORONARY ARTERY BYPASS GRAFT (CABG) 3 VESSELS: LIMA to LAD; LLE EVH/SVG to LPL & OM2;  Surgeon: Jose Sanchez DO;  Location: [facility];  Service: Cardiac Surgery    AK ECHO TRANSESOPHAG R-T 2D W/PRB IMG ACQUISJ I&R N/A 4/9/2021    Procedure: TRANSESOPHAGEAL ECHOCARDIOGRAM (BENNETT);  Surgeon: Jose Sanchez DO;  Location: [facility];  Service: Cardiac Surgery    AK NDSC SURG W/VIDEO-ASSISTED HARVEST VEIN CABG Left 4/9/2021    Procedure: HARVEST VEIN ENDOSCOPIC (EVH);  Surgeon: Jose Sanchez DO;  Location[facility];  Service: Cardiac Surgery    AK SLCTV CATHJ 3RD+ ORD SLCTV ABDL PEL/LXTR BRNCH Left 1/20/2022    Procedure: ARTERIOGRAM, STENT PLACEMENT IN LEFT SUPERFICIAL FEMORIAL ARTERY;  Surgeon: Pradeep Brothers MD;  [facility];  Service: Vascular    VASECTOMY

## 2025-06-04 NOTE — PROGRESS NOTES
Progress Note - Critical Care/ICU   Name: Maxime Green 60 y.o. 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     Neuro/Psych:  Diagnoses: RLE ischemia, pain control  Plan:  Neuro checks Every 4 hours  Sedation: None  Analgesia: Multimodal. Tylenol 650 mg oral Q6H PRN, Oxy 2.5/5 Q4H PRN, Dilaudid 0.2 mg IV Q2H PRN     CV:  Diagnosis: RLE acute lower limb ischemia, CAD, PAD, HTN, asx carotid artery stenosis, former tobacco user, h/o Cardiac cath 10/21 and CABG 4/2021 6/2 RLE angiogram with thrombectomy and initiation of thrombolysis with interventional radiology   IR Lysis check 6/3  Palpable DP and PT pulses bilaterally  Plan:  Continue heparin gtt  Continuous cardiopulmonary monitoring. Maintain MAP >65.  Continue home metoprolol 25 mg daily  Continue home atorvastatin 40 mg oral with dinner   Continue home ASA 81 mg  PRN labetalol 10 mg Q6H PRN for SBP > 160        Pulm:  Diagnoses: No active issues   Currently on room air  Plan:  Continuous pulse oximetry. Maintain O2 sat >92%.      GI:  Diagnoses: No active issues, hx of GERD  Last BM: 6/1  Plan:  Bowel regimen: mirilax  GI prophylaxis: N/A     :  Diagnoses: Urinary retention (resolved)  Baseline creatinine: 1  Plan:  Cr 0.70 from 0.73 from 0.98 from 1.01  Monitor I/Os.  UO: 1650 cc in last 24h  Continue urinary retention protocol     F/E/N:  F: Fluid resuscitation prn.  E: Monitor and replete electrolytes for Mg >2, Phos >3, K >4.  N: CCD2      Heme/Onc:  Diagnoses: RLE acute limb ischemia   6/2 RLE angiogram with thrombectomy and initiation of thrombolysis with interventional radiology   IR Lysis check 6/3  Plan:  Hgb 12.0 from 12.2 from 12.7  PTT 98 (86, 82, 38, 39, 35, 87, 120)  VTE prophylaxis: anticoagulated     Endo:  Diagnoses: DM  Plan:   Holding home metformin  Insulin: Sliding scale. Monitor blood glucose.     ID:  Diagnoses: No active issues   Plan:  Monitor fever curve and WBC.  WBC 6.5 from 7.11     MSK/Skin:  Diagnoses: R groin access hematoma (resolved)  Plan:  Monitor dressing   PT/OT when appropriate. Encourage OOB and ambulation when appropriate. Local wound care prn.     LDAs:  Lines - PICC, peripheral IV  Drains - N/A  Airways -  N/A    Disposition: Critical care      ICU Core Measures     A: Assess, Prevent, and Manage Pain Has pain been assessed? Yes  Need for changes to pain regimen? No   B: Both SAT/SAT  N/A   C: Choice of Sedation RASS Goal: 0 Alert and Calm  Need for changes to sedation or analgesia regimen? No   D: Delirium CAM-ICU: Negative   E: Early Mobility  Plan for early mobility? Yes   F: Family Engagement Plan for family engagement today? Yes         Prophylaxis:  VTE VTE covered by:  heparin (porcine), Intravenous, 16 Units/kg/hr at 06/04/25 0400  heparin (porcine), Intravenous  heparin (porcine), Intravenous       Stress Ulcer  not orderedcovered byfamotidine (PEPCID) 20 mg tablet [[id number]] (Long-Term Med)         24 Hour Events : None  Subjective   No acute events overnight. Patient reports pain is improving in RLE. They are tolerating their diet. They are having flatus but no BM. They are voiding. They have been OOB. They deny nausea, vomiting, chest pain, shortness of breath, fevers, chills.      Review of Systems: Review of Systems   Constitutional:  Negative for chills and fever.   HENT:  Negative for congestion.    Eyes:  Negative for discharge.   Respiratory:  Negative for shortness of breath.     Cardiovascular:  Negative for chest pain.   Gastrointestinal:  Negative for abdominal pain.   Genitourinary:  Negative for difficulty urinating.   Musculoskeletal:  Negative for arthralgias.   Skin:  Negative for color change.   Neurological:  Negative for facial asymmetry.   Psychiatric/Behavioral:  Negative for agitation.        Objective :                   Vitals I/O      Most Recent Min/Max in 24hrs   Temp 98.1 °F (36.7 °C) Temp  Min: 97.6 °F (36.4 °C)  Max: 98.1 °F (36.7 °C)   Pulse 67 Pulse  Min: 58  Max: 78   Resp 13 Resp  Min: 9  Max: 31   /66 BP  Min: 102/57  Max: 175/77   O2 Sat 96 % SpO2  Min: 93 %  Max: 100 %      Intake/Output Summary (Last 24 hours) at 6/4/2025 0652  Last data filed at 6/4/2025 0600  Gross per 24 hour   Intake 1551.2 ml   Output 1650 ml   Net -98.8 ml       Diet Jorge/CHO Controlled; Consistent Carbohydrate Diet Level 2 (5 carb servings/75 grams CHO/meal)    Invasive Monitoring           Physical Exam   Physical Exam  Eyes:      Extraocular Movements: Extraocular movements intact.   Skin:     General: Skin is warm and dry.   HENT:      Head: Normocephalic and atraumatic.      Nose: No congestion.      Mouth/Throat:      Mouth: Mucous membranes are moist.   Cardiovascular:      Rate and Rhythm: Normal rate.      Pulses: Pulses are 2+ DP and PT b/l.   Abdominal:      Palpations: Abdomen is soft.   Constitutional:       General: He is not in acute distress.  Pulmonary:      Effort: Pulmonary effort is normal.   Neurological:      General: No focal deficit present.      Mental Status: He is alert. He is not agitated.      Cranial Nerves: No facial asymmetry.      Motor: No motor deficit.          Diagnostic Studies        Lab Results: I have reviewed the following results:     Medications:  Scheduled PRN   aspirin, 81 mg, Daily  atorvastatin, 40 mg, Daily With Dinner  chlorhexidine, 15 mL, Q12H BOBBY  insulin lispro, 1-5 Units, TID AC  metoprolol succinate, 25 mg, Daily       acetaminophen, 650 mg, Q6H PRN  heparin (porcine), 2,800 Units, Q6H PRN  heparin (porcine), 5,600 Units, Q6H PRN  HYDROmorphone, 0.2 mg, Q2H PRN  labetalol, 10 mg, Q6H PRN  oxyCODONE, 2.5 mg, Q4H PRN   Or  oxyCODONE, 5 mg, Q4H PRN       Continuous    heparin (porcine), 3-30 Units/kg/hr (Order-Specific), Last Rate: 16 Units/kg/hr (06/04/25 0400)         Labs:   CBC    Recent Labs     06/03/25 1944 06/04/25  0327   WBC 7.11 6.50   HGB 12.2 12.0   HCT 38.0 36.4*   * 96*     BMP    Recent Labs     06/03/25  0546 06/04/25  0327   SODIUM 133* 134*   K 3.8 3.8    100   CO2 25 27   AGAP 8 7   BUN 18 15   CREATININE 0.73 0.70   CALCIUM 8.3* 8.5       Coags    Recent Labs     06/03/25 1944 06/03/25 2128 06/04/25  0327   INR 1.32*  --   --    PTT 82* 86* 98*        Additional Electrolytes  Recent Labs     06/04/25  0327   MG 1.8*   PHOS 2.4          Blood Gas    No recent results  No recent results LFTs  No recent results    Infectious  No recent results  Glucose  Recent Labs     06/03/25  0546 06/04/25  0327   GLUC 151* 193*

## 2025-06-04 NOTE — ASSESSMENT & PLAN NOTE
Lab Results   Component Value Date    HGBA1C 7.4 (A) 09/13/2024       Recent Labs     06/02/25  1646 06/03/25  0553 06/03/25  1700 06/04/25  0627   POCGLU 185* 115 126 166*       Blood Sugar Average: Last 72 hrs:  (P) 148    Follow insulin sliding scale protocol

## 2025-06-05 ENCOUNTER — TRANSITIONAL CARE MANAGEMENT (OUTPATIENT)
Dept: FAMILY MEDICINE CLINIC | Facility: CLINIC | Age: 60
End: 2025-06-05

## 2025-06-05 ENCOUNTER — PATIENT OUTREACH (OUTPATIENT)
Dept: CASE MANAGEMENT | Facility: OTHER | Age: 60
End: 2025-06-05

## 2025-06-05 NOTE — UTILIZATION REVIEW
NOTIFICATION OF ADMISSION DISCHARGE   This is a Notification of Discharge from [facility]. Please be advised that this patient has been discharge from our facility. Below you will find the admission and discharge date and time including the patient’s disposition.   UTILIZATION REVIEW CONTACT:  Utilization Review Assistants  Network Utilization Review Department  Phone: [phone] x carefully listen to the prompts. All voicemails are confidential.  Email: [email or web address]     ADMISSION INFORMATION  PRESENTATION DATE: 6/2/2025  4:39 AM  OBERVATION ADMISSION DATE: N/A  INPATIENT ADMISSION DATE: 6/2/25  4:39 AM   DISCHARGE DATE: 6/4/2025  5:14 PM   DISPOSITION:Home/Self Care    Network Utilization Review Department  ATTENTION: Please call with any questions or concerns to [phone] and carefully listen to the prompts so that you are directed to the right person. All voicemails are confidential.   For Discharge needs, contact Care Management DC Support Team at [phone] opt. 2  Send all requests for admission clinical reviews, approved or denied determinations and any other requests to dedicated fax number below belonging to the campus where the patient is receiving treatment. List of dedicated fax numbers for the Facilities:  FACILITY NAME UR FAX NUMBER   ADMISSION DENIALS (Administrative/Medical Necessity) [phone]   DISCHARGE SUPPORT TEAM ([facility]) [phone]   PARENT CHILD HEALTH (Maternity/NICU/Pediatrics) [phone]   [facility] [phone]   [facility] [phone]   [facility] [phone]   [facility] [phone]   [facility] [phone]   [facility] [phone]   [facility] [phone]   [facility] [phone]   [facility]  [facility] [phone]   [facility] [phone]   [facility] [phone]   [facility] [phone]

## 2025-06-05 NOTE — PROGRESS NOTES
"Outpatient Care Management Note:  Referral for care management received after recent hospitalization. Chart review completed.     History includes CAD, HTN, DM with recent A1C of 7.4, PVD, GERD and hx of CABG.    6/1/2025-Presented to ED with complaints of right leg pain that began after mowing lawn.  Right leg pale and cool.  Bedside doppler unable to locate DP or PT pulse.  Started on heparin drip.    6/2/2025-Transferred to [facility]. CTA abdomen shows Ischemia due to occluded (R) bypass graft.  RLE angiogram, thrombectomy and initiation of thrombolysis with TPA completed.     6/3/2025-complete lysis of RLE graft. Patent PT and peroneal arteries. .     6/4/2025-Discharged to home.  Work note given to return 6/9/2025.  FU with vascular surgery 6/16/2025.    Outreach call placed to Mr. Green.  He states he is doing well and the right leg looks \"normal.\"  Reviewed AVS and restrictions.  Mr. Green is aware he has a 10 pound lifting restriction for 3 days and that he may return to work on 6/9/2025.  He is concerned that his RTW note says no limitations and not no restrictions.  Discussed that it means the same thing but that if there is a problem he may call me and I would request a new letter.  He is agreeable to that plan.  Contact number provided.     Mr. Green has picked up medications and began taking his Plavix today. He shares that the pharmacist did question the Plavix as he is already on Xarelto but the prescription was filled.     Follow up appointment with vascular surgery is scheduled on 6/16/2025.    Mr. Green has no needs and does not feel continued outreach is needed.  He will call me if there is an issue with his RTW note.   "

## 2025-06-08 DIAGNOSIS — I21.4 NSTEMI (NON-ST ELEVATED MYOCARDIAL INFARCTION) (HCC): ICD-10-CM

## 2025-06-08 RX ORDER — ISOSORBIDE MONONITRATE 30 MG/1
30 TABLET, EXTENDED RELEASE ORAL 2 TIMES DAILY
Qty: 180 TABLET | Refills: 1 | Status: SHIPPED | OUTPATIENT
Start: 2025-06-08

## 2025-06-12 ENCOUNTER — TELEPHONE (OUTPATIENT)
Dept: FAMILY MEDICINE CLINIC | Facility: CLINIC | Age: 60
End: 2025-06-12

## 2025-06-12 NOTE — TELEPHONE ENCOUNTER
Pt returned call. Warm transferred pt to me in office.     Pt d/c 6/4/2025, TCM scheduled 6/16/2025 w/Dr Stapleton, virtual TCM per pts request. TCM by 6/17/2025.

## 2025-06-16 ENCOUNTER — TELEMEDICINE (OUTPATIENT)
Dept: FAMILY MEDICINE CLINIC | Facility: CLINIC | Age: 60
End: 2025-06-16
Payer: COMMERCIAL

## 2025-06-16 ENCOUNTER — OFFICE VISIT (OUTPATIENT)
Dept: VASCULAR SURGERY | Facility: CLINIC | Age: 60
End: 2025-06-16
Payer: COMMERCIAL

## 2025-06-16 VITALS
WEIGHT: 157 LBS | HEIGHT: 65 IN | HEART RATE: 81 BPM | SYSTOLIC BLOOD PRESSURE: 142 MMHG | DIASTOLIC BLOOD PRESSURE: 84 MMHG | BODY MASS INDEX: 26.16 KG/M2

## 2025-06-16 DIAGNOSIS — Z09 HOSPITAL DISCHARGE FOLLOW-UP: Primary | ICD-10-CM

## 2025-06-16 DIAGNOSIS — I70.213 ATHEROSCLEROSIS OF NATIVE ARTERY OF BOTH LOWER EXTREMITIES WITH INTERMITTENT CLAUDICATION (HCC): ICD-10-CM

## 2025-06-16 DIAGNOSIS — Z87.891 FORMER TOBACCO USE: ICD-10-CM

## 2025-06-16 DIAGNOSIS — I99.8 ACUTE LOWER LIMB ISCHEMIA: ICD-10-CM

## 2025-06-16 DIAGNOSIS — I73.9 PERIPHERAL VASCULAR DISEASE (HCC): Primary | ICD-10-CM

## 2025-06-16 DIAGNOSIS — I73.9 PERIPHERAL VASCULAR DISEASE (HCC): ICD-10-CM

## 2025-06-16 PROCEDURE — 99214 OFFICE O/P EST MOD 30 MIN: CPT | Performed by: SURGERY

## 2025-06-16 PROCEDURE — 99495 TRANSJ CARE MGMT MOD F2F 14D: CPT | Performed by: STUDENT IN AN ORGANIZED HEALTH CARE EDUCATION/TRAINING PROGRAM

## 2025-06-16 NOTE — PATIENT INSTRUCTIONS
1. Peripheral vascular disease (HCC)  Assessment & Plan:  60-year-old male with peripheral arterial occlusive disease status post occlusion of right lower extremity bypass status post lysis with successful resolution.  Recommend 3-month arterial duplex for early surveillance.  Medical management should include Plavix and Xarelto, discontinue aspirin at this time.  Continue statin therapy.

## 2025-06-16 NOTE — LETTER
2025     Michael Stapleton MD  [address]  [facility] [address]    Patient: Maxime Green   YOB: 1965   Date of Visit: 2025       Dear Dr. Michael Stapleton MD  Maxime Green:    Thank you for referring Maxime Green to me for evaluation. Below are my notes for this consultation.    If you have questions, please do not hesitate to call me. I look forward to following your patient along with you.         Sincerely,        Miles Riojas MD        CC: Maxime Riojas MD  2025  9:08 AM  Sign when Signing Visit  Name: Maxime Green      : 1965      MRN: [id number]  Encounter Provider: Miles Riojas MD  Encounter Date: 2025   Encounter department: [facility] [place]  :  Assessment & Plan  Peripheral vascular disease (HCC)  60-year-old male with peripheral arterial occlusive disease status post occlusion of right lower extremity bypass status post lysis with successful resolution.  Recommend 3-month arterial duplex for early surveillance.  Medical management should include Plavix and Xarelto, discontinue aspirin at this time.  Continue statin therapy.             History of Present Illness  HPI  Maxime Green is a 60 y.o. male who presents after hospitalization with acute onset of right lower extremity pain found to be secondary to occlusion of his right lower extremity bypass.  He successfully underwent arteriogram with lysis.  On his lysis check balloon angioplasty of the common femoral artery was performed.  He has had complete resolution of his symptoms and is back to work and going to the gym.  He has no wounds on his feet.  He quit smoking several years ago.  His medical management includes Plavix, Xarelto and we will discontinue his aspirin today as triple therapy is associated with an increased level of bleeding risk.  He is on a statin medication as well.  We will perform a 3-month  "arterial duplex for early surveillance.    Patient presents R fem- AK pop bypass graft thrombectomy done 6/02 for ischemia.      Review of Systems   Constitutional: Negative.    HENT: Negative.     Eyes: Negative.    Respiratory: Negative.     Cardiovascular: Negative.    Gastrointestinal: Negative.    Endocrine: Negative.    Genitourinary: Negative.    Musculoskeletal: Negative.    Skin: Negative.    Allergic/Immunologic: Negative.    Neurological: Negative.    Hematological: Negative.    Psychiatric/Behavioral: Negative.            Objective  /84 (BP Location: Left arm, Patient Position: Sitting, Cuff Size: Standard)   Pulse 81   Ht 5' 5\" (1.651 m)   Wt 71.2 kg (157 lb)   BMI 26.13 kg/m²      Physical Exam  Vitals and nursing note reviewed.   Constitutional:       General: He is not in acute distress.     Appearance: He is well-developed.   HENT:      Head: Normocephalic and atraumatic.     Eyes:      Conjunctiva/sclera: Conjunctivae normal.       Cardiovascular:      Rate and Rhythm: Normal rate and regular rhythm.      Pulses:           Dorsalis pedis pulses are 2+ on the right side and 2+ on the left side.      Heart sounds: No murmur heard.     Comments: No wounds  Pulmonary:      Effort: Pulmonary effort is normal. No respiratory distress.      Breath sounds: Normal breath sounds.   Abdominal:      Palpations: Abdomen is soft.      Tenderness: There is no abdominal tenderness.     Musculoskeletal:         General: No swelling.      Cervical back: Neck supple.     Skin:     General: Skin is warm and dry.      Capillary Refill: Capillary refill takes less than 2 seconds.     Neurological:      Mental Status: He is alert.     Psychiatric:         Mood and Affect: Mood normal.           "

## 2025-06-16 NOTE — PROGRESS NOTES
Name: Maxime Green      : 1965      MRN: [id number]  Encounter Provider: Miles Riojas MD  Encounter Date: 2025   Encounter department: [facility] [place]  :  Assessment & Plan  Peripheral vascular disease (HCC)  60-year-old male with peripheral arterial occlusive disease status post occlusion of right lower extremity bypass status post lysis with successful resolution.  Recommend 3-month arterial duplex for early surveillance.  Medical management should include Plavix and Xarelto, discontinue aspirin at this time.  Continue statin therapy.             History of Present Illness   HPI  Maxime Green is a 60 y.o. male who presents after hospitalization with acute onset of right lower extremity pain found to be secondary to occlusion of his right lower extremity bypass.  He successfully underwent arteriogram with lysis.  On his lysis check balloon angioplasty of the common femoral artery was performed.  He has had complete resolution of his symptoms and is back to work and going to the gym.  He has no wounds on his feet.  He quit smoking several years ago.  His medical management includes Plavix, Xarelto and we will discontinue his aspirin today as triple therapy is associated with an increased level of bleeding risk.  He is on a statin medication as well.  We will perform a 3-month arterial duplex for early surveillance.    Patient presents R fem- AK pop bypass graft thrombectomy done  for ischemia.      Review of Systems   Constitutional: Negative.    HENT: Negative.     Eyes: Negative.    Respiratory: Negative.     Cardiovascular: Negative.    Gastrointestinal: Negative.    Endocrine: Negative.    Genitourinary: Negative.    Musculoskeletal: Negative.    Skin: Negative.    Allergic/Immunologic: Negative.    Neurological: Negative.    Hematological: Negative.    Psychiatric/Behavioral: Negative.            Objective   /84 (BP Location: Left arm, Patient  "Position: Sitting, Cuff Size: Standard)   Pulse 81   Ht 5' 5\" (1.651 m)   Wt 71.2 kg (157 lb)   BMI 26.13 kg/m²      Physical Exam  Vitals and nursing note reviewed.   Constitutional:       General: He is not in acute distress.     Appearance: He is well-developed.   HENT:      Head: Normocephalic and atraumatic.     Eyes:      Conjunctiva/sclera: Conjunctivae normal.       Cardiovascular:      Rate and Rhythm: Normal rate and regular rhythm.      Pulses:           Dorsalis pedis pulses are 2+ on the right side and 2+ on the left side.      Heart sounds: No murmur heard.     Comments: No wounds  Pulmonary:      Effort: Pulmonary effort is normal. No respiratory distress.      Breath sounds: Normal breath sounds.   Abdominal:      Palpations: Abdomen is soft.      Tenderness: There is no abdominal tenderness.     Musculoskeletal:         General: No swelling.      Cervical back: Neck supple.     Skin:     General: Skin is warm and dry.      Capillary Refill: Capillary refill takes less than 2 seconds.     Neurological:      Mental Status: He is alert.     Psychiatric:         Mood and Affect: Mood normal.           "

## 2025-06-16 NOTE — PROGRESS NOTES
Virtual TCM Visit:Name: Maxime Green      : 1965      MRN: [id number]  Encounter Provider: Michael Stapleton MD  Encounter Date: 2025   Encounter department: [facility] [address]  :  Assessment & Plan  Hospital discharge follow-up         Acute lower limb ischemia  S/p vascular surgery procedure. Doing well, plavix and xarelto./ also on crestor       Former tobacco use         Peripheral vascular disease (HCC)         Atherosclerosis of native artery of both lower extremities with intermittent claudication (HCC)                History of Present Illness     Transitional Care Management Review:   Maxime Green is a 60 y.o. male here for TCM follow up.    During the TCM phone call patient stated:  TCM Call (since 2025)     Date and time call was made  2025  8:39 AM    Hospital care reviewed  Records reviewed    Patient was hospitialized at  [facility]    Date of Admission  25    Date of discharge  25    Diagnosis  Acute lower limb ischemia    Disposition  Home    Were the patients medications reviewed and updated  Yes    Current Symptoms  None      TCM Call (since 2025)     Post hospital issues  None    Scheduled for follow up?  Yes    Did you obtain your prescribed medications  Yes    Do you need help managing your prescriptions or medications  No    Is transportation to your appointment needed  No    I have advised the patient to call PCP with any new or worsening symptoms  Ludmila SAGE/ MA    Living Arrangements  Family members    Support System  Family    The type of support provided  Emotional    Do you have social support  Yes, as much as I need    Are you recieving home care services  No        HPI    He presented to [facility] on 2025 having mowed his grass earlier in the evening with sudden onset of right lower extremity pain approximately 8:30 PM.  He noted feeling of pins-and-needles as well as ongoing severe  pain.  Motor exam was not normal with inability to dorsiflex.  He noted being compliant with prior aspirin and Xarelto therapy.  CTA was performed revealing right lower extremity bypass graft occlusion.  The patient was initiated on heparin drip and transferred to [facility] for escalated vascular care.     On 6/2/2025, the patient was taken to the interventional radiology suite at which time he underwent right lower extremity arteriogram with AngioJet thrombectomy and initiation of thrombolysis.  The right femoral-AK popliteal bypass was occluded with reconstitution of the right peroneal and PT arteries.  These arteries were not visualized following AngioJet thrombectomy likely due to distal embolization.  The patient was therefore initiated on Lasix therapy with tPA and nonweight-based heparin protocol.     Postprocedure, the patient was maintained in the ICU where his overall medical management was assisted by our surgical critical care colleagues.     On 6/3/2025 the patient was returned to the interventional radiology suite at which time he underwent right lower extremity arteriogram for lysis recheck.  The PT and peroneal arteries were patent.  There was mild stenosis of the distal common femoral artery successfully treated with angioplasty.  No other lesions were identified.  Catheters were removed and the patient was returned to the ICU     Postprocedure, the patient remained on anticoagulation therapy in the form of heparin drip.  This was eventually transitioned back to his prior oral anticoagulation with Xarelto.  Antiplatelet therapy was continued with aspirin however, given second episode of graft thrombosis, Plavix was added to his regimen for triple therapy.  He remained with a palpable DP and PT pulse.  There was concern for some left groin access site bleeding while sheaths were still present.  Postprocedure, the patient was noted with left groin ecchymosis and small nonexpanding  hematoma.     By PPD #2/#1, the patient remained neurovascularly intact with palpable right DP and PT pulses.  He noted some residual cramping in the calf.  The left groin hematoma was stable.  The access site was with Steri-Strips.  His pain was controlled with minimal use of narcotic.  He was tolerating a diet.  He was ambulatory.  He was deemed appropriate and stable for discharge and was discharged on 6/4/2025.     Currently doing well. Back to working out, working with out issue.    Review of Systems   Constitutional:  Negative for chills, fatigue and fever.   HENT:  Negative for rhinorrhea and sore throat.    Eyes:  Negative for visual disturbance.   Respiratory:  Negative for cough and shortness of breath.    Cardiovascular:  Negative for chest pain and palpitations.   Gastrointestinal:  Negative for abdominal pain, constipation, diarrhea, nausea and vomiting.   Genitourinary:  Negative for difficulty urinating, dysuria and frequency.   Musculoskeletal:  Negative for arthralgias and myalgias.   Skin:  Negative for color change and rash.   Neurological:  Negative for weakness and headaches.     Objective   There were no vitals taken for this visit.    Physical Exam  Pulmonary:      Effort: Pulmonary effort is normal. No respiratory distress.     Neurological:      Mental Status: He is alert. Mental status is at baseline.       Medications have been reviewed by provider in current encounter    Administrative Statements   Encounter provider Michael Stapleton MD    The Patient is located at Home and in the following state in which I hold an active license [place].    The patient was identified by name and date of birth. Maxime Green was informed that this is a telemedicine visit and that the visit is being conducted through the Epic Embedded platform. He agrees to proceed..  My office door was closed. No one else was in the room.  He acknowledged consent and understanding of privacy and security of the video platform.  The patient has agreed to participate and understands they can discontinue the visit at any time.    I have spent a total time of 15 minutes in caring for this patient on the day of the visit/encounter including Impressions and Reviewing/placing orders in the medical record (including tests, medications, and/or procedures), not including the time spent for establishing the audio/video connection.    Michael Stapleton MD

## 2025-06-16 NOTE — ASSESSMENT & PLAN NOTE
60-year-old male with peripheral arterial occlusive disease status post occlusion of right lower extremity bypass status post lysis with successful resolution.  Recommend 3-month arterial duplex for early surveillance.  Medical management should include Plavix and Xarelto, discontinue aspirin at this time.  Continue statin therapy.

## 2025-07-02 ENCOUNTER — VBI (OUTPATIENT)
Dept: ADMINISTRATIVE | Facility: OTHER | Age: 60
End: 2025-07-02

## 2025-07-02 NOTE — TELEPHONE ENCOUNTER
07/02/25 8:46 AM     Chart reviewed for CRC: Colonoscopy ; nothing is submitted to the patient's insurance at this time.     Jaden Wylie MA   [facility]

## 2025-07-07 ENCOUNTER — HOSPITAL ENCOUNTER (EMERGENCY)
Facility: HOSPITAL | Age: 60
End: 2025-07-08
Attending: EMERGENCY MEDICINE | Admitting: EMERGENCY MEDICINE
Payer: COMMERCIAL

## 2025-07-07 ENCOUNTER — APPOINTMENT (EMERGENCY)
Dept: CT IMAGING | Facility: HOSPITAL | Age: 60
End: 2025-07-07
Payer: COMMERCIAL

## 2025-07-07 DIAGNOSIS — I70.221 CRITICAL LIMB ISCHEMIA OF RIGHT LOWER EXTREMITY (HCC): Primary | ICD-10-CM

## 2025-07-07 LAB
ALBUMIN SERPL BCG-MCNC: 4.8 G/DL (ref 3.5–5)
ALP SERPL-CCNC: 78 U/L (ref 34–104)
ALT SERPL W P-5'-P-CCNC: 22 U/L (ref 7–52)
ANION GAP SERPL CALCULATED.3IONS-SCNC: 7 MMOL/L (ref 4–13)
APTT PPP: 38 SECONDS (ref 23–34)
AST SERPL W P-5'-P-CCNC: 21 U/L (ref 13–39)
BASOPHILS # BLD AUTO: 0.05 THOUSANDS/ÂΜL (ref 0–0.1)
BASOPHILS NFR BLD AUTO: 1 % (ref 0–1)
BILIRUB SERPL-MCNC: 1.14 MG/DL (ref 0.2–1)
BUN SERPL-MCNC: 18 MG/DL (ref 5–25)
CALCIUM SERPL-MCNC: 10.2 MG/DL (ref 8.4–10.2)
CHLORIDE SERPL-SCNC: 101 MMOL/L (ref 96–108)
CO2 SERPL-SCNC: 26 MMOL/L (ref 21–32)
CREAT SERPL-MCNC: 0.97 MG/DL (ref 0.6–1.3)
EOSINOPHIL # BLD AUTO: 0.15 THOUSAND/ÂΜL (ref 0–0.61)
EOSINOPHIL NFR BLD AUTO: 2 % (ref 0–6)
ERYTHROCYTE [DISTWIDTH] IN BLOOD BY AUTOMATED COUNT: 13.8 % (ref 11.6–15.1)
GFR SERPL CREATININE-BSD FRML MDRD: 84 ML/MIN/1.73SQ M
GLUCOSE SERPL-MCNC: 142 MG/DL (ref 65–140)
HCT VFR BLD AUTO: 41.5 % (ref 36.5–49.3)
HGB BLD-MCNC: 13.5 G/DL (ref 12–17)
IMM GRANULOCYTES # BLD AUTO: 0.01 THOUSAND/UL (ref 0–0.2)
IMM GRANULOCYTES NFR BLD AUTO: 0 % (ref 0–2)
INR PPP: 1.42 (ref 0.85–1.19)
LYMPHOCYTES # BLD AUTO: 1.59 THOUSANDS/ÂΜL (ref 0.6–4.47)
LYMPHOCYTES NFR BLD AUTO: 25 % (ref 14–44)
MCH RBC QN AUTO: 26.2 PG (ref 26.8–34.3)
MCHC RBC AUTO-ENTMCNC: 32.5 G/DL (ref 31.4–37.4)
MCV RBC AUTO: 80 FL (ref 82–98)
MONOCYTES # BLD AUTO: 0.71 THOUSAND/ÂΜL (ref 0.17–1.22)
MONOCYTES NFR BLD AUTO: 11 % (ref 4–12)
NEUTROPHILS # BLD AUTO: 3.83 THOUSANDS/ÂΜL (ref 1.85–7.62)
NEUTS SEG NFR BLD AUTO: 61 % (ref 43–75)
NRBC BLD AUTO-RTO: 0 /100 WBCS
PLATELET # BLD AUTO: 189 THOUSANDS/UL (ref 149–390)
PMV BLD AUTO: 9.3 FL (ref 8.9–12.7)
POTASSIUM SERPL-SCNC: 4.1 MMOL/L (ref 3.5–5.3)
PROT SERPL-MCNC: 8.4 G/DL (ref 6.4–8.4)
PROTHROMBIN TIME: 18.1 SECONDS (ref 12.3–15)
RBC # BLD AUTO: 5.16 MILLION/UL (ref 3.88–5.62)
SODIUM SERPL-SCNC: 134 MMOL/L (ref 135–147)
WBC # BLD AUTO: 6.34 THOUSAND/UL (ref 4.31–10.16)

## 2025-07-07 PROCEDURE — 73706 CT ANGIO LWR EXTR W/O&W/DYE: CPT

## 2025-07-07 PROCEDURE — 36415 COLL VENOUS BLD VENIPUNCTURE: CPT | Performed by: EMERGENCY MEDICINE

## 2025-07-07 PROCEDURE — 99284 EMERGENCY DEPT VISIT MOD MDM: CPT

## 2025-07-07 PROCEDURE — 96365 THER/PROPH/DIAG IV INF INIT: CPT

## 2025-07-07 PROCEDURE — 85610 PROTHROMBIN TIME: CPT | Performed by: EMERGENCY MEDICINE

## 2025-07-07 PROCEDURE — 96375 TX/PRO/DX INJ NEW DRUG ADDON: CPT

## 2025-07-07 PROCEDURE — 80053 COMPREHEN METABOLIC PANEL: CPT | Performed by: EMERGENCY MEDICINE

## 2025-07-07 PROCEDURE — 85025 COMPLETE CBC W/AUTO DIFF WBC: CPT | Performed by: EMERGENCY MEDICINE

## 2025-07-07 PROCEDURE — 85730 THROMBOPLASTIN TIME PARTIAL: CPT | Performed by: EMERGENCY MEDICINE

## 2025-07-07 PROCEDURE — 96366 THER/PROPH/DIAG IV INF ADDON: CPT

## 2025-07-07 RX ORDER — HEPARIN SODIUM 1000 [USP'U]/ML
5200 INJECTION, SOLUTION INTRAVENOUS; SUBCUTANEOUS ONCE
Status: COMPLETED | OUTPATIENT
Start: 2025-07-07 | End: 2025-07-07

## 2025-07-07 RX ORDER — FENTANYL CITRATE 50 UG/ML
75 INJECTION, SOLUTION INTRAMUSCULAR; INTRAVENOUS ONCE
Refills: 0 | Status: COMPLETED | OUTPATIENT
Start: 2025-07-07 | End: 2025-07-07

## 2025-07-07 RX ORDER — HEPARIN SODIUM 1000 [USP'U]/ML
2600 INJECTION, SOLUTION INTRAVENOUS; SUBCUTANEOUS EVERY 6 HOURS PRN
Status: DISCONTINUED | OUTPATIENT
Start: 2025-07-07 | End: 2025-07-08 | Stop reason: HOSPADM

## 2025-07-07 RX ORDER — HEPARIN SODIUM 10000 [USP'U]/100ML
3-30 INJECTION, SOLUTION INTRAVENOUS
Status: DISCONTINUED | OUTPATIENT
Start: 2025-07-07 | End: 2025-07-08 | Stop reason: HOSPADM

## 2025-07-07 RX ORDER — HEPARIN SODIUM 1000 [USP'U]/ML
5200 INJECTION, SOLUTION INTRAVENOUS; SUBCUTANEOUS EVERY 6 HOURS PRN
Status: DISCONTINUED | OUTPATIENT
Start: 2025-07-07 | End: 2025-07-08 | Stop reason: HOSPADM

## 2025-07-07 RX ADMIN — IOHEXOL 120 ML: 350 INJECTION, SOLUTION INTRAVENOUS at 20:32

## 2025-07-07 RX ADMIN — HEPARIN SODIUM 5200 UNITS: 1000 INJECTION, SOLUTION INTRAVENOUS; SUBCUTANEOUS at 20:11

## 2025-07-07 RX ADMIN — HEPARIN SODIUM 18 UNITS/KG/HR: 10000 INJECTION, SOLUTION INTRAVENOUS at 20:11

## 2025-07-07 RX ADMIN — FENTANYL CITRATE 75 MCG: 0.05 INJECTION, SOLUTION INTRAMUSCULAR; INTRAVENOUS at 20:05

## 2025-07-08 ENCOUNTER — APPOINTMENT (INPATIENT)
Dept: RADIOLOGY | Facility: HOSPITAL | Age: 60
DRG: 252 | End: 2025-07-08
Payer: COMMERCIAL

## 2025-07-08 ENCOUNTER — HOSPITAL ENCOUNTER (INPATIENT)
Facility: HOSPITAL | Age: 60
LOS: 3 days | Discharge: HOME | DRG: 252 | End: 2025-07-11
Attending: STUDENT IN AN ORGANIZED HEALTH CARE EDUCATION/TRAINING PROGRAM | Admitting: STUDENT IN AN ORGANIZED HEALTH CARE EDUCATION/TRAINING PROGRAM
Payer: COMMERCIAL

## 2025-07-08 VITALS
SYSTOLIC BLOOD PRESSURE: 123 MMHG | RESPIRATION RATE: 18 BRPM | DIASTOLIC BLOOD PRESSURE: 74 MMHG | OXYGEN SATURATION: 97 % | HEART RATE: 54 BPM | TEMPERATURE: 97.9 F | BODY MASS INDEX: 25.46 KG/M2 | WEIGHT: 153 LBS

## 2025-07-08 DIAGNOSIS — I99.8 ACUTE LOWER LIMB ISCHEMIA: Primary | ICD-10-CM

## 2025-07-08 PROBLEM — T82.898D: Status: ACTIVE | Noted: 2025-07-08

## 2025-07-08 LAB
ANION GAP SERPL CALCULATED.3IONS-SCNC: 9 MMOL/L (ref 4–13)
APTT PPP: 110 SECONDS (ref 23–34)
APTT PPP: 114 SECONDS (ref 23–34)
APTT PPP: 62 SECONDS (ref 23–34)
APTT PPP: 68 SECONDS (ref 23–34)
BUN SERPL-MCNC: 15 MG/DL (ref 5–25)
CALCIUM SERPL-MCNC: 9 MG/DL (ref 8.4–10.2)
CHLORIDE SERPL-SCNC: 100 MMOL/L (ref 96–108)
CO2 SERPL-SCNC: 25 MMOL/L (ref 21–32)
CREAT SERPL-MCNC: 0.85 MG/DL (ref 0.6–1.3)
ERYTHROCYTE [DISTWIDTH] IN BLOOD BY AUTOMATED COUNT: 13.8 % (ref 11.6–15.1)
EST. AVERAGE GLUCOSE BLD GHB EST-MCNC: 180 MG/DL
FIBRINOGEN PPP-MCNC: 258 MG/DL (ref 206–523)
GFR SERPL CREATININE-BSD FRML MDRD: 94 ML/MIN/1.73SQ M
GLUCOSE SERPL-MCNC: 132 MG/DL (ref 65–140)
GLUCOSE SERPL-MCNC: 132 MG/DL (ref 65–140)
GLUCOSE SERPL-MCNC: 137 MG/DL (ref 65–140)
GLUCOSE SERPL-MCNC: 186 MG/DL (ref 65–140)
GLUCOSE SERPL-MCNC: 189 MG/DL (ref 65–140)
HBA1C MFR BLD: 7.9 %
HCT VFR BLD AUTO: 38.4 % (ref 36.5–49.3)
HGB BLD-MCNC: 12.7 G/DL (ref 12–17)
MCH RBC QN AUTO: 26.8 PG (ref 26.8–34.3)
MCHC RBC AUTO-ENTMCNC: 33.1 G/DL (ref 31.4–37.4)
MCV RBC AUTO: 81 FL (ref 82–98)
PLATELET # BLD AUTO: 169 THOUSANDS/UL (ref 149–390)
PMV BLD AUTO: 9.9 FL (ref 8.9–12.7)
POTASSIUM SERPL-SCNC: 4.1 MMOL/L (ref 3.5–5.3)
RBC # BLD AUTO: 4.73 MILLION/UL (ref 3.88–5.62)
SODIUM SERPL-SCNC: 134 MMOL/L (ref 135–147)
WBC # BLD AUTO: 5.24 THOUSAND/UL (ref 4.31–10.16)

## 2025-07-08 PROCEDURE — 83036 HEMOGLOBIN GLYCOSYLATED A1C: CPT | Performed by: PHYSICIAN ASSISTANT

## 2025-07-08 PROCEDURE — 99223 1ST HOSP IP/OBS HIGH 75: CPT | Performed by: SURGERY

## 2025-07-08 PROCEDURE — 36415 COLL VENOUS BLD VENIPUNCTURE: CPT | Performed by: PHYSICIAN ASSISTANT

## 2025-07-08 PROCEDURE — 85384 FIBRINOGEN ACTIVITY: CPT | Performed by: PHYSICIAN ASSISTANT

## 2025-07-08 PROCEDURE — 85730 THROMBOPLASTIN TIME PARTIAL: CPT | Performed by: PHYSICIAN ASSISTANT

## 2025-07-08 PROCEDURE — 70450 CT HEAD/BRAIN W/O DYE: CPT

## 2025-07-08 PROCEDURE — 99285 EMERGENCY DEPT VISIT HI MDM: CPT | Performed by: EMERGENCY MEDICINE

## 2025-07-08 PROCEDURE — 85730 THROMBOPLASTIN TIME PARTIAL: CPT | Performed by: STUDENT IN AN ORGANIZED HEALTH CARE EDUCATION/TRAINING PROGRAM

## 2025-07-08 PROCEDURE — 85027 COMPLETE CBC AUTOMATED: CPT | Performed by: PHYSICIAN ASSISTANT

## 2025-07-08 PROCEDURE — 82948 REAGENT STRIP/BLOOD GLUCOSE: CPT

## 2025-07-08 PROCEDURE — NC001 PR NO CHARGE: Performed by: PHYSICIAN ASSISTANT

## 2025-07-08 PROCEDURE — 80048 BASIC METABOLIC PNL TOTAL CA: CPT | Performed by: PHYSICIAN ASSISTANT

## 2025-07-08 PROCEDURE — 99283 EMERGENCY DEPT VISIT LOW MDM: CPT

## 2025-07-08 RX ORDER — METOPROLOL SUCCINATE 25 MG/1
25 TABLET, EXTENDED RELEASE ORAL DAILY
Status: DISCONTINUED | OUTPATIENT
Start: 2025-07-08 | End: 2025-07-11 | Stop reason: HOSPADM

## 2025-07-08 RX ORDER — HEPARIN SODIUM 1000 [USP'U]/ML
2600 INJECTION, SOLUTION INTRAVENOUS; SUBCUTANEOUS EVERY 6 HOURS PRN
Status: DISCONTINUED | OUTPATIENT
Start: 2025-07-08 | End: 2025-07-09

## 2025-07-08 RX ORDER — ATORVASTATIN CALCIUM 80 MG/1
80 TABLET, FILM COATED ORAL
Status: DISCONTINUED | OUTPATIENT
Start: 2025-07-08 | End: 2025-07-11 | Stop reason: HOSPADM

## 2025-07-08 RX ORDER — HYDROMORPHONE HCL IN WATER/PF 6 MG/30 ML
0.2 PATIENT CONTROLLED ANALGESIA SYRINGE INTRAVENOUS
Status: DISCONTINUED | OUTPATIENT
Start: 2025-07-08 | End: 2025-07-09

## 2025-07-08 RX ORDER — SODIUM CHLORIDE 9 MG/ML
50 INJECTION, SOLUTION INTRAVENOUS CONTINUOUS
Status: DISCONTINUED | OUTPATIENT
Start: 2025-07-08 | End: 2025-07-10

## 2025-07-08 RX ORDER — CALCIUM CARBONATE 500 MG/1
500 TABLET, CHEWABLE ORAL DAILY PRN
Status: DISCONTINUED | OUTPATIENT
Start: 2025-07-08 | End: 2025-07-11 | Stop reason: HOSPADM

## 2025-07-08 RX ORDER — HEPARIN SODIUM 10000 [USP'U]/100ML
3-30 INJECTION, SOLUTION INTRAVENOUS
Status: DISCONTINUED | OUTPATIENT
Start: 2025-07-08 | End: 2025-07-09

## 2025-07-08 RX ORDER — HEPARIN SODIUM 1000 [USP'U]/ML
5200 INJECTION, SOLUTION INTRAVENOUS; SUBCUTANEOUS EVERY 6 HOURS PRN
Status: DISCONTINUED | OUTPATIENT
Start: 2025-07-08 | End: 2025-07-09

## 2025-07-08 RX ORDER — OXYCODONE HYDROCHLORIDE 5 MG/1
5 TABLET ORAL EVERY 4 HOURS PRN
Refills: 0 | Status: DISCONTINUED | OUTPATIENT
Start: 2025-07-08 | End: 2025-07-10

## 2025-07-08 RX ORDER — CLOPIDOGREL BISULFATE 75 MG/1
75 TABLET ORAL DAILY
Status: DISCONTINUED | OUTPATIENT
Start: 2025-07-08 | End: 2025-07-09

## 2025-07-08 RX ORDER — INSULIN LISPRO 100 [IU]/ML
1-5 INJECTION, SOLUTION INTRAVENOUS; SUBCUTANEOUS EVERY 6 HOURS SCHEDULED
Status: DISCONTINUED | OUTPATIENT
Start: 2025-07-08 | End: 2025-07-09

## 2025-07-08 RX ORDER — FAMOTIDINE 20 MG/1
20 TABLET, FILM COATED ORAL DAILY
Status: DISCONTINUED | OUTPATIENT
Start: 2025-07-08 | End: 2025-07-11 | Stop reason: HOSPADM

## 2025-07-08 RX ORDER — ISOSORBIDE MONONITRATE 30 MG/1
30 TABLET, EXTENDED RELEASE ORAL 2 TIMES DAILY
Status: DISCONTINUED | OUTPATIENT
Start: 2025-07-08 | End: 2025-07-11 | Stop reason: HOSPADM

## 2025-07-08 RX ORDER — ACETAMINOPHEN 325 MG/1
650 TABLET ORAL EVERY 6 HOURS PRN
Status: DISCONTINUED | OUTPATIENT
Start: 2025-07-08 | End: 2025-07-11 | Stop reason: HOSPADM

## 2025-07-08 RX ORDER — ONDANSETRON 2 MG/ML
4 INJECTION INTRAMUSCULAR; INTRAVENOUS EVERY 4 HOURS PRN
Status: DISCONTINUED | OUTPATIENT
Start: 2025-07-08 | End: 2025-07-11 | Stop reason: HOSPADM

## 2025-07-08 RX ORDER — OXYCODONE HYDROCHLORIDE 10 MG/1
10 TABLET ORAL EVERY 4 HOURS PRN
Refills: 0 | Status: DISCONTINUED | OUTPATIENT
Start: 2025-07-08 | End: 2025-07-10

## 2025-07-08 RX ADMIN — HEPARIN SODIUM 16 UNITS/KG/HR: 10000 INJECTION, SOLUTION INTRAVENOUS at 16:06

## 2025-07-08 RX ADMIN — ISOSORBIDE MONONITRATE 30 MG: 30 TABLET, EXTENDED RELEASE ORAL at 08:50

## 2025-07-08 RX ADMIN — INSULIN LISPRO 1 UNITS: 100 INJECTION, SOLUTION INTRAVENOUS; SUBCUTANEOUS at 11:09

## 2025-07-08 RX ADMIN — SODIUM CHLORIDE 50 ML/HR: 0.9 INJECTION, SOLUTION INTRAVENOUS at 02:33

## 2025-07-08 RX ADMIN — METOPROLOL SUCCINATE 25 MG: 25 TABLET, EXTENDED RELEASE ORAL at 08:22

## 2025-07-08 RX ADMIN — FAMOTIDINE 20 MG: 20 TABLET, FILM COATED ORAL at 08:22

## 2025-07-08 RX ADMIN — ATORVASTATIN CALCIUM 80 MG: 80 TABLET, FILM COATED ORAL at 17:46

## 2025-07-08 RX ADMIN — HEPARIN SODIUM 18 UNITS/KG/HR: 10000 INJECTION, SOLUTION INTRAVENOUS at 02:22

## 2025-07-08 RX ADMIN — INSULIN LISPRO 1 UNITS: 100 INJECTION, SOLUTION INTRAVENOUS; SUBCUTANEOUS at 17:47

## 2025-07-08 RX ADMIN — CLOPIDOGREL BISULFATE 75 MG: 75 TABLET, FILM COATED ORAL at 08:22

## 2025-07-08 RX ADMIN — ISOSORBIDE MONONITRATE 30 MG: 30 TABLET, EXTENDED RELEASE ORAL at 17:46

## 2025-07-08 NOTE — H&P
H&P - Vascular Surgery   Name: Maxime Green 60 y.o. male I MRN: [id number]  Unit/Bed#: ED 26 I Date of Admission: 7/8/2025   Date of Service: 7/8/2025 I Hospital Day: 0     Assessment & Plan  Femoral-popliteal bypass graft occlusion, right, subsequent encounter  59 y/o M w/ history of PAD w/ claudication s/p Left SFA stent 1/20/22, Right Fem- POP bypass 2/3/2022 (Zev), RLE bypass graft occlusion s/p lysis, PTA, Viabahn stent 10/19/22 and repeat lysis with noted CFA stenosis above the bifurcation 6/2-3/2025. He now represents in transfer from [place] w/th RLE bypass graft occlusion. Initially he c/o rest pain but is now pain free at rest, M/S are intact. DP/PT appreciated with doppler. Currently on Heparin gtt.    Plan:  -Acute RLE bypass graft occlusion, now asymptomatic at rest, likely returned to claudication as prior to bypass surgery.  -Admit to Vascular Surgery service, Dr. Mc, anticipate >2MN stay for lysis   -Continue Heparin gtt (Xarelto held)  -IR consult for Agram, possible lysis/intervention  -CTH/Fibrinogen  -Keep NPO     History of Present Illness   Maxime Green is a 60 y.o. male with past medical history of CAD s/p CABG with left GSV harvest, DM, GERD, HTN, PAD s/p left SFA stent, right femoropopliteal bypass, history of bypass occlusion x 2 s/p successful lysis most recently June 2025 maintained on Xarelto/Plavix who presents with sudden onset of right lower extremity pain after cutting the grass Scott evening.  He states the same day he went to the gym and was doing heavy weighted leg presses.  He presented to the emergency room at [facility] with complaint of right leg pain.  CTA revealed right lower extremity bypass graft occlusion.  He was transferred to [facility] for vascular evaluation and management.  On arrival to [facility] he was receiving heparin drip and his rest pain had resolved.  On examination, motor and sensation are intact, there is no pain or  tenderness in the right lower extremity at rest or with palpation, Doppler DP and PT signals are present.  His bypass was originally performed in 2022 for claudication symptoms.    Review of Systems   Constitutional: Negative.    HENT: Negative.     Eyes: Negative.    Respiratory: Negative.     Cardiovascular: Negative.    Gastrointestinal: Negative.    Endocrine: Negative.    Genitourinary: Negative.    Musculoskeletal:  Positive for myalgias.   Skin:  Positive for pallor.   Allergic/Immunologic: Negative.    Neurological: Negative.    Hematological: Negative.    Psychiatric/Behavioral: Negative.       Medical History Review: I have reviewed the patient's PMH, PSH, Social History, Family History, Meds, and Allergies     Objective :  Temp:  [97.8 °F (36.6 °C)-97.9 °F (36.6 °C)] 97.8 °F (36.6 °C)  HR:  [54-76] 59  BP: (123-165)/(70-83) 165/83  Resp:  [18-19] 18  SpO2:  [97 %-100 %] 99 %  O2 Device: None (Room air)    I/O       None            Physical Exam  Constitutional:       General: He is not in acute distress.     Appearance: Normal appearance. He is not ill-appearing.   HENT:      Head: Normocephalic and atraumatic.      Nose: Nose normal.      Mouth/Throat:      Mouth: Mucous membranes are moist.      Pharynx: Oropharynx is clear.     Eyes:      Extraocular Movements: Extraocular movements intact.      Conjunctiva/sclera: Conjunctivae normal.       Cardiovascular:      Rate and Rhythm: Normal rate and regular rhythm.      Heart sounds: Normal heart sounds.   Pulmonary:      Effort: Pulmonary effort is normal.      Breath sounds: Normal breath sounds.   Abdominal:      General: Abdomen is flat. There is no distension.      Palpations: Abdomen is soft.      Tenderness: There is no abdominal tenderness.     Musculoskeletal:         General: No tenderness. Normal range of motion.      Cervical back: Normal range of motion and neck supple.      Right lower leg: No edema.      Left lower leg: No edema.     Skin:      General: Skin is warm and dry.      Capillary Refill: Capillary refill takes more than 3 seconds.      Findings: No lesion.     Neurological:      General: No focal deficit present.      Mental Status: He is alert and oriented to person, place, and time.      Cranial Nerves: No cranial nerve deficit.      Sensory: No sensory deficit.      Motor: No weakness.     Psychiatric:         Mood and Affect: Mood normal.         Behavior: Behavior normal.         Thought Content: Thought content normal.         Judgment: Judgment normal.            Lab Results: I have reviewed the following results:  Recent Labs     07/07/25 1952   WBC 6.34   HGB 13.5   HCT 41.5      SODIUM 134*   K 4.1      CO2 26   BUN 18   CREATININE 0.97   GLUC 142*   AST 21   ALT 22   ALB 4.8   TBILI 1.14*   ALKPHOS 78   PTT 38*   INR 1.42*       Imaging Results Review: I reviewed radiology reports from this admission including: CT A.  Other Study Results Review: No additional pertinent studies reviewed.    VTE Prophylaxis: Heparin

## 2025-07-08 NOTE — ED PROVIDER NOTES
Time reflects when diagnosis was documented in both MDM as applicable and the Disposition within this note       Time User Action Codes Description Comment    7/7/2025  9:49 PM Nestor Duque Add [I70.221] Critical limb ischemia of right lower extremity (HCC)           ED Disposition       ED Disposition   Transfer to Another Facility-In Network    Condition   --    Date/Time   Mon Jul 7, 2025  9:48 PM    Comment   Maxime Green should be transferred out to [facility] under the care of Dr. Mc.               Assessment & Plan       Medical Decision Making  60-year-old male with right leg pain reminiscent of previous episode of occluded graft.  Patient has pale foot with no dopplerable pulses, concern for recurrence of ischemia, will discuss case with vascular, check CTA, anticipate transfer.    Amount and/or Complexity of Data Reviewed  Labs: ordered.  Radiology: ordered.    Risk  Prescription drug management.             Medications   fentaNYL injection 75 mcg (75 mcg Intravenous Given 7/7/25 2005)   heparin (porcine) injection 5,200 Units (5,200 Units Intravenous Given 7/7/25 2011)   iohexol (OMNIPAQUE) 350 MG/ML injection (MULTI-DOSE) 100 mL (120 mL Intravenous Given 7/7/25 2032)       ED Risk Strat Scores                    No data recorded        SBIRT 20yo+      Flowsheet Row Most Recent Value   Initial Alcohol Screen: US AUDIT-C     1. How often do you have a drink containing alcohol? 0 Filed at: 07/07/2025 2128   2. How many drinks containing alcohol do you have on a typical day you are drinking?  0 Filed at: 07/07/2025 2128   3a. Male UNDER 65: How often do you have five or more drinks on one occasion? 0 Filed at: 07/07/2025 2128   Audit-C Score 0 Filed at: 07/07/2025 2128   EVGENY: How many times in the past year have you...    Used an illegal drug or used a prescription medication for non-medical reasons? Never Filed at: 07/07/2025 2128                            History of Present Illness       Chief Complaint    Patient presents with    Leg Pain     Pt arrives c/o R leg pain starting last night. Pt recently admitted due to occlusion and R limb ischemia.        Past Medical History[1]   Past Surgical History[2]   Family History[3]   Social History[4]   E-Cigarette/Vaping    E-Cigarette Use Never User       E-Cigarette/Vaping Substances    Nicotine No     THC No     CBD No     Flavoring No       I have reviewed and agree with the history as documented.     60-year-old male presents emergency department for evaluation of back pain.  Patient has right leg pain that started after he was cutting grass, it is reminiscent of episode from about 1 month ago when he had a clot of his bypass graft and ischemia.  He does have some paleness of the foot though no numbness without weakness.  He states the pain is similar but not as bad he wanted to come in before it got worse.        Review of Systems   Constitutional:  Negative for appetite change, chills, fatigue and fever.   HENT:  Negative for sneezing and sore throat.    Eyes:  Negative for visual disturbance.   Respiratory:  Negative for cough, choking, chest tightness, shortness of breath and wheezing.    Cardiovascular:  Negative for chest pain and palpitations.   Gastrointestinal:  Negative for abdominal pain, constipation, diarrhea, nausea and vomiting.   Genitourinary:  Negative for difficulty urinating and dysuria.   Musculoskeletal:  Positive for arthralgias and myalgias.   Neurological:  Negative for dizziness, weakness, light-headedness, numbness and headaches.   All other systems reviewed and are negative.          Objective       ED Triage Vitals   Temperature Pulse Blood Pressure Respirations SpO2 Patient Position - Orthostatic VS   07/07/25 1757 07/07/25 1757 07/07/25 1757 07/07/25 1757 07/07/25 1757 07/07/25 1757   97.9 °F (36.6 °C) 76 151/77 18 100 % Sitting      Temp Source Heart Rate Source BP Location FiO2 (%) Pain Score    07/07/25 1757 07/07/25 1757 07/07/25  1757 -- 07/07/25 2005    Temporal Monitor Left arm  10 - Worst Possible Pain      Vitals      Date and Time Temp Pulse SpO2 Resp BP Pain Score FACES Pain Rating User   07/08/25 0000 -- 54 97 % 18 123/74 -- -- [facility]   07/07/25 2351 -- 59 98 % 19 141/82 -- -- [facility]   07/07/25 2100 -- 64 97 % 18 140/70 -- -- AM   07/07/25 2005 -- -- -- -- -- 10 - Worst Possible Pain -- AM   07/07/25 1757 97.9 °F (36.6 °C) 76 100 % 18 151/77 -- -- BS            Physical Exam  Vitals and nursing note reviewed.   Constitutional:       General: He is not in acute distress.     Appearance: He is well-developed. He is not diaphoretic.   HENT:      Head: Normocephalic and atraumatic.     Eyes:      Pupils: Pupils are equal, round, and reactive to light.     Neck:      Vascular: No JVD.      Trachea: No tracheal deviation.     Cardiovascular:      Rate and Rhythm: Normal rate and regular rhythm.      Heart sounds: Normal heart sounds. No murmur heard.     No friction rub. No gallop.   Pulmonary:      Effort: Pulmonary effort is normal. No respiratory distress.      Breath sounds: Normal breath sounds. No wheezing or rales.   Abdominal:      General: Bowel sounds are normal. There is no distension.      Palpations: Abdomen is soft.      Tenderness: There is no abdominal tenderness. There is no guarding or rebound.     Musculoskeletal:      Comments: No dopplerable pulse on the right lower extremity.  Patient has very sluggish cap refill.  He has no neurologic deficits     Skin:     General: Skin is warm and dry.      Coloration: Skin is not pale.     Neurological:      Mental Status: He is alert and oriented to person, place, and time.      Cranial Nerves: No cranial nerve deficit.      Motor: No abnormal muscle tone.     Psychiatric:         Behavior: Behavior normal.         Results Reviewed       Procedure Component Value Units Date/Time    Protime-INR [[id number]]  (Abnormal) Collected: 07/07/25 1952    Lab Status: Final result Specimen: Blood  from Arm, Right Updated: 07/07/25 2030     Protime 18.1 seconds      INR 1.42    Narrative:      INR Therapeutic Range    Indication                                             INR Range      Atrial Fibrillation                                               2.0-3.0  Hypercoagulable State                                    2.0.2.3  Left Ventricular Asist Device                            2.0-3.0  Mechanical Heart Valve                                  -    Aortic(with afib, MI, embolism, HF, LA enlargement,    and/or coagulopathy)                                     2.0-3.0 (2.5-3.5)     Mitral                                                             2.5-3.5  Prosthetic/Bioprosthetic Heart Valve               2.0-3.0  Venous thromboembolism (VTE: VT, PE        2.0-3.0    APTT [[id number]]  (Abnormal) Collected: 07/07/25 1952    Lab Status: Final result Specimen: Blood from Arm, Right Updated: 07/07/25 2030     PTT 38 seconds     Comprehensive metabolic panel [[id number]]  (Abnormal) Collected: 07/07/25 1952    Lab Status: Final result Specimen: Blood from Arm, Right Updated: 07/07/25 2017     Sodium 134 mmol/L      Potassium 4.1 mmol/L      Chloride 101 mmol/L      CO2 26 mmol/L      ANION GAP 7 mmol/L      BUN 18 mg/dL      Creatinine 0.97 mg/dL      Glucose 142 mg/dL      Calcium 10.2 mg/dL      AST 21 U/L      ALT 22 U/L      Alkaline Phosphatase 78 U/L      Total Protein 8.4 g/dL      Albumin 4.8 g/dL      Total Bilirubin 1.14 mg/dL      eGFR 84 ml/min/1.73sq m     Narrative:      National Kid[employer] Disease Foundation guidelines for Chronic Kidney Disease (CKD):     Stage 1 with normal or high GFR (GFR > 90 mL/min/1.73 square meters)    Stage 2 Mild CKD (GFR = 60-89 mL/min/1.73 square meters)    Stage 3A Moderate CKD (GFR = 45-59 mL/min/1.73 square meters)    Stage 3B Moderate CKD (GFR = 30-44 mL/min/1.73 square meters)    Stage 4 Severe CKD (GFR = 15-29 mL/min/1.73 square meters)    Stage 5 End Stage CKD (GFR <15  mL/min/1.73 square meters)  Note: GFR calculation is accurate only with a steady state creatinine    CBC and differential [[id number]]  (Abnormal) Collected: 07/07/25 1952    Lab Status: Final result Specimen: Blood from Arm, Right Updated: 07/07/25 2003     WBC 6.34 Thousand/uL      RBC 5.16 Million/uL      Hemoglobin 13.5 g/dL      Hematocrit 41.5 %      MCV 80 fL      MCH 26.2 pg      MCHC 32.5 g/dL      RDW 13.8 %      MPV 9.3 fL      Platelets 189 Thousands/uL      nRBC 0 /100 WBCs      Segmented % 61 %      Immature Grans % 0 %      Lymphocytes % 25 %      Monocytes % 11 %      Eosinophils Relative 2 %      Basophils Relative 1 %      Absolute Neutrophils 3.83 Thousands/µL      Absolute Immature Grans 0.01 Thousand/uL      Absolute Lymphocytes 1.59 Thousands/µL      Absolute Monocytes 0.71 Thousand/µL      Eosinophils Absolute 0.15 Thousand/µL      Basophils Absolute 0.05 Thousands/µL             CTA lower extremity right w wo contrast    (Results Pending)       Procedures    ED Medication and Procedure Management   Prior to Admission Medications   Prescriptions Last Dose Informant Patient Reported? Taking?   Jardiance 25 MG TABS  Self No No   Sig: TAKE 1 TABLET (25 MG TOTAL) BY MOUTH DAILY.   acetaminophen (TYLENOL) 325 mg tablet  Self No No   Sig: Take 2 tablets (650 mg total) by mouth every 6 (six) hours as needed for mild pain   clopidogrel (PLAVIX) 75 mg tablet  Self No No   Sig: Take 1 tablet (75 mg total) by mouth daily   famotidine (PEPCID) 20 mg tablet  Self No No   Sig: Take 1 tablet (20 mg total) by mouth daily   isosorbide mononitrate (IMDUR) 30 mg 24 hr tablet  Self No No   Sig: TAKE 1 TABLET BY MOUTH TWICE A DAY   metFORMIN (GLUCOPHAGE) 1000 MG tablet  Self No No   Sig: TAKE 1 TABLET BY MOUTH TWICE A DAY WITH FOOD   metoprolol succinate (TOPROL-XL) 25 mg 24 hr tablet  Self No No   Sig: TAKE 1 TABLET (25 MG TOTAL) BY MOUTH DAILY.   rivaroxaban (Xarelto) 20 mg tablet  Self No No   Sig: TAKE 1 TABLET  BY MOUTH DAILY WITH BREAKFAST   rosuvastatin (CRESTOR) 10 MG tablet  Self No No   Sig: Take 1 tablet (10 mg total) by mouth every other day      Facility-Administered Medications: None     Discharge Medication List as of 7/8/2025 12:34 AM        CONTINUE these medications which have NOT CHANGED    Details   acetaminophen (TYLENOL) 325 mg tablet Take 2 tablets (650 mg total) by mouth every 6 (six) hours as needed for mild pain, Starting Wed 6/4/2025, OTC      clopidogrel (PLAVIX) 75 mg tablet Take 1 tablet (75 mg total) by mouth daily, Starting Wed 6/4/2025, Normal      famotidine (PEPCID) 20 mg tablet Take 1 tablet (20 mg total) by mouth daily, Starting Tue 3/18/2025, Normal      isosorbide mononitrate (IMDUR) 30 mg 24 hr tablet TAKE 1 TABLET BY MOUTH TWICE A DAY, Starting Sun 6/8/2025, Normal      Jardiance 25 MG TABS TAKE 1 TABLET (25 MG TOTAL) BY MOUTH DAILY., Normal      metFORMIN (GLUCOPHAGE) 1000 MG tablet TAKE 1 TABLET BY MOUTH TWICE A DAY WITH FOOD, Starting Thu 3/13/2025, Normal      metoprolol succinate (TOPROL-XL) 25 mg 24 hr tablet TAKE 1 TABLET (25 MG TOTAL) BY MOUTH DAILY., Starting Thu 3/13/2025, Normal      rivaroxaban (Xarelto) 20 mg tablet TAKE 1 TABLET BY MOUTH DAILY WITH BREAKFAST, Starting Wed 10/16/2024, Normal      rosuvastatin (CRESTOR) 10 MG tablet Take 1 tablet (10 mg total) by mouth every other day, Starting Fri 9/13/2024, Normal           No discharge procedures on file.  ED SEPSIS DOCUMENTATION   Time reflects when diagnosis was documented in both MDM as applicable and the Disposition within this note       Time User Action Codes Description Comment    7/7/2025  9:49 PM Nestor Duque Add [I70.221] Critical limb ischemia of right lower extremity (HCC)                    [1]   Past Medical History:  Diagnosis Date    Bicuspid aortic valve     being observed    CAD (coronary artery disease)     Chest pain 4/6/2021    Coronary artery disease     Diabetes mellitus (HCC)     Encounter for  postoperative care 5/17/2021    Former tobacco use     GERD (gastroesophageal reflux disease)     Goiter     History of myocardial infarction     History of rib fracture     Hyperlipidemia     Hypertension     Hypertensive urgency 4/6/2021    Left carotid bruit 7/22/2021    Leg pain, bilateral     Agram today LLE   1/20/2022    Leukocytosis 4/6/2021    Myocardial infarction (HCC)     2021-   CABG  x3    NSTEMI (non-ST elevated myocardial infarction) (HCC) 4/7/2021    Wears glasses    [2]   Past Surgical History:  Procedure Laterality Date    ARTERIOGRAM Right 10/19/2022    Procedure: RIGHT LOWER EXTREMITY ARTERIOGRAM WITH LYSIS, PLACEMENT OF LYSIS CATHETER; third order cannulation of right popliteal artery via left groin access;  Surgeon: Ronald Ordonez MD;  [facility]: [facility];  Service: Vascular    CARDIAC CATHETERIZATION      CARDIAC CATHETERIZATION N/A 10/20/2021    Procedure: Cardiac catheterization;  Surgeon: Lynnette Reeder MD;  Location: [facility];  Service: Cardiology    CARDIAC CATHETERIZATION N/A 10/20/2021    Procedure: Cardiac Coronary Angiogram;  Surgeon: Lynnette Reeder MD;  [facility];  Service: Cardiology    CARDIAC CATHETERIZATION N/A 10/20/2021    Procedure: Cardiac pci;  Surgeon: Lynnette Reeder MD;  [facility];  Service: Cardiology    IR LOWER EXTREMITY ANGIOGRAM  1/20/2022    IR LOWER EXTREMITY ANGIOGRAM  2/3/2022    IR LOWER EXTREMITY ANGIOGRAM  10/19/2022    IR LOWER EXTREMITY ANGIOGRAM  6/2/2025    IR PICC PLACEMENT DOUBLE LUMEN  10/19/2022    IR PICC PLACEMENT DOUBLE LUMEN  6/2/2025    IR TPA LYSIS CHECK  10/20/2022    IR TPA LYSIS CHECK  6/3/2025    MT BYPASS W/VEIN FEMORAL-POPLITEAL Right 2/3/2022    Procedure: BYPASS FEMORAL-POPLITEAL;  Surgeon: Pradeep Brothers MD;  [facility];  Service: Vascular    MT CORONARY ARTERY BYP W/VEIN & ARTERY GRAFT 4 VEIN N/A 4/9/2021    Procedure: CORONARY ARTERY BYPASS GRAFT (CABG) 3  VESSELS: LIMA to LAD; LLE EVH/SVG to LPL & OM2;  Surgeon: Jose Sanchez DO;  Location: [facility];  Service: Cardiac Surgery    TX ECHO TRANSESOPHAG R-T 2D W/PRB IMG ACQUISJ I&R N/A 2021    Procedure: TRANSESOPHAGEAL ECHOCARDIOGRAM (BENNETT);  Surgeon: Jose Sanchez DO;  [facility]: [facility];  Service: Cardiac Surgery    TX NDSC SURG W/VIDEO-ASSISTED HARVEST VEIN CABG Left 2021    Procedure: HARVEST VEIN ENDOSCOPIC (EVH);  Surgeon: Jose Sanchez DO;  [facility];  Service: Cardiac Surgery    TX SLCTV CATHJ 3RD+ ORD SLCTV ABDL PEL/LXTR BRNCH Left 2022    Procedure: ARTERIOGRAM, STENT PLACEMENT IN LEFT SUPERFICIAL FEMORIAL ARTERY;  Surgeon: Pradeep Brothers MD;  [facility];  Service: Vascular    VASECTOMY     [3]   Family History  Problem Relation Name Age of Onset    Heart disease Father     [4]   Social History  Tobacco Use    Smoking status: Former     Current packs/day: 0.00     Average packs/day: 1 pack/day for 38.0 years (38.0 ttl pk-yrs)     Types: Cigarettes     Start date: 1983     Quit date: 2021     Years since quittin.2    Smokeless tobacco: Never   Vaping Use    Vaping status: Never Used   Substance Use Topics    Alcohol use: Never    Drug use: Never        Nestor Duque MD  [phone]

## 2025-07-08 NOTE — ED NOTES
Heparin gtt started at 2011, placed order for repeat ptt @ 0200 7/8/25, 6 hrs after gtt initiation.      Marisol Shane  07/07/25 0453

## 2025-07-08 NOTE — PLAN OF CARE
Problem: PAIN - ADULT  Goal: Verbalizes/displays adequate comfort level or baseline comfort level  Description: Interventions:  - Encourage patient to monitor pain and request assistance  - Assess pain using appropriate pain scale  - Administer analgesics as ordered based on type and severity of pain and evaluate response  - Implement non-pharmacological measures as appropriate and evaluate response  - Consider cultural and social influences on pain and pain management  - Notify physician/advanced practitioner if interventions unsuccessful or patient reports new pain  - Educate patient/family on pain management process including their role and importance of  reporting pain   - Provide non-pharmacologic/complimentary pain relief interventions  Outcome: Progressing     Problem: SAFETY ADULT  Goal: Patient will remain free of falls  Description: INTERVENTIONS:  - Educate patient/family on patient safety including physical limitations  - Instruct patient to call for assistance with activity   - Consider consulting OT/PT to assist with strengthening/mobility based on AM PAC & JH-HLM score  - Consult OT/PT to assist with strengthening/mobility   - Keep Call bell within reach  - Keep bed low and locked with side rails adjusted as appropriate  - Keep care items and personal belongings within reach  - Initiate and maintain comfort rounds  - Make Fall Risk Sign visible to staff  - Offer Toileting every  Hours, in advance of need  - Initiate/Maintain alarm  - Obtain necessary fall risk management equipment:  - Apply yellow socks and bracelet for high fall risk patients  - Consider moving patient to room near nurses station  Outcome: Progressing  Goal: Maintain or return to baseline ADL function  Description: INTERVENTIONS:  -  Assess patient's ability to carry out ADLs; assess patient's baseline for ADL function and identify physical deficits which impact ability to perform ADLs (bathing, care of mouth/teeth, toileting,  grooming, dressing, etc.)  - Assess/evaluate cause of self-care deficits   - Assess range of motion  - Assess patient's mobility; develop plan if impaired  - Assess patient's need for assistive devices and provide as appropriate  - Encourage maximum independence but intervene and supervise when necessary  - Involve family in performance of ADLs  - Assess for home care needs following discharge   - Consider OT consult to assist with ADL evaluation and planning for discharge  - Provide patient education as appropriate  - Monitor functional capacity and physical performance, use of AM PAC & -HLM   - Monitor gait, balance and fatigue with ambulation    Outcome: Progressing  Goal: Maintains/Returns to pre admission functional level  Description: INTERVENTIONS:  - Perform AM-PAC 6 Click Basic Mobility/ Daily Activity assessment daily.  - Set and communicate daily mobility goal to care team and patient/family/caregiver.   - Collaborate with rehabilitation services on mobility goals if consulted  - Perform Range of Motion  times a day.  - Reposition patient every  hours.  - Dangle patient  times a day  - Stand patient times a day  - Ambulate patient  times a day  - Out of bed to chair  times a day   - Out of bed for meals  times a day  - Out of bed for toileting  - Record patient progress and toleration of activity level   Outcome: Progressing     Problem: CARDIOVASCULAR - ADULT  Goal: Maintains optimal cardiac output and hemodynamic stability  Description: INTERVENTIONS:  - Monitor I/O, vital signs and rhythm  - Monitor for S/S and trends of decreased cardiac output  - Administer and titrate ordered vasoactive medications to optimize hemodynamic stability  - Assess quality of pulses, skin color and temperature  - Assess for signs of decreased coronary artery perfusion  - Instruct patient to report change in severity of symptoms  Outcome: Progressing  Goal: Absence of cardiac dysrhythmias or at baseline  rhythm  Description: INTERVENTIONS:  - Continuous cardiac monitoring, vital signs, obtain 12 lead EKG if ordered  - Administer antiarrhythmic and heart rate control medications as ordered  - Monitor electrolytes and administer replacement therapy as ordered  Outcome: Progressing     Problem: SKIN/TISSUE INTEGRITY - ADULT  Goal: Skin Integrity remains intact(Skin Breakdown Prevention)  Description: Assess:  -Perform Pavan assessment every   -Clean and moisturize skin every  -Inspect skin when repositioning, toileting, and assisting with ADLS  -Assess under medical devices such as  every   -Assess extremities for adequate circulation and sensation     Bed Management:  -Have minimal linens on bed & keep smooth, unwrinkled  -Change linens as needed when moist or perspiring  -Avoid sitting or lying in one position for more than  hours while in bed  -Keep HOB at degrees     Toileting:  -Offer bedside commode  -Assess for incontinence every  -Use incontinent care products after each incontinent episode such as     Activity:  -Mobilize patient  times a day  -Encourage activity and walks on unit  -Encourage or provide ROM exercises   -Turn and reposition patient every Hours  -Use appropriate equipment to lift or move patient in bed  -Instruct/ Assist with weight shifting every  when out of bed in chair  -Consider limitation of chair time  hour intervals    Skin Care:  -Avoid use of baby powder, tape, friction and shearing, hot water or constrictive clothing  -Relieve pressure over bony prominences using   -Do not massage red bony areas    Next Steps:  -Teach patient strategies to minimize risks such as    -Consider consults to  interdisciplinary teams such as   Outcome: Progressing  Goal: Incision(s), wounds(s) or drain site(s) healing without S/S of infection  Description: INTERVENTIONS  - Assess and document dressing, incision, wound bed, drain sites and surrounding tissue  - Provide patient and family education  -  Perform skin care/dressing changes every   Outcome: Progressing  Goal: Pressure injury heals and does not worsen  Description: Interventions:  - Implement low air loss mattress or specialty surface (Criteria met)  - Apply silicone foam dressing  - Instruct/assist with weight shifting every  minutes when in chair   - Limit chair time to hour intervals  - Use special pressure reducing interventions such when in chair   - Apply fecal or urinary incontinence containment device   - Perform passive or active ROM every   - Turn and reposition patient & offload bony prominences every hours   - Utilize friction reducing device or surface for transfers   - Consider consults to  interdisciplinary teams such as   - Use incontinent care products after each incontinent episode such as   - Consider nutrition services referral as needed  Outcome: Progressing     Problem: HEMATOLOGIC - ADULT  Goal: Maintains hematologic stability  Description: INTERVENTIONS  - Assess for signs and symptoms of bleeding or hemorrhage  - Monitor labs  - Administer supportive blood products/factors as ordered and appropriate  Outcome: Progressing

## 2025-07-08 NOTE — LETTER
[facility]  [address]  [place] [address]  Dept: [phone]    July 11, 2025     Patient: Maxime Green   YOB: 1965   Date of Visit: 7/8/2025       To Whom it May Concern:    Maxime Green is under my professional care. He was seen in the hospital from 7/8/2025 to 07/11/25. 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 Please refer to follow up appointment for clearance to return to work.    If you have any questions or concerns, please don't hesitate to call the [facility], [facility].         Sincerely,            Godwin Saunders PA-C    Supervising physician: Dr. Adri Burton

## 2025-07-08 NOTE — TELEPHONE ENCOUNTER
Can the completed forms be brought to [facility] on 4/28/22 or can they be scanned into pt's chart? Jt Stevens is a 57 year old male presenting to the walk-in clinic today for Abd Pain.  Got to [place]  Diarrhea since that time, was seen in [place] - given rx to help with diarrhea & abx.  Denies fevers, N/V. No urinary sx.     Denies known Latex allergy or symptoms of Latex sensitivity.  Medications reviewed and updated.  Pharmacy reviewed & updated as per patient preference.     Standing orders collected during rooming process: none.    Patient advised staff will contact with positive results only.  Patient instructed can also view results on [employer].     PPE worn during room process  RN: gloves, N95 w/L3 mask.  Patient: mask.     Patient location: Room# 25.    Patient would like communication of their results via:    [employer]

## 2025-07-08 NOTE — TELEMEDICINE
e-Consult (IPC)  - Interventional Radiology  Maxime Green 60 y.o. male MRN: [id number]  Unit/Bed#: ED 26 Encounter: [id number]          Interventional Radiology has been consulted to evaluate Maxime Green    We were consulted by vascular surgery concerning this patient with RLE fem pop bypass graft occlusion    Inpatient Consult to IR  Consult performed by: Soha Correia PA-C  Consult ordered by: Gabby Membreno PA-C        07/08/25    Assessment/Recommendation:     60 year old male with pmh of CAD, CABG, DM2, former smoker, RLE fem pop bypass graft occlusion 6/2-6/3/25, presenting with RLE bypass graft occlusion. Originally with rest pain but has improved with heparin gtt.     CTA 7/7/25: Occluded right bypass graft  Thrombus in proximal anterior tibial artery with no flow distally    - last xarelto confirmed with patient 7/7 morning. Will plan for lysis initiation 7/9. Please continue to hold xarelto  - CT head ordered  - please keep npo after midnight  - discussed with patient    11-20 minutes, >50% of the total time devoted to medical consultative verbal/EMR discussion between providers. Written report will be generated in the EMR.     Thank you for allowing Interventional Radiology to participate in the care of Maxime Green. Please don't hesitate to call or TigerText us with any questions.     Soha Correia PA-C

## 2025-07-08 NOTE — EMTALA/ACUTE CARE TRANSFER
[facility] EMERGENCY DEPARTMENT  [address]  [facility] [address]  Dept: [phone]      EMTALA TRANSFER CONSENT    NAME Maxime COOK 1965                              MRN [id number]    I have been informed of my rights regarding examination, treatment, and transfer   by Dr. Nestor Duque MD    Benefits: Specialized equipment and/or services available at the receiving facility (Include comment)________________________    Risks: Potential for delay in receiving treatment      Transfer Request   I acknowledge that my medical condition has been evaluated and explained to me by the emergency department physician or other qualified medical person and/or my attending physician who has recommended and offered to me further medical examination and treatment. I understand the Hospital's obligation with respect to the treatment and stabilization of my emergency medical condition. I nevertheless request to be transferred. I release the Hospital, the doctor, and any other persons caring for me from all responsibility or liability for any injury or ill effects that may result from my transfer and agree to accept all responsibility for the consequences of my choice to transfer, rather than receive stabilizing treatment at the Hospital. I understand that because the transfer is my request, my insurance may not provide reimbursement for the services.  The Hospital will assist and direct me and my family in how to make arrangements for transfer, but the hospital is not liable for any fees charged by the transport service.  In spite of this understanding, I refuse to consent to further medical examination and treatment which has been offered to me, and request transfer to Accepting Facility Name, City & State : [facility]. I authorize the performance of emergency medical procedures and treatments upon me in both transit and upon arrival at the receiving facility.   Additionally, I authorize the release of any and all medical records to the receiving facility and request they be transported with me, if possible.    I authorize the performance of emergency medical procedures and treatments upon me in both transit and upon arrival at the receiving facility.  Additionally, I authorize the release of any and all medical records to the receiving facility and request they be transported with me, if possible.  I understand that the safest mode of transportation during a medical emergency is an ambulance and that the Hospital advocates the use of this mode of transport. Risks of traveling to the receiving facility by car, including absence of medical control, life sustaining equipment, such as oxygen, and medical personnel has been explained to me and I fully understand them.    (RUTHY CORRECT BOX BELOW)  [  ]  I consent to the stated transfer and to be transported by ambulance/helicopter.  [  ]  I consent to the stated transfer, but refuse transportation by ambulance and accept full responsibility for my transportation by car.  I understand the risks of non-ambulance transfers and I exonerate the Hospital and its staff from any deterioration in my condition that results from this refusal.    X___________________________________________    DATE  25  TIME________  Signature of patient or legally responsible individual signing on patient behalf           RELATIONSHIP TO PATIENT_________________________          Provider Certification    NAME Maxime Green                                         1965                              MRN [id number]    A medical screening exam was performed on the above named patient.  Based on the examination:    Condition Necessitating Transfer The encounter diagnosis was Critical limb ischemia of right lower extremity (HCC).    Patient Condition: The patient has been stabilized such that within reasonable medical probability, no material  deterioration of the patient condition or the condition of the unborn child(susan) is likely to result from the transfer    Reason for Transfer: Level of Care needed not available at this facility    Transfer Requirements: Facility SLB   Space available and qualified personnel available for treatment as acknowledged by    Agreed to accept transfer and to provide appropriate medical treatment as acknowledged by       Dr. Mc  Appropriate medical records of the examination and treatment of the patient are provided at the time of transfer   STAFF INITIAL WHEN COMPLETED _______  Transfer will be performed by qualified personnel from    and appropriate transfer equipment as required, including the use of necessary and appropriate life support measures.    Provider Certification: I have examined the patient and explained the following risks and benefits of being transferred/refusing transfer to the patient/family:  General risk, such as traffic hazards, adverse weather conditions, rough terrain or turbulence, possible failure of equipment (including vehicle or aircraft), or consequences of actions of persons outside the control of the transport personnel      Based on these reasonable risks and benefits to the patient and/or the unborn child(susan), and based upon the information available at the time of the patient’s examination, I certify that the medical benefits reasonably to be expected from the provision of appropriate medical treatments at another medical facility outweigh the increasing risks, if any, to the individual’s medical condition, and in the case of labor to the unborn child, from effecting the transfer.    X____________________________________________ DATE 07/07/25        TIME_______      ORIGINAL - SEND TO MEDICAL RECORDS   COPY - SEND WITH PATIENT DURING TRANSFER

## 2025-07-08 NOTE — ASSESSMENT & PLAN NOTE
61 y/o M w/ history of PAD w/ claudication s/p Left SFA stent 1/20/22, Right Fem- POP bypass 2/3/2022 (Zev), RLE bypass graft occlusion s/p lysis, PTA, Viabahn stent 10/19/22 and repeat lysis with noted CFA stenosis above the bifurcation 6/2-3/2025. He now represents in transfer from [place] w/th RLE bypass graft occlusion. Initially he c/o rest pain but is now pain free at rest, M/S are intact. DP/PT appreciated with doppler. Currently on Heparin gtt.    Plan:  -Acute RLE bypass graft occlusion, now asymptomatic at rest, likely returned to claudication as prior to bypass surgery.  -Admit to Vascular Surgery service, Dr. Mc, anticipate >2MN stay for lysis   -Continue Heparin gtt (Xarelto held)  -IR consult for Johanneam, possible lysis/intervention  -CTH/Fibrinogen  -Keep NPO

## 2025-07-09 ENCOUNTER — APPOINTMENT (INPATIENT)
Dept: RADIOLOGY | Facility: HOSPITAL | Age: 60
DRG: 252 | End: 2025-07-09
Payer: COMMERCIAL

## 2025-07-09 ENCOUNTER — APPOINTMENT (INPATIENT)
Dept: RADIOLOGY | Facility: HOSPITAL | Age: 60
DRG: 252 | End: 2025-07-09
Attending: PHYSICIAN ASSISTANT
Payer: COMMERCIAL

## 2025-07-09 LAB
ANION GAP SERPL CALCULATED.3IONS-SCNC: 6 MMOL/L (ref 4–13)
APTT PPP: 48 SECONDS (ref 23–34)
APTT PPP: 67 SECONDS (ref 23–34)
APTT PPP: 80 SECONDS (ref 23–34)
BUN SERPL-MCNC: 15 MG/DL (ref 5–25)
CALCIUM SERPL-MCNC: 8.9 MG/DL (ref 8.4–10.2)
CHLORIDE SERPL-SCNC: 104 MMOL/L (ref 96–108)
CO2 SERPL-SCNC: 26 MMOL/L (ref 21–32)
CREAT SERPL-MCNC: 0.82 MG/DL (ref 0.6–1.3)
ERYTHROCYTE [DISTWIDTH] IN BLOOD BY AUTOMATED COUNT: 13.8 % (ref 11.6–15.1)
ERYTHROCYTE [DISTWIDTH] IN BLOOD BY AUTOMATED COUNT: 13.9 % (ref 11.6–15.1)
ERYTHROCYTE [DISTWIDTH] IN BLOOD BY AUTOMATED COUNT: 14 % (ref 11.6–15.1)
FIBRINOGEN PPP-MCNC: 158 MG/DL (ref 206–523)
GFR SERPL CREATININE-BSD FRML MDRD: 96 ML/MIN/1.73SQ M
GLUCOSE SERPL-MCNC: 131 MG/DL (ref 65–140)
GLUCOSE SERPL-MCNC: 135 MG/DL (ref 65–140)
GLUCOSE SERPL-MCNC: 149 MG/DL (ref 65–140)
GLUCOSE SERPL-MCNC: 150 MG/DL (ref 65–140)
HCT VFR BLD AUTO: 35.5 % (ref 36.5–49.3)
HCT VFR BLD AUTO: 36.2 % (ref 36.5–49.3)
HCT VFR BLD AUTO: 36.7 % (ref 36.5–49.3)
HGB BLD-MCNC: 11.7 G/DL (ref 12–17)
HGB BLD-MCNC: 11.9 G/DL (ref 12–17)
HGB BLD-MCNC: 11.9 G/DL (ref 12–17)
INR PPP: 1.16 (ref 0.85–1.19)
MCH RBC QN AUTO: 26.4 PG (ref 26.8–34.3)
MCH RBC QN AUTO: 26.5 PG (ref 26.8–34.3)
MCH RBC QN AUTO: 26.6 PG (ref 26.8–34.3)
MCHC RBC AUTO-ENTMCNC: 32.4 G/DL (ref 31.4–37.4)
MCHC RBC AUTO-ENTMCNC: 32.9 G/DL (ref 31.4–37.4)
MCHC RBC AUTO-ENTMCNC: 33 G/DL (ref 31.4–37.4)
MCV RBC AUTO: 80 FL (ref 82–98)
MCV RBC AUTO: 81 FL (ref 82–98)
MCV RBC AUTO: 82 FL (ref 82–98)
PA ADP BLD-ACNC: 300 PRU (ref 194–418)
PLATELET # BLD AUTO: 138 THOUSANDS/UL (ref 149–390)
PLATELET # BLD AUTO: 158 THOUSANDS/UL (ref 149–390)
PLATELET # BLD AUTO: 164 THOUSANDS/UL (ref 149–390)
PMV BLD AUTO: 9.2 FL (ref 8.9–12.7)
PMV BLD AUTO: 9.2 FL (ref 8.9–12.7)
PMV BLD AUTO: 9.9 FL (ref 8.9–12.7)
POTASSIUM SERPL-SCNC: 3.6 MMOL/L (ref 3.5–5.3)
PROTHROMBIN TIME: 15.1 SECONDS (ref 12.3–15)
RBC # BLD AUTO: 4.42 MILLION/UL (ref 3.88–5.62)
RBC # BLD AUTO: 4.47 MILLION/UL (ref 3.88–5.62)
RBC # BLD AUTO: 4.5 MILLION/UL (ref 3.88–5.62)
SODIUM SERPL-SCNC: 136 MMOL/L (ref 135–147)
WBC # BLD AUTO: 3.89 THOUSAND/UL (ref 4.31–10.16)
WBC # BLD AUTO: 4.21 THOUSAND/UL (ref 4.31–10.16)
WBC # BLD AUTO: 5.41 THOUSAND/UL (ref 4.31–10.16)

## 2025-07-09 PROCEDURE — NC001 PR NO CHARGE: Performed by: RADIOLOGY

## 2025-07-09 PROCEDURE — 82948 REAGENT STRIP/BLOOD GLUCOSE: CPT

## 2025-07-09 PROCEDURE — 76937 US GUIDE VASCULAR ACCESS: CPT

## 2025-07-09 PROCEDURE — C1769 GUIDE WIRE: HCPCS

## 2025-07-09 PROCEDURE — B5181ZA FLUOROSCOPY OF SUPERIOR VENA CAVA USING LOW OSMOLAR CONTRAST, GUIDANCE: ICD-10-PCS | Performed by: RADIOLOGY

## 2025-07-09 PROCEDURE — 80048 BASIC METABOLIC PNL TOTAL CA: CPT | Performed by: PHYSICIAN ASSISTANT

## 2025-07-09 PROCEDURE — 3E05317 INTRODUCTION OF OTHER THROMBOLYTIC INTO PERIPHERAL ARTERY, PERCUTANEOUS APPROACH: ICD-10-PCS | Performed by: STUDENT IN AN ORGANIZED HEALTH CARE EDUCATION/TRAINING PROGRAM

## 2025-07-09 PROCEDURE — 99153 MOD SED SAME PHYS/QHP EA: CPT

## 2025-07-09 PROCEDURE — 99232 SBSQ HOSP IP/OBS MODERATE 35: CPT | Performed by: SURGERY

## 2025-07-09 PROCEDURE — B41F1ZZ FLUOROSCOPY OF RIGHT LOWER EXTREMITY ARTERIES USING LOW OSMOLAR CONTRAST: ICD-10-PCS | Performed by: RADIOLOGY

## 2025-07-09 PROCEDURE — 36246 INS CATH ABD/L-EXT ART 2ND: CPT

## 2025-07-09 PROCEDURE — 85027 COMPLETE CBC AUTOMATED: CPT | Performed by: PHYSICIAN ASSISTANT

## 2025-07-09 PROCEDURE — 85027 COMPLETE CBC AUTOMATED: CPT | Performed by: RADIOLOGY

## 2025-07-09 PROCEDURE — 36247 INS CATH ABD/L-EXT ART 3RD: CPT | Performed by: RADIOLOGY

## 2025-07-09 PROCEDURE — 04HM33Z INSERTION OF INFUSION DEVICE INTO RIGHT POPLITEAL ARTERY, PERCUTANEOUS APPROACH: ICD-10-PCS | Performed by: RADIOLOGY

## 2025-07-09 PROCEDURE — 36573 INSJ PICC RS&I 5 YR+: CPT

## 2025-07-09 PROCEDURE — 85730 THROMBOPLASTIN TIME PARTIAL: CPT | Performed by: STUDENT IN AN ORGANIZED HEALTH CARE EDUCATION/TRAINING PROGRAM

## 2025-07-09 PROCEDURE — 99291 CRITICAL CARE FIRST HOUR: CPT | Performed by: SURGERY

## 2025-07-09 PROCEDURE — 37211 THROMBOLYTIC ART THERAPY: CPT | Performed by: RADIOLOGY

## 2025-07-09 PROCEDURE — C1751 CATH, INF, PER/CENT/MIDLINE: HCPCS

## 2025-07-09 PROCEDURE — 85384 FIBRINOGEN ACTIVITY: CPT | Performed by: RADIOLOGY

## 2025-07-09 PROCEDURE — 75625 CONTRAST EXAM ABDOMINL AORTA: CPT

## 2025-07-09 PROCEDURE — 37211 THROMBOLYTIC ART THERAPY: CPT

## 2025-07-09 PROCEDURE — 04HK33Z INSERTION OF INFUSION DEVICE INTO RIGHT FEMORAL ARTERY, PERCUTANEOUS APPROACH: ICD-10-PCS | Performed by: RADIOLOGY

## 2025-07-09 PROCEDURE — 36140 INTRO NDL ICATH UPR/LXTR ART: CPT

## 2025-07-09 PROCEDURE — 99152 MOD SED SAME PHYS/QHP 5/>YRS: CPT

## 2025-07-09 PROCEDURE — 85730 THROMBOPLASTIN TIME PARTIAL: CPT | Performed by: RADIOLOGY

## 2025-07-09 PROCEDURE — 02HV33Z INSERTION OF INFUSION DEVICE INTO SUPERIOR VENA CAVA, PERCUTANEOUS APPROACH: ICD-10-PCS | Performed by: RADIOLOGY

## 2025-07-09 PROCEDURE — 76937 US GUIDE VASCULAR ACCESS: CPT | Performed by: RADIOLOGY

## 2025-07-09 PROCEDURE — 99152 MOD SED SAME PHYS/QHP 5/>YRS: CPT | Performed by: RADIOLOGY

## 2025-07-09 PROCEDURE — C1887 CATHETER, GUIDING: HCPCS

## 2025-07-09 PROCEDURE — 85610 PROTHROMBIN TIME: CPT | Performed by: RADIOLOGY

## 2025-07-09 PROCEDURE — 36248 INS CATH ABD/L-EXT ART ADDL: CPT

## 2025-07-09 PROCEDURE — 85576 BLOOD PLATELET AGGREGATION: CPT

## 2025-07-09 RX ORDER — TICAGRELOR 90 MG/1
90 TABLET, FILM COATED ORAL EVERY 12 HOURS SCHEDULED
Qty: 60 TABLET | Refills: 2 | Status: SHIPPED | OUTPATIENT
Start: 2025-07-09 | End: 2025-07-30 | Stop reason: SDUPTHER

## 2025-07-09 RX ORDER — IODIXANOL 320 MG/ML
200 INJECTION, SOLUTION INTRAVASCULAR
Status: COMPLETED | OUTPATIENT
Start: 2025-07-09 | End: 2025-07-09

## 2025-07-09 RX ORDER — HYDROMORPHONE HCL/PF 1 MG/ML
1 SYRINGE (ML) INJECTION ONCE
Status: COMPLETED | OUTPATIENT
Start: 2025-07-09 | End: 2025-07-09

## 2025-07-09 RX ORDER — POTASSIUM CHLORIDE 1500 MG/1
40 TABLET, EXTENDED RELEASE ORAL ONCE
Status: COMPLETED | OUTPATIENT
Start: 2025-07-09 | End: 2025-07-09

## 2025-07-09 RX ORDER — MIDAZOLAM HYDROCHLORIDE 2 MG/2ML
INJECTION, SOLUTION INTRAMUSCULAR; INTRAVENOUS AS NEEDED
Status: COMPLETED | OUTPATIENT
Start: 2025-07-09 | End: 2025-07-09

## 2025-07-09 RX ORDER — HEPARIN SODIUM 10000 [USP'U]/100ML
400 INJECTION, SOLUTION INTRAVENOUS
Status: DISCONTINUED | OUTPATIENT
Start: 2025-07-09 | End: 2025-07-10

## 2025-07-09 RX ORDER — FENTANYL CITRATE 50 UG/ML
INJECTION, SOLUTION INTRAMUSCULAR; INTRAVENOUS AS NEEDED
Status: COMPLETED | OUTPATIENT
Start: 2025-07-09 | End: 2025-07-09

## 2025-07-09 RX ORDER — HYDROMORPHONE HCL/PF 1 MG/ML
0.5 SYRINGE (ML) INJECTION
Status: DISCONTINUED | OUTPATIENT
Start: 2025-07-09 | End: 2025-07-10

## 2025-07-09 RX ORDER — HEPARIN SODIUM 200 [USP'U]/100ML
20 INJECTION, SOLUTION INTRAVENOUS CONTINUOUS
Status: DISCONTINUED | OUTPATIENT
Start: 2025-07-09 | End: 2025-07-10

## 2025-07-09 RX ORDER — LIDOCAINE HYDROCHLORIDE 10 MG/ML
INJECTION, SOLUTION EPIDURAL; INFILTRATION; INTRACAUDAL; PERINEURAL AS NEEDED
Status: COMPLETED | OUTPATIENT
Start: 2025-07-09 | End: 2025-07-09

## 2025-07-09 RX ORDER — INSULIN LISPRO 100 [IU]/ML
1-5 INJECTION, SOLUTION INTRAVENOUS; SUBCUTANEOUS EVERY 6 HOURS SCHEDULED
Status: DISCONTINUED | OUTPATIENT
Start: 2025-07-09 | End: 2025-07-10

## 2025-07-09 RX ADMIN — FAMOTIDINE 20 MG: 20 TABLET, FILM COATED ORAL at 09:11

## 2025-07-09 RX ADMIN — IODIXANOL 133 ML: 320 INJECTION, SOLUTION INTRAVASCULAR at 13:45

## 2025-07-09 RX ADMIN — POTASSIUM CHLORIDE 40 MEQ: 1500 TABLET, EXTENDED RELEASE ORAL at 09:11

## 2025-07-09 RX ADMIN — ALTEPLASE 0.5 MG/HR: 2.2 INJECTION, POWDER, LYOPHILIZED, FOR SOLUTION INTRAVENOUS at 14:04

## 2025-07-09 RX ADMIN — HEPARIN SODIUM 400 UNITS/HR: 10000 INJECTION, SOLUTION INTRAVENOUS at 14:43

## 2025-07-09 RX ADMIN — ATORVASTATIN CALCIUM 80 MG: 80 TABLET, FILM COATED ORAL at 17:07

## 2025-07-09 RX ADMIN — ISOSORBIDE MONONITRATE 30 MG: 30 TABLET, EXTENDED RELEASE ORAL at 17:07

## 2025-07-09 RX ADMIN — ISOSORBIDE MONONITRATE 30 MG: 30 TABLET, EXTENDED RELEASE ORAL at 09:12

## 2025-07-09 RX ADMIN — SODIUM CHLORIDE 50 ML/HR: 0.9 INJECTION, SOLUTION INTRAVENOUS at 14:43

## 2025-07-09 RX ADMIN — ALTEPLASE 0.5 MG/HR: 2.2 INJECTION, POWDER, LYOPHILIZED, FOR SOLUTION INTRAVENOUS at 21:42

## 2025-07-09 RX ADMIN — MIDAZOLAM 1 MG: 1 INJECTION INTRAMUSCULAR; INTRAVENOUS at 11:45

## 2025-07-09 RX ADMIN — MIDAZOLAM 0.5 MG: 1 INJECTION INTRAMUSCULAR; INTRAVENOUS at 13:08

## 2025-07-09 RX ADMIN — Medication 6 MG: at 21:42

## 2025-07-09 RX ADMIN — HYDROMORPHONE HYDROCHLORIDE 1 MG: 1 INJECTION, SOLUTION INTRAMUSCULAR; INTRAVENOUS; SUBCUTANEOUS at 16:18

## 2025-07-09 RX ADMIN — METOPROLOL SUCCINATE 25 MG: 25 TABLET, EXTENDED RELEASE ORAL at 09:12

## 2025-07-09 RX ADMIN — FENTANYL CITRATE 50 MCG: 50 INJECTION INTRAMUSCULAR; INTRAVENOUS at 11:45

## 2025-07-09 RX ADMIN — HEPARIN SODIUM 400 UNITS/HR: 10000 INJECTION, SOLUTION INTRAVENOUS at 13:36

## 2025-07-09 RX ADMIN — FENTANYL CITRATE 25 MCG: 50 INJECTION INTRAMUSCULAR; INTRAVENOUS at 13:08

## 2025-07-09 RX ADMIN — HYDROMORPHONE HYDROCHLORIDE 0.5 MG: 1 INJECTION, SOLUTION INTRAMUSCULAR; INTRAVENOUS; SUBCUTANEOUS at 15:43

## 2025-07-09 RX ADMIN — OXYCODONE HYDROCHLORIDE 10 MG: 10 TABLET ORAL at 14:57

## 2025-07-09 RX ADMIN — HEPARIN SODIUM IN SODIUM CHLORIDE 20 UNITS/HR: 200 INJECTION INTRAVENOUS at 14:04

## 2025-07-09 RX ADMIN — LIDOCAINE HYDROCHLORIDE 10 ML: 10 INJECTION, SOLUTION EPIDURAL; INFILTRATION; INTRACAUDAL; PERINEURAL at 11:58

## 2025-07-09 RX ADMIN — OXYCODONE HYDROCHLORIDE 10 MG: 10 TABLET ORAL at 19:35

## 2025-07-09 NOTE — CASE MANAGEMENT
Case Management Discharge Planning Note    Patient name Maxime Green  Location [facility]-01 MRN [id number]  : 1965 Date 2025       Current Admission Date: 2025  Current Admission Diagnosis:Femoral-popliteal bypass graft occlusion, right, subsequent encounter   Patient Active Problem List    Diagnosis Date Noted    Femoral-popliteal bypass graft occlusion, right, subsequent encounter 2025    Acute lower limb ischemia 2025    Asymptomatic bilateral carotid artery stenosis 2024    Peripheral vascular disease (HCC) 10/18/2022    Former tobacco use     History of PTCA     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) 2022    GERD (gastroesophageal reflux disease)     Hypertension     CAD (coronary artery disease)     Hx of CABG 2021    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 complication, without long-term current use of insulin (HCC)       LOS (days): 1  Geometric Mean LOS (GMLOS) (days):   Days to GMLOS:     OBJECTIVE:  Risk of Unplanned Readmission Score: 16.64         Current admission status: Inpatient   Preferred Pharmacy:   [facility]/pharmacy #[id number] - [place], [place] - [address]  [address]  [place] [address]  Phone: [phone] Fax: [phone]    Primary Care Provider: Michael Stapleton MD    Primary Insurance: JDC[employer]osphate  Secondary Insurance:     DISCHARGE DETAILS:     Request from vascular to ewing check Brilinta at [facility]. CM called [facility] in [place] to complete price check. Per [facility], copay for Brilinta generic is $0 and they have to order it so it will be in tomorrow.    CM relayed above to vascular team. CM will continue to follow for DC planning needs. No other anticipated needs at this time.

## 2025-07-09 NOTE — ASSESSMENT & PLAN NOTE
59 y/o M w/ history of PAD w/ claudication s/p Left SFA stent 1/20/22, Right Fem- POP bypass 2/3/2022 (Zev), RLE bypass graft occlusion s/p lysis, PTA, Viabahn stent 10/19/22 and repeat lysis with noted CFA stenosis above the bifurcation 6/2-3/2025. He now represents in transfer from [place] w/th RLE bypass graft occlusion. Initially he c/o rest pain but is now pain free at rest, M/S are intact. DP/PT appreciated with doppler. Currently on Heparin gtt.    Plan:  -Acute RLE bypass graft occlusion, now asymptomatic at rest, likely returned to claudication as prior to bypass surgery.  -Pending IR thrombolysis today   -NPO after midnight  -Continue Heparin gtt (Xarelto held)  -P2Y12 result of 300, Plavix non-responder. Discussed with IR, will plan to Brilinta load after lysis procedure. Plavix discontinued at this time.  -CTH negative, Fibrinogen 258 on 7/8  -Continue statin  -Pain control PRN

## 2025-07-09 NOTE — PLAN OF CARE
Problem: PAIN - ADULT  Goal: Verbalizes/displays adequate comfort level or baseline comfort level  Description: Interventions:  - Encourage patient to monitor pain and request assistance  - Assess pain using appropriate pain scale  - Administer analgesics as ordered based on type and severity of pain and evaluate response  - Implement non-pharmacological measures as appropriate and evaluate response  - Consider cultural and social influences on pain and pain management  - Notify physician/advanced practitioner if interventions unsuccessful or patient reports new pain  - Educate patient/family on pain management process including their role and importance of  reporting pain   - Provide non-pharmacologic/complimentary pain relief interventions  Outcome: Progressing     Problem: INFECTION - ADULT  Goal: Absence or prevention of progression during hospitalization  Description: INTERVENTIONS:  - Assess and monitor for signs and symptoms of infection  - Monitor lab/diagnostic results  - Monitor all insertion sites, i.e. indwelling lines, tubes, and drains  - Monitor endotracheal if appropriate and nasal secretions for changes in amount and color  - [place] appropriate cooling/warming therapies per order  - Administer medications as ordered  - Instruct and encourage patient and family to use good hand hygiene technique  - Identify and instruct in appropriate isolation precautions for identified infection/condition  Outcome: Progressing  Goal: Absence of fever/infection during neutropenic period  Description: INTERVENTIONS:  - Monitor WBC  - Perform strict hand hygiene  - Limit to healthy visitors only  - No plants, dried, fresh or silk flowers with phipps in patient room  Outcome: Progressing     Problem: SAFETY ADULT  Goal: Patient will remain free of falls  Description: INTERVENTIONS:  - Educate patient/family on patient safety including physical limitations  - Instruct patient to call for assistance with activity   -  Consider consulting OT/PT to assist with strengthening/mobility based on AM PAC & JH-HLM score  - Consult OT/PT to assist with strengthening/mobility   - Keep Call bell within reach  - Keep bed low and locked with side rails adjusted as appropriate  - Keep care items and personal belongings within reach  - Initiate and maintain comfort rounds  - Make Fall Risk Sign visible to staff  - Apply yellow socks and bracelet for high fall risk patients  - Consider moving patient to room near nurses station  Outcome: Progressing  Goal: Maintain or return to baseline ADL function  Description: INTERVENTIONS:  -  Assess patient's ability to carry out ADLs; assess patient's baseline for ADL function and identify physical deficits which impact ability to perform ADLs (bathing, care of mouth/teeth, toileting, grooming, dressing, etc.)  - Assess/evaluate cause of self-care deficits   - Assess range of motion  - Assess patient's mobility; develop plan if impaired  - Assess patient's need for assistive devices and provide as appropriate  - Encourage maximum independence but intervene and supervise when necessary  - Involve family in performance of ADLs  - Assess for home care needs following discharge   - Consider OT consult to assist with ADL evaluation and planning for discharge  - Provide patient education as appropriate  - Monitor functional capacity and physical performance, use of AM PAC & JH-HLM   - Monitor gait, balance and fatigue with ambulation    Outcome: Progressing  Goal: Maintains/Returns to pre admission functional level  Description: INTERVENTIONS:  - Perform AM-PAC 6 Click Basic Mobility/ Daily Activity assessment daily.  - Set and communicate daily mobility goal to care team and patient/family/caregiver.   - Collaborate with rehabilitation services on mobility goals if consulted  - Out of bed for toileting  - Record patient progress and toleration of activity level   Outcome: Progressing     Problem: SKIN/TISSUE  INTEGRITY - ADULT  Goal: Skin Integrity remains intact(Skin Breakdown Prevention)  Description: Assess:  -Inspect skin when repositioning, toileting, and assisting with ADLS  -Assess extremities for adequate circulation and sensation     Bed Management:  -Have minimal linens on bed & keep smooth, unwrinkled  -Change linens as needed when moist or perspiring    Activity:  -Encourage activity and walks on unit  -Encourage or provide ROM exercises   Skin Care:  -Avoid use of baby powder, tape, friction and shearing, hot water or constrictive clothing  -Do not massage red bony areas    Outcome: Progressing  Goal: Incision(s), wounds(s) or drain site(s) healing without S/S of infection  Description: INTERVENTIONS  - Assess and document dressing, incision, wound bed, drain sites and surrounding tissue  - Provide patient and family education    Outcome: Progressing  Goal: Pressure injury heals and does not worsen  Description: Interventions:  - Implement low air loss mattress or specialty surface (Criteria met)  - Apply silicone foam dressing  - Apply fecal or urinary incontinence containment device   - Utilize friction reducing device or surface for transfers   - Consider nutrition services referral as needed  Outcome: Progressing     Problem: HEMATOLOGIC - ADULT  Goal: Maintains hematologic stability  Description: INTERVENTIONS  - Assess for signs and symptoms of bleeding or hemorrhage  - Monitor labs  - Administer supportive blood products/factors as ordered and appropriate  Outcome: Progressing

## 2025-07-09 NOTE — PROGRESS NOTES
Progress Note - Vascular Surgery   Name: Maxime Green 60 y.o. male I MRN: [id number]  [facility] Date of Admission: 7/8/2025   Date of Service: 7/9/2025 I Hospital Day: 1    Assessment & Plan  Femoral-popliteal bypass graft occlusion, right, subsequent encounter  59 y/o M w/ history of PAD w/ claudication s/p Left SFA stent 1/20/22, Right Fem- POP bypass 2/3/2022 (Zev), RLE bypass graft occlusion s/p lysis, PTA, Viabahn stent 10/19/22 and repeat lysis with noted CFA stenosis above the bifurcation 6/2-3/2025. He now represents in transfer from [place] w/th RLE bypass graft occlusion. Initially he c/o rest pain but is now pain free at rest, M/S are intact. DP/PT appreciated with doppler. Currently on Heparin gtt.    Plan:  -Acute RLE bypass graft occlusion, now asymptomatic at rest, likely returned to claudication as prior to bypass surgery.  -Pending IR thrombolysis today   -NPO after midnight  -Continue Heparin gtt (Xarelto held)  -P2Y12 result of 300, Plavix non-responder. Discussed with IR, will plan to Brilinta load after lysis procedure. Plavix discontinued at this time.  -CTH negative, Fibrinogen 258 on 7/8  -Continue statin  -Pain control PRN      24 Hour Events : NPO at midnight  Subjective : Pt resting comfortably in bed, denies change in symptoms, pain is adequately controlled. Anticipates IR today for thrombolysis, which he is familiar with. Remains NPO, voiding independently. M/S intact.     Objective :  Temp:  [97.4 °F (36.3 °C)-97.5 °F (36.4 °C)] 97.4 °F (36.3 °C)  HR:  [58-68] 68  BP: (108-156)/(59-83) 123/72  Resp:  [16-18] 18  SpO2:  [97 %-100 %] 98 %  O2 Device: None (Room air)     I/O         07/07 0701  07/08 0700 07/08 0701  07/09 0700 07/09 0701  07/10 0700    P.O.  480     I.V.  1664.3     Total Intake  2144.3     Urine  2275     Total Output  2275     Net  -130.8                    Physical Exam  Vitals and nursing note reviewed.   Constitutional:       General: He is not in  "acute distress.     Appearance: He is well-developed. He is not ill-appearing.   HENT:      Head: Normocephalic and atraumatic.     Eyes:      Conjunctiva/sclera: Conjunctivae normal.       Cardiovascular:      Rate and Rhythm: Normal rate.      Pulses:           Dorsalis pedis pulses are detected w/ Doppler on the right side.        Posterior tibial pulses are detected w/ Doppler on the right side.   Pulmonary:      [place]: Pulmonary effort is normal. No respiratory distress.   Abdominal:      Palpations: Abdomen is soft.      Tenderness: There is no abdominal tenderness.     Musculoskeletal:         General: No swelling.      Cervical back: Neck supple.      Right lower leg: No edema.      Left lower leg: No edema.     Skin:     General: Skin is warm and dry.      Capillary Refill: Capillary refill takes 2 to 3 seconds.     Neurological:      Mental Status: He is alert.      Sensory: No sensory deficit.      Motor: No weakness.     Psychiatric:         Mood and Affect: Mood normal.             Lab Results: I have reviewed the following results:  CBC with diff:   Lab Results   Component Value Date    WBC 4.21 (L) 07/09/2025    HGB 11.9 (L) 07/09/2025    HCT 36.2 (L) 07/09/2025    MCV 81 (L) 07/09/2025     07/09/2025    RBC 4.47 07/09/2025    MCH 26.6 (L) 07/09/2025    MCHC 32.9 07/09/2025    RDW 13.9 07/09/2025    MPV 9.2 07/09/2025    NRBC 0 07/07/2025   ,   BMP/CMP:  Lab Results   Component Value Date    SODIUM 136 07/09/2025    K 3.6 07/09/2025     07/09/2025    CO2 26 07/09/2025    CO2 22 04/09/2021    BUN 15 07/09/2025    CREATININE 0.82 07/09/2025    GLUCOSE 145 (H) 04/09/2021    CALCIUM 8.9 07/09/2025    AST 21 07/07/2025    ALT 22 07/07/2025    ALKPHOS 78 07/07/2025    EGFR 96 07/09/2025   ,   Lipid Panel: No results found for: \"CHOL\",   Coags:   Lab Results   Component Value Date    PTT 80 (H) 07/09/2025    INR 1.42 (H) 07/07/2025   ,   Blood Culture: No results found for: \"BLOODCX\", " "  Urinalysis:   Lab Results   Component Value Date    COLORU Yellow 04/08/2021    CLARITYU Clear 04/08/2021    SPECGRAV 1.012 04/08/2021    PHUR 8.0 04/08/2021    LEUKOCYTESUR Negative 04/08/2021    NITRITE Negative 04/08/2021    GLUCOSEU Negative 04/08/2021    KETONESU Negative 04/08/2021    BILIRUBINUR Negative 04/08/2021    BLOODU Negative 04/08/2021   ,   Urine Culture: No results found for: \"URINECX\",   Wound Culure: No results found for: \"WOUNDCULT\"    Imaging Results Review: No pertinent imaging studies reviewed.  Other Study Results Review: No additional pertinent studies reviewed.    VTE Prophylaxis: VTE covered by:  heparin (porcine), Intravenous, 16 Units/kg/hr at 07/08/25 1606  heparin (porcine), Intravenous  heparin (porcine), Intravenous     "

## 2025-07-09 NOTE — SEDATION DOCUMENTATION
Lower extremity angiogram lysis and PICC placement performed by . Alteplase and Heparin started on sheath and catheter. PICC placed. Patient transported to [place] [address] and report given to RN at the bedside.

## 2025-07-09 NOTE — UTILIZATION REVIEW
Initial Clinical Review    Admission: Date/Time/Statement:   Admission Orders (From admission, onward)       Ordered        07/08/25 0202  Inpatient Admission  Once                          Orders Placed This Encounter   Procedures    Inpatient Admission     Standing Status:   Standing     Number of Occurrences:   1     Level of Care:   Med Surg [16]     Estimated length of stay:   More than 2 Midnights     Certification:   I certify that inpatient services are medically necessary for this patient for a duration of greater than two midnights. See H&P and MD Progress Notes for additional information about the patient's course of treatment.     ED Arrival Information       Expected   7/8/2025     Arrival   7/8/2025 01:12    Acuity   Urgent              Means of arrival   Ambulance    Escorted by   [facility]    Service   Vascular Surgery    Admission type   Emergency              Arrival complaint   vascular graft occlusion             Chief Complaint   Patient presents with    Medical Problem     Pt is a vascular transfer from [place].        Initial Presentation: 60 y.o. male w/ PMH of CAD s/p CABG w/ L GSV harvest, DM, GERD, HTN, PAD s/p L SFA stent, R femoropopliteal bypass, h/o bypass occlusion x 2 s/p successful lysis most recently 06/2025 on Xarelto/Plavix was transferred from [facility] to [facility] for a higher level of care.  Pt initially presented to [facility] w/ sudden onset of RLE pain after cutting the grass Sunday evening. Also reports that he went to the gym that same day and was doing heavy lifting. CTA revealed right lower extremity bypass graft occlusion.  He was transferred to [facility] for vascular evaluation and management.  On arrival to [facility] he was receiving heparin drip and his rest pain had resolved.  On examination, motor and sensation are intact, there is no pain or tenderness in the right lower extremity at rest or with palpation, Doppler DP and PT signals  are present.      Admit as Inpatient for evaluation and treatment of Acute RLE bypass graft occlusion.  Plan: cont hep gtt. Hold Xarelto. IR consulted for rpt agram w/ catheter directed thrombolysis tomorrow. Keep NPO     IR Consult: RLE bypass graft occlusion   last xarelto confirmed with patient 7/7 morning. Will plan for lysis initiation 7/9. CT head ordered. keep npo after midnight    Date: 07/08   Day 2: Pt remains NPO, voiding independently. M/S intact. Pending IR thrombolysis today. P2Y12 result of 300, Plavix non-responder. Cont hep gtt. plan to Brilinta load after lysis procedure. Plavix discontinued at this time.CTH negative, Fibrinogen 258 on 7/8.Continue statin.Pain control PRN    IR Procedure Note:   Date: 7/9/2025   Anesthesia: conscious sedation   Findings: Bypass occulusion crossed and lysis catheter placed into distal popliteal artery.   To ICU for overnight monitoring.        ED Treatment-Medication Administration from 07/08/2025 0001 to 07/08/2025 1558         Date/Time Order Dose Route Action     07/08/2025 0822 clopidogrel (PLAVIX) tablet 75 mg 75 mg Oral Given     07/08/2025 0822 famotidine (PEPCID) tablet 20 mg 20 mg Oral Given     07/08/2025 0850 isosorbide mononitrate (IMDUR) 24 hr tablet 30 mg 30 mg Oral Given     07/08/2025 0822 metoprolol succinate (TOPROL-XL) 24 hr tablet 25 mg 25 mg Oral Given     07/08/2025 0233 sodium chloride 0.9 % infusion 50 mL/hr Intravenous New Bag     07/08/2025 1109 insulin lispro (HumALOG/ADMELOG) 100 units/mL subcutaneous injection 1-5 Units 1 Units Subcutaneous Given     07/08/2025 0222 heparin (porcine) 25,000 units in 0.45% NaCl 250 mL infusion (premix) 18 Units/kg/hr Intravenous New Bag     07/08/2025 0849 heparin (porcine) 25,000 units in 0.45% NaCl 250 mL infusion (premix) 16 Units/kg/hr Intravenous Rate/Dose Change            Scheduled Medications:  atorvastatin, 80 mg, Oral, Daily With Dinner  famotidine, 20 mg, Oral, Daily  insulin lispro, 1-5 Units,  Subcutaneous, Q6H BOBBY  isosorbide mononitrate, 30 mg, Oral, BID  melatonin, 6 mg, Oral, HS  metoprolol succinate, 25 mg, Oral, Daily      Continuous IV Infusions:  heparin (porcine), 3-30 Units/kg/hr (Order-Specific), Intravenous, Titrated  sodium chloride, 50 mL/hr, Intravenous, Continuous      PRN Meds:  acetaminophen, 650 mg, Oral, Q6H PRN  calcium carbonate, 500 mg, Oral, Daily PRN  heparin (porcine), 2,600 Units, Intravenous, Q6H PRN  heparin (porcine), 5,200 Units, Intravenous, Q6H PRN  HYDROmorphone, 0.2 mg, Intravenous, Q3H PRN  ondansetron, 4 mg, Intravenous, Q4H PRN  oxyCODONE, 10 mg, Oral, Q4H PRN  oxyCODONE, 5 mg, Oral, Q4H PRN      ED Triage Vitals   Temperature Pulse Respirations Blood Pressure SpO2 Pain Score   07/08/25 0115 07/08/25 0115 07/08/25 0115 07/08/25 0115 07/08/25 0115 07/08/25 0900   97.8 °F (36.6 °C) 57 18 165/83 99 % 8     Weight (last 2 days)       None            Vital Signs (last 3 days)       Date/Time Temp Pulse Resp BP MAP (mmHg) SpO2 O2 Device Patient Position - Orthostatic VS Fredi Coma Scale Score Pain    07/09/25 0912 -- 67 -- 143/94 -- -- -- -- -- --    07/09/25 0911 -- -- -- -- -- -- None (Room air) -- 15 No Pain    07/09/25 07:15:33 97.4 °F (36.3 °C) 68 18 123/72 89 98 % None (Room air) Lying -- --    07/08/25 23:17:20 97.4 °F (36.3 °C) 58 16 138/79 99 99 % -- -- -- --    07/08/25 2000 -- -- -- -- -- -- -- -- 15 No Pain    07/08/25 1630 -- -- -- -- -- -- -- -- 15 --    07/08/25 16:07:44 97.5 °F (36.4 °C) 58 16 141/83 102 100 % None (Room air) Sitting -- --    07/08/25 1400 -- 66 18 113/73 85 98 % None (Room air) Sitting -- 8 07/08/25 1300 -- 64 18 156/67 112 97 % None (Room air) Sitting -- 8 07/08/25 1100 -- 62 18 108/59 78 98 % None (Room air) Sitting -- 8 07/08/25 0900 -- 58 18 142/77 105 99 % None (Room air) Lying -- 8 07/08/25 0822 -- 61 -- 141/71 -- -- -- -- -- --    07/08/25 0400 -- 54 18 138/79 102 99 % None (Room air) -- -- --    07/08/25 0126 -- 59 18  "165/83 -- 99 % None (Room air) -- 15 --    07/08/25 0115 97.8 °F (36.6 °C) 57 18 165/83 -- 99 % None (Room air) -- -- --              Pertinent Labs/Diagnostic Test Results:   Radiology:  CT head wo contrast   Final Interpretation by Kiko Russell DO (07/08 1016)      No acute intracranial abnormality.                  Workstation performed: [id number]         IR lower extremity angiogram    (Results Pending)     Cardiology:  No orders to display     GI:  No orders to display           Results from last 7 days   Lab Units 07/09/25 0621 07/08/25 0415 07/07/25 1952   WBC Thousand/uL 4.21* 5.24 6.34   HEMOGLOBIN g/dL 11.9* 12.7 13.5   HEMATOCRIT % 36.2* 38.4 41.5   PLATELETS Thousands/uL 164 169 189   TOTAL NEUT ABS Thousands/µL  --   --  3.83         Results from last 7 days   Lab Units 07/09/25 0621 07/08/25 0415 07/07/25 1952   SODIUM mmol/L 136 134* 134*   POTASSIUM mmol/L 3.6 4.1 4.1   CHLORIDE mmol/L 104 100 101   CO2 mmol/L 26 25 26   ANION GAP mmol/L 6 9 7   BUN mg/dL 15 15 18   CREATININE mg/dL 0.82 0.85 0.97   EGFR ml/min/1.73sq m 96 94 84   CALCIUM mg/dL 8.9 9.0 10.2     Results from last 7 days   Lab Units 07/07/25 1952   AST U/L 21   ALT U/L 22   ALK PHOS U/L 78   TOTAL PROTEIN g/dL 8.4   ALBUMIN g/dL 4.8   TOTAL BILIRUBIN mg/dL 1.14*     Results from last 7 days   Lab Units 07/09/25  0619 07/09/25  0006 07/08/25  1744 07/08/25  1027 07/08/25  0540 07/08/25  0221   POC GLUCOSE mg/dl 149* 135 186* 189* 137 132     Results from last 7 days   Lab Units 07/09/25  0621 07/08/25 0415 07/07/25 1952   GLUCOSE RANDOM mg/dL 150* 132 142*         Results from last 7 days   Lab Units 07/08/25  0237   HEMOGLOBIN A1C % 7.9*   EAG mg/dl 180     No results found for: \"BETA-HYDROXYBUTYRATE\"                           Results from last 7 days   Lab Units 07/09/25  0621 07/08/25  2034 07/08/25  1419 07/08/25  0237 07/07/25  1952   PROTIME seconds  --   --   --   --  18.1*   INR   --   --   --   --  1.42* "   PTT seconds 80* 62* 68*   < > 38*    < > = values in this interval not displayed.                                                                                                               Past Medical History[1]  Present on Admission:  **None**      Admitting Diagnosis: Known medical problems [Z78.9]  Acute lower limb ischemia [I99.8]  Age/Sex: 60 y.o. male    Network Utilization Review Department  ATTENTION: Please call with any questions or concerns to [phone] and carefully listen to the prompts so that you are directed to the right person. All voicemails are confidential.   For Discharge needs, contact Care Management DC Support Team at [phone] opt. [phone]  Send all requests for admission clinical reviews, approved or denied determinations and any other requests to dedicated fax number below belonging to the campus where the patient is receiving treatment. List of dedicated fax numbers for the Facilities:  FACILITY NAME UR FAX NUMBER   ADMISSION DENIALS (Administrative/Medical Necessity) [phone]   DISCHARGE SUPPORT TEAM (NETWORK) [phone]   PARENT CHILD HEALTH (Maternity/NICU/Pediatrics) [phone]   [facility] [phone]   [facility] [phone]   [facility] [phone]   [facility] [phone]   [facility] [phone]   [facility] [phone]   [facility] [phone]   [facility] [phone]   [facility] [phone]   Critical access hospital [phone]   [facility] [phone]   [facility] [phone]              [1]   Past Medical History:  Diagnosis Date    Bicuspid aortic valve     being observed    CAD (coronary artery disease)     Chest pain  4/6/2021    Coronary artery disease     Diabetes mellitus ([facility])     Encounter for postoperative care 5/17/2021    Former tobacco use     GERD (gastroesophageal reflux disease)     Goiter     History of myocardial infarction     History of rib fracture     Hyperlipidemia     Hypertension     Hypertensive urgency 4/6/2021    Left carotid bruit 7/22/2021    Leg pain, bilateral     Agram today LLE   1/20/2022    Leukocytosis 4/6/2021    Myocardial infarction ([facility])     2021-   CABG  x3    NSTEMI (non-ST elevated myocardial infarction) ([facility]) 4/7/2021    Wears glasses

## 2025-07-09 NOTE — PROCEDURES
Venous Access Line Insertion    Date/Time: 7/9/2025 1:40 PM    Performed by: Jean Carlos Zambrano  Authorized by: Jam Garcia MD    Patient location:  IR  Consent:     Consent obtained:  Verbal and written    Consent given by:  Patient    Risks discussed:  Arterial puncture, bleeding, incorrect placement, infection and nerve damage    Alternatives discussed:  No treatment, delayed treatment and alternative treatment  Universal protocol:     Procedure explained and questions answered to patient or proxy's satisfaction: yes      Immediately prior to procedure, a time out was called: yes      Relevant documents present and verified: yes      Test results available and properly labeled: yes      Radiology Images displayed and confirmed.  If images not available, report reviewed: yes      Required blood products, implants, devices, and special equipment available: yes      Site/side marked: yes      Patient identity confirmed:  Verbally with patient and arm band  Pre-procedure details:     Hand hygiene: Hand hygiene performed prior to insertion      Sterile barrier technique: All elements of maximal sterile technique followed      Skin preparation:  ChloraPrep    Skin preparation agent: Skin preparation agent completely dried prior to procedure    Procedure details:     Complex Venous Access Line Type: PICC      Catheter tip vessel location: atriocaval junction      Orientation:  Left    Location:  [place]    Procedural supplies:  Double lumen    Catheter size:  5 Fr    Total catheter length (cm):  41    Catheter out on skin (cm):  0    Max flow rate:  999.99    Arm circumference:  30    Site selection rationale:  Open vessel    Approach: percutaneous technique used      Patient position:  Flat    Ultrasound image availability:  Images available in PACS    Sterile ultrasound techniques: Sterile gel and sterile probe covers were used      Number of attempts:  1    Successful placement: yes      Landmarks  identified: yes    Anesthesia (see MAR for exact dosages):     Anesthesia method:  Local infiltration    Local anesthetic:  Lidocaine 1% w/o epi  Post-procedure details:     Post-procedure:  Dressing applied    Assessment:  Blood return through all ports    Post-procedure complications: none      Patient tolerance of procedure:  Tolerated well, no immediate complications    Observer: Yes      Observer name:  Jaleesa PRUETT

## 2025-07-09 NOTE — CONSULTS
Consultation - Critical Care/ICU   Name: Maxime Green 60 y.o. male I MRN: [id number]  Unit/Bed#: [facility] 517-01 I Date of Admission: 7/8/2025   Date of Service: 7/9/2025 I Hospital Day: 1   Consults  Physician Requesting Evaluation: Ryan Mc MD   Reason for Evaluation / Principal Problem: Right Femoral-popliteal bypass graft occlusion        Assessment & Plan   Active Hospital Problems    Diagnosis Date Noted POA    Femoral-popliteal bypass graft occlusion, right, subsequent encounter 07/08/2025 Not Applicable      Resolved Hospital Problems   No resolved problems to display.     Neuro/Psych:  Diagnoses: RLE ischemia, pain control  Plan:  Neuro checks Q4  Sedation: None  Analgesia: Multimodal. Tylenol 650mg PRN. Oxy 5/10 Q4H PRN, Dilaudid 0.5 mg IV Q2H PRN. Dilaudid 1mg 1x dose.     CV:  Diagnoses: Femoral-popliteal bypass graft occlusion on right  Plan:  Continuous cardiopulmonary monitoring. Maintain MAP >65.  Continue home metoprolol 25 mg daily  Continue home atorvastatin 40 mg oral with dinner   Continue home ASA 81 mg  PRN labetalol 10 mg Q6H PRN for SBP > 160  Continuous heparin  Alteplase 10mg 250mL infusion  Vascular following, plan to start Brilinta load after lysis procedure.      Pulm:  Diagnoses: No active issues  Plan:  Continuous pulse oximetry. Maintain O2 sat >92%.     GI:  Diagnoses: No active issues  Plan:  Continue Famotidine    :  Diagnoses: No active issues  Creatinine trend: 0.82 from 0.85  Baseline creatinine: 1  Plan:  Monitor I/Os.    F/E/N:  Diagnoses:   Plan:   F: 50mL/hr continuous saline. Fluid resuscitation prn.  E: Monitor and replete electrolytes for Mg >2, Phos >3, K >4.  N: Regular diet, NPO at midnight    Heme/Onc:  Diagnoses: RLE acute limb ischemia   CTA on 7/07 showed occluded right bypass graft  Plan:  Hgb- 11.9 from 12.7  PTT- 80  VTE prophylaxis: Continuous heparin  Follow up     Endo:  Diagnoses: DM  Plan:   Holding home metformin  Insulin: Sliding scale. Monitor  blood glucose.    ID:  Diagnoses: No active issues  Culture data: None  Labs: 4.21 WBC  Temperature: Afebrile  Plan:  Monitor fever curve and WBC.    MSK/Skin:  Diagnoses: No active issues  Plan:  PT/OT when appropriate. Encourage OOB and ambulation when appropriate. Local wound care prn.        LDAs:  Lines - PICC, peripheral IV  Drains - n/a  Airways -  n/a    Disposition: Critical care    History of Present Illness   Maxime Green is a 60 y.o. who presents as a transfer from [place] for RLE bypass graft occlusion. Pt has pmh of PAD w/ claudication s/p left SFA stent 1/20/22, Right Fem- pop bypass 2/3/2022 ([facility]), RLE bypass graft occlusion s/p lysis, PTA, Viabahn stent 10/19/22 and repeat lysis with noted CFA stenosis above the bifurcation 6/2-3/2025. Per chart review, pt was admitted a month ago w/ bypass oclusion and had catheter directed thrombolysis. Pt had recurrent pain w/ walking that started 2 days prior to admission. Pt has been taking Plavix and DOAC. Pt does heavy weight lifting as well.   In ED, CTA revealed right lower extremity bypass graft occlusion. Pt transferred to [place], started on heparin drip.     History obtained from chart review and the patient.  Review of Systems: See HPI for Review of Systems    Historical Information   Past Medical History:  No date: Bicuspid aortic valve      Comment:  being observed  No date: CAD (coronary artery disease)  4/6/2021: Chest pain  No date: Coronary artery disease  No date: Diabetes mellitus (HCC)  5/17/2021: Encounter for postoperative care  No date: Former tobacco use  No date: GERD (gastroesophageal reflux disease)  No date: Goiter  No date: History of myocardial infarction  No date: History of rib fracture  No date: Hyperlipidemia  No date: Hypertension  4/6/2021: Hypertensive urgency  7/22/2021: Left carotid bruit  No date: Leg pain, bilateral      Comment:  Agram today LLE   1/20/2022 4/6/2021: Leukocytosis  No date: Myocardial infarction  ([facility])      Comment:  2021-   CABG  x3  4/7/2021: NSTEMI (non-ST elevated myocardial infarction) ([facility])  No date: Wears glasses Past Surgical History:  10/19/2022: ARTERIOGRAM; Right      Comment:  Procedure: RIGHT LOWER EXTREMITY ARTERIOGRAM WITH LYSIS,               PLACEMENT OF LYSIS CATHETER; third order cannulation of                right popliteal artery via left groin access;  Surgeon:                Ronald Ordonez MD;  Location: [facility];                 Service: Vascular  No date: CARDIAC CATHETERIZATION  10/20/2021: CARDIAC CATHETERIZATION; N/A      Comment:  Procedure: Cardiac catheterization;  Surgeon: Lynnette Reeder MD;  Location: [facility];  Service:                Cardiology  10/20/2021: CARDIAC CATHETERIZATION; N/A      Comment:  Procedure: Cardiac Coronary Angiogram;  Surgeon:                Lynnette Reeder MD;  [facility];                 Service: Cardiology  10/20/2021: CARDIAC CATHETERIZATION; N/A      Comment:  Procedure: Cardiac pci;  Surgeon: Lynnette Reeder MD;                 [facility]: [facility];  Service: Cardiology  1/20/2022: IR LOWER EXTREMITY ANGIOGRAM  2/3/2022: IR LOWER EXTREMITY ANGIOGRAM  10/19/2022: IR LOWER EXTREMITY ANGIOGRAM  6/2/2025: IR LOWER EXTREMITY ANGIOGRAM  10/19/2022: IR PICC PLACEMENT DOUBLE LUMEN  6/2/2025: IR PICC PLACEMENT DOUBLE LUMEN  10/20/2022: IR TPA LYSIS CHECK  6/3/2025: IR TPA LYSIS CHECK  2/3/2022: OR BYPASS W/VEIN FEMORAL-POPLITEAL; Right      Comment:  Procedure: BYPASS FEMORAL-POPLITEAL;  Surgeon: Pradeep Brothers MD;  Location: [facility];  Service:                Vascular  4/9/2021: OR CORONARY ARTERY BYP W/VEIN & ARTERY GRAFT 4 VEIN; N/A      Comment:  Procedure: CORONARY ARTERY BYPASS GRAFT (CABG) 3                VESSELS: LIMA to LAD; LLE EVH/SVG to LPL & OM2;  Surgeon:               Jose Sanchez DO;  Location: [facility];  Service:                Cardiac  Surgery  4/9/2021: AK ECHO TRANSESOPHAG R-T 2D W/PRB IMG ACQUISJ I&R; N/A      Comment:  Procedure: TRANSESOPHAGEAL ECHOCARDIOGRAM (BENNETT);                 Surgeon: Jose Sanchez DO;  Location: [facility];                 Service: Cardiac Surgery  4/9/2021: AK NDSC SURG W/VIDEO-ASSISTED HARVEST VEIN CABG; Left      Comment:  Procedure: HARVEST VEIN ENDOSCOPIC (EVH);  Surgeon:                Jose Sanchez DO;  Location: [facility];  Service:                Cardiac Surgery  1/20/2022: AK SLCTV CATHJ 3RD+ ORD SLCTV ABDL PEL/LXTR BRNCH; Left      Comment:  Procedure: ARTERIOGRAM, STENT PLACEMENT IN LEFT                SUPERFICIAL FEMORIAL ARTERY;  Surgeon: Pradeep Brothers MD;  [facility];  Service: Vascular  No date: VASECTOMY   Current Outpatient Medications   Medication Instructions    acetaminophen (TYLENOL) 650 mg, Oral, Every 6 hours PRN    clopidogrel (PLAVIX) 75 mg, Oral, Daily    famotidine (PEPCID) 20 mg, Oral, Daily    isosorbide mononitrate (IMDUR) 30 mg, Oral, 2 times daily    Jardiance 25 MG TABS TAKE 1 TABLET (25 MG TOTAL) BY MOUTH DAILY.    metFORMIN (GLUCOPHAGE) 1,000 mg, Oral, 2 times daily with meals    metoprolol succinate (TOPROL-XL) 25 mg, Oral, Daily    rosuvastatin (CRESTOR) 10 mg, Oral, Every other day    ticagrelor (BRILINTA) 90 mg, Oral, Every 12 hours scheduled    Xarelto 20 mg, Oral, Daily with breakfast    Allergies[1]   Social History[2] Family History[3]       Objective :                   Vitals I/O      Most Recent Min/Max in 24hrs   Temp (!) 97.4 °F (36.3 °C) Temp  Min: 97.4 °F (36.3 °C)  Max: 97.5 °F (36.4 °C)   Pulse 62 Pulse  Min: 51  Max: 68   Resp 16 Resp  Min: 16  Max: 18   /80 BP  Min: 106/59  Max: 151/82   O2 Sat 98 % SpO2  Min: 98 %  Max: 100 %      Intake/Output Summary (Last 24 hours) at 7/9/2025 1435  Last data filed at 7/9/2025 0556  Gross per 24 hour   Intake 2144.25 ml   Output 2275 ml   Net -130.75 ml       Diet NPO; Sips with  meds    Invasive Monitoring           Physical Exam   Physical Exam  Eyes:      Conjunctiva/sclera: Conjunctivae normal.   Skin:     Comments: Right toes cold and pale appearing.    HENT:      Head: Normocephalic and atraumatic.   Cardiovascular:      Rate and Rhythm: Normal rate and regular rhythm.      Pulses: Pulses are Pulse not able to be located on doppler in Right foot.      Heart sounds: Normal heart sounds.   Musculoskeletal:      Right lower leg: No edema.      Left lower leg: No edema.   Abdominal: General: Bowel sounds are normal. There is no distension.      Palpations: Abdomen is soft.      Tenderness: There is no abdominal tenderness. There is no guarding.   Constitutional:       Appearance: He is well-developed. He is ill-appearing.   Pulmonary:      Effort: Tachypnea present.      Breath sounds: Normal breath sounds.   Neurological:      Mental Status: He is agitated.      Motor: No motor deficit.          Diagnostic Studies        Lab Results: I have reviewed the following results:     Medications:  Scheduled PRN   atorvastatin, 80 mg, Daily With Dinner  famotidine, 20 mg, Daily  [Transfer Hold] insulin lispro, 1-5 Units, Q6H BOBBY  isosorbide mononitrate, 30 mg, BID  [Transfer Hold] melatonin, 6 mg, HS  metoprolol succinate, 25 mg, Daily      acetaminophen, 650 mg, Q6H PRN  [Transfer Hold] calcium carbonate, 500 mg, Daily PRN  [Transfer Hold] HYDROmorphone, 0.2 mg, Q3H PRN  [Transfer Hold] ondansetron, 4 mg, Q4H PRN  [Transfer Hold] oxyCODONE, 10 mg, Q4H PRN  [Transfer Hold] oxyCODONE, 5 mg, Q4H PRN       Continuous    alteplase (CATHFLO) 10 mg in sodium chloride 0.9 % 250 mL infusion, 0.5 mg/hr, Last Rate: 0.5 mg/hr (07/09/25 1404)  heparin (porcine), 400 Units/hr, Last Rate: 400 Units/hr (07/09/25 1336)  heparin, 20 Units/hr, Last Rate: 20 Units/hr (07/09/25 1404)  sodium chloride, 50 mL/hr, Last Rate: 50 mL/hr (07/08/25 0233)         Labs:   CBC    Recent Labs     07/08/25  0415 07/09/25  0621    WBC 5.24 4.21*   HGB 12.7 11.9*   HCT 38.4 36.2*    164     BMP    Recent Labs     25  0621   SODIUM 134* 136   K 4.1 3.6    104   CO2 25 26   AGAP 9 6   BUN 15 15   CREATININE 0.85 0.82   CALCIUM 9.0 8.9       Coags    Recent Labs     25  0621   INR 1.42*  --   --   --    PTT 38*   < > 62* 80*    < > = values in this interval not displayed.        Additional Electrolytes  No recent results       Blood Gas    No recent results  No recent results LFTs  Recent Labs     25   ALT 22   AST 21   ALKPHOS 78   ALB 4.8   TBILI 1.14*       Infectious  No recent results  Glucose  Recent Labs     2521   GLUC 142* 132 150*               [1] No Known Allergies  [2]   Social History  Tobacco Use    Smoking status: Former     Current packs/day: 0.00     Average packs/day: 1 pack/day for 38.0 years (38.0 ttl pk-yrs)     Types: Cigarettes     Start date: 1983     Quit date: 2021     Years since quittin.2    Smokeless tobacco: Never   Vaping Use    Vaping status: Never Used   Substance Use Topics    Alcohol use: Never    Drug use: Never   [3]   Family History  Problem Relation Name Age of Onset    Heart disease Father

## 2025-07-09 NOTE — BRIEF OP NOTE (RAD/CATH)
INTERVENTIONAL RADIOLOGY PROCEDURE NOTE    Date: 7/9/2025    Procedure:   Procedure Summary       Date:  Room / Location:     Anesthesia Start:  Anesthesia Stop:     Procedure:  Diagnosis:     Scheduled Providers:  Responsible Provider:     Anesthesia Type: Not recorded ASA Status: Not recorded            Preoperative diagnosis:   1. Acute lower limb ischemia         Postoperative diagnosis: Same.    Surgeon: Jam Garcia MD     Assistant: None. No qualified resident was available.    Blood loss: Minimal    Specimens: None     Findings: Bypass occulusion crossed and lysis catheter placed into distal popliteal artery.    To ICU for overnight monitoring.    Complications: None immediate.    Anesthesia: conscious sedation

## 2025-07-09 NOTE — QUICK NOTE
Post-Op Check - Vascular Surgery   Maxime Green 60 y.o. male MRN: [id number]  Unit/Bed#: [facility] 517-01 Encounter: [id number]    Assessment:  60yoM s/p RLE angiogram, lysis initiation though L femoral access; L basilic PICC placement    Plan:  -Appreciate ICU level care while lysis catheter in place  - Incentive spirometry  - Diet: NPOpMN for lysis recheck, tomorrow, 7/10  - PRN analgesia, anti-emetics  - Recommend maintaining normotension to mitigate risk of ICH while undergoing lysis  - I/Os  - Hep gtt/tPA per IR  - q1hr Neurovascular checks  - Reach out to BE Vascular Surgery Resident/AP with questions or concerns    Subjective: Patient seen and examined at bedside with complaint of RLE pain, denies numbness/tingling to RLE, motor function intact, limited by pain.    Vitals:  /80   Pulse 62   Temp 97.5 °F (36.4 °C)   Resp 16   SpO2 98%     Physical Exam:  General appearance: Alert in mild distress 2/2 pain  Neurologic: Grossly neurologically intact without noted focal deficit  Neck: supple, symmetrical, trachea midline  Lungs: Normal work of breathing, no accessory muscle use  Heart: Regular rate.   Abdomen: Soft, non-distended abdomen. L femoral access with small area of oozing, catheter in place. Soft, no noted hematoma.   Extremities: Bilateral LE M/S intact. R hallux with mild cyanotic discoloration though with brisk cap refill. R calf tender to palpation however compartment soft and compressible    Pulse exam:  DP: Right: no doppler signal appreciated  Left: 2+  PT: Right: monophasic PT signal Left: multiphasic PT signal    I/Os:  I/O last 3 completed shifts:  In: 2144.3 [P.O.:480; I.V.:1664.3]  Out: 2275 [Urine:2275]  No intake/output data recorded.    Invasive Lines/Tubes:  Invasive Devices       Peripherally Inserted Central Catheter Line  Duration             PICC Line 07/09/25 <1 day              Peripheral Intravenous Line  Duration             Peripheral IV 07/07/25 Left Antecubital 1 day     Peripheral IV 07/07/25 Right Antecubital 1 day              Line  Duration             Arterial Sheath Left Femoral <1 day                    VTE Prophylaxis: Reason for no pharmacologic prophylaxis Alteplase/hep gtt administration    Allie Clemente PA-C  7/9/2025

## 2025-07-09 NOTE — PLAN OF CARE
Problem: PAIN - ADULT  Goal: Verbalizes/displays adequate comfort level or baseline comfort level  Description: Interventions:  - Encourage patient to monitor pain and request assistance  - Assess pain using appropriate pain scale  - Administer analgesics as ordered based on type and severity of pain and evaluate response  - Implement non-pharmacological measures as appropriate and evaluate response  - Consider cultural and social influences on pain and pain management  - Notify physician/advanced practitioner if interventions unsuccessful or patient reports new pain  - Educate patient/family on pain management process including their role and importance of  reporting pain   - Provide non-pharmacologic/complimentary pain relief interventions  Outcome: Progressing     Problem: INFECTION - ADULT  Goal: Absence or prevention of progression during hospitalization  Description: INTERVENTIONS:  - Assess and monitor for signs and symptoms of infection  - Monitor lab/diagnostic results  - Monitor all insertion sites, i.e. indwelling lines, tubes, and drains  - Monitor endotracheal if appropriate and nasal secretions for changes in amount and color  - Cartwright appropriate cooling/warming therapies per order  - Administer medications as ordered  - Instruct and encourage patient and family to use good hand hygiene technique  - Identify and instruct in appropriate isolation precautions for identified infection/condition  Outcome: Progressing  Goal: Absence of fever/infection during neutropenic period  Description: INTERVENTIONS:  - Monitor WBC  - Perform strict hand hygiene  - Limit to healthy visitors only  - No plants, dried, fresh or silk flowers with phipps in patient room  Outcome: Progressing     Problem: SAFETY ADULT  Goal: Patient will remain free of falls  Description: INTERVENTIONS:  - Educate patient/family on patient safety including physical limitations  - Instruct patient to call for assistance with activity   -  Consider consulting OT/PT to assist with strengthening/mobility based on AM PAC & JH-HLM score  - Consult OT/PT to assist with strengthening/mobility   - Keep Call bell within reach  - Keep bed low and locked with side rails adjusted as appropriate  - Keep care items and personal belongings within reach  - Initiate and maintain comfort rounds  - Make Fall Risk Sign visible to staff  - Offer Toileting every  Hours, in advance of need  - Initiate/Maintain alarm  - Obtain necessary fall risk management equipment:   - Apply yellow socks and bracelet for high fall risk patients  - Consider moving patient to room near nurses station  Outcome: Progressing  Goal: Maintain or return to baseline ADL function  Description: INTERVENTIONS:  -  Assess patient's ability to carry out ADLs; assess patient's baseline for ADL function and identify physical deficits which impact ability to perform ADLs (bathing, care of mouth/teeth, toileting, grooming, dressing, etc.)  - Assess/evaluate cause of self-care deficits   - Assess range of motion  - Assess patient's mobility; develop plan if impaired  - Assess patient's need for assistive devices and provide as appropriate  - Encourage maximum independence but intervene and supervise when necessary  - Involve family in performance of ADLs  - Assess for home care needs following discharge   - Consider OT consult to assist with ADL evaluation and planning for discharge  - Provide patient education as appropriate  - Monitor functional capacity and physical performance, use of AM PAC & JH-HLM   - Monitor gait, balance and fatigue with ambulation    Outcome: Progressing  Goal: Maintains/Returns to pre admission functional level  Description: INTERVENTIONS:  - Perform AM-PAC 6 Click Basic Mobility/ Daily Activity assessment daily.  - Set and communicate daily mobility goal to care team and patient/family/caregiver.   - Collaborate with rehabilitation services on mobility goals if consulted  -  Perform Range of Motion  times a day.  - Reposition patient every  hours.  - Dangle patient  times a day  - Stand patient times a day  - Ambulate patient  times a day  - Out of bed to chair  times a day   - Out of bed for meals  times a day  - Out of bed for toileting  - Record patient progress and toleration of activity level   Outcome: Progressing     Problem: DISCHARGE PLANNING  Goal: Discharge to home or other facility with appropriate resources  Description: INTERVENTIONS:  - Identify barriers to discharge w/patient and caregiver  - Arrange for needed discharge resources and transportation as appropriate  - Identify discharge learning needs (meds, wound care, etc.)  - Arrange for interpretive services to assist at discharge as needed  - Refer to Case Management Department for coordinating discharge planning if the patient needs post-hospital services based on physician/advanced practitioner order or complex needs related to functional status, cognitive ability, or social support system  Outcome: Progressing     Problem: Knowledge Deficit  Goal: Patient/family/caregiver demonstrates understanding of disease process, treatment plan, medications, and discharge instructions  Description: Complete learning assessment and assess knowledge base.  Interventions:  - Provide teaching at level of understanding  - Provide teaching via preferred learning methods  Outcome: Progressing     Problem: CARDIOVASCULAR - ADULT  Goal: Maintains optimal cardiac output and hemodynamic stability  Description: INTERVENTIONS:  - Monitor I/O, vital signs and rhythm  - Monitor for S/S and trends of decreased cardiac output  - Administer and titrate ordered vasoactive medications to optimize hemodynamic stability  - Assess quality of pulses, skin color and temperature  - Assess for signs of decreased coronary artery perfusion  - Instruct patient to report change in severity of symptoms  Outcome: Progressing  Goal: Absence of cardiac  dysrhythmias or at baseline rhythm  Description: INTERVENTIONS:  - Continuous cardiac monitoring, vital signs, obtain 12 lead EKG if ordered  - Administer antiarrhythmic and heart rate control medications as ordered  - Monitor electrolytes and administer replacement therapy as ordered  Outcome: Progressing     Problem: SKIN/TISSUE INTEGRITY - ADULT  Goal: Skin Integrity remains intact(Skin Breakdown Prevention)  Description: Assess:  -Perform Pavan assessment every   -Clean and moisturize skin every   -Inspect skin when repositioning, toileting, and assisting with ADLS  -Assess under medical devices such as  every   -Assess extremities for adequate circulation and sensation     Bed Management:  -Have minimal linens on bed & keep smooth, unwrinkled  -Change linens as needed when moist or perspiring  -Avoid sitting or lying in one position for more than hours while in bed  -Keep HOB at degrees     Toileting:  -Offer bedside commode  -Assess for incontinence every   -Use incontinent care products after each incontinent episode such as     Activity:  -Mobilize patient  times a day  -Encourage activity and walks on unit  -Encourage or provide ROM exercises   -Turn and reposition patient every  Hours  -Use appropriate equipment to lift or move patient in bed  -Instruct/ Assist with weight shifting every  when out of bed in chair  -Consider limitation of chair time  hour intervals    Skin Care:  -Avoid use of baby powder, tape, friction and shearing, hot water or constrictive clothing  -Relieve pressure over bony prominences using  -Do not massage red bony areas    Next Steps:  -Teach patient strategies to minimize risks such as    -Consider consults to  interdisciplinary teams such as   Outcome: Progressing  Goal: Incision(s), wounds(s) or drain site(s) healing without S/S of infection  Description: INTERVENTIONS  - Assess and document dressing, incision, wound bed, drain sites and surrounding tissue  - Provide patient  and family education  - Perform skin care/dressing changes every   Outcome: Progressing  Goal: Pressure injury heals and does not worsen  Description: Interventions:  - Implement low air loss mattress or specialty surface (Criteria met)  - Apply silicone foam dressing  - Instruct/assist with weight shifting every  minutes when in chair   - Limit chair time to hour intervals  - Use special pressure reducing interventions such as  when in chair   - Apply fecal or urinary incontinence containment device   - Perform passive or active ROM every   - Turn and reposition patient & offload bony prominences every  hours   - Utilize friction reducing device or surface for transfers   - Consider consults to  interdisciplinary teams such as   - Use incontinent care products after each incontinent episode such as   - Consider nutrition services referral as needed  Outcome: Progressing     Problem: HEMATOLOGIC - ADULT  Goal: Maintains hematologic stability  Description: INTERVENTIONS  - Assess for signs and symptoms of bleeding or hemorrhage  - Monitor labs  - Administer supportive blood products/factors as ordered and appropriate  Outcome: Progressing

## 2025-07-10 ENCOUNTER — APPOINTMENT (INPATIENT)
Dept: RADIOLOGY | Facility: HOSPITAL | Age: 60
DRG: 252 | End: 2025-07-10
Attending: RADIOLOGY
Payer: COMMERCIAL

## 2025-07-10 PROBLEM — D65 ACQUIRED HYPOFIBRINOGENEMIA (HCC): Status: ACTIVE | Noted: 2025-07-10

## 2025-07-10 LAB
ANION GAP SERPL CALCULATED.3IONS-SCNC: 5 MMOL/L (ref 4–13)
APTT PPP: 37 SECONDS (ref 23–34)
APTT PPP: 45 SECONDS (ref 23–34)
APTT PPP: 47 SECONDS (ref 23–34)
APTT PPP: 69 SECONDS (ref 23–34)
BUN SERPL-MCNC: 15 MG/DL (ref 5–25)
CALCIUM SERPL-MCNC: 8.5 MG/DL (ref 8.4–10.2)
CHLORIDE SERPL-SCNC: 106 MMOL/L (ref 96–108)
CO2 SERPL-SCNC: 26 MMOL/L (ref 21–32)
CREAT SERPL-MCNC: 0.82 MG/DL (ref 0.6–1.3)
ERYTHROCYTE [DISTWIDTH] IN BLOOD BY AUTOMATED COUNT: 13.8 % (ref 11.6–15.1)
ERYTHROCYTE [DISTWIDTH] IN BLOOD BY AUTOMATED COUNT: 13.9 % (ref 11.6–15.1)
FIBRINOGEN PPP-MCNC: 70 MG/DL (ref 206–523)
FIBRINOGEN PPP-MCNC: 80 MG/DL (ref 206–523)
GFR SERPL CREATININE-BSD FRML MDRD: 96 ML/MIN/1.73SQ M
GLUCOSE SERPL-MCNC: 110 MG/DL (ref 65–140)
GLUCOSE SERPL-MCNC: 113 MG/DL (ref 65–140)
GLUCOSE SERPL-MCNC: 125 MG/DL (ref 65–140)
GLUCOSE SERPL-MCNC: 127 MG/DL (ref 65–140)
GLUCOSE SERPL-MCNC: 187 MG/DL (ref 65–140)
GLUCOSE SERPL-MCNC: 94 MG/DL (ref 65–140)
HCT VFR BLD AUTO: 33.5 % (ref 36.5–49.3)
HCT VFR BLD AUTO: 34.1 % (ref 36.5–49.3)
HGB BLD-MCNC: 10.8 G/DL (ref 12–17)
HGB BLD-MCNC: 11.1 G/DL (ref 12–17)
INR PPP: 1.3 (ref 0.85–1.19)
MCH RBC QN AUTO: 26.3 PG (ref 26.8–34.3)
MCH RBC QN AUTO: 26.9 PG (ref 26.8–34.3)
MCHC RBC AUTO-ENTMCNC: 32.2 G/DL (ref 31.4–37.4)
MCHC RBC AUTO-ENTMCNC: 32.6 G/DL (ref 31.4–37.4)
MCV RBC AUTO: 82 FL (ref 82–98)
MCV RBC AUTO: 83 FL (ref 82–98)
PLATELET # BLD AUTO: 146 THOUSANDS/UL (ref 149–390)
PLATELET # BLD AUTO: 147 THOUSANDS/UL (ref 149–390)
PMV BLD AUTO: 10.1 FL (ref 8.9–12.7)
PMV BLD AUTO: 9.5 FL (ref 8.9–12.7)
POTASSIUM SERPL-SCNC: 4 MMOL/L (ref 3.5–5.3)
PROTHROMBIN TIME: 16.4 SECONDS (ref 12.3–15)
RBC # BLD AUTO: 4.1 MILLION/UL (ref 3.88–5.62)
RBC # BLD AUTO: 4.13 MILLION/UL (ref 3.88–5.62)
SODIUM SERPL-SCNC: 137 MMOL/L (ref 135–147)
WBC # BLD AUTO: 5.23 THOUSAND/UL (ref 4.31–10.16)
WBC # BLD AUTO: 5.46 THOUSAND/UL (ref 4.31–10.16)

## 2025-07-10 PROCEDURE — 99232 SBSQ HOSP IP/OBS MODERATE 35: CPT | Performed by: SURGERY

## 2025-07-10 PROCEDURE — 82948 REAGENT STRIP/BLOOD GLUCOSE: CPT

## 2025-07-10 PROCEDURE — 047M342 DILATION OF RIGHT POPLITEAL ARTERY WITH DRUG-ELUTING INTRALUMINAL DEVICE, SUSTAINED RELEASE, PERCUTANEOUS APPROACH: ICD-10-PCS | Performed by: RADIOLOGY

## 2025-07-10 PROCEDURE — C1769 GUIDE WIRE: HCPCS | Performed by: STUDENT IN AN ORGANIZED HEALTH CARE EDUCATION/TRAINING PROGRAM

## 2025-07-10 PROCEDURE — C1760 CLOSURE DEV, VASC: HCPCS | Performed by: STUDENT IN AN ORGANIZED HEALTH CARE EDUCATION/TRAINING PROGRAM

## 2025-07-10 PROCEDURE — 85730 THROMBOPLASTIN TIME PARTIAL: CPT | Performed by: STUDENT IN AN ORGANIZED HEALTH CARE EDUCATION/TRAINING PROGRAM

## 2025-07-10 PROCEDURE — 85027 COMPLETE CBC AUTOMATED: CPT | Performed by: RADIOLOGY

## 2025-07-10 PROCEDURE — 36140 INTRO NDL ICATH UPR/LXTR ART: CPT

## 2025-07-10 PROCEDURE — 37252 INTRVASC US NONCORONARY 1ST: CPT | Performed by: RADIOLOGY

## 2025-07-10 PROCEDURE — 76937 US GUIDE VASCULAR ACCESS: CPT

## 2025-07-10 PROCEDURE — 99232 SBSQ HOSP IP/OBS MODERATE 35: CPT

## 2025-07-10 PROCEDURE — 85730 THROMBOPLASTIN TIME PARTIAL: CPT

## 2025-07-10 PROCEDURE — C1753 CATH, INTRAVAS ULTRASOUND: HCPCS | Performed by: STUDENT IN AN ORGANIZED HEALTH CARE EDUCATION/TRAINING PROGRAM

## 2025-07-10 PROCEDURE — 37226 HB FEM/POPL REVASC W/STENT: CPT

## 2025-07-10 PROCEDURE — 37226 PR REVSC OPN/PRQ FEM/POP W/STNT/ANGIOP SM VSL: CPT | Performed by: RADIOLOGY

## 2025-07-10 PROCEDURE — 85730 THROMBOPLASTIN TIME PARTIAL: CPT | Performed by: RADIOLOGY

## 2025-07-10 PROCEDURE — 99152 MOD SED SAME PHYS/QHP 5/>YRS: CPT

## 2025-07-10 PROCEDURE — 37252 INTRVASC US NONCORONARY 1ST: CPT

## 2025-07-10 PROCEDURE — 85610 PROTHROMBIN TIME: CPT

## 2025-07-10 PROCEDURE — 99153 MOD SED SAME PHYS/QHP EA: CPT

## 2025-07-10 PROCEDURE — 37224 HB FEM/POPL REVAS W/TLA: CPT

## 2025-07-10 PROCEDURE — B44FZZ3 ULTRASONOGRAPHY OF RIGHT LOWER EXTREMITY ARTERIES, INTRAVASCULAR: ICD-10-PCS | Performed by: RADIOLOGY

## 2025-07-10 PROCEDURE — C1874 STENT, COATED/COV W/DEL SYS: HCPCS | Performed by: STUDENT IN AN ORGANIZED HEALTH CARE EDUCATION/TRAINING PROGRAM

## 2025-07-10 PROCEDURE — 99152 MOD SED SAME PHYS/QHP 5/>YRS: CPT | Performed by: RADIOLOGY

## 2025-07-10 PROCEDURE — C1725 CATH, TRANSLUMIN NON-LASER: HCPCS | Performed by: STUDENT IN AN ORGANIZED HEALTH CARE EDUCATION/TRAINING PROGRAM

## 2025-07-10 PROCEDURE — 37214 CESSJ THERAPY CATH REMOVAL: CPT | Performed by: RADIOLOGY

## 2025-07-10 PROCEDURE — 85384 FIBRINOGEN ACTIVITY: CPT | Performed by: RADIOLOGY

## 2025-07-10 PROCEDURE — 80048 BASIC METABOLIC PNL TOTAL CA: CPT

## 2025-07-10 RX ORDER — TICAGRELOR 90 MG/1
90 TABLET, FILM COATED ORAL EVERY 12 HOURS SCHEDULED
Status: DISCONTINUED | OUTPATIENT
Start: 2025-07-11 | End: 2025-07-11 | Stop reason: HOSPADM

## 2025-07-10 RX ORDER — HEPARIN SODIUM 1000 [USP'U]/ML
2600 INJECTION, SOLUTION INTRAVENOUS; SUBCUTANEOUS EVERY 6 HOURS PRN
Status: DISCONTINUED | OUTPATIENT
Start: 2025-07-10 | End: 2025-07-11

## 2025-07-10 RX ORDER — TICAGRELOR 90 MG/1
180 TABLET, FILM COATED ORAL ONCE
Status: COMPLETED | OUTPATIENT
Start: 2025-07-10 | End: 2025-07-10

## 2025-07-10 RX ORDER — IODIXANOL 320 MG/ML
50 INJECTION, SOLUTION INTRAVASCULAR
Status: COMPLETED | OUTPATIENT
Start: 2025-07-10 | End: 2025-07-10

## 2025-07-10 RX ORDER — FENTANYL CITRATE 50 UG/ML
INJECTION, SOLUTION INTRAMUSCULAR; INTRAVENOUS AS NEEDED
Status: COMPLETED | OUTPATIENT
Start: 2025-07-10 | End: 2025-07-10

## 2025-07-10 RX ORDER — MIDAZOLAM HYDROCHLORIDE 2 MG/2ML
INJECTION, SOLUTION INTRAMUSCULAR; INTRAVENOUS AS NEEDED
Status: COMPLETED | OUTPATIENT
Start: 2025-07-10 | End: 2025-07-10

## 2025-07-10 RX ORDER — HEPARIN SODIUM 10000 [USP'U]/100ML
3-30 INJECTION, SOLUTION INTRAVENOUS
Status: DISCONTINUED | OUTPATIENT
Start: 2025-07-10 | End: 2025-07-11

## 2025-07-10 RX ORDER — INSULIN LISPRO 100 [IU]/ML
1-5 INJECTION, SOLUTION INTRAVENOUS; SUBCUTANEOUS
Status: DISCONTINUED | OUTPATIENT
Start: 2025-07-11 | End: 2025-07-11 | Stop reason: HOSPADM

## 2025-07-10 RX ORDER — HEPARIN SODIUM 1000 [USP'U]/ML
5200 INJECTION, SOLUTION INTRAVENOUS; SUBCUTANEOUS EVERY 6 HOURS PRN
Status: DISCONTINUED | OUTPATIENT
Start: 2025-07-10 | End: 2025-07-11

## 2025-07-10 RX ORDER — LIDOCAINE WITH 8.4% SOD BICARB 0.9%(10ML)
SYRINGE (ML) INJECTION AS NEEDED
Status: COMPLETED | OUTPATIENT
Start: 2025-07-10 | End: 2025-07-10

## 2025-07-10 RX ADMIN — OXYCODONE HYDROCHLORIDE 10 MG: 10 TABLET ORAL at 02:32

## 2025-07-10 RX ADMIN — Medication 2.5 MG: at 22:08

## 2025-07-10 RX ADMIN — FAMOTIDINE 20 MG: 20 TABLET, FILM COATED ORAL at 08:08

## 2025-07-10 RX ADMIN — TICAGRELOR 180 MG: 90 TABLET ORAL at 21:48

## 2025-07-10 RX ADMIN — METOPROLOL SUCCINATE 25 MG: 25 TABLET, EXTENDED RELEASE ORAL at 08:08

## 2025-07-10 RX ADMIN — ATORVASTATIN CALCIUM 80 MG: 80 TABLET, FILM COATED ORAL at 17:17

## 2025-07-10 RX ADMIN — IODIXANOL 36 ML: 320 INJECTION, SOLUTION INTRAVASCULAR at 11:57

## 2025-07-10 RX ADMIN — OXYCODONE HYDROCHLORIDE 10 MG: 10 TABLET ORAL at 08:08

## 2025-07-10 RX ADMIN — ISOSORBIDE MONONITRATE 30 MG: 30 TABLET, EXTENDED RELEASE ORAL at 08:08

## 2025-07-10 RX ADMIN — ISOSORBIDE MONONITRATE 30 MG: 30 TABLET, EXTENDED RELEASE ORAL at 17:17

## 2025-07-10 RX ADMIN — FENTANYL CITRATE 50 MCG: 50 INJECTION INTRAMUSCULAR; INTRAVENOUS at 11:10

## 2025-07-10 RX ADMIN — FENTANYL CITRATE 25 MCG: 50 INJECTION INTRAMUSCULAR; INTRAVENOUS at 11:47

## 2025-07-10 RX ADMIN — Medication 10 ML: at 11:13

## 2025-07-10 RX ADMIN — MIDAZOLAM 0.5 MG: 1 INJECTION INTRAMUSCULAR; INTRAVENOUS at 11:39

## 2025-07-10 RX ADMIN — MIDAZOLAM 1 MG: 1 INJECTION INTRAMUSCULAR; INTRAVENOUS at 11:09

## 2025-07-10 RX ADMIN — ALTEPLASE 0.3 MG/HR: 2.2 INJECTION, POWDER, LYOPHILIZED, FOR SOLUTION INTRAVENOUS at 04:51

## 2025-07-10 RX ADMIN — FENTANYL CITRATE 25 MCG: 50 INJECTION INTRAMUSCULAR; INTRAVENOUS at 11:40

## 2025-07-10 RX ADMIN — HEPARIN SODIUM 18 UNITS/KG/HR: 10000 INJECTION, SOLUTION INTRAVENOUS at 15:20

## 2025-07-10 RX ADMIN — Medication 6 MG: at 21:48

## 2025-07-10 NOTE — PROGRESS NOTES
Progress Note - Critical Care/ICU   Name: Maxime Green 60 y.o. male I MRN: [id number]  Unit/Bed#: [facility] 505-01 I Date of Admission: 7/8/2025   Date of Service: 7/10/2025 I Hospital Day: 2      Assessment & Plan   Active Hospital Problems    Diagnosis Date Noted POA    Femoral-popliteal bypass graft occlusion, right, subsequent encounter 07/08/2025 Not Applicable    Acquired hypofibrinogenemia (HCC) 07/10/2025 No      Resolved Hospital Problems   No resolved problems to display.     Neuro/Psych:  Diagnoses: RLE ischemia, pain control  Plan:  Neuro checks Q1H Neurovascular checks  Sedation: None  Analgesia: Multimodal. Tylenol 650 mg PRN. Oxy 5/10 Q4H PRN, Dilaudid 0.5mg IV Q2H PRN.      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  Continuous cardiopulmonary monitoring. Maintain MAP >65.     Pulm:  Diagnoses: No active issues  Plan:  Continuous pulse oximetry. Maintain O2 sat >92%.   Incentive spirometry     GI:  Diagnoses: No active issues  Plan:  Continue Famotidine for hx of GERD     :  Diagnoses: No active issues  Creatinine trend: BMP pending  Baseline creatinine: 1  Plan:  Monitor I/Os.     F/E/N:  Diagnoses:   Plan:   F: 50mL/hr Saline. Fluid resuscitation prn.  E: Monitor and replete electrolytes for Mg >2, Phos >3, K >4.  N: Currently NPO since midnight for lysis recheck today     Heme/Onc:  Diagnoses: RLE acute limb ischemia              CTA on 7/07 showed occluded right bypass graft  Plan:  Hgb 10.8 from 11.1, PLTs 147 from 146  PTT 47.  Fibrinogen 80 from 158  VTE prophylaxis: Continuous Heparin gtt per IR/alteplase     Endo:  Diagnoses: DM  Plan:   Insulin: Sliding Scale. Monitor blood glucose.     ID:  Diagnoses: No active issues  Culture data: None  Labs: WBC 5.46  Temperature: Afebrile  Plan:  Monitor fever curve and WBC.     MSK/Skin:  Diagnoses: No active issues  Plan:  PT/OT when appropriate. Encourage OOB and ambulation when appropriate. Local wound care prn.        LDAs:  Lines - PICC, peripheral IV  Drains -   Airways -     Disposition: Critical care    ICU Core Measures     A: Assess, Prevent, and Manage Pain Has pain been assessed? Yes  Need for changes to pain regimen? No   B: Both SAT/SAT  N/A   C: Choice of Sedation RASS Goal: 0 Alert and Calm  Need for changes to sedation or analgesia regimen? N/A   D: Delirium CAM-ICU: Negative   E: Early Mobility  Plan for early mobility? Yes   F: Family Engagement Plan for family engagement today? Yes         Prophylaxis:  VTE VTE covered by:  heparin (porcine), Intravenous, 18 Units/kg/hr at 07/10/25 1520  heparin (porcine), Intravenous  heparin (porcine), Intravenous       Stress Ulcer  covered byfamotidine (PEPCID) 20 mg tablet [[id number]] (Long-Term Med), famotidine (PEPCID) tablet 20 mg [[id number]]         24 Hour Events : Maxime Green is a 60 y.o. hx of PAD w/ claudication. Pt is receiving ICU care for lysis catheter treatment of RLE bypass graft occlusion. IR placed lysis catheter in R distal popliteal artery on 7/9. Pt experiencing severe pain yesterday, but significantly improved today. Pt denies chest pain, SOB, and n/v.   Subjective       Objective :                   Vitals I/O      Most Recent Min/Max in 24hrs   Temp 97.9 °F (36.6 °C) Temp  Min: 97.5 °F (36.4 °C)  Max: 98 °F (36.7 °C)   Pulse 68 Pulse  Min: 46  Max: 96   Resp 15 Resp  Min: 8  Max: 29   /67 BP  Min: 111/63  Max: 201/82   O2 Sat 97 % SpO2  Min: 95 %  Max: 100 %      Intake/Output Summary (Last 24 hours) at  7/10/2025 1541  Last data filed at 7/10/2025 1400  Gross per 24 hour   Intake 2510.15 ml   Output 925 ml   Net 1585.15 ml       Diet Jorge/CHO Controlled; Consistent Carbohydrate Diet Level 2 (5 carb servings/75 grams CHO/meal)    Invasive Monitoring           Physical Exam   Physical Exam  Eyes:      Conjunctiva/sclera: Conjunctivae normal.   Feet:      Comments:  Bilateral feet warm and capillary refil <2 seconds. Motor and sensory intact.   Skin:     General: Skin is warm and dry.   HENT:      Head: Normocephalic and atraumatic.   Cardiovascular:      Rate and Rhythm: Normal rate and regular rhythm.      Heart sounds: Normal heart sounds.   Musculoskeletal:      Right lower leg: No edema.      Left lower leg: No edema.   Abdominal:      Palpations: Abdomen is soft.      Tenderness: There is no abdominal tenderness. There is no guarding.   Constitutional:       General: He is not in acute distress.     Appearance: He is well-developed and well-nourished. He is not toxic-appearing.      Interventions: He is not sedated.  Pulmonary:      Effort: Pulmonary effort is normal.      Breath sounds: Normal breath sounds.   Neurological:      General: No focal deficit present.      Mental Status: He is alert and oriented to person, place and time.          Diagnostic Studies        Lab Results: I have reviewed the following results:     Medications:  Scheduled PRN   atorvastatin, 80 mg, Daily With Dinner  famotidine, 20 mg, Daily  insulin lispro, 1-5 Units, Q6H BOBBY  isosorbide mononitrate, 30 mg, BID  melatonin, 6 mg, HS  metoprolol succinate, 25 mg, Daily  ticagrelor, 180 mg, Once  [START ON 7/11/2025] ticagrelor, 90 mg, Q12H BOBBY      acetaminophen, 650 mg, Q6H PRN  calcium carbonate, 500 mg, Daily PRN  heparin (porcine), 2,600 Units, Q6H PRN  heparin (porcine), 5,200 Units, Q6H PRN  ondansetron, 4 mg, Q4H PRN  oxyCODONE, 2.5 mg, Q4H PRN       Continuous    heparin (porcine), 3-30 Units/kg/hr (Order-Specific), Last Rate: 18  Units/kg/hr (07/10/25 1520)         Labs:   CBC    Recent Labs     07/10/25  0101 07/10/25  0534   WBC 5.23 5.46   HGB 11.1* 10.8*   HCT 34.1* 33.5*   * 147*     BMP    Recent Labs     07/09/25  0621 07/10/25  0534   SODIUM 136 137   K 3.6 4.0    106   CO2 26 26   AGAP 6 5   BUN 15 15   CREATININE 0.82 0.82   CALCIUM 8.9 8.5       Coags    Recent Labs     07/09/25  1447 07/09/25  1746 07/10/25  0101 07/10/25  0534   INR 1.16  --   --   --    PTT 67*   < > 47* 45*    < > = values in this interval not displayed.        Additional Electrolytes  No recent results       Blood Gas    No recent results  No recent results LFTs  No recent results    Infectious  No recent results  Glucose  Recent Labs     07/09/25  0621 07/10/25  0534   GLUC 150* 127             Assessment & Plan        Active Hospital Problems     Diagnosis Date Noted POA    Femoral-popliteal bypass graft occlusion, right, subsequent encounter 07/08/2025 Not Applicable       Resolved Hospital Problems   No resolved problems to display.        Disposition: Critical care     History of Present Illness  Maxime Green is a 60 y.o. hx of PAD w/ claudication. Pt is receiving ICU care for lysis catheter treatment of RLE bypass graft occlusion. IR placed lysis catheter in R distal popliteal artery on 7/9. Pt experiencing severe pain yesterday, but significantly improved today. Pt denies chest pain, SOB, and n/v.     History obtained from chart review and the patient.  Review of Systems: Review of Systems   Constitutional:  Negative for chills and fever.   HENT:  Negative for ear pain and sore throat.    Eyes:  Negative for pain and visual disturbance.   Respiratory:  Negative for cough and shortness of breath.    Cardiovascular:  Negative for chest pain and palpitations.   Gastrointestinal:  Negative for abdominal pain and vomiting.   Genitourinary:  Negative for dysuria and hematuria.   Musculoskeletal:  Positive for back pain.   Skin:  Negative for  color change and rash.   Neurological:  Negative for seizures and syncope.   All other systems reviewed and are negative.

## 2025-07-10 NOTE — BRIEF OP NOTE (RAD/CATH)
RLE Arteriogram Procedure Note    PATIENT NAME: Maxime Green  : 1965  MRN: [id number]     Pre-op Diagnosis:   1. Acute lower limb ischemia      Post-op Diagnosis:   1. Acute lower limb ischemia        Surgeon:   DO Paulo Lee MD    Assistants:     No qualified resident was available.    Estimated Blood Loss: none  Findings:   L CFA arteriotomy closed with Angioseal  Resolution of R fem-pop and peroneal thrombus following overnight lysis aside from a small non occlusive peroneal clot.   Small dissection / narrowing via IVUS at peripheral edge of Viabahn stent in the P1 popliteal artery, treated with 6 mm POBA and overlapping 6 mm Shahana    Specimens: none    Complications:  none    Anesthesia: conscious sedation and local    Quinn Fairbanks DO     Date: 7/10/2025  Time: 12:56 PM

## 2025-07-10 NOTE — ASSESSMENT & PLAN NOTE
61 y/o M w/ history of PAD w/ claudication s/p Left SFA stent 1/20/22, Right Fem- POP bypass 2/3/2022 (Zev), RLE bypass graft occlusion s/p lysis, PTA, Viabahn stent 10/19/22 and repeat lysis with noted CFA stenosis above the bifurcation 6/2-3/2025. He now represents in transfer from [place] w/th RLE bypass graft occlusion. Initially he c/o rest pain but is now pain free at rest, M/S are intact. DP/PT appreciated with doppler.     POD 1 - thrombolysis with IR  Now with palpable R PT, doppler DT signal, foot is warm, pain controlled    Plan:  -Acute RLE bypass graft occlusion, asymptomatic at rest, likely returned to claudication as prior to bypass surgery.  -POD 1 thrombolysis with IR   -NPO after midnight for lysis recheck  -L groin access stable, soft, no oozing.   -Continue to hold Xarelto, hep gtt/alteplase gtt per IR protocol  -Frequent neurovascular checks  -P2Y12 result of 300, Plavix non-responder. Discussed with IR, will plan to Brilinta load after lysis procedure. Plavix discontinued.  -CM for price check for Brilinta, $0 copay  -Continue statin  -Pain control PRN  -ICU level of care while lysis catheter in place

## 2025-07-10 NOTE — ASSESSMENT & PLAN NOTE
S/p thrombolysis on 7/9 with IR  Initial fibrinogen 258 on admission  Down trending upon initiation of lysis, last result 70 this morning  tPA rate adjusted per IR protocol, continue frequent neurovascular checks, monitor for worsening bleeding  LUE PICC with some oozing at insertion site, L groin access site soft, no hematoma.   Continue to monitor

## 2025-07-10 NOTE — CONSULTS
Consultation - Critical Care/ICU   Name: Maxime Green 60 y.o. male I MRN: [id number]  Unit/Bed#: 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     Neuro/Psych:  Diagnoses: RLE ischemia, pain control  Plan:  Neuro checks Q1H Neurovascular checks  Sedation: None  Analgesia: Multimodal. Tylenol 650 mg PRN. Oxy 5/10 Q4H PRN, Dilaudid 0.5mg IV Q2H PRN.     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  Continuous cardiopulmonary monitoring. Maintain MAP >65.    Pulm:  Diagnoses: No active issues  Plan:  Continuous pulse oximetry. Maintain O2 sat >92%.   Incentive spirometry    GI:  Diagnoses: No active issues  Plan:  Continue Famotidine for hx of GERD    :  Diagnoses: No active issues  Creatinine trend: BMP pending  Baseline creatinine: 1  Plan:  Monitor I/Os.    F/E/N:  Diagnoses:   Plan:   F: 50mL/hr Saline. Fluid resuscitation prn.  E: Monitor and replete electrolytes for Mg >2, Phos >3, K >4.  N: Currently NPO since midnight for lysis recheck today    Heme/Onc:  Diagnoses: RLE acute limb  ischemia              CTA on 7/07 showed occluded right bypass graft  Plan:  Hgb 10.8 from 11.1, PLTs 147 from 146  PTT 47. Fibrinogen 80 from 158  VTE prophylaxis: Continuous Heparin gtt per IR/alteplase    Endo:  Diagnoses: DM  Plan:   Insulin: Sliding Scale. Monitor blood glucose.    ID:  Diagnoses: No active issues  Culture data: None  Labs: WBC 5.46  Temperature: Afebrile  Plan:  Monitor fever curve and WBC.    MSK/Skin:  Diagnoses: No active issues  Plan:  PT/OT when appropriate. Encourage OOB and ambulation when appropriate. Local wound care prn.      LDAs:  Lines - PICC, peripheral IV  Drains -   Airways -     Disposition: Critical care    History of Present Illness   Maxime Green is a 60 y.o. hx of PAD w/ claudication. Pt is receiving ICU care for lysis catheter treatment of RLE bypass graft occlusion. IR placed lysis catheter in R distal popliteal artery on 7/9. Pt experiencing severe pain yesterday, but significantly improved today. Pt denies chest pain, SOB, and n/v.    History obtained from chart review and the patient.  Review of Systems: Review of Systems   Constitutional:  Negative for chills and fever.   HENT:  Negative for ear pain and sore throat.    Eyes:  Negative for pain and visual disturbance.   Respiratory:  Negative for cough and shortness of breath.    Cardiovascular:  Negative for chest pain and palpitations.   Gastrointestinal:  Negative for abdominal pain and vomiting.   Genitourinary:  Negative for dysuria and hematuria.   Musculoskeletal:  Positive for back pain.   Skin:  Negative for color change and rash.   Neurological:  Negative for seizures and syncope.   All other systems reviewed and are negative.      Historical Information   Past Medical History:  No date: Bicuspid aortic valve      Comment:  being observed  No date: CAD (coronary artery disease)  4/6/2021: Chest pain  No date: Coronary artery disease  No date: Diabetes mellitus (HCC)  5/17/2021: Encounter for postoperative  care  No date: Former tobacco use  No date: GERD (gastroesophageal reflux disease)  No date: Goiter  No date: History of myocardial infarction  No date: History of rib fracture  No date: Hyperlipidemia  No date: Hypertension  4/6/2021: Hypertensive urgency  7/22/2021: Left carotid bruit  No date: Leg pain, bilateral      Comment:  Agram today LLE   1/20/2022 4/6/2021: Leukocytosis  No date: Myocardial infarction (HCC)      Comment:  2021-   CABG  x3  4/7/2021: NSTEMI (non-ST elevated myocardial infarction) ([facility])  No date: Wears glasses Past Surgical History:  10/19/2022: ARTERIOGRAM; Right      Comment:  Procedure: RIGHT LOWER EXTREMITY ARTERIOGRAM WITH LYSIS,               PLACEMENT OF LYSIS CATHETER; third order cannulation of                right popliteal artery via left groin access;  Surgeon:                Ronald Ordonez MD;  [facility]: [facility];                 Service: Vascular  No date: CARDIAC CATHETERIZATION  10/20/2021: CARDIAC CATHETERIZATION; N/A      Comment:  Procedure: Cardiac catheterization;  Surgeon: Lynnette Reeder MD;  Location: [facility];  Service:                Cardiology  10/20/2021: CARDIAC CATHETERIZATION; N/A      Comment:  Procedure: Cardiac Coronary Angiogram;  Surgeon:                Lynnette Reeder MD;  [facility];                 Service: Cardiology  10/20/2021: CARDIAC CATHETERIZATION; N/A      Comment:  Procedure: Cardiac pci;  Surgeon: Lynnette Reeder MD;                 [facility];  Service: Cardiology  1/20/2022: IR LOWER EXTREMITY ANGIOGRAM  2/3/2022: IR LOWER EXTREMITY ANGIOGRAM  10/19/2022: IR LOWER EXTREMITY ANGIOGRAM  6/2/2025: IR LOWER EXTREMITY ANGIOGRAM  10/19/2022: IR PICC PLACEMENT DOUBLE LUMEN  6/2/2025: IR PICC PLACEMENT DOUBLE LUMEN  10/20/2022: IR TPA LYSIS CHECK  6/3/2025: IR TPA LYSIS CHECK  2/3/2022: HI BYPASS W/VEIN FEMORAL-POPLITEAL; Right      Comment:  Procedure: BYPASS  FEMORAL-POPLITEAL;  Surgeon: Pradeep Brothers MD;  Location: [facility];  Service:                Vascular  4/9/2021: FL CORONARY ARTERY BYP W/VEIN & ARTERY GRAFT 4 VEIN; N/A      Comment:  Procedure: CORONARY ARTERY BYPASS GRAFT (CABG) 3                VESSELS: LIMA to LAD; LLE EVH/SVG to LPL & OM2;  Surgeon:               Jose Sanchez DO;  Location: [facility];  Service:                Cardiac Surgery  4/9/2021: FL ECHO TRANSESOPHAG R-T 2D W/PRB IMG ACQUISJ I&R; N/A      Comment:  Procedure: TRANSESOPHAGEAL ECHOCARDIOGRAM (BENNETT);                 Surgeon: Jose Sanchez DO;  Location: [facility];                 Service: Cardiac Surgery  4/9/2021: FL NDSC SURG W/VIDEO-ASSISTED HARVEST VEIN CABG; Left      Comment:  Procedure: HARVEST VEIN ENDOSCOPIC (EVH);  Surgeon:                Jose Sanchez DO;  Location: [facility];  Service:                Cardiac Surgery  1/20/2022: FL SLCTV CATHJ 3RD+ ORD SLCTV ABDL PEL/LXTR BRNCH; Left      Comment:  Procedure: ARTERIOGRAM, STENT PLACEMENT IN LEFT                SUPERFICIAL FEMORIAL ARTERY;  Surgeon: Pradeep Brothers MD;  [facility];  Service: Vascular  No date: VASECTOMY   Current Outpatient Medications   Medication Instructions    acetaminophen (TYLENOL) 650 mg, Oral, Every 6 hours PRN    clopidogrel (PLAVIX) 75 mg, Oral, Daily    famotidine (PEPCID) 20 mg, Oral, Daily    isosorbide mononitrate (IMDUR) 30 mg, Oral, 2 times daily    Jardiance 25 MG TABS TAKE 1 TABLET (25 MG TOTAL) BY MOUTH DAILY.    metFORMIN (GLUCOPHAGE) 1,000 mg, Oral, 2 times daily with meals    metoprolol succinate (TOPROL-XL) 25 mg, Oral, Daily    rosuvastatin (CRESTOR) 10 mg, Oral, Every other day    ticagrelor (BRILINTA) 90 mg, Oral, Every 12 hours scheduled    Xarelto 20 mg, Oral, Daily with breakfast    Allergies[1]   Social History[2] Family History[3]       Objective :                   Vitals I/O      Most Recent Min/Max in 24hrs   Temp 97.8 °F  (36.6 °C) Temp  Min: 97.4 °F (36.3 °C)  Max: 97.8 °F (36.6 °C)   Pulse 57 Pulse  Min: 46  Max: 96   Resp (!) 9 Resp  Min: 8  Max: 29   /65 BP  Min: 106/59  Max: 201/82   O2 Sat 96 % SpO2  Min: 95 %  Max: 100 %      Intake/Output Summary (Last 24 hours) at 7/10/2025 0548  Last data filed at 7/10/2025 0200  Gross per 24 hour   Intake 2385.12 ml   Output 300 ml   Net 2085.12 ml       Diet NPO; Sips with meds    Invasive Monitoring           Physical Exam   Physical Exam  Eyes:      Conjunctiva/sclera: Conjunctivae normal.   Feet:      Comments: Bilateral feet warm and capillary refil <2 seconds. Motor and sensory intact.   Skin:     General: Skin is warm and dry.   HENT:      Head: Normocephalic and atraumatic.   Cardiovascular:      Rate and Rhythm: Normal rate and regular rhythm.      Pulses: Normal pulses.      Heart sounds: Normal heart sounds.   Musculoskeletal:      Right lower leg: No edema.      Left lower leg: No edema.   Abdominal:      Palpations: Abdomen is soft.      Tenderness: There is no abdominal tenderness. There is no guarding.   Constitutional:       General: He is not in acute distress.     Appearance: He is well-developed and well-nourished. He is not ill-appearing or toxic-appearing.   Pulmonary:      Effort: Pulmonary effort is normal.      Breath sounds: Normal breath sounds.   Neurological:      General: No focal deficit present.      Mental Status: He is alert and oriented to person, place and time. Mental status is at baseline.          Diagnostic Studies        Lab Results: I have reviewed the following results:     Medications:  Scheduled PRN   atorvastatin, 80 mg, Daily With Dinner  famotidine, 20 mg, Daily  insulin lispro, 1-5 Units, Q6H BOBBY  isosorbide mononitrate, 30 mg, BID  melatonin, 6 mg, HS  metoprolol succinate, 25 mg, Daily      acetaminophen, 650 mg, Q6H PRN  calcium carbonate, 500 mg, Daily PRN  HYDROmorphone, 0.5 mg, Q3H PRN  ondansetron, 4 mg, Q4H PRN  oxyCODONE, 10  mg, Q4H PRN  oxyCODONE, 5 mg, Q4H PRN       Continuous    alteplase (CATHFLO) 10 mg in sodium chloride 0.9 % 250 mL infusion, 0.3 mg/hr, Last Rate: 0.3 mg/hr (07/10/25 0451)  heparin (porcine), 400 Units/hr, Last Rate: 300 Units/hr (25 1611)  heparin, 20 Units/hr, Last Rate: 20 Units/hr (25 1404)  sodium chloride, 50 mL/hr, Last Rate: 50 mL/hr (25 1443)         Labs:   CBC    Recent Labs     25  1746 07/10/25  0101   WBC 5.41 5.23   HGB 11.7* 11.1*   HCT 35.5* 34.1*   * 146*     BMP    Recent Labs     25  0621   SODIUM 136   K 3.6      CO2 26   AGAP 6   BUN 15   CREATININE 0.82   CALCIUM 8.9       Coags    Recent Labs     25  1447 25  1746 07/10/25  0101   INR 1.16  --   --    PTT 67* 48* 47*        Additional Electrolytes  No recent results       Blood Gas    No recent results  No recent results LFTs  No recent results    Infectious  No recent results  Glucose  Recent Labs     25  0621   GLUC 150*               [1] No Known Allergies  [2]   Social History  Tobacco Use    Smoking status: Former     Current packs/day: 0.00     Average packs/day: 1 pack/day for 38.0 years (38.0 ttl pk-yrs)     Types: Cigarettes     Start date: 1983     Quit date: 2021     Years since quittin.2    Smokeless tobacco: Never   Vaping Use    Vaping status: Never Used   Substance Use Topics    Alcohol use: Never    Drug use: Never   [3]   Family History  Problem Relation Name Age of Onset    Heart disease Father

## 2025-07-10 NOTE — SEDATION DOCUMENTATION
RLE angiogram and lysis recheck performed by Dr Fairbanks without complication. Patient tolerated procedure well. IR Procedure Bedrest Start Time is 1200. Report provided to primary CASSANDRA Nair.

## 2025-07-10 NOTE — QUICK NOTE
Post-Op Check - Vascular Surgery   Maxime Green 60 y.o. male MRN: [id number]  Unit/Bed#: [facility] 505-01 Encounter: [id number]    Assessment:  60yoM s/p RLE angiogram with lysis initiation 7/9/25, now s/p lysis recheck with R P1 pop viabahn stent angioplasty, Shahana stent, lysis catheter removal, L Femoral access (Angioseal)    Plan:  - Incentive spirometry  - Diet: CCD2  - PRN analgesia, anti-emetics  - Brilinta/Statin (1 loading dose of 180mg this evening  - Continue hep gtt overnight  - I/Os  - VTE Prophylaxis covered by hep gtt  - q4hr Neurovascular checks    Subjective: Patient feels well, some residual R calf pain and R toes pain though well controlled.     Vitals:  /87   Pulse 66   Temp 98 °F (36.7 °C)   Resp 18   SpO2 100%     Physical Exam:  General appearance: Alert and oriented in no acute distress  Neurologic: Grossly neurologically intact, no focal deficit noted  Neck: supple, symmetrical, trachea midline  Lungs: Normal work of breathing, no accessory muscle use  Heart: Regular rate and rhythm  Abdomen: Soft, nontender abdomen. L femoral access site dressing c/d/I without noted hematoma  Extremities: Bilateral LE M/S intact    Pulse exam:  Femoral: Right: 2+ Left: 2+  DP: Right: 2+ Left: 2+  PT: Right: 2+     I/Os:  I/O last 3 completed shifts:  In: 4214.5 [P.O.:900; I.V.:3132.2; IV Piggyback:182.3]  Out: 2500 [Urine:2500]  I/O this shift:  In: 439.9 [I.V.:406.8; IV Piggyback:33.1]  Out: 325 [Urine:325]    Invasive Lines/Tubes:  Invasive Devices       Peripherally Inserted Central Catheter Line  Duration             PICC Line 07/09/25 1 day              Peripheral Intravenous Line  Duration             Peripheral IV 07/07/25 Left Antecubital 2 days    Peripheral IV 07/07/25 Right Antecubital 2 days                  VTE Prophylaxis: Heparin gtt    Allie Clemente PA-C  7/10/2025

## 2025-07-10 NOTE — CASE MANAGEMENT
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/2025    Femoral-popliteal bypass graft occlusion, right, subsequent encounter 2025    Acute lower limb ischemia 2025    Asymptomatic bilateral carotid artery stenosis 2024    Peripheral vascular disease (HCC) 10/18/2022    Former tobacco use     History of PTCA     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) 2022    GERD (gastroesophageal reflux disease)     Hypertension     CAD (coronary artery disease)     Hx of CABG 2021    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 complication, without long-term current use of insulin ([facility])       LOS (days): 2  Geometric Mean LOS (GMLOS) (days): 1.8  Days to GMLOS:-0.8     OBJECTIVE:    Risk of Unplanned Readmission Score: 17.1         Current admission status: Inpatient       Preferred Pharmacy:   [id number] - [place], [place] - [address]  [address]  [place] [address]  Phone: [phone] Fax: [phone]    Primary Care Provider: Michael Stapleton MD    Primary Insurance: [employer]  Secondary Insurance:     ASSESSMENT:  Active Health Care Proxies       Ronald Green Health Care Representative - Brother   Primary Phone: [phone] (Mobile)                 Advance Directives  Does patient have a Health Care POA?: No  Does patient have Advance Directives?: No  Primary Contact: Ronald Green (Brother)  [phone] (Mobile)    Readmission Root Cause  30 Day Readmission: No    Patient Information  Admitted from:: Home  Mental Status: Alert  During Assessment patient was accompanied by: Not accompanied during  assessment  Assessment information provided by:: Patient  Primary Caregiver: Self  What city do you live in?: [place]  Type of Current Residence: 2 story home  Living Arrangements: Lives Alone    Activities of Daily Living Prior to Admission  Functional Status: Independent  Completes ADLs independently?: Yes  Ambulates independently?: Yes  Does patient use assisted devices?: No  Does patient currently own DME?: No  Does patient have a history of Outpatient Therapy (PT/OT)?: Yes  Does the patient have a history of Short-Term Rehab?: No  Does patient have a history of HHC?: No    Patient Information Continued  Income Source: Employed  Does patient have prescription coverage?: Yes  Can the patient afford their medications and any related supplies (such as glucometers or test strips)?: Yes  Does patient receive dialysis treatments?: No  Does patient have a history of Mental Health Diagnosis?: No    Means of Transportation  Means of Transport to Appts:: Drives Self    CM met with Pt to introduce self, explain role, complete CM assessment, and discuss DC planning.     Pt lives alone in 2 story home. Pt independent with ADLs, no DME use, drives and works FT. Pt anticipates no needs at DC. States he will have a ride home.     CM will continue to follow for DC planning needs

## 2025-07-11 ENCOUNTER — TRANSITIONAL CARE MANAGEMENT (OUTPATIENT)
Dept: FAMILY MEDICINE CLINIC | Facility: CLINIC | Age: 60
End: 2025-07-11

## 2025-07-11 VITALS
HEART RATE: 64 BPM | WEIGHT: 152.34 LBS | RESPIRATION RATE: 17 BRPM | SYSTOLIC BLOOD PRESSURE: 163 MMHG | BODY MASS INDEX: 25.35 KG/M2 | TEMPERATURE: 98.6 F | DIASTOLIC BLOOD PRESSURE: 86 MMHG | OXYGEN SATURATION: 98 %

## 2025-07-11 LAB
ANION GAP SERPL CALCULATED.3IONS-SCNC: 8 MMOL/L (ref 4–13)
APTT PPP: 98 SECONDS (ref 23–34)
BASOPHILS # BLD AUTO: 0.02 THOUSANDS/ÂΜL (ref 0–0.1)
BASOPHILS NFR BLD AUTO: 0 % (ref 0–1)
BUN SERPL-MCNC: 11 MG/DL (ref 5–25)
CALCIUM SERPL-MCNC: 8.5 MG/DL (ref 8.4–10.2)
CHLORIDE SERPL-SCNC: 101 MMOL/L (ref 96–108)
CO2 SERPL-SCNC: 26 MMOL/L (ref 21–32)
CREAT SERPL-MCNC: 0.82 MG/DL (ref 0.6–1.3)
EOSINOPHIL # BLD AUTO: 0.1 THOUSAND/ÂΜL (ref 0–0.61)
EOSINOPHIL NFR BLD AUTO: 2 % (ref 0–6)
ERYTHROCYTE [DISTWIDTH] IN BLOOD BY AUTOMATED COUNT: 13.5 % (ref 11.6–15.1)
GFR SERPL CREATININE-BSD FRML MDRD: 96 ML/MIN/1.73SQ M
GLUCOSE SERPL-MCNC: 152 MG/DL (ref 65–140)
GLUCOSE SERPL-MCNC: 166 MG/DL (ref 65–140)
GLUCOSE SERPL-MCNC: 198 MG/DL (ref 65–140)
HCT VFR BLD AUTO: 33.4 % (ref 36.5–49.3)
HGB BLD-MCNC: 10.8 G/DL (ref 12–17)
IMM GRANULOCYTES # BLD AUTO: 0.02 THOUSAND/UL (ref 0–0.2)
IMM GRANULOCYTES NFR BLD AUTO: 0 % (ref 0–2)
LYMPHOCYTES # BLD AUTO: 1.39 THOUSANDS/ÂΜL (ref 0.6–4.47)
LYMPHOCYTES NFR BLD AUTO: 22 % (ref 14–44)
MAGNESIUM SERPL-MCNC: 1.6 MG/DL (ref 1.9–2.7)
MCH RBC QN AUTO: 26.3 PG (ref 26.8–34.3)
MCHC RBC AUTO-ENTMCNC: 32.3 G/DL (ref 31.4–37.4)
MCV RBC AUTO: 81 FL (ref 82–98)
MONOCYTES # BLD AUTO: 0.68 THOUSAND/ÂΜL (ref 0.17–1.22)
MONOCYTES NFR BLD AUTO: 11 % (ref 4–12)
NEUTROPHILS # BLD AUTO: 4.03 THOUSANDS/ÂΜL (ref 1.85–7.62)
NEUTS SEG NFR BLD AUTO: 65 % (ref 43–75)
NRBC BLD AUTO-RTO: 0 /100 WBCS
PLATELET # BLD AUTO: 118 THOUSANDS/UL (ref 149–390)
PMV BLD AUTO: 10 FL (ref 8.9–12.7)
POTASSIUM SERPL-SCNC: 3.5 MMOL/L (ref 3.5–5.3)
RBC # BLD AUTO: 4.11 MILLION/UL (ref 3.88–5.62)
SODIUM SERPL-SCNC: 135 MMOL/L (ref 135–147)
WBC # BLD AUTO: 6.24 THOUSAND/UL (ref 4.31–10.16)

## 2025-07-11 PROCEDURE — 83735 ASSAY OF MAGNESIUM: CPT

## 2025-07-11 PROCEDURE — 85730 THROMBOPLASTIN TIME PARTIAL: CPT | Performed by: STUDENT IN AN ORGANIZED HEALTH CARE EDUCATION/TRAINING PROGRAM

## 2025-07-11 PROCEDURE — 80048 BASIC METABOLIC PNL TOTAL CA: CPT

## 2025-07-11 PROCEDURE — 82948 REAGENT STRIP/BLOOD GLUCOSE: CPT

## 2025-07-11 PROCEDURE — 85025 COMPLETE CBC W/AUTO DIFF WBC: CPT

## 2025-07-11 RX ORDER — MAGNESIUM SULFATE HEPTAHYDRATE 40 MG/ML
4 INJECTION, SOLUTION INTRAVENOUS ONCE
Status: COMPLETED | OUTPATIENT
Start: 2025-07-11 | End: 2025-07-11

## 2025-07-11 RX ORDER — POTASSIUM CHLORIDE 1500 MG/1
40 TABLET, EXTENDED RELEASE ORAL ONCE
Status: COMPLETED | OUTPATIENT
Start: 2025-07-11 | End: 2025-07-11

## 2025-07-11 RX ORDER — CALCIUM CARBONATE 500 MG/1
500 TABLET, CHEWABLE ORAL DAILY PRN
COMMUNITY
Start: 2025-07-11

## 2025-07-11 RX ADMIN — POTASSIUM CHLORIDE 40 MEQ: 1500 TABLET, EXTENDED RELEASE ORAL at 08:06

## 2025-07-11 RX ADMIN — Medication 2.5 MG: at 03:03

## 2025-07-11 RX ADMIN — MAGNESIUM SULFATE HEPTAHYDRATE 4 G: 40 INJECTION, SOLUTION INTRAVENOUS at 08:06

## 2025-07-11 RX ADMIN — INSULIN LISPRO 1 UNITS: 100 INJECTION, SOLUTION INTRAVENOUS; SUBCUTANEOUS at 12:01

## 2025-07-11 RX ADMIN — INSULIN LISPRO 1 UNITS: 100 INJECTION, SOLUTION INTRAVENOUS; SUBCUTANEOUS at 06:38

## 2025-07-11 RX ADMIN — METOPROLOL SUCCINATE 25 MG: 25 TABLET, EXTENDED RELEASE ORAL at 08:06

## 2025-07-11 RX ADMIN — ISOSORBIDE MONONITRATE 30 MG: 30 TABLET, EXTENDED RELEASE ORAL at 08:06

## 2025-07-11 RX ADMIN — RIVAROXABAN 20 MG: 20 TABLET, FILM COATED ORAL at 09:32

## 2025-07-11 RX ADMIN — TICAGRELOR 90 MG: 90 TABLET ORAL at 08:06

## 2025-07-11 RX ADMIN — FAMOTIDINE 20 MG: 20 TABLET, FILM COATED ORAL at 08:06

## 2025-07-11 NOTE — DISCHARGE INSTR - AVS FIRST PAGE
DISCHARGE INSTRUCTIONS  ARTERIOGRAM/ANGIOPLASTY/THROMBOLYSIS    ACTIVITY: On the evening following the procedure, you should be mostly resting.  Someone should remain with you during the evening and overnight following the procedure.     On the day after your procedure, limit your activity to walking.  Avoid heavy lifting (no more than 15 lbs) for the first three days. Walking up steps and normal activities may be resumed as you feel ready.   You should not drive a car for at least two days following discharge from the hospital. You may ride in a car.   If you have any questions regarding a particular activity, please discuss with your doctor or nurse before you are discharged.    DIET:  Resume your normal diet.  Drink more water than usual for the next 24 hours.    PROCEDURE SITE: You may have a procedure site in your groin, arm, or foot.  You may have surgical glue at your procedure site.  The glue is used to cover the procedure site, assist in closure, and prevent contamination. This adhesive will darken and peel away on its own within one to two weeks. Do not pick at it.    You should shower daily.  Wash incision daily with soap and water, but do not rub or scrub the incision; rinse thoroughly and pat dry.  Do not bathe in a tub or swim for the first 2 week following your procedure or if you have any open wounds.  It is normal to have some bruising, swelling or discoloration around the procedure site.  IF increasing redness, pain, or a bulge develops, call our office immediately.    If present, you may remove the band-aid or “steri-strips” over your procedure site after two days.   If you notice any active bleeding at the site, apply pressure to the site and call our office ([phone]) or [phone].    FOLLOW UP STUDIES:  Your doctor will discuss whether further treatments or follow-up studies are necessary at your first post procedure visit.    FOLLOW UP APPOINTMENTS:  Making and keeping follow up appointments  and ultrasound tests are important to your recovery.  If you have difficulty making it to or keeping your follow up appointments, call the office.    If you have increased pain, fever >101.5, increased drainage, redness or a bad smell at your surgery site, new coldness/numbness of your arm or leg, please call us immediately and GO directly to the ER.    PLEASE CALL THE OFFICE IF YOU HAVE ANY QUESTIONS  [phone]  [phone] [address], [address], [place] [address]  [address] [place] [address]  [address] [place] [address]  [address] [facility], [place] [address]  [address] [facility], [address], [place] [address]  [address] [facility], [address], [place] [address]  [address] [facility] [address], [address], [place] [address]  [address] [facility], [address]st Floor, [address], [place] [address]  [address] [place] [address]  [address] [facility], [address], [place] [address]    --Monitor puncture sites for increased bleeding, some oozing on the first day after removal is normal. If worsening pain or bleeding that consistently saturates dressings, please call the Vascular office.  --May change gauze and tape dressings as needed.

## 2025-07-11 NOTE — PROGRESS NOTES
Progress Note - Vascular Surgery   Name: Maxime Green 60 y.o. male I MRN: [id number]  Unit/Bed#: [facility] 505-01 I Date of Admission: 7/8/2025   Date of Service: 7/11/2025 I Hospital Day: 3    Assessment & Plan  Femoral-popliteal bypass graft occlusion, right, subsequent encounter  61 y/o M w/ history of PAD w/ claudication s/p Left SFA stent 1/20/22, Right Fem- POP bypass 2/3/2022 (Zev), RLE bypass graft occlusion s/p lysis, PTA, Viabahn stent 10/19/22 and repeat lysis with noted CFA stenosis above the bifurcation 6/2-3/2025. He now represents in transfer from [place] w/th RLE bypass graft occlusion. Initially he c/o rest pain but is now pain free at rest, M/S are intact.    POD 2 - thrombolysis with IR  Now with palpable R DP and PTl, foot is warm, pain controlled    Plan:  -Acute RLE bypass graft occlusion, asymptomatic at rest, likely returned to claudication as prior to bypass surgery.  -POD 2 thrombolysis with IR  -L groin access stable, soft, no oozing.   -Transitioned to hep gtt after lysis discontinuation. Plan to d/c hep gtt today and transition to Xarelto.   -Hgb 10.8 from 10.8  -Neurovascular checks q4h  -P2Y12 result of 300, Plavix non-responder. Plavix discontinued, Brilinta loaded 7/10, now on BID Brilinta.  - for price check for Brilinta, $0 copay  -Continue statin  -Pain control PRN  -Downgraded to MedSurg  -Likely discharge in next 24 hours, will discuss with Dr. Adri Burton.        Acquired hypofibrinogenemia (HCC)  S/p thrombolysis on 7/9 with IR  Initial fibrinogen 258 on admission  Down trending upon initiation of lysis, last result 70 yesterday morning  tPA rate adjusted per IR protocol, continue frequent neurovascular checks, monitor for worsening bleeding  LUE PICC plan to remove today, L groin access site soft, no hematoma.   Continue to monitor    24 Hour Events :   Subjective : Pt resting comfortably in hospital bed, anticipates potential discharge today. He states his symptoms are  improved, tolerating PO diet and voiding independently. Demonstrates motor function, denies numbness, tingling or pain to RLE.    Objective :  Temp:  [97.8 °F (36.6 °C)-98.6 °F (37 °C)] 98.6 °F (37 °C)  HR:  [56-68] 64  BP: (111-177)/(63-89) 163/86  Resp:  [11-22] 17  SpO2:  [96 %-100 %] 98 %  O2 Device: None (Room air)  Nasal Cannula O2 Flow Rate (L/min):  [2 L/min] 2 L/min  FiO2 (%):  [21] 21     I/O         07/09 0701  07/10 0700 07/10 0701 07/11 0700 07/11 0701 07/12 0700    P.O. 420      I.V. 1468 406.8     IV Piggyback 182.3 33.1     Total Intake 2070.2 439.9     Urine 600 2325     Total Output 600 2325     Net +1470.2 -1885.1                  Lines/Drains/Airways       Active Status       Name Placement date Placement time Site Days    PICC Line 07/09/25 07/09/25  1349  --  1                  Physical Exam  Vitals and nursing note reviewed.   Constitutional:       General: He is not in acute distress.     Appearance: He is well-developed.   HENT:      Head: Normocephalic and atraumatic.     Eyes:      Conjunctiva/sclera: Conjunctivae normal.       Cardiovascular:      Rate and Rhythm: Normal rate and regular rhythm.      Pulses:           Dorsalis pedis pulses are 2+ on the right side.        Posterior tibial pulses are 2+ on the right side.   Pulmonary:      Effort: Pulmonary effort is normal. No respiratory distress.   Abdominal:      Palpations: Abdomen is soft.      Tenderness: There is no abdominal tenderness.     Musculoskeletal:         General: No swelling.      Cervical back: Neck supple.     Skin:     General: Skin is warm and dry.      Capillary Refill: Capillary refill takes 2 to 3 seconds.     Neurological:      Mental Status: He is alert and oriented to person, place, and time. Mental status is at baseline.      Sensory: No sensory deficit.      Motor: No weakness.     Psychiatric:         Mood and Affect: Mood normal.           Lab Results: I have reviewed the following results:  CBC with  "diff:   Lab Results   Component Value Date    WBC 6.24 07/11/2025    HGB 10.8 (L) 07/11/2025    HCT 33.4 (L) 07/11/2025    MCV 81 (L) 07/11/2025     (L) 07/11/2025    RBC 4.11 07/11/2025    MCH 26.3 (L) 07/11/2025    MCHC 32.3 07/11/2025    RDW 13.5 07/11/2025    MPV 10.0 07/11/2025    NRBC 0 07/11/2025   ,   BMP/CMP:  Lab Results   Component Value Date    SODIUM 135 07/11/2025    K 3.5 07/11/2025     07/11/2025    CO2 26 07/11/2025    CO2 22 04/09/2021    BUN 11 07/11/2025    CREATININE 0.82 07/11/2025    GLUCOSE 145 (H) 04/09/2021    CALCIUM 8.5 07/11/2025    AST 21 07/07/2025    ALT 22 07/07/2025    ALKPHOS 78 07/07/2025    EGFR 96 07/11/2025   ,   Lipid Panel: No results found for: \"CHOL\",   Coags:   Lab Results   Component Value Date    PTT 98 (H) 07/11/2025    INR 1.30 (H) 07/10/2025   ,   Blood Culture: No results found for: \"BLOODCX\",   Urinalysis:   Lab Results   Component Value Date    COLORU Yellow 04/08/2021    CLARITYU Clear 04/08/2021    SPECGRAV 1.012 04/08/2021    PHUR 8.0 04/08/2021    LEUKOCYTESUR Negative 04/08/2021    NITRITE Negative 04/08/2021    GLUCOSEU Negative 04/08/2021    KETONESU Negative 04/08/2021    BILIRUBINUR Negative 04/08/2021    BLOODU Negative 04/08/2021   ,   Urine Culture: No results found for: \"URINECX\",   Wound Culure: No results found for: \"WOUNDCULT\"    Imaging Results Review: No pertinent imaging studies reviewed.  Other Study Results Review: No additional pertinent studies reviewed.    VTE Prophylaxis: VTE covered by:  heparin (porcine), Intravenous, 16 Units/kg/hr at 07/11/25 0509  heparin (porcine), Intravenous  heparin (porcine), Intravenous     "

## 2025-07-11 NOTE — PLAN OF CARE
Problem: PAIN - ADULT  Goal: Verbalizes/displays adequate comfort level or baseline comfort level  Description: Interventions:  - Encourage patient to monitor pain and request assistance  - Assess pain using appropriate pain scale  - Administer analgesics as ordered based on type and severity of pain and evaluate response  - Implement non-pharmacological measures as appropriate and evaluate response  - Consider cultural and social influences on pain and pain management  - Notify physician/advanced practitioner if interventions unsuccessful or patient reports new pain  - Educate patient/family on pain management process including their role and importance of  reporting pain   - Provide non-pharmacologic/complimentary pain relief interventions  7/11/2025 0410 by Angle Stuart RN  Outcome: Progressing  7/10/2025 2240 by Angle Stuart RN  Outcome: Progressing

## 2025-07-11 NOTE — DISCHARGE SUMMARY
Discharge Summary   Maxime Green 60 y.o. male MRN: [id number]  Unit/Bed#: [facility] 505-01 Encounter: [id number]    Admission Date: 7/8/2025     Discharge Date:07/11/25    Attending:Ryan Mc MD     Consultants: Critical Care    Admitting Diagnosis:  Acute lower limb ischemia [I99.8]    Principle Diagnosis: Acute limb ischemia, right lower limb with rest pain  Secondary Diagnosis: Hypofibrinogenemia secondary to thrombolysis with alteplase administration  Past Medical History[1]  Past Surgical History[2]    Procedures Performed: IR thrombolysis and stent placement over distal anastomotic stenosis      Laboratory data at discharge:   Results from last 7 days   Lab Units 07/11/25  0430 07/10/25  0534 07/10/25  0101   WBC Thousand/uL 6.24 5.46 5.23   HEMOGLOBIN g/dL 10.8* 10.8* 11.1*   HEMATOCRIT % 33.4* 33.5* 34.1*   PLATELETS Thousands/uL 118* 147* 146*     Results from last 7 days   Lab Units 07/11/25  0430 07/10/25  0534 07/09/25  0621   POTASSIUM mmol/L 3.5 4.0 3.6   CHLORIDE mmol/L 101 106 104   CO2 mmol/L 26 26 26   BUN mg/dL 11 15 15   CREATININE mg/dL 0.82 0.82 0.82   CALCIUM mg/dL 8.5 8.5 8.9     Results from last 7 days   Lab Units 07/11/25  0430 07/10/25  2145 07/10/25  1522 07/09/25  1746 07/09/25  1447 07/08/25  0237 07/07/25  1952   INR   --   --  1.30*  --  1.16  --  1.42*   PTT seconds 98* 69* 37*   < > 67*   < > 38*    < > = values in this interval not displayed.           Discharge instructions/Information to patient and family:   See after visit summary for information provided to patient and family.     Discharge Medications:  See after visit summary for reconciled discharge medications provided to patient and family.      --Continue Brilinta twice daily  --Continue Xarelto    Hospital Course:   Maxime Green is a 60-year-old male with a past medical history of hypertension, GERD, DM, CAD s/p CABG, carotid artery stenosis, PAD s/p left SFA stent in 2022 s/p right femoral - AK pop bypass with  PTFE, thrombosis of bypass resolved with thrombolysis and Viabahn stent placement, with repeat occlusion in 06/2025 status post lysis, presents for similar symptoms of rest pain.  Found to have occluded bypass again, and is amenable to repeat thrombolysis with IR.  On presentation, he was motor or sensory intact with dopplerable DP PT signals to his right foot.  He was then placed on a VTE heparin drip, and IR was consulted.  He remained n.p.o. in anticipation of lysis, home Xarelto was held.  Patient underwent initial IR lysis and stent placement over distal anastomotic stenosis on 07/09/2025.  A left upper extremity PICC line was inserted at this time.  At completion of procedure, he was transferred to the critical care unit for close monitoring while receiving thrombolysis with alteplase administration.  He had frequent neurovascular checks and labs trended during this time.  His puncture sites remained soft with minimal oozing.  He then went back for lysis recheck on 07/10/2025.  At this time lysis was discontinued, found to have palpable DP PT pulses, with right lower extremity warm and well-perfused.  He returned to the critical care unit, and shortly after was downgraded to [facility] and transferred to the floor.  By 07/11/2025, his PICC line was removed and heparin VTE gtt. was discontinued.  He was then transition to p.o. full dose Xarelto, as it is his regular home dose.  During this admission, he was tested for Plavix response which he was determined to be a nonresponder.  Plavix was then discontinued. At completion of lysis he was Brilinta loaded, and Brilinta started twice daily thereafter.  Case management priced Brilinta prior to administration, determined to have $0 co-pay at his local pharmacy.  His left groin access site remained clean dry and intact, bilateral feet warm and well-perfused with palpable pulses bilaterally.  He reported some tenderness in his right calf that is most likely due to  reperfusion pain.  Compartments remain soft, and he remained motor sensory intact bilaterally.  At this time he felt comfortable with discharge.  Follow-up appointment was arranged, and he was given a work note until he gets appropriate clearance at his follow-up appointment.  His pain remained adequately controlled on Tylenol, endorsed not needing any pain prescriptions for home.  At discharge, some strikethrough noted to gauze dressing after PICC was removed earlier.  Left arm remains soft, no hematoma or further ooze.  Instructed to monitor puncture sites for bleeding that is saturating dressings, however this is most likely due to Brilinta load and systemic AC.  He remained hemodynamically stable, tolerating p.o. diet and voiding spontaneously.  At this time IVs were discontinued, and he called family for a ride home.  He remained medically stable for discharge.     Hospital course was complicated by the following:  -- Hypofibrinogenemia: Fibrinogen level 258 on admission, upon trending levels at initiation of thrombolysis, his fibrinogen decreased 70.  [facility] followed IR protocol for reducing alteplase dosing, venipuncture sites were monitored for oozing, no signs of bleeding at access site, remains soft no hematoma.  Hemoglobin remained stable during admission.  Neurologic exam closely monitored.    Prior to discharge, the patient was given instructions for outpatient care and follow-up.  The patient has been instructed to call w/ any questions, changes, or concerns for the medical condition.    For further details of the hospitalization, please refer to the medical record.    Condition at Discharge: stable     Provisions for Follow-Up Care:  See after visit summary for information related to follow-up care and any pertinent home health orders.      Disposition: Home    Planned Readmission: No    Discharge Statement   I spent 30 minutes discharging the patient. This time was spent on the day of discharge. I  had direct contact with the patient on the day of discharge. Additional documentation is required if more than 30 minutes were spent on discharge.     Godwin Saunders PA-C  7/11/2025      This text is generated with voice recognition software. There may be translation, syntax,  or grammatical errors. If you have any questions, please contact the dictating provider.           [1]   Past Medical History:  Diagnosis Date    Bicuspid aortic valve     being observed    CAD (coronary artery disease)     Chest pain 4/6/2021    Coronary artery disease     Diabetes mellitus ([facility])     Encounter for postoperative care 5/17/2021    Former tobacco use     GERD (gastroesophageal reflux disease)     Goiter     History of myocardial infarction     History of rib fracture     Hyperlipidemia     Hypertension     Hypertensive urgency 4/6/2021    Left carotid bruit 7/22/2021    Leg pain, bilateral     Agram today LLE   1/20/2022    Leukocytosis 4/6/2021    Myocardial infarction ([facility])     2021-   CABG  x3    NSTEMI (non-ST elevated myocardial infarction) ([facility]) 4/7/2021    Wears glasses    [2]   Past Surgical History:  Procedure Laterality Date    ARTERIOGRAM Right 10/19/2022    Procedure: RIGHT LOWER EXTREMITY ARTERIOGRAM WITH LYSIS, PLACEMENT OF LYSIS CATHETER; third order cannulation of right popliteal artery via left groin access;  Surgeon: Ronald Ordonez MD;  [facility]: [facility];  Service: Vascular    CARDIAC CATHETERIZATION      CARDIAC CATHETERIZATION N/A 10/20/2021    Procedure: Cardiac catheterization;  Surgeon: Lynnette Reeder MD;  Location: [facility];  Service: Cardiology    CARDIAC CATHETERIZATION N/A 10/20/2021    Procedure: Cardiac Coronary Angiogram;  Surgeon: Lynnette eReder MD;  [facility]: [facility];  Service: Cardiology    CARDIAC CATHETERIZATION N/A 10/20/2021    Procedure: Cardiac pci;  Surgeon: Lynnette Reeder MD;  [facility]: [facility];  Service: Cardiology    IR LOWER EXTREMITY  ANGIOGRAM  1/20/2022    IR LOWER EXTREMITY ANGIOGRAM  2/3/2022    IR LOWER EXTREMITY ANGIOGRAM  10/19/2022    IR LOWER EXTREMITY ANGIOGRAM  6/2/2025    IR LOWER EXTREMITY ANGIOGRAM  7/9/2025    IR PICC PLACEMENT DOUBLE LUMEN  10/19/2022    IR PICC PLACEMENT DOUBLE LUMEN  6/2/2025    IR PICC PLACEMENT DOUBLE LUMEN  7/9/2025    IR TPA LYSIS CHECK  10/20/2022    IR TPA LYSIS CHECK  6/3/2025    WY BYPASS W/VEIN FEMORAL-POPLITEAL Right 2/3/2022    Procedure: BYPASS FEMORAL-POPLITEAL;  Surgeon: Pradeep Brothers MD;  Location: [facility];  Service: Vascular    WY CORONARY ARTERY BYP W/VEIN & ARTERY GRAFT 4 VEIN N/A 4/9/2021    Procedure: CORONARY ARTERY BYPASS GRAFT (CABG) 3 VESSELS: LIMA to LAD; LLE EVH/SVG to LPL & OM2;  Surgeon: Jose Sanchez DO;  Location: [facility];  Service: Cardiac Surgery    WY ECHO TRANSESOPHAG R-T 2D W/PRB IMG ACQUISJ I&R N/A 4/9/2021    Procedure: TRANSESOPHAGEAL ECHOCARDIOGRAM (BENNETT);  Surgeon: Jose Sanchez DO;  [facility]: [facility];  Service: Cardiac Surgery    WY NDSC SURG W/VIDEO-ASSISTED HARVEST VEIN CABG Left 4/9/2021    Procedure: HARVEST VEIN ENDOSCOPIC (EVH);  Surgeon: Jose Sanchez DO;  Location: [facility];  Service: Cardiac Surgery    WY SLCTV CATHJ 3RD+ ORD SLCTV ABDL PEL/LXTR BRNCH Left 1/20/2022    Procedure: ARTERIOGRAM, STENT PLACEMENT IN LEFT SUPERFICIAL FEMORIAL ARTERY;  Surgeon: Pradeep Brothers MD;  [facility];  Service: Vascular    VASECTOMY

## 2025-07-11 NOTE — ASSESSMENT & PLAN NOTE
S/p thrombolysis on 7/9 with IR  Initial fibrinogen 258 on admission  Down trending upon initiation of lysis, last result 70 yesterday morning  tPA rate adjusted per IR protocol, continue frequent neurovascular checks, monitor for worsening bleeding  LUE PICC plan to remove today, L groin access site soft, no hematoma.   Continue to monitor

## 2025-07-11 NOTE — ASSESSMENT & PLAN NOTE
59 y/o M w/ history of PAD w/ claudication s/p Left SFA stent 1/20/22, Right Fem- POP bypass 2/3/2022 (Zev), RLE bypass graft occlusion s/p lysis, PTA, Viabahn stent 10/19/22 and repeat lysis with noted CFA stenosis above the bifurcation 6/2-3/2025. He now represents in transfer from [place] w/th RLE bypass graft occlusion. Initially he c/o rest pain but is now pain free at rest, M/S are intact.    POD 2 - thrombolysis with IR  Now with palpable R DP and PTl, foot is warm, pain controlled    Plan:  -Acute RLE bypass graft occlusion, asymptomatic at rest, likely returned to claudication as prior to bypass surgery.  -POD 2 thrombolysis with IR  -L groin access stable, soft, no oozing.   -Transitioned to hep gtt after lysis discontinuation. Plan to d/c hep gtt today and transition to Xarelto.   -Hgb 10.8 from 10.8  -Neurovascular checks q4h  -P2Y12 result of 300, Plavix non-responder. Plavix discontinued, Brilinta loaded 7/10, now on BID Brilinta.  - for price check for Brilinta, $0 copay  -Continue statin  -Pain control PRN  -Downgraded to MedSurg  -Likely discharge in next 24 hours, will discuss with Dr. Adri Burton.

## 2025-07-14 NOTE — UTILIZATION REVIEW
NOTIFICATION OF ADMISSION DISCHARGE   This is a Notification of Discharge from [facility]. Please be advised that this patient has been discharge from our facility. Below you will find the admission and discharge date and time including the patient’s disposition.   UTILIZATION REVIEW CONTACT:  Utilization Review Assistants  Network Utilization Review Department  Phone: [phone] x carefully listen to the prompts. All voicemails are confidential.  Email: [email or web address]     ADMISSION INFORMATION  PRESENTATION DATE: 7/8/2025  1:12 AM  OBERVATION ADMISSION DATE: N/A  INPATIENT ADMISSION DATE: 7/8/25  2:02 AM   DISCHARGE DATE: 7/11/2025  1:56 PM   DISPOSITION:Home/Self Care    Network Utilization Review Department  ATTENTION: Please call with any questions or concerns to [phone] and carefully listen to the prompts so that you are directed to the right person. All voicemails are confidential.   For Discharge needs, contact Care Management DC Support Team at [phone] opt. [phone]  Send all requests for admission clinical reviews, approved or denied determinations and any other requests to dedicated fax number below belonging to the campus where the patient is receiving treatment. List of dedicated fax numbers for the Facilities:  FACILITY NAME UR FAX NUMBER   ADMISSION DENIALS (Administrative/Medical Necessity) [phone]   DISCHARGE SUPPORT TEAM ([facility]) [phone]   PARENT CHILD HEALTH (Maternity/NICU/Pediatrics) [phone]   [facility] [phone]   [facility] [phone]   [facility] [phone]   [facility] [phone]   [facility] [phone]   [facility] [phone]   [facility] [phone]   [facility] [phone]   [facility]  [facility] [phone]   [facility] [phone]   [facility] [phone]   [facility] [phone]

## 2025-07-17 ENCOUNTER — PATIENT MESSAGE (OUTPATIENT)
Dept: FAMILY MEDICINE CLINIC | Facility: CLINIC | Age: 60
End: 2025-07-17

## 2025-07-22 NOTE — PATIENT COMMUNICATION
Pt called in returning vm he received to schedule TCM appt.     Warm transferred pt to [place] with Clerical team for further assistance.

## 2025-07-23 ENCOUNTER — TELEMEDICINE (OUTPATIENT)
Dept: FAMILY MEDICINE CLINIC | Facility: CLINIC | Age: 60
End: 2025-07-23
Payer: COMMERCIAL

## 2025-07-23 VITALS — HEIGHT: 65 IN | WEIGHT: 150 LBS | BODY MASS INDEX: 24.99 KG/M2

## 2025-07-23 DIAGNOSIS — T82.898D FEMORAL-POPLITEAL BYPASS GRAFT OCCLUSION, RIGHT, SUBSEQUENT ENCOUNTER: ICD-10-CM

## 2025-07-23 DIAGNOSIS — I99.8 ACUTE LOWER LIMB ISCHEMIA: Primary | ICD-10-CM

## 2025-07-23 DIAGNOSIS — I25.110 CORONARY ARTERY DISEASE INVOLVING NATIVE CORONARY ARTERY OF NATIVE HEART WITH UNSTABLE ANGINA PECTORIS (HCC): Chronic | ICD-10-CM

## 2025-07-23 DIAGNOSIS — E11.59 TYPE 2 DIABETES MELLITUS WITH OTHER CIRCULATORY COMPLICATION, WITHOUT LONG-TERM CURRENT USE OF INSULIN (HCC): ICD-10-CM

## 2025-07-23 PROCEDURE — 99495 TRANSJ CARE MGMT MOD F2F 14D: CPT | Performed by: STUDENT IN AN ORGANIZED HEALTH CARE EDUCATION/TRAINING PROGRAM

## 2025-07-23 NOTE — PROGRESS NOTES
Transition of Care Visit:  Name: Maxime Green      : 1965      MRN: [id number]  Encounter Provider: Michael Stapleton MD  Encounter Date: 2025   Encounter department: [facility] [address] [place] [address]    Assessment & Plan  Acute lower limb ischemia  Possibly related to being a plavix non responder. Went on brilinta        Femoral-popliteal bypass graft occlusion, right, subsequent encounter         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 complication, without long-term current use of insulin (HCC)    Lab Results   Component Value Date    HGBA1C 7.9 (H) 2025            Coronary artery disease involving native coronary artery of native heart with unstable angina pectoris (HCC)  Given strong family hx, possibly a component of this. Can recheck later this year  Orders:  •  Lipoprotein A (LPA); Future         History of Present Illness     Transitional Care Management Review:   Maxime Green is a 60 y.o. male here for TCM follow up.     During the TCM phone call patient stated:  TCM Call (since 2025)     Date and time call was made  2025  2:15 PM    Hospital care reviewed  Records reviewed    Patient was hospitialized at  [facility]    Date of Admission  25    Date of discharge  25    Diagnosis  Femoral-popliteal bypass graft occlusion, right, subsequent encounter Principal problem    Disposition  Home      TCM Call (since 2025)     None        HPI    Maxime Green is a 60-year-old male with a past medical history of hypertension, GERD, DM, CAD s/p CABG, carotid artery stenosis, PAD s/p left SFA stent in  s/p right femoral - AK pop bypass with PTFE, thrombosis of bypass resolved with thrombolysis and Viabahn stent placement, with repeat occlusion in 2025 status post lysis, presents for similar symptoms of rest pain.  Found to have occluded bypass again, and is amenable to repeat thrombolysis with IR.  On presentation, he was  motor or sensory intact with dopplerable DP PT signals to his right foot.  He was then placed on a VTE heparin drip, and IR was consulted.  He remained n.p.o. in anticipation of lysis, home Xarelto was held.  Patient underwent initial IR lysis and stent placement over distal anastomotic stenosis on 07/09/2025.  A left upper extremity PICC line was inserted at this time.  At completion of procedure, he was transferred to the critical care unit for close monitoring while receiving thrombolysis with alteplase administration.  He had frequent neurovascular checks and labs trended during this time.  His puncture sites remained soft with minimal oozing.  He then went back for lysis recheck on 07/10/2025.  At this time lysis was discontinued, found to have palpable DP PT pulses, with right lower extremity warm and well-perfused.  He returned to the critical care unit, and shortly after was downgraded to [facility] and transferred to the floor.  By 07/11/2025, his PICC line was removed and heparin VTE gtt. was discontinued.  He was then transition to p.o. full dose Xarelto, as it is his regular home dose.  During this admission, he was tested for Plavix response which he was determined to be a nonresponder.  Plavix was then discontinued. At completion of lysis he was Brilinta loaded, and Brilinta started twice daily thereafter.  Case management priced Brilinta prior to administration, determined to have $0 co-pay at his local pharmacy.  His left groin access site remained clean dry and intact, bilateral feet warm and well-perfused with palpable pulses bilaterally.  He reported some tenderness in his right calf that is most likely due to reperfusion pain.  Compartments remain soft, and he remained motor sensory intact bilaterally.    Review of Systems   Constitutional:  Negative for chills, fatigue and fever.   HENT:  Negative for rhinorrhea and sore throat.    Eyes:  Negative for visual disturbance.   Respiratory:  Negative  "for cough and shortness of breath.    Cardiovascular:  Negative for chest pain and palpitations.   Gastrointestinal:  Negative for abdominal pain, constipation, diarrhea, nausea and vomiting.   Genitourinary:  Negative for difficulty urinating, dysuria and frequency.   Musculoskeletal:  Negative for arthralgias and myalgias.   Skin:  Negative for color change and rash.   Neurological:  Negative for weakness and headaches.     Objective   Ht 5' 5\" (1.651 m)   Wt 68 kg (150 lb)   BMI 24.96 kg/m²     Physical Exam  Constitutional:       Appearance: Normal appearance.   HENT:      Head: Normocephalic and atraumatic.   Pulmonary:      Effort: No respiratory distress.     Neurological:      Mental Status: He is alert. Mental status is at baseline.     Psychiatric:         Mood and Affect: Mood normal.       Medications have been reviewed by provider in current encounter      "

## 2025-07-23 NOTE — ASSESSMENT & PLAN NOTE
Given strong family hx, possibly a component of this. Can recheck later this year  Orders:  •  Lipoprotein A (LPA); Future

## 2025-07-30 ENCOUNTER — OFFICE VISIT (OUTPATIENT)
Dept: VASCULAR SURGERY | Facility: CLINIC | Age: 60
End: 2025-07-30
Payer: COMMERCIAL

## 2025-07-30 VITALS
HEIGHT: 65 IN | BODY MASS INDEX: 25.33 KG/M2 | RESPIRATION RATE: 16 BRPM | WEIGHT: 152 LBS | SYSTOLIC BLOOD PRESSURE: 132 MMHG | DIASTOLIC BLOOD PRESSURE: 84 MMHG | OXYGEN SATURATION: 99 % | HEART RATE: 85 BPM

## 2025-07-30 DIAGNOSIS — I99.8 ACUTE LOWER LIMB ISCHEMIA: ICD-10-CM

## 2025-07-30 DIAGNOSIS — I70.213 ATHEROSCLEROSIS OF NATIVE ARTERY OF BOTH LOWER EXTREMITIES WITH INTERMITTENT CLAUDICATION (HCC): ICD-10-CM

## 2025-07-30 DIAGNOSIS — I65.23 ASYMPTOMATIC BILATERAL CAROTID ARTERY STENOSIS: ICD-10-CM

## 2025-07-30 DIAGNOSIS — T82.898D FEMORAL-POPLITEAL BYPASS GRAFT OCCLUSION, RIGHT, SUBSEQUENT ENCOUNTER: Primary | ICD-10-CM

## 2025-07-30 PROBLEM — D65 ACQUIRED HYPOFIBRINOGENEMIA (HCC): Status: RESOLVED | Noted: 2025-07-10 | Resolved: 2025-07-30

## 2025-07-30 PROCEDURE — 99213 OFFICE O/P EST LOW 20 MIN: CPT | Performed by: SURGERY

## 2025-07-30 RX ORDER — TICAGRELOR 90 MG/1
90 TABLET, FILM COATED ORAL EVERY 12 HOURS SCHEDULED
Qty: 180 TABLET | Refills: 1 | Status: SHIPPED | OUTPATIENT
Start: 2025-07-30

## (undated) DEVICE — INTENDED FOR TISSUE SEPARATION, AND OTHER PROCEDURES THAT REQUIRE A SHARP SURGICAL BLADE TO PUNCTURE OR CUT.: Brand: BARD-PARKER ® CARBON RIB-BACK BLADES

## (undated) DEVICE — TOWEL SURG XR DETECT GREEN STRL RFD

## (undated) DEVICE — ADHESIVE SKIN HIGH VISCOSITY EXOFIN 1ML

## (undated) DEVICE — CATH TEMPO AQUA 4FVER135Â°100CM: Brand: TEMPO AQUA

## (undated) DEVICE — RADIFOCUS TORQUE DEVICE MULTI-TORQUE VISE: Brand: RADIFOCUS TORQUE DEVICE

## (undated) DEVICE — CHLORAPREP HI-LITE 26ML ORANGE

## (undated) DEVICE — PROVE COVER: Brand: UNBRANDED

## (undated) DEVICE — SUT PROLENE 6-0 C-1/C-1 30 IN 8307H

## (undated) DEVICE — 40601 PROLONGED POSITIONING SYSTEM: Brand: 40601 PROLONGED POSITIONING SYSTEM

## (undated) DEVICE — 3M™ IOBAN™ 2 ANTIMICROBIAL INCISE DRAPE 6648EZ: Brand: IOBAN™ 2

## (undated) DEVICE — NON-DEHP HIGH FLOW RATE EXTENSION SET, MALE LUER LOCK ADAPTER

## (undated) DEVICE — BARD® PARSONNET™ VASCULAR PROBE 1.0 MM X 45 CM: Brand: BARD® PARSONNET

## (undated) DEVICE — GLOVE SRG BIOGEL 7

## (undated) DEVICE — SPECIMEN CONTAINER STERILE PEEL PACK

## (undated) DEVICE — GLIDESHEATH SLENDER STAINLESS STEEL KIT: Brand: GLIDESHEATH SLENDER

## (undated) DEVICE — DRAPE EQUIPMENT RF WAND

## (undated) DEVICE — CATH DIAG 5FR IMPULSE 100CM IM

## (undated) DEVICE — CATH DIAG 5FR IMPULSE 100CM FR4

## (undated) DEVICE — PINNACLE R/O II INTRODUCER SHEATH WITH RADIOPAQUE MARKER: Brand: PINNACLE

## (undated) DEVICE — 3000CC GUARDIAN II: Brand: GUARDIAN

## (undated) DEVICE — BONE WAX WHITE: Brand: BONE WAX WHITE

## (undated) DEVICE — RADIFOCUS GLIDEWIRE: Brand: GLIDEWIRE

## (undated) DEVICE — SYRINGE 1ML SLIP TIP

## (undated) DEVICE — DRESSING MEPILEX AG BORDER 4 X 8 IN

## (undated) DEVICE — RECIP.STERNUM SAW BLADE 34/7.5/0.7MM: Brand: AESCULAP

## (undated) DEVICE — GLOVE SRG BIOGEL ECLIPSE 8

## (undated) DEVICE — FLUID MANAGEMENT KIT - IR

## (undated) DEVICE — TRAY FOLEY 16FR URIMETER SURESTEP

## (undated) DEVICE — 3M™ TEGADERM™ TRANSPARENT FILM DRESSING FRAME STYLE, 1626W, 4 IN X 4-3/4 IN (10 CM X 12 CM), 50/CT 4CT/CASE: Brand: 3M™ TEGADERM™

## (undated) DEVICE — EVERGRIP INSERT SET 86MM: Brand: FOGARTY EVERGRIP

## (undated) DEVICE — STERILE ICS CARDIOVASCULAR PK: Brand: CARDINAL HEALTH

## (undated) DEVICE — DESTINATION PERIPHERAL GUIDING SHEATH: Brand: DESTINATION

## (undated) DEVICE — AORTIC PUNCH 5.2 MM DISP

## (undated) DEVICE — CATH DIAG 5FR IMPULSE 100CM FL4

## (undated) DEVICE — TUBING INSUFFLATION SET ISO CONNECTOR

## (undated) DEVICE — SUT PROLENE 7-0 BV-1/BV-1 24 IN 8304H

## (undated) DEVICE — STERILE BETHLEHEM FEM POP PACK: Brand: CARDINAL HEALTH

## (undated) DEVICE — SILVER-COATED ANTIBACTERIAL BARRIER DRESSING: Brand: ACTICOAT SURGIC 10X25CM 5PK US

## (undated) DEVICE — SUT PROLENE 7-0 BV 175-6 24 IN 8735H

## (undated) DEVICE — GLOVE INDICATOR PI UNDERGLOVE SZ 7.5 BLUE

## (undated) DEVICE — INDICATED FOR SURGICAL CLAMPING DURING CARDIOVASCULAR PERIPHERAL VASCULAR, AND GENERAL SURGERY.: Brand: SOFT/FIBRA® SPRING CLIP  1/2 FORCE

## (undated) DEVICE — SGW STORQ .035 300CM ANGLE STD: Brand: STORQ

## (undated) DEVICE — IV SET 15 DROP STERILE 0/Y GRAVITY

## (undated) DEVICE — NC TREK CORONARY DILATATION CATHETER 2.75 MM X 12 MM / RAPID-EXCHANGE: Brand: NC TREK

## (undated) DEVICE — STOPCOCK 4 WAY LL HIGH PRESSURE

## (undated) DEVICE — ELECTRODE BLADE E-Z CLEAN 4IN -0014A

## (undated) DEVICE — SILVER-COATED ANTIBACTERIAL BARRIER DRESSING: Brand: ACTICOAT SURGIC 10X12CM 5PK US

## (undated) DEVICE — SYRINGE KIT,PACKAGED,,150FT,MK 7(ANGIO-ARTERION, 150ML SYR KIT W/QFT,MC)(60729385): Brand: MEDRAD® MARK 7 ARTERION DISPOSABLE SYRINGE 150 ML WITH QUICK FILL TUBE

## (undated) DEVICE — 32 FR RIGHT ANGLE – SOFT PVC CATHETER: Brand: PVC THORACIC CATHETERS

## (undated) DEVICE — TRAY FOLEY 16FR SURESTEP TEMP SENS URIMETER STAT LOK

## (undated) DEVICE — PINNACLE INTRODUCER SHEATH: Brand: PINNACLE

## (undated) DEVICE — PRESTO™ INFLATION DEVICE: Brand: PRESTO

## (undated) DEVICE — MICROPUNCTURE INTRODUCER SET SILHOUETTE TRANSITIONLESS PUSH-PLUS DESIGN - STIFFENED CANNULA WITH NITINOL WIRE GUIDE: Brand: MICROPUNCTURE

## (undated) DEVICE — SUT SILK 2-0 30 IN A305H

## (undated) DEVICE — DRESSING MEPILEX AG BORDER 4 X 4 IN

## (undated) DEVICE — BAG DECANTER

## (undated) DEVICE — NC TREK CORONARY DILATATION CATHETER 2.0 MM X 12 MM / RAPID-EXCHANGE: Brand: NC TREK

## (undated) DEVICE — VASOVIEW HEMOPRO 2: Brand: VASOVIEW HEMOPRO 2

## (undated) DEVICE — SYRINGE 50ML LL

## (undated) DEVICE — SUT MONOCRYL 4-0 PS-2 27 IN Y426H

## (undated) DEVICE — SUT MONOCRYL PLUS 4-0 PS-2 18 IN MCP496G

## (undated) DEVICE — SUT SILK 3-0 SH CR/8 18 IN C013D

## (undated) DEVICE — Device

## (undated) DEVICE — ANTIBACTERIAL UNDYED BRAIDED (POLYGLACTIN 910), SYNTHETIC ABSORBABLE SUTURE: Brand: COATED VICRYL

## (undated) DEVICE — CATH BAL CHARGER 5 X 150MM X 135CM

## (undated) DEVICE — RUNTHROUGH NS EXTRA FLOPPY PTCA GUIDEWIRE: Brand: RUNTHROUGH

## (undated) DEVICE — PENCIL ELECTROSURG E-Z CLEAN -0035H

## (undated) DEVICE — SUT PROLENE 6-0 BV130 30 IN 8709H

## (undated) DEVICE — SUT SILK 0 30 IN A306H

## (undated) DEVICE — INFUSER BAG 500ML

## (undated) DEVICE — JP CHANNEL DRAIN, 24FR HUBLESS: Brand: CARDINAL HEALTH

## (undated) DEVICE — IV EXTENSION TUBING 33 IN

## (undated) DEVICE — INTENDED FOR TISSUE SEPARATION, AND OTHER PROCEDURES THAT REQUIRE A SHARP SURGICAL BLADE TO PUNCTURE OR CUT.: Brand: BARD-PARKER SAFETY BLADES SIZE 15, STERILE

## (undated) DEVICE — SUT ETHILON 3-0 PS-1 18 IN 1663G

## (undated) DEVICE — PLUMEPEN PRO 10FT

## (undated) DEVICE — BLADE BEAVER MINI SZ 69

## (undated) DEVICE — 3M™ TEGADERM™ TRANSPARENT FILM DRESSING FRAME STYLE, 1627, 4 IN X 10 IN (10 CM X 25 CM), 20/CT 4CT/CASE: Brand: 3M™ TEGADERM™

## (undated) DEVICE — SUT SILK 4-0 30 IN A303H

## (undated) DEVICE — GLOVE SRG BIOGEL 7.5

## (undated) DEVICE — OASIS DRAIN, DUAL, IN-LINE, ATS COMPATIBLE: Brand: OASIS

## (undated) DEVICE — IV CATH INTROCAN 18G X 1 1/4 SAFETY

## (undated) DEVICE — SUT MONOCRYL 3-0 SH 27 IN Y416H

## (undated) DEVICE — SCD SEQUENTIAL COMPRESSION COMFORT SLEEVE MEDIUM KNEE LENGTH: Brand: KENDALL SCD

## (undated) DEVICE — SUT PROLENE 4-0 BB 36 IN 8581H

## (undated) DEVICE — SUT SILK 0 CT-1 30 IN 424H

## (undated) DEVICE — SUT ETHIBOND 2-0 SH-1/SH-1 30 IN X763H

## (undated) DEVICE — CATH DIAG 5FR .035 65CM 6S OMMI-FLUSH

## (undated) DEVICE — SUT MONOCRYL PLUS 3-0 PS-2 27 IN MCP427H

## (undated) DEVICE — SUCTION CATH 18 FR

## (undated) DEVICE — INTENDED FOR TISSUE SEPARATION, AND OTHER PROCEDURES THAT REQUIRE A SHARP SURGICAL BLADE TO PUNCTURE OR CUT.: Brand: BARD-PARKER SAFETY BLADES SIZE 10, STERILE

## (undated) DEVICE — CATH STRAIGHT RED RUBBER 20 FR

## (undated) DEVICE — CATH GUIDE LAUNCHER 6FR EBU 3.5

## (undated) DEVICE — THERMOFLECT BLANKET, L, 25EA                               TS THERMOFLECT BLANKET, 48" X 84", SILVER, 5/BG, 5 BG/CS NW: Brand: THERMOFLECT

## (undated) DEVICE — INTENDED FOR TISSUE SEPARATION, AND OTHER PROCEDURES THAT REQUIRE A SHARP SURGICAL BLADE TO PUNCTURE OR CUT.: Brand: BARD-PARKER SAFETY BLADES SIZE 11, STERILE

## (undated) DEVICE — BLANKET HYPOTHERMIA ADULT GAYMAR

## (undated) DEVICE — DRESSING ALLEVYN LIFE HEEL 25 X 25.2CM

## (undated) DEVICE — GAUZE SPONGES,16 PLY: Brand: CURITY

## (undated) DEVICE — ASTOUND STANDARD SURGICAL GOWN, XL: Brand: CONVERTORS

## (undated) DEVICE — LIGACLIP MCA MULTIPLE CLIP APPLIERS, 20 MEDIUM CLIPS: Brand: LIGACLIP

## (undated) DEVICE — SPONGE SCRUB 4 PCT CHLORHEXIDINE

## (undated) DEVICE — PLEDGET CARDIO PTFE 9.5 X 4.8 SOFT LF (6EA/PK)

## (undated) DEVICE — 32 FR STRAIGHT – SOFT PVC CATHETER: Brand: PVC THORACIC CATHETERS

## (undated) DEVICE — COVER PROBE INTRAOPERATIVE 6 X 96 IN

## (undated) DEVICE — 3M™ TEGADERM™ CHG DRESSING 25/CARTON 4 CARTONS/CASE 1659: Brand: TEGADERM™

## (undated) DEVICE — SUT PROLENE 7-0 BV175-8/BV175-8 24 IN EPM8747

## (undated) DEVICE — ALCON OPHTHALMIC KNIFE 15 °: Brand: ALCON

## (undated) DEVICE — SUT SILK 3-0 30 IN A304H

## (undated) DEVICE — LIGHT HANDLE COVER SLEEVE DISP BLUE STELLAR

## (undated) DEVICE — SUT PROLENE 4-0 RB-1/RB-1 36 IN 8557H

## (undated) DEVICE — ACE WRAP 6 IN UNSTERILE

## (undated) DEVICE — DGW .035 FC J3MM 260CM TEF: Brand: EMERALD

## (undated) DEVICE — SUT SILK 2 60 IN SA8H

## (undated) DEVICE — DRAPE SHEET X-LG

## (undated) DEVICE — PACK CABG PBDS

## (undated) DEVICE — GLOVE INDICATOR PI UNDERGLOVE SZ 8 BLUE

## (undated) DEVICE — STOPCOCK 3-WAY

## (undated) DEVICE — FILTER SMOKE EVAC VIROSAFE

## (undated) DEVICE — SNAP KOVER: Brand: UNBRANDED

## (undated) DEVICE — MICROPUNCTURE 501

## (undated) DEVICE — SURGICEL 4 X 8

## (undated) DEVICE — SUT PDS PLUS 1 CTB 36 IN PDPB359T

## (undated) DEVICE — LIGACLIP MCA MULTIPLE CLIP APPLIERS, 20 SMALL CLIPS: Brand: LIGACLIP

## (undated) DEVICE — STERNAL WIRE

## (undated) DEVICE — MINI TREK CORONARY DILATATION CATHETER 2.0 MM X 15 MM / RAPID-EXCHANGE: Brand: MINI TREK

## (undated) DEVICE — FLEXCIL HIGH PRESSURE CONTRAST INJECTION LINE: Brand: NAMIC

## (undated) DEVICE — SUT PROLENE 5-0 C-1/C-1 36 IN 8321H